# Patient Record
Sex: FEMALE | Race: WHITE | Employment: OTHER | URBAN - METROPOLITAN AREA
[De-identification: names, ages, dates, MRNs, and addresses within clinical notes are randomized per-mention and may not be internally consistent; named-entity substitution may affect disease eponyms.]

---

## 2017-01-26 ENCOUNTER — OFFICE VISIT (OUTPATIENT)
Dept: SLEEP MEDICINE | Age: 77
End: 2017-01-26

## 2017-01-26 ENCOUNTER — DOCUMENTATION ONLY (OUTPATIENT)
Dept: SLEEP MEDICINE | Age: 77
End: 2017-01-26

## 2017-01-26 VITALS
WEIGHT: 214 LBS | HEIGHT: 61 IN | BODY MASS INDEX: 40.4 KG/M2 | DIASTOLIC BLOOD PRESSURE: 65 MMHG | SYSTOLIC BLOOD PRESSURE: 169 MMHG | OXYGEN SATURATION: 95 % | TEMPERATURE: 98.2 F | HEART RATE: 55 BPM

## 2017-01-26 DIAGNOSIS — E66.01 OBESITY, MORBID, BMI 40.0-49.9 (HCC): ICD-10-CM

## 2017-01-26 DIAGNOSIS — G47.33 OSA (OBSTRUCTIVE SLEEP APNEA): Primary | ICD-10-CM

## 2017-01-26 RX ORDER — ZINC GLUCONATE 10 MG
1 LOZENGE ORAL
COMMUNITY
End: 2019-08-07

## 2017-01-26 RX ORDER — CALCIUM CARBONATE 600 MG
600 TABLET ORAL
COMMUNITY
End: 2019-08-07

## 2017-01-26 NOTE — PROGRESS NOTES
Faxed supply order to [facility], per [facility] no longer accepts medicare.  Patient has been notified of new DME and contact information

## 2017-01-26 NOTE — PROGRESS NOTES
[address] [place]. [place], [address]  Tel.  [phone]  Fax. 100 [facility] 60  [place], [address]  Tel.  [phone]  Fax. [phone] [address] [address] [address]   Tel.  [phone]  Fax. [phone]     S>Jeannine Zimmer is a 67 y.o. female seen for a positive airway pressure follow-up. She reports some problems using the device. The following problems are identified:    Drowsiness no Problems exhaling no   Snoring no Forget to put on no   Mask Comfortable yes Can't fall asleep no   Dry Mouth no Mask falls off no   Air Leaking yes Frequent awakenings no         She admits that her sleep has improved. Allergies   Allergen Reactions    Sulfa (Sulfonamide Antibiotics) Other (comments)     Unknown    Codeine Other (comments)    Levaquin [Levofloxacin] Other (comments)    Lisinopril Cough       She has a current medication list which includes the following prescription(s): calcium carbonate, magnesium, levetiracetam, metoprolol tartrate, chlorthalidone, pravastatin, ondansetron, nebivolol, simvastatin, amlodipine, terazosin hcl, losartan, docosahexanoic acid/epa, cholecalciferol (vitamin d3), calcium, aspirin, and levofloxacin. .      She  has a past medical history of High cholesterol and Hypertension. [place] Sleepiness Score: 6   and Modified F.O.S.Q. Score Total / 2: 18   which reflect improved sleep quality over therapy time. O>    Visit Vitals    /65    Pulse (!) 55    Temp 98.2 °F (36.8 °C)    Ht 5' 1\" (1.549 m)    Wt 214 lb (97.1 kg)    SpO2 95%    BMI 40.43 kg/m2           General:   Alert, oriented, not in distress   Neck:   No JVD    Chest/Lungs:  symetrical lung expansion , no accessory muscle use    Extremities:  no obvious rashes , negative edema    Neuro:  No focal deficits ; No obvious tremor    Psych:  Normal affect ,  Normal countenance ;           A>    ICD-10-CM ICD-9-CM    1.  ABIGAIL (obstructive sleep apnea) G47.33 327.23 AMB SUPPLY ORDER   2. Obesity, morbid, BMI 40.0-49.9 ([facility] E66.01 278.01      AHI = 28 (2014). On CPAP :  12 cmH2O. Compliant:      yes    Therapeutic Response:  Positive    P>    Orders Placed This Encounter    AMB SUPPLY ORDER     Wireless modem enrollment with privileges to change therapy remotely - indefinite. PAP Mask -patient preference, tubing, filters as needed (all sleep supplies)  Length of Need = 99 months    Narayan Guerrier M.D.  (electronically signed)  Diplomate in Sleep Medicine, [facility]    AMB SUPPLY ORDER     * CPAP interface re-evaluation - patient has mask comfort issues. Narayan Guerrier M.D.  (electronically signed)  Diplomate in Sleep Medicine, [facility]       * Follow-up Disposition:  Return in about 1 year (around 1/26/2018), or if symptoms worsen or fail to improve. * She was asked to contact our office for any problems regarding PAP therapy. * Counseling was provided regarding the importance of regular PAP use and on proper sleep hygiene and safe driving. * Re-enforced proper and regular cleaning for the device. Discussed the patient's above normal BMI with her. I have recommended the following interventions: dietary management education, guidance, and counseling . The BMI follow up plan is as follows: BMI is out of normal parameters and plan is as follows: I have counseled this patient on diet and exercise regimens    Thank you for allowing us to participate in your patient's medical care. Narayan Guerrier M.D.   [facility]

## 2017-01-26 NOTE — PATIENT INSTRUCTIONS
[address] [facility] [place]. [place], [address]  Tel.  [phone]  Fax. 100 [facility] 60  [place], [address]  Tel.  [phone]  Fax. [phone] [address]  Tel.  [phone]  Fax. [phone]     Learning About CPAP for Sleep Apnea  What is CPAP? CPAP is a small machine that you use at home every night while you sleep. It increases air pressure in your throat to keep your airway open. When you have sleep apnea, this can help you sleep better so you feel much better. CPAP stands for \"continuous positive airway pressure. \"  The CPAP machine will have one of the following:  · A mask that covers your nose and mouth  · Prongs that fit into your nose  · A mask that covers your nose only, the most common type. This type is called NCPAP. The N stands for \"nasal.\"  Why is it done? CPAP is usually the best treatment for obstructive sleep apnea. It is the first treatment choice and the most widely used. Your doctor may suggest CPAP if you have:  · Moderate to severe sleep apnea. · Sleep apnea and coronary artery disease (CAD) or heart failure. How does it help? · CPAP can help you have more normal sleep, so you feel less sleepy and more alert during the daytime. · CPAP may help keep heart failure or other heart problems from getting worse. · NCPAP may help lower your blood pressure. · If you use CPAP, your bed partner may also sleep better because you are not snoring or restless. What are the side effects? Some people who use CPAP have:  · A dry or stuffy nose and a sore throat. · Irritated skin on the face. · Sore eyes. · Bloating. If you have any of these problems, work with your doctor to fix them. Here are some things you can try:  · Be sure the mask or nasal prongs fit well. · See if your doctor can adjust the pressure of your CPAP. · If your nose is dry, try a humidifier.   · If your nose is runny or stuffy, try decongestant medicine or a steroid nasal spray. If these things do not help, you might try a different type of machine. Some machines have air pressure that adjusts on its own. Others have air pressures that are different when you breathe in than when you breathe out. This may reduce discomfort caused by too much pressure in your nose. Where can you learn more? Go to BloomReach.be  Enter "[employer]" in the search box to learn more about \"Learning About CPAP for Sleep Apnea. \"   © 9032-7705 Healthwise, Incorporated. Care instructions adapted under license by Keturah Browne (which disclaims liability or warranty for this information). This care instruction is for use with your licensed healthcare professional. If you have questions about a medical condition or this instruction, always ask your healthcare professional. [address] any warranty or liability for your use of this information. Content Version: 3.2.24152; Last Revised: January 11, 2010  PROPER SLEEP HYGIENE    What to avoid  · Do not have drinks with caffeine, such as coffee or black tea, for 8 hours before bed. · Do not smoke or use other types of tobacco near bedtime. Nicotine is a stimulant and can keep you awake. · Avoid drinking alcohol late in the evening, because it can cause you to wake in the middle of the night. · Do not eat a big meal close to bedtime. If you are hungry, eat a light snack. · Do not drink a lot of water close to bedtime, because the need to urinate may wake you up during the night. · Do not read or watch TV in bed. Use the bed only for sleeping and sexual activity. What to try  · Go to bed at the same time every night, and wake up at the same time every morning. Do not take naps during the day. · Keep your bedroom quiet, dark, and cool. · Get regular exercise, but not within 3 to 4 hours of your bedtime. .  · Sleep on a comfortable pillow and mattress.   · If watching the clock makes you anxious, turn it facing away from you so you cannot see the time. · If you worry when you lie down, start a worry book. Well before bedtime, write down your worries, and then set the book and your concerns aside. · Try meditation or other relaxation techniques before you go to bed. · If you cannot fall asleep, get up and go to another room until you feel sleepy. Do something relaxing. Repeat your bedtime routine before you go to bed again. · Make your house quiet and calm about an hour before bedtime. Turn down the lights, turn off the TV, log off the computer, and turn down the volume on music. This can help you relax after a busy day. Drowsy Driving: The [facility] cites drowsiness as a causing factor in more than 496,496 police reported crashes annually, resulting in 76,000 injuries and 1,500 deaths. Other surveys suggest 55% of people polled have driven while drowsy in the past year, 23% had fallen asleep but not crashed, 3% crashed, and 2% had and accident due to drowsy driving. Who is at risk? Young Drivers: One study of drowsy driving accidents states that 55% of the drivers were under 25 years. Of those, 75% were male. Shift Workers and Travelers: People who work overnight or travel across time zones frequently are at higher risk of experiencing Circadian Rhythm Disorders. They are trying to work and function when their body is programed to sleep. Sleep Deprived: Lack of sleep has a serious impact on your ability to pay attention or focus on a task. Consistently getting less than the average of 8 hours your body needs creates partial or cumulative sleep deprivation. Untreated Sleep Disorders: Sleep Apnea, Narcolepsy, R.L.S., and other sleep disorders (untreated) prevent a person from getting enough restful sleep. This leads to excessive daytime sleepiness and increases the risk for drowsy driving accidents by up to 7 times.   Medications / Alcohol: Even over the counter medications can cause drowsiness. Medications that impair a drivers attention should have a warning label. Alcohol naturally makes you sleepy and on its own can cause accidents. Combined with excessive drowsiness its effects are amplified. Signs of Drowsy Driving:   * You don't remember driving the last few miles   * You may drift out of your kiko   * You are unable to focus and your thoughts wander   * You may yawn more often than normal   * You have difficulty keeping your eyes open / nodding off   * Missing traffic signs, speeding, or tailgating  Prevention-   Good sleep hygiene, lifestyle and behavioral choices have the most impact on drowsy driving. There is no substitute for sleep and the average person requires 8 hours nightly. If you find yourself driving drowsy, stop and sleep. Consider the sleep hygiene tips provided during your visit as well. Medication Refill Policy: Refills for all medications require 1 week advance notice. Please have your pharmacy fax a refill request. We are unable to fax, or call in \"controled substance\" medications and you will need to pick these prescriptions up from our office. [employer] Activation    Thank you for requesting access to [employer]. Please follow the instructions below to securely access and download your online medical record. [employer] allows you to send messages to your doctor, view your test results, renew your prescriptions, schedule appointments, and more. How Do I Sign Up? 1. In your internet browser, go to [email or web address] [email or web address]. 2. Click on the First Time User? Click Here link in the Sign In box. You will see the New Member Sign Up page. 3. Enter your [employer] Access Code exactly as it appears below. You will not need to use this code after youve completed the sign-up process. If you do not sign up before the expiration date, you must request a new code. [employer] Access Code:  Activation code not generated  Current "[employer] Status: Patient Declined (This is the date your "[employer] access code will )    4. Enter the last four digits of your Social Security Number (xxxx) and Date of Birth (mm/dd/yyyy) as indicated and click Submit. You will be taken to the next sign-up page. 5. Create a "[employer] ID. This will be your "[employer] login ID and cannot be changed, so think of one that is secure and easy to remember. 6. Create a "[employer] password. You can change your password at any time. 7. Enter your Password Reset Question and Answer. This can be used at a later time if you forget your password. 8. Enter your e-mail address. You will receive e-mail notification when new information is available in [address] 9. Click Sign Up. You can now view and download portions of your medical record. 10. Click the Download Summary menu link to download a portable copy of your medical information. Additional Information    If you have questions, please call [phone]. Remember, "[employer] is NOT to be used for urgent needs. For medical emergencies, dial 911. Starting a Weight Loss Plan: After Your Visit  Your Care Instructions  If you are thinking about losing weight, it can be hard to know where to start. Your doctor can help you set up a weight loss plan that best meets your needs. You may want to take a class on nutrition or exercise, or join a weight loss support group. If you have questions about how to make changes to your eating or exercise habits, ask your doctor about seeing a registered dietitian or an exercise specialist.  It can be a big challenge to lose weight. But you do not have to make huge changes at once. Make small changes, and stick with them. When those changes become habit, add a few more changes. If you do not think you are ready to make changes right now, try to pick a date in the future.  Make an appointment to see your doctor to discuss whether the time is right for you to start a plan.  Follow-up care is a key part of your treatment and safety. Be sure to make and go to all appointments, and call your doctor if you are having problems. It's also a good idea to know your test results and keep a list of the medicines you take. How can you care for yourself at home? · Set realistic goals. Many people expect to lose much more weight than is likely. A weight loss of 5% to 10% of your body weight may be enough to improve your health. · Get family and friends involved to provide support. Talk to them about why you are trying to lose weight, and ask them to help. They can help by participating in exercise and having meals with you, even if they may be eating something different. · Find what works best for you. If you do not have time or do not like to cook, a program that offers meal replacement bars or shakes may be better for you. Or if you like to prepare meals, finding a plan that includes daily menus and recipes may be best.  · Ask your doctor about other health professionals who can help you achieve your weight loss goals. ¨ A dietitian can help you make healthy changes in your diet. ¨ An exercise specialist or  can help you develop a safe and effective exercise program.  ¨ A counselor or psychiatrist can help you cope with issues such as depression, anxiety, or family problems that can make it hard to focus on weight loss. · Consider joining a support group for people who are trying to lose weight. Your doctor can suggest groups in your area. Where can you learn more? Go to [employer]be  Enter U357 in the search box to learn more about \"Starting a Weight Loss Plan: After Your Visit. \"   © 8924-4168 Healthwise, Incorporated. Care instructions adapted under license by [address] [employer] (which disclaims liability or warranty for this information).  This care instruction is for use with your licensed healthcare professional. If you have questions about a medical condition or this instruction, always ask your healthcare professional. [place] [address] any warranty or liability for your use of this information.   Content Version: 7.2.80119; Last Revised: September 1, 2009

## 2017-01-26 NOTE — MR AVS SNAPSHOT
Visit Information Date & Time Provider Department Dept. Phone Encounter #  
 1/26/2017  9:20 AM Kathleen Hartmann MD [facility] 53 [id number] Follow-up Instructions Return in about 1 year (around 1/26/2018), or if symptoms worsen or fail to improve. Follow-up and Disposition History Your Appointments 8/10/2017  9:40 AM  
Follow Up with Jareth Swanson MD  
Neurology sergio [facility] ([facility]) Appt Note: FU seizures $0cp-db [place] [address] [facility] [address] [phone]  
  
   
 [place] [address] [id number] [address] Upcoming Health Maintenance Date Due DTaP/Tdap/Td series (1 - Tdap) 8/13/1960 ZOSTER VACCINE AGE 60> 8/13/1999 GLAUCOMA SCREENING Q2Y 8/13/2004 OSTEOPOROSIS SCREENING (DEXA) 8/13/2004 Pneumococcal 65+ Low/Medium Risk (1 of 2 - PCV13) 8/13/2004 MEDICARE YEARLY EXAM 8/13/2004 INFLUENZA AGE 9 TO ADULT 8/1/2016 Allergies as of 1/26/2017  Review Complete On: 1/26/2017 By: Kathleen Hartmann MD  
  
 Severity Noted Reaction Type Reactions Sulfa (Sulfonamide Antibiotics) High 10/02/2014    Other (comments) Unknown Codeine  05/31/2016    Other (comments) Levaquin [Levofloxacin]  05/31/2016    Other (comments) Lisinopril  05/31/2016    Cough Current Immunizations  Never Reviewed No immunizations on file. Not reviewed this visit You Were Diagnosed With   
  
 Codes Comments ABIGAIL (obstructive sleep apnea)    -  Primary ICD-10-CM: G47.33 
ICD-9-CM: 327.23 Obesity, morbid, BMI 40.0-49.9 (HCC)     ICD-10-CM: E66.01 
ICD-9-CM: 278.01 Vitals BP Pulse Temp Height(growth percentile) Weight(growth percentile) SpO2  
 169/65 (!) 55 98.2 °F (36.8 °C) 5' 1\" (1.549 m) 214 lb (97.1 kg) 95% BMI OB Status Smoking Status 40.43 kg/m2 Hysterectomy Never Smoker Vitals History BMI and BSA Data Body Mass Index Body Surface Area 40.43 kg/m 2 2.04 m 2 Preferred Pharmacy Pharmacy Name [facility] [address] [facility] Your Updated Medication List  
  
   
This list is accurate as of: 1/26/17  9:45 AM.  Always use your most recent med list.  
  
  
  
  
 aspirin 81 mg chewable tablet Take 81 mg by mouth daily. BYSTOLIC 5 mg tablet Generic drug:  nebivolol Take  by mouth daily. CALCIUM 600 600 mg (1,500 mg) tablet Generic drug:  calcium carbonate Take 600 mg by mouth two (2) times a day. CALCIUM PO Take  by mouth. chlorthalidone 25 mg tablet Commonly known as:  Annalee  Take  by mouth daily. FISH OIL PO Take  by mouth. HYTRIN PO Take  by mouth.  
  
 levETIRAcetam 500 mg tablet Commonly known as:  KEPPRA 1 in AM and HALF in PM  
  
 levoFLOXacin 750 mg tablet Commonly known as:  Bianca Nitza Take 1 tablet by mouth daily. losartan 100 mg tablet Commonly known as:  COZAAR Take 100 mg by mouth daily. magnesium 250 mg Tab Take  by mouth.  
  
 metoprolol tartrate 100 mg IR tablet Commonly known as:  LOPRESSOR Take  by mouth two (2) times a day. NORVASC 10 mg tablet Generic drug:  amLODIPine Take  by mouth daily. ondansetron 4 mg disintegrating tablet Commonly known as:  ZOFRAN ODT Take 1 tablet by mouth every eight (8) hours as needed for Nausea. pravastatin 20 mg tablet Commonly known as:  PRAVACHOL Take 20 mg by mouth nightly. simvastatin 20 mg tablet Commonly known as:  ZOCOR Take  by mouth nightly. VITAMIN D3 2,000 unit Tab Generic drug:  cholecalciferol (vitamin D3) Take  by mouth. We Performed the Following AMB SUPPLY ORDER [[id number] Custom] Comments:  
 Wireless modem enrollment with privileges to change therapy remotely - indefinite. PAP Mask -patient preference, tubing, filters as needed (all sleep supplies) Length of Need = 99 months Dave Marin M.D.  (electronically signed) Diplomate in Sleep Medicine, [facility] AMB SUPPLY ORDER [[id number] Custom] Comments: * CPAP interface re-evaluation - patient has mask comfort issues. Dave Marin M.D.  (electronically signed) Diplomate in Sleep Medicine, [facility] Follow-up Instructions Return in about 1 year (around 1/26/2018), or if symptoms worsen or fail to improve. Patient Instructions [address] [place]. [address] [facility], [address] Tel.  [phone] Fax. [address] [facility] [address] [place], [address] [facility] Tel.  [phone] Fax. [phone] [address] [place] [address] Tel.  [phone] Fax. [phone] Learning About CPAP for Sleep Apnea What is CPAP? CPAP is a small machine that you use at home every night while you sleep. It increases air pressure in your throat to keep your airway open. When you have sleep apnea, this can help you sleep better so you feel much better. CPAP stands for \"continuous positive airway pressure. \" The CPAP machine will have one of the following: · A mask that covers your nose and mouth · Prongs that fit into your nose · A mask that covers your nose only, the most common type. This type is called NCPAP. The N stands for \"nasal.\" Why is it done? CPAP is usually the best treatment for obstructive sleep apnea. It is the first treatment choice and the most widely used. Your doctor may suggest CPAP if you have: · Moderate to severe sleep apnea. · Sleep apnea and coronary artery disease (CAD) or heart failure. How does it help? · CPAP can help you have more normal sleep, so you feel less sleepy and more alert during the daytime. · CPAP may help keep heart failure or other heart problems from getting worse. · NCPAP may help lower your blood pressure. · If you use CPAP, your bed partner may also sleep better because you are not snoring or restless. What are the side effects? Some people who use CPAP have: · A dry or stuffy nose and a sore throat. · Irritated skin on the face. · Sore eyes. · Bloating. If you have any of these problems, work with your doctor to fix them. Here are some things you can try: · Be sure the mask or nasal prongs fit well. · See if your doctor can adjust the pressure of your CPAP. · If your nose is dry, try a humidifier. · If your nose is runny or stuffy, try decongestant medicine or a steroid nasal spray. If these things do not help, you might try a different type of machine. Some machines have air pressure that adjusts on its own. Others have air pressures that are different when you breathe in than when you breathe out. This may reduce discomfort caused by too much pressure in your nose. Where can you learn more? Go to Drimmi.be Enter A610 in the search box to learn more about \"Learning About CPAP for Sleep Apnea. \"  
© 7084-4437 Healthwise, Incorporated. Care instructions adapted under license by [employer] (which disclaims liability or warranty for this information). This care instruction is for use with your licensed healthcare professional. If you have questions about a medical condition or this instruction, always ask your healthcare professional. [place] [address] any warranty or liability for your use of this information. Content Version: 5.7.89526; Last Revised: January 11, 2010 PROPER SLEEP HYGIENE What to avoid · Do not have drinks with caffeine, such as coffee or black tea, for 8 hours before bed. · Do not smoke or use other types of tobacco near bedtime. Nicotine is a stimulant and can keep you awake. · Avoid drinking alcohol late in the evening, because it can cause you to wake in the middle of the night. · Do not eat a big meal close to bedtime. If you are hungry, eat a light snack. · Do not drink a lot of water close to bedtime, because the need to urinate may wake you up during the night. · Do not read or watch TV in bed. Use the bed only for sleeping and sexual activity. What to try · Go to bed at the same time every night, and wake up at the same time every morning. Do not take naps during the day. · Keep your bedroom quiet, dark, and cool. · Get regular exercise, but not within 3 to 4 hours of your bedtime. Mayo Junk · Sleep on a comfortable pillow and mattress. · If watching the clock makes you anxious, turn it facing away from you so you cannot see the time. · If you worry when you lie down, start a worry book. Well before bedtime, write down your worries, and then set the book and your concerns aside. · Try meditation or other relaxation techniques before you go to bed. · If you cannot fall asleep, get up and go to another room until you feel sleepy. Do something relaxing. Repeat your bedtime routine before you go to bed again. · Make your house quiet and calm about an hour before bedtime. Turn down the lights, turn off the TV, log off the computer, and turn down the volume on music. This can help you relax after a busy day. Drowsy Driving: The [employer] cites drowsiness as a causing factor in more than 167,509 police reported crashes annually, resulting in 76,000 injuries and 1,500 deaths. Other surveys suggest 55% of people polled have driven while drowsy in the past year, 23% had fallen asleep but not crashed, 3% crashed, and 2% had and accident due to drowsy driving. Who is at risk? Young Drivers: One study of drowsy driving accidents states that 55% of the drivers were under 25 years. Of those, 75% were male.   
Shift Workers and Travelers: People who work overnight or travel across time zones frequently are at higher risk of experiencing Circadian Rhythm Disorders. They are trying to work and function when their body is programed to sleep. Sleep Deprived: Lack of sleep has a serious impact on your ability to pay attention or focus on a task. Consistently getting less than the average of 8 hours your body needs creates partial or cumulative sleep deprivation. Untreated Sleep Disorders: Sleep Apnea, Narcolepsy, R.L.S., and other sleep disorders (untreated) prevent a person from getting enough restful sleep. This leads to excessive daytime sleepiness and increases the risk for drowsy driving accidents by up to 7 times. Medications / Alcohol: Even over the counter medications can cause drowsiness. Medications that impair a drivers attention should have a warning label. Alcohol naturally makes you sleepy and on its own can cause accidents. Combined with excessive drowsiness its effects are amplified. Signs of Drowsy Driving: * You don't remember driving the last few miles * You may drift out of your kiko * You are unable to focus and your thoughts wander * You may yawn more often than normal 
 * You have difficulty keeping your eyes open / nodding off * Missing traffic signs, speeding, or tailgating Prevention-  
Good sleep hygiene, lifestyle and behavioral choices have the most impact on drowsy driving. There is no substitute for sleep and the average person requires 8 hours nightly. If you find yourself driving drowsy, stop and sleep. Consider the sleep hygiene tips provided during your visit as well. Medication Refill Policy: Refills for all medications require 1 week advance notice. Please have your pharmacy fax a refill request. We are unable to fax, or call in \"controled substance\" medications and you will need to pick these prescriptions up from our office. [employer] Activation Thank you for requesting access to [employer].  Please follow the instructions below to securely access and download your online medical record. [employer] allows you to send messages to your doctor, view your test results, renew your prescriptions, schedule appointments, and more. How Do I Sign Up? 1. In your internet browser, go to [email or web address]. 2. Click on the First Time User? Click Here link in the Sign In box. You will see the New Member Sign Up page. 3. Enter your [employer] Access Code exactly as it appears below. You will not need to use this code after youve completed the sign-up process. If you do not sign up before the expiration date, you must request a new code. MyChart Access Code: Activation code not generated Current [employer] Status: Patient Declined (This is the date your [employer] access code will ) 4. Enter the last four digits of your Social Security Number (xxxx) and Date of Birth (mm/dd/yyyy) as indicated and click Submit. You will be taken to the next sign-up page. 5. Create a [employer] ID. This will be your [employer] login ID and cannot be changed, so think of one that is secure and easy to remember. 6. Create a [employer] password. You can change your password at any time. 7. Enter your Password Reset Question and Answer. This can be used at a later time if you forget your password. 8. Enter your e-mail address. You will receive e-mail notification when new information is available in [address] 9. Click Sign Up. You can now view and download portions of your medical record. 10. Click the Download Summary menu link to download a portable copy of your medical information. Additional Information If you have questions, please call [phone]. Remember, [employer] is NOT to be used for urgent needs. For medical emergencies, dial 911. Starting a Weight Loss Plan: After Your Visit Your Care Instructions If you are thinking about losing weight, it can be hard to know where to start. Your doctor can help you set up a weight loss plan that best meets your needs. You may want to take a class on nutrition or exercise, or join a weight loss support group. If you have questions about how to make changes to your eating or exercise habits, ask your doctor about seeing a registered dietitian or an exercise specialist. 
It can be a big challenge to lose weight. But you do not have to make huge changes at once. Make small changes, and stick with them. When those changes become habit, add a few more changes. If you do not think you are ready to make changes right now, try to pick a date in the future. Make an appointment to see your doctor to discuss whether the time is right for you to start a plan. Follow-up care is a key part of your treatment and safety. Be sure to make and go to all appointments, and call your doctor if you are having problems. It's also a good idea to know your test results and keep a list of the medicines you take. How can you care for yourself at home? · Set realistic goals. Many people expect to lose much more weight than is likely. A weight loss of 5% to 10% of your body weight may be enough to improve your health. · Get family and friends involved to provide support. Talk to them about why you are trying to lose weight, and ask them to help. They can help by participating in exercise and having meals with you, even if they may be eating something different. · Find what works best for you. If you do not have time or do not like to cook, a program that offers meal replacement bars or shakes may be better for you. Or if you like to prepare meals, finding a plan that includes daily menus and recipes may be best. 
· Ask your doctor about other health professionals who can help you achieve your weight loss goals. ¨ A dietitian can help you make healthy changes in your diet.  
¨ An exercise specialist or  can help you develop a safe and effective exercise program. 
¨ A counselor or psychiatrist can help you cope with issues such as depression, anxiety, or family problems that can make it hard to focus on weight loss. · Consider joining a support group for people who are trying to lose weight. Your doctor can suggest groups in your area. Where can you learn more? Go to Senic.be Enter R344 in the search box to learn more about \"Starting a Weight Loss Plan: After Your Visit. \"  
© 9959-2823 Healthwise, Incorporated. Care instructions adapted under license by [employer]ona [employer] (which disclaims liability or warranty for this information). This care instruction is for use with your licensed healthcare professional. If you have questions about a medical condition or this instruction, always ask your healthcare professional. [address] any warranty or liability for your use of this information. Content Version: 2.8.94709; Last Revised: September 1, 2009 Please provide this summary of care documentation to your next provider. Your primary care clinician is listed as Lola Sim. If you have any questions after today's visit, please call [phone].

## 2017-03-23 ENCOUNTER — DOCUMENTATION ONLY (OUTPATIENT)
Dept: SLEEP MEDICINE | Age: 77
End: 2017-03-23

## 2017-07-24 ENCOUNTER — TELEPHONE (OUTPATIENT)
Dept: NEUROLOGY | Age: 77
End: 2017-07-24

## 2017-07-24 DIAGNOSIS — Z86.69 HX OF SEIZURE DISORDER: Primary | ICD-10-CM

## 2017-07-24 NOTE — TELEPHONE ENCOUNTER
Contacted patient and made her aware of lab orders. She requested orders be mailed to address on file.

## 2017-07-24 NOTE — TELEPHONE ENCOUNTER
Yes, printed order for CMP and Keppra level. Ask her to get it done 1 week before her follow up visit. Thanks.

## 2017-07-24 NOTE — TELEPHONE ENCOUNTER
----- Message from Hugo Bradley sent at 7/24/2017 11:24 AM EDT -----  Regarding: Dr. Anderson Perea would like a callback to know if she needs to have blood work performed before her next appt on 8/10/17.      [phone]

## 2017-08-04 LAB
ALBUMIN SERPL-MCNC: 4 G/DL (ref 3.5–4.8)
ALBUMIN/GLOB SERPL: 1.4 {RATIO} (ref 1.2–2.2)
ALP SERPL-CCNC: 76 IU/L (ref 39–117)
ALT SERPL-CCNC: 21 IU/L (ref 0–32)
AST SERPL-CCNC: 23 IU/L (ref 0–40)
BILIRUB SERPL-MCNC: 0.7 MG/DL (ref 0–1.2)
BUN SERPL-MCNC: 40 MG/DL (ref 8–27)
BUN/CREAT SERPL: 25 (ref 12–28)
CALCIUM SERPL-MCNC: 10.1 MG/DL (ref 8.7–10.3)
CHLORIDE SERPL-SCNC: 105 MMOL/L (ref 96–106)
CO2 SERPL-SCNC: 22 MMOL/L (ref 18–29)
CREAT SERPL-MCNC: 1.6 MG/DL (ref 0.57–1)
GLOBULIN SER CALC-MCNC: 2.8 G/DL (ref 1.5–4.5)
GLUCOSE SERPL-MCNC: 86 MG/DL (ref 65–99)
LEVETIRACETAM SERPL-MCNC: 36.2 UG/ML (ref 10–40)
POTASSIUM SERPL-SCNC: 5 MMOL/L (ref 3.5–5.2)
PROT SERPL-MCNC: 6.8 G/DL (ref 6–8.5)
SODIUM SERPL-SCNC: 142 MMOL/L (ref 134–144)

## 2017-08-10 ENCOUNTER — OFFICE VISIT (OUTPATIENT)
Dept: NEUROLOGY | Age: 77
End: 2017-08-10

## 2017-08-10 VITALS
WEIGHT: 209 LBS | HEART RATE: 64 BPM | SYSTOLIC BLOOD PRESSURE: 128 MMHG | OXYGEN SATURATION: 98 % | DIASTOLIC BLOOD PRESSURE: 70 MMHG | HEIGHT: 61 IN | BODY MASS INDEX: 39.46 KG/M2

## 2017-08-10 DIAGNOSIS — R20.2 PARESTHESIA: ICD-10-CM

## 2017-08-10 DIAGNOSIS — G40.909 NONINTRACTABLE EPILEPSY WITHOUT STATUS EPILEPTICUS, UNSPECIFIED EPILEPSY TYPE (HCC): Primary | ICD-10-CM

## 2017-08-10 NOTE — PROGRESS NOTES
Interval HPI:   This is a 67 y.o. female who is following up for     Chief Complaint   Patient presents with    Seizure     Interval Hx:    EEG (8-29-26): normal  Keppra level (8-2-17): 36.2 (normal, rr 10-40)  CMP: mild elevated Cr 1.6 (essentially unchanged compared to Jan 2015)    Pt accompanied by daughter. She denies any syncopal or seizure-like episodes since last one in May 2016. Lowered dose of Keppra last visit to 500 AM/ 250 PM due to c/o lightheadedness. No similar complaints today, says she's tolerating it well. Only other issue she wanted to discuss was having random areas of sticking sensation in thighs, legs, and occasionally arms. No numbness/ tingling/ burning in feet, not diabetic, and the sensations seem to be less frequent recently. No diffuse muscle aching or limb pain (other than knee pains) to suggest myopathy or fibromyalgia.       =============================  Brief Hx:  67 y.o. female with remote hx of epilepsy, well controlled on combination of low dose Phenytoin (? Dose) and Mysoline. Last witnessed seizure in 1967. Those meds stopped around 2014 d/t no seizure for many years. Had an episode on 5-10-16 where patient fell out of bed in early AM, urinary incontinence, tongue or cheek bite, that may have been a seizure. Was started on Keppra 500 mg BID at that point. Brief ROS: as above or otherwise negative  There have been no significant changes in PMHx, PSHx, SHx except as noted above. Allergies   Allergen Reactions    Sulfa (Sulfonamide Antibiotics) Other (comments)     Unknown    Codeine Other (comments)    Levaquin [Levofloxacin] Other (comments)    Lisinopril Cough     Current Outpatient Prescriptions   Medication Sig Dispense Refill    omega 3-dha-epa-fish oil (FISH OIL) 100-160-1,000 mg cap Take  by mouth.  calcium carbonate (CALCIUM 600) 600 mg (1,500 mg) tablet Take 600 mg by mouth two (2) times a day.       magnesium 250 mg tab Take  by mouth.      levETIRAcetam (KEPPRA) 500 mg tablet 1 in AM and HALF in  Tab 2    metoprolol tartrate (LOPRESSOR) 100 mg IR tablet Take  by mouth two (2) times a day.  chlorthalidone (HYGROTEN) 25 mg tablet Take  by mouth daily.  pravastatin (PRAVACHOL) 20 mg tablet Take 20 mg by mouth nightly.  amlodipine (NORVASC) 10 mg tablet Take  by mouth daily.  TERAZOSIN HCL (HYTRIN PO) Take  by mouth.  losartan (COZAAR) 100 mg tablet Take 100 mg by mouth daily.  DOCOSAHEXANOIC ACID/EPA (FISH OIL PO) Take  by mouth.  cholecalciferol, vitamin D3, (VITAMIN D3) 2,000 unit tab Take  by mouth.  CALCIUM PO Take  by mouth.  aspirin 81 mg chewable tablet Take 81 mg by mouth daily.  ondansetron (ZOFRAN ODT) 4 mg disintegrating tablet Take 1 tablet by mouth every eight (8) hours as needed for Nausea. 15 tablet 0    levofloxacin (LEVAQUIN) 750 mg tablet Take 1 tablet by mouth daily. 10 tablet 0    nebivolol (BYSTOLIC) 5 mg tablet Take  by mouth daily.  simvastatin (ZOCOR) 20 mg tablet Take  by mouth nightly. Physical Exam  Blood pressure 128/70, pulse 64, height 5' 1\" (1.549 m), weight 94.8 kg (209 lb), SpO2 98 %. No acute distress  Neck: no stiffness  Skin: no rashes    Focused Neurological Exam     Mental status: Alert and oriented to person, place situation. Language: normal fluency and comprehension; no dysarthria. CNs:   Visual fields grossly normal  Extraocular movements intact, no nystagmus  Face appears symmetric and facial strength normal.    Hearing is intact to casual conversation. Sensory: intact light touch in all 4 extremities  Motor: Normal bulk and strength in all 4 extremities, no myalgias  Reflexes: not tested today  Gait: not observed    Impression      ICD-10-CM ICD-9-CM    1. Nonintractable epilepsy without status epilepticus, unspecified epilepsy type [facility] G40.909 345.90    2.  Paresthesia R20.2 782.0        1) Epilepsy (not intractable)  Well controlled after restarting AED. No further syncopal or seizure-like episode since the one in May 2016. Continue Keppra 500 mg AM and 250 mg PM; no side effects (had SEFx on higher dose). Pt recently filled 90 day Rx Keppa 500 mg AM and 250 mg so no new Rx today. Will authorize when new Rx request sent in. 2) Paresthesias  D/w patient that paresthesias don't seem to be in the pattern of a typical, symmetric, polyneuropathy, or suggestive of a radiculopathy. Could be small fiber polyneuropathy vs random paresthesias. As her symptoms seem to be lessening, we will defer any lab evaluation or nerve conduction/ emg testing for now. If symptoms become more frequent, she was advised to make an earlier follow up visit and we'll come up with an evaluation plan at that point.      3) F/u in 1 year

## 2017-10-16 ENCOUNTER — TELEPHONE (OUTPATIENT)
Dept: NEUROLOGY | Age: 77
End: 2017-10-16

## 2017-10-16 DIAGNOSIS — Z86.69 HX OF SEIZURE DISORDER: ICD-10-CM

## 2017-10-16 DIAGNOSIS — R56.9 CONVULSIONS, UNSPECIFIED CONVULSION TYPE (HCC): ICD-10-CM

## 2017-10-16 RX ORDER — LEVETIRACETAM 500 MG/1
TABLET ORAL
Qty: 405 TAB | Refills: 1 | Status: SHIPPED | OUTPATIENT
Start: 2017-10-16 | End: 2019-01-14 | Stop reason: SDUPTHER

## 2017-10-16 NOTE — TELEPHONE ENCOUNTER
----- Message from Fatimah Xavier MD sent at 10/16/2017  1:51 PM EDT -----  Regarding: Call pt or Daughter re keppra refill  Got a message from [address] saying refill error on Keppra \"Pt not found. \" I didn't send in a Rx at her last visit in early August but was going to when I got the request.  Call daughter and see if they changed pharmacies or something.      Thx

## 2017-10-16 NOTE — TELEPHONE ENCOUNTER
----- Message from Ck Centeno sent at 10/16/2017  2:47 PM EDT -----  Regarding: Dr Dinh/rx refill  Pt (p) [phone], she is following up on rx refill request for Keppra, for [facility], , she said she is out of the medication, and will need it called in as soon as possible.

## 2017-10-16 NOTE — TELEPHONE ENCOUNTER
Contacted patients daughter in law Davida. She states patient is still using the [facility] [address] in [place] [address]. She will find out is patient needs it refilled and call back.

## 2017-10-16 NOTE — TELEPHONE ENCOUNTER
Received the following message:   ----- Message from Fernanda Wynn sent at 10/16/2017  2:47 PM EDT -----  Regarding: Dr Dinh/rx refill  Pt (p) [phone], she is following up on rx refill request for Keppra, for [facility], , she said she is out of the medication, and will need it called in as soon as possible.

## 2017-10-17 ENCOUNTER — TELEPHONE (OUTPATIENT)
Dept: NEUROLOGY | Age: 77
End: 2017-10-17

## 2017-10-17 NOTE — TELEPHONE ENCOUNTER
----- Message from Joellen Phelps sent at 10/16/2017  3:19 PM EDT -----  Regarding: Dr. Jaylyn Chen called concerning her medication and would like it to be sent to the pharmacy on file.  Pt best contact number  [phone]

## 2017-10-17 NOTE — TELEPHONE ENCOUNTER
Contacted patient and informed her Keppra was sent to the pharmacy yesterday. Patient voiced understanding and will call back if any further questions or concerns.

## 2018-01-24 ENCOUNTER — DOCUMENTATION ONLY (OUTPATIENT)
Dept: SLEEP MEDICINE | Age: 78
End: 2018-01-24

## 2018-01-24 NOTE — PROGRESS NOTES
Silverio Jasso from [address] called advised need new cpap supply order for patient. Hector Hyatt that patient will need to schedule yearly follow up in order to get a current cpap supply order. LVm for patient to call office to schedule appointment.

## 2018-01-25 ENCOUNTER — OFFICE VISIT (OUTPATIENT)
Dept: SLEEP MEDICINE | Age: 78
End: 2018-01-25

## 2018-01-25 VITALS
WEIGHT: 210.5 LBS | HEART RATE: 63 BPM | DIASTOLIC BLOOD PRESSURE: 81 MMHG | OXYGEN SATURATION: 98 % | SYSTOLIC BLOOD PRESSURE: 147 MMHG | HEIGHT: 61 IN | BODY MASS INDEX: 39.74 KG/M2

## 2018-01-25 DIAGNOSIS — G47.33 OSA (OBSTRUCTIVE SLEEP APNEA): Primary | ICD-10-CM

## 2018-01-25 DIAGNOSIS — I10 ESSENTIAL HYPERTENSION: ICD-10-CM

## 2018-01-25 NOTE — PROGRESS NOTES
[address] [place]. [place], [address]  Tel.  [phone]  Fax. 100 [facility] 60  [place], [address]  Tel.  [phone]  Fax. [phone] [address] [facility] [address] [place] [address]  Tel.  [phone]  Fax. [phone]     S>Jeannine De Oliveira is a 65 y.o. female seen for a positive airway pressure follow-up. She reports no problems using the device. She is 98.9% compliant over the past 30 days. The following problems are identified:    Drowsiness no Problems exhaling no   Snoring no Forget to put on no   Mask Comfortable yes Can't fall asleep no   Dry Mouth no Mask falls off no   Air Leaking no Frequent awakenings no       She admits that her sleep has improved. Allergies   Allergen Reactions    Sulfa (Sulfonamide Antibiotics) Other (comments)     Unknown    Codeine Other (comments)    Levaquin [Levofloxacin] Other (comments)    Lisinopril Cough       She has a current medication list which includes the following prescription(s): levetiracetam, omega 3-dha-epa-fish oil, magnesium, metoprolol tartrate, chlorthalidone, pravastatin, amlodipine, terazosin hcl, losartan, cholecalciferol (vitamin d3), aspirin, calcium carbonate, ondansetron, levofloxacin, nebivolol, simvastatin, docosahexanoic acid/epa, and calcium. .      She  has a past medical history of High cholesterol and Hypertension. Causey Sleepiness Score: 5   and Modified F.O.S.Q. Score Total / 2: 19   which reflect improved sleep quality over therapy time.     O>    Visit Vitals    /81    Pulse 63    Ht 5' 1\" (1.549 m)    Wt 210 lb 8 oz (95.5 kg)    SpO2 98%    BMI 39.77 kg/m2         General:   Not in acute distress   Eyes:  Anicteric sclerae, no obvious strabismus   Nose:  No obvious nasal septum deviation    Oropharynx:   Class 4 oropharyngeal outlet, thick tongue base, uvula not seen due to low-lying soft palate, narrow tonsilo-pharyngeal pilars   Tonsils:   tonsils are not visualized due to low-lying soft palate   Neck:   midline trachea   Chest/Lungs:  Equal lung expansion, clear on auscultation    CVS:  Normal rate, regular rhythm; no JVD   Skin:  Warm to touch; no obvious rashes   Neuro:  No focal deficits ; no obvious tremor    Psych:  Normal affect,  normal countenance;           A>    ICD-10-CM ICD-9-CM    1. ABIGAIL (obstructive sleep apnea) G47.33 327.23 AMB SUPPLY ORDER   2. Essential hypertension I10 401.9    3. BMI 39.0-39.9,adult Z68.39 V85.39      AHI = 28 (2014). On CPAP :  10.5 cmH2O. Compliant:      yes    Therapeutic Response:  Positive    P>    * We have recommended a dedicated weight loss through appropriate diet and an exercise regiment as significant weight reduction has been shown to reduce severity of obstructive sleep apnea. * Follow-up Disposition:  Return in about 1 year (around 1/25/2019), or if symptoms worsen or fail to improve. * She was asked to contact our office for any problems regarding PAP therapy. * Counseling was provided regarding the importance of regular PAP use and on proper sleep hygiene and safe driving. * Re-enforced proper and regular cleaning for the device. Thank you for allowing us to participate in your patient's medical care. Reyes Sill, MD, FAASM  Electronically signed.  01/25/18

## 2018-01-25 NOTE — PATIENT INSTRUCTIONS
[address] [facility] [place]. [place], [address]  Tel.  [phone]  Fax. 100 [facility] 60  [place], [address] [facility]  Tel.  [phone]  Fax. [phone] [address]  Tel.  [phone]  Fax. [phone]     Learning About CPAP for Sleep Apnea  What is CPAP? CPAP is a small machine that you use at home every night while you sleep. It increases air pressure in your throat to keep your airway open. When you have sleep apnea, this can help you sleep better so you feel much better. CPAP stands for \"continuous positive airway pressure. \"  The CPAP machine will have one of the following:  · A mask that covers your nose and mouth  · Prongs that fit into your nose  · A mask that covers your nose only, the most common type. This type is called NCPAP. The N stands for \"nasal.\"  Why is it done? CPAP is usually the best treatment for obstructive sleep apnea. It is the first treatment choice and the most widely used. Your doctor may suggest CPAP if you have:  · Moderate to severe sleep apnea. · Sleep apnea and coronary artery disease (CAD) or heart failure. How does it help? · CPAP can help you have more normal sleep, so you feel less sleepy and more alert during the daytime. · CPAP may help keep heart failure or other heart problems from getting worse. · NCPAP may help lower your blood pressure. · If you use CPAP, your bed partner may also sleep better because you are not snoring or restless. What are the side effects? Some people who use CPAP have:  · A dry or stuffy nose and a sore throat. · Irritated skin on the face. · Sore eyes. · Bloating. If you have any of these problems, work with your doctor to fix them. Here are some things you can try:  · Be sure the mask or nasal prongs fit well. · See if your doctor can adjust the pressure of your CPAP. · If your nose is dry, try a humidifier.   · If your nose is runny or stuffy, try decongestant medicine or a steroid nasal spray. If these things do not help, you might try a different type of machine. Some machines have air pressure that adjusts on its own. Others have air pressures that are different when you breathe in than when you breathe out. This may reduce discomfort caused by too much pressure in your nose. Where can you learn more? Go to Instapagar.be  Enter Saray Betancourt in the search box to learn more about \"Learning About CPAP for Sleep Apnea. \"   © 8914-3888 Healthwise, [employer]. Care instructions adapted under license by Jorge Bingham (which disclaims liability or warranty for this information). This care instruction is for use with your licensed healthcare professional. If you have questions about a medical condition or this instruction, always ask your healthcare professional. [address] any warranty or liability for your use of this information. Content Version: 8.3.33962; Last Revised: January 11, 2010  PROPER SLEEP HYGIENE    What to avoid  · Do not have drinks with caffeine, such as coffee or black tea, for 8 hours before bed. · Do not smoke or use other types of tobacco near bedtime. Nicotine is a stimulant and can keep you awake. · Avoid drinking alcohol late in the evening, because it can cause you to wake in the middle of the night. · Do not eat a big meal close to bedtime. If you are hungry, eat a light snack. · Do not drink a lot of water close to bedtime, because the need to urinate may wake you up during the night. · Do not read or watch TV in bed. Use the bed only for sleeping and sexual activity. What to try  · Go to bed at the same time every night, and wake up at the same time every morning. Do not take naps during the day. · Keep your bedroom quiet, dark, and cool. · Get regular exercise, but not within 3 to 4 hours of your bedtime. .  · Sleep on a comfortable pillow and mattress.   · If watching the clock makes you anxious, turn it facing away from you so you cannot see the time. · If you worry when you lie down, start a worry book. Well before bedtime, write down your worries, and then set the book and your concerns aside. · Try meditation or other relaxation techniques before you go to bed. · If you cannot fall asleep, get up and go to another room until you feel sleepy. Do something relaxing. Repeat your bedtime routine before you go to bed again. · Make your house quiet and calm about an hour before bedtime. Turn down the lights, turn off the TV, log off the computer, and turn down the volume on music. This can help you relax after a busy day. Drowsy Driving: The [facility] 54 cites drowsiness as a causing factor in more than 387,567 police reported crashes annually, resulting in 76,000 injuries and 1,500 deaths. Other surveys suggest 55% of people polled have driven while drowsy in the past year, 23% had fallen asleep but not crashed, 3% crashed, and 2% had and accident due to drowsy driving. Who is at risk? Young Drivers: One study of drowsy driving accidents states that 55% of the drivers were under 25 years. Of those, 75% were male. Shift Workers and Travelers: People who work overnight or travel across time zones frequently are at higher risk of experiencing Circadian Rhythm Disorders. They are trying to work and function when their body is programed to sleep. Sleep Deprived: Lack of sleep has a serious impact on your ability to pay attention or focus on a task. Consistently getting less than the average of 8 hours your body needs creates partial or cumulative sleep deprivation. Untreated Sleep Disorders: Sleep Apnea, Narcolepsy, R.L.S., and other sleep disorders (untreated) prevent a person from getting enough restful sleep. This leads to excessive daytime sleepiness and increases the risk for drowsy driving accidents by up to 7 times.   Medications / Alcohol: Even over the counter medications can cause drowsiness. Medications that impair a drivers attention should have a warning label. Alcohol naturally makes you sleepy and on its own can cause accidents. Combined with excessive drowsiness its effects are amplified. Signs of Drowsy Driving:   * You don't remember driving the last few miles   * You may drift out of your kiko   * You are unable to focus and your thoughts wander   * You may yawn more often than normal   * You have difficulty keeping your eyes open / nodding off   * Missing traffic signs, speeding, or tailgating  Prevention-   Good sleep hygiene, lifestyle and behavioral choices have the most impact on drowsy driving. There is no substitute for sleep and the average person requires 8 hours nightly. If you find yourself driving drowsy, stop and sleep. Consider the sleep hygiene tips provided during your visit as well. Medication Refill Policy: Refills for all medications require 1 week advance notice. Please have your pharmacy fax a refill request. We are unable to fax, or call in \"controled substance\" medications and you will need to pick these prescriptions up from our office. [employer] Activation    Thank you for requesting access to [employer]. Please follow the instructions below to securely access and download your online medical record. [employer] allows you to send messages to your doctor, view your test results, renew your prescriptions, schedule appointments, and more. How Do I Sign Up? 1. In your internet browser, go to [email or web address]. 2. Click on the First Time User? Click Here link in the Sign In box. You will see the New Member Sign Up page. 3. Enter your [employer] Access Code exactly as it appears below. You will not need to use this code after youve completed the sign-up process. If you do not sign up before the expiration date, you must request a new code.     [employer] Access Code: [email or web address]  Expires: 2018 11:25 AM (This is the date your [employer] access code will )    4. Enter the last four digits of your Social Security Number (xxxx) and Date of Birth (mm/dd/yyyy) as indicated and click Submit. You will be taken to the next sign-up page. 5. Create a [employer] ID. This will be your [employer] login ID and cannot be changed, so think of one that is secure and easy to remember. 6. Create a [employer] password. You can change your password at any time. 7. Enter your Password Reset Question and Answer. This can be used at a later time if you forget your password. 8. Enter your e-mail address. You will receive e-mail notification when new information is available in [address] 9. Click Sign Up. You can now view and download portions of your medical record. 10. Click the Download Summary menu link to download a portable copy of your medical information. Additional Information    If you have questions, please call [phone]. Remember, [employer] is NOT to be used for urgent needs. For medical emergencies, dial 911.

## 2018-01-26 ENCOUNTER — DOCUMENTATION ONLY (OUTPATIENT)
Dept: SLEEP MEDICINE | Age: 78
End: 2018-01-26

## 2018-07-10 ENCOUNTER — TELEPHONE (OUTPATIENT)
Dept: NEUROLOGY | Age: 78
End: 2018-07-10

## 2018-07-10 NOTE — TELEPHONE ENCOUNTER
Patient called to make her yearly f/u appt and she wanted to know if she needed to have blood work done before she comes in to see him.

## 2018-08-14 DIAGNOSIS — G40.909 NONINTRACTABLE EPILEPSY WITHOUT STATUS EPILEPTICUS, UNSPECIFIED EPILEPSY TYPE (HCC): Primary | ICD-10-CM

## 2018-08-14 NOTE — TELEPHONE ENCOUNTER
Contacted patient and informed her she will need blood work done 1 week before her f/up. She voiced understanding and will call back if any further questions or concerns. Lab slip mailed to address on file.

## 2018-08-16 ENCOUNTER — HOSPITAL ENCOUNTER (OUTPATIENT)
Dept: CT IMAGING | Age: 78
Discharge: HOME | End: 2018-08-16
Attending: ORTHOPAEDIC SURGERY
Payer: MEDICARE

## 2018-08-16 DIAGNOSIS — M17.11 OSTEOARTHRITIS OF RIGHT KNEE: ICD-10-CM

## 2018-08-16 PROCEDURE — 73700 CT LOWER EXTREMITY W/O DYE: CPT

## 2018-08-23 ENCOUNTER — TELEPHONE (OUTPATIENT)
Dept: NEUROLOGY | Age: 78
End: 2018-08-23

## 2018-08-23 NOTE — TELEPHONE ENCOUNTER
Contacted patient and informed her she can go to any [facility] to have labs drawn. The order was printed at [facility] that's why is shows that address. Patient voiced understanding and will call back if any further questions or concerns.

## 2018-08-23 NOTE — TELEPHONE ENCOUNTER
----- Message from Suki Wheatley sent at 8/23/2018 11:46 AM EDT -----  Regarding: Dr Clarence Campos would like another lab order sent to her in the mail with the [facility] listed on it since that is where her appt is scheduled. The one she previously received had Rajeev on it. Pt can be reached at [phone].

## 2018-09-06 LAB
BUN SERPL-MCNC: 52 MG/DL (ref 8–27)
BUN/CREAT SERPL: 32 (ref 12–28)
CALCIUM SERPL-MCNC: 9.9 MG/DL (ref 8.7–10.3)
CHLORIDE SERPL-SCNC: 108 MMOL/L (ref 96–106)
CO2 SERPL-SCNC: 22 MMOL/L (ref 20–29)
CREAT SERPL-MCNC: 1.61 MG/DL (ref 0.57–1)
GLUCOSE SERPL-MCNC: 95 MG/DL (ref 65–99)
LEVETIRACETAM SERPL-MCNC: 32.8 UG/ML (ref 10–40)
POTASSIUM SERPL-SCNC: 5 MMOL/L (ref 3.5–5.2)
SODIUM SERPL-SCNC: 145 MMOL/L (ref 134–144)

## 2018-09-10 ENCOUNTER — OFFICE VISIT (OUTPATIENT)
Dept: NEUROLOGY | Age: 78
End: 2018-09-10

## 2018-09-10 VITALS
WEIGHT: 209 LBS | SYSTOLIC BLOOD PRESSURE: 112 MMHG | OXYGEN SATURATION: 96 % | HEART RATE: 63 BPM | HEIGHT: 61 IN | BODY MASS INDEX: 39.46 KG/M2 | DIASTOLIC BLOOD PRESSURE: 66 MMHG

## 2018-09-10 DIAGNOSIS — R06.2 WHEEZING: ICD-10-CM

## 2018-09-10 DIAGNOSIS — R00.0 TACHYCARDIA: ICD-10-CM

## 2018-09-10 DIAGNOSIS — G40.909 NONINTRACTABLE EPILEPSY WITHOUT STATUS EPILEPTICUS, UNSPECIFIED EPILEPSY TYPE (HCC): Primary | ICD-10-CM

## 2018-09-10 NOTE — PROGRESS NOTES
Interval HPI:   This is a 77 y.o. female who is following up for     Chief Complaint   Patient presents with    Seizure     Interval Hx:    EEG (8-29-26): normal  Keppra level (9-4-18): 32.8 (normal, rr 10-40)  CMP: mild elevated Cr 1.61 (essentially unchanged compared to Jan 2015)    Pt denies any syncopal or seizure-like episodes since last one in May 2016. C/o light-headedness in the past so lowered dose of Keppra to 500 AM/ 250 PM   Still c/o episodic light-headed feeling. Is on 3 BP meds. Last visit was c/o random areas of paresthesia (sticking sensation in thighs, legs, occasionally arms). Today says she has it but it's infrequent, not daily, maybe every other week and it doesn't last long. Brief ROS: as above or otherwise negative      =============================  Brief Hx:  67 y.o. female with remote hx of epilepsy, well controlled on combination of low dose Phenytoin (? Dose) and Mysoline. Last witnessed seizure in 1967. Those meds stopped around 2014 d/t no seizure for many years. Had an episode on 5-10-16 where patient fell out of bed in early AM, urinary incontinence, tongue or cheek bite, that may have been a seizure. Was started on Keppra 500 mg BID at that point. Allergies   Allergen Reactions    Sulfa (Sulfonamide Antibiotics) Other (comments)     Unknown    Codeine Other (comments)    Levaquin [Levofloxacin] Other (comments)    Lisinopril Cough     Current Outpatient Prescriptions   Medication Sig Dispense Refill    levETIRAcetam (KEPPRA) 500 mg tablet 1 in AM and HALF in PM.  Anti-seizure medication. 90 day prescription. 405 Tab 1    calcium carbonate (CALCIUM 600) 600 mg (1,500 mg) tablet Take 600 mg by mouth two (2) times a day.  magnesium 250 mg tab Take  by mouth.  metoprolol tartrate (LOPRESSOR) 100 mg IR tablet Take  by mouth two (2) times a day.  chlorthalidone (HYGROTEN) 25 mg tablet Take  by mouth daily.       pravastatin (PRAVACHOL) 20 mg tablet Take 20 mg by mouth nightly.  amlodipine (NORVASC) 10 mg tablet Take 10 mg by mouth daily.  TERAZOSIN HCL (HYTRIN PO) Take  by mouth.  losartan (COZAAR) 100 mg tablet Take 100 mg by mouth daily.  DOCOSAHEXANOIC ACID/EPA (FISH OIL PO) Take  by mouth.  cholecalciferol, vitamin D3, (VITAMIN D3) 2,000 unit tab Take  by mouth.  aspirin 81 mg chewable tablet Take 162 mg by mouth daily. Physical Exam  Blood pressure 112/66, pulse 63, height 5' 1\" (1.549 m), weight 94.8 kg (209 lb), SpO2 96 %. No acute distress  Neck: no stiffness    Lungs: mild wheezing bilaterally    Skin: no rashes    Focused Neurological Exam     Mental status: Alert and oriented to person, place situation. Language: normal fluency and comprehension; no dysarthria. CNs:   Visual fields grossly normal  Extraocular movements intact, no nystagmus  Face appears symmetric and facial strength normal.    Hearing is intact to casual conversation. Sensory: intact light touch in all 4 extremities  Motor: Normal bulk and strength in all 4 extremities, no myalgias  Reflexes: absent patellars  Gait: mild antalgic (right knee pain)    Impression      ICD-10-CM ICD-9-CM    1. Nonintractable epilepsy without status epilepticus, unspecified epilepsy type [facility] G40.909 345.90    2. Wheezing R06.2 786.07    3. Tachycardia R00.0 785.0        1) Epilepsy (not intractable)  Well controlled after restarting AED. No further syncopal or seizure-like episode since the one in May 2016. Continue Keppra 500 mg AM and 250 mg PM; no side effects (had SEFx on higher dose). Will authorize 90 day Rx (+ RFs) of Keppra when sent from pharmacy. 2) Wheezing  Very mild on exam; no c/o recent URI though says she coughs in AM  Advised her to see PCP    3) Intermittent tachycardia  Advised pt to see her Cardiologist    4) Paresthesias  Infrequent; no neurologic workup needed.      5) F/u in 1 year

## 2018-09-10 NOTE — PATIENT INSTRUCTIONS
1. Make a visit with your PCP regarding mild wheezing and coughing in morning    2. Make a visit with your Cardiologist to talk about intermittent rapid heart beats (tachycardia)    3. Follow up with me/ Neurologist in 1 year  We will renew the 90 day Rx for Keppra once your pharmacy sends it to me    4. See you in a year; call sooner if any problems.

## 2019-02-20 ENCOUNTER — HOSPITAL ENCOUNTER (INPATIENT)
Age: 79
LOS: 4 days | Discharge: HOME HEALTH | DRG: 066 | End: 2019-02-24
Attending: HOSPITALIST | Admitting: HOSPITALIST
Payer: MEDICARE

## 2019-02-20 ENCOUNTER — HOSPITAL ENCOUNTER (EMERGENCY)
Age: 79
Discharge: OTHER INSTITUTION | End: 2019-02-20
Attending: STUDENT IN AN ORGANIZED HEALTH CARE EDUCATION/TRAINING PROGRAM
Payer: MEDICARE

## 2019-02-20 ENCOUNTER — APPOINTMENT (OUTPATIENT)
Dept: CT IMAGING | Age: 79
End: 2019-02-20
Attending: STUDENT IN AN ORGANIZED HEALTH CARE EDUCATION/TRAINING PROGRAM
Payer: MEDICARE

## 2019-02-20 VITALS
RESPIRATION RATE: 18 BRPM | WEIGHT: 204 LBS | HEART RATE: 60 BPM | OXYGEN SATURATION: 97 % | HEIGHT: 63 IN | BODY MASS INDEX: 36.14 KG/M2 | TEMPERATURE: 97.7 F | SYSTOLIC BLOOD PRESSURE: 125 MMHG | DIASTOLIC BLOOD PRESSURE: 55 MMHG

## 2019-02-20 DIAGNOSIS — I61.1 NONTRAUMATIC CORTICAL HEMORRHAGE OF LEFT CEREBRAL HEMISPHERE (HCC): Primary | ICD-10-CM

## 2019-02-20 DIAGNOSIS — I61.0 NONTRAUMATIC SUBCORTICAL HEMORRHAGE OF LEFT CEREBRAL HEMISPHERE (HCC): ICD-10-CM

## 2019-02-20 PROBLEM — I61.9 ICH (INTRACEREBRAL HEMORRHAGE) (HCC): Status: ACTIVE | Noted: 2019-02-20

## 2019-02-20 LAB
ALBUMIN SERPL-MCNC: 3.4 G/DL (ref 3.5–5)
ALBUMIN/GLOB SERPL: 0.8 {RATIO} (ref 1.1–2.2)
ALP SERPL-CCNC: 62 U/L (ref 45–117)
ALT SERPL-CCNC: 33 U/L (ref 12–78)
ANION GAP SERPL CALC-SCNC: 9 MMOL/L (ref 5–15)
APPEARANCE UR: CLEAR
AST SERPL-CCNC: 28 U/L (ref 15–37)
ATRIAL RATE: 57 BPM
BACTERIA URNS QL MICRO: NEGATIVE /HPF
BASOPHILS # BLD: 0 K/UL (ref 0–0.1)
BASOPHILS NFR BLD: 1 % (ref 0–1)
BILIRUB SERPL-MCNC: 0.7 MG/DL (ref 0.2–1)
BILIRUB UR QL: NEGATIVE
BUN SERPL-MCNC: 42 MG/DL (ref 6–20)
BUN/CREAT SERPL: 24 (ref 12–20)
CALCIUM SERPL-MCNC: 10.1 MG/DL (ref 8.5–10.1)
CALCULATED P AXIS, ECG09: 23 DEGREES
CALCULATED R AXIS, ECG10: 13 DEGREES
CALCULATED T AXIS, ECG11: 31 DEGREES
CHLORIDE SERPL-SCNC: 108 MMOL/L (ref 97–108)
CO2 SERPL-SCNC: 27 MMOL/L (ref 21–32)
COLOR UR: ABNORMAL
CREAT SERPL-MCNC: 1.74 MG/DL (ref 0.55–1.02)
DIAGNOSIS, 93000: NORMAL
DIFFERENTIAL METHOD BLD: NORMAL
EOSINOPHIL # BLD: 0.2 K/UL (ref 0–0.4)
EOSINOPHIL NFR BLD: 3 % (ref 0–7)
EPITH CASTS URNS QL MICRO: NORMAL /LPF
ERYTHROCYTE [DISTWIDTH] IN BLOOD BY AUTOMATED COUNT: 11.9 % (ref 11.5–14.5)
GLOBULIN SER CALC-MCNC: 4.2 G/DL (ref 2–4)
GLUCOSE BLD STRIP.AUTO-MCNC: 109 MG/DL (ref 65–100)
GLUCOSE BLD STRIP.AUTO-MCNC: 114 MG/DL (ref 65–100)
GLUCOSE SERPL-MCNC: 110 MG/DL (ref 65–100)
GLUCOSE UR STRIP.AUTO-MCNC: NEGATIVE MG/DL
HCT VFR BLD AUTO: 36 % (ref 35–47)
HGB BLD-MCNC: 12.2 G/DL (ref 11.5–16)
HGB UR QL STRIP: ABNORMAL
INR PPP: 1 (ref 0.9–1.1)
KETONES UR QL STRIP.AUTO: NEGATIVE MG/DL
LEUKOCYTE ESTERASE UR QL STRIP.AUTO: NEGATIVE
LYMPHOCYTES # BLD: 2.7 K/UL (ref 0.8–3.5)
LYMPHOCYTES NFR BLD: 35 % (ref 12–49)
MCH RBC QN AUTO: 31.5 PG (ref 26–34)
MCHC RBC AUTO-ENTMCNC: 33.9 G/DL (ref 30–36.5)
MCV RBC AUTO: 93 FL (ref 80–99)
MONOCYTES # BLD: 0.7 K/UL (ref 0–1)
MONOCYTES NFR BLD: 9 % (ref 5–13)
NEUTS SEG # BLD: 4.2 K/UL (ref 1.8–8)
NEUTS SEG NFR BLD: 52 % (ref 32–75)
NITRITE UR QL STRIP.AUTO: NEGATIVE
P-R INTERVAL, ECG05: 190 MS
PH UR STRIP: 7 [PH] (ref 5–8)
PLATELET # BLD AUTO: 254 K/UL (ref 150–400)
PMV BLD AUTO: 9.1 FL (ref 8.9–12.9)
POTASSIUM SERPL-SCNC: 4.5 MMOL/L (ref 3.5–5.1)
PROT SERPL-MCNC: 7.6 G/DL (ref 6.4–8.2)
PROT UR STRIP-MCNC: 100 MG/DL
PROTHROMBIN TIME: 9.4 SEC (ref 9–11.1)
Q-T INTERVAL, ECG07: 420 MS
QRS DURATION, ECG06: 86 MS
QTC CALCULATION (BEZET), ECG08: 408 MS
RBC # BLD AUTO: 3.87 M/UL (ref 3.8–5.2)
RBC #/AREA URNS HPF: NORMAL /HPF (ref 0–5)
SERVICE CMNT-IMP: ABNORMAL
SERVICE CMNT-IMP: ABNORMAL
SODIUM SERPL-SCNC: 144 MMOL/L (ref 136–145)
SP GR UR REFRACTOMETRY: 1.02 (ref 1–1.03)
UROBILINOGEN UR QL STRIP.AUTO: 0.2 EU/DL (ref 0.2–1)
VENTRICULAR RATE, ECG03: 57 BPM
WBC # BLD AUTO: 7.8 K/UL (ref 3.6–11)
WBC URNS QL MICRO: NORMAL /HPF (ref 0–4)
XXWBCSUS: 0

## 2019-02-20 PROCEDURE — 99285 EMERGENCY DEPT VISIT HI MDM: CPT

## 2019-02-20 PROCEDURE — 81001 URINALYSIS AUTO W/SCOPE: CPT

## 2019-02-20 PROCEDURE — 82962 GLUCOSE BLOOD TEST: CPT

## 2019-02-20 PROCEDURE — 80053 COMPREHEN METABOLIC PANEL: CPT

## 2019-02-20 PROCEDURE — 74011250636 HC RX REV CODE- 250/636: Performed by: HOSPITALIST

## 2019-02-20 PROCEDURE — 36415 COLL VENOUS BLD VENIPUNCTURE: CPT

## 2019-02-20 PROCEDURE — 74011000250 HC RX REV CODE- 250: Performed by: STUDENT IN AN ORGANIZED HEALTH CARE EDUCATION/TRAINING PROGRAM

## 2019-02-20 PROCEDURE — 65610000006 HC RM INTENSIVE CARE

## 2019-02-20 PROCEDURE — 96365 THER/PROPH/DIAG IV INF INIT: CPT

## 2019-02-20 PROCEDURE — 85025 COMPLETE CBC W/AUTO DIFF WBC: CPT

## 2019-02-20 PROCEDURE — 85610 PROTHROMBIN TIME: CPT

## 2019-02-20 PROCEDURE — 74011250636 HC RX REV CODE- 250/636: Performed by: STUDENT IN AN ORGANIZED HEALTH CARE EDUCATION/TRAINING PROGRAM

## 2019-02-20 PROCEDURE — 93005 ELECTROCARDIOGRAM TRACING: CPT

## 2019-02-20 PROCEDURE — 70450 CT HEAD/BRAIN W/O DYE: CPT

## 2019-02-20 PROCEDURE — 74011250637 HC RX REV CODE- 250/637: Performed by: HOSPITALIST

## 2019-02-20 RX ORDER — LEVETIRACETAM 250 MG/1
250 TABLET ORAL EVERY EVENING
Status: DISCONTINUED | OUTPATIENT
Start: 2019-02-20 | End: 2019-02-24 | Stop reason: HOSPADM

## 2019-02-20 RX ORDER — LABETALOL HCL 20 MG/4 ML
10 SYRINGE (ML) INTRAVENOUS
Status: COMPLETED | OUTPATIENT
Start: 2019-02-20 | End: 2019-02-21

## 2019-02-20 RX ORDER — ACETAMINOPHEN 325 MG/1
650 TABLET ORAL
Status: DISCONTINUED | OUTPATIENT
Start: 2019-02-20 | End: 2019-02-24 | Stop reason: HOSPADM

## 2019-02-20 RX ORDER — PRAVASTATIN SODIUM 20 MG/1
20 TABLET ORAL
Status: DISCONTINUED | OUTPATIENT
Start: 2019-02-20 | End: 2019-02-24 | Stop reason: HOSPADM

## 2019-02-20 RX ORDER — LANOLIN ALCOHOL/MO/W.PET/CERES
200 CREAM (GRAM) TOPICAL DAILY
Status: DISCONTINUED | OUTPATIENT
Start: 2019-02-21 | End: 2019-02-24 | Stop reason: HOSPADM

## 2019-02-20 RX ORDER — NALOXONE HYDROCHLORIDE 0.4 MG/ML
0.4 INJECTION, SOLUTION INTRAMUSCULAR; INTRAVENOUS; SUBCUTANEOUS AS NEEDED
Status: DISCONTINUED | OUTPATIENT
Start: 2019-02-20 | End: 2019-02-24 | Stop reason: HOSPADM

## 2019-02-20 RX ORDER — HYDRALAZINE HYDROCHLORIDE 20 MG/ML
10 INJECTION INTRAMUSCULAR; INTRAVENOUS
Status: COMPLETED | OUTPATIENT
Start: 2019-02-20 | End: 2019-02-21

## 2019-02-20 RX ORDER — ACETAMINOPHEN 650 MG/1
650 SUPPOSITORY RECTAL
Status: DISCONTINUED | OUTPATIENT
Start: 2019-02-20 | End: 2019-02-24 | Stop reason: HOSPADM

## 2019-02-20 RX ORDER — SODIUM CHLORIDE 9 MG/ML
100 INJECTION, SOLUTION INTRAVENOUS CONTINUOUS
Status: DISPENSED | OUTPATIENT
Start: 2019-02-20 | End: 2019-02-21

## 2019-02-20 RX ORDER — ONDANSETRON 2 MG/ML
4 INJECTION INTRAMUSCULAR; INTRAVENOUS
Status: DISCONTINUED | OUTPATIENT
Start: 2019-02-20 | End: 2019-02-24 | Stop reason: HOSPADM

## 2019-02-20 RX ORDER — LEVETIRACETAM 500 MG/1
500 TABLET ORAL DAILY
Status: DISCONTINUED | OUTPATIENT
Start: 2019-02-21 | End: 2019-02-24 | Stop reason: HOSPADM

## 2019-02-20 RX ADMIN — NICARDIPINE HYDROCHLORIDE 5 MG/HR: 2.5 INJECTION INTRAVENOUS at 12:35

## 2019-02-20 RX ADMIN — PRAVASTATIN SODIUM 20 MG: 20 TABLET ORAL at 22:10

## 2019-02-20 RX ADMIN — SODIUM CHLORIDE 100 ML/HR: 900 INJECTION, SOLUTION INTRAVENOUS at 18:12

## 2019-02-20 RX ADMIN — LEVETIRACETAM 250 MG: 250 TABLET, FILM COATED ORAL at 18:02

## 2019-02-20 RX ADMIN — HYDRALAZINE HYDROCHLORIDE 10 MG: 20 INJECTION INTRAMUSCULAR; INTRAVENOUS at 15:52

## 2019-02-20 RX ADMIN — HYDRALAZINE HYDROCHLORIDE 10 MG: 20 INJECTION INTRAMUSCULAR; INTRAVENOUS at 18:15

## 2019-02-20 NOTE — ED NOTES
TRANSFER - OUT REPORT: 
 
Verbal report given to Nkechi Ross RN(name) on Armando Solorzano  being transferred to [facility] ICU 9(unit) for routine progression of care Report consisted of patients Situation, Background, Assessment and  
Recommendations(SBAR). Information from the following report(s) SBAR, Kardex, ED Summary, [facility] and Recent Results was reviewed with the receiving nurse. Lines:  
Peripheral IV 02/20/19 Right Antecubital (Active) Site Assessment Clean, dry, & intact 2/20/2019 11:40 AM  
Phlebitis Assessment 0 2/20/2019 11:40 AM  
Infiltration Assessment 0 2/20/2019 11:40 AM  
Dressing Status Clean, dry, & intact 2/20/2019 11:40 AM  
Dressing Type Transparent 2/20/2019 11:40 AM  
Hub Color/Line Status Pink 2/20/2019 11:40 AM  
  
 
Opportunity for questions and clarification was provided. Patient transported with: 
 Monitor, IV infusing via pump (SEE MAR).

## 2019-02-20 NOTE — ED TRIAGE NOTES
Pt brought to treatment area via wheelchair with c/o \"about 7 am my sister and I noticed I was slurring my speech and the right side of my face was drooping down. \"  Dr. Annette Longo at bedside to evaluate.

## 2019-02-20 NOTE — ED NOTES
Dr. Gabby Mcdaniel at bedside to speak to patient. Pt is in agreement with plan for admission to [facility].

## 2019-02-20 NOTE — ED NOTES
Timeout completed with CHRIS SALEH signed by all parties and original sent with patient to [facility] ICU 9.  Pt stable for transfer. No neuro changes since arrival.  Pt sent on monitor x 3 with cardene drip infusing via pump.

## 2019-02-20 NOTE — ED PROVIDER NOTES
Patient is a 71-year-old female presenting to the emergency department for slurred speech facial droop. Patient states that she had symptoms last night which went to bed and then upon awakening this morning she noticed around 7:30 towards 8:00 she was having slurred speech she looked in mirror and felt like the left side of her face was drooping she pointed this out to her friend who also agreed with her. Patient initially thought this was just due to her being nervous because she had a doctor's appointment this morning. Patient denies any other neuro symptoms including tingling or numbness in her upper or lower extremities upper or lower extremity weakness denies any headaches, dizziness changes in hearing or changes in vision. Patient denies any history of CVA does have a history of hypertension and high cholesterol. Past Medical History:  
Diagnosis Date  High cholesterol  Hypertension Past Surgical History:  
Procedure Laterality Date  HX TONSILLECTOMY Family History:  
Problem Relation Age of Onset  Hypertension Mother  Cancer Sister  Diabetes Sister  Hypertension Sister Social History Socioeconomic History  Marital status:  Spouse name: Not on file  Number of children: Not on file  Years of education: Not on file  Highest education level: Not on file Social Needs  Financial resource strain: Not on file  Food insecurity - worry: Not on file  Food insecurity - inability: Not on file  Transportation needs - medical: Not on file  Transportation needs - non-medical: Not on file Occupational History  Not on file Tobacco Use  Smoking status: Never Smoker  Smokeless tobacco: Never Used Substance and Sexual Activity  Alcohol use: No  
 Drug use: No  
 Sexual activity: Not on file Other Topics Concern  Not on file Social History Narrative  Not on file ALLERGIES: Sulfa (sulfonamide antibiotics); Codeine; Levaquin [levofloxacin]; and Lisinopril Review of Systems Respiratory: Negative for chest tightness. Cardiovascular: Negative for chest pain. Gastrointestinal: Negative for nausea and vomiting. Musculoskeletal: Negative for neck pain and neck stiffness. Neurological: Positive for facial asymmetry and speech difficulty. Negative for syncope, weakness, light-headedness and headaches. Psychiatric/Behavioral: The patient is nervous/anxious. All other systems reviewed and are negative. Vitals:  
 02/20/19 1326 02/20/19 1330 02/20/19 1332 02/20/19 1342 BP: 125/55 138/58  125/55 Pulse: 63 62 60 Resp: 20 19 18 Temp:      
SpO2: 95% 97% 97% Weight:      
Height:      
      
 
Physical Exam  
Constitutional: She is oriented to person, place, and time. She appears well-developed and well-nourished. HENT:  
Head: Normocephalic and atraumatic. Eyes: EOM are normal. Pupils are equal, round, and reactive to light. Neck: Normal range of motion. Neck supple. Pulmonary/Chest: Effort normal.  
Abdominal: Soft. Bowel sounds are normal.  
Neurological: She is alert and oriented to person, place, and time. She has normal strength. She displays no tremor. A cranial nerve deficit (slight left sided facial droop, mild dysarthria) is present. No sensory deficit. She displays a negative Romberg sign. GCS eye subscore is 4. GCS verbal subscore is 5. GCS motor subscore is 6. Nursing note and vitals reviewed. MDM Number of Diagnoses or Management Options Nontraumatic cortical hemorrhage of left cerebral hemisphere [facility]):  
Diagnosis management comments: A/P:  CVA, TIA. 76 y/o female with concerns of CVA vs. TIA with mild dysarthria and facial droop on left side. Pt with hx of HTN, no hx of CVA. Code Stroke called, ecg, cbc, cmp, pt/inr, ua, ct non con head, tele-neuro consult. Reassessment:  Pt with L internal capsular hemorrhage, will start pt on nicardipine gtt to maintain SBP <140. Spoke to Dr. Carlito Mosqueda ([facility]) at [facility] and Dr. Lima Sensor [facility]) for transfer of care. Amount and/or Complexity of Data Reviewed Clinical lab tests: ordered and reviewed Tests in the radiology section of CPT®: reviewed and ordered Review and summarize past medical records: yes Discuss the patient with other providers: yes Independent visualization of images, tracings, or specimens: yes Risk of Complications, Morbidity, and/or Mortality Presenting problems: high Diagnostic procedures: moderate Management options: moderate Critical Care Total time providing critical care: 30-74 minutes (Total critical care time spent exclusive of procedures:  52 min.) Patient Progress Patient progress: stable Procedures

## 2019-02-20 NOTE — ED NOTES
Bed assignment received at [facility]. Pt and family notified. Will attempt to call report.

## 2019-02-20 NOTE — ROUTINE PROCESS
TRANSFER - IN REPORT:    Verbal report received from Kinsey(name) on Marc Van Buren  being received from standing ED (unit) for urgent transfer      Report consisted of patients Situation, Background, Assessment and   Recommendations(SBAR). Information from the following report(s) SBAR, Kardex, ED Summary, Procedure Summary, Intake/Output, MAR, Accordion and Recent Results was reviewed with the receiving nurse. Opportunity for questions and clarification was provided. Assessment completed upon patients arrival to unit and care assumed.

## 2019-02-20 NOTE — ED NOTES
Stroke Education provided to patient and the following topics were discussed 1. Patients personal risk factors for stroke are hypertension and hyperlipidemia 2. Warning signs of Stroke: * Sudden numbness or weakness of the face, arm or leg, especially on one side of The body * Sudden confusion, trouble speaking or understanding * Sudden trouble seeing in one or both eyes * Sudden trouble walking, dizziness, loss of balance or coordination * Sudden severe headache with no known cause 3. Importance of activation Emergency Medical Services ( 9-1-1 ) immediately if experience any warning signs of stroke. 4. Be sure and schedule a follow-up appointment with your primary care doctor or any specialists as instructed. 5. You must take medicine every day to treat your risk factors for stroke. Be sure to take your medicines exactly as your doctor tells you: no more, no less. Know what your medicines are for , what they do. Anti-thrombotics /anticoagulants can help prevent strokes. You are taking the following medicine(s) 6.  Smoking and second-hand smoke greatly increase your risk of stroke, cardiovascular disease and death. Smoking history never 7. Information provided was [facility] Stroke Education Annmarie Avalos or Verbal Education 8. Documentation of teaching completed in Patient Education Activity and on Care Plan with teaching response noted? See note

## 2019-02-20 NOTE — ED NOTES
Pt assisted to [facility] to void. Urine specimen sent to lab. Pt assisted back to stretcher in position of comfort. Call bell within reach.

## 2019-02-21 ENCOUNTER — APPOINTMENT (OUTPATIENT)
Dept: MRI IMAGING | Age: 79
DRG: 066 | End: 2019-02-21
Attending: HOSPITALIST
Payer: MEDICARE

## 2019-02-21 LAB
ALBUMIN SERPL-MCNC: 2.9 G/DL (ref 3.5–5)
ALBUMIN/GLOB SERPL: 0.8 {RATIO} (ref 1.1–2.2)
ALP SERPL-CCNC: 61 U/L (ref 45–117)
ALT SERPL-CCNC: 26 U/L (ref 12–78)
ANION GAP SERPL CALC-SCNC: 8 MMOL/L (ref 5–15)
AST SERPL-CCNC: 24 U/L (ref 15–37)
BILIRUB SERPL-MCNC: 0.9 MG/DL (ref 0.2–1)
BUN SERPL-MCNC: 38 MG/DL (ref 6–20)
BUN/CREAT SERPL: 24 (ref 12–20)
CALCIUM SERPL-MCNC: 9 MG/DL (ref 8.5–10.1)
CHLORIDE SERPL-SCNC: 115 MMOL/L (ref 97–108)
CHOLEST SERPL-MCNC: 131 MG/DL
CO2 SERPL-SCNC: 22 MMOL/L (ref 21–32)
CREAT SERPL-MCNC: 1.6 MG/DL (ref 0.55–1.02)
ERYTHROCYTE [DISTWIDTH] IN BLOOD BY AUTOMATED COUNT: 12.1 % (ref 11.5–14.5)
GLOBULIN SER CALC-MCNC: 3.8 G/DL (ref 2–4)
GLUCOSE SERPL-MCNC: 100 MG/DL (ref 65–100)
HCT VFR BLD AUTO: 34.1 % (ref 35–47)
HDLC SERPL-MCNC: 38 MG/DL
HDLC SERPL: 3.4 {RATIO} (ref 0–5)
HGB BLD-MCNC: 11.5 G/DL (ref 11.5–16)
LDLC SERPL CALC-MCNC: 62 MG/DL (ref 0–100)
LIPID PROFILE,FLP: ABNORMAL
MCH RBC QN AUTO: 31.3 PG (ref 26–34)
MCHC RBC AUTO-ENTMCNC: 33.7 G/DL (ref 30–36.5)
MCV RBC AUTO: 92.9 FL (ref 80–99)
NRBC # BLD: 0 K/UL (ref 0–0.01)
NRBC BLD-RTO: 0 PER 100 WBC
PLATELET # BLD AUTO: 220 K/UL (ref 150–400)
PMV BLD AUTO: 9.1 FL (ref 8.9–12.9)
POTASSIUM SERPL-SCNC: 3.8 MMOL/L (ref 3.5–5.1)
PROT SERPL-MCNC: 6.7 G/DL (ref 6.4–8.2)
RBC # BLD AUTO: 3.67 M/UL (ref 3.8–5.2)
SODIUM SERPL-SCNC: 145 MMOL/L (ref 136–145)
TRIGL SERPL-MCNC: 155 MG/DL (ref ?–150)
VLDLC SERPL CALC-MCNC: 31 MG/DL
WBC # BLD AUTO: 8.2 K/UL (ref 3.6–11)

## 2019-02-21 PROCEDURE — 97535 SELF CARE MNGMENT TRAINING: CPT

## 2019-02-21 PROCEDURE — 74011250637 HC RX REV CODE- 250/637: Performed by: HOSPITALIST

## 2019-02-21 PROCEDURE — 74011250636 HC RX REV CODE- 250/636: Performed by: HOSPITALIST

## 2019-02-21 PROCEDURE — 65610000006 HC RM INTENSIVE CARE

## 2019-02-21 PROCEDURE — 80061 LIPID PANEL: CPT

## 2019-02-21 PROCEDURE — A9575 INJ GADOTERATE MEGLUMI 0.1ML: HCPCS | Performed by: HOSPITALIST

## 2019-02-21 PROCEDURE — 70553 MRI BRAIN STEM W/O & W/DYE: CPT

## 2019-02-21 PROCEDURE — 97165 OT EVAL LOW COMPLEX 30 MIN: CPT

## 2019-02-21 PROCEDURE — 74011250636 HC RX REV CODE- 250/636: Performed by: NURSE PRACTITIONER

## 2019-02-21 PROCEDURE — 97161 PT EVAL LOW COMPLEX 20 MIN: CPT

## 2019-02-21 PROCEDURE — 85027 COMPLETE CBC AUTOMATED: CPT

## 2019-02-21 PROCEDURE — 80053 COMPREHEN METABOLIC PANEL: CPT

## 2019-02-21 PROCEDURE — 74011250637 HC RX REV CODE- 250/637: Performed by: INTERNAL MEDICINE

## 2019-02-21 PROCEDURE — 94660 CPAP INITIATION&MGMT: CPT

## 2019-02-21 PROCEDURE — 36415 COLL VENOUS BLD VENIPUNCTURE: CPT

## 2019-02-21 PROCEDURE — 97116 GAIT TRAINING THERAPY: CPT

## 2019-02-21 RX ORDER — CARVEDILOL 6.25 MG/1
6.25 TABLET ORAL 2 TIMES DAILY WITH MEALS
Status: DISCONTINUED | OUTPATIENT
Start: 2019-02-21 | End: 2019-02-24 | Stop reason: HOSPADM

## 2019-02-21 RX ORDER — LOSARTAN POTASSIUM 50 MG/1
100 TABLET ORAL DAILY
Status: DISCONTINUED | OUTPATIENT
Start: 2019-02-22 | End: 2019-02-24 | Stop reason: HOSPADM

## 2019-02-21 RX ORDER — AMLODIPINE BESYLATE 5 MG/1
5 TABLET ORAL DAILY
Status: DISCONTINUED | OUTPATIENT
Start: 2019-02-22 | End: 2019-02-22

## 2019-02-21 RX ORDER — SODIUM CHLORIDE 0.9 % (FLUSH) 0.9 %
10 SYRINGE (ML) INJECTION
Status: COMPLETED | OUTPATIENT
Start: 2019-02-21 | End: 2019-02-21

## 2019-02-21 RX ORDER — GADOTERATE MEGLUMINE 376.9 MG/ML
10 INJECTION INTRAVENOUS
Status: COMPLETED | OUTPATIENT
Start: 2019-02-21 | End: 2019-02-21

## 2019-02-21 RX ORDER — LABETALOL HCL 20 MG/4 ML
10 SYRINGE (ML) INTRAVENOUS
Status: DISCONTINUED | OUTPATIENT
Start: 2019-02-21 | End: 2019-02-24 | Stop reason: HOSPADM

## 2019-02-21 RX ORDER — CARVEDILOL 6.25 MG/1
3.12 TABLET ORAL 2 TIMES DAILY WITH MEALS
COMMUNITY
End: 2019-08-28

## 2019-02-21 RX ORDER — FAMOTIDINE 20 MG/1
20 TABLET, FILM COATED ORAL DAILY
Status: DISCONTINUED | OUTPATIENT
Start: 2019-02-21 | End: 2019-02-24 | Stop reason: HOSPADM

## 2019-02-21 RX ORDER — HYDRALAZINE HYDROCHLORIDE 20 MG/ML
10 INJECTION INTRAMUSCULAR; INTRAVENOUS
Status: DISCONTINUED | OUTPATIENT
Start: 2019-02-21 | End: 2019-02-24 | Stop reason: HOSPADM

## 2019-02-21 RX ADMIN — Medication 10 ML: at 15:44

## 2019-02-21 RX ADMIN — HYDRALAZINE HYDROCHLORIDE 10 MG: 20 INJECTION INTRAMUSCULAR; INTRAVENOUS at 19:03

## 2019-02-21 RX ADMIN — LEVETIRACETAM 250 MG: 250 TABLET, FILM COATED ORAL at 18:14

## 2019-02-21 RX ADMIN — HYDRALAZINE HYDROCHLORIDE 10 MG: 20 INJECTION INTRAMUSCULAR; INTRAVENOUS at 12:33

## 2019-02-21 RX ADMIN — LABETALOL 20 MG/4 ML (5 MG/ML) INTRAVENOUS SYRINGE 10 MG: at 11:12

## 2019-02-21 RX ADMIN — CARVEDILOL 6.25 MG: 6.25 TABLET, FILM COATED ORAL at 16:12

## 2019-02-21 RX ADMIN — FAMOTIDINE 20 MG: 20 TABLET ORAL at 11:12

## 2019-02-21 RX ADMIN — ACETAMINOPHEN 650 MG: 325 TABLET ORAL at 16:14

## 2019-02-21 RX ADMIN — HYDRALAZINE HYDROCHLORIDE 10 MG: 20 INJECTION INTRAMUSCULAR; INTRAVENOUS at 03:22

## 2019-02-21 RX ADMIN — PRAVASTATIN SODIUM 20 MG: 20 TABLET ORAL at 23:26

## 2019-02-21 RX ADMIN — LEVETIRACETAM 500 MG: 500 TABLET ORAL at 08:17

## 2019-02-21 RX ADMIN — GADOTERATE MEGLUMINE 10 ML: 376.9 INJECTION INTRAVENOUS at 15:43

## 2019-02-21 RX ADMIN — Medication 200 MG: at 08:17

## 2019-02-21 NOTE — PROGRESS NOTES
Problem: Self Care Deficits Care Plan (Adult)  Goal: *Acute Goals and Plan of Care (Insert Text)  Occupational Therapy Goals  Initiated 2/21/2019   1. Patient will perform grooming standing at sink with supervision/set-up within 7 day(s). 2.  Patient will perform bathing with supervision/set-up within 7 day(s). 3.  Patient will perform lower body dressing with supervision/set-up using AE PRN within 7 day(s). 4.  Patient will perform toilet transfers with supervision/set-up within 7 day(s). 5.  Patient will perform all aspects of toileting with supervision/set-up within 7 day(s). 6.  Patient will participate in upper extremity therapeutic exercise/activities with supervision/set-up for 10 minutes within 7 day(s). 7.  Patient will utilize energy conservation techniques during functional activities with verbal cues within 7 day(s). 8.  Patient will improve their Fugl Forrest score by 5 points in prep for ADLs within 7 days. Occupational Therapy EVALUATION  Patient: Anya Steinberg (82 y.o. female)  Date: 2/21/2019  Primary Diagnosis: [facility] [I61.9]       Precautions:   Fall, DNR    ASSESSMENT :  Based on the objective data described below, the patient presents with mild RUE weakness (shoulder flexion MMT 4-, otherwise strength intact), mild RUE dysmetria and delayed coordination, impaired standing balance, b/l knee pain (reports history arthritis, anticipating knee replacement), and impaired LB access resulting in impaired functional mobility and ADL independence s/p admission for L basal ganglia ICH. Patient scored 62/66 on Fugl Forrest UE motor assessment. Receptive to [employer] education and provided with handout. Patient currently requires overall set-up to mod-A for ADLs, min-A for sit-stand, and min-A ambulating with RW.      PLOF: Fully independent with ADLs and IADLs, ambulatory with rollator, living alone in [facility] although son lives in same building    Discharge recommendation: Patient is below functional baseline and at increased risk for falls. Recommend inpatient rehabilitation       Patient will benefit from skilled intervention to address the above impairments. Patients rehabilitation potential is considered to be Good  Factors which may influence rehabilitation potential include:   [x]                None noted  []                Mental ability/status  []                Medical condition  []                Home/family situation and support systems  []                Safety awareness  []                Pain tolerance/management  []                Other:      PLAN :  Recommendations and Planned Interventions:  [x]                  Self Care Training                  [x]           Therapeutic Activities  [x]                  Functional Mobility Training    []           Cognitive Retraining  [x]                  Therapeutic Exercises           [x]           Endurance Activities  [x]                  Balance Training                   []           Neuromuscular Re-Education  []                  Visual/Perceptual Training     [x]      Home Safety Training  [x]                  Patient Education                 [x]           Family Training/Education  []                  Other (comment):    Frequency/Duration: Patient will be followed by occupational therapy 5 times a week to address goals. Discharge Recommendations: Inpatient Rehab  Further Equipment Recommendations for Discharge: TBD     SUBJECTIVE:   Patient stated My knees hurt. I was planning on getting a knee replacement in March.     OBJECTIVE DATA SUMMARY:   HISTORY:   Past Medical History:   Diagnosis Date    High cholesterol     Hypertension      Past Surgical History:   Procedure Laterality Date    HX TONSILLECTOMY         Prior Level of Function/Environment/Context: see above  Expanded or extensive additional review of patient history:     Home Situation  Home Environment: Private residence(condo)  # Steps to Enter: 0  One/Two Story Residence: Two story  # of Interior Steps: (elevator to access 2nd floor)  Living Alone: Yes  Support Systems: Family member(s), Child(shauna)  Patient Expects to be Discharged to[de-identified] Private residence  Current DME Used/Available at Home: Cane, straight, Walker, rolling, CPAP, Blood pressure cuff  Hand dominance: Right    EXAMINATION OF PERFORMANCE DEFICITS:  Cognitive/Behavioral Status:  Neurologic State: Alert  Orientation Level: Oriented X4  Cognition: Appropriate decision making; Appropriate safety awareness; Appropriate for age attention/concentration; Follows commands  Perception: Appears intact  Perseveration: No perseveration noted  Safety/Judgement: Awareness of environment; Insight into deficits    Skin: visible skin appears intact    Edema: none noted    Hearing: Auditory  Auditory Impairment: Hard of hearing, bilateral, Hearing aid(s)  Hearing Aids/Status: At bedside    Vision/Perceptual:    Tracking: Able to track stimulus in all quadrants w/o difficulty              Visual Fields: (able to detect stimuli in all fields)  Diplopia: No    Acuity: Within Defined Limits;Able to read clock/calendar on wall without difficulty; Able to read employee name badge without difficulty         Range of Motion:    AROM: Within functional limits  PROM: Within functional limits                      Strength:    Strength: Within functional limits                Coordination:  Coordination: Within functional limits(mild RUE dysmetria)  Fine Motor Skills-Upper: Left Intact; Right Intact    Gross Motor Skills-Upper: Left Intact; Right Impaired    Tone & Sensation:    Tone: Normal  Sensation: Intact                      Balance:  Sitting: Intact  Standing: Impaired; With support  Standing - Static: Good  Standing - Dynamic : Fair    Functional Mobility and Transfers for ADLs:  Bed Mobility:  Supine to Sit: (NT--pt received and returned seated in chair)    Transfers:  Sit to Stand: Minimum assistance ADL Assessment:  Feeding: Independent(inferred)    Oral Facial Hygiene/Grooming: Minimum assistance(standing at sink with RW)    Bathing: Moderate assistance(inferred due to impaired LB access)    Upper Body Dressing: Setup(inferred)    Lower Body Dressing: Moderate assistance(inferred d/t knee pain, impaired LB access, uses AE at home)    Toileting: Minimum assistance(inferred for steadying)                ADL Intervention and task modifications:               Cognitive Retraining  Safety/Judgement: Awareness of environment; Insight into deficits        Functional Measure:     Barthel Index:    Bathin  Bladder: 10  Bowels: 10  Groomin  Dressin  Feeding: 10  Mobility: 0  Stairs: 0  Toilet Use: 5  Transfer (Bed to Chair and Back): 10  Total: 55        The Barthel ADL Index: Guidelines  1. The index should be used as a record of what a patient does, not as a record of what a patient could do. 2. The main aim is to establish degree of independence from any help, physical or verbal, however minor and for whatever reason. 3. The need for supervision renders the patient not independent. 4. A patient's performance should be established using the best available evidence. Asking the patient, friends/relatives and nurses are the usual sources, but direct observation and common sense are also important. However direct testing is not needed. 5. Usually the patient's performance over the preceding 24-48 hours is important, but occasionally longer periods will be relevant. 6. Middle categories imply that the patient supplies over 50 per cent of the effort. 7. Use of aids to be independent is allowed. Cain Whitt., Barthel, D.W. (9308). Functional evaluation: the Barthel Index. [address] [facility] (14)2. Mark Chiang kota MATHEUS Bee, Errol Glasgow., Nevaeh Pressley, [address] [facility] ().  Measuring the change indisability after inpatient rehabilitation; comparison of the responsiveness of the Barthel Index and Functional [place] Measure. Journal of Neurology, Neurosurgery, and Psychiatry, 66(4), 918-326. OXANA Duncan, EDITA Ramirez, & Rossy Rendon M.A. (2004.) Assessment of post-stroke quality of life in cost-effectiveness studies: The usefulness of the Barthel Index and the EuroQoL-5D. Quality of Life Research, 13, 427-43       Fugl-Forrest Assessment of Motor Recovery after Stroke:     Reflex Activity  Flexors/Biceps/Fingers: Can be elicited  Extensors/Triceps: Can be elicited  Reflex Subtotal: 4    Volitional Movement Within Synergies  Shoulder Retraction: Full  Shoulder Elevation: Full  Shoulder Abduction (90 degrees): Full  Shoulder External Rotation: Full  Elbow Flexion: Full  Forearm Supination: Partial  Shoulder Adduction/Internal Rotation: Full  Elbow Extension: Full  Forearm Pronation: Full  Subtotal: 17    Volitional Movement Mixing Synergies  Hand to Lumbar Spine: Full  Shoulder Flexion (0-90 degrees): Full  Pronation-Supination: Full  Subtotal: 6    Volitional Movement With Little or No Synergy  Shoulder Abduction (0-90 degrees): Full  Shoulder Flexion ( degrees): Full  Pronation/Supination: Partial  Subtotal : 5    Normal Reflex Activity  Biceps, Triceps, Finger Flexors: Full  Subtotal : 2    Upper Extremity Total   Upper Extremity Total: 34    Wrist  Stability at 15 Degree Dorsiflexion: Full  Repeated Dorsiflexion/ Volar Flexion: Full  Stability at 15 Degree Dorsiflexion: Full  Repeated Dorsiflexion/ Volar Flexion: Full  Circumduction: Full  Wrist Total: 10    Hand  Mass Flexion: Full  Mass Extension: Full  Grasp A: Full  Grasp B: Full  Grasp C: Full  Grasp D: Full  Grasp E: Full  Hand Total: 14    Coordination/Speed  Tremor: None  Dysmetria: Slight  Time: 2-5s  Coordination/Speed Total : 4    Total A-D  Total A-D (Motor Function): 62/66       This is a reliable/valid measure of arm function after a neurological event.  It has established value to characterize functional status and for measuring spontaneous and therapy-induced recovery; tests proximal and distal motor functions. Fugl-Forrest Assessment - UE scores recorded between five and 30 days post neurologic event can be used to predict UE recovery at six months post neurologic event. Severe = 0-21 points   Moderately Severe = 22-33 points   Moderate = 34-47 points   Mild = 48-66 points  JORGE LUIS Gaytan, DEBRA Quinones, & BRUCE Gonsales (1992). Measurement of motor recovery after stroke: Outcome assessment and sample size requirements. Stroke, 23, pp. 2712-4210.   ------------------------------------------------------------------------------------------------------------------------------------------------------------------  MCID:  Stroke:   Leandra Blanca et al, 2001; n = 171; mean age 79 (6) years; assessed within 16 (12) days of stroke, Acute Stroke)  FMA Motor Scores from Admission to Discharge   10 point increase in FMA Upper Extremity = 1.5 change in discharge FIM   10 point increase in FMA Lower Extremity = 1.9 change in discharge FIM  MDC:   Stroke:   Vannessa Camargo et al, 2008, n = 14, mean age = 59.9 (14.6) years, assessed on average 14 (6.5) months post stroke, Chronic Stroke)   FMA = 5.2 points for the Upper Extremity portion of the assessment       Occupational Therapy Evaluation Charge Determination   History Examination Decision-Making   LOW Complexity : Brief history review  MEDIUM Complexity : 3-5 performance deficits relating to physical, cognitive , or psychosocial skils that result in activity limitations and / or participation restrictions MEDIUM Complexity : Patient may present with comorbidities that affect occupational performnce.  Miniml to moderate modification of tasks or assistance (eg, physical or verbal ) with assesment(s) is necessary to enable patient to complete evaluation       Based on the above components, the patient evaluation is determined to be of the following complexity level: LOW Pain:  Pain Scale 1: Numeric (0 - 10)  Pain Intensity 1: 0              Activity Tolerance:   VSS    After treatment:   [x]  Patient left in no apparent distress sitting up in chair  []  Patient left in no apparent distress in bed  [x]  Call bell left within reach  [x]  Nursing notified  []  Caregiver present  []  Bed alarm activated    COMMUNICATION/EDUCATION:   The patients plan of care was discussed with: Physical Therapist and Registered Nurse. Patient was educated regarding her deficit(s) of RUE dysmetria as this relates to her diagnosis of CVA. She demonstrated Good understanding as evidenced by verbalization. Patient and/or family was verbally educated on the BE FAST acronym for signs/symptoms of CVA and TIA. Provided with BEFAST handout. All questions answered with patient indicating good understanding. [x]      Home safety education was provided and the patient/caregiver indicated understanding. [x]      Patient/family have participated as able and agree with findings and recommendations. []      Patient is unable to participate in plan of care at this time. This patients plan of care is appropriate for delegation to [facility].     Thank you for this referral.  Nupur Mccoy OT  Time Calculation: 27 mins

## 2019-02-21 NOTE — PROGRESS NOTES
PCCM    Chart reviewed  Discussed on Multi-D rounds    New acute left basal ganglia bleed  In ICU  Neuro checks   Goal SBP <140  Head of bed elevation  For MRI  Has SCDs  SUP  On CPAP at home ABIGAIL  H/o SZ    available as needed ICU issues

## 2019-02-21 NOTE — PROGRESS NOTES
0730: Bedside and Verbal shift change report given to Sydnee RN (oncoming nurse) by Nolvia Chapin RN (offgoing nurse). Report included the following information SBAR, Kardex, Intake/Output, MAR, Recent Results and Cardiac Rhythm NSR.     1930: Bedside and Verbal shift change report given to Elinor RN (oncoming nurse) by Shelia Lopez RN (offgoing nurse). Report included the following information SBAR, Kardex, Intake/Output, MAR, Recent Results and Cardiac Rhythm NSR.

## 2019-02-21 NOTE — PROGRESS NOTES
Problem: Mobility Impaired (Adult and Pediatric)  Goal: *Acute Goals and Plan of Care (Insert Text)  Physical Therapy Goals  Initiated 2/21/2019  1. Patient will move from supine to sit and sit to supine  in bed with modified independence within 7 day(s). 2.  Patient will transfer from bed to chair and chair to bed with modified independence using the least restrictive device within 7 day(s). 3.  Patient will perform sit to stand with modified independence within 7 day(s). 4.  Patient will ambulate with modified independence for 250 feet with the least restrictive device within 7 day(s). 6.  Patient will improve Parra Balance score by 7 points within 7 days. physical Therapy EVALUATION  Patient: Yovani Collier (76 y.o. female)  Date: 2/21/2019  Primary Diagnosis: stroke       Precautions:   Fall, DNR    ASSESSMENT :   Based on the objective data described below, the patient presented with Contact guard assistance and Minimum assistance level overall for transfers. Gait training completed over 80 feet using a gait belt and rolling walker and at the Contact guard assistance and Minimum assistance level of assistance. Pt demonstrated slurred speech, R facial droop, impaired coordination on the RUE during finger to nose test s/p admission for ICH L Basal ganglia. Pt scored a 21/56 on the Parra Balance score indicating moderate risk for falls  The following are barriers to independence while in acute care:   -Medical condition: ROM, strength, standing balance and coordination    -Other:   History of arthritis bilateral knee    The patient will benefit from skilled acute intervention to address the above impairments and their rehabilitation potential is considered to be Good    Discharge recommendations: Rehab at inpatient facility: patient can tolerate 3 hours of therapy   Patient's barriers to discharging home, in addition to above impairments: lives alone. , at risk for falls       PLAN :  Recommendations and Planned Interventions: bed mobility training, transfer training, gait training, exercises and neuromuscular re-education      Frequency/Duration: Patient will be followed by physical therapy  5 times a week to address goals. SUBJECTIVE:   Patient stated [facility] speech is still off.     OBJECTIVE DATA SUMMARY:   HISTORY:    Past Medical History:   Diagnosis Date    High cholesterol     Hypertension      Past Surgical History:   Procedure Laterality Date    HX TONSILLECTOMY       Prior Level of Function/Home Situation: pt lives alone in a 2 story condo with elevator to access 2nd floor. Pt reported she has not driven for a few months due to arthritic pain. Pt has a supportive sister and friends that assist as needed. Pt typically ambulates with a RW or cane at baseline. Personal factors and/or comorbidities impacting plan of care:     Home Situation  Home Environment: Private residence  # Steps to Enter: 0  One/Two Story Residence: Two story  # of Interior Steps: (elevator to access 2nd floor)  Living Alone: Yes  Support Systems: Family member(s), Friends \ neighbors  Current DME Used/Available at Home: Cane, straight, Walker, rolling    EXAMINATION/PRESENTATION/DECISION MAKING:   Critical Behavior:  Neurologic State: Alert, Eyes open spontaneously  Orientation Level: Oriented X4  Cognition: Follows commands, Appropriate for age attention/concentration, Appropriate safety awareness       Range Of Motion:  AROM: Within functional limits           PROM: Within functional limits           Strength:    Strength:  Within functional limits                    Tone & Sensation:   Tone: Normal              Sensation: Intact               Coordination:  Coordination: Within functional limits(except slight impaired on the R finger to nose)  Vision:      Functional Mobility:  Bed Mobility:     Supine to Sit: (NT--pt received and returned seated in chair)        Transfers:  Sit to Stand: Contact guard assistance;Minimum assistance(verbal cues for proper hand placement)  Stand to Sit: Minimum assistance(fair eccentric control)                       Balance:   Sitting: Intact  Standing: Impaired; With support  Standing - Static: Good  Standing - Dynamic : Fair  Ambulation/Gait Training:  Distance (ft): 80 Feet (ft)  Assistive Device: Gait belt;Walker, rolling  Ambulation - Level of Assistance: Contact guard assistance;Minimal assistance        Gait Abnormalities: Decreased step clearance        Base of Support: Widened     Speed/[place]: Pace decreased (<100 feet/min)  Step Length: Right shortened;Left shortened      Functional Measure:  Parra Balance Test:    Sitting to Standing: 3  Standing Unsupported: 0  Sitting with Back Unsupported: 4  Standing to Sittin  Transfers: 1  Standing Unsupported with Eyes Closed: 3  Standing Unsupported with Feet Together: 3  Reach Forward with Outstretched Arm: 2   Object: 1  Turn to Look Over Shoulders: 1  Turn 360 Degrees: 1  Alternate Foot on Step/Stool: 0  Standing Unsupported One Foot in Front: 0  Stand on One Le  Total: 21         56=Maximum possible score;   0-20=High fall risk  21-40=Moderate fall risk   41-56=Low fall risk       Based on the above components, the patient evaluation is determined to be of the following complexity level: LOW         Activity Tolerance:   Good  Please refer to the flowsheet for vital signs taken during this treatment. After treatment patient left:   Up in chair  Bed/Chair-wheels locked  Call light within reach  RN notified     COMMUNICATION/EDUCATION:   The patients plan of care was discussed with: Occupational Therapist, Speech Therapist and Registered Nurse. Fall prevention education was provided and the patient/caregiver indicated understanding., Patient/family have participated as able in goal setting and plan of care. and Patient/family agree to work toward stated goals and plan of care.     Thank you for this referral.  Carlos Manuel Murillo PT, DPT   Time Calculation: 18 mins

## 2019-02-21 NOTE — H&P
[address] [place] [address] HISTORY AND PHYSICAL Name:  Petar Reardon 
MR#:  [id number] :  1940 ACCOUNT #:  [de-identified] ADMIT DATE:  2019 CHIEF COMPLAINT:  Facial droop and slurred speech, onset around 7:30 this morning. HISTORY OF PRESENT ILLNESS:  This is a 42-year-old female with a past medical history 
of hypertension, controlled with multiple antihypertensive medications, history of 
seizure, she was taking Keppra and seizure-free for 3 years, presented to outside 
emergency room for the above symptoms. The patient noted a facial droop when she 
looked into the mirror while brushing her teeth this morning, showed it to her sister 
who agreed and also she noted some slurred speech. She went to outside emergency 
room and she had a stat noncontrast CAT scan of the head that showed acute left basal 
ganglia hemorrhage, and she was emergently transferred to [facility].  The 
patient has been taking aspirin on a daily basis and she has taken her dose this 
morning. The other lab work showed a normal CBC, normal urine examination. INR was 1. Serum chemistry showed a BUN of 42, creatinine of 1.72. An EKG showed sinus 
bradycardia. The patient was seen in the intensive care unit bed 9 as a direct 
transfer. Presently, she denies headache, double vision, blurry vision, focal 
extremity numbness or weakness. Her sister is at the bedside. She has very subtle 
dysarthria, some facial droop on the right. She has history of TIAs and seizure, no 
breakthrough for 3 years now. MEDICATIONS:  She takes calcium carbonate, Lopressor 100 mg twice a day, 
chlorthalidone 25 mg daily, amlodipine 10 mg daily, terazosin as needed, losartan 100 
mg daily, calcium with vitamin D3, aspirin 81 mg daily, Keppra 500 mg in the morning 
and 250 in the evening, magnesium 250 mg daily, pravastatin 20 mg nightly. ALLERGIES:  SULFA DRUGS, CODEINE, LEVAQUIN, LISINOPRIL. PAST MEDICAL HISTORY:  As above. SOCIAL HISTORY:  She is . Never smokes. Denied excessive alcohol or drug 
use. FAMILY MEDICAL HISTORY:  Significant for hypertension, cancer, diabetes. Hypertension in siblings. Hypertension in mother. REVIEW OF SYSTEMS:  Pertinent findings discussed in the HPI. The rest of the 
extensive review of systems is negative. PHYSICAL EXAMINATION: 
GENERAL:  The patient is lying comfortably in ICU bed, alert, oriented x4, not in any 
cardiorespiratory distress. VITAL SIGNS:  Temperature 97.5, heart rate 64, respirations 17, oxygen saturation 98% 
on ambient air. HEENT:  Head is atraumatic. Slight droop on the right side. No pallor or jaundice. LUNGS:  Clear air entry bilaterally. CARDIOVASCULAR:  No carotid bruits. S1 is well heard. No gallop or murmur. ABDOMEN:  Obese, normoactive. No organomegaly. GENITOURINARY:  No CVA or suprapubic tenderness. EXTREMITIES:  No edema or deformity. NEUROLOGIC:  Mental status:  Alert, oriented x4. Cranial nerves:  Right facial droop 
and dysarthria. Motor examination:  Symmetrical 5/5. Gait not tested. Coordination 
normal. 
 
ASSESSMENT AND PLAN: 
1. Acute hemorrhagic stroke:  She has a small bleed into the left basal ganglia. She has taken her aspirin. We will avoid any antiplatelet, anticoagulant. She will 
be monitored in the intensive care unit. Blood pressure monitoring and treatment and 
targets set in the order. Neurology and Neurosurgical Service are consulted. The 
patient has been evaluated by a teleneurologist from the outside hospital, who 
recommended admission, imaging study, consultation with Neurology and Neurosurgery. I ordered plain MRI of the brain. She has chronic kidney disease. I will defer CT 
angiogram or MR angiogram studies for now unless they are recommended by Neurology or Neurosurgery. Per Teleneurology, the patient is not a candidate for neuroendovascular intervention. This is not thromboembolic. She may need evaluation 
for aneurysm, but we will await and discuss with Neurology or Neurosurgery before we 
venture on that. This looks more a hemorrhagic stroke, although one could not rule 
out aneurysm. Hold off all a hypertensive medications for now. Continue statin. 2.  History of seizure:  Keppra will be continued. If she passed dysphagia screen. She 
will be administered oral medicine, otherwise will be switched to intravenous route. 3.  Chronic kidney disease stage 3-4:  Slight worsening of the creatinine from September, but more or less, her kidney function has remained stable. 4.  Obstructive sleep apnea:  She wears CPAP at a pressure of 4. 
5.  Advanced care planning:  She wishes to be do not resuscitate and indicates that 
her son would be her surrogate decision maker. Collin Guerrero MD 
 
 
[id number] 
D:  02/20/2019 16:53 
T:  02/20/2019 21:21 
JOB #:  [id number]

## 2019-02-21 NOTE — CONSULTS
Neurology Consult                                                       Subjective:      Vera Brock  is a 77 y.o. Right handed  female  With a PMH of hypertension who around 0730 this morning was eating breakfast. She noticed at that time her face wasn't feeling right she then spoke with her sister who was with her and her sister told her she was slurring her words. She did not go in right away she had a appointment with her kidney doctor that morning and decided to keep that appointment and then was urged by her doctor at that time to be seen in the emergency room in which she was seen at an emergency room and after images were completed that revealed Acute left basal ganglia hemorrhage was transferred to [facility] for higher level of care. (She states that she does have an up coming Right knee surgery to take place in late March 2019) Neurosurgery has already evaluated as well .     Past Medical History:   Diagnosis Date    Arthritis     BMI 40.0-44.9, adult ([facility] [address] 03/20/2018    CAD (coronary artery disease)     Depression     Diabetes (HCC)     GERD (gastroesophageal reflux disease)     Headache(784.0)     Hx of carbuncle of skin and subcutaneous tissue 03/20/2018    Hyperlipidemia     Hypertension     Morbid obesity ([address] 03/20/2018    Psychiatric disorder     Depressioin, anxiety     Past Surgical History:   Procedure Laterality Date    HX GYN      c section    HX HEENT      tonsillectomy    HX ORTHOPAEDIC      lumbar spine surgery    HX ORTHOPAEDIC      bilat knee arthroscopy    HX ORTHOPAEDIC      cervical fusion    HX ORTHOPAEDIC      carpal tunnel surgery      Family History   Problem Relation Age of Onset    Cancer Maternal Aunt     Diabetes Brother     Heart Disease Maternal Grandmother      Social History     Tobacco Use    Smoking status: Never Smoker    Smokeless tobacco: Never Used   Substance Use Topics    Alcohol use: No      Current Facility-Administered Medications Medication Dose Route Frequency Provider Last Rate Last Dose    midazolam (VERSED) 1 mg/mL injection             propofol (DIPRIVAN) infusion  0-50 mcg/kg/min IntraVENous TITRATE Rosalind Sanchez MD 32 mL/hr at 01/12/19 2130 50 mcg/kg/min at 01/12/19 2130    midazolam (VERSED) 1 mg/mL injection             LORazepam (ATIVAN) 1 mg/mL in D5W infusion  0-5 mg/hr IntraVENous TITRATE Rosalind Sanchez MD 2.5 mL/hr at 01/12/19 1833 2.5 mg/hr at 01/12/19 1833    midazolam (VERSED) injection 5 mg  5 mg IntraVENous NOW Rosalind Sanchez MD        acetaminophen (TYLENOL) suppository 650 mg  650 mg Rectal Q4H PRN Carol Arango MD        0.9% sodium chloride infusion  100 mL/hr IntraVENous CONTINUOUS Carol Arango  mL/hr at 01/12/19 1834 100 mL/hr at 01/12/19 1834    sodium chloride (NS) flush 5-40 mL  5-40 mL IntraVENous Q8H Carol Arango MD   10 mL at 01/12/19 2144    sodium chloride (NS) flush 5-40 mL  5-40 mL IntraVENous PRN Carol Arango MD        ondansetron (ZOFRAN) injection 4 mg  4 mg IntraVENous Q6H PRN Garey Peabody, MD        [START ON 1/13/2019] aspirin (ASA) suppository 300 mg  300 mg Rectal DAILY Carol Arango MD        bisacodyl (DULCOLAX) suppository 10 mg  10 mg Rectal DAILY PRN Carol Arango MD        heparin (porcine) injection 5,000 Units  5,000 Units SubCUTAneous Q8H Carol Arango MD   5,000 Units at 01/12/19 1921    insulin lispro (HUMALOG) injection   SubCUTAneous AC&HS Delfina VERMA MD   4 Units at 01/12/19 2143    glucose chewable tablet 16 g  4 Tab Oral PRN Carol Arango MD        dextrose (D50W) injection syrg 12.5-25 g  12.5-25 g IntraVENous PRN Carol Arango MD        glucagon (GLUCAGEN) injection 1 mg  1 mg IntraMUSCular PRN Carol Arango MD        pantoprazole (PROTONIX) 40 mg in sodium chloride 0.9% 10 mL injection  40 mg IntraVENous DAILY Carol Arango MD   40 mg at 01/12/19 1928    potassium chloride 10 mEq in 50 ml IVPB  10 mEq IntraVENous Q1H Carol Arango MD 50 mL/hr at 01/12/19 2228 10 mEq at 01/12/19 2228    niCARdipine (CARDENE) 25 mg in 0.9% sodium chloride 250 mL infusion  0-15 mg/hr IntraVENous TITRATE Yumiko VERMA MD 75 mL/hr at 01/12/19 2131 7.5 mg/hr at 01/12/19 2131    acetaminophen (OFIRMEV) infusion 1,000 mg  1,000 mg IntraVENous ONCE Radha Read MD            Allergies   Allergen Reactions    Sulfa (Sulfonamide Antibiotics) Other (comments)     Eyes burned after using sulfa eyedrops    Gabapentin Other (comments)     Swelling in ankles    Oxycodone Unknown (comments)     \"hallucinations\"    Tape [Adhesive] Rash       Review of Systems:  A comprehensive review of systems was negative except for that written in the History of Present Illness. Objective:     Vitals:    01/12/19 2200 01/12/19 2205 01/12/19 2215 01/12/19 2230   BP: 111/76 127/57 134/60 137/55   Pulse: (!) 103 99 99 98   Resp: 11 14 23 19   Temp:    (!) 103.2 °F (39.6 °C)   SpO2: 98% 98% 98% 99%   Weight:       Height:            Physical Exam:  GENERAL: alert, cooperative, no distress, appears stated age  EYE: negative, conjunctivae/corneas clear. PERRL, EOM's intact. Fundi benign  LUNG: clear to auscultation bilaterally  HEART: regular rate and rhythm, S1, S2 normal, no murmur, click, rub or gallop  ABDOMEN: soft, non-tender. Bowel sounds normal. No masses,  no organomegaly  EXTREMITIES:  extremities normal, atraumatic, 2 + pitting edema in bilateral lower extremities no cyanosis   SKIN: Normal. and no rash or abnormalities  NEUROLOGIC: GCS 15  PSYCHIATRIC: non focal       NIHSS: 4      Imaging:  CT head 2/20/19    Ventricles: Midline, no hydrocephalus. Intracranial Hemorrhage: None. Brain Parenchyma/Brainstem: Acute left basal ganglia hemorrhage measures 2.4 x  0.9 x 1.7 cm (1.8 mL). Minimal adjacent edema without significant mass effect.   Basal Cisterns: Normal.  Paranasal Sinuses: Visualized sinuses are clear.  Additional Comments: N/A.     IMPRESSION:     Acute left basal ganglia hemorrhage          Assessment:     Hospital Problems  Date Reviewed: 1/12/2019          Codes Class Noted POA    * (Principal) Acute CVA (cerebrovascular accident) ([facility] [address] ICD-10-CM: I63.9  ICD-9-CM: 434.91  1/12/2019 Yes              Plan:     - SBP < 140  - HOB at 30-45 degrees  - MRI of the brain w/o contrast  - repeat head CT stat if decline in Neurostatus  - Repeat head CT in the am   - Neurology to continue to follow th patient        Signed By: TRISH Clark

## 2019-02-21 NOTE — PROGRESS NOTES
Problem: Falls - Risk of  Goal: *Absence of Falls  Document Chava Fall Risk and appropriate interventions in the flowsheet.   Outcome: Progressing Towards Goal  Fall Risk Interventions:  Mobility Interventions: Patient to call before getting OOB, PT Consult for mobility concerns, PT Consult for assist device competence, Strengthening exercises (ROM-active/passive)         Medication Interventions: Evaluate medications/consider consulting pharmacy, Patient to call before getting OOB, Teach patient to arise slowly    Elimination Interventions: Call light in reach, Patient to call for help with toileting needs, Toileting schedule/hourly rounds

## 2019-02-21 NOTE — ROUTINE PROCESS
Bedside, Verbal and Written shift change report given to Traci RN (oncoming nurse) by Deb Arroyo RN (offgoing nurse). Report included the following information SBAR, Kardex, ED Summary, Procedure Summary, Intake/Output, MAR, Accordion and Recent Results.

## 2019-02-21 NOTE — PROGRESS NOTES
Hospitalist Progress Note              Priyanka Bolton MD.                                                             Cell: [phone]                               NAME:  Lidia Aguirre  :  1940  MRN:  [id number]  Date of Service:  2019    Summary: 77 y.o. female with past medical history of hypertension on multiple anti-hypertensive's, seizure disorder, who presented on 2019 with left facial droop and slurred speech. Assessment/Plan:  Acute left basal ganglia hemorrhage: Poa, Possibly due to hypertensive bleed vs acute stroke with hemorrhagic transformation. F/u with brain MRI. Stroke up  Goal SBP < 140 mm hg.  Hold anti-platelet agent  Neuro checks  PT/OT consult    Left facial droop; d/t above    Resistant hypertension on multiple anti-hypertensive's. Resume home anti-hypertensive's    H/o Seizures: Continue keppra  Seizure precuations    CKD stage 3: Monitor renal indices      Code status: Full  DVT prophylaxsis: SCD   Dispo: Continue inpatient care         Interval History/Subjective:    F/u for left facial droop. No acute overnight event. No new complaints. Denies headache. Review of Systems:  Pertinent items are noted in HPI. Objective:     VITALS:   Last 24hrs VS reviewed since prior progress note. Most recent are:  Visit Vitals  /63   Pulse 70   Temp 97.9 °F (36.6 °C)   Resp 21   Ht 5' 3\" (1.6 m)   Wt 97.1 kg (214 lb 1.1 oz)   SpO2 97%   BMI 40.45 kg/m²       Intake/Output Summary (Last 24 hours) at 2019 1613  Last data filed at 2019 1300  Gross per 24 hour   Intake 1340 ml   Output --   Net 1340 ml        PHYSICAL EXAM:  General: No acute distress, cooperative, pleasant   EENT: EOMI. Anicteric sclerae. Oral mucous moist, oropharynx benign. Left facial droop  Resp: CTA bilaterally. No wheezing/rhonchi/rales.  No accessory muscle use  CV: Regular rhythm, normal rate, no murmurs, gallops, rubs  GI: Soft, non distended, non tender. normoactive bowel sounds, no hepatosplenomegaly Extremities: No edema, warm, 2+ pulses throughout  Neurologic: Moves all extremities. AAOx3, CN II-XII grossly intact  Psych: Good insight. Not anxious nor agitated. Skin: Good Turgor, no rashes or ulcers    Lab Data Personally Reviewed: (see below)     Medications list Personally Reviewed:  x YES  NO     _______________________________________________________________________  Care Plan discussed with:  Patient/Family and Nurse    Total NON critical care TIME:  30 minutes    Lionel Queen MD     Procedures: see electronic medical records for all procedures/Xrays and details which were not copied into this note but were reviewed prior to creation of Plan. LABS:  Recent Labs     02/21/19  0450 02/20/19  1142   WBC 8.2 7.8   HGB 11.5 12.2   HCT 34.1* 36.0    254     Recent Labs     02/21/19  0450 02/20/19  1142    144   K 3.8 4.5   * 108   CO2 22 27   BUN 38* 42*   CREA 1.60* 1.74*    110*   CA 9.0 10.1     Recent Labs     02/21/19  0450 02/20/19  1142   SGOT 24 28   ALT 26 33   AP 61 62   TBILI 0.9 0.7   TP 6.7 7.6   ALB 2.9* 3.4*   GLOB 3.8 4.2*     Recent Labs     02/20/19  1142   INR 1.0   PTP 9.4      No results for input(s): FE, TIBC, PSAT, FERR in the last 72 hours. No results found for: FOL, RBCF   No results for input(s): PH, PCO2, PO2 in the last 72 hours. No results for input(s): CPK, CKNDX, TROIQ in the last 72 hours.     No lab exists for component: CPKMB  Lab Results   Component Value Date/Time    Cholesterol, total 131 02/21/2019 04:51 AM    HDL Cholesterol 38 02/21/2019 04:51 AM    LDL, calculated 62 02/21/2019 04:51 AM    Triglyceride 155 (H) 02/21/2019 04:51 AM    CHOL/HDL Ratio 3.4 02/21/2019 04:51 AM     Lab Results   Component Value Date/Time    Glucose (POC) 109 (H) 02/20/2019 10:13 PM    Glucose (POC) 114 (H) 02/20/2019 11:34 AM     Lab Results   Component Value Date/Time    Color YELLOW/STRAW 02/20/2019 12:54 PM    Appearance CLEAR 02/20/2019 12:54 PM    Specific gravity 1.020 02/20/2019 12:54 PM    Specific gravity 1.020 01/05/2015 03:58 PM    pH (UA) 7.0 02/20/2019 12:54 PM    Protein 100 (A) 02/20/2019 12:54 PM    Glucose NEGATIVE  02/20/2019 12:54 PM    Ketone NEGATIVE  02/20/2019 12:54 PM    Bilirubin NEGATIVE  02/20/2019 12:54 PM    Urobilinogen 0.2 02/20/2019 12:54 PM    Nitrites NEGATIVE  02/20/2019 12:54 PM    Leukocyte Esterase NEGATIVE  02/20/2019 12:54 PM    Epithelial cells FEW 02/20/2019 12:54 PM    Bacteria NEGATIVE  02/20/2019 12:54 PM    WBC 0-4 02/20/2019 12:54 PM    RBC 0-5 02/20/2019 12:54 PM

## 2019-02-21 NOTE — CONSULTS
Neurology Consult                                                        Subjective:      Marino Cummings  is a 77 y.o. Right handed  female  With a PMH of hypertension who around 0730 this morning was eating breakfast. She noticed at that time her face wasn't feeling right she then spoke with her sister who was with her and her sister told her she was slurring her words. She did not go in right away she had a appointment with her kidney doctor that morning and decided to keep that appointment and then was urged by her doctor at that time to be seen in the emergency room in which she was seen at an emergency room and after images were completed that revealed Acute left basal ganglia hemorrhage was transferred to [facility] for higher level of care.    (She states that she does have an up coming Right knee surgery to take place in late March 2019) Neurosurgery has already evaluated as well .          Past Medical History:   Diagnosis Date    Arthritis      BMI 40.0-44.9, adult ([address] 03/20/2018    CAD (coronary artery disease)      Depression      Diabetes ([address]      GERD (gastroesophageal reflux disease)      Headache(784.0)      Hx of carbuncle of skin and subcutaneous tissue 03/20/2018    Hyperlipidemia      Hypertension      Morbid obesity ([address] 03/20/2018    Psychiatric disorder       Depressioin, anxiety            Past Surgical History:   Procedure Laterality Date    HX GYN         c section    HX HEENT         tonsillectomy    HX ORTHOPAEDIC         lumbar spine surgery    HX ORTHOPAEDIC         bilat knee arthroscopy    HX ORTHOPAEDIC         cervical fusion    HX ORTHOPAEDIC         carpal tunnel surgery            Family History   Problem Relation Age of Onset    Cancer Maternal Aunt      Diabetes Brother      Heart Disease Maternal Grandmother        Social History           Tobacco Use    Smoking status: Never Smoker    Smokeless tobacco: Never Used   Substance Use Topics    Alcohol use: No                Current Facility-Administered Medications   Medication Dose Route Frequency Provider Last Rate Last Dose    midazolam (VERSED) 1 mg/mL injection                propofol (DIPRIVAN) infusion  0-50 mcg/kg/min IntraVENous TITRATE Argentina Mcgowan MD 32 mL/hr at 01/12/19 2130 50 mcg/kg/min at 01/12/19 2130    midazolam (VERSED) 1 mg/mL injection                LORazepam (ATIVAN) 1 mg/mL in D5W infusion  0-5 mg/hr IntraVENous TITRATE Argentina Mcgowan MD 2.5 mL/hr at 01/12/19 1833 2.5 mg/hr at 01/12/19 1833    midazolam (VERSED) injection 5 mg  5 mg IntraVENous NOW Argentina Mcgowan MD        acetaminophen (TYLENOL) suppository 650 mg  650 mg Rectal Q4H PRN Carol Arango MD        0.9% sodium chloride infusion  100 mL/hr IntraVENous CONTINUOUS Carol Arango  mL/hr at 01/12/19 1834 100 mL/hr at 01/12/19 1834    sodium chloride (NS) flush 5-40 mL  5-40 mL IntraVENous Q8H Carol Arango MD   10 mL at 01/12/19 2144    sodium chloride (NS) flush 5-40 mL  5-40 mL IntraVENous PRN Carol Arango MD        ondansetron (ZOFRAN) injection 4 mg  4 mg IntraVENous Q6H PRN Carol Arango MD        [START ON 1/13/2019] aspirin (ASA) suppository 300 mg  300 mg Rectal DAILY Carol Arango MD        bisacodyl (DULCOLAX) suppository 10 mg  10 mg Rectal DAILY PRN Carol Arango MD        heparin (porcine) injection 5,000 Units  5,000 Units SubCUTAneous Q8H Carol Arango MD   5,000 Units at 01/12/19 1921    insulin lispro (HUMALOG) injection   SubCUTAneous AC&HS [place] VERMA MD   4 Units at 01/12/19 2143    glucose chewable tablet 16 g  4 Tab Oral PRN Carol Arango MD        dextrose (D50W) injection syrg 12.5-25 g  12.5-25 g IntraVENous PRN Carol Arango MD        glucagon (GLUCAGEN) injection 1 mg  1 mg IntraMUSCular PRN Carol Arango MD        pantoprazole (PROTONIX) 40 mg in sodium chloride 0.9% 10 mL injection  40 mg IntraVENous DAILY Venice Montiel MD LUCI   40 mg at 01/12/19 1928    potassium chloride 10 mEq in 50 ml IVPB  10 mEq IntraVENous Q1H Carol Arango MD 50 mL/hr at 01/12/19 2228 10 mEq at 01/12/19 2228    niCARdipine (CARDENE) 25 mg in 0.9% sodium chloride 250 mL infusion  0-15 mg/hr IntraVENous TITRATE Jazmin VERMA MD 75 mL/hr at 01/12/19 2131 7.5 mg/hr at 01/12/19 2131    acetaminophen (OFIRMEV) infusion 1,000 mg  1,000 mg IntraVENous ONCE Alan Read MD                   Allergies   Allergen Reactions    Sulfa (Sulfonamide Antibiotics) Other (comments)       Eyes burned after using sulfa eyedrops    Gabapentin Other (comments)       Swelling in ankles    Oxycodone Unknown (comments)       \"hallucinations\"    Tape [Adhesive] Rash         Review of Systems:  A comprehensive review of systems was negative except for that written in the History of Present Illness.     Objective:             Vitals:     01/12/19 2200 01/12/19 2205 01/12/19 2215 01/12/19 2230   BP: 111/76 127/57 134/60 137/55   Pulse: (!) 103 99 99 98   Resp: 11 14 23 19   Temp:       (!) 103.2 °F (39.6 °C)   SpO2: 98% 98% 98% 99%   Weight:           Height:                 Physical Exam:  GENERAL: alert, cooperative, no distress, appears stated age  EYE: negative, conjunctivae/corneas clear. PERRL, EOM's intact. Fundi benign  LUNG: clear to auscultation bilaterally  HEART: regular rate and rhythm, S1, S2 normal, no murmur, click, rub or gallop  ABDOMEN: soft, non-tender. Bowel sounds normal. No masses,  no organomegaly  EXTREMITIES:  extremities normal, atraumatic, 2 + pitting edema in bilateral lower extremities no cyanosis   SKIN: Normal. and no rash or abnormalities  NEUROLOGIC: GCS 15  PSYCHIATRIC: non focal        NIHSS: 4        Imaging:  CT head 2/20/19     Ventricles: Midline, no hydrocephalus. Intracranial Hemorrhage: None. Brain Parenchyma/Brainstem: Acute left basal ganglia hemorrhage measures 2.4 x  0.9 x 1.7 cm (1.8 mL).  Minimal adjacent edema without significant mass effect. Basal Cisterns: Normal.  Paranasal Sinuses: Visualized sinuses are clear.   Additional Comments: N/A.     IMPRESSION:     Acute left basal ganglia hemorrhage              Assessment:                 Hospital Problems  Date Reviewed: 1/12/2019           Codes Class Noted POA     * (Principal) Acute CVA (cerebrovascular accident) ([facility] [address] ICD-10-CM: I63.9  ICD-9-CM: 434.91   1/12/2019 Yes                   Plan:      - SBP < 140  - HOB at 30-45 degrees  - MRI of the brain w/o contrast  - repeat head CT stat if decline in Neurostatus  - Repeat head CT in the am   - Neurology to continue to follow th patient           Signed By: TRISH Diallo

## 2019-02-21 NOTE — PROGRESS NOTES
Reason for Admission:   Presented to the ED with slurred speech and right facial droop. CT: acute left basal ganglia hemorrhage. RRAT Score:   5                 Plan for utilizing home health:  Not at this time, therapy recommending inpatient rehab                       Likelihood of Readmission:  low  Transition of Care Plan:   Inpatient rehab    Care manager met with patient and her son Renuka Wong ([phone]) to introduce self and discuss transitions of care. Patient lives in a [address] on [address] second floor(facility has an elevator) and her son and his wife live on the 4th floor of the same building. Per patient she has a walker and a cane,mostly uses the walker, she still drives short distances. Patient confirmed her PCP to be Dr Naz Burnett and uses the [facility] as her pharmacy. Consult noted for discharge planning. Therapy has recommended inpatient rehab. CM provided patient a list of facilities and they would like referral made to [facility]. Referral made through [facility]. Akila Silva RN,CRM    Care Management Interventions  PCP Verified by CM: Yes(Dr Naz Burnett)  Palliative Care Criteria Met (RRAT>21 & CHF Dx)?: No  Transition of Care Consult (CM Consult): (Inpatient rehab)  Jose Mariahart Signup: No  Discharge Durable Medical Equipment: No  Physical Therapy Consult: Yes  Occupational Therapy Consult: Yes  Speech Therapy Consult: No  Current Support Network: Lives Alone(tyrone Andino: [phone], CARLIE Leong [phone])  Confirm Follow Up Transport: Family  Plan discussed with Pt/Family/Caregiver: Yes  Freedom of Choice Offered:  Yes

## 2019-02-21 NOTE — CONSULTS
[address]    Name:  Shelia Haque  MR#:  [id number]  :  1940  ACCOUNT #:  [de-identified]  DATE OF SERVICE:  2019      CONSULTING DOCTOR:  German Holm MD, hospitalist.    REASON FOR CONSULATION:  Left basal ganglia/left external capsule hemorrhage. HISTORY OF PRESENT ILLNESS:  A 69-year-old female with a history of hypertension, I  believe came to the emergency room today and she noticed that she had some slurred  speech and felt like some drooping of the right side of her mouth. She is moving  both sides. She has been having loss of consciousness, nausea, vomiting, is not  terribly  numb. Blood pressure on original presentation was only mildly elevated. CT of the head at an outside freestanding ER shows a small left external capsule  hemorrhage. No mass effect. No intraventricular hemorrhage. PAST MEDICAL HISTORY:  Hypertension, hypercholesterolemia. PAST SURGICAL HISTORY:   None known. MEDICATIONS ON ADMISSION:  None known. ALLERGIES:   SULFA AND CODEINE AND LEVAQUIN, LISINOPRIL. SOCIAL HISTORY:  She has been staying with her sister. She is an active DNR. NEUROLOGIC EXAMINATION:  She is an obese elderly female lying in bed. She is awake,  alert and oriented x3. She has a mild right facial droop. She is _____ gravity  bilaterally. Not complaining of any focal numbness, complaining of bilateral leg  pain from orthopedic problems. Abdomen soft. IMPRESSION:  Ms. Rk Mazariegos has a small hypertensive wet stroke of the left external  capsule. There is no need for neurosurgical intervention, would recommend followup  imaging in the next 24 hours and neurologic monitoring, call us if needed.       MD TRISH Quezada/S_ARLYN_Val/BC_RADHA  D:  2019 21:41  T:  2019 5:31  JOB #:  [id number]

## 2019-02-22 ENCOUNTER — APPOINTMENT (OUTPATIENT)
Dept: NON INVASIVE DIAGNOSTICS | Age: 79
DRG: 066 | End: 2019-02-22
Attending: HOSPITALIST
Payer: MEDICARE

## 2019-02-22 PROCEDURE — 74011250636 HC RX REV CODE- 250/636: Performed by: NURSE PRACTITIONER

## 2019-02-22 PROCEDURE — 74011250637 HC RX REV CODE- 250/637: Performed by: INTERNAL MEDICINE

## 2019-02-22 PROCEDURE — 94660 CPAP INITIATION&MGMT: CPT

## 2019-02-22 PROCEDURE — 74011250637 HC RX REV CODE- 250/637: Performed by: HOSPITALIST

## 2019-02-22 PROCEDURE — 74011250637 HC RX REV CODE- 250/637: Performed by: PSYCHIATRY & NEUROLOGY

## 2019-02-22 PROCEDURE — 97116 GAIT TRAINING THERAPY: CPT

## 2019-02-22 PROCEDURE — 74011250636 HC RX REV CODE- 250/636

## 2019-02-22 PROCEDURE — 93306 TTE W/DOPPLER COMPLETE: CPT

## 2019-02-22 PROCEDURE — 65660000000 HC RM CCU STEPDOWN

## 2019-02-22 RX ORDER — FLUTICASONE PROPIONATE 50 MCG
1 SPRAY, SUSPENSION (ML) NASAL
COMMUNITY
End: 2021-05-14

## 2019-02-22 RX ORDER — OLMESARTAN MEDOXOMIL 40 MG/1
40 TABLET ORAL DAILY
COMMUNITY
End: 2019-02-24

## 2019-02-22 RX ORDER — CHLORTHALIDONE 25 MG/1
25 TABLET ORAL DAILY
Status: DISCONTINUED | OUTPATIENT
Start: 2019-02-23 | End: 2019-02-24 | Stop reason: HOSPADM

## 2019-02-22 RX ORDER — DOXAZOSIN 2 MG/1
4 TABLET ORAL
Status: DISCONTINUED | OUTPATIENT
Start: 2019-02-22 | End: 2019-02-24 | Stop reason: HOSPADM

## 2019-02-22 RX ORDER — CARBOXYMETHYLCELLULOSE SODIUM AND GLYCERIN 5; 9 MG/ML; MG/ML
1 SOLUTION/ DROPS OPHTHALMIC
COMMUNITY
End: 2021-05-14

## 2019-02-22 RX ORDER — AMLODIPINE BESYLATE 5 MG/1
10 TABLET ORAL DAILY
Status: DISCONTINUED | OUTPATIENT
Start: 2019-02-23 | End: 2019-02-24 | Stop reason: HOSPADM

## 2019-02-22 RX ORDER — MELATONIN
2000
COMMUNITY
End: 2019-08-07

## 2019-02-22 RX ORDER — LABETALOL HCL 20 MG/4 ML
SYRINGE (ML) INTRAVENOUS
Status: COMPLETED
Start: 2019-02-22 | End: 2019-02-22

## 2019-02-22 RX ORDER — AMLODIPINE BESYLATE 5 MG/1
5 TABLET ORAL ONCE
Status: COMPLETED | OUTPATIENT
Start: 2019-02-22 | End: 2019-02-22

## 2019-02-22 RX ORDER — MONTELUKAST SODIUM 10 MG/1
10 TABLET ORAL DAILY
COMMUNITY
End: 2021-05-14

## 2019-02-22 RX ADMIN — CARVEDILOL 6.25 MG: 6.25 TABLET, FILM COATED ORAL at 08:38

## 2019-02-22 RX ADMIN — ACETAMINOPHEN 650 MG: 325 TABLET ORAL at 11:15

## 2019-02-22 RX ADMIN — DOXAZOSIN 4 MG: 2 TABLET ORAL at 21:09

## 2019-02-22 RX ADMIN — HYDRALAZINE HYDROCHLORIDE 10 MG: 20 INJECTION INTRAMUSCULAR; INTRAVENOUS at 00:32

## 2019-02-22 RX ADMIN — HYDRALAZINE HYDROCHLORIDE 10 MG: 20 INJECTION INTRAMUSCULAR; INTRAVENOUS at 11:15

## 2019-02-22 RX ADMIN — HYDRALAZINE HYDROCHLORIDE 10 MG: 20 INJECTION INTRAMUSCULAR; INTRAVENOUS at 20:04

## 2019-02-22 RX ADMIN — Medication 200 MG: at 08:38

## 2019-02-22 RX ADMIN — FAMOTIDINE 20 MG: 20 TABLET ORAL at 08:37

## 2019-02-22 RX ADMIN — AMLODIPINE BESYLATE 5 MG: 5 TABLET ORAL at 08:37

## 2019-02-22 RX ADMIN — LOSARTAN POTASSIUM 100 MG: 50 TABLET ORAL at 08:37

## 2019-02-22 RX ADMIN — HYDRALAZINE HYDROCHLORIDE 10 MG: 20 INJECTION INTRAMUSCULAR; INTRAVENOUS at 23:51

## 2019-02-22 RX ADMIN — LEVETIRACETAM 250 MG: 250 TABLET, FILM COATED ORAL at 17:55

## 2019-02-22 RX ADMIN — LEVETIRACETAM 500 MG: 500 TABLET ORAL at 08:37

## 2019-02-22 RX ADMIN — PRAVASTATIN SODIUM 20 MG: 20 TABLET ORAL at 21:09

## 2019-02-22 RX ADMIN — HYDRALAZINE HYDROCHLORIDE 10 MG: 20 INJECTION INTRAMUSCULAR; INTRAVENOUS at 15:41

## 2019-02-22 RX ADMIN — CARVEDILOL 6.25 MG: 6.25 TABLET, FILM COATED ORAL at 17:55

## 2019-02-22 RX ADMIN — LABETALOL 20 MG/4 ML (5 MG/ML) INTRAVENOUS SYRINGE 10 MG: at 22:52

## 2019-02-22 RX ADMIN — AMLODIPINE BESYLATE 5 MG: 5 TABLET ORAL at 11:12

## 2019-02-22 RX ADMIN — HYDRALAZINE HYDROCHLORIDE 10 MG: 20 INJECTION INTRAMUSCULAR; INTRAVENOUS at 07:05

## 2019-02-22 NOTE — PROGRESS NOTES
0730 Bedside and Verbal shift change report given to Roxanna (oncoming nurse) by Elyssa Snell (offgoing nurse). Report included the following information SBAR, Kardex, MAR, Recent Results and Cardiac Rhythm NSR.     0900 Working with PT, ambulating with walker. 1030 Updated patient's son at bedside. 200 Dr. Hattie Ramirez at bedside examining patient. Change to Q4 neuro checks and transfer to stepdown neuro floor. Provided Dr. Hattie Ramirez with son's phone number to update him on plan of care. 1420 TRANSFER - OUT REPORT:    Verbal report given to Reymundo Bard (name) on Prudy Coup  being transferred to NSTU (unit) for routine progression of care       Report consisted of patients Situation, Background, Assessment and   Recommendations(SBAR). Information from the following report(s) SBAR, Kardex, MAR, Recent Results and Cardiac Rhythm NSR was reviewed with the receiving nurse. Lines:   Peripheral IV 02/20/19 Right Antecubital (Active)   Site Assessment Clean, dry, & intact 2/22/2019 12:00 PM   Phlebitis Assessment 0 2/22/2019 12:00 PM   Infiltration Assessment 0 2/22/2019 12:00 PM   Dressing Status Clean, dry, & intact 2/22/2019 12:00 PM   Dressing Type Transparent 2/22/2019 12:00 PM   Hub Color/Line Status Pink;Capped 2/22/2019 12:00 PM   Action Taken Open ports on tubing capped 2/22/2019 12:00 PM   Alcohol Cap Used Yes 2/22/2019 12:00 PM        Opportunity for questions and clarification was provided.       Patient transported with:   SEJENT

## 2019-02-22 NOTE — PROGRESS NOTES
Problem: Falls - Risk of  Goal: *Absence of Falls  Document Chava Fall Risk and appropriate interventions in the flowsheet.   Outcome: Progressing Towards Goal  Fall Risk Interventions:  Mobility Interventions: Communicate number of staff needed for ambulation/transfer, Patient to call before getting OOB, Strengthening exercises (ROM-active/passive), Utilize walker, cane, or other assistive device, PT Consult for mobility concerns         Medication Interventions: Evaluate medications/consider consulting pharmacy, Patient to call before getting OOB    Elimination Interventions: Patient to call for help with toileting needs, Toileting schedule/hourly rounds

## 2019-02-22 NOTE — PROGRESS NOTES
TRANSFER - IN REPORT:    Verbal report received from [facility], RN(name) on Fern Albert  being received from ICU(unit) for routine progression of care      Report consisted of patients Situation, Background, Assessment and   Recommendations(SBAR). Information from the following report(s) SBAR, Kardex, Intake/Output, MAR, Recent Results, Med Rec Status and Cardiac Rhythm SR was reviewed with the receiving nurse. Opportunity for questions and clarification was provided. Assessment completed upon patients arrival to unit and care assumed.

## 2019-02-22 NOTE — PROGRESS NOTES
Hospitalist Progress Note              Nandini Quintero MD.                                                             Cell: [phone]                               NAME:  Bartolo Alex  :  1940  MRN:  [id number]  Date of Service:  2019    Summary: 77 y.o. female with past medical history of hypertension on multiple anti-hypertensive's, seizure disorder, who presented on 2019 with left facial droop and slurred speech. Assessment/Plan:  Acute left basal ganglia hemorrhage: Poa, Possibly due to hypertensive bleed vs acute stroke with hemorrhagic transformation. MRI of the brain shows left lateral basal ganglia acute infarct. No associated vascular abnormality appearance consistent with idiopathic/hypertensive hemorrhage. Old right posterior medial occipital lobe cortical infarct. - Apart from mild right facial drop no other acute neurological deficits. Hold antiplatelets but can resume Aspirin in 2 weeks as per neurology  Continue strict BP control with SBP< 140 mmhg  Pt/Ot following  Obtain 2 d echo     Right facial droop; d/t above    Resistant hypertension on multiple anti-hypertensive's. On norvasc, coreg, chlorthalidone and losartan  Encourage home BP monitoring  Goal SBP < 140 mm hg    H/o Seizures: Continue keppra  Seizure precuations  Stable    CKD stage 3: Monitor renal indices      Code status: Full  DVT prophylaxsis: SCD   Dispo: Home with self care. Probably d/c home tomorrow     Interval History/Subjective:    F/u for right facial droop. No acute overnight event. Patient states \" I feel jittery inside\". Review of Systems:  Pertinent items are noted in HPI. Objective:     VITALS:   Last 24hrs VS reviewed since prior progress note.  Most recent are:  Visit Vitals  /51 (BP 1 Location: Left arm, BP Patient Position: Post activity)   Pulse 77   Temp 98.2 °F (36.8 °C)   Resp 24   Ht 5' 3\" (1.6 m)   Wt 98.9 kg (218 lb 0.6 oz)   SpO2 93%   BMI 41.20 kg/m²       Intake/Output Summary (Last 24 hours) at 2/22/2019 1621  Last data filed at 2/22/2019 1300  Gross per 24 hour   Intake 370 ml   Output 300 ml   Net 70 ml        PHYSICAL EXAM:  General: No acute distress, cooperative, pleasant   EENT: EOMI. Anicteric sclerae. Oral mucous moist, oropharynx benign. Right facial droop  Resp: CTA bilaterally. No wheezing/rhonchi/rales. No accessory muscle use  CV: Regular rhythm, normal rate, no murmurs, gallops, rubs  GI: Soft, non distended, non tender. normoactive bowel sounds, no hepatosplenomegaly Extremities: No edema, warm, 2+ pulses throughout  Neurologic: Moves all extremities. AAOx3, CN II-XII grossly intact  Psych: Good insight. Not anxious nor agitated. Skin: Good Turgor, no rashes or ulcers    Lab Data Personally Reviewed: (see below)     Medications list Personally Reviewed:  x YES  NO     _______________________________________________________________________  Care Plan discussed with:  Patient/Family and Nurse    Total NON critical care TIME:  30 minutes    Omid Sainz MD     Procedures: see electronic medical records for all procedures/Xrays and details which were not copied into this note but were reviewed prior to creation of Plan. LABS:  Recent Labs     02/21/19  0450 02/20/19  1142   WBC 8.2 7.8   HGB 11.5 12.2   HCT 34.1* 36.0    254     Recent Labs     02/21/19  0450 02/20/19  1142    144   K 3.8 4.5   * 108   CO2 22 27   BUN 38* 42*   CREA 1.60* 1.74*    110*   CA 9.0 10.1     Recent Labs     02/21/19  0450 02/20/19  1142   SGOT 24 28   ALT 26 33   AP 61 62   TBILI 0.9 0.7   TP 6.7 7.6   ALB 2.9* 3.4*   GLOB 3.8 4.2*     Recent Labs     02/20/19  1142   INR 1.0   PTP 9.4      No results for input(s): FE, TIBC, PSAT, FERR in the last 72 hours. No results found for: FOL, RBCF   No results for input(s): PH, PCO2, PO2 in the last 72 hours.   No results for input(s): CPK, CKNDX, TROIQ in the last 72 hours.     No lab exists for component: CPKMB  Lab Results   Component Value Date/Time    Cholesterol, total 131 02/21/2019 04:51 AM    HDL Cholesterol 38 02/21/2019 04:51 AM    LDL, calculated 62 02/21/2019 04:51 AM    Triglyceride 155 (H) 02/21/2019 04:51 AM    CHOL/HDL Ratio 3.4 02/21/2019 04:51 AM     Lab Results   Component Value Date/Time    Glucose (POC) 109 (H) 02/20/2019 10:13 PM    Glucose (POC) 114 (H) 02/20/2019 11:34 AM     Lab Results   Component Value Date/Time    Color YELLOW/STRAW 02/20/2019 12:54 PM    Appearance CLEAR 02/20/2019 12:54 PM    Specific gravity 1.020 02/20/2019 12:54 PM    Specific gravity 1.020 01/05/2015 03:58 PM    pH (UA) 7.0 02/20/2019 12:54 PM    Protein 100 (A) 02/20/2019 12:54 PM    Glucose NEGATIVE  02/20/2019 12:54 PM    Ketone NEGATIVE  02/20/2019 12:54 PM    Bilirubin NEGATIVE  02/20/2019 12:54 PM    Urobilinogen 0.2 02/20/2019 12:54 PM    Nitrites NEGATIVE  02/20/2019 12:54 PM    Leukocyte Esterase NEGATIVE  02/20/2019 12:54 PM    Epithelial cells FEW 02/20/2019 12:54 PM    Bacteria NEGATIVE  02/20/2019 12:54 PM    WBC 0-4 02/20/2019 12:54 PM    RBC 0-5 02/20/2019 12:54 PM

## 2019-02-22 NOTE — PROGRESS NOTES
Neurology Progress Note     NAME: Barb Montiel   :  1940   MRN:  [id number]   DATE:  2019    Assessment:     Principal Problem:    ICH (intracerebral hemorrhage) ([facility] [address] (2019)      Pt is a 70yo female with HTN and epilepsy, followed by Dr. Dariel Ochoa and well controlled on Keppra, DM, HLD, ABIGAIL on CPAP, h/o TIA, presenting to an outside ED 19 with right facial droop and dysarthria found to have left BG hemorrhage on CT. She was transferred to [facility] for NSG eval, no surgical intervention indicated. BP at presentation was 125/55, SBPs ranging 130-150's in last 24 hours. She was taking ASA 162mg daily. MRI brain w/wo contrast left BG hemorrhagic infarct, no associated vascular abnormality. Old right occipital CVA seen. Exam with slight right facial droop, no PD today, o/w non-focal.   Hypertensive bleed. Plan:   -Transfer to floor  -q4 hour neuro checks  -Needs better control of HTN. Will ask pharmacist if she can check with pt's pharmacy to make certain we have the correct home doses of antihypertensive meds. -Goal SBP <140  -Continue to hold AP therapy. If HTN well controlled, she should not have recurrent hemorrhage. Restart ASA in two weeks.   -Possible d/c in AM if BP under control.   -Encourage improved diet and daily CV activity to help control BP. -Please call if needed. Subjective:   Pt reports she is feeling better. Finished lunch and stated it was delicious, she \"didn't know a heart health diet could taste so good. \"    Objective:   Chart reviewed since last seen    Current Facility-Administered Medications   Medication Dose Route Frequency    [START ON 2019] amLODIPine (NORVASC) tablet 10 mg  10 mg Oral DAILY    hydrALAZINE (APRESOLINE) 20 mg/mL injection 10 mg  10 mg IntraVENous Q4H PRN    labetalol (NORMODYNE;TRANDATE) injection 10 mg  10 mg IntraVENous Q4H PRN    famotidine (PEPCID) tablet 20 mg  20 mg Oral DAILY    carvedilol (COREG) tablet 6.25 mg  6.25 mg Oral BID WITH MEALS    losartan (COZAAR) tablet 100 mg  100 mg Oral DAILY    ondansetron (ZOFRAN) injection 4 mg  4 mg IntraVENous Q6H PRN    naloxone (NARCAN) injection 0.4 mg  0.4 mg IntraVENous PRN    acetaminophen (TYLENOL) tablet 650 mg  650 mg Oral Q4H PRN    Or    acetaminophen (TYLENOL) solution 650 mg  650 mg Per NG tube Q4H PRN    Or    acetaminophen (TYLENOL) suppository 650 mg  650 mg Rectal Q4H PRN    levETIRAcetam (KEPPRA) tablet 500 mg  500 mg Oral DAILY    levETIRAcetam (KEPPRA) tablet 250 mg  250 mg Oral QPM    magnesium oxide (MAG-OX) tablet 200 mg  200 mg Oral DAILY    pravastatin (PRAVACHOL) tablet 20 mg  20 mg Oral QHS       Visit Vitals  /62 (BP 1 Location: Left arm, BP Patient Position: Sitting)   Pulse 66   Temp 98.8 °F (37.1 °C)   Resp 16   Ht 5' 3\" (1.6 m)   Wt 98.9 kg (218 lb 0.6 oz)   SpO2 97%   BMI 41.20 kg/m²     Temp (24hrs), Av.5 °F (36.9 °C), Min:97.9 °F (36.6 °C), Max:98.8 °F (37.1 °C)      701 - 1900  In: 250 [P.O.:250]  Out: 200 [Urine:200]  1901 -  07  In: 9705 [P.O.:480; I.V.:980]  Out: -       Physical Exam:  General: Well developed well nourished patient in no apparent distress. Cardiac: Regular rate and rhythm with no murmurs. Extremities: 2+ Radial pulses, no cyanosis or edema    Neurological Exam:  Mental Status: Oriented to time, place and person. Speech and language intact. Attention and fund of knowledge appropriate. Normal recent and remote memory. Cranial Nerves:   PERRL, EOMI, no nystagmus, no ptosis. Very minimal right facial asym. Palate is midline. Tongue is midline. Hearing is intact. Motor:  5/5 strength in upper and lower proximal and distal muscles. Normal bulk and tone. No PD.  No tremors   Reflexes:      Sensory:      Gait:     Cerebellar:  Intact FTN intact Lab Review No results found for this or any previous visit (from the past 24 hour(s)). Additional comments:  I have reviewed the patient's new clinical lab test results. I have personally reviewed the patient's radiographs. MRI   MRI Results (most recent):  [facility] [place] [facility]   MRI BRAIN W WO CONT    Narrative *PRELIMINARY REPORT*  Hemorrhagic left basal ganglia infarct. Preliminary report was provided by Dr. Amy Mayberry, the on-call radiologist, at [phone]  hours. Final report to follow. *END PRELIMINARY REPORT*    *FINAL REPORT BELOW*  EXAM:  MRI BRAIN W WO CONT  INDICATION:  acute hemorrhagic stroke, patient presented with acute onset right  facial droop and slurred speech. TECHNIQUE:  Sagittal T1, axial FLAIR, T2, T1 and gradient echo T2-weighted images of the  head were obtained followed by intravenous infusion 10 mL Gadavist repeat axial  and coronal T1-weighted images and axial diffusion weighted images. COMPARISON: CT head  FINDINGS:  The ventricular size and configuration are within normal limits. The acute left lateral basal ganglia hemorrhage is demonstrated on MRI and  unchanged from the CT 2/20/19. No abnormal enhancement or other findings to  suggest associated tumor or vascular malformation. No other foci of hemorrhage in the brain. No restricted diffusion or other findings of acute brain infarction. A few scattered foci of T2 hyperintensity cerebral white matter suggest mild  chronic small vessel ischemic change. Focal encephalomalacia right posterior medial occipital lobe likely related to  an old right occipital infarct. No evidence of intracranial mass or abnormal extra-axial fluid collections. Flow voids are present in the vertebral, basilar and carotid artery systems. The craniocervical junction is unremarkable. The structures of the cranial base including paranasal sinuses are   unremarkable. Impression IMPRESSION:   1.  Left lateral basal ganglia acute infarct again demonstrated and unchanged in  size. No associated abnormality and appearance most consistent with  idiopathic/hypertensive hemorrhage. 2. Old right posterior medial occipital lobe cortical infarct. Care Plan discussed with:  Patient x   Family- called son, Dawn Cuevas x   RN x   Care Manager    Consultant/Specialist:       Signed: Louann Rock MD

## 2019-02-22 NOTE — PROGRESS NOTES
Admission Medication Reconciliation:    Information obtained from: Patient and 1 [employer]    Significant PMH/Disease States:   Past Medical History:   Diagnosis Date    High cholesterol     Hypertension        Chief Complaint for this Admission:  Facial droop and slurred speech    Allergies:  Sulfa (sulfonamide antibiotics); Codeine; Levaquin [levofloxacin]; and Lisinopril    Prior to Admission Medications:   Prior to Admission Medications   Prescriptions Last Dose Informant Patient Reported? Taking? Carboxymethylcellulose-Glycern (REFRESH OPTIVE) 0.5-0.9 % drop   Yes Yes   Sig: Administer 1 Drop to both eyes two (2) times daily as needed for Other (dry eyes). DOCOSAHEXANOIC ACID/EPA (FISH OIL PO)   Yes Yes   Sig: Take 1 Cap by mouth two (2) times a day. TERAZOSIN HCL (HYTRIN PO) 2019 at Unknown time  Yes Yes   Sig: Take 5 mg by mouth nightly. amlodipine (NORVASC) 10 mg tablet 2019 at 0730  Yes Yes   Sig: Take 10 mg by mouth daily. aspirin 81 mg chewable tablet 2019 at 0730  Yes Yes   Sig: Take 162 mg by mouth daily. calcium carbonate (CALCIUM 600) 600 mg (1,500 mg) tablet 2019 at Unknown time  Yes Yes   Sig: Take 600 mg by mouth nightly. carvedilol (COREG) 6.25 mg tablet 2019 at Unknown time  Yes Yes   Sig: Take 6.25 mg by mouth two (2) times daily (with meals). chlorthalidone (HYGROTEN) 25 mg tablet 2019 at Unknown time  Yes Yes   Sig: Take 25 mg by mouth daily. cholecalciferol (VITAMIN D3) 1,000 unit tablet   Yes Yes   Sig: Take 2,000 Units by mouth nightly. fluticasone (FLONASE ALLERGY RELIEF) 50 mcg/actuation nasal spray   Yes Yes   [place] by Both Nostrils route nightly. levETIRAcetam (KEPPRA) 500 mg tablet 2019 at Unknown time  No Yes   Sig: TAKE ONE TABLET BY MOUTH EVERY MORNING AND TAKE ONE-HALF TABLET BY MOUTH EVERY EVENING   magnesium 250 mg tab 2019 at Unknown time  Yes Yes   Sig: Take 1 Tab by mouth nightly.    montelukast (SINGULAIR) 10 mg tablet   Yes Yes   Sig: Take 10 mg by mouth daily as needed. olmesartan (BENICAR) 40 mg tablet   Yes Yes   Sig: Take 40 mg by mouth daily. pravastatin (PRAVACHOL) 20 mg tablet 2/14/2019 at Unknown time  Yes Yes   Sig: Take 20 mg by mouth nightly. Facility-Administered Medications: None     Comments/Recommendations: The patient's PTA medication list was reviewed with the outpatient pharmacy and confirmed with the patient. The following changes were made:    1. Amlodipine was changed from 5 to 10 mg PO daily  2. Cholecalciferol was changed from 2000 units PO BID to 2000 units PO qHS  3. Losartan 100 mg was changed to olmesartan 40 mg PO daily  4. Terazosin was changed to 5 mg PO qHS  5. Montelukast, Flonase nasal spray, Refresh Optive were added    Thank you for allowing me to participate in the care of this patient. The above list now represents the patient's most up-to-date PTA medication list.  Please call the inpatient pharmacy at [phone] with any questions.

## 2019-02-22 NOTE — PROGRESS NOTES
1930: Bedside and Verbal shift change report given to Svenontaignsergio RN  (oncoming nurse) by Seb Sellers RN  (offgoing nurse). Report included the following information SBAR, Kardex, ED Summary, Procedure Summary, Intake/Output, MAR, Accordion, Recent Results, Med Rec Status, Cardiac Rhythm NSR, Alarm Parameters , Procedure Verification and Quality Measures.

## 2019-02-22 NOTE — PROGRESS NOTES
Problem: Mobility Impaired (Adult and Pediatric)  Goal: *Acute Goals and Plan of Care (Insert Text)  Physical Therapy Goals  Initiated 2/21/2019  1. Patient will move from supine to sit and sit to supine  in bed with modified independence within 7 day(s). 2.  Patient will transfer from bed to chair and chair to bed with modified independence using the least restrictive device within 7 day(s). 3.  Patient will perform sit to stand with modified independence within 7 day(s). 4.  Patient will ambulate with modified independence for 250 feet with the least restrictive device within 7 day(s). 6.  Patient will improve Parra Balance score by 7 points within 7 days. physical Therapy TREATMENT  Patient: Soledad Singleton (65 y.o. female)  Date: 2/22/2019  Diagnosis: ICH (intracerebral hemorrhage) ([facility]) [I61.9] ICH (intracerebral hemorrhage) ([facility] [address]      Precautions: Fall, DNR  Chart, physical therapy assessment, plan of care and goals were reviewed. ASSESSMENT:  Pt received seated on the toilet and requested assistance with pericare stating she was unable to clean her bottom (discussed with OT). Pt completed sit<>stand with CGA-min A. Pt with fair eccentric control lowering self into chair requiring verbal cues to reach back for arm rests of chair. Pt ambulated with RW with CGA demonstrating wide JEANCARLOS, decreased zhang, reports of bilateral knee pain (chronic at baseline). Pt performed static standing tasks with reaching to targets with CGA. Pt will continue to benefit from PT to progress mobility as tolerated. Pt will benefit from Inpatient rehab upon discharge pending progress. Progression toward goals:  []    Improving appropriately and progressing toward goals  []    Improving slowly and progressing toward goals  []    Not making progress toward goals and plan of care will be adjusted     PLAN:  Patient continues to benefit from skilled intervention to address the above impairments.   Continue treatment per established plan of care. Discharge Recommendations:  Inpatient Rehab  Further Equipment Recommendations for Discharge:  tbd     SUBJECTIVE:   Patient stated I still don't feel like myself.     OBJECTIVE DATA SUMMARY:   Critical Behavior:  Neurologic State: Alert, Eyes open spontaneously  Orientation Level: Oriented X4  Cognition: Appropriate decision making, Appropriate for age attention/concentration, Appropriate safety awareness, Follows commands  Safety/Judgement: Awareness of environment, Insight into deficits  Functional Mobility Training:  Bed Mobility:                    Transfers:  Sit to Stand: Contact guard assistance  Stand to Sit: Minimum assistance(fair eccentric control)                             Balance:  Sitting: Intact  Standing: Impaired; With support  Standing - Static: Good  Standing - Dynamic : Fair  Ambulation/Gait Training:  Distance (ft): 90 Feet (ft)  Assistive Device: Gait belt;Walker, rolling  Ambulation - Level of Assistance: Contact guard assistance        Gait Abnormalities: Decreased step clearance        Base of Support: Widened     Speed/Italia: Pace decreased (<100 feet/min)  Step Length: Right shortened;Left shortened           Neuro Re-Education:  Reaching for targets in standing within JEANCARLOS, across midline  Therapeutic Exercises:     Pain:  Pain Scale 1: Numeric (0 - 10)  Pain Intensity 1: 0              Activity Tolerance:   Good. VSS    Please refer to the flowsheet for vital signs taken during this treatment.   After treatment:   [x]    Patient left in no apparent distress sitting up in chair  []    Patient left in no apparent distress in bed  [x]    Call bell left within reach  [x]    Nursing notified  []    Caregiver present  []    Bed alarm activated    COMMUNICATION/COLLABORATION:   The patients plan of care was discussed with: Occupational Therapist and Registered Nurse    Solomon Leija PT, DPT   Time Calculation: 12 mins

## 2019-02-22 NOTE — PROGRESS NOTES
PCCM  D/o multi-D rounds    In ICU neuro check protocol after BGF bleed  Available as needed ICU issues

## 2019-02-23 LAB
ECHO AO ROOT DIAM: 2.97 CM
ECHO AV AREA PEAK VELOCITY: 1.9 CM2
ECHO AV AREA VTI: 1.7 CM2
ECHO AV CUSP MM: 1.56 CM
ECHO AV MEAN GRADIENT: 10.5 MMHG
ECHO AV PEAK GRADIENT: 16.8 MMHG
ECHO AV PEAK VELOCITY: 204.72 CM/S
ECHO AV VTI: 50.64 CM
ECHO LA MAJOR AXIS: 3.23 CM
ECHO LA TO AORTIC ROOT RATIO: 1.09
ECHO LV INTERNAL DIMENSION DIASTOLIC: 4.95 CM (ref 3.9–5.3)
ECHO LV INTERNAL DIMENSION SYSTOLIC: 2.53 CM
ECHO LV IVSD: 1.02 CM (ref 0.6–0.9)
ECHO LV MASS 2D: 211.9 G (ref 67–162)
ECHO LV MASS INDEX 2D: 105.6 G/M2 (ref 43–95)
ECHO LV POSTERIOR WALL DIASTOLIC: 1 CM (ref 0.6–0.9)
ECHO LVOT DIAM: 1.86 CM
ECHO LVOT PEAK GRADIENT: 7.8 MMHG
ECHO LVOT PEAK VELOCITY: 139.84 CM/S
ECHO LVOT SV: 87.8 ML
ECHO LVOT VTI: 32.21 CM
ECHO MV A VELOCITY: 122 CM/S
ECHO MV AREA PHT: 2.4 CM2
ECHO MV E DECELERATION TIME (DT): 316.5 MS
ECHO MV E VELOCITY: 0.84 CM/S
ECHO MV E/A RATIO: 0.01
ECHO MV PRESSURE HALF TIME (PHT): 91.8 MS
ECHO PV MAX VELOCITY: 148.25 CM/S
ECHO PV PEAK GRADIENT: 8.8 MMHG
ECHO RV INTERNAL DIMENSION: 3.11 CM
ECHO RV TAPSE: 2.39 CM (ref 1.5–2)

## 2019-02-23 PROCEDURE — 74011250637 HC RX REV CODE- 250/637: Performed by: INTERNAL MEDICINE

## 2019-02-23 PROCEDURE — 74011250637 HC RX REV CODE- 250/637: Performed by: PSYCHIATRY & NEUROLOGY

## 2019-02-23 PROCEDURE — 74011250637 HC RX REV CODE- 250/637: Performed by: HOSPITALIST

## 2019-02-23 PROCEDURE — 65660000000 HC RM CCU STEPDOWN

## 2019-02-23 RX ADMIN — FAMOTIDINE 20 MG: 20 TABLET ORAL at 08:38

## 2019-02-23 RX ADMIN — DOXAZOSIN 4 MG: 2 TABLET ORAL at 20:42

## 2019-02-23 RX ADMIN — LEVETIRACETAM 250 MG: 250 TABLET, FILM COATED ORAL at 17:51

## 2019-02-23 RX ADMIN — Medication 200 MG: at 08:38

## 2019-02-23 RX ADMIN — LOSARTAN POTASSIUM 100 MG: 50 TABLET ORAL at 08:38

## 2019-02-23 RX ADMIN — LEVETIRACETAM 500 MG: 500 TABLET ORAL at 08:38

## 2019-02-23 RX ADMIN — PRAVASTATIN SODIUM 20 MG: 20 TABLET ORAL at 20:42

## 2019-02-23 RX ADMIN — CARVEDILOL 6.25 MG: 6.25 TABLET, FILM COATED ORAL at 17:46

## 2019-02-23 NOTE — PROGRESS NOTES
Problem: Falls - Risk of  Goal: *Absence of Falls  Document Chava Fall Risk and appropriate interventions in the flowsheet.   Outcome: Progressing Towards Goal  Fall Risk Interventions:  Mobility Interventions: Assess mobility with egress test, Communicate number of staff needed for ambulation/transfer, Patient to call before getting OOB, PT Consult for mobility concerns, Utilize walker, cane, or other assistive device         Medication Interventions: Evaluate medications/consider consulting pharmacy, Patient to call before getting OOB, Teach patient to arise slowly    Elimination Interventions: Call light in reach, Patient to call for help with toileting needs, Toileting schedule/hourly rounds

## 2019-02-23 NOTE — PROGRESS NOTES
Hospitalist Progress Note              Dolores Reyna MD.                                                             Cell: [phone]                               NAME:  Mirna Ortega  :  1940  MRN:  [id number]  Date of Service:  2019    Summary: 77 y.o. female with past medical history of hypertension on multiple anti-hypertensive's, seizure disorder, who presented on 2019 with left facial droop and slurred speech.   Doing Ok no residual weakness waiting for Echo last night BP was high and she was anxious now normal.    Assessment/Plan:  Acute left basal ganglia hemorrhage: Poa, Possibly due to hypertensive bleed vs acute stroke with hemorrhagic transformation. MRI of the brain shows left lateral basal ganglia acute infarct. No associated vascular abnormality appearance consistent with idiopathic/hypertensive hemorrhage. Old right posterior medial occipital lobe cortical infarct. - Apart from mild right facial drop no other acute neurological deficits. Hold antiplatelets but can resume Aspirin in 2 weeks as per neurology  Continue strict BP control with SBP< 140 mmhg  Pt/Ot following  Obtain 2 d echo     Right facial droop; d/t above    Resistant hypertension on multiple anti-hypertensive's. On norvasc, coreg, chlorthalidone and losartan  Encourage home BP monitoring  Goal SBP < 140 mm hg    H/o Seizures: Continue keppra  Seizure precuations  Stable    CKD stage 3: Monitor renal indices      Code status: Full  DVT prophylaxsis: SCD   Dispo: Home with self care. Probably d/c home tomorrow     Interval History/Subjective:    F/u for right facial droop. No acute overnight event. Patient states \" I feel jittery inside\". Review of Systems:  Pertinent items are noted in HPI. Objective:     VITALS:   Last 24hrs VS reviewed since prior progress note.  Most recent are:  Visit Vitals  /52 (BP 1 Location: Left arm, BP Patient Position: At rest)   Pulse 90   Temp 99.2 °F (37.3 °C)   Resp 17   Ht 5' 3\" (1.6 m)   Wt 93.2 kg (205 lb 8 oz)   SpO2 94%   BMI 36.40 kg/m²       Intake/Output Summary (Last 24 hours) at 2/23/2019 0737  Last data filed at 2/22/2019 1300  Gross per 24 hour   Intake 250 ml   Output 300 ml   Net -50 ml        PHYSICAL EXAM:  General: No acute distress, cooperative, pleasant   EENT: EOMI. Anicteric sclerae. Oral mucous moist, oropharynx benign. Right facial droop  Resp: CTA bilaterally. No wheezing/rhonchi/rales. No accessory muscle use  CV: Regular rhythm, normal rate, no murmurs, gallops, rubs  GI: Soft, non distended, non tender. normoactive bowel sounds, no hepatosplenomegaly Extremities: No edema, warm, 2+ pulses throughout  Neurologic: Moves all extremities. AAOx3, CN II-XII grossly intact  Psych: Good insight. Not anxious nor agitated. Skin: Good Turgor, no rashes or ulcers    Lab Data Personally Reviewed: (see below)     Medications list Personally Reviewed:  x YES  NO     _______________________________________________________________________  Care Plan discussed with:  Patient/Family and Nurse    Total NON critical care TIME:  30 minutes    Tori Daniel MD     Procedures: see electronic medical records for all procedures/Xrays and details which were not copied into this note but were reviewed prior to creation of Plan. LABS:  Recent Labs     02/21/19  0450 02/20/19  1142   WBC 8.2 7.8   HGB 11.5 12.2   HCT 34.1* 36.0    254     Recent Labs     02/21/19  0450 02/20/19  1142    144   K 3.8 4.5   * 108   CO2 22 27   BUN 38* 42*   CREA 1.60* 1.74*    110*   CA 9.0 10.1     Recent Labs     02/21/19  0450 02/20/19  1142   SGOT 24 28   ALT 26 33   AP 61 62   TBILI 0.9 0.7   TP 6.7 7.6   ALB 2.9* 3.4*   GLOB 3.8 4.2*     Recent Labs     02/20/19  1142   INR 1.0   PTP 9.4      No results for input(s): FE, TIBC, PSAT, FERR in the last 72 hours.    No results found for: FOL, RBCF   No results for input(s): PH, PCO2, PO2 in the last 72 hours. No results for input(s): CPK, CKNDX, TROIQ in the last 72 hours.     No lab exists for component: CPKMB  Lab Results   Component Value Date/Time    Cholesterol, total 131 02/21/2019 04:51 AM    HDL Cholesterol 38 02/21/2019 04:51 AM    LDL, calculated 62 02/21/2019 04:51 AM    Triglyceride 155 (H) 02/21/2019 04:51 AM    CHOL/HDL Ratio 3.4 02/21/2019 04:51 AM     Lab Results   Component Value Date/Time    Glucose (POC) 109 (H) 02/20/2019 10:13 PM    Glucose (POC) 114 (H) 02/20/2019 11:34 AM     Lab Results   Component Value Date/Time    Color YELLOW/STRAW 02/20/2019 12:54 PM    Appearance CLEAR 02/20/2019 12:54 PM    Specific gravity 1.020 02/20/2019 12:54 PM    Specific gravity 1.020 01/05/2015 03:58 PM    pH (UA) 7.0 02/20/2019 12:54 PM    Protein 100 (A) 02/20/2019 12:54 PM    Glucose NEGATIVE  02/20/2019 12:54 PM    Ketone NEGATIVE  02/20/2019 12:54 PM    Bilirubin NEGATIVE  02/20/2019 12:54 PM    Urobilinogen 0.2 02/20/2019 12:54 PM    Nitrites NEGATIVE  02/20/2019 12:54 PM    Leukocyte Esterase NEGATIVE  02/20/2019 12:54 PM    Epithelial cells FEW 02/20/2019 12:54 PM    Bacteria NEGATIVE  02/20/2019 12:54 PM    WBC 0-4 02/20/2019 12:54 PM    RBC 0-5 02/20/2019 12:54 PM

## 2019-02-23 NOTE — PROGRESS NOTES
Bedside shift change report given to Dawood Monzon RN (oncoming nurse) by Raymundo White RN (offgoing nurse). Report included the following information SBAR.

## 2019-02-24 VITALS
TEMPERATURE: 99.1 F | HEART RATE: 75 BPM | OXYGEN SATURATION: 94 % | DIASTOLIC BLOOD PRESSURE: 56 MMHG | WEIGHT: 186.3 LBS | SYSTOLIC BLOOD PRESSURE: 130 MMHG | BODY MASS INDEX: 33.01 KG/M2 | RESPIRATION RATE: 20 BRPM | HEIGHT: 63 IN

## 2019-02-24 PROCEDURE — 74011250637 HC RX REV CODE- 250/637: Performed by: HOSPITALIST

## 2019-02-24 PROCEDURE — 74011250637 HC RX REV CODE- 250/637: Performed by: PSYCHIATRY & NEUROLOGY

## 2019-02-24 PROCEDURE — 74011250637 HC RX REV CODE- 250/637: Performed by: INTERNAL MEDICINE

## 2019-02-24 RX ORDER — LEVETIRACETAM 250 MG/1
250 TABLET ORAL EVERY EVENING
Qty: 30 TAB | Refills: 2 | Status: SHIPPED
Start: 2019-02-24 | End: 2019-04-12

## 2019-02-24 RX ORDER — FAMOTIDINE 20 MG/1
20 TABLET, FILM COATED ORAL DAILY
Qty: 30 TAB | Refills: 0 | Status: SHIPPED | OUTPATIENT
Start: 2019-02-25 | End: 2019-08-07

## 2019-02-24 RX ORDER — LOSARTAN POTASSIUM 100 MG/1
100 TABLET ORAL DAILY
Qty: 30 TAB | Refills: 2 | Status: SHIPPED | OUTPATIENT
Start: 2019-02-25 | End: 2019-08-28

## 2019-02-24 RX ADMIN — LEVETIRACETAM 500 MG: 500 TABLET ORAL at 08:02

## 2019-02-24 RX ADMIN — AMLODIPINE BESYLATE 10 MG: 5 TABLET ORAL at 08:01

## 2019-02-24 RX ADMIN — FAMOTIDINE 20 MG: 20 TABLET ORAL at 08:01

## 2019-02-24 RX ADMIN — CARVEDILOL 6.25 MG: 6.25 TABLET, FILM COATED ORAL at 08:01

## 2019-02-24 RX ADMIN — LOSARTAN POTASSIUM 100 MG: 50 TABLET ORAL at 08:01

## 2019-02-24 RX ADMIN — Medication 200 MG: at 08:01

## 2019-02-24 RX ADMIN — CHLORTHALIDONE 25 MG: 25 TABLET ORAL at 08:01

## 2019-02-24 NOTE — PROGRESS NOTES
Stroke Education documented in Patient Education: YES  Core Measures Documented in [facility]:  Risk Factors: YES  Warning signs of stroke: YES  When to Activate 911: YES  Medication Education for Risk Factors: YES  Smoking cessation if applicable: YES  Written Education Given:  YES    Discharge NIH Completed: YES  Score: 1    BRAINS: YES    Follow Up Appointment Made: YES  Date/Time if applicable: Bedside RN performed patient education and medication education. Discharge concerns initiated and discussed with patient, including clarification on \"who\" assists the patient at their home and instructions for when the home going patient should call their provider after discharge. Opportunity for questions and clarification was provided. Patient receptive to education: YES  Patient stated:   Barriers to Education: None  Diagnosis Education given:  YES    Length of stay: 4  Expected Day of Discharge: Today  Ask if they have \"Help at Home\" & add to white board?   YES    Education Day #: 1    Medication Education Given:  YES  M in the box Medication name: pravastatin    Pt aware of HCAHPS survey: YES

## 2019-02-24 NOTE — PROGRESS NOTES
Problem: Falls - Risk of  Goal: *Absence of Falls  Document Chava Fall Risk and appropriate interventions in the flowsheet.   Outcome: Progressing Towards Goal  Fall Risk Interventions:  Mobility Interventions: Patient to call before getting OOB         Medication Interventions: Patient to call before getting OOB    Elimination Interventions: Call light in reach

## 2019-02-24 NOTE — DISCHARGE SUMMARY
[facility] [address] SUMMARY    Name:  Tawanda Arciniega  MR#:  [id number]  :  1940  ACCOUNT #:  [de-identified]  ADMIT DATE:  2019  DISCHARGE DATE:  2019      MAIN DIAGNOSIS ON ADMISSION:  Acute left basal ganglia hemorrhagic cerebrovascular accident. 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 CONSULTANTS:  1.  Neurology. 2.  Neurosurgery. HOSPITAL COURSE:  A 78-year-old female with a past medical history significant for uncontrolled primary hypertension and on multiple antihypertensives, seizure disorder, and chronic kidney disease stage III was brought to the emergency room for evaluation of acute onset left facial droop and slurred speech. The patient's initial hospital course was significant for being admitted to the intensive care unit after being evaluated with a CT of the head without contrast at an outside emergency room that showed the acute left basal ganglia hemorrhage. Of note, the patient was taking a daily aspirin including on the day of hospital transfer to [facility] Int[facility] Unit. Initial management included close monitoring of the blood pressure with a goal systolic blood pressure of less than 140 mmHg with close 1 hourly neurologic checks. The patient was seen emergently by the teleneurology service and was deemed not a candidate for neuroendovascular intervention as it was not a thromboembolic event. The patient was continued on seizure prophylaxis including with Keppra. The patient was seen and evaluated by Dr. Maria M Obrien from the neurosurgical service who deemed that this was a small hypertensive left basal ganglia hemorrhagic CVA requiring no surgical intervention. Blood pressure control was eventually optimized with adjustments in therapy including with daily Norvasc, carvedilol, chlorthalidone, and losartan.   The patient was eventually transferred down to the step-down unit with further evaluation including with an MRI of the brain that showed a left lateral basal ganglia acute infarct again demonstrated and unchanged in size. No associated abnormality in appearance, most consistent with idiopathic/hypertensive hemorrhage. An old right posterior medial occipital lobe cortical infarct was also noted. A 2-D echocardiogram showed a left ventricular hyperdynamic systolic function. Estimated left ventricular ejection fraction of 61-65%. Normal left ventricular wall motion. No regional wall motion abnormality was noted. No patent foramen ovale was noted. The patient's urinalysis showed yellow clear urine, negative nitrites, negative leukocytes, wbc's of 0-4, negative bacteria. Metabolic panel was reviewed with stable chronic kidney disease stage III without any acidosis or other electrolyte abnormalities. A lipid profile showed a total cholesterol of 131, triglycerides of 155, HDL of 38, LDL of 62. CBC showed normal indices with a WBC of 7.8, hemoglobin 12.2, hematocrit 36, platelet count of 959 with an MCV of 93. The patient was evaluated by the PT/OT service with steady daily progress with therapies, with recommendations for home health home PT/OT arrangements on discharge. The patient was able to tolerate and advance an heart healthy diet. Daily discussions about therapeutic lifestyle changes were done with the patient including the importance of blood pressure reading checks and recordings for followup office visit for adjustments in therapies. With clinical and chemical stability and plans in place for home health home PT arrangements, the patient was deemed stable for discharge to home this date, 02/24/2019. PHYSICAL EXAMINATION:  Examination findings on this day of discharge:  GENERAL:  The patient is awake, alert, denying any new complaints.   VITAL SIGNS:  Blood pressure of 113/56, heart rate 75, respiratory rate 20, afebrile. HEAD:  Normocephalic. Pupils equal and reactive to light without any nystagmus. ENT:  Ears, nose, and throat clear. NECK:  No jugular venous distention or carotid bruits. CARDIOVASCULAR:  S1, S2 present. RESPIRATORY:  Lungs with decreased air entry at both bases without any crepitations or rhonchi. GI:  Bowel sounds present. The abdomen is soft, nontender. No rebound, no guarding. GENITOURINARY:  Nil of note. MUSCULOSKELETAL:  No asymmetry of the extremities. About 2+ bipedal edema. CNS:  The patient is awake, alert, oriented to time, place, and person. LABORATORY FINDINGS ON DISCHARGE:  As follows:  CBC with a WBC of 8.2, hemoglobin 11.5, hematocrit 34.1, platelet count of 485 with an MCV of 92.9. Metabolic panel with a sodium of 145, potassium 3.8, chloride 115, bicarb 22, BUN of 30, creatinine 1.60. FINAL DIAGNOSES ON DISCHARGE:  As follows:  1. Acute left basal ganglia hemorrhagic cerebrovascular accident with resolved left facial droop and slurred speech. 2.  Status post malignant hypertension. Probable uncontrolled primary hypertension. 3.  Old cerebrovascular accident. 4.  Obstructive sleep apnea. 5.  Seizure disorder without any breakthrough seizures. 6.  Probable osteoporosis. DISCHARGE MEDICATIONS:  As follows:  1. Famotidine 20 mg 1 tablet p.o. daily for 30 days. 2.  Cholecalciferol 2000 units p.o. nightly. 3.  Keppra 250 mg p.o. every evening. 4.  Calcium 600 mg 1 tablet p.o. nightly. 5.  Chlorthalidone 25 mg p.o. daily. 6.  Carvedilol 6.25 mg p.o. twice daily. 7.  Fish oil 1 capsule p.o. twice daily. 8.  Flonase spray 1 spray to both nostrils nightly. 9.  Hytrin 5 mg p.o. nightly. 10.  Losartan 100 mg p.o. daily. 11.  Norvasc 10 mg p.o. daily. 12.  Pravastatin 20 mg p.o. nightly. 13.  Refresh Optive 1 drop to both eyes twice daily as needed for dry eyes. 14.  Singulair 10 mg p.o. daily.     DISCHARGE INSTRUCTIONS:  The patient is to follow a heart healthy diet, ambulate with the aid of a front-wheeled walker as tolerated, to check and record blood pressure readings at least three times a day and take to the next office visit for adjustments in antihypertensive therapies. The patient is to have home health arrangements in place on discharge for monitoring of the chronic medical problems. The patient is to have PT/OT arrangements in place on discharge for monitoring of activity. The patient instructed to hold aspirin and to resume as previously prescribed in 2 weeks' time from discharge if no further uncontrolled blood pressure readings. The patient is to return to the ER if any recurrence of or new symptoms develop. The patient is to follow up with PCP in 1 week's time from discharge and Neurology and Neurosurgeryin 4 weeks' time from discharge with a possible followup CT of the head to ascertain resolution of the small left basal ganglia hemorrhagic CVA. The patient is stable for discharge to home this date, 02/24/2019. The entire discharge plans including all medications, their side effects, the importance of adhering to all outpatient followup plans discussed in detail with the patient. All questions and concerns were addressed. Total time for the discharge summary 50 minutes.       MD ALICIA [email or web address]  D:  02/24/2019 17:00  T:  02/24/2019 18:39  JOB #:  [id number]

## 2019-02-24 NOTE — PROGRESS NOTES
Problem: Falls - Risk of  Goal: *Absence of Falls  Document Chava Fall Risk and appropriate interventions in the flowsheet.   Outcome: Progressing Towards Goal  Fall Risk Interventions:  Mobility Interventions: Communicate number of staff needed for ambulation/transfer, Patient to call before getting OOB, Utilize walker, cane, or other assistive device         Medication Interventions: Evaluate medications/consider consulting pharmacy, Patient to call before getting OOB, Teach patient to arise slowly    Elimination Interventions: Call light in reach, Patient to call for help with toileting needs, Toileting schedule/hourly rounds

## 2019-02-24 NOTE — PROGRESS NOTES
Problem: Hemorrhagic Stroke: Day 5 through Discharge  Goal: Diagnostic Test/Procedures  Outcome: Progressing Towards Goal  All tests completed , waiting on echo report for discharge.

## 2019-02-24 NOTE — PROGRESS NOTES
Discharge orders written. Met with patient, she is agreeable with discharge plan and home health. Referral sent to [facility] via electronic referral system.     Care Management Interventions  PCP Verified by CM: Yes(Dr Arash Macias)  Palliative Care Criteria Met (RRAT>21 & CHF Dx)?: No  Transition of Care Consult (CM Consult): [address]: No  Reason Outside [place]: Patient already serviced by other home care/hospice agency([place] of choice given, patient requested [facility])  [place] [address] [place]. Chiquita Malhotra: No  Discharge Durable Medical Equipment: No  Physical Therapy Consult: Yes  Occupational Therapy Consult: Yes  Speech Therapy Consult: No  Current Support Network: Lives Alone(tyrone Sanchez C: [phone], DIL Corbin Albino [phone])  Confirm Follow Up Transport: Family  Plan discussed with Pt/Family/Caregiver: Felicie Minors to discharge plan)  Freedom of Choice Offered: Yes  Discharge Location  Discharge Placement: Home with home health    Radha Lui RN, BSN, [facility] [place]  ED Care Management  [phone]

## 2019-04-12 ENCOUNTER — OFFICE VISIT (OUTPATIENT)
Dept: NEUROLOGY | Age: 79
End: 2019-04-12

## 2019-04-12 VITALS
HEIGHT: 63 IN | HEART RATE: 87 BPM | DIASTOLIC BLOOD PRESSURE: 84 MMHG | BODY MASS INDEX: 35.05 KG/M2 | SYSTOLIC BLOOD PRESSURE: 128 MMHG | OXYGEN SATURATION: 98 % | WEIGHT: 197.8 LBS | TEMPERATURE: 97.6 F | RESPIRATION RATE: 20 BRPM

## 2019-04-12 DIAGNOSIS — I61.0 BASAL GANGLIA HEMORRHAGE (HCC): ICD-10-CM

## 2019-04-12 DIAGNOSIS — G40.909 SEIZURE DISORDER (HCC): Primary | ICD-10-CM

## 2019-04-12 RX ORDER — LEVETIRACETAM 250 MG/1
250 TABLET ORAL 2 TIMES DAILY
Qty: 180 TAB | Refills: 0 | Status: SHIPPED | OUTPATIENT
Start: 2019-04-12 | End: 2019-07-11

## 2019-04-12 NOTE — PATIENT INSTRUCTIONS
1.  Restart Aspirin 81 mg a day (take in morning)    2. Keep BP under control  (you want to be sure the top # isn't going above 140 and bottom number isn't going above 85)    3. Increase your Keppra 500 mg tablet to HALF tab twice a day    4.  See you back in 6 months

## 2019-04-12 NOTE — PROGRESS NOTES
Interval HPI:   This is a 77 y.o. female who is following up for     Chief Complaint   Patient presents with    Neurologic Problem     Cerebral Hemorrhage Follow Up      Interval Hx:    Pt following after     1) Recent left basal ganglia hemorrhagic stroke. Reviewed EMR notes. Pt admitted at [facility] after developing acute onset right facial droop and slurred speech. CT head showed acute left basal ganglia hemorrhage. Seen by Neurosurgery but no intervention required. BP control was optimized with Norvasc, carvedilol, chlorthalidone, and losartan. MRI Brain confirmed the hemorrhage, no change in size. She was seen by Dr Weinberg/ Neurology who recommended keeping SBP < 140 and withholding ASA x 2 weeks. Today patient says she's doing well. Only mild residual right facial droop, otherwise ambulatory and only minimal reduced dexterity of right hand, not dropping anything out of either hand. Denies any new neurologic symptoms. 2) Epilepsy  EEG (8-29-26): normal.  Keppra level (9-4-18): 32.8 (normal, rr 10-40). Last visit kept her on keppa to 500 mg AM/  250 mg PM.  Today she continues to say she hasn't had a seizure since the last one in May 2016. She does say that during the recent admission, someone reduced her keppra to 250 mg once a day. I reviewed Dr Nieves Singh note and don't see an suggestion to reduce pt's Keppra. Pt has a hx of CKD (recent GFR 31, Cr 1.6) so someone may have reduced Keppra for that reason. 3) Intermittent facial muscle twitching  Notices it mostly at night after she places CPAP    Brief ROS: as above or otherwise negative      =============================  Brief Hx:  67 y.o. female with remote hx of epilepsy, well controlled on combination of low dose Phenytoin (? Dose) and Mysoline. Last witnessed seizure in 1967. Those meds stopped around 2014 d/t no seizure for many years.  Had an episode on 5-10-16 where patient fell out of bed in early AM, urinary incontinence, tongue or cheek bite, that may have been a seizure. Was started on Keppra 500 mg BID at that point. Allergies   Allergen Reactions    Sulfa (Sulfonamide Antibiotics) Other (comments)     Unknown    Codeine Other (comments)    Levaquin [Levofloxacin] Other (comments)    Lisinopril Cough     Current Outpatient Medications   Medication Sig Dispense Refill    levETIRAcetam (KEPPRA) 250 mg tablet Take 1 Tab by mouth two (2) times a day for 90 days. 180 Tab 0    losartan (COZAAR) 100 mg tablet Take 1 Tab by mouth daily. 30 Tab 2    montelukast (SINGULAIR) 10 mg tablet Take 10 mg by mouth daily as needed.  fluticasone (FLONASE ALLERGY RELIEF) 50 mcg/actuation nasal spray 1 [place] by Both Nostrils route nightly.  carvedilol (COREG) 6.25 mg tablet Take 6.25 mg by mouth two (2) times daily (with meals).  pravastatin (PRAVACHOL) 20 mg tablet Take 20 mg by mouth nightly.  amlodipine (NORVASC) 10 mg tablet Take 10 mg by mouth daily.  DOCOSAHEXANOIC ACID/EPA (FISH OIL PO) Take 1 Cap by mouth two (2) times a day.  famotidine (PEPCID) 20 mg tablet Take 1 Tab by mouth daily. 30 Tab 0    cholecalciferol (VITAMIN D3) 1,000 unit tablet Take 2,000 Units by mouth nightly.  Carboxymethylcellulose-Glycern (REFRESH OPTIVE) 0.5-0.9 % drop Administer 1 Drop to both eyes two (2) times daily as needed for Other (dry eyes).  calcium carbonate (CALCIUM 600) 600 mg (1,500 mg) tablet Take 600 mg by mouth nightly.  magnesium 250 mg tab Take 1 Tab by mouth nightly.  chlorthalidone (HYGROTEN) 25 mg tablet Take 25 mg by mouth daily.  TERAZOSIN HCL (HYTRIN PO) Take 5 mg by mouth nightly. Physical Exam  Blood pressure 128/84, pulse 87, temperature 97.6 °F (36.4 °C), temperature source Oral, resp. rate 20, height 5' 3\" (1.6 m), weight 89.7 kg (197 lb 12.8 oz), SpO2 98 %.     No acute distress  Neck: no stiffness    Lungs: mild wheezing bilaterally    Skin: no karen    Focused Neurological Exam     Mental status: Alert and oriented to person, place situation. Language: normal fluency and comprehension; no dysarthria. CNs:   Visual fields grossly normal  Extraocular movements intact, no nystagmus  Face: mild right lower facial weakness  Hearing is intact to casual conversation. Sensory: intact light touch in all 4 extremities  Motor: Normal bulk and strength in all 4 extremities, no myalgias  Reflexes: absent patellars  Gait: mild antalgic (right knee pain)    Impression      ICD-10-CM ICD-9-CM    1. Seizure disorder ([facility]) G40.909 345.90 levETIRAcetam (KEPPRA) 250 mg tablet   2. Basal ganglia hemorrhage ([facility]) I61.0 431      1) Basal ganglia hemorrhage  BP looks good today. Advised pt to restart ASA 81 mg/ day and continue working with PCP/ other Specialists to keep BPs under control. 2) Epilepsy (not intractable)  Well controlled after restarting AED. No further syncopal or seizure-like episode since the one in May 2016. Based on recent GFR/ Cr pt can take up to 250-500 mg Keppra every 12 hrs. Advised her to increase her keppra to 250 mg BID.       3) Follow up in 6 months

## 2019-04-12 NOTE — PROGRESS NOTES
Patient is here for a follow up for stroke on 2/20/19. She states that she doesn't really feel any different specifically before her stroke. Her right hand is a little weak. She states that she can hear her heart beat in her ears loudly. No seizures reported.

## 2019-07-03 ENCOUNTER — HOSPITAL ENCOUNTER (OUTPATIENT)
Dept: CT IMAGING | Age: 79
Discharge: HOME | End: 2019-07-03
Attending: ORTHOPAEDIC SURGERY
Payer: MEDICARE

## 2019-07-03 DIAGNOSIS — M17.11 OSTEOARTHRITIS OF RIGHT KNEE: ICD-10-CM

## 2019-07-03 PROCEDURE — 73700 CT LOWER EXTREMITY W/O DYE: CPT

## 2019-07-26 ENCOUNTER — OFFICE VISIT (OUTPATIENT)
Dept: NEUROLOGY | Age: 79
End: 2019-07-26

## 2019-07-26 VITALS
HEART RATE: 55 BPM | DIASTOLIC BLOOD PRESSURE: 70 MMHG | BODY MASS INDEX: 35.04 KG/M2 | HEIGHT: 63 IN | OXYGEN SATURATION: 97 % | RESPIRATION RATE: 20 BRPM | SYSTOLIC BLOOD PRESSURE: 148 MMHG

## 2019-07-26 DIAGNOSIS — E78.5 HYPERLIPIDEMIA, UNSPECIFIED HYPERLIPIDEMIA TYPE: ICD-10-CM

## 2019-07-26 DIAGNOSIS — G40.909 NONINTRACTABLE EPILEPSY WITHOUT STATUS EPILEPTICUS, UNSPECIFIED EPILEPSY TYPE (HCC): Primary | ICD-10-CM

## 2019-07-26 DIAGNOSIS — Z86.73 HX OF COMPLETED STROKE: ICD-10-CM

## 2019-07-26 NOTE — PROGRESS NOTES
Interval HPI:   This is a 77 y.o. female who is following up for     Chief Complaint   Patient presents with    Follow-up     having knee surgery on August 22nd      Interval Hx:    Pt presents for Neurologic clearance to proceed with Knee replacement (right)    1) Hx of Left Basal Ganglia Hemorrhage in February 2019 (treated at [facility])  2) Incidental old right medial occipital lobe stroke seen on MRI brain in February 2019  3) History of epilepsy (well-controlled)    Patient was taking Keppra 500 mg in the morning and 250 mg in the evening but when she went into the hospital for the basal ganglia stroke someone (not Dr. Ally Rodarte Neurology) reduced her Keppra to 250 mg a day presumably because of reduced kidney function. I reviewed her recent metabolic panel at the time of the last visit and her creatinine was 1.6 indicating that she could take up to 500 mg of Keppra twice a day. I increased her Keppra to 250 mg twice a day which she says she continues to take. She denies having any seizures since the last one in May 2016. In terms of her left basal ganglia hemorrhagic stroke she says the only residual symptom is she has very mild reduced dexterity of her right hand hard to type but otherwise she can hold things and do everything else with her hand. She was not aware that she had had a stroke prior to the one in February 2019. We reviewed the imaging in the office today and I showed her the basal ganglia stroke on the left side and also a very small ischemic stroke in the right medial occipital lobe. Brief ROS: as above or otherwise negative      =============================  Brief Hx:    77 y.o. female with remote hx of epilepsy, well controlled on combination of low dose Phenytoin (? Dose) and Mysoline. Last witnessed seizure in 1967. Those meds stopped around 2014 d/t no seizure for many years.  Had an episode on 5-10-16 where patient fell out of bed in early AM, urinary incontinence, tongue or cheek bite, that may have been a seizure. Was started on Keppra 500 mg BID at that point. EEG (8-29-26): normal.  Keppra level (9-4-18): 32.8 (normal, rr 10-40). When she was admitted for her basal ganglia stroke (February 2019) her Keppra had been reduced to 250 mg once a day for renal disease (GFR 31, creatinine 1.6). At her subsequent neurology follow-up visit I increased her Keppra to 250 mg twice a day. Feb 2019: Hypertensive left basal ganglia hemorrhagic stroke (initial symptoms are right facial droop and slurred speech). CT head showed acute left basal ganglia hemorrhage. Seen by Neurosurgery but no intervention required. BP control was optimized with Norvasc, carvedilol, chlorthalidone, and losartan. MRI Brain confirmed the hemorrhage, no change in size. She was seen by Dr Weinberg/ Neurology who recommended keeping SBP < 140 and withholding ASA x 2 weeks. MRI brain at that time also showed a remote right medial occipital ischemic stroke. Allergies   Allergen Reactions    Sulfa (Sulfonamide Antibiotics) Other (comments)     Unknown    Codeine Other (comments)    Levaquin [Levofloxacin] Other (comments)    Lisinopril Cough     Current Outpatient Medications   Medication Sig Dispense Refill    losartan (COZAAR) 100 mg tablet Take 1 Tab by mouth daily. 30 Tab 2    cholecalciferol (VITAMIN D3) 1,000 unit tablet Take 2,000 Units by mouth nightly.  montelukast (SINGULAIR) 10 mg tablet Take 10 mg by mouth daily as needed.  fluticasone (FLONASE ALLERGY RELIEF) 50 mcg/actuation nasal spray 1 [place] by Both Nostrils route nightly.  Carboxymethylcellulose-Glycern (REFRESH OPTIVE) 0.5-0.9 % drop Administer 1 Drop to both eyes two (2) times daily as needed for Other (dry eyes).  carvedilol (COREG) 6.25 mg tablet Take 6.25 mg by mouth two (2) times daily (with meals).  chlorthalidone (HYGROTEN) 25 mg tablet Take 25 mg by mouth daily.       pravastatin (PRAVACHOL) 20 mg tablet Take 20 mg by mouth nightly.  amlodipine (NORVASC) 10 mg tablet Take 10 mg by mouth daily.  TERAZOSIN HCL (HYTRIN PO) Take 5 mg by mouth nightly.  DOCOSAHEXANOIC ACID/EPA (FISH OIL PO) Take 1 Cap by mouth two (2) times a day.  famotidine (PEPCID) 20 mg tablet Take 1 Tab by mouth daily. 30 Tab 0    calcium carbonate (CALCIUM 600) 600 mg (1,500 mg) tablet Take 600 mg by mouth nightly.  magnesium 250 mg tab Take 1 Tab by mouth nightly. PMHx:   Past Medical History:   Diagnosis Date    High cholesterol     Hypertension      PSHx:  has a past surgical history that includes hx tonsillectomy. SocHx:  reports that she has never smoked. She has never used smokeless tobacco. She reports that she does not drink alcohol or use drugs. FHx: family history includes Cancer in her sister; Diabetes in her sister; Hypertension in her mother and sister. Physical Exam  Blood pressure 148/70, pulse (!) 55, resp. rate 20, height 5' 3\" (1.6 m), SpO2 97 %. No acute distress  Neck: no stiffness  Skin: no rashes    Focused Neurological Exam     Mental status: Alert and oriented to person, place situation. Language: normal fluency and comprehension; no dysarthria. CNs:   Visual fields grossly normal  Extraocular movements intact, no nystagmus  Face: mild right lower facial weakness  Hearing is intact to casual conversation. Sensory: intact light touch in all 4 extremities  Motor: Normal bulk and strength in all 4 extremities, no myalgias  Reflexes: absent patellars  Gait: mild antalgic (right knee pain)    Impression      ICD-10-CM ICD-9-CM    1. Nonintractable epilepsy without status epilepticus, unspecified epilepsy type ([facility] [address] G40.909 345.90 LEVETIRACETAM (KEPPRA)   2. Hx of completed stroke Z86.73 V12.54 LIPID PANEL      DUPLEX CAROTID BILATERAL   3.  Hyperlipidemia, unspecified hyperlipidemia type E78.5 272.4 LIPID PANEL       1) Basal ganglia hemorrhage (2-20-19, [facility]):    BP slightly elevated today (147/ 70) and mild bradycardia. Patient says her blood pressures vary anywhere from 120s to 140s. Discussed with her that she should work with her cardiologist (to see in a few weeks for preop clearance) and PCP to get keep her systolic blood pressure under the 140s all the time preferably (120s 130s) to reduce the chance of hemorrhagic stroke. 2) Old right posterior occipital lobe cortical infarct: continue ASA 81 mg/ day. Stop Asprin 5 days before surgery, restart after when cleared by Orthopedics. Check fasting cholesterol to ensure LDL is at goal (< 70, in setting of TIA/ stroke). If not, pt's pravastatin will need to be increased. 3) Epilepsy (not intractable)  Well controlled after restarting AED. No further syncopal or seizure-like episode since the one in May 2016. Continue Keppra 250 mg PO BID    4) Neurology preoperative clearance (the following was put into letter format and will be sent to patient's orthopedic/ Dr Lorena Osei)    Discussed with patient that from my standpoint, once the following are addressed, she can proceed with any necessary surgeries:    I) SBP consistently below 140s (to reduce chance of hemorrhagic stroke). She was advised to work with PCP and Cardiologist to achieve this. II) Check fasting cholesterol panel. If LDL is greater than 70, her pravastatin should be increased (to reduce chance of ischemic stroke). III) Check Keppra level to know what her baseline is. IV) Check Carotid Dopplers to ensure no significant carotid artery stenosis (given that she had a remote right occipital stroke)    Pre-operatively, she can stop taking her Aspirin 5 days before surgery and restart it after surgery when cleared by orthopedics to do so.     Perioperatively I recommend that she be given Keppra 500 mg IV x 1 before surgery and change her Keppra to 250 mg IV BID after surgery until she starts taking PO, then her Keppra can be changed back to 250 mg PO BID. 5) Follow up: 6 months.

## 2019-07-26 NOTE — PROGRESS NOTES
Doing water therapy for her knees   She has knee surgery scheduled for August 22nd     No seizures since her last visit   She needs clearance to be able to have surgery on her knee

## 2019-07-26 NOTE — PATIENT INSTRUCTIONS
To be cleared by the Neurologist for your Right Knee Surgery/ Replacement:    1) Blood Pressure Control  Due to your Left Brain Hemorrhage Stroke (February 2019), you will need to work with your PCP and Cardiologist to ensure your Blood Pressure remains under 140, consistently    2) Cholesterol  Due to your prior stroke, your LDL cholesterol (\" bad cholesterol\") will need to stay under 70. I am giving you a lab slip to have your Cholesterol checked. If the LDL is above 70, you will need to ask your PCP to increase your Pravastatin (cholesterol medication). 3) Seizure history  Have your Keppra level checked (see lab order)    Once I see the keppra level and cholesterol results I will sign the Surgery Clearance form and Fax back to Dr Orion Deleon.        4) Follow in 6 months

## 2019-07-26 NOTE — LETTER
7/26/2019 2:16 PM 
 
Ms. James Hampton 
[address] [facility] [address] [id number] [address] Campos Scan[facility]: 
 
James Hampton (1940) is followed by me for a history of Epilepsy (well controlled on Keppra 250 mg PO BID), and a recent (Feb 2019) left basal ganglia hemorrhage (the residual symptom is very mild reduced dexterity in the right hand). She has explained to me that she needs to have right knee replacement and is asked for neurologic clearance. Below are my recommendations: 
 
 
From my standpoint, once the following are addressed, she can proceed with any necessary surgeries: 
 
I) SBP consistently below 140s (to reduce chance of hemorrhagic stroke). She was advised to work with PCP and Cardiologist to achieve this. II) Check fasting cholesterol panel. If LDL is greater than 70, her pravastatin will need to be  increased (to reduce chance of ischemic stroke). III) Check Keppra level to know what her baseline is. IV) Check Carotid Dopplers to ensure no significant carotid artery stenosis (given that she had a remote right occipital stroke and no prior carotid imaging on file within [facility]) Pre-operatively, she can stop taking her Aspirin 5 days before surgery and restart it after surgery when cleared by orthopedics to do so. Perioperatively I recommend that she be given Keppra 500 mg IV x 1 before surgery and change her Keppra to 250 mg IV BID after surgery until she starts taking PO, then her Keppra can be changed back to 250 mg PO BID. Sincerely, Morgan Barrow MD

## 2019-08-01 LAB
CHOLEST SERPL-MCNC: 136 MG/DL (ref 100–199)
HDLC SERPL-MCNC: 36 MG/DL
LDLC SERPL CALC-MCNC: 77 MG/DL (ref 0–99)
LEVETIRACETAM SERPL-MCNC: 15.7 UG/ML (ref 10–40)
TRIGL SERPL-MCNC: 114 MG/DL (ref 0–149)
VLDLC SERPL CALC-MCNC: 23 MG/DL (ref 5–40)

## 2019-08-02 ENCOUNTER — TELEPHONE (OUTPATIENT)
Dept: NEUROLOGY | Age: 79
End: 2019-08-02

## 2019-08-02 NOTE — TELEPHONE ENCOUNTER
----- Message from Patrizia Toussaint sent at 8/2/2019  9:40 AM EDT -----  Regarding: Dr Ame Jesus is calling to get Test Results, please call pt at [phone]

## 2019-08-05 DIAGNOSIS — G40.909 NONINTRACTABLE EPILEPSY WITHOUT STATUS EPILEPTICUS, UNSPECIFIED EPILEPSY TYPE (HCC): Primary | ICD-10-CM

## 2019-08-05 RX ORDER — LEVETIRACETAM 500 MG/1
500 TABLET ORAL 2 TIMES DAILY
Qty: 60 TAB | Refills: 2 | Status: SHIPPED | OUTPATIENT
Start: 2019-08-05 | End: 2019-08-12 | Stop reason: SDUPTHER

## 2019-08-05 NOTE — TELEPHONE ENCOUNTER
1) Cholesterol: her bad cholesterol (LDL) is 77, which is above the goal of less than 70 for patients who have had a TIA or a stroke. Pt will need to contact her PCP to increase her Pravastatin to 40 mg at bedtime. 2) Keppra level: keppra level is 15, at the lower end of the normal range (10-40). She should increase her Keppra 250 mg tablets to two tablets twice a day. When she finishes with that, she should  the new Rx for Keppa 500 mg one tablet twice a day    3) I recommend she get Carotid Dopplers checked before surgery to be sure no areas of significant blockage in the neck arteries. Please contact her to get that scheduled at [facility]. Thanks.

## 2019-08-06 ENCOUNTER — TELEPHONE (OUTPATIENT)
Dept: NEUROLOGY | Age: 79
End: 2019-08-06

## 2019-08-06 NOTE — TELEPHONE ENCOUNTER
Called and spoke to patient relayed per md  She states understanding   She will call back to schedule doppler once she checks her calandar

## 2019-08-07 ENCOUNTER — HOSPITAL ENCOUNTER (OUTPATIENT)
Dept: PREADMISSION TESTING | Age: 79
Discharge: HOME | End: 2019-08-07
Payer: MEDICARE

## 2019-08-07 ENCOUNTER — HOSPITAL ENCOUNTER (OUTPATIENT)
Dept: NON INVASIVE DIAGNOSTICS | Age: 79
Discharge: HOME | End: 2019-08-07
Attending: ORTHOPAEDIC SURGERY
Payer: MEDICARE

## 2019-08-07 VITALS
BODY MASS INDEX: 34.2 KG/M2 | DIASTOLIC BLOOD PRESSURE: 71 MMHG | OXYGEN SATURATION: 98 % | SYSTOLIC BLOOD PRESSURE: 166 MMHG | RESPIRATION RATE: 18 BRPM | HEART RATE: 58 BPM | TEMPERATURE: 97.8 F | HEIGHT: 63 IN | WEIGHT: 193 LBS

## 2019-08-07 LAB
ABO + RH BLD: NORMAL
ALBUMIN SERPL-MCNC: 3.4 G/DL (ref 3.5–5)
ALBUMIN/GLOB SERPL: 0.9 {RATIO} (ref 1.1–2.2)
ALP SERPL-CCNC: 100 U/L (ref 45–117)
ALT SERPL-CCNC: 24 U/L (ref 12–78)
ANION GAP SERPL CALC-SCNC: 3 MMOL/L (ref 5–15)
APPEARANCE UR: ABNORMAL
AST SERPL-CCNC: 18 U/L (ref 15–37)
ATRIAL RATE: 56 BPM
BACTERIA URNS QL MICRO: ABNORMAL /HPF
BASOPHILS # BLD: 0.1 K/UL (ref 0–0.1)
BASOPHILS NFR BLD: 1 % (ref 0–1)
BILIRUB SERPL-MCNC: 0.5 MG/DL (ref 0.2–1)
BILIRUB UR QL: NEGATIVE
BLOOD GROUP ANTIBODIES SERPL: NORMAL
BUN SERPL-MCNC: 55 MG/DL (ref 6–20)
BUN/CREAT SERPL: 29 (ref 12–20)
CALCIUM SERPL-MCNC: 8.9 MG/DL (ref 8.5–10.1)
CALCULATED P AXIS, ECG09: 41 DEGREES
CALCULATED R AXIS, ECG10: 40 DEGREES
CALCULATED T AXIS, ECG11: 44 DEGREES
CHLORIDE SERPL-SCNC: 118 MMOL/L (ref 97–108)
CO2 SERPL-SCNC: 22 MMOL/L (ref 21–32)
COLOR UR: ABNORMAL
CREAT SERPL-MCNC: 1.92 MG/DL (ref 0.55–1.02)
CRP SERPL-MCNC: <0.29 MG/DL (ref 0–0.6)
DIAGNOSIS, 93000: NORMAL
DIFFERENTIAL METHOD BLD: ABNORMAL
EOSINOPHIL # BLD: 0.3 K/UL (ref 0–0.4)
EOSINOPHIL NFR BLD: 5 % (ref 0–7)
EPITH CASTS URNS QL MICRO: ABNORMAL /LPF
ERYTHROCYTE [DISTWIDTH] IN BLOOD BY AUTOMATED COUNT: 12.3 % (ref 11.5–14.5)
ERYTHROCYTE [SEDIMENTATION RATE] IN BLOOD: 49 MM/HR (ref 0–30)
EST. AVERAGE GLUCOSE BLD GHB EST-MCNC: 111 MG/DL
GLOBULIN SER CALC-MCNC: 4 G/DL (ref 2–4)
GLUCOSE SERPL-MCNC: 94 MG/DL (ref 65–100)
GLUCOSE UR STRIP.AUTO-MCNC: NEGATIVE MG/DL
HBA1C MFR BLD: 5.5 % (ref 4.2–6.3)
HCT VFR BLD AUTO: 35 % (ref 35–47)
HGB BLD-MCNC: 11.3 G/DL (ref 11.5–16)
HGB UR QL STRIP: ABNORMAL
HYALINE CASTS URNS QL MICRO: ABNORMAL /LPF (ref 0–5)
IMM GRANULOCYTES # BLD AUTO: 0 K/UL (ref 0–0.04)
IMM GRANULOCYTES NFR BLD AUTO: 0 % (ref 0–0.5)
KETONES UR QL STRIP.AUTO: NEGATIVE MG/DL
LEUKOCYTE ESTERASE UR QL STRIP.AUTO: ABNORMAL
LYMPHOCYTES # BLD: 2.7 K/UL (ref 0.8–3.5)
LYMPHOCYTES NFR BLD: 37 % (ref 12–49)
MCH RBC QN AUTO: 31.2 PG (ref 26–34)
MCHC RBC AUTO-ENTMCNC: 32.3 G/DL (ref 30–36.5)
MCV RBC AUTO: 96.7 FL (ref 80–99)
MONOCYTES # BLD: 0.7 K/UL (ref 0–1)
MONOCYTES NFR BLD: 9 % (ref 5–13)
NEUTS SEG # BLD: 3.5 K/UL (ref 1.8–8)
NEUTS SEG NFR BLD: 48 % (ref 32–75)
NITRITE UR QL STRIP.AUTO: NEGATIVE
NRBC # BLD: 0 K/UL (ref 0–0.01)
NRBC BLD-RTO: 0 PER 100 WBC
P-R INTERVAL, ECG05: 194 MS
PH UR STRIP: 5 [PH] (ref 5–8)
PLATELET # BLD AUTO: 251 K/UL (ref 150–400)
PMV BLD AUTO: 9.1 FL (ref 8.9–12.9)
POTASSIUM SERPL-SCNC: 5.7 MMOL/L (ref 3.5–5.1)
PROT SERPL-MCNC: 7.4 G/DL (ref 6.4–8.2)
PROT UR STRIP-MCNC: 300 MG/DL
Q-T INTERVAL, ECG07: 416 MS
QRS DURATION, ECG06: 90 MS
QTC CALCULATION (BEZET), ECG08: 401 MS
RBC # BLD AUTO: 3.62 M/UL (ref 3.8–5.2)
RBC #/AREA URNS HPF: ABNORMAL /HPF (ref 0–5)
SODIUM SERPL-SCNC: 143 MMOL/L (ref 136–145)
SP GR UR REFRACTOMETRY: 1.01 (ref 1–1.03)
SPECIMEN EXP DATE BLD: NORMAL
UA: UC IF INDICATED,UAUC: ABNORMAL
UROBILINOGEN UR QL STRIP.AUTO: 0.2 EU/DL (ref 0.2–1)
VENTRICULAR RATE, ECG03: 56 BPM
WBC # BLD AUTO: 7.2 K/UL (ref 3.6–11)
WBC URNS QL MICRO: >100 /HPF (ref 0–4)

## 2019-08-07 PROCEDURE — 87186 SC STD MICRODIL/AGAR DIL: CPT

## 2019-08-07 PROCEDURE — 83036 HEMOGLOBIN GLYCOSYLATED A1C: CPT

## 2019-08-07 PROCEDURE — 93005 ELECTROCARDIOGRAM TRACING: CPT

## 2019-08-07 PROCEDURE — 87077 CULTURE AEROBIC IDENTIFY: CPT

## 2019-08-07 PROCEDURE — 86140 C-REACTIVE PROTEIN: CPT

## 2019-08-07 PROCEDURE — 36415 COLL VENOUS BLD VENIPUNCTURE: CPT

## 2019-08-07 PROCEDURE — 86900 BLOOD TYPING SEROLOGIC ABO: CPT

## 2019-08-07 PROCEDURE — 81001 URINALYSIS AUTO W/SCOPE: CPT

## 2019-08-07 PROCEDURE — 80053 COMPREHEN METABOLIC PANEL: CPT

## 2019-08-07 PROCEDURE — 85025 COMPLETE CBC W/AUTO DIFF WBC: CPT

## 2019-08-07 PROCEDURE — 85652 RBC SED RATE AUTOMATED: CPT

## 2019-08-07 PROCEDURE — 87086 URINE CULTURE/COLONY COUNT: CPT

## 2019-08-07 RX ORDER — GUAIFENESIN 100 MG/5ML
81 LIQUID (ML) ORAL DAILY
COMMUNITY
End: 2019-08-28

## 2019-08-07 RX ORDER — TERAZOSIN 5 MG/1
5 CAPSULE ORAL
COMMUNITY
End: 2021-05-14

## 2019-08-07 NOTE — PERIOP NOTES
[address] [address] [facility]   MAIN OR                                  [phone]   MAIN PRE OP                          [phone]                                                                                AMBULATORY PRE OP          [phone]  PRE-ADMISSION TESTING    [phone]     Surgery Date:   Thursday, August 22, 2019        Is surgery arrival time given by surgeon? NO  If NO, [facility] staff will call you between 3 and 7pm the day before your surgery with your arrival time. (If your surgery is on a Monday, we will call you the Friday before.)    Call [phone] after 7pm Monday-Friday if you did not receive your arrival time. INSTRUCTIONS BEFORE YOUR SURGERY   When You  Arrive Arrive at the [address]nd [address] [facility] on the day of your surgery  Have your insurance card, photo ID, and any copayment (if needed)   Food   and   Drink NO food or drink after midnight the night before surgery    This means NO water, gum, mints, coffee, juice, etc.  No alcohol (beer, wine, liquor) 24 hours before and after surgery   Medications to   TAKE   Morning of Surgery MEDICATIONS TO TAKE THE MORNING OF SURGERY WITH A SIP OF WATER:    Carvedilol, amlodipine   Medications  To  STOP      7 days before surgery  Non-Steroidal anti-inflammatory Drugs (NSAID's): for example, Ibuprofen (Advil, Motrin), Naproxen (Aleve)   Aspirin, if taking for pain    Herbal supplements, vitamins, and fish oil   Other:  (Pain medications not listed above, including Tylenol may be taken)   Blood  Thinners  If you take  Aspirin, Plavix, Coumadin, or any blood-thinning or anti-blood clot medicine, talk to the doctor who prescribed the medications for pre-operative instructions.  Please stop aspirin 5 days prior to surgery per Dr. Jhoan Peacock instructions.    Bathing Clothing  Jewelry  Valuables       If you shower the morning of surgery, please do not apply anything to your skin (lotions, powders, deodorant, or makeup, especially mascara)   Follow all special bath instructions (for total joint replacement, spine and bowel surgeries)   Do not shave or trim anywhere 24 hours before surgery   Wear your hair loose or down; no pony-tails, buns, or metal hair clips   Wear loose, comfortable, clean clothes   Wear glasses instead of contacts   Leave money, valuables, and jewelry, including body piercings, at home   Going Home - or Spending the Night  SAME-DAY SURGERY: You must have a responsible adult drive you home and stay with you 24 hours after surgery   ADMITS: If your doctor is keeping you in the hospital after surgery, leave personal belongings/luggage in your car until you have a hospital room number. Hospital discharge time is 12 noon  Drivers must be here before 12 noon unless you are told differently   Special Instructions   Special Instructions:  · Use Chlorhexidine Care Fusion wash and sponges 3 days prior to surgery as instructed. · Incentive spirometer given with instructions to practice at home and bring back to the hospital on the day of surgery. · [facility] will contact you if your Hemoglobin A1C is greater than 7.5. · Ensure/Glucerna  sample, nutritional information, and Ensure/Glucerna coupon given. · Pain pamphlet and Call Don't Fall reminder reviewed with patient. ·  parking is complimentary Monday - Friday 7 am - 5 pm  · Bring PTA Medication list day of surgery with the last doses taken documented  · Please bring CPAP the day of surgery. · Bring cane/walker     Follow all instructions so your surgery wont be cancelled. Please, be on time. If a situation occurs and you are delayed the day of surgery, call  [phone]. If your physical condition changes (like a fever, cold, flu, etc.) call your surgeon. The patient was contacted  in person.  Home medication reviewed and verified during PAT appointment. The patient verbalizes understanding of all instructions and does not  need reinforcement.

## 2019-08-08 LAB
BACTERIA SPEC CULT: NORMAL
BACTERIA SPEC CULT: NORMAL
SERVICE CMNT-IMP: NORMAL

## 2019-08-09 LAB
BACTERIA SPEC CULT: ABNORMAL
CC UR VC: ABNORMAL
SERVICE CMNT-IMP: ABNORMAL

## 2019-08-09 NOTE — H&P
Preoperative Evaluation                     History and Physical with Surgical Risk Stratification     8/7/2019    CC: right knee pain  Surgery: RTK     HPI:   Elias Gabriel is a 77 y.o. female referred for pre-operative evaluation by Dr. Magda Chung for surgery on 8/22/19. Ms. Deedee Rouse states that for the past 4-5 years her knee has been hurting with the pain getting to the point it is impacting her quality of life. Walking makes the pain worse. She denies swelling. She has had buckling. Pain is typically a 8/10. The pain will radiate up and down her leg. She has failed all conservative measure. The patient was evaluated in the surgeon's office and it was determined that the most appropriate plan of care is to proceed with surgical intervention. Patient's PCP Orlando Horvath MD    Seizures: Received note from neurologist outlining his recommendations for surgery. She has done all that has been requested. She will see cardiology on the 14th to get her SBP lower. Keppra orders have been shared with surgeon and myself and pre-op dosing has been placed in CC. Review of Systems     Constitutional: Negative for chills and fever  HENT: Negative for congestion and sore throat  Eyes: negative for blurred vision and double vision  Respiratory: Negative for cough, shortness of breath and wheezing  Mouth: Negative for loose, broken or chipped teeth. Positive for top and bottom partials   Cardiovascular: Negative for chest pain and palpitations  Gastrointestinal: Negative for abdominal pain, constipation, diarrhea and nausea  Genitourinary: Negative for dysuria and hematuria  Musculoskeletal: Positive for right knee pain  Skin: Negative for rash, open wounds. Negative for bruises easily  Neurological: Negative for dizziness, tremors and headaches  Psychiatric: Negative for depression. The patient is not nervous/anxious.     Inherent Risk of Surgery     Surgical risk:  Intermediate  Low:  EIntermediate: Orthopedic, High:    Patient Cardiac Risk Assessment     Revised Cardiac Risk Index (RCRI)    Rate if cardiac death, nonfatal MI, nonfatal cardiac arrest by number of risk factor- 0.4%    TESSA/AHA 2007 Guidelines:   1) Surgery Emergency, Non-cardiac -> to surgery  2) If not, look at clinical predictors    Major Intermediate Minor    Hx of CVA CKD  Blood Thinner: ASA and Fish Oil    METS      EQUAL TO 4 Care for self Walk indoors around house Walk 2-3 blocks on level ground (2-3 mph) Light work around house (dust, dishes)   METS- Does water aerobics and walks laps in her apartment building    Other Risk Factors:   Screening for ETOH use:  Done and low risk  Smoking status:   None    Personal or FH of bleeding problems:  No  Personal or FH of blood clots:  No  Personal or FH of anesthesia problems:   No    Pulmonary Risk:  Asthma or COPD:  No  Body mass index is 34.74 kg/m². Known ABIGAIL:  No  Albumin abnormal, BUN normal    Past Medical, Surgical, Social History     Allergies: Allergies   Allergen Reactions    Codeine Other (comments)    Levaquin [Levofloxacin] Other (comments)     Hallucinations    Lisinopril Cough    Sulfa (Sulfonamide Antibiotics) Rash and Itching         Current Outpatient Medications   Medication Sig    aspirin 81 mg chewable tablet Take 81 mg by mouth daily.  terazosin (HYTRIN) 5 mg capsule Take 5 mg by mouth nightly.  acetaminophen (TYLENOL PO) Take  by mouth as needed.  levETIRAcetam (KEPPRA) 500 mg tablet Take 1 Tab by mouth two (2) times a day. Anti-seizure medication    losartan (COZAAR) 100 mg tablet Take 1 Tab by mouth daily.  montelukast (SINGULAIR) 10 mg tablet Take 10 mg by mouth daily.  fluticasone (FLONASE ALLERGY RELIEF) 50 mcg/actuation nasal spray 1 [place] by Both Nostrils route nightly.  Carboxymethylcellulose-Glycern (REFRESH OPTIVE) 0.5-0.9 % drop Administer 1 Drop to both eyes two (2) times daily as needed for Other (dry eyes).     carvedilol (COREG) 6.25 mg tablet Take 6.25 mg by mouth two (2) times daily (with meals).  chlorthalidone (HYGROTEN) 25 mg tablet Take 25 mg by mouth daily.  pravastatin (PRAVACHOL) 40 mg tablet Take 40 mg by mouth nightly.  amlodipine (NORVASC) 10 mg tablet Take 5 mg by mouth daily.  DOCOSAHEXANOIC ACID/EPA (FISH OIL PO) Take 1 Cap by mouth two (2) times a day. No current facility-administered medications for this encounter. Past Medical History:   Diagnosis Date    Anxiety     Arthritis     Basal ganglia stroke ([address] 2019    Left    CKD (chronic kidney disease) stage 3, GFR 30-59 ml/min ([facility])     High cholesterol     Hx of seasonal allergies     Hypertension     Ischemic stroke ([address]     Right occipital    Monoclonal gammopathy 2018    ABIGAIL on CPAP     Seizures ([facility])     last [facility] level- 2019 @ 15    Urinary incontinence      Past Surgical History:   Procedure Laterality Date    HX APPENDECTOMY N/A     HX CATARACT REMOVAL Bilateral 2018    HX  SECTION N/A     x 2    HX HEMORRHOIDECTOMY      HX HYSTERECTOMY      HX TONSILLECTOMY       Social History     Tobacco Use    Smoking status: Never Smoker    Smokeless tobacco: Never Used   Substance Use Topics    Alcohol use: No    Drug use: No       Objective     Vitals:    19 1459   BP: 166/71   Pulse: (!) 58   Resp: 18   Temp: 97.8 °F (36.6 °C)   SpO2: 98%   Weight: 87.5 kg (193 lb)   Height: 5' 2.5\" (1.588 m)       Constitutional:  Appears well,  No Acute Distress, Vitals noted  Psychiatric:   Affect normal, Alert and Oriented to person/place/time    Eyes:   Pupils equally round and reactive, EOMI, conjunctiva clear, eyelids normal  ENT:   External ears and nose normal/lips, teeth normal, gums normal, TMs and Orophyarynx normal  Neck:   general inspection and Thyroid normal.  No abnormal cervical or supraclavicular nodes    Lungs:   clear to auscultation, good respiratory effort  Heart: Ausculation normal.  Bradycardia. No cardiac murmurs. No carotid bruits or palpable thrills  Chest wall normal  Musculoskeletal: Gait antalgi  Extremities:   without edema, good peripheral pulses  Skin:   Warm to palpation, without rashes, bruising, or suspicious lesions     Recent Results (from the past 72 hour(s))   C REACTIVE PROTEIN, QT    Collection Time: 08/07/19  3:57 PM   Result Value Ref Range    C-Reactive protein <0.29 0.00 - 0.60 mg/dL   CBC WITH AUTOMATED DIFF    Collection Time: 08/07/19  3:57 PM   Result Value Ref Range    WBC 7.2 3.6 - 11.0 K/uL    RBC 3.62 (L) 3.80 - 5.20 M/uL    HGB 11.3 (L) 11.5 - 16.0 g/dL    HCT 35.0 35.0 - 47.0 %    MCV 96.7 80.0 - 99.0 FL    MCH 31.2 26.0 - 34.0 PG    MCHC 32.3 30.0 - 36.5 g/dL    RDW 12.3 11.5 - 14.5 %    PLATELET 563 524 - 052 K/uL    MPV 9.1 8.9 - 12.9 FL    NRBC 0.0 0  WBC    ABSOLUTE NRBC 0.00 0.00 - 0.01 K/uL    NEUTROPHILS 48 32 - 75 %    LYMPHOCYTES 37 12 - 49 %    MONOCYTES 9 5 - 13 %    EOSINOPHILS 5 0 - 7 %    BASOPHILS 1 0 - 1 %    IMMATURE GRANULOCYTES 0 0.0 - 0.5 %    ABS. NEUTROPHILS 3.5 1.8 - 8.0 K/UL    ABS. LYMPHOCYTES 2.7 0.8 - 3.5 K/UL    ABS. MONOCYTES 0.7 0.0 - 1.0 K/UL    ABS. EOSINOPHILS 0.3 0.0 - 0.4 K/UL    ABS. BASOPHILS 0.1 0.0 - 0.1 K/UL    ABS. IMM. GRANS. 0.0 0.00 - 0.04 K/UL    DF AUTOMATED     METABOLIC PANEL, COMPREHENSIVE    Collection Time: 08/07/19  3:57 PM   Result Value Ref Range    Sodium 143 136 - 145 mmol/L    Potassium 5.7 (H) 3.5 - 5.1 mmol/L    Chloride 118 (H) 97 - 108 mmol/L    CO2 22 21 - 32 mmol/L    Anion gap 3 (L) 5 - 15 mmol/L    Glucose 94 65 - 100 mg/dL    BUN 55 (H) 6 - 20 MG/DL    Creatinine 1.92 (H) 0.55 - 1.02 MG/DL    BUN/Creatinine ratio 29 (H) 12 - 20      GFR est AA 31 (L) >60 ml/min/1.73m2    GFR est non-AA 25 (L) >60 ml/min/1.73m2    Calcium 8.9 8.5 - 10.1 MG/DL    Bilirubin, total 0.5 0.2 - 1.0 MG/DL    ALT (SGPT) 24 12 - 78 U/L    AST (SGOT) 18 15 - 37 U/L    Alk.  phosphatase 100 45 - 117 U/L    Protein, total 7.4 6.4 - 8.2 g/dL    Albumin 3.4 (L) 3.5 - 5.0 g/dL    Globulin 4.0 2.0 - 4.0 g/dL    A-G Ratio 0.9 (L) 1.1 - 2.2     HEMOGLOBIN A1C WITH EAG    Collection Time: 08/07/19  3:57 PM   Result Value Ref Range    Hemoglobin A1c 5.5 4.2 - 6.3 %    Est. average glucose 111 mg/dL   CULTURE, MRSA    Collection Time: 08/07/19  3:57 PM   Result Value Ref Range    Special Requests: NO SPECIAL REQUESTS      Culture result: MRSA NOT PRESENT      Culture result:            Screening of patient nares for MRSA is for surveillance purposes and, if positive, to facilitate isolation considerations in high risk settings. It is not intended for automatic decolonization interventions per se as regimens are not sufficiently effective to warrant routine use.    SED RATE (ESR)    Collection Time: 08/07/19  3:57 PM   Result Value Ref Range    Sed rate, automated 49 (H) 0 - 30 mm/hr   URINALYSIS W/ REFLEX CULTURE    Collection Time: 08/07/19  3:57 PM   Result Value Ref Range    Color YELLOW/STRAW      Appearance CLOUDY (A) CLEAR      Specific gravity 1.015 1.003 - 1.030      pH (UA) 5.0 5.0 - 8.0      Protein 300 (A) NEG mg/dL    Glucose NEGATIVE  NEG mg/dL    Ketone NEGATIVE  NEG mg/dL    Bilirubin NEGATIVE  NEG      Blood TRACE (A) NEG      Urobilinogen 0.2 0.2 - 1.0 EU/dL    Nitrites NEGATIVE  NEG      Leukocyte Esterase MODERATE (A) NEG      WBC >100 (H) 0 - 4 /hpf    RBC 0-5 0 - 5 /hpf    Epithelial cells FEW FEW /lpf    Bacteria 1+ (A) NEG /hpf    UA:UC IF INDICATED URINE CULTURE ORDERED (A) CNI      Hyaline cast 2-5 0 - 5 /lpf   TYPE & SCREEN    Collection Time: 08/07/19  3:57 PM   Result Value Ref Range    Crossmatch Expiration 08/21/2019     ABO/Rh(D) A POSITIVE     Antibody screen NEG    CULTURE, URINE    Collection Time: 08/07/19  3:57 PM   Result Value Ref Range    Special Requests: NO SPECIAL REQUESTS  Reflexed from [id number]        [place] Count >100,000  COLONIES/mL        Culture result: GRAM NEGATIVE RODS (A)     EKG, 12 LEAD, INITIAL Collection Time: 08/07/19  4:23 PM   Result Value Ref Range    Ventricular Rate 56 BPM    Atrial Rate 56 BPM    P-R Interval 194 ms    QRS Duration 90 ms    Q-T Interval 416 ms    QTC Calculation (Bezet) 401 ms    Calculated P Axis 41 degrees    Calculated R Axis 40 degrees    Calculated T Axis 44 degrees    Diagnosis       Sinus bradycardia  Otherwise normal ECG  When compared with ECG of 20-FEB-2019 11:38,  No significant change was found  Confirmed by Shea Ospina MD., Rishi ([id number]) on 8/7/2019 6:42:22 PM         Assessment and Plan     Assessment/Plan:   1) OA Right Knee  2) Pre-Operative Evaluation    Labs and EKG reviewed. MRSA negative. Urine culture pending    Potassium elevated at 5.7- sent to PCP. Cr at baseline for CKD. See separate note for Keppra instructions. Preoperative Clearance  Per RCRI, the patient has a 0.4% risk of cardiac death, nonfatal MI, nonfatal cardiac arrest based on no risk factors. Per ACC/AHA guidelines, patient is intermediate risk for a(n) intermediate risk surgery and may proceed to planned surgery with the above noted risk.     Jazmin Yuan, MAYKEL

## 2019-08-09 NOTE — PROGRESS NOTES
Received detailed note from neurologist noting his recommendations for her upcoming surgery. She has gotten her dopplers- I cannot see any results at this time. Her cholesterol results were done and her statin has been increased to 40mg. Her Keppra level has been drawn and came back at 15. He recommends a loading dose of IV Keppra prior to surgery and then resume her PO dose the next day. Per surgeon's request I have placed the order under sign and held for DOS. She is going to her cardiologist, Dr. Ck Wu on 8/14/19 to address her still elevated SBP. I have faxed our EKG along with a copy of the note detailing she needs to be under 140 SBP. We will follow up with him once her appointment is finished.

## 2019-08-12 RX ORDER — CEFUROXIME AXETIL 250 MG/1
250 TABLET ORAL 2 TIMES DAILY
Qty: 10 TAB | Refills: 0 | Status: SHIPPED | OUTPATIENT
Start: 2019-08-12 | End: 2019-08-17

## 2019-08-12 RX ORDER — CEFUROXIME AXETIL 250 MG/1
250 TABLET ORAL 2 TIMES DAILY
Qty: 10 TAB | Refills: 0 | Status: SHIPPED | OUTPATIENT
Start: 2019-08-12 | End: 2019-08-12 | Stop reason: SDUPTHER

## 2019-08-12 NOTE — PROGRESS NOTES
Notification of Positive Urine Culture and Treatment Ordered by Preadmission Testing Nurse Practitioner      Patient Name:  Heather Hawthorne  MRN: [id number]  : 1940    Surgeon: Pako Cole  Date of surgery: 19  Procedure: RTK    Allergies: Allergies   Allergen Reactions    Codeine Other (comments)    Levaquin [Levofloxacin] Other (comments)     Hallucinations    Lisinopril Cough    Sulfa (Sulfonamide Antibiotics) Rash and Itching       Urine culture result:     Culture result:   Date Value Ref Range Status   2019 MRSA NOT PRESENT   Final   2019    Final        Screening of patient nares for MRSA is for surveillance purposes and, if positive, to facilitate isolation considerations in high risk settings. It is not intended for automatic decolonization interventions per se as regimens are not sufficiently effective to warrant routine use.    2019 KLEBSIELLA PNEUMONIAE (A)   Final       Treatment ordered: Ceftin 250mg PO BID x 5 days    Date patient notified of results / treatment prescribed: 19    The patient was instructed to add medication and date they began treatment to their \"Prior to Admission Medication\" list given to them in [address], NP

## 2019-08-15 NOTE — FACE TO FACE
The patient and  attended the pre-operative joint replacement class. The content of the class was presented using a power point presentation and visual demonstrations specific for patients undergoing total hip and knee replacement surgery. A pre-operative Patient education booklet specific to hip and knee replacement was given to the patient. Incentive spirometer and CHG bath kit were given to the patient with written instructions and a demonstration of the equipment was done in class. Day of surgery routine and expectations, hospital routine and expectations, nutrition, alcohol, nicotine, medications, infection control, pain management, DVT precautions and equipment, ice therapy, home preparation and safety were reviewed in class. During class, the patient and  had opportunities to ask questions of the material presented as well as any concerns about their upcoming surgery. Physical Therapy present in class and educated patient and caregivers primarily on [address], exercises, mobility expectations, caregiver education, and home safety.

## 2019-08-16 NOTE — PROGRESS NOTES
Received cardiology clearance and they took her BP on two separate occassions and both were under 140 SBP. Dr. Sarah Hill feels she is ok to proceed without further intervention.

## 2019-08-21 ENCOUNTER — ANESTHESIA EVENT (OUTPATIENT)
Dept: SURGERY | Age: 79
DRG: 470 | End: 2019-08-21
Payer: MEDICARE

## 2019-08-22 ENCOUNTER — ANESTHESIA (OUTPATIENT)
Dept: SURGERY | Age: 79
DRG: 470 | End: 2019-08-22
Payer: MEDICARE

## 2019-08-22 ENCOUNTER — APPOINTMENT (OUTPATIENT)
Dept: GENERAL RADIOLOGY | Age: 79
DRG: 470 | End: 2019-08-22
Attending: PHYSICIAN ASSISTANT
Payer: MEDICARE

## 2019-08-22 ENCOUNTER — HOSPITAL ENCOUNTER (INPATIENT)
Age: 79
LOS: 6 days | Discharge: HOME HEALTH | DRG: 470 | End: 2019-08-28
Attending: ORTHOPAEDIC SURGERY | Admitting: ORTHOPAEDIC SURGERY
Payer: MEDICARE

## 2019-08-22 DIAGNOSIS — M17.11 ARTHRITIS OF RIGHT KNEE: Primary | ICD-10-CM

## 2019-08-22 LAB
ABO + RH BLD: NORMAL
ANION GAP SERPL CALC-SCNC: 7 MMOL/L (ref 5–15)
BLOOD GROUP ANTIBODIES SERPL: NORMAL
BUN SERPL-MCNC: 62 MG/DL (ref 6–20)
BUN/CREAT SERPL: 33 (ref 12–20)
CALCIUM SERPL-MCNC: 9.3 MG/DL (ref 8.5–10.1)
CHLORIDE SERPL-SCNC: 121 MMOL/L (ref 97–108)
CO2 SERPL-SCNC: 15 MMOL/L (ref 21–32)
CREAT SERPL-MCNC: 1.86 MG/DL (ref 0.55–1.02)
GLUCOSE SERPL-MCNC: 103 MG/DL (ref 65–100)
POTASSIUM SERPL-SCNC: 5.4 MMOL/L (ref 3.5–5.1)
SODIUM SERPL-SCNC: 143 MMOL/L (ref 136–145)
SPECIMEN EXP DATE BLD: NORMAL

## 2019-08-22 PROCEDURE — 74011250637 HC RX REV CODE- 250/637: Performed by: ORTHOPAEDIC SURGERY

## 2019-08-22 PROCEDURE — 74011250636 HC RX REV CODE- 250/636: Performed by: NURSE PRACTITIONER

## 2019-08-22 PROCEDURE — 77030020263 HC SOL INJ SOD CL0.9% LFCR 1000ML: Performed by: ORTHOPAEDIC SURGERY

## 2019-08-22 PROCEDURE — 76210000035 HC AMBSU PH I REC 1 TO 1.5 HR: Performed by: ORTHOPAEDIC SURGERY

## 2019-08-22 PROCEDURE — 64445 NJX AA&/STRD SCIATIC NRV IMG: CPT

## 2019-08-22 PROCEDURE — 76030000021 HC AMB SURG 2 TO 2.5 HR INTENSV-TIER 1: Performed by: ORTHOPAEDIC SURGERY

## 2019-08-22 PROCEDURE — 74011000272 HC RX REV CODE- 272: Performed by: ORTHOPAEDIC SURGERY

## 2019-08-22 PROCEDURE — 86900 BLOOD TYPING SEROLOGIC ABO: CPT

## 2019-08-22 PROCEDURE — 80048 BASIC METABOLIC PNL TOTAL CA: CPT

## 2019-08-22 PROCEDURE — 77030028907 HC WRP KNEE WO BGS SOLM -B

## 2019-08-22 PROCEDURE — 77030018723 HC ELCTRD BLD COVD -A: Performed by: ORTHOPAEDIC SURGERY

## 2019-08-22 PROCEDURE — 65270000029 HC RM PRIVATE

## 2019-08-22 PROCEDURE — 74011250636 HC RX REV CODE- 250/636: Performed by: ANESTHESIOLOGY

## 2019-08-22 PROCEDURE — 74011250636 HC RX REV CODE- 250/636: Performed by: ORTHOPAEDIC SURGERY

## 2019-08-22 PROCEDURE — 74011250636 HC RX REV CODE- 250/636

## 2019-08-22 PROCEDURE — 74011000258 HC RX REV CODE- 258: Performed by: NURSE PRACTITIONER

## 2019-08-22 PROCEDURE — 77030018836 HC SOL IRR NACL ICUM -A: Performed by: ORTHOPAEDIC SURGERY

## 2019-08-22 PROCEDURE — 76060000064 HC AMB SURG ANES 2 TO 2.5 HR: Performed by: ORTHOPAEDIC SURGERY

## 2019-08-22 PROCEDURE — 64447 NJX AA&/STRD FEMORAL NRV IMG: CPT

## 2019-08-22 PROCEDURE — 77030006835 HC BLD SAW SAG STRY -B: Performed by: ORTHOPAEDIC SURGERY

## 2019-08-22 PROCEDURE — 77030039266 HC ADH SKN EXOFIN S2SG -A: Performed by: ORTHOPAEDIC SURGERY

## 2019-08-22 PROCEDURE — 77030002933 HC SUT MCRYL J&J -A: Performed by: ORTHOPAEDIC SURGERY

## 2019-08-22 PROCEDURE — 74011250637 HC RX REV CODE- 250/637: Performed by: ANESTHESIOLOGY

## 2019-08-22 PROCEDURE — 73560 X-RAY EXAM OF KNEE 1 OR 2: CPT

## 2019-08-22 PROCEDURE — 74011250636 HC RX REV CODE- 250/636: Performed by: PHYSICIAN ASSISTANT

## 2019-08-22 PROCEDURE — 77030012935 HC DRSG AQUACEL BMS -B: Performed by: ORTHOPAEDIC SURGERY

## 2019-08-22 PROCEDURE — 77030007866 HC KT SPN ANES BBMI -B

## 2019-08-22 PROCEDURE — 77030038149 HC BLD SAW SAG STRY -D: Performed by: ORTHOPAEDIC SURGERY

## 2019-08-22 PROCEDURE — 77030029820: Performed by: ORTHOPAEDIC SURGERY

## 2019-08-22 PROCEDURE — 36415 COLL VENOUS BLD VENIPUNCTURE: CPT

## 2019-08-22 PROCEDURE — 0SRC0JA REPLACEMENT OF RIGHT KNEE JOINT WITH SYNTHETIC SUBSTITUTE, UNCEMENTED, OPEN APPROACH: ICD-10-PCS | Performed by: ORTHOPAEDIC SURGERY

## 2019-08-22 PROCEDURE — 77030011640 HC PAD GRND REM COVD -A: Performed by: ORTHOPAEDIC SURGERY

## 2019-08-22 PROCEDURE — 77030000032 HC CUF TRNQT ZIMM -B: Performed by: ORTHOPAEDIC SURGERY

## 2019-08-22 PROCEDURE — 74011250636 HC RX REV CODE- 250/636: Performed by: NURSE ANESTHETIST, CERTIFIED REGISTERED

## 2019-08-22 PROCEDURE — 77030031139 HC SUT VCRL2 J&J -A: Performed by: ORTHOPAEDIC SURGERY

## 2019-08-22 PROCEDURE — 74011000250 HC RX REV CODE- 250: Performed by: ORTHOPAEDIC SURGERY

## 2019-08-22 PROCEDURE — 77030029828 HC FEM TIB CKPNT KT DISP STRY -B: Performed by: ORTHOPAEDIC SURGERY

## 2019-08-22 PROCEDURE — 74011000250 HC RX REV CODE- 250: Performed by: NURSE ANESTHETIST, CERTIFIED REGISTERED

## 2019-08-22 PROCEDURE — 77030035236 HC SUT PDS STRATFX BARB J&J -B: Performed by: ORTHOPAEDIC SURGERY

## 2019-08-22 PROCEDURE — 74011250637 HC RX REV CODE- 250/637: Performed by: PHYSICIAN ASSISTANT

## 2019-08-22 PROCEDURE — 74011000258 HC RX REV CODE- 258: Performed by: ORTHOPAEDIC SURGERY

## 2019-08-22 PROCEDURE — 74011000250 HC RX REV CODE- 250

## 2019-08-22 PROCEDURE — C1713 ANCHOR/SCREW BN/BN,TIS/BN: HCPCS | Performed by: ORTHOPAEDIC SURGERY

## 2019-08-22 PROCEDURE — 77030003601 HC NDL NRV BLK BBMI -A

## 2019-08-22 PROCEDURE — C1776 JOINT DEVICE (IMPLANTABLE): HCPCS | Performed by: ORTHOPAEDIC SURGERY

## 2019-08-22 PROCEDURE — 8E0Y0CZ ROBOTIC ASSISTED PROCEDURE OF LOWER EXTREMITY, OPEN APPROACH: ICD-10-PCS | Performed by: ORTHOPAEDIC SURGERY

## 2019-08-22 PROCEDURE — 77030018673: Performed by: ORTHOPAEDIC SURGERY

## 2019-08-22 PROCEDURE — 77030013708 HC HNDPC SUC IRR PULS STRY –B: Performed by: ORTHOPAEDIC SURGERY

## 2019-08-22 DEVICE — CRUCIATE RETAINING FEMORAL
Type: IMPLANTABLE DEVICE | Site: KNEE | Status: FUNCTIONAL
Brand: TRIATHLON

## 2019-08-22 DEVICE — TIBIAL BEARING INSERT - CR
Type: IMPLANTABLE DEVICE | Site: KNEE | Status: FUNCTIONAL
Brand: TRIATHLON

## 2019-08-22 DEVICE — COMPONENT TOT KNEE HYBRID POROUS X3 TRIATHLON: Type: IMPLANTABLE DEVICE | Status: FUNCTIONAL

## 2019-08-22 DEVICE — PATELLA
Type: IMPLANTABLE DEVICE | Site: KNEE | Status: FUNCTIONAL
Brand: TRIATHLON

## 2019-08-22 DEVICE — TIBIAL COMPONENT
Type: IMPLANTABLE DEVICE | Site: KNEE | Status: FUNCTIONAL
Brand: TRIATHLON

## 2019-08-22 RX ORDER — ONDANSETRON 2 MG/ML
4 INJECTION INTRAMUSCULAR; INTRAVENOUS
Status: ACTIVE | OUTPATIENT
Start: 2019-08-22 | End: 2019-08-23

## 2019-08-22 RX ORDER — DIPHENHYDRAMINE HYDROCHLORIDE 50 MG/ML
12.5 INJECTION, SOLUTION INTRAMUSCULAR; INTRAVENOUS
Status: ACTIVE | OUTPATIENT
Start: 2019-08-22 | End: 2019-08-23

## 2019-08-22 RX ORDER — CEFAZOLIN SODIUM/WATER 2 G/20 ML
2 SYRINGE (ML) INTRAVENOUS EVERY 8 HOURS
Status: COMPLETED | OUTPATIENT
Start: 2019-08-22 | End: 2019-08-23

## 2019-08-22 RX ORDER — AMOXICILLIN 250 MG
1 CAPSULE ORAL 2 TIMES DAILY
Status: DISCONTINUED | OUTPATIENT
Start: 2019-08-22 | End: 2019-08-28 | Stop reason: HOSPADM

## 2019-08-22 RX ORDER — SODIUM CHLORIDE 0.9 % (FLUSH) 0.9 %
5-40 SYRINGE (ML) INJECTION AS NEEDED
Status: DISCONTINUED | OUTPATIENT
Start: 2019-08-22 | End: 2019-08-28 | Stop reason: HOSPADM

## 2019-08-22 RX ORDER — AMLODIPINE BESYLATE 5 MG/1
5 TABLET ORAL DAILY
Status: DISCONTINUED | OUTPATIENT
Start: 2019-08-23 | End: 2019-08-23

## 2019-08-22 RX ORDER — LIDOCAINE HYDROCHLORIDE 10 MG/ML
0.1 INJECTION, SOLUTION EPIDURAL; INFILTRATION; INTRACAUDAL; PERINEURAL AS NEEDED
Status: DISCONTINUED | OUTPATIENT
Start: 2019-08-22 | End: 2019-08-22 | Stop reason: HOSPADM

## 2019-08-22 RX ORDER — FACIAL-BODY WIPES
10 EACH TOPICAL DAILY PRN
Status: DISCONTINUED | OUTPATIENT
Start: 2019-08-24 | End: 2019-08-28 | Stop reason: HOSPADM

## 2019-08-22 RX ORDER — CARBOXYMETHYLCELLULOSE SODIUM 10 MG/ML
1 GEL OPHTHALMIC
Status: DISCONTINUED | OUTPATIENT
Start: 2019-08-22 | End: 2019-08-28 | Stop reason: HOSPADM

## 2019-08-22 RX ORDER — IBUPROFEN 800 MG/1
800 TABLET ORAL
Qty: 50 TAB | Refills: 2 | Status: SHIPPED | OUTPATIENT
Start: 2019-08-22 | End: 2019-08-27

## 2019-08-22 RX ORDER — ACETAMINOPHEN 500 MG
500-1000 TABLET ORAL
Qty: 60 TAB | Refills: 0 | Status: SHIPPED | OUTPATIENT
Start: 2019-08-22 | End: 2019-08-28 | Stop reason: SDUPTHER

## 2019-08-22 RX ORDER — PRAVASTATIN SODIUM 20 MG/1
40 TABLET ORAL
Status: DISCONTINUED | OUTPATIENT
Start: 2019-08-22 | End: 2019-08-28 | Stop reason: HOSPADM

## 2019-08-22 RX ORDER — SODIUM CHLORIDE 9 MG/ML
125 INJECTION, SOLUTION INTRAVENOUS CONTINUOUS
Status: DISPENSED | OUTPATIENT
Start: 2019-08-22 | End: 2019-08-23

## 2019-08-22 RX ORDER — MIDAZOLAM HYDROCHLORIDE 1 MG/ML
INJECTION, SOLUTION INTRAMUSCULAR; INTRAVENOUS AS NEEDED
Status: DISCONTINUED | OUTPATIENT
Start: 2019-08-22 | End: 2019-08-22 | Stop reason: HOSPADM

## 2019-08-22 RX ORDER — EPHEDRINE SULFATE/0.9% NACL/PF 50 MG/5 ML
SYRINGE (ML) INTRAVENOUS AS NEEDED
Status: DISCONTINUED | OUTPATIENT
Start: 2019-08-22 | End: 2019-08-22 | Stop reason: HOSPADM

## 2019-08-22 RX ORDER — SODIUM CHLORIDE 0.9 % (FLUSH) 0.9 %
5-40 SYRINGE (ML) INJECTION EVERY 8 HOURS
Status: DISCONTINUED | OUTPATIENT
Start: 2019-08-22 | End: 2019-08-22 | Stop reason: HOSPADM

## 2019-08-22 RX ORDER — ASPIRIN 81 MG/1
81 TABLET ORAL 2 TIMES DAILY
Qty: 60 TAB | Refills: 0 | Status: SHIPPED | OUTPATIENT
Start: 2019-08-22 | End: 2019-08-28

## 2019-08-22 RX ORDER — CARVEDILOL 3.12 MG/1
3.12 TABLET ORAL 2 TIMES DAILY WITH MEALS
Status: DISCONTINUED | OUTPATIENT
Start: 2019-08-22 | End: 2019-08-24

## 2019-08-22 RX ORDER — POLYETHYLENE GLYCOL 3350 17 G/17G
17 POWDER, FOR SOLUTION ORAL DAILY
Status: DISCONTINUED | OUTPATIENT
Start: 2019-08-23 | End: 2019-08-28 | Stop reason: HOSPADM

## 2019-08-22 RX ORDER — PREGABALIN 75 MG/1
75 CAPSULE ORAL ONCE
Status: COMPLETED | OUTPATIENT
Start: 2019-08-22 | End: 2019-08-22

## 2019-08-22 RX ORDER — SODIUM CHLORIDE 0.9 % (FLUSH) 0.9 %
5-40 SYRINGE (ML) INJECTION AS NEEDED
Status: DISCONTINUED | OUTPATIENT
Start: 2019-08-22 | End: 2019-08-22 | Stop reason: HOSPADM

## 2019-08-22 RX ORDER — ACETAMINOPHEN 325 MG/1
975 TABLET ORAL ONCE
Status: COMPLETED | OUTPATIENT
Start: 2019-08-22 | End: 2019-08-22

## 2019-08-22 RX ORDER — HYDROMORPHONE HYDROCHLORIDE 1 MG/ML
0.5 INJECTION, SOLUTION INTRAMUSCULAR; INTRAVENOUS; SUBCUTANEOUS
Status: DISPENSED | OUTPATIENT
Start: 2019-08-22 | End: 2019-08-23

## 2019-08-22 RX ORDER — POVIDONE-IODINE 10 %
SOLUTION, NON-ORAL TOPICAL AS NEEDED
Status: DISCONTINUED | OUTPATIENT
Start: 2019-08-22 | End: 2019-08-22 | Stop reason: HOSPADM

## 2019-08-22 RX ORDER — MONTELUKAST SODIUM 10 MG/1
10 TABLET ORAL DAILY
Status: DISCONTINUED | OUTPATIENT
Start: 2019-08-22 | End: 2019-08-28 | Stop reason: HOSPADM

## 2019-08-22 RX ORDER — SODIUM CHLORIDE 0.9 % (FLUSH) 0.9 %
5-40 SYRINGE (ML) INJECTION EVERY 8 HOURS
Status: DISCONTINUED | OUTPATIENT
Start: 2019-08-22 | End: 2019-08-28 | Stop reason: HOSPADM

## 2019-08-22 RX ORDER — FENTANYL CITRATE 50 UG/ML
INJECTION, SOLUTION INTRAMUSCULAR; INTRAVENOUS AS NEEDED
Status: DISCONTINUED | OUTPATIENT
Start: 2019-08-22 | End: 2019-08-22 | Stop reason: HOSPADM

## 2019-08-22 RX ORDER — SODIUM CHLORIDE, SODIUM LACTATE, POTASSIUM CHLORIDE, CALCIUM CHLORIDE 600; 310; 30; 20 MG/100ML; MG/100ML; MG/100ML; MG/100ML
100 INJECTION, SOLUTION INTRAVENOUS CONTINUOUS
Status: DISCONTINUED | OUTPATIENT
Start: 2019-08-22 | End: 2019-08-22 | Stop reason: HOSPADM

## 2019-08-22 RX ORDER — FENTANYL CITRATE 50 UG/ML
25 INJECTION, SOLUTION INTRAMUSCULAR; INTRAVENOUS
Status: DISCONTINUED | OUTPATIENT
Start: 2019-08-22 | End: 2019-08-22 | Stop reason: HOSPADM

## 2019-08-22 RX ORDER — ONDANSETRON 2 MG/ML
4 INJECTION INTRAMUSCULAR; INTRAVENOUS AS NEEDED
Status: DISCONTINUED | OUTPATIENT
Start: 2019-08-22 | End: 2019-08-22 | Stop reason: HOSPADM

## 2019-08-22 RX ORDER — HYDROMORPHONE HYDROCHLORIDE 1 MG/ML
.25-1 INJECTION, SOLUTION INTRAMUSCULAR; INTRAVENOUS; SUBCUTANEOUS
Status: DISCONTINUED | OUTPATIENT
Start: 2019-08-22 | End: 2019-08-22 | Stop reason: HOSPADM

## 2019-08-22 RX ORDER — FAMOTIDINE 20 MG/1
20 TABLET, FILM COATED ORAL DAILY
Status: DISCONTINUED | OUTPATIENT
Start: 2019-08-23 | End: 2019-08-28 | Stop reason: HOSPADM

## 2019-08-22 RX ORDER — ONDANSETRON 8 MG/1
4 TABLET, ORALLY DISINTEGRATING ORAL
Qty: 30 TAB | Refills: 0 | Status: SHIPPED | OUTPATIENT
Start: 2019-08-22 | End: 2019-08-28 | Stop reason: SDUPTHER

## 2019-08-22 RX ORDER — GABAPENTIN 300 MG/1
300 CAPSULE ORAL
Status: DISCONTINUED | OUTPATIENT
Start: 2019-08-22 | End: 2019-08-28 | Stop reason: HOSPADM

## 2019-08-22 RX ORDER — DIPHENHYDRAMINE HYDROCHLORIDE 50 MG/ML
12.5 INJECTION, SOLUTION INTRAMUSCULAR; INTRAVENOUS AS NEEDED
Status: DISCONTINUED | OUTPATIENT
Start: 2019-08-22 | End: 2019-08-22 | Stop reason: HOSPADM

## 2019-08-22 RX ORDER — OXYCODONE HYDROCHLORIDE 5 MG/1
5 TABLET ORAL
Status: DISCONTINUED | OUTPATIENT
Start: 2019-08-22 | End: 2019-08-28 | Stop reason: HOSPADM

## 2019-08-22 RX ORDER — CEFAZOLIN SODIUM/WATER 2 G/20 ML
2 SYRINGE (ML) INTRAVENOUS ONCE
Status: COMPLETED | OUTPATIENT
Start: 2019-08-22 | End: 2019-08-22

## 2019-08-22 RX ORDER — BUPIVACAINE HYDROCHLORIDE 5 MG/ML
INJECTION, SOLUTION EPIDURAL; INTRACAUDAL AS NEEDED
Status: DISCONTINUED | OUTPATIENT
Start: 2019-08-22 | End: 2019-08-22 | Stop reason: HOSPADM

## 2019-08-22 RX ORDER — PROPOFOL 10 MG/ML
INJECTION, EMULSION INTRAVENOUS
Status: DISCONTINUED | OUTPATIENT
Start: 2019-08-22 | End: 2019-08-22 | Stop reason: HOSPADM

## 2019-08-22 RX ORDER — OXYCODONE HYDROCHLORIDE 5 MG/1
10 TABLET ORAL
Status: DISCONTINUED | OUTPATIENT
Start: 2019-08-22 | End: 2019-08-28 | Stop reason: HOSPADM

## 2019-08-22 RX ORDER — NALOXONE HYDROCHLORIDE 0.4 MG/ML
0.4 INJECTION, SOLUTION INTRAMUSCULAR; INTRAVENOUS; SUBCUTANEOUS AS NEEDED
Status: DISCONTINUED | OUTPATIENT
Start: 2019-08-22 | End: 2019-08-28 | Stop reason: HOSPADM

## 2019-08-22 RX ORDER — ACETAMINOPHEN 325 MG/1
650 TABLET ORAL EVERY 6 HOURS
Status: DISCONTINUED | OUTPATIENT
Start: 2019-08-22 | End: 2019-08-28 | Stop reason: HOSPADM

## 2019-08-22 RX ORDER — ROPIVACAINE HYDROCHLORIDE 2 MG/ML
INJECTION, SOLUTION EPIDURAL; INFILTRATION; PERINEURAL AS NEEDED
Status: DISCONTINUED | OUTPATIENT
Start: 2019-08-22 | End: 2019-08-22 | Stop reason: HOSPADM

## 2019-08-22 RX ORDER — FAMOTIDINE 20 MG/1
20 TABLET, FILM COATED ORAL 2 TIMES DAILY
Status: DISCONTINUED | OUTPATIENT
Start: 2019-08-22 | End: 2019-08-22

## 2019-08-22 RX ORDER — SODIUM CHLORIDE, SODIUM LACTATE, POTASSIUM CHLORIDE, CALCIUM CHLORIDE 600; 310; 30; 20 MG/100ML; MG/100ML; MG/100ML; MG/100ML
75 INJECTION, SOLUTION INTRAVENOUS CONTINUOUS
Status: DISCONTINUED | OUTPATIENT
Start: 2019-08-22 | End: 2019-08-22 | Stop reason: HOSPADM

## 2019-08-22 RX ORDER — GABAPENTIN 100 MG/1
100 CAPSULE ORAL
Status: DISCONTINUED | OUTPATIENT
Start: 2019-08-23 | End: 2019-08-28 | Stop reason: HOSPADM

## 2019-08-22 RX ORDER — OXYCODONE HYDROCHLORIDE 5 MG/1
5-10 TABLET ORAL
Qty: 42 TAB | Refills: 0 | Status: SHIPPED | OUTPATIENT
Start: 2019-08-22 | End: 2019-08-28 | Stop reason: SDUPTHER

## 2019-08-22 RX ORDER — TERAZOSIN 5 MG/1
5 CAPSULE ORAL
Status: DISCONTINUED | OUTPATIENT
Start: 2019-08-22 | End: 2019-08-28 | Stop reason: HOSPADM

## 2019-08-22 RX ORDER — FLUTICASONE PROPIONATE 50 MCG
1 SPRAY, SUSPENSION (ML) NASAL
Status: DISCONTINUED | OUTPATIENT
Start: 2019-08-22 | End: 2019-08-28 | Stop reason: HOSPADM

## 2019-08-22 RX ORDER — ASPIRIN 81 MG/1
81 TABLET ORAL 2 TIMES DAILY
Status: DISCONTINUED | OUTPATIENT
Start: 2019-08-22 | End: 2019-08-28 | Stop reason: HOSPADM

## 2019-08-22 RX ORDER — OXYCODONE HYDROCHLORIDE 5 MG/1
5 TABLET ORAL
Status: DISCONTINUED | OUTPATIENT
Start: 2019-08-22 | End: 2019-08-22 | Stop reason: HOSPADM

## 2019-08-22 RX ORDER — SODIUM CHLORIDE, SODIUM LACTATE, POTASSIUM CHLORIDE, CALCIUM CHLORIDE 600; 310; 30; 20 MG/100ML; MG/100ML; MG/100ML; MG/100ML
INJECTION, SOLUTION INTRAVENOUS
Status: DISCONTINUED | OUTPATIENT
Start: 2019-08-22 | End: 2019-08-22 | Stop reason: HOSPADM

## 2019-08-22 RX ORDER — SODIUM CHLORIDE, SODIUM LACTATE, POTASSIUM CHLORIDE, CALCIUM CHLORIDE 600; 310; 30; 20 MG/100ML; MG/100ML; MG/100ML; MG/100ML
125 INJECTION, SOLUTION INTRAVENOUS CONTINUOUS
Status: DISCONTINUED | OUTPATIENT
Start: 2019-08-22 | End: 2019-08-22 | Stop reason: HOSPADM

## 2019-08-22 RX ORDER — LEVETIRACETAM 250 MG/1
500 TABLET ORAL 2 TIMES DAILY
Status: DISCONTINUED | OUTPATIENT
Start: 2019-08-22 | End: 2019-08-28 | Stop reason: HOSPADM

## 2019-08-22 RX ADMIN — Medication 10 MG: at 14:36

## 2019-08-22 RX ADMIN — PREGABALIN 75 MG: 75 CAPSULE ORAL at 12:18

## 2019-08-22 RX ADMIN — GABAPENTIN 300 MG: 300 CAPSULE ORAL at 22:16

## 2019-08-22 RX ADMIN — ROPIVACAINE HYDROCHLORIDE 20 ML: 2 INJECTION, SOLUTION EPIDURAL; INFILTRATION; PERINEURAL at 12:52

## 2019-08-22 RX ADMIN — Medication 2 G: at 13:41

## 2019-08-22 RX ADMIN — FAMOTIDINE 20 MG: 20 TABLET ORAL at 17:52

## 2019-08-22 RX ADMIN — SODIUM CHLORIDE, POTASSIUM CHLORIDE, SODIUM LACTATE AND CALCIUM CHLORIDE: 600; 310; 30; 20 INJECTION, SOLUTION INTRAVENOUS at 14:58

## 2019-08-22 RX ADMIN — Medication 2 G: at 20:08

## 2019-08-22 RX ADMIN — FLUTICASONE PROPIONATE 1 SPRAY: 50 SPRAY, METERED NASAL at 22:16

## 2019-08-22 RX ADMIN — MIDAZOLAM HYDROCHLORIDE 2 MG: 1 INJECTION, SOLUTION INTRAMUSCULAR; INTRAVENOUS at 12:48

## 2019-08-22 RX ADMIN — ACETAMINOPHEN 650 MG: 325 TABLET ORAL at 17:52

## 2019-08-22 RX ADMIN — ACETAMINOPHEN 975 MG: 325 TABLET ORAL at 12:18

## 2019-08-22 RX ADMIN — SODIUM CHLORIDE 125 ML/HR: 900 INJECTION, SOLUTION INTRAVENOUS at 16:49

## 2019-08-22 RX ADMIN — SODIUM CHLORIDE 500 MG: 900 INJECTION, SOLUTION INTRAVENOUS at 12:31

## 2019-08-22 RX ADMIN — Medication 10 ML: at 22:15

## 2019-08-22 RX ADMIN — FENTANYL CITRATE 50 MCG: 50 INJECTION, SOLUTION INTRAMUSCULAR; INTRAVENOUS at 12:57

## 2019-08-22 RX ADMIN — ROPIVACAINE HYDROCHLORIDE 20 ML: 2 INJECTION, SOLUTION EPIDURAL; INFILTRATION; PERINEURAL at 12:55

## 2019-08-22 RX ADMIN — MONTELUKAST SODIUM 10 MG: 10 TABLET, COATED ORAL at 20:08

## 2019-08-22 RX ADMIN — TERAZOSIN HYDROCHLORIDE 5 MG: 5 CAPSULE ORAL at 22:16

## 2019-08-22 RX ADMIN — ASPIRIN 81 MG: 81 TABLET, COATED ORAL at 17:52

## 2019-08-22 RX ADMIN — Medication 10 MG: at 14:12

## 2019-08-22 RX ADMIN — SODIUM CHLORIDE, SODIUM LACTATE, POTASSIUM CHLORIDE, AND CALCIUM CHLORIDE 100 ML/HR: 600; 310; 30; 20 INJECTION, SOLUTION INTRAVENOUS at 12:19

## 2019-08-22 RX ADMIN — LEVETIRACETAM 500 MG: 500 TABLET ORAL at 20:08

## 2019-08-22 RX ADMIN — PROPOFOL 100 MCG/KG/MIN: 10 INJECTION, EMULSION INTRAVENOUS at 13:38

## 2019-08-22 RX ADMIN — BUPIVACAINE HYDROCHLORIDE 2 ML: 5 INJECTION, SOLUTION EPIDURAL; INTRACAUDAL; PERINEURAL at 13:04

## 2019-08-22 RX ADMIN — FENTANYL CITRATE 50 MCG: 50 INJECTION, SOLUTION INTRAMUSCULAR; INTRAVENOUS at 12:48

## 2019-08-22 RX ADMIN — Medication 10 ML: at 17:53

## 2019-08-22 RX ADMIN — PRAVASTATIN SODIUM 40 MG: 20 TABLET ORAL at 22:16

## 2019-08-22 RX ADMIN — SODIUM CHLORIDE, POTASSIUM CHLORIDE, SODIUM LACTATE AND CALCIUM CHLORIDE: 600; 310; 30; 20 INJECTION, SOLUTION INTRAVENOUS at 13:24

## 2019-08-22 RX ADMIN — SENNOSIDES,DOCUSATE SODIUM 1 TABLET: 8.6; 5 TABLET, FILM COATED ORAL at 17:52

## 2019-08-22 NOTE — ANESTHESIA PREPROCEDURE EVALUATION
Relevant Problems   No relevant active problems       Anesthetic History   No history of anesthetic complications            Review of Systems / Medical History  Patient summary reviewed, nursing notes reviewed and pertinent labs reviewed    Pulmonary        Sleep apnea: CPAP           Neuro/Psych     seizures  CVA       Cardiovascular    Hypertension          Hyperlipidemia         GI/Hepatic/Renal  Within defined limits              Endo/Other        Arthritis     Other Findings              Physical Exam    Airway  Mallampati: III  TM Distance: 4 - 6 cm  Neck ROM: normal range of motion   Mouth opening: Normal     Cardiovascular    Rhythm: regular  Rate: normal         Dental    Dentition: Lower partial plate and Upper partial plate     Pulmonary  Breath sounds clear to auscultation               Abdominal  GI exam deferred       Other Findings            Anesthetic Plan    ASA: 3  Anesthesia type: spinal      Post-op pain plan if not by surgeon: peripheral nerve block single    Induction: Intravenous  Anesthetic plan and risks discussed with: Patient

## 2019-08-22 NOTE — PROGRESS NOTES
Bedside and Verbal shift change report given to Driss Barajas (oncoming nurse) by Val Petit (offgoing nurse). Report included the following information SBAR, Kardex, OR Summary, Intake/Output, MAR and Recent Results.

## 2019-08-22 NOTE — PROGRESS NOTES
Pharmacy Dosing Note    Ordered regimen: Famotidine 20 mg PO BID  New regimen: Change to famotidine 20 mg PO daily due to CrCl less than 50 mL/min per [facility] Renal Dosing Protocol. Estimated Creatinine Clearance: 24.5 mL/min (A) (based on SCr of 1.86 mg/dL (H)).     Thank you,  Kika Givens, PHARMD

## 2019-08-22 NOTE — PERIOP NOTES
1650  Pt off monitors for tranposrt to room 422. Family informed. All belongings snet. TRANSFER - OUT REPORT:    Verbal report given to Quoc Jackson on Salima Koenig  being transferred to [address] [place] [address] for routine post - op       Report consisted of patients Situation, Background, Assessment and   Recommendations(SBAR). Information from the following report(s) SBAR, OR Summary, Intake/Output and MAR was reviewed with the receiving nurse. Lines:   Peripheral IV 08/22/19 Left Forearm (Active)   Site Assessment Clean, dry, & intact 8/22/2019  3:45 PM   Phlebitis Assessment 0 8/22/2019  3:45 PM   Infiltration Assessment 0 8/22/2019  3:45 PM   Dressing Status Clean, dry, & intact 8/22/2019  3:45 PM   Dressing Type Transparent 8/22/2019  3:45 PM   Hub Color/Line Status Pink; Infusing 8/22/2019  3:45 PM   Alcohol Cap Used Yes 8/22/2019  3:45 PM        Opportunity for questions and clarification was provided.       Patient transported with:   Registered Nurse

## 2019-08-22 NOTE — DISCHARGE INSTRUCTIONS
TOTAL KNEE DISCHARGE INSTRUCTIONS      Patient: Palmer Tim MRN: [id number]  SSN: [id number]              Please take the time to review the following instructions before you leave the hospital and use them as guidelines during your recovery from surgery. If you have any questions you may contact my office at [phone]  After business hours or during the weekend you can contact me through [address] or text / call at [phone] (cell phone) for emergency's. Please use the office number during regular business hours. SPECIAL INSTRUCTIONS :   1. Full extension at the knee is the most important aspect of your range of motion. Avoid placing a pillow or bump behind the knee. Rather, place the heel up on a bump or pillow and allow gravity to help straighten the knee. 2. You may weight bear as tolerated on the knee and during the day you should bend the knee as much as possible. 3. Drainage from the incision more than 4 days from surgery is concerning. Contact my office if there is any question [phone].   4. You may contact me directly through [employer] if there are specific questions or text / call using my cell number [phone]. DRESSING :     AQUACEL Dressings : This should be removed by physical therapy or you may remove this yourself 7 days after the date of your surgery. If there is no drainage, then a simple dressing may be used or no dressing at all. Other dressing options can be purchased over the counter at a local pharmacy or medical supply vendor. A porous adhesive dressing such as pictured above can be purchased online ([employer]) or at your local [facility] or [employer]. You only need to keep the incision covered for 7 days after showers. A dressing may be used for longer if there are issues with clothing clinging to the incision. Showering/ Bathing: You may shower with the Silverlon dressing in place.  This is left in place for 7 days following discharge from the hospital. If your incision is dry without drainage you may shower following your discharge home. After 7 days your dressing should be removed for showering. It is fine to have water run over the incision. Do not vigorously scrub your incision. Apply a clean, dry dressing after you have dried your incision. Do not take a bath or get into a swimming pool / [facility] Angel until you follow up with Dr. Crystal Radford. Do not soak your incision under water. If there is continued drainage or you are concerned contact Dr Muriel Kline office prior to showering [phone] ext [phone] . Diet:  You may advance to your regular diet as tolerated. Increase your clear liquid intake for the next 2-3 days. Medication:      1. You will be given prescriptions for pain medication when you are discharged from the hospital. The side effects of these medications can be substantial and the narcotic medications are not mandatory. You may substitute these medications with Tylenol or Alleve / Motrin. 2. Please use the medications as prescribed. Pain medications may cause constipation- Colace twice daily and Miralax one scoop daily while taking the narcotic medication should help prevent constipation. Please discuss with your local pharmacist regarding increasing this dosage if constipation persists. Other possible side effects of pain medication are dizziness, headache, nausea, vomiting, and urinary retention. Discontinue the pain medication if you develop itching, rash, shortness of breath, or difficulties swallowing. If these symptoms become severe or are not relieved by discontinuing the medication, you should seek immediate medical attention. 3. Refills of pain medication are authorized during office hours only (8 AM- 5 PM  Monday thru Friday). Many of these medication will require you or a family member to pick-up a physical prescription at the office.    4. Medications other than antiinflammatories will not be called into the pharmacy after business hours. 5. You may resume the medication(s) you were taking prior to your surgery. Narcotics may change the effects of some antidepressant medication(s). If you have any questions about possible interactions between your regular medications and the pain medication, you should ask the pharmacist or contact the prescribing physician. 6. If you have constipation which is not improved by oral stool softeners then a Ducolax suppository should be purchased over the counter. 7. Continue the blood thinner (Aspirin or Lovenox) for a total of 30 days following surgery. Follow up appointment:    Please call our office at [phone] for your follow up appointment. This should be scheduled 14 days following the date of surgery. You should also have a scheduled appointment for physical therapy following surgery. Physical Therapy / Nursing:    Physical Therapy following surgery will be arranged as an outpatient or at home. They have specific instructions for rehab and wound care. It is fine to have physical therapy remove your dressing at 7 days following surgery. Returning to work:    Normal return to work is 6-12 weeks following surgery. Depending on your progression following surgery and specific job duties you may take longer for a full return to work. DRIVING    You should not return to driving until you are off all opioid pain medications and able to safely and quickly apply the brakes. This is normally 2-6 weeks for left sided surgery and 2-8 weeks for right sided surgery. Important Signs and Symptoms:    If any of the following signs or symptoms occur, you should contact Dr. Marga Fischer office.   Please be advised if a problem arises which you feel requires immediate medical attention or you are unable to contact Dr. Marga Fischer office you should seek immediate medical attention at the ER or other health care facility you have access to.    1. A sudden increase in swelling and/or redness or warmth at the area your surgery was performed which isnt relieved by rest, ice, and elevation. 2. Oral temperature greater than 101 degrees for 12 hours or more which isnt relieved by an increase in fluid intake and taking 2 Tylenol every 4-6 hours. 3. Excessive drainage from your incisions, or drainage which hasnt stopped by 72 hours after your surgery. 4. Fever, chills, shortness of breath, chest pain, nausea, vomiting or other signs and symptoms which are of concern to you. FREQUENTLY ASKED QUESTIONS   What should I take for pain?  o In general you will be discharged with three medications for pain (Extra Strength Tylenol, Oxycodone 5mg and Tramadol). Gabapentin is also used for pain and taken as directed (100mg in the am and 300mg prior to bed at night). This may vary slightly depending on what you were taking in the hospital. USE ICE regularly, this is the most important modality for controlling pain. Scheduled Medications :      1. Tylenol 1 tablet (500mg) to 2 tablets (1000mg) every 4 hours. This should not exceed 4000mg in a 24 hour period. 2. Ibuprofen 800mg every 8 hours x 30 days. 3. Gabapentin 1 tablet (100 mg) in the morning with breakfast and 3 tablets (300 mg) at night before bed. Once you decide to discontinue taking this medication, it should be tapered over a 7 day time period. Break through Pain Medications :       1st Line - Oxycodone 1 (5mg) - 2 (10mg) every 4 hours (Or as directed), take these between Tylenol / Ibuprofen doses if your pain is not below 4 / 10. This may be needed only for several days following your discharge. Extra Strength Tylenol 1-2 tablets (500-1000mg) every 4-6 hrs. 2nd Line - Tramadol 50mg Every 8 hours for pain if not improving with the Tylenol and Oxycodone.               When should I call for advice regarding my pain?  o After 12 hours on the above regimen, if nothing is working call the office ) [phone], contact me through [employer] or text / call my cell after hours [phone].  Can I get refills?  o Opioid refills are provided for the first 6 weeks following surgery. o Try Tylenol 500- 1000 mg along with Alleve 220-440mg (every 12 hours) or Motrin 200-800mg (every 6-8 hours) during the majority of the day. - Save the opioid pain medications for physical therapy (1 hour prior) and before sleeping at night. - Keep in mind you need to discontinue these medications prior to returning to driving.  Is swelling normal?  o Almost everyone has some degree of swelling following surgery. o Following hip and knee replacement surgery, swelling can be normal below the incision for the first 1-2 weeks. - This swelling peaks around 5-7 days after surgery.   - You may have some bruising around the back of the thigh, calf and even into the foot. - Use ice daily   What should I do for the swelling?  o Ice, Ice, Ice  o Keep the limb elevated. o Apply compression socks (knee high for total knees and up to the mid-thigh for total hips.   o Heat may also be applied, choose the modality that makes you the most comfortable.  How long should I remain on blood thinners following surgery?  o Thirty days   When can I drive?  o Once you have stopped using regular narcotic pain medications (Percocet, Lortab, etc.) and can safely apply the brakes without hesitation.  When can I shower? o 48-72 hours following surgery if the incision is dry.  o No submersion of the incision, bathing or swimming for 14 days following surgery or until cleared by Dr Faustina Lan.  What do I do with the dressing when I shower?  o The dressing can be removed after 7 days. o The incision is sealed with Dermabond (Biologic glue) and except for wounds which are draining should be watertight.  How active should I be following surgery?   o Progress activities in moderation at your own pace.   o Walk each day and set progressive goals with small increments (1st week - ½ block of walking, 2nd week - 1 block, 3rd week - 2 blocks, etc.)     Please do not hesitate to contact me through [employer] or by text / call me at [phone] (cell phone) for questions following surgery - MD Carlton Rodríguez MD  Cell [phone]  Danny Boyce [address] [phone]  Medical Assistants: [address]  [phone]

## 2019-08-22 NOTE — PROGRESS NOTES
Problem: Falls - Risk of  Goal: *Absence of Falls  Description  Document Giovana Late Fall Risk and appropriate interventions in the flowsheet.   Outcome: Progressing Towards Goal  Note:   Fall Risk Interventions:  Mobility Interventions: Assess mobility with egress test, Bed/chair exit alarm, Communicate number of staff needed for ambulation/transfer, OT consult for ADLs, Patient to call before getting OOB, PT Consult for mobility concerns, Strengthening exercises (ROM-active/passive), Utilize walker, cane, or other assistive device         Medication Interventions: Assess postural VS orthostatic hypotension, Bed/chair exit alarm, Evaluate medications/consider consulting pharmacy, Patient to call before getting OOB, Teach patient to arise slowly    Elimination Interventions: Bed/chair exit alarm, Elevated toilet seat, Call light in reach, Patient to call for help with toileting needs, Stay With Me (per policy), Toilet paper/wipes in reach, Toileting schedule/hourly rounds              Problem: Knee Replacement: Day of Surgery/Unit  Goal: Activity/Safety  Outcome: Progressing Towards Goal  Goal: Consults, if ordered  Outcome: Progressing Towards Goal  Goal: Nutrition/Diet  Outcome: Progressing Towards Goal  Goal: Medications  Outcome: Progressing Towards Goal  Goal: Respiratory  Outcome: Progressing Towards Goal  Goal: Treatments/Interventions/Procedures  Outcome: Progressing Towards Goal  Goal: Psychosocial  Outcome: Progressing Towards Goal  Goal: *Initiate mobility  Outcome: Progressing Towards Goal  Goal: *Optimal pain control at patient's stated goal  Outcome: Progressing Towards Goal  Goal: *Hemodynamically stable  Outcome: Progressing Towards Goal

## 2019-08-22 NOTE — ANESTHESIA PROCEDURE NOTES
Spinal Block    Start time: 8/22/2019 12:59 PM  End time: 8/22/2019 1:04 PM  Performed by: Brant Vasquez MD  Authorized by: Brant Vasquez MD     Pre-procedure:   Indications: at surgeon's request and primary anesthetic  Preanesthetic Checklist: patient identified, risks and benefits discussed, anesthesia consent, site marked, patient being monitored and timeout performed    Timeout Time: 12:59          Spinal Block:   Patient Position:  Seated  Prep Region:  Lumbar  Prep: chlorhexidine      Location:  L3-4  Technique:  Single shot        Needle:   Needle Type:  Pencil-tip  Needle Gauge:  25 G  Attempts:  1      Events: CSF confirmed, no blood with aspiration and no paresthesia        Assessment:  Insertion:  Uncomplicated  Patient tolerance:  Patient tolerated the procedure well with no immediate complications

## 2019-08-22 NOTE — ANESTHESIA PROCEDURE NOTES
Peripheral Block    Start time: 8/22/2019 12:48 PM  End time: 8/22/2019 12:55 PM  Performed by: Shekhar Lemus MD  Authorized by: Shekhar Lemus MD       Pre-procedure: Indications: at surgeon's request and post-op pain management    Preanesthetic Checklist: patient identified, risks and benefits discussed, site marked, timeout performed, anesthesia consent given and patient being monitored    Timeout Time: 12:48          Block Type:   Block Type:  Popliteal and femoral single shot  Laterality:  Right  Monitoring:  Continuous pulse ox, frequent vital sign checks, heart rate and responsive to questions  Injection Technique:  Single shot  Procedures: ultrasound guided and nerve stimulator    Patient Position: supine  Prep: chlorhexidine    Location:  Upper thigh  Needle Type:  Stimuplex  Needle Gauge:  21 G  Needle Localization:  Nerve stimulator and ultrasound guidance    Assessment:  Number of attempts:  1  Injection Assessment:  Incremental injection every 5 mL, local visualized surrounding nerve on ultrasound, negative aspiration for blood, no paresthesia and no intravascular symptoms  Patient tolerance:  Patient tolerated the procedure well with no immediate complications  Popliteal block done under ultrasound guidance and nerve stimulation with incremental injection of Ropivacaine 0.2% 20 cc using a 21 g Stimuplex needle.

## 2019-08-22 NOTE — ANESTHESIA POSTPROCEDURE EVALUATION
Procedure(s):  RIGHT TOTAL KNEE ARTHROPLASTY MAKOPLASTY. spinal    Anesthesia Post Evaluation      Multimodal analgesia: multimodal analgesia used between 6 hours prior to anesthesia start to PACU discharge  Patient location during evaluation: bedside  Patient participation: complete - patient participated  Level of consciousness: awake  Pain management: adequate  Airway patency: patent  Anesthetic complications: no  Cardiovascular status: acceptable  Respiratory status: acceptable  Hydration status: acceptable  Post anesthesia nausea and vomiting:  controlled      Vitals Value Taken Time   /63 8/22/2019  4:40 PM   Temp 36.4 °C (97.6 °F) 8/22/2019  4:25 PM   Pulse 61 8/22/2019  4:41 PM   Resp 19 8/22/2019  4:41 PM   SpO2 95 % 8/22/2019  4:43 PM   Vitals shown include unvalidated device data.

## 2019-08-23 LAB
ALBUMIN SERPL-MCNC: 2.7 G/DL (ref 3.5–5)
ALBUMIN/GLOB SERPL: 0.7 {RATIO} (ref 1.1–2.2)
ALP SERPL-CCNC: 90 U/L (ref 45–117)
ALT SERPL-CCNC: 83 U/L (ref 12–78)
ANION GAP SERPL CALC-SCNC: 7 MMOL/L (ref 5–15)
ANION GAP SERPL CALC-SCNC: 7 MMOL/L (ref 5–15)
AST SERPL-CCNC: 74 U/L (ref 15–37)
ATRIAL RATE: 61 BPM
BILIRUB SERPL-MCNC: 0.2 MG/DL (ref 0.2–1)
BUN SERPL-MCNC: 59 MG/DL (ref 6–20)
BUN SERPL-MCNC: 61 MG/DL (ref 6–20)
BUN/CREAT SERPL: 31 (ref 12–20)
BUN/CREAT SERPL: 37 (ref 12–20)
CALCIUM SERPL-MCNC: 8.2 MG/DL (ref 8.5–10.1)
CALCIUM SERPL-MCNC: 8.5 MG/DL (ref 8.5–10.1)
CALCULATED P AXIS, ECG09: 46 DEGREES
CALCULATED R AXIS, ECG10: 11 DEGREES
CALCULATED T AXIS, ECG11: 25 DEGREES
CHLORIDE SERPL-SCNC: 120 MMOL/L (ref 97–108)
CHLORIDE SERPL-SCNC: 121 MMOL/L (ref 97–108)
CO2 SERPL-SCNC: 13 MMOL/L (ref 21–32)
CO2 SERPL-SCNC: 14 MMOL/L (ref 21–32)
CREAT SERPL-MCNC: 1.67 MG/DL (ref 0.55–1.02)
CREAT SERPL-MCNC: 1.88 MG/DL (ref 0.55–1.02)
DIAGNOSIS, 93000: NORMAL
GLOBULIN SER CALC-MCNC: 3.8 G/DL (ref 2–4)
GLUCOSE SERPL-MCNC: 154 MG/DL (ref 65–100)
GLUCOSE SERPL-MCNC: 216 MG/DL (ref 65–100)
HGB BLD-MCNC: 10 G/DL (ref 11.5–16)
P-R INTERVAL, ECG05: 192 MS
POTASSIUM SERPL-SCNC: 5.7 MMOL/L (ref 3.5–5.1)
POTASSIUM SERPL-SCNC: 5.7 MMOL/L (ref 3.5–5.1)
POTASSIUM SERPL-SCNC: 6.1 MMOL/L (ref 3.5–5.1)
PROT SERPL-MCNC: 6.5 G/DL (ref 6.4–8.2)
Q-T INTERVAL, ECG07: 408 MS
QRS DURATION, ECG06: 92 MS
QTC CALCULATION (BEZET), ECG08: 410 MS
SODIUM SERPL-SCNC: 140 MMOL/L (ref 136–145)
SODIUM SERPL-SCNC: 142 MMOL/L (ref 136–145)
VENTRICULAR RATE, ECG03: 61 BPM

## 2019-08-23 PROCEDURE — 80053 COMPREHEN METABOLIC PANEL: CPT

## 2019-08-23 PROCEDURE — 85018 HEMOGLOBIN: CPT

## 2019-08-23 PROCEDURE — 84132 ASSAY OF SERUM POTASSIUM: CPT

## 2019-08-23 PROCEDURE — 36415 COLL VENOUS BLD VENIPUNCTURE: CPT

## 2019-08-23 PROCEDURE — 74011250637 HC RX REV CODE- 250/637: Performed by: PHYSICIAN ASSISTANT

## 2019-08-23 PROCEDURE — 74011250636 HC RX REV CODE- 250/636: Performed by: PHYSICIAN ASSISTANT

## 2019-08-23 PROCEDURE — 74011250637 HC RX REV CODE- 250/637: Performed by: FAMILY MEDICINE

## 2019-08-23 PROCEDURE — 97535 SELF CARE MNGMENT TRAINING: CPT

## 2019-08-23 PROCEDURE — 74011250637 HC RX REV CODE- 250/637: Performed by: ORTHOPAEDIC SURGERY

## 2019-08-23 PROCEDURE — 97116 GAIT TRAINING THERAPY: CPT

## 2019-08-23 PROCEDURE — 97165 OT EVAL LOW COMPLEX 30 MIN: CPT

## 2019-08-23 PROCEDURE — 97110 THERAPEUTIC EXERCISES: CPT

## 2019-08-23 PROCEDURE — 74011250636 HC RX REV CODE- 250/636: Performed by: FAMILY MEDICINE

## 2019-08-23 PROCEDURE — 93005 ELECTROCARDIOGRAM TRACING: CPT

## 2019-08-23 PROCEDURE — 97162 PT EVAL MOD COMPLEX 30 MIN: CPT

## 2019-08-23 PROCEDURE — 97530 THERAPEUTIC ACTIVITIES: CPT

## 2019-08-23 PROCEDURE — 65660000000 HC RM CCU STEPDOWN

## 2019-08-23 PROCEDURE — 77030038269 HC DRN EXT URIN PURWCK BARD -A

## 2019-08-23 RX ORDER — LABETALOL HCL 20 MG/4 ML
20 SYRINGE (ML) INTRAVENOUS
Status: DISCONTINUED | OUTPATIENT
Start: 2019-08-23 | End: 2019-08-28 | Stop reason: HOSPADM

## 2019-08-23 RX ORDER — LOSARTAN POTASSIUM 50 MG/1
100 TABLET ORAL DAILY
Status: DISCONTINUED | OUTPATIENT
Start: 2019-08-23 | End: 2019-08-24

## 2019-08-23 RX ORDER — FUROSEMIDE 10 MG/ML
10 INJECTION INTRAMUSCULAR; INTRAVENOUS ONCE
Status: COMPLETED | OUTPATIENT
Start: 2019-08-23 | End: 2019-08-23

## 2019-08-23 RX ORDER — AMLODIPINE BESYLATE 5 MG/1
10 TABLET ORAL DAILY
Status: DISCONTINUED | OUTPATIENT
Start: 2019-08-24 | End: 2019-08-28 | Stop reason: HOSPADM

## 2019-08-23 RX ORDER — AMLODIPINE BESYLATE 5 MG/1
5 TABLET ORAL ONCE
Status: COMPLETED | OUTPATIENT
Start: 2019-08-23 | End: 2019-08-23

## 2019-08-23 RX ORDER — HYDRALAZINE HYDROCHLORIDE 20 MG/ML
10 INJECTION INTRAMUSCULAR; INTRAVENOUS
Status: DISCONTINUED | OUTPATIENT
Start: 2019-08-23 | End: 2019-08-23

## 2019-08-23 RX ADMIN — FAMOTIDINE 20 MG: 20 TABLET ORAL at 08:39

## 2019-08-23 RX ADMIN — SENNOSIDES,DOCUSATE SODIUM 1 TABLET: 8.6; 5 TABLET, FILM COATED ORAL at 08:39

## 2019-08-23 RX ADMIN — GABAPENTIN 300 MG: 300 CAPSULE ORAL at 21:18

## 2019-08-23 RX ADMIN — ACETAMINOPHEN 650 MG: 325 TABLET ORAL at 11:04

## 2019-08-23 RX ADMIN — Medication 2 G: at 05:46

## 2019-08-23 RX ADMIN — ACETAMINOPHEN 650 MG: 325 TABLET ORAL at 05:45

## 2019-08-23 RX ADMIN — HYDRALAZINE HYDROCHLORIDE 10 MG: 20 INJECTION INTRAMUSCULAR; INTRAVENOUS at 11:49

## 2019-08-23 RX ADMIN — Medication 10 ML: at 05:46

## 2019-08-23 RX ADMIN — SENNOSIDES,DOCUSATE SODIUM 1 TABLET: 8.6; 5 TABLET, FILM COATED ORAL at 17:04

## 2019-08-23 RX ADMIN — ACETAMINOPHEN 650 MG: 325 TABLET ORAL at 17:04

## 2019-08-23 RX ADMIN — TERAZOSIN HYDROCHLORIDE 5 MG: 5 CAPSULE ORAL at 21:18

## 2019-08-23 RX ADMIN — LEVETIRACETAM 500 MG: 500 TABLET ORAL at 21:18

## 2019-08-23 RX ADMIN — OXYCODONE HYDROCHLORIDE 5 MG: 5 TABLET ORAL at 19:02

## 2019-08-23 RX ADMIN — ASPIRIN 81 MG: 81 TABLET, COATED ORAL at 08:39

## 2019-08-23 RX ADMIN — MONTELUKAST SODIUM 10 MG: 10 TABLET, COATED ORAL at 21:18

## 2019-08-23 RX ADMIN — Medication 10 ML: at 21:19

## 2019-08-23 RX ADMIN — LOSARTAN POTASSIUM 100 MG: 50 TABLET, FILM COATED ORAL at 21:18

## 2019-08-23 RX ADMIN — POLYETHYLENE GLYCOL 3350 17 G: 17 POWDER, FOR SOLUTION ORAL at 08:39

## 2019-08-23 RX ADMIN — AMLODIPINE BESYLATE 5 MG: 5 TABLET ORAL at 08:39

## 2019-08-23 RX ADMIN — CARVEDILOL 3.12 MG: 3.12 TABLET, FILM COATED ORAL at 08:39

## 2019-08-23 RX ADMIN — LABETALOL 20 MG/4 ML (5 MG/ML) INTRAVENOUS SYRINGE 20 MG: at 17:04

## 2019-08-23 RX ADMIN — ACETAMINOPHEN 650 MG: 325 TABLET ORAL at 00:41

## 2019-08-23 RX ADMIN — CARVEDILOL 3.12 MG: 3.12 TABLET, FILM COATED ORAL at 17:04

## 2019-08-23 RX ADMIN — GABAPENTIN 100 MG: 100 CAPSULE ORAL at 08:39

## 2019-08-23 RX ADMIN — ASPIRIN 81 MG: 81 TABLET, COATED ORAL at 17:04

## 2019-08-23 RX ADMIN — SODIUM CHLORIDE 125 ML/HR: 900 INJECTION, SOLUTION INTRAVENOUS at 03:15

## 2019-08-23 RX ADMIN — FUROSEMIDE 10 MG: 10 INJECTION, SOLUTION INTRAMUSCULAR; INTRAVENOUS at 11:04

## 2019-08-23 RX ADMIN — Medication 2 G: at 11:51

## 2019-08-23 RX ADMIN — PRAVASTATIN SODIUM 40 MG: 20 TABLET ORAL at 21:18

## 2019-08-23 RX ADMIN — Medication 10 ML: at 13:55

## 2019-08-23 RX ADMIN — OXYCODONE HYDROCHLORIDE 5 MG: 5 TABLET ORAL at 15:40

## 2019-08-23 RX ADMIN — AMLODIPINE BESYLATE 5 MG: 5 TABLET ORAL at 13:54

## 2019-08-23 RX ADMIN — LEVETIRACETAM 500 MG: 500 TABLET ORAL at 08:39

## 2019-08-23 NOTE — PROGRESS NOTES
8/23/2019 1:27 PM Case management consult for discharge planning received. Met with pt. Charted address and phone number confirmed. Reason for Admission:   Right total knee arthroplasty with Dr. Kya Douglas Score: 9                    Plan for utilizing home health: N/a, pt to start outpatient PT on 8/27 at [facility]. Pt made this appt prior to surgery. Current Advanced Directive/Advance Care Plan: On file  Darius Durant Son [phone]   [facility] Daughter-in-Law [phone]     Darius Durant Child [phone]                            Transition of Care Plan: home with outpatient PT and family    Pt lives alone in a 2nd floor condo in [place], there is an elevator to enter. Pt will have her sister staying with her for the 1st week after discharge and reported her son and daughter in law live in the same building as her, they are able to assist if needed. Pt has a rw, bsc, cane and wheelchair at home. Pt has rx coverage and fills her scripts at the [address] at [facility]. Pt's son will transport pt home. CM will follow. YOSELYN Hector  Care Management Interventions  PCP Verified by CM: Apolinar Khna, no nurse navigator )  Transition of Care Consult (CM Consult): Discharge Planning  MyChart Signup: No  Discharge Durable Medical Equipment: No  Physical Therapy Consult: Yes  Occupational Therapy Consult: Yes  Speech Therapy Consult: No  Current Support Network: Family Lives Nearby, Other  Plan discussed with Pt/Family/Caregiver:  Yes

## 2019-08-23 NOTE — PROGRESS NOTES
OCCUPATIONAL THERAPY EVALUATION/DISCHARGE  Patient: Ana Grewal (55 y.o. female)  Date: 8/23/2019  Primary Diagnosis: Primary osteoarthritis of right knee [M17.11]  Procedure(s) (LRB):  RIGHT TOTAL KNEE ARTHROPLASTY MAKOPLASTY (Right) 1 Day Post-Op   Precautions:   Bed Alarm, Fall, WBAT    ASSESSMENT  Based on the objective data described below, the patient presents with R knee pain, and decreased functional mobility. Patient with elevated blood pressure today but cleared by RN for OT evaluation. Patient without symptoms for elevated BP and agreeable to activity. Patient demonstrates min assist -CGA for ADL tasks. Patient declined full dressing due to plan for overnight hospital stay, but agreeable to practice for education of technique. Patient educated for home safety, tasks limitations and modifications for ADL tasks. Patient to have sister stay with her x 1 week upon return home. Current Level of Function (ADLs/self-care): Min A to CGA    Functional Outcome Measure: The patient scored 75/100 on the Barthel Index outcome measure. PLAN :    Recommendation for discharge: (in order for the patient to meet his/her long term goals)  No skilled occupational therapy/ follow up rehabilitation needs identified at this time. This discharge recommendation:  Has not yet been discussed the attending provider and/or case management    Equipment recommendations for successful discharge: none       SUBJECTIVE:   Patient stated I have been doing it right then.     OBJECTIVE DATA SUMMARY:   HISTORY:   Past Medical History:   Diagnosis Date    Anxiety     Arthritis     Basal ganglia stroke ([facility] [address] 02/20/2019    Left    CKD (chronic kidney disease) stage 3, GFR 30-59 ml/min (HCC)     High cholesterol     Hx of seasonal allergies     Hypertension     Ischemic stroke ([facility] [address]     Right occipital    Monoclonal gammopathy 2018    ABIGAIL on CPAP     Seizures ([facility] [address]     last keppra level- 8/2019 @ 15   Last seizure 2017    Urinary incontinence      Past Surgical History:   Procedure Laterality Date    HX APPENDECTOMY N/A     HX CATARACT REMOVAL Bilateral 2018    HX  SECTION N/A     x 2    HX HEMORRHOIDECTOMY      HX HYSTERECTOMY      HX TONSILLECTOMY         Prior Level of Function/Environment/Context: mod I   Expanded or extensive additional review of patient history:   Home Situation  Home Environment: Private residence  # Steps to Enter: 0  One/Two Story Residence: One story  # of Interior Steps: (elevator)  Lift Chair Available: No(son and wife live two floors above her's)  Living Alone: Yes  Support Systems: Child(shauna)(son and wife live two floors above her's)  Patient Expects to be Discharged to[de-identified] Private residence  Current DME Used/Available at Home: Adaptive dressing aides, Cane, straight, Commode, bedside, CPAP, Grab bars, Raised toilet seat, Shower chair, Walker, rollator, Walker, rolling, Wheelchair  Tub or Shower Type: Shower    Hand dominance: Right    EXAMINATION OF PERFORMANCE DEFICITS:  Cognitive/Behavioral Status:  Neurologic State: Alert  Orientation Level: Oriented X4  Cognition: Appropriate decision making; Appropriate for age attention/concentration; Appropriate safety awareness; Follows commands  Perception: Appears intact  Perseveration: No perseveration noted  Safety/Judgement: Awareness of environment;Good awareness of safety precautions    Skin: intact as seen    Edema: RLE     Hearing: Auditory  Auditory Impairment: Hard of hearing, bilateral    Vision/Perceptual:                                     Range of Motion:    AROM: Within functional limits  PROM: Within functional limits                      Strength:    Strength: Within functional limits                Coordination:  Coordination: Within functional limits  Fine Motor Skills-Upper: Left Intact; Right Intact    Gross Motor Skills-Upper: Left Intact; Right Intact    Tone & Sensation:    Tone: Normal  Sensation: Intact Balance:  Sitting: Intact  Standing: Intact; With support    Functional Mobility and Transfers for ADLs:  Bed Mobility:  Rolling: Modified independent  Supine to Sit: Modified independent  Sit to Supine: Modified independent  Scooting: Modified independent    Transfers:  Sit to Stand: Minimum assistance  Stand to Sit: Contact guard assistance  Bed to Chair: Minimum assistance  Toilet Transfer : Minimum assistance    ADL Assessment:  Feeding: Independent    Oral Facial Hygiene/Grooming: Contact guard assistance    Bathing: Minimum assistance    Upper Body Dressing: Supervision    Lower Body Dressing: Minimum assistance    Toileting: Contact guard assistance                ADL Intervention and task modifications:            Bathing: Patient educated that discharge instructions will include specifics from surgeon regarding when patient is allowed to bathe. Patient instructed when bathing to not submerge wound in water, stand/use shower chair to shower or sponge bathe  Patient indicated understanding. Dressing joint: Patient instructed to don/doff RLE first/last.  Patient instructed to don all clothing while sitting prior to standing, doff all clothing to knees while standing, then sit to doff clothing off from knees to feet in order to facilitate fall prevention, pain management, and energy conservation. Patient indicated understanding/recalled strategies. Home safety: Patient instructed on home modifications and safety (raise height of ADL objects, appropriate height of chair surfaces, recliner safety, change of floor surfaces, clear pathways) to increase independence and fall prevention. Patient indicated understanding. Standing: Patient instructed to walk up to sink/counter top/surfaces, step into walker to increase safety of joint and fall prevention.  Patient instructed to increase amount of time standing, observe standing position during ADLs in order to increase even weight bearing through bilateral LEs in order to increase independence with ADLs. Patient indicated understanding. Cognitive Retraining  Safety/Judgement: Awareness of environment;Good awareness of safety precautions    Therapeutic Exercise:     Functional Measure:  Barthel Index:    Bathin  Bladder: 10  Bowels: 10  Groomin  Dressing: 10  Feeding: 10  Mobility: 10  Stairs: 0  Toilet Use: 5  Transfer (Bed to Chair and Back): 10  Total: 75/100        Percentage of impairment   0%   1-19%   20-39%   40-59%   60-79%   80-99%   100%   Barthel Score 0-100 100 99-80 79-60 59-40 20-39 1-19   0     The Barthel ADL Index: Guidelines  1. The index should be used as a record of what a patient does, not as a record of what a patient could do. 2. The main aim is to establish degree of independence from any help, physical or verbal, however minor and for whatever reason. 3. The need for supervision renders the patient not independent. 4. A patient's performance should be established using the best available evidence. Asking the patient, friends/relatives and nurses are the usual sources, but direct observation and common sense are also important. However direct testing is not needed. 5. Usually the patient's performance over the preceding 24-48 hours is important, but occasionally longer periods will be relevant. 6. Middle categories imply that the patient supplies over 50 per cent of the effort. 7. Use of aids to be independent is allowed. Dillon Cardenas., Barthel, DLindaW. ([id number]). Functional evaluation: the Barthel Index. [facility] (14)2. MATHEUS Mederos, Lissy Atkins., Eric Shaikh., [place], [address] [place] [address] (). Measuring the change indisability after inpatient rehabilitation; comparison of the responsiveness of the Barthel Index and Functional [place] Measure. Journal of Neurology, Neurosurgery, and Psychiatry, 66(4), 738-206.   Evelio Barrios NSOLITARIO, EDITA Ramirez, Chuckie Villa M.A. (2004.) Assessment of post-stroke quality of life in cost-effectiveness studies: The usefulness of the Barthel Index and the EuroQoL-5D. Quality of Life Research, 15, 314-14         Occupational Therapy Evaluation Charge Determination   History Examination Decision-Making   LOW Complexity : Brief history review  LOW Complexity : 1-3 performance deficits relating to physical, cognitive , or psychosocial skils that result in activity limitations and / or participation restrictions  LOW Complexity : No comorbidities that affect functional and no verbal or physical assistance needed to complete eval tasks       Based on the above components, the patient evaluation is determined to be of the following complexity level: LOW   Pain Rating:      Activity Tolerance:   Good  Please refer to the flowsheet for vital signs taken during this treatment. After treatment patient left in no apparent distress:    Sitting in chair, Call bell within reach and Bed / chair alarm activated    COMMUNICATION/EDUCATION:   The patients plan of care was discussed with: Physical Therapist and Registered Nurse.     Thank you for this referral.  Renee Pretty OTR/L  Time Calculation: 25 mins

## 2019-08-23 NOTE — PROGRESS NOTES
Patient sleeping throughout the night with no c/o pain. Circulation, motion and good pulses to LLE.  MD - Michael Earing notified of critical lab results - Carbon Dioxide and no orders received.

## 2019-08-23 NOTE — PROGRESS NOTES
Problem: Mobility Impaired (Adult and Pediatric)  Goal: *Acute Goals and Plan of Care (Insert Text)  Description  FUNCTIONAL STATUS PRIOR TO ADMISSION: Patient was independent and active without use of DME.    HOME SUPPORT PRIOR TO ADMISSION: The patient lived alone with no local support. Physical Therapy Goals  Initiated 8/23/2019  1. Patient will move from supine to sit and sit to supine , scoot up and down and roll side to side in bed with modified independence within 7 day(s). 2.  Patient will transfer from bed to chair and chair to bed with modified independence using the least restrictive device within 7 day(s). 3.  Patient will perform sit to stand with modified independence within 7 day(s). 4.  Patient will ambulate with modified independence for 150 feet with the least restrictive device within 7 day(s). Note:   PHYSICAL THERAPY EVALUATION  Patient: João Goyal (57 y.o. female)  Date: 8/23/2019  Primary Diagnosis: Primary osteoarthritis of right knee [M17.11]  Procedure(s) (LRB):  RIGHT TOTAL KNEE ARTHROPLASTY MAKOPLASTY (Right) 1 Day Post-Op   Precautions:  Bed Alarm, Fall, WBAT      ASSESSMENT  Based on the objective data described below, the patient presents with knee pain, decreased right knee ROM and strength, and decreased balance. Patient with elevated BP in 200s/90s so after review of knee exercises, transferred to edge of bed and to chair but then in order to complete EKG transferred patient back to bed. Patient asymptomatic and EKG normal per RN. Patient verbalized good understanding of exercises and gait and transfer sequences. Anticipate patient will do well with PT post op and be able to transfer to home. She has good family support and 1:1 of sitting staying with her at discharge. Has all DME and elevator at [employer].     Current Level of Function Impacting Discharge (mobility/balance): minimal assist x 1 for basic mobility; HBP limiting mobility assessment at this time    Functional Outcome Measure: The patient scored  on the Barthel Index outcome measure. Patient will benefit from skilled therapy intervention to address the above noted impairments. PLAN :  Recommendations and Planned Interventions: bed mobility training, transfer training, gait training, therapeutic exercises, patient and family training/education and therapeutic activities      Frequency/Duration: Patient will be followed by physical therapy:  twice daily to address goals. Recommendation for discharge: (in order for the patient to meet his/her long term goals)  Per Dr Orion Deleon    This discharge recommendation:  Has not yet been discussed the attending provider and/or case management    Equipment recommendations for successful discharge (if) home: none         SUBJECTIVE:   Patient stated I feel fine- there is no pain at all.     OBJECTIVE DATA SUMMARY:   HISTORY:    Past Medical History:   Diagnosis Date    Anxiety     Arthritis     Basal ganglia stroke ([facility] [address] 2019    Left    CKD (chronic kidney disease) stage 3, GFR 30-59 ml/min ([facility]     High cholesterol     Hx of seasonal allergies     Hypertension     Ischemic stroke ([facility] [address]     Right occipital    Monoclonal gammopathy 2018    ABIGAIL on CPAP     Seizures ([facility] [address]     last keppra level- 2019 @ 15   Last seizure 2017    Urinary incontinence      Past Surgical History:   Procedure Laterality Date    HX APPENDECTOMY N/A     HX CATARACT REMOVAL Bilateral 2018    HX  SECTION N/A     x 2    HX HEMORRHOIDECTOMY      HX HYSTERECTOMY      HX TONSILLECTOMY         Personal factors and/or comorbidities impacting plan of care:     Home Situation  Home Environment: Private residence  # Steps to Enter: 0  One/Two Story Residence: One story  # of Interior Steps: (elevator)  Lift Chair Available: No(son and wife live two floors above her's)  Living Alone: Yes  Support Systems: Child(shauna)(son and wife live two floors above her's)  Patient Expects to be Discharged to[de-identified] Private residence  Current DME Used/Available at Home: Adaptive dressing aides, Cane, straight, Commode, bedside, CPAP, Grab bars, Raised toilet seat, Shower chair, Walker, rollator, Walker, rolling, Wheelchair  Tub or Shower Type: Shower    EXAMINATION/PRESENTATION/DECISION MAKING:   Critical Behavior:  Neurologic State: Alert  Orientation Level: Oriented X4  Cognition: Appropriate decision making, Appropriate for age attention/concentration, Appropriate safety awareness, Follows commands  Safety/Judgement: Awareness of environment, Good awareness of safety precautions  Hearing: Auditory  Auditory Impairment: Hard of hearing, bilateral    Range Of Motion:  AROM: Within functional limits           PROM: Within functional limits           Strength:    Strength: Within functional limits                    Tone & Sensation:   Tone: Normal              Sensation: Intact               Coordination:  Coordination: Within functional limits    Functional Mobility:  Bed Mobility:  Rolling: Modified independent  Supine to Sit: Modified independent  Sit to Supine: Modified independent  Scooting: Modified independent  Transfers:  Sit to Stand: Minimum assistance  Stand to Sit: Minimum assistance  Stand Pivot Transfers: Minimum assistance     Bed to Chair: Minimum assistance    Balance:   Sitting: Intact; Without support  Standing: Intact; With support  Ambulation/Gait Training:  Distance (ft): 2 Feet (ft)  Assistive Device: Gait belt;Walker, rolling  Ambulation - Level of Assistance: Minimal assistance  Gait Abnormalities: Antalgic  Base of Support: Narrowed  Speed/Italia: Slow        Stairs - Level of Assistance: (NT)        Therapeutic Exercises:   Initiated with handout- 10 reps of basic exercises    Functional Measure:  Barthel Index:    Bathin  Bladder: 10  Bowels: 10  Groomin  Dressing: 10  Feeding: 10  Mobility: 10  Stairs: 0  Toilet Use: 0  Transfer (Bed to Chair and Back): 10  Total: 70/100 The Barthel ADL Index: Guidelines  1. The index should be used as a record of what a patient does, not as a record of what a patient could do. 2. The main aim is to establish degree of independence from any help, physical or verbal, however minor and for whatever reason. 3. The need for supervision renders the patient not independent. 4. A patient's performance should be established using the best available evidence. Asking the patient, friends/relatives and nurses are the usual sources, but direct observation and common sense are also important. However direct testing is not needed. 5. Usually the patient's performance over the preceding 24-48 hours is important, but occasionally longer periods will be relevant. 6. Middle categories imply that the patient supplies over 50 per cent of the effort. 7. Use of aids to be independent is allowed. Elayne Tripp., Barthel, D.W. ([id number]). Functional evaluation: the Barthel Index. [address] [facility] (14)2. [facility] Kathi kota Rohit, CHELOF, Trish Bernstein., [facility] [place], [address] [place] [address] (1999). Measuring the change indisability after inpatient rehabilitation; comparison of the responsiveness of the Barthel Index and Functional [place] Measure. Journal of Neurology, Neurosurgery, and Psychiatry, 66(4), 974-861. Tavia Krause, N.[place].LUCI, EDITA Ramirez, & Bridget Macias MLindaA. (2004.) Assessment of post-stroke quality of life in cost-effectiveness studies: The usefulness of the Barthel Index and the EuroQoL-5D.  Quality of Life Research, 15, 190-21           Physical Therapy Evaluation Charge Determination   History Examination Presentation Decision-Making   LOW Complexity : Zero comorbidities / personal factors that will impact the outcome / POC LOW Complexity : 1-2 Standardized tests and measures addressing body structure, function, activity limitation and / or participation in recreation  LOW Complexity : Stable, uncomplicated  Other outcome measures Barthel Index  LOW Based on the above components, the patient evaluation is determined to be of the following complexity level: LOW     Pain Ratin/10    Activity Tolerance:   Limited assessment due to elevated BP   Please refer to the flowsheet for vital signs taken during this treatment. After treatment patient left in no apparent distress:   Supine in bed, Call bell within reach and Bed / chair alarm activated    COMMUNICATION/EDUCATION:   The patients plan of care was discussed with: Registered Nurse. Fall prevention education was provided and the patient/caregiver indicated understanding. and Patient/family have participated as able in goal setting and plan of care.     Thank you for this referral.  Prasanna Jimenez, PT, DPT   Time Calculation: 45 mins

## 2019-08-23 NOTE — PROGRESS NOTES
Problem: Mobility Impaired (Adult and Pediatric)  Goal: *Acute Goals and Plan of Care (Insert Text)  Description  FUNCTIONAL STATUS PRIOR TO ADMISSION: Patient was independent and active without use of DME.    HOME SUPPORT PRIOR TO ADMISSION: The patient lived alone with no local support. Physical Therapy Goals  Initiated 8/23/2019  1. Patient will move from supine to sit and sit to supine , scoot up and down and roll side to side in bed with modified independence within 7 day(s). 2.  Patient will transfer from bed to chair and chair to bed with modified independence using the least restrictive device within 7 day(s). 3.  Patient will perform sit to stand with modified independence within 7 day(s). 4.  Patient will ambulate with modified independence for 150 feet with the least restrictive device within 7 day(s).      8/23/2019 1713 by Milagro Hadley, PT, DPT  Note:   PHYSICAL THERAPY TREATMENT  Patient: Desirae Thomas (5 y.o. female)  Date: 8/23/2019  Diagnosis: Primary osteoarthritis of right knee [M17.11] <principal problem not specified>  Procedure(s) (LRB):  RIGHT TOTAL KNEE ARTHROPLASTY MAKOPLASTY (Right) 1 Day Post-Op  Precautions: Bed Alarm, Fall, WBAT  Chart, physical therapy assessment, plan of care and goals were reviewed. ASSESSMENT  Based on the objective data described below, presents asymptomatic in supine, anxious to ambulate and get OOB. BP distinctly different between RUE and LUE measurements and pre and post activity measures listed below. Patient able to complete 10 reps of all knee exercises including SLRs with verbal prompts only. Amb 100' in rivera with step to gait and beginning step through pattern with CG assistance. Absent of knee buckling or instability. Up to chair and chair alarm set, RN aware. Patient requesting med for pain 5-6/10 upon sitting.  Ice also applied               Right         Left                       PRE     196/81      140/63    98% 73 bpm asymptomatic    POST   228/100    122/57   97%  78 bpm , slight shortness of breath reported but otherwise asymptomatic           PLAN :  Patient continues to benefit from skilled intervention to address the above impairments. Continue treatment per established plan of care. to address goals. Recommendation for discharge: (in order for the patient to meet his/her long term goals)  Per Dr Ebenezer Torres    This discharge recommendation:  Has not yet been discussed the attending provider and/or case management    Equipment recommendations for successful discharge (if) home: none       SUBJECTIVE:   Patient stated I feel perfectly fine- I just dont understand this BP.    OBJECTIVE DATA SUMMARY:   Critical Behavior:  Neurologic State: Alert  Orientation Level: Oriented X4  Cognition: Appropriate decision making, Appropriate for age attention/concentration, Appropriate safety awareness, Follows commands  Safety/Judgement: Awareness of environment, Good awareness of safety precautions    Range of Motion:  AROM: Within functional limits  PROM: Within functional limits     RLE AROM  R Knee Flexion: 80  R Knee Extension: 6                Functional Mobility Training:  Bed Mobility:  Rolling: Modified independent  Supine to Sit: Modified independent  Sit to Supine: Modified independent  Scooting: Modified independent        Transfers:  Sit to Stand: Minimum assistance  Stand to Sit: Contact guard assistance  Stand Pivot Transfers: Minimum assistance     Bed to Chair: Minimum assistance                    Balance:  Sitting: Intact  Standing: Intact; With support  Ambulation/Gait Training:  Distance (ft): 100 Feet (ft)  Assistive Device: Gait belt;Walker, rolling  Ambulation - Level of Assistance: Minimal assistance        Gait Abnormalities: Antalgic  Right Side Weight Bearing: As tolerated     Base of Support: Narrowed     Speed/Italia: Slow    Stairs:     Stairs - Level of Assistance: (NT)        Therapeutic Exercises: EXERCISE   Sets   Reps   Active Active Assist   Passive Self ROM   Comments   Ankle Pumps  10 ?                                        ?                                        ?                                        ?                                           Quad Sets  10 ?                                        ?                                        ?                                        ?                                           Hamstring Sets  10 ?                                        ?                                        ?                                        ?                                           Short Arc Quads   ? ?                                        ?                                        ?                                           Knee Extension Stretch  1   ?                                          ?                                          ?                                          ?                                           Heel Slides  10 ?                                        ?                                        ?                                        ?                                           Long Arc Quads   ? ?                                        ?                                        ?                                           Knee Flexion Stretch   ? ?                                        ?                                        ?                                           Straight Leg Raises  10 ?                                        ?                                        ?                                        ?                                               Pain Ratin-6/10    Activity Tolerance:   observed SOB with activity  Please refer to the flowsheet for vital signs taken during this treatment.     After treatment patient left in no apparent distress:   Sitting in chair, Call bell within reach and Bed / chair alarm activated    COMMUNICATION/COLLABORATION:   The patients plan of care was discussed with: Occupational Therapist and Registered Nurse    Rajan Melo, PT, DPT   Time Calculation: 25 mins           8/23/2019 1405 by Mackenzie Naik PT, DPT  Note:   PHYSICAL THERAPY EVALUATION  Patient: Regina Jones (77 y.o. female)  Date: 8/23/2019  Primary Diagnosis: Primary osteoarthritis of right knee [M17.11]  Procedure(s) (LRB):  RIGHT TOTAL KNEE ARTHROPLASTY MAKOPLASTY (Right) 1 Day Post-Op   Precautions:  Bed Alarm, Fall, WBAT      ASSESSMENT  Based on the objective data described below, the patient presents with knee pain, decreased right knee ROM and strength, and decreased balance. Patient with elevated BP in 200s/90s so after review of knee exercises, transferred to edge of bed and to chair but then in order to complete EKG transferred patient back to bed. Patient asymptomatic and EKG normal per RN. Patient verbalized good understanding of exercises and gait and transfer sequences. Anticipate patient will do well with PT post op and be able to transfer to home. She has good family support and 1:1 of sitting staying with her at discharge. Has all DME and elevator at [employer]. Current Level of Function Impacting Discharge (mobility/balance): minimal assist x 1 for basic mobility; HBP limiting mobility assessment at this time    Functional Outcome Measure: The patient scored  on the Barthel Index outcome measure. Patient will benefit from skilled therapy intervention to address the above noted impairments.        PLAN :  Recommendations and Planned Interventions: bed mobility training, transfer training, gait training, therapeutic exercises, patient and family training/education and therapeutic activities      Frequency/Duration: Patient will be followed by physical therapy:  twice daily to address goals. Recommendation for discharge: (in order for the patient to meet his/her long term goals)  Per Dr Rayshawn Huitron    This discharge recommendation:  Has not yet been discussed the attending provider and/or case management    Equipment recommendations for successful discharge (if) home: none         SUBJECTIVE:   Patient stated I feel fine- there is no pain at all.     OBJECTIVE DATA SUMMARY:   HISTORY:    Past Medical History:   Diagnosis Date    Anxiety     Arthritis     Basal ganglia stroke ([facility] 2019    Left    CKD (chronic kidney disease) stage 3, GFR 30-59 ml/min ([facility])     High cholesterol     Hx of seasonal allergies     Hypertension     Ischemic stroke ([facility]     Right occipital    Monoclonal gammopathy 2018    ABIGAIL on CPAP     Seizures ([facility]     last keppra level- 2019 @ 15   Last seizure 2017    Urinary incontinence      Past Surgical History:   Procedure Laterality Date    HX APPENDECTOMY N/A     HX CATARACT REMOVAL Bilateral 2018    HX  SECTION N/A     x 2    HX HEMORRHOIDECTOMY      HX HYSTERECTOMY      HX TONSILLECTOMY         Personal factors and/or comorbidities impacting plan of care:     Home Situation  Home Environment: Private residence  # Steps to Enter: 0  One/Two Story Residence: One story  # of Interior Steps: (elevator)  Lift Chair Available: No(son and wife live two floors above her's)  Living Alone: Yes  Support Systems: Child(shauna)(son and wife live two floors above her's)  Patient Expects to be Discharged to[de-identified] Private residence  Current DME Used/Available at Home: Adaptive dressing aides, Cane, straight, Commode, bedside, CPAP, Grab bars, Raised toilet seat, Shower chair, Walker, rollator, Walker, rolling, Wheelchair  Tub or Shower Type: Shower    EXAMINATION/PRESENTATION/DECISION MAKING:   Critical Behavior:  Neurologic State: Alert  Orientation Level: Oriented X4  Cognition: Appropriate decision making, Appropriate for age attention/concentration, Appropriate safety awareness, Follows commands  Safety/Judgement: Awareness of environment, Good awareness of safety precautions  Hearing: Auditory  Auditory Impairment: Hard of hearing, bilateral    Range Of Motion:  AROM: Within functional limits           PROM: Within functional limits           Strength:    Strength: Within functional limits                    Tone & Sensation:   Tone: Normal              Sensation: Intact               Coordination:  Coordination: Within functional limits    Functional Mobility:  Bed Mobility:  Rolling: Modified independent  Supine to Sit: Modified independent  Sit to Supine: Modified independent  Scooting: Modified independent  Transfers:  Sit to Stand: Minimum assistance  Stand to Sit: Minimum assistance  Stand Pivot Transfers: Minimum assistance     Bed to Chair: Minimum assistance    Balance:   Sitting: Intact; Without support  Standing: Intact; With support  Ambulation/Gait Training:  Distance (ft): 2 Feet (ft)  Assistive Device: Gait belt;Walker, rolling  Ambulation - Level of Assistance: Minimal assistance  Gait Abnormalities: Antalgic  Base of Support: Narrowed  Speed/Italia: Slow        Stairs - Level of Assistance: (NT)        Therapeutic Exercises:   Initiated with handout- 10 reps of basic exercises    Functional Measure:  Barthel Index:    Bathin  Bladder: 10  Bowels: 10  Groomin  Dressing: 10  Feeding: 10  Mobility: 10  Stairs: 0  Toilet Use: 0  Transfer (Bed to Chair and Back): 10  Total: 70/100       The Barthel ADL Index: Guidelines  1. The index should be used as a record of what a patient does, not as a record of what a patient could do. 2. The main aim is to establish degree of independence from any help, physical or verbal, however minor and for whatever reason. 3. The need for supervision renders the patient not independent. 4. A patient's performance should be established using the best available evidence.  Asking the patient, friends/relatives and nurses are the usual sources, but direct observation and common sense are also important. However direct testing is not needed. 5. Usually the patient's performance over the preceding 24-48 hours is important, but occasionally longer periods will be relevant. 6. Middle categories imply that the patient supplies over 50 per cent of the effort. 7. Use of aids to be independent is allowed. Barabara Files., Barthel, D.W. (9491). Functional evaluation: the Barthel Index. [facility] (14)2. Shine Marx kota MATHEUS Bee, Mikaela Villegas., Shelley Romero., [place], [address] Measuring the change indisability after inpatient rehabilitation; comparison of the responsiveness of the Barthel Index and Functional [place] Measure. Journal of Neurology, Neurosurgery, and Psychiatry, 66(4), 258-543. Josee Polanco, NADEOLA.LUCI, EDITA Ramirez, & Neela Kinney MANNELIESE. (2004.) Assessment of post-stroke quality of life in cost-effectiveness studies: The usefulness of the Barthel Index and the EuroQoL-5D. Quality of Life Research, 15, 566-72           Physical Therapy Evaluation Charge Determination   History Examination Presentation Decision-Making   LOW Complexity : Zero comorbidities / personal factors that will impact the outcome / POC LOW Complexity : 1-2 Standardized tests and measures addressing body structure, function, activity limitation and / or participation in recreation  LOW Complexity : Stable, uncomplicated  Other outcome measures Barthel Index  LOW       Based on the above components, the patient evaluation is determined to be of the following complexity level: LOW     Pain Ratin/10    Activity Tolerance:   Limited assessment due to elevated BP   Please refer to the flowsheet for vital signs taken during this treatment.     After treatment patient left in no apparent distress:   Supine in bed, Call bell within reach and Bed / chair alarm activated    COMMUNICATION/EDUCATION:   The patients plan of care was discussed with: Registered Nurse. Fall prevention education was provided and the patient/caregiver indicated understanding. and Patient/family have participated as able in goal setting and plan of care.     Thank you for this referral.  Adis Elise, PT, DPT   Time Calculation: 45 mins

## 2019-08-23 NOTE — PROGRESS NOTES
Shift Summary    Bedside and Verbal shift change report given to Ellyn Urias RN (oncoming nurse) by Wai Gilman RN (offgoing nurse). Report included the following information SBAR, Kardex, MAR and Recent Results. NP with ortho notified of elevated potassium. New orders to recheck. x2 unsuccessful attempts. Phlebotomy notified to attempt draw    New orders to consult Dr. Duyen Jean. BP redrawn post AM medications. BP continues to rise. Dr. Rodriguez Earing at bedside and made aware of BP. Izabella Mcdonough notified of elevated BP and potassium    EKG completed. nml showing NSR. Attempted to work with therapy. Patient transfer to chair /72    Repeat K  5.7    Dr Duyen Jean at bedside. Critical CO2 reported. Norvasc increased to 10mg    Dr. Izabella Mcdonough notified of continued elevated BP despite hydralazine administration. OK to give additional 5 mg of norvasc now. 18  Notified MD again of elevated BP. Manual BP showing 194/86. TRANSFER - OUT REPORT:    Verbal report given to April RN(name) on Amritnicolasa Vincent  being transferred to [facility](unit) for routine progression of care/Hypertension    Report consisted of patients Situation, Background, Assessment and   Recommendations(SBAR). Information from the following report(s) SBAR, Kardex, [facility], Recent Results and Cardiac Rhythm   was reviewed with the receiving nurse. Lines:   Peripheral IV 08/22/19 Left Forearm (Active)   Site Assessment Clean, dry, & intact 8/23/2019  3:54 PM   Phlebitis Assessment 0 8/23/2019  3:54 PM   Infiltration Assessment 0 8/23/2019  3:54 PM   Dressing Status Clean, dry, & intact 8/23/2019  3:54 PM   Dressing Type Transparent 8/23/2019  3:54 PM   Hub Color/Line Status Pink 8/23/2019  3:54 PM   Action Taken Open ports on tubing capped 8/23/2019  3:54 PM   Alcohol Cap Used Yes 8/23/2019  3:54 PM        Opportunity for questions and clarification was provided.       Patient transported with:   Monitor  Registered Nurse

## 2019-08-23 NOTE — PROGRESS NOTES
TOTAL KNEE ARTHROPLASTY DAILY NOTE     ASSESSMENT / PLAN :   1. Pain Control : Excellent - Able to sleep and participate with therapy  2. Overnight Issues : none  3. Wound or incisional issue : Healing incision with no visible drainage  4. Therapy / Weight Bearing Recommendations : Weight bear as tolerated with use of a walker and two person assist while mobilizing  5. DVT Prophylaxis : Mechanical and Aspirin and mechanical lower extremity compression device  6. Disposition : Discharge to home with home health (maximum of 4 visits)  7. Medical Concerns : MMP, CKD and dementia. Hyperkalemia noted, will consult Dr Hilary Teresa  8. Comments : Mobilize with therapy     POD  1 Day Post-Op s/p Procedure(s):  RIGHT TOTAL KNEE ARTHROPLASTY MAKOPLASTY     SUBJECTIVE :     Overnight complaints : none. OBJECTIVE :     Vitals:    08/23/19 1018 08/23/19 1036 08/23/19 1056 08/23/19 1057   BP: 153/73 (!) 212/72 145/67 182/68   Pulse: 66 64 63    Resp:   16    Temp:   97.9 °F (36.6 °C)    SpO2: 98%  97%    Weight:       Height:           Alert and oriented x3. Examination of the right knee reveals that the dressing is clean, dry and intact. Motor Exam : Pt is able to perform a straight leg raise. Sensation is intact to light touch. No calf pain.      Labs:  Recent Labs     08/23/19  0956 08/23/19  0529   HGB  --  10.0*    142   K 5.7*  5.7* 6.1*   * 121*   CO2 13* 14*   BUN 59* 61*   CREA 1.88* 1.67*   * 154*      Patient mobility           Panda Banks MD  Cell [phone]  Jeny Branham PA-C  Cell [phone]  Medical Assistants: [address]  [phone]                 Surgery Scheduler: Real Redd PLUIS. [address]

## 2019-08-23 NOTE — PROGRESS NOTES
Occupational therapy note:  Orders received, chart reviewed. Spoke with patient RN who reports patient currently hypertensive awaiting input from physician for management. Will hold at this time and follow up with patient at later time. Seble Mcdonough MS OTR/L

## 2019-08-23 NOTE — PROGRESS NOTES
Nutrition Assessment:    RECOMMENDATIONS/INTERVENTION(S):   1. Continue regular diet. 2. Add Ensure HP 1x/day to promote adequate protein intake. 3. Continue to monitor po intake, wt changes, labs. ASSESSMENT:   8/23: RD consult received for nutrition supplements. Pt admitted w/ osteoarthritis of L knee, s/p TKA. Pt reports good appetite and po intake currently and PTA. Says she has had some recent intentional wt loss of 10# by cutting back on starches and snacks. No n/v/d/c or c/s issues. Discussed sources of protein and its importance for healing. Interested in receiving Ensure while hospitalized, will add 1x/day. Has Ensure at home that she plans on drinking once per day. K 6.1 high, discussed sources of potassium to avoid until lab is WNL. Pt seemed receptive. Labs- K6.1, Cl 121, -103. Meds- Miralax, pericolace, pravastatin, Pepcid, Coreg. Diet Order: Regular  % Eaten:  No data found. Pertinent Medications: [x] Reviewed    Labs: [x] Reviewed    Anthropometrics: Height: 5' 1.5\" (156.2 cm) Weight: 87.2 kg (192 lb 3.9 oz)    IBW (%IBW):   ( ) UBW (%UBW):   (  %)      BMI: Body mass index is 35.74 kg/m². This BMI is indicative of:   [] Underweight    [] Normal    [] Overweight    [x]  Obesity    []  Extreme Obesity (BMI>40)  Estimated Nutrition Needs (Based on): 1405 Kcals/day(1286 x 1.3 AF) , 87 g(1-1.2 g/kg) Protein  Carbohydrate: At Least 130 g/day  Fluids: 1671 mL/day (1 ml/kcal)    Last BM: 8/21   [x]Active     []Hyperactive  []Hypoactive       [] Absent   BS  Skin:    [] Intact   [x] Incision  [] Breakdown   [] DTI   [] Tears/Excoriation/Abrasion  []Edema [] Other:      Wt Readings from Last 30 Encounters:   08/22/19 87.2 kg (192 lb 3.9 oz)   08/07/19 87.5 kg (193 lb)   04/12/19 89.7 kg (197 lb 12.8 oz)   02/24/19 84.5 kg (186 lb 4.8 oz)   02/20/19 94.3 kg (207 lb 14.3 oz)   02/20/19 92.5 kg (204 lb)   09/10/18 94.8 kg (209 lb)   01/25/18 95.5 kg (210 lb 8 oz)   08/10/17 94.8 kg (209 lb) 01/26/17 97.1 kg (214 lb)   11/10/16 90.7 kg (200 lb)   05/31/16 99.3 kg (219 lb)   01/05/15 99.3 kg (219 lb)   12/10/14 101.2 kg (223 lb)   10/21/14 100.2 kg (221 lb)   10/03/14 100.2 kg (221 lb)   10/02/14 99.3 kg (219 lb)      NUTRITION DIAGNOSES:   Problem:  Increased nutrient needs     Etiology: related to increased demand for protein     Signs/Symptoms: as evidenced by        NUTRITION INTERVENTIONS:  Meals/Snacks: General/healthful diet   Supplements: Commercial supplement              GOAL:   PO intake >75% meals and ONS next 5-7 days    Cultural, Presybeterian, or Ethnic Dietary Needs: None     EDUCATION & DISCHARGE NEEDS:    [] None Identified   [x] Identified and Education Provided/Documented: sources of potassium   [] Identified and Pt declined/was not appropriate      [] Interdisciplinary Care Plan Reviewed/Documented    [x] Discharge Needs:  Regular diet + ONS   [] No Nutrition Related Discharge Needs    NUTRITION RISK:   Pt Is At Nutrition Risk  [x]     No Nutrition Risk Identified  []       PT SEEN FOR:    [x]  MD Consult: []Calorie Count      []Diabetic Diet Education        []Diet Education     []Electrolyte Management     [x]General Nutrition Management and Supplements     []Management of Tube Feeding     []TPN Recommendations    []  RN Referral:  []MST score >=2     []Enteral/Parenteral Nutrition PTA     []Pregnant: Gestational DM or Multigestation                 [] Pressure Ulcer    []  Low BMI      []  Length of Stay       [] Dysphagia Diet         [] Ventilator  []  Follow-up     Previous Recommendations:   [] Implemented          [] Not Implemented          [x] Not Applicable    Previous Goal:   [] Met              [] Progressing Towards Goal              [] Not Progressing Towards Goal   [x] Not Applicable            Rajesh Bryson, [address] [facility]  Pager [phone]  Phone [phone]

## 2019-08-23 NOTE — PROGRESS NOTES
Bedside and Verbal shift change report given to Negrito Carlson (oncoming nurse) by April (offgoing nurse). Report included the following information SBAR, Kardex, ED Summary, Intake/Output, MAR, Accordion, Recent Results, Med Rec Status and Cardiac Rhythm NSR.     1930: P/c to Dr. India Good in reference to patient's concern about home medication, BP, and patient's request for nephrology consult. Per Dr. India Good, ok to restart patient's home dose of losartan. Goals for sbp <200 and to keep dbp no lower than what it is currently (in the 50 range). 2118: Patient requested medication for 4/10 headache. Pulled roxicodone but was unable to administer d/t patient receiving the medication at 1907. I turned off the patient's lights, decreased tv volume, and made sure that the patient was comfortable in bed. I will reassess shortly. 7529: Patient is now bradycardic with HR at 49 while sleeping. BP was: 143/36. I went in to check on the patient and woke her up. Her HR increased to 63. Rechecked BP: 156/40 Patient denied shortness of breath and pain. I will continue to monitor.

## 2019-08-24 ENCOUNTER — APPOINTMENT (OUTPATIENT)
Dept: VASCULAR SURGERY | Age: 79
DRG: 470 | End: 2019-08-24
Attending: FAMILY MEDICINE
Payer: MEDICARE

## 2019-08-24 LAB
ANION GAP SERPL CALC-SCNC: 8 MMOL/L (ref 5–15)
APPEARANCE UR: ABNORMAL
BACTERIA URNS QL MICRO: NEGATIVE /HPF
BASOPHILS # BLD: 0 K/UL (ref 0–0.1)
BASOPHILS NFR BLD: 0 % (ref 0–1)
BILIRUB UR QL: NEGATIVE
BUN SERPL-MCNC: 72 MG/DL (ref 6–20)
BUN/CREAT SERPL: 37 (ref 12–20)
CALCIUM SERPL-MCNC: 8.5 MG/DL (ref 8.5–10.1)
CHLORIDE SERPL-SCNC: 117 MMOL/L (ref 97–108)
CO2 SERPL-SCNC: 14 MMOL/L (ref 21–32)
COLOR UR: ABNORMAL
CREAT SERPL-MCNC: 1.95 MG/DL (ref 0.55–1.02)
DIFFERENTIAL METHOD BLD: ABNORMAL
EOSINOPHIL # BLD: 0 K/UL (ref 0–0.4)
EOSINOPHIL NFR BLD: 0 % (ref 0–7)
EPITH CASTS URNS QL MICRO: ABNORMAL /LPF
ERYTHROCYTE [DISTWIDTH] IN BLOOD BY AUTOMATED COUNT: 12.6 % (ref 11.5–14.5)
GLUCOSE SERPL-MCNC: 130 MG/DL (ref 65–100)
GLUCOSE UR STRIP.AUTO-MCNC: NEGATIVE MG/DL
HCT VFR BLD AUTO: 28.3 % (ref 35–47)
HGB BLD-MCNC: 9.5 G/DL (ref 11.5–16)
HGB UR QL STRIP: NEGATIVE
HYALINE CASTS URNS QL MICRO: ABNORMAL /LPF (ref 0–5)
IMM GRANULOCYTES # BLD AUTO: 0.1 K/UL (ref 0–0.04)
IMM GRANULOCYTES NFR BLD AUTO: 1 % (ref 0–0.5)
KETONES UR QL STRIP.AUTO: NEGATIVE MG/DL
LEUKOCYTE ESTERASE UR QL STRIP.AUTO: NEGATIVE
LYMPHOCYTES # BLD: 1.3 K/UL (ref 0.8–3.5)
LYMPHOCYTES NFR BLD: 10 % (ref 12–49)
MAGNESIUM SERPL-MCNC: 2.4 MG/DL (ref 1.6–2.4)
MCH RBC QN AUTO: 32 PG (ref 26–34)
MCHC RBC AUTO-ENTMCNC: 33.6 G/DL (ref 30–36.5)
MCV RBC AUTO: 95.3 FL (ref 80–99)
MONOCYTES # BLD: 1 K/UL (ref 0–1)
MONOCYTES NFR BLD: 8 % (ref 5–13)
NEUTS SEG # BLD: 10.3 K/UL (ref 1.8–8)
NEUTS SEG NFR BLD: 81 % (ref 32–75)
NITRITE UR QL STRIP.AUTO: NEGATIVE
NRBC # BLD: 0 K/UL (ref 0–0.01)
NRBC BLD-RTO: 0 PER 100 WBC
PH UR STRIP: 5.5 [PH] (ref 5–8)
PLATELET # BLD AUTO: 190 K/UL (ref 150–400)
PMV BLD AUTO: 9.4 FL (ref 8.9–12.9)
POTASSIUM SERPL-SCNC: 5.3 MMOL/L (ref 3.5–5.1)
PROT UR STRIP-MCNC: 100 MG/DL
RBC # BLD AUTO: 2.97 M/UL (ref 3.8–5.2)
RBC #/AREA URNS HPF: ABNORMAL /HPF (ref 0–5)
SODIUM SERPL-SCNC: 139 MMOL/L (ref 136–145)
SP GR UR REFRACTOMETRY: 1.01 (ref 1–1.03)
UA: UC IF INDICATED,UAUC: ABNORMAL
UROBILINOGEN UR QL STRIP.AUTO: 0.2 EU/DL (ref 0.2–1)
WBC # BLD AUTO: 12.7 K/UL (ref 3.6–11)
WBC URNS QL MICRO: ABNORMAL /HPF (ref 0–4)

## 2019-08-24 PROCEDURE — 74011250637 HC RX REV CODE- 250/637: Performed by: FAMILY MEDICINE

## 2019-08-24 PROCEDURE — 85025 COMPLETE CBC W/AUTO DIFF WBC: CPT

## 2019-08-24 PROCEDURE — 83735 ASSAY OF MAGNESIUM: CPT

## 2019-08-24 PROCEDURE — 74011250637 HC RX REV CODE- 250/637: Performed by: PHYSICIAN ASSISTANT

## 2019-08-24 PROCEDURE — 36415 COLL VENOUS BLD VENIPUNCTURE: CPT

## 2019-08-24 PROCEDURE — 74011250637 HC RX REV CODE- 250/637: Performed by: ORTHOPAEDIC SURGERY

## 2019-08-24 PROCEDURE — 80048 BASIC METABOLIC PNL TOTAL CA: CPT

## 2019-08-24 PROCEDURE — 93975 VASCULAR STUDY: CPT

## 2019-08-24 PROCEDURE — 97116 GAIT TRAINING THERAPY: CPT | Performed by: PHYSICAL THERAPIST

## 2019-08-24 PROCEDURE — 97110 THERAPEUTIC EXERCISES: CPT | Performed by: PHYSICAL THERAPIST

## 2019-08-24 PROCEDURE — 65610000006 HC RM INTENSIVE CARE

## 2019-08-24 PROCEDURE — 81001 URINALYSIS AUTO W/SCOPE: CPT

## 2019-08-24 PROCEDURE — 77030038269 HC DRN EXT URIN PURWCK BARD -A

## 2019-08-24 RX ORDER — CARVEDILOL 6.25 MG/1
6.25 TABLET ORAL 2 TIMES DAILY WITH MEALS
Status: DISCONTINUED | OUTPATIENT
Start: 2019-08-24 | End: 2019-08-24

## 2019-08-24 RX ORDER — CARVEDILOL 6.25 MG/1
6.25 TABLET ORAL 2 TIMES DAILY WITH MEALS
Status: DISCONTINUED | OUTPATIENT
Start: 2019-08-24 | End: 2019-08-28 | Stop reason: HOSPADM

## 2019-08-24 RX ADMIN — OXYCODONE HYDROCHLORIDE 5 MG: 5 TABLET ORAL at 14:23

## 2019-08-24 RX ADMIN — ACETAMINOPHEN 650 MG: 325 TABLET ORAL at 00:37

## 2019-08-24 RX ADMIN — Medication 10 ML: at 08:57

## 2019-08-24 RX ADMIN — ACETAMINOPHEN 650 MG: 325 TABLET ORAL at 19:06

## 2019-08-24 RX ADMIN — GABAPENTIN 100 MG: 100 CAPSULE ORAL at 08:56

## 2019-08-24 RX ADMIN — MONTELUKAST SODIUM 10 MG: 10 TABLET, COATED ORAL at 20:29

## 2019-08-24 RX ADMIN — LEVETIRACETAM 500 MG: 500 TABLET ORAL at 08:56

## 2019-08-24 RX ADMIN — TERAZOSIN HYDROCHLORIDE 5 MG: 5 CAPSULE ORAL at 22:02

## 2019-08-24 RX ADMIN — Medication 10 ML: at 14:00

## 2019-08-24 RX ADMIN — SENNOSIDES,DOCUSATE SODIUM 1 TABLET: 8.6; 5 TABLET, FILM COATED ORAL at 19:06

## 2019-08-24 RX ADMIN — LEVETIRACETAM 500 MG: 500 TABLET ORAL at 20:30

## 2019-08-24 RX ADMIN — AMLODIPINE BESYLATE 10 MG: 5 TABLET ORAL at 08:56

## 2019-08-24 RX ADMIN — ASPIRIN 81 MG: 81 TABLET, COATED ORAL at 19:06

## 2019-08-24 RX ADMIN — ACETAMINOPHEN 650 MG: 325 TABLET ORAL at 23:44

## 2019-08-24 RX ADMIN — PRAVASTATIN SODIUM 40 MG: 20 TABLET ORAL at 22:02

## 2019-08-24 RX ADMIN — GABAPENTIN 300 MG: 300 CAPSULE ORAL at 22:03

## 2019-08-24 RX ADMIN — ASPIRIN 81 MG: 81 TABLET, COATED ORAL at 08:56

## 2019-08-24 RX ADMIN — FLUTICASONE PROPIONATE 1 SPRAY: 50 SPRAY, METERED NASAL at 22:57

## 2019-08-24 RX ADMIN — FAMOTIDINE 20 MG: 20 TABLET ORAL at 08:56

## 2019-08-24 RX ADMIN — POLYETHYLENE GLYCOL 3350 17 G: 17 POWDER, FOR SOLUTION ORAL at 08:55

## 2019-08-24 RX ADMIN — SENNOSIDES,DOCUSATE SODIUM 1 TABLET: 8.6; 5 TABLET, FILM COATED ORAL at 08:56

## 2019-08-24 RX ADMIN — ACETAMINOPHEN 650 MG: 325 TABLET ORAL at 08:56

## 2019-08-24 NOTE — CONSULTS
[address] [facility]    Name:  George Price  MR#:  [id number]  :  1940  ACCOUNT #:  [de-identified]  DATE OF SERVICE:  2019      REASON FOR CONSULTATION:  Hypertension, chronic kidney disease. HISTORY OF PRESENT ILLNESS:  This is an 42-year-old white female with the following medical problems:  1. Hypertension. 2.  MGUS. 3.  Dyslipidemia. 4.  Chronic kidney disease stage IV with a baseline creatinine around 1.79 mg/dL (2019). 5.  Proteinuria. 6.  Edema. We were asked to see the patient in regards to hypertension and chronic kidney disease. This is an 42-year-old white female followed by my partner, Dr. Arsalan Aden, last seen in the office in 2019 or 2019. She had a creatinine of 1.79 mg/dL at that time. She has been admitted to the hospital, underwent a right knee replacement. Her blood pressure was quite elevated postoperatively yesterday, into the 214 range. In addition, she was modestly hyperkalemic, now improved. Her creatinine appears to be at/near baseline. Her potassium has improved, as well, back to that which was noted 2019 (around the time of presentation). She is currently in bed, denies any nausea, vomiting, or shortness of breath. She says otherwise she is feeling reasonably well. CURRENT MEDICATIONS:  1. Amlodipine 10 mg daily. 2.  Carvedilol 6.25 mg b.i.d.  3.  Famotidine. 4.  Neurontin. 5.  Keppra. 6.  Pravastatin. 7.  Terazosin 5 mg at bedtime. PHYSICAL EXAMINATION:  GENERAL:  A pleasant elderly female in bed. VITAL SIGNS:  Most recent blood pressure documented is 131/53, pulse 55, respirations 28, temperature 97.4. HEENT:  Head is normocephalic. Eyes show no scleral icterus. NECK:  Appears supple. LUNGS:  Clear to auscultation. CARDIOVASCULAR:  Exam shows a rhythm which is regular. ABDOMEN:  Obese and soft. EXTREMITIES:  No left ankle edema. SKIN:  Warm to touch.   NEUROLOGIC:  She is awake and alert, answers questions appropriately. LABORATORY DATA:  Sodium 139, potassium 5.3, chloride and bicarb 117 and 14 (was 15 preoperatively), BUN and creatinine are 72 and 1.95. Calcium 8.5, magnesium 2.4. ASSESSMENT AND PLAN:  The patient's creatinine is at/near baseline. Her blood pressure is reasonable. She is a little bit bradycardic (heart rate mid-50s). When she was last seen in the office, her regimen included losartan and chlorthalidone, as well as amlodipine. She is not on the losartan or chlorthalidone currently. This may make her hyperkalemia somewhat problematic, but she has improved with a dose of furosemide. We would recommend the following at this juncture. Watch another day or so. Watch blood pressure; if her heart rate remains in the mid-50 range, her current regimen would appear to be acceptable. Hold on reinstituting therapy with losartan/chlorthalidone at present. I told her that if she is discharged Monday or Tuesday (my partners will swing by on Monday to see her), she will need to keep a close eye on her blood pressures at home, and probably need a set of labs next week as well. She also should probably be on a potassium-restricted diet currently. Thank you for the consult. I will check her labs tomorrow and her blood pressure. Dr. Siena Espinoza will be back Monday. If blood pressures are reasonable (130-140 with heart rate in the mid-50s) and creatinine is stable and she is not hyperkalemic, she can probably be discharged home tomorrow with the above guidance. Thank you for the consult.       MD MADI Ulrich/[email or web address]/  D:  08/24/2019 11:31  T:  08/24/2019 14:21  JOB #:  [id number]

## 2019-08-24 NOTE — PROGRESS NOTES
PHYSICAL THERAPY TREATMENT  Patient: Jose Luis Hyatt (10 y.o. female)  Date: 8/24/2019  Diagnosis: Primary osteoarthritis of right knee [M17.11] <principal problem not specified>  Procedure(s) (LRB):  RIGHT TOTAL KNEE ARTHROPLASTY MAKOPLASTY (Right) 2 Days Post-Op  Precautions: Bed Alarm, Fall, WBAT  Chart, physical therapy assessment, plan of care and goals were reviewed. ASSESSMENT  Based on the objective data described below, patient able to increase ambulatory distance to 150' with rolling walker, contact guard only. Gait pattern improving with decreased antalgia and increased step length. She is essentially mod I with bed mobility, contact when upright. Nearing full ext & flexion; swelling minimal.  Good exercise performance and follow thru. Pain as high as 7, tho no negative impact upon performance. No stairs at home. BP stable & within appropriate range today as taken from R forearm. Should be cleared to go home - from PT standpoint - following today's second treatment. Current Level of Function Impacting Discharge (mobility/balance): contact guard while upright    Other factors to consider for discharge: daughter to be with her upon return home - for 1 week. To OP PT         PLAN :  Patient continues to benefit from skilled intervention to address the above impairments. Continue treatment per established plan of care. to address goals.     Recommendation for discharge: (in order for the patient to meet his/her long term goals)  Outpatient physical therapy follow up recommended for       This discharge recommendation:  Has not yet been discussed the attending provider and/or case management    Equipment recommendations for successful discharge (if) home: none       SUBJECTIVE:   Patient stated My pain is up to a 7. when walking    OBJECTIVE DATA SUMMARY:   Critical Behavior:  Neurologic State: Alert  Orientation Level: Oriented to time  Cognition: Follows commands  Safety/Judgement: Awareness of environment, Good awareness of safety precautions  Functional Mobility Training:  Bed Mobility:  Rolling: Modified independent  Supine to Sit: Modified independent  Sit to Supine: Modified independent  Scooting: Modified independent        Transfers:  Sit to Stand: Contact guard assistance  Stand to Sit: Contact guard assistance(trouble controling descent)                             Balance:  Sitting: Intact  Standing: Intact; With support  Ambulation/Gait Training:  Distance (ft): 150 Feet (ft)  Assistive Device: Walker, rolling  Ambulation - Level of Assistance: Contact guard assistance        Gait Abnormalities: Antalgic;Decreased step clearance  Right Side Weight Bearing: As tolerated        Stance: Right decreased  Speed/Italia: Slow  Step Length: Right shortened;Left shortened                    Therapeutic Exercises: All supine and seated exercises performed with use of visual & verbal cues  Unable to SLR yet  Tolerating flexion & ext stretches well  Pain Rating:  As above    Activity Tolerance:   Good  Please refer to the flowsheet for vital signs taken during this treatment.     After treatment patient left in no apparent distress:   Sitting in chair    COMMUNICATION/COLLABORATION:   The patients plan of care was discussed with: Registered Nurse and Physician    Kenyetta Unger PT   Time Calculation: 32 mins

## 2019-08-24 NOTE — PROGRESS NOTES
Spoke with BODØ (nurse) about patient needing to be NPO after midnight and will do the renal study in the morning.

## 2019-08-24 NOTE — PROGRESS NOTES
Family Practice  Daily Progress Note: 8/24/2019  Nino Mujica MD    Assessment/Plan:   S/P Right total knee arthroplasty 8/22/19 - per ortho    CKD3 with moderate hyperkalemia  - recheck and monitor  - one dose lasix given and recheck labs    HTN - chronic, requiring multiple drugs  - nephrology consult    Seizure disorder  - cont Keppra    Asthma  - cont home asthma meds    MGUS  -monitor    Hyperlipidemia  -hold statin for now    Hx Basal Ganglia bleed 2/20/19 - tx at [facility]     Problem List:  Problem List as of 8/24/2019 Date Reviewed: 1/25/2018          Codes Class Noted - Resolved    Primary osteoarthritis of right knee ICD-10-CM: M17.11  ICD-9-CM: 715.16  8/22/2019 - Present        ICH (intracerebral hemorrhage) ([facility] ICD-10-CM: I61.9  ICD-9-CM: 833  2/20/2019 - Present        Paresthesia ICD-10-CM: R20.2  ICD-9-CM: 782.0  8/10/2017 - Present        Nonintractable epilepsy without status epilepticus ([facility] ICD-10-CM: G40.909  ICD-9-CM: 345.90  8/10/2017 - Present        ABIGAIL (obstructive sleep apnea) ICD-10-CM: G47.33  ICD-9-CM: 327.23  12/10/2014 - Present        RESOLVED: Convulsions ([facility] ICD-10-CM: R56.9  ICD-9-CM: 780.39  5/31/2016 - 8/10/2017        RESOLVED: Hx of seizure disorder ICD-10-CM: R24.50  ICD-9-CM: V12.49  5/31/2016 - 8/10/2017            Subjective:    [de-identified] yo WF, pt of Dr [employer] in our offices, who is 1 day s/p right TKR. We are asked to see her due to elevated K+ and for her other medical problems. Currently she reports little pain, has been up briefly and appetite is good. Review of her office note reveal longstanding HTN which has been difficult to control, requiring multiple medications and med changes. I suspect her BP will be difficult to control here as well. She has CKD3 with creat usually in 1.6-1.8 range and about the same here to slightly worse. She has a hx of Seizure disorder which has been stable on current med.   Her asthma has been stable lately, with her last flair being in 2019 associated with a URI/Sinusitis/Bronchitis. In addition, she has a hx of a Basal Ganglia bleed 19, was treated at [facility], and has recovered. Long term anticoags may not be in order. Hx of MGUS (monoclonal gammopathy of uncertain significance) which is being monitored outpt. Hyperlipidemia has been fairly well controlled with last LDL 77. Her potassium has been creeping up the last several days and this AM is 6.1; she has not had hyperkalemia outpt. For now will tx this simply with low dose of Lasix IV (10mg) one time since she is edematous and will decrease her fluids from 125cc/hr to 75cc/hr. Recheck of CMP is pending. EKG is ok showing no acute changes. Continue to monitor K+ - if it stays in the mid 5s we can just watch it. Also will not restart her Losartan as this could increase K+. For her BP will increase Amlodipine to 10mg a day and add prn hydralzine. : AM labs pending. K+ 5.7 yesterday. Will hold Losartan and increase Coreg. Continue Amlodipine. Will ask Nephrology to see for her BP that is much higher in  the right arm but not the left. Renal US this am. Knee is feeling pretty well. Some pain.    Past Medical History:   Diagnosis Date    Anxiety     Arthritis     Basal ganglia stroke ([facility] 2019    Left    CKD (chronic kidney disease) stage 3, GFR 30-59 ml/min ([facility])     High cholesterol     Hx of seasonal allergies     Hypertension     Ischemic stroke ([facility]     Right occipital    Monoclonal gammopathy 2018    ABIGAIL on CPAP     Seizures ([facility]     last keppra level- 2019 @ 15   Last seizure 2017    Urinary incontinence      Past Surgical History:   Procedure Laterality Date    HX APPENDECTOMY N/A     HX CATARACT REMOVAL Bilateral 2018    HX  SECTION N/A     x 2    HX HEMORRHOIDECTOMY      HX HYSTERECTOMY      HX TONSILLECTOMY       Social History     Socioeconomic History    Marital status:  Spouse name: Not on file    Number of children: Not on file    Years of education: Not on file    Highest education level: Not on file   Occupational History    Not on file   Social Needs    Financial resource strain: Not on file    Food insecurity:     Worry: Not on file     Inability: Not on file    Transportation needs:     Medical: Not on file     Non-medical: Not on file   Tobacco Use    Smoking status: Never Smoker    Smokeless tobacco: Never Used   Substance and Sexual Activity    Alcohol use: No    Drug use: No    Sexual activity: Not on file   Lifestyle    Physical activity:     Days per week: Not on file     Minutes per session: Not on file    Stress: Not on file   Relationships    Social connections:     Talks on phone: Not on file     Gets together: Not on file     Attends Cheondoism service: Not on file     Active member of club or organization: Not on file     Attends meetings of clubs or organizations: Not on file     Relationship status: Not on file    Intimate partner violence:     Fear of current or ex partner: Not on file     Emotionally abused: Not on file     Physically abused: Not on file     Forced sexual activity: Not on file   Other Topics Concern    Not on file   Social History Narrative    Not on file     Family History   Problem Relation Age of Onset    Hypertension Mother     Cancer Sister         Skin    Diabetes Sister     Hypertension Sister     Stroke Father     Parkinson's Disease Father     Cancer Sister         Skin    Cancer Sister     No Known Problems Brother      Allergies   Allergen Reactions    Codeine Other (comments)    Levaquin [Levofloxacin] Other (comments)     Hallucinations    Lisinopril Cough    Sulfa (Sulfonamide Antibiotics) Rash and Itching       Review of Systems:   A comprehensive review of systems was negative except for that written in the HPI.     Objective:   Physical Exam:     Visit Vitals  /42 (BP 1 Location: Right arm, BP Patient Position: At rest)   Pulse (!) 49   Temp 97.4 °F (36.3 °C)   Resp 17   Ht 5' 1.5\" (1.562 m)   Wt 192 lb 3.9 oz (87.2 kg)   SpO2 99%   BMI 35.74 kg/m²    O2 Flow Rate (L/min): 2 l/min O2 Device: Room air    Temp (24hrs), Av.5 °F (36.4 °C), Min:97.4 °F (36.3 °C), Max:97.9 °F (36.6 °C)    No intake/output data recorded.  1901 -  0700  In: -   Out: 850 [Urine:850]    General:  Alert, cooperative, no distress, appears stated age. Head:  Normocephalic, without obvious abnormality, atraumatic. Eyes:  Conjunctivae/corneas clear. PERRL, EOMs intact. Throat: Lips, mucosa, and tongue moist..   Neck: Supple, symmetrical, trachea midline, no adenopathy, thyroid: no enlargement/tenderness/nodules, no carotid bruit and no JVD. Lungs:   Clear to auscultation bilaterally. Chest wall:  No tenderness or deformity. Heart:  Regular rate and rhythm, S1, S2 normal, no murmur, click, rub or gallop. Abdomen:   Soft, non-tender. Bowel sounds normal. No masses,  No organomegaly. Extremities: no cyanosis. Trace edema pretibial bilat. No calf tenderness or cords. Right knee dressing dry. Painful to attempt to move right leg. Pulses: 2+ and symmetric all extremities. Skin: Skin color, texture, turgor normal. No rashes or lesions   Neurologic: CNII-XII intact. Alert and oriented X 3. Fine motor of hands and fingers normal.   equal.  No cogwheeling or rigidity. Gait not tested at this time. Sensation grossly normal to touch.   Gross motor of extremities otherwise normal.       Data Review:       Recent Days:  Recent Labs     19  0817 19  0529   WBC 12.7*  --    HGB 9.5* 10.0*   HCT 28.3*  --      --      Recent Labs     19  0956 19  0529 19  1145    142 143   K 5.7*  5.7* 6.1* 5.4*   * 121* 121*   CO2 13* 14* 15*   * 154* 103*   BUN 59* 61* 62*   CREA 1.88* 1.67* 1.86*   CA 8.2* 8.5 9.3   ALB 2.7*  --   --    TBILI 0.2  --   -- SGOT 74*  --   --    ALT 83*  --   --      No results for input(s): PH, PCO2, PO2, HCO3, FIO2 in the last 72 hours. 24 Hour Results:  Recent Results (from the past 24 hour(s))   POTASSIUM    Collection Time: 08/23/19  9:56 AM   Result Value Ref Range    Potassium 5.7 (H) 3.5 - 5.1 mmol/L   METABOLIC PANEL, COMPREHENSIVE    Collection Time: 08/23/19  9:56 AM   Result Value Ref Range    Sodium 140 136 - 145 mmol/L    Potassium 5.7 (H) 3.5 - 5.1 mmol/L    Chloride 120 (H) 97 - 108 mmol/L    CO2 13 (LL) 21 - 32 mmol/L    Anion gap 7 5 - 15 mmol/L    Glucose 216 (H) 65 - 100 mg/dL    BUN 59 (H) 6 - 20 MG/DL    Creatinine 1.88 (H) 0.55 - 1.02 MG/DL    BUN/Creatinine ratio 31 (H) 12 - 20      GFR est AA 31 (L) >60 ml/min/1.73m2    GFR est non-AA 26 (L) >60 ml/min/1.73m2    Calcium 8.2 (L) 8.5 - 10.1 MG/DL    Bilirubin, total 0.2 0.2 - 1.0 MG/DL    ALT (SGPT) 83 (H) 12 - 78 U/L    AST (SGOT) 74 (H) 15 - 37 U/L    Alk.  phosphatase 90 45 - 117 U/L    Protein, total 6.5 6.4 - 8.2 g/dL    Albumin 2.7 (L) 3.5 - 5.0 g/dL    Globulin 3.8 2.0 - 4.0 g/dL    A-G Ratio 0.7 (L) 1.1 - 2.2     EKG, 12 LEAD, INITIAL    Collection Time: 08/23/19 10:38 AM   Result Value Ref Range    Ventricular Rate 61 BPM    Atrial Rate 61 BPM    P-R Interval 192 ms    QRS Duration 92 ms    Q-T Interval 408 ms    QTC Calculation (Bezet) 410 ms    Calculated P Axis 46 degrees    Calculated R Axis 11 degrees    Calculated T Axis 25 degrees    Diagnosis       Normal sinus rhythm  Normal ECG  When compared with ECG of 07-AUG-2019 16:23,  No significant change was found  Confirmed by Rishi Segovia MD. ([id number]) on 8/23/2019 12:55:02 PM     CBC WITH AUTOMATED DIFF    Collection Time: 08/24/19  8:17 AM   Result Value Ref Range    WBC 12.7 (H) 3.6 - 11.0 K/uL    RBC 2.97 (L) 3.80 - 5.20 M/uL    HGB 9.5 (L) 11.5 - 16.0 g/dL    HCT 28.3 (L) 35.0 - 47.0 %    MCV 95.3 80.0 - 99.0 FL    MCH 32.0 26.0 - 34.0 PG    MCHC 33.6 30.0 - 36.5 g/dL    RDW 12.6 11.5 - 14.5 % PLATELET 975 995 - 199 K/uL    MPV 9.4 8.9 - 12.9 FL    NRBC 0.0 0  WBC    ABSOLUTE NRBC 0.00 0.00 - 0.01 K/uL    NEUTROPHILS 81 (H) 32 - 75 %    LYMPHOCYTES 10 (L) 12 - 49 %    MONOCYTES 8 5 - 13 %    EOSINOPHILS 0 0 - 7 %    BASOPHILS 0 0 - 1 %    IMMATURE GRANULOCYTES 1 (H) 0.0 - 0.5 %    ABS. NEUTROPHILS 10.3 (H) 1.8 - 8.0 K/UL    ABS. LYMPHOCYTES 1.3 0.8 - 3.5 K/UL    ABS. MONOCYTES 1.0 0.0 - 1.0 K/UL    ABS. EOSINOPHILS 0.0 0.0 - 0.4 K/UL    ABS. BASOPHILS 0.0 0.0 - 0.1 K/UL    ABS. IMM.  GRANS. 0.1 (H) 0.00 - 0.04 K/UL    DF AUTOMATED         Medications reviewed  Current Facility-Administered Medications   Medication Dose Route Frequency    amLODIPine (NORVASC) tablet 10 mg  10 mg Oral DAILY    labetalol (NORMODYNE;TRANDATE) 20 mg/4 mL (5 mg/mL) injection 20 mg  20 mg IntraVENous Q4H PRN    losartan (COZAAR) tablet 100 mg  100 mg Oral DAILY    sodium chloride (NS) flush 5-40 mL  5-40 mL IntraVENous Q8H    sodium chloride (NS) flush 5-40 mL  5-40 mL IntraVENous PRN    acetaminophen (TYLENOL) tablet 650 mg  650 mg Oral Q6H    oxyCODONE IR (ROXICODONE) tablet 5 mg  5 mg Oral Q3H PRN    oxyCODONE IR (ROXICODONE) tablet 10 mg  10 mg Oral Q3H PRN    naloxone (NARCAN) injection 0.4 mg  0.4 mg IntraVENous PRN    senna-docusate (PERICOLACE) 8.6-50 mg per tablet 1 Tab  1 Tab Oral BID    polyethylene glycol (MIRALAX) packet 17 g  17 g Oral DAILY    bisacodyl (DULCOLAX) suppository 10 mg  10 mg Rectal DAILY PRN    gabapentin (NEURONTIN) capsule 100 mg  100 mg Oral DAILY WITH BREAKFAST    gabapentin (NEURONTIN) capsule 300 mg  300 mg Oral QHS    aspirin delayed-release tablet 81 mg  81 mg Oral BID    terazosin (HYTRIN) capsule 5 mg  5 mg Oral QHS    carvedilol (COREG) tablet 3.125 mg  3.125 mg Oral BID WITH MEALS    levETIRAcetam (KEPPRA) tablet 500 mg  500 mg Oral BID    pravastatin (PRAVACHOL) tablet 40 mg  40 mg Oral QHS    montelukast (SINGULAIR) tablet 10 mg  10 mg Oral DAILY    fluticasone propionate (FLONASE) 50 mcg/actuation nasal spray 1 Spray  1 Spray Both Nostrils QHS    carboxymethylcellulose sodium (CELLUVISC) 1 % ophthalmic solution 1 Drop  1 Drop Both Eyes BID PRN    famotidine (PEPCID) tablet 20 mg  20 mg Oral DAILY     Care Plan discussed with: Patient and Nurse  Total time spent with patient: 30 minutes.   Nolberto Powell MD

## 2019-08-24 NOTE — PROGRESS NOTES
Bedside and Verbal shift change report given to [place] (oncoming nurse) by BODØ (offgoing nurse). Report included the following information SBAR, Kardex, ED Summary, Procedure Summary, Intake/Output, MAR, Recent Results and Cardiac Rhythm sinus vanessa. 0800 Dr Stephie Ibrahim on floor, aware of asymptomatic bradycardia, no new orders. Verbal order for STAT BMP/CBC/Mag labs. Verbal order to start regular low K diet after US, pt has been NPO since midnight for US. MD defers to ortho for DVT prophylaxis. Purewick in place. Verbal order for nephrology consult per pt request, pt of [facility]    [phone] losartan dcd by MD 2/2 K increase, increased coreg    [phone] pt off floor to [address], tele notified. Call from Jolyn Krabbe in lab, CO2 14, Dr Stephie Ibrahim notified, no new orders    [phone] pt back on floor, tele notified    1102 Dr Lauren Moreno notified of bradycardia, telephone order to hold coreg. MD aware of VS, parameters to be placed on coreg, hold if HR<55    1127 Dr Sofia Carrero from nephrology at bedside, aware of bradycardia, BPs    1141 Pt at bedside    [address] at bedside, given update    1424 prn roxicodone given for c/o knee pain. 12 son given phone update with pt permission, sister at bedside    [id number] unable to get oral or axillary temp. Rectal temp 94.3. Dr Lauren Moreno notified, order to transfer to ICU for [facility] hugger    1620 pt wrapped in blankets, thermostat increased     [address] for Dr Faustina Lan given update, will update surgeon    0323 recheck temp 94.2 rectal, ICU RN aware    TRANSFER - OUT REPORT:    Verbal report given to [address] [place] [address] (name) on [facility]  being transferred to ICU(unit) for change in patient condition(hypothermia)       Report consisted of patients Situation, Background, Assessment and   Recommendations(SBAR).      Information from the following report(s) SBAR, Kardex, Procedure Summary, Intake/Output, MAR, Recent Results and Cardiac Rhythm sinus vanessa was reviewed with the receiving nurse.    Lines:   Peripheral IV 08/22/19 Left Forearm (Active)   Site Assessment Clean, dry, & intact 8/24/2019  8:18 AM   Phlebitis Assessment 0 8/24/2019  8:18 AM   Infiltration Assessment 0 8/24/2019  8:18 AM   Dressing Status Clean, dry, & intact 8/24/2019  8:18 AM   Dressing Type Transparent 8/24/2019  8:18 AM   Hub Color/Line Status Pink;Capped;Flushed 8/24/2019  8:18 AM   Action Taken Open ports on tubing capped 8/24/2019  8:18 AM   Alcohol Cap Used Yes 8/24/2019  8:18 AM        Opportunity for questions and clarification was provided. Patient transported with:   Monitor  Registered Nurse     Pt and all belongings moved to ICU 6.  ICU RN aware that no labs are ordered for tomorrow am.

## 2019-08-24 NOTE — PROGRESS NOTES
PHYSICAL THERAPY TREATMENT  Patient: Darryn Stevens (74 y.o. female)  Date: 8/24/2019  Diagnosis: Primary osteoarthritis of right knee [M17.11] <principal problem not specified>  Procedure(s) (LRB):  RIGHT TOTAL KNEE ARTHROPLASTY MAKOPLASTY (Right) 2 Days Post-Op  Precautions: Bed Alarm, Fall, WBAT  Chart, physical therapy assessment, plan of care and goals were reviewed. ASSESSMENT  Based on the objective data described below, patient is ready for discharge from PT standpoint. She ambulates with contact guard for safety, but requires no physical assist.  Has doubled amb distance this pm.  Vitals are stable and no complaints with exception of pain at 6 with movement. Good ROM    Current Level of Function Impacting Discharge (mobility/balance): good functional mobility at standby level when initially at home. Will have assist in short term    Other factors to consider for discharge: lives alone         PLAN :  Patient continues to benefit from skilled intervention to address the above impairments. Continue treatment per established plan of care. to address goals. Recommendation for discharge: (in order for the patient to meet his/her long term goals)  Outpatient physical therapy follow up recommended for per protocol     This discharge recommendation:  Has not yet been discussed the attending provider and/or case management    Equipment recommendations for successful discharge (if) home: none       SUBJECTIVE:   Patient stated I've been doing my exercises.     OBJECTIVE DATA SUMMARY:   Critical Behavior:  Neurologic State: Alert  Orientation Level: Oriented to time  Cognition: Follows commands  Safety/Judgement: Awareness of environment, Good awareness of safety precautions  Functional Mobility Training:  Bed Mobility:  Rolling: Modified independent  Supine to Sit: Modified independent  Sit to Supine: Modified independent  Scooting: Modified independent        Transfers:  Sit to Stand: Contact guard assistance  Stand to Sit: Contact guard assistance                             Balance:  Sitting: Intact  Standing: Intact; With support  Ambulation/Gait Training:  Distance (ft): 300 Feet (ft)  Assistive Device: Walker, rolling  Ambulation - Level of Assistance: Contact guard assistance        Gait Abnormalities: Antalgic;Decreased step clearance  Right Side Weight Bearing: As tolerated        Stance: Right decreased  Speed/Italia: Slow  Step Length: Right shortened;Left shortened                    Therapeutic Exercises:   NA this session, tho reminded to complete  Pain Ratin    Activity Tolerance:   Good  Please refer to the flowsheet for vital signs taken during this treatment.     After treatment patient left in no apparent distress:   Supine in bed    COMMUNICATION/COLLABORATION:   The patients plan of care was discussed with: Registered Nurse    Liliana Chandler, PT   Time Calculation: 13 mins

## 2019-08-25 LAB
ANION GAP SERPL CALC-SCNC: 7 MMOL/L (ref 5–15)
BASOPHILS # BLD: 0 K/UL (ref 0–0.1)
BASOPHILS NFR BLD: 0 % (ref 0–1)
BUN SERPL-MCNC: 74 MG/DL (ref 6–20)
BUN/CREAT SERPL: 40 (ref 12–20)
CALCIUM SERPL-MCNC: 8.3 MG/DL (ref 8.5–10.1)
CHLORIDE SERPL-SCNC: 119 MMOL/L (ref 97–108)
CO2 SERPL-SCNC: 15 MMOL/L (ref 21–32)
CREAT SERPL-MCNC: 1.86 MG/DL (ref 0.55–1.02)
DIFFERENTIAL METHOD BLD: ABNORMAL
EOSINOPHIL # BLD: 0 K/UL (ref 0–0.4)
EOSINOPHIL NFR BLD: 0 % (ref 0–7)
ERYTHROCYTE [DISTWIDTH] IN BLOOD BY AUTOMATED COUNT: 12.7 % (ref 11.5–14.5)
GLUCOSE SERPL-MCNC: 127 MG/DL (ref 65–100)
HCT VFR BLD AUTO: 27.1 % (ref 35–47)
HGB BLD-MCNC: 8.8 G/DL (ref 11.5–16)
IMM GRANULOCYTES # BLD AUTO: 0.1 K/UL (ref 0–0.04)
IMM GRANULOCYTES NFR BLD AUTO: 1 % (ref 0–0.5)
LYMPHOCYTES # BLD: 1.4 K/UL (ref 0.8–3.5)
LYMPHOCYTES NFR BLD: 12 % (ref 12–49)
MAGNESIUM SERPL-MCNC: 2.6 MG/DL (ref 1.6–2.4)
MCH RBC QN AUTO: 31.4 PG (ref 26–34)
MCHC RBC AUTO-ENTMCNC: 32.5 G/DL (ref 30–36.5)
MCV RBC AUTO: 96.8 FL (ref 80–99)
MONOCYTES # BLD: 1.1 K/UL (ref 0–1)
MONOCYTES NFR BLD: 9 % (ref 5–13)
NEUTS SEG # BLD: 9.4 K/UL (ref 1.8–8)
NEUTS SEG NFR BLD: 78 % (ref 32–75)
NRBC # BLD: 0 K/UL (ref 0–0.01)
NRBC BLD-RTO: 0 PER 100 WBC
PHOSPHATE SERPL-MCNC: 3.6 MG/DL (ref 2.6–4.7)
PLATELET # BLD AUTO: 197 K/UL (ref 150–400)
PMV BLD AUTO: 9.8 FL (ref 8.9–12.9)
POTASSIUM SERPL-SCNC: 5.3 MMOL/L (ref 3.5–5.1)
RBC # BLD AUTO: 2.8 M/UL (ref 3.8–5.2)
SODIUM SERPL-SCNC: 141 MMOL/L (ref 136–145)
WBC # BLD AUTO: 12 K/UL (ref 3.6–11)

## 2019-08-25 PROCEDURE — 74011250637 HC RX REV CODE- 250/637: Performed by: PHYSICIAN ASSISTANT

## 2019-08-25 PROCEDURE — 74011250637 HC RX REV CODE- 250/637: Performed by: ORTHOPAEDIC SURGERY

## 2019-08-25 PROCEDURE — 65270000029 HC RM PRIVATE

## 2019-08-25 PROCEDURE — 74011250637 HC RX REV CODE- 250/637: Performed by: FAMILY MEDICINE

## 2019-08-25 PROCEDURE — 80048 BASIC METABOLIC PNL TOTAL CA: CPT

## 2019-08-25 PROCEDURE — 83735 ASSAY OF MAGNESIUM: CPT

## 2019-08-25 PROCEDURE — 85025 COMPLETE CBC W/AUTO DIFF WBC: CPT

## 2019-08-25 PROCEDURE — 97530 THERAPEUTIC ACTIVITIES: CPT | Performed by: PHYSICAL THERAPIST

## 2019-08-25 PROCEDURE — 84100 ASSAY OF PHOSPHORUS: CPT

## 2019-08-25 PROCEDURE — 36415 COLL VENOUS BLD VENIPUNCTURE: CPT

## 2019-08-25 PROCEDURE — 97116 GAIT TRAINING THERAPY: CPT | Performed by: PHYSICAL THERAPIST

## 2019-08-25 PROCEDURE — 77030038269 HC DRN EXT URIN PURWCK BARD -A

## 2019-08-25 RX ADMIN — GABAPENTIN 100 MG: 100 CAPSULE ORAL at 09:14

## 2019-08-25 RX ADMIN — LEVETIRACETAM 500 MG: 500 TABLET ORAL at 21:55

## 2019-08-25 RX ADMIN — SENNOSIDES,DOCUSATE SODIUM 1 TABLET: 8.6; 5 TABLET, FILM COATED ORAL at 09:14

## 2019-08-25 RX ADMIN — MONTELUKAST SODIUM 10 MG: 10 TABLET, COATED ORAL at 21:59

## 2019-08-25 RX ADMIN — ASPIRIN 81 MG: 81 TABLET, COATED ORAL at 18:13

## 2019-08-25 RX ADMIN — ACETAMINOPHEN 650 MG: 325 TABLET ORAL at 05:38

## 2019-08-25 RX ADMIN — FAMOTIDINE 20 MG: 20 TABLET ORAL at 09:14

## 2019-08-25 RX ADMIN — ACETAMINOPHEN 650 MG: 325 TABLET ORAL at 18:13

## 2019-08-25 RX ADMIN — LEVETIRACETAM 500 MG: 500 TABLET ORAL at 09:14

## 2019-08-25 RX ADMIN — AMLODIPINE BESYLATE 10 MG: 5 TABLET ORAL at 09:14

## 2019-08-25 RX ADMIN — TERAZOSIN HYDROCHLORIDE 5 MG: 5 CAPSULE ORAL at 21:55

## 2019-08-25 RX ADMIN — ASPIRIN 81 MG: 81 TABLET, COATED ORAL at 09:14

## 2019-08-25 RX ADMIN — PRAVASTATIN SODIUM 40 MG: 20 TABLET ORAL at 21:55

## 2019-08-25 RX ADMIN — CARVEDILOL 6.25 MG: 6.25 TABLET, FILM COATED ORAL at 18:13

## 2019-08-25 RX ADMIN — ACETAMINOPHEN 650 MG: 325 TABLET ORAL at 12:07

## 2019-08-25 RX ADMIN — POLYETHYLENE GLYCOL 3350 17 G: 17 POWDER, FOR SOLUTION ORAL at 09:14

## 2019-08-25 RX ADMIN — GABAPENTIN 300 MG: 300 CAPSULE ORAL at 21:55

## 2019-08-25 RX ADMIN — CARVEDILOL 6.25 MG: 6.25 TABLET, FILM COATED ORAL at 09:14

## 2019-08-25 RX ADMIN — Medication 10 ML: at 21:59

## 2019-08-25 RX ADMIN — OXYCODONE HYDROCHLORIDE 10 MG: 5 TABLET ORAL at 20:13

## 2019-08-25 NOTE — PROGRESS NOTES
@0700 Bedside and Verbal shift change report given to Kaity Eduardo RN (oncoming nurse) by Sherren Squibb RN (offgoing nurse). Report included the following information SBAR, Kardex, ED Summary, OR Summary, Procedure Summary, Intake/Output, MAR, Accordion, Recent Results, Med Rec Status, Cardiac Rhythm Sinus and Alarm Parameters . Overnight events reviewed with night RN. No ectopy noted on scope overnight.  K 5.3, Mag 2.6, Creatinine 1.86.  ml overnight (0.5 ml/kg/H). Temp normal at 98.1 regan hugger was discontinue shortly after transfer and patient temp remain stable overnight. @9898 Dr. Esperanza Jc arrives and exams the patient. Discontinues the regan hugger and transfer to surgical floor. @7461 TRANSFER - OUT REPORT:    Verbal report given to Deya TALLEY(name) on Kaylan Avery  being transferred to [facility] for routine progression of care       Report consisted of patients Situation, Background, Assessment and   Recommendations(SBAR). Information from the following report(s) SBAR, Kardex, OR Summary, Procedure Summary, Intake/Output, MAR, Accordion, Recent Results, Med Rec Status, Cardiac Rhythm Sinus and Alarm Parameters  was reviewed with the receiving nurse. Lines:   Peripheral IV 08/22/19 Left Forearm (Active)   Site Assessment Clean, dry, & intact 8/25/2019 12:10 PM   Phlebitis Assessment 0 8/25/2019 12:10 PM   Infiltration Assessment 0 8/25/2019 12:10 PM   Dressing Status Clean, dry, & intact 8/25/2019 12:10 PM   Dressing Type Transparent 8/25/2019 12:10 PM   Hub Color/Line Status Pink;Capped;Flushed 8/25/2019 12:10 PM   Action Taken Open ports on tubing capped 8/25/2019 12:10 PM   Alcohol Cap Used Yes 8/25/2019 12:10 PM        Opportunity for questions and clarification was provided.       Patient transported with:   Monitor  Tech

## 2019-08-25 NOTE — PROGRESS NOTES
TOTAL KNEE ARTHROPLASTY DAILY NOTE     ASSESSMENT / PLAN :   1. Pain Control : Acceptable - Some pain at times, but the patient does not wish to increase their medications  2. Overnight Issues : transferred to ICU for [facility], BP/HR stable,    3. Wound or incisional issue : Healing incision with no visible drainage  4. Therapy / Weight Bearing Recommendations : Weight bear as tolerated with use of a walker and two person assist while mobilizing  5. DVT Prophylaxis : Mechanical and Aspirin and mechanical lower extremity compression device  6. Disposition : Discharge home with scheduled outpatient PT once medically cleared. 7. Medical Concerns : CKD (elevated BUN/Cr - improved from yesterday. Nephrology consulted. Mildly hyperkalemic (Treated with lasix)  8. Comments : Dr. Aileen Welch (Nephrology) recommends monitoring patient with consideration for possible discharge Sunday vs Monday     POD  2 Days Post-Op s/p Procedure(s):  RIGHT TOTAL KNEE ARTHROPLASTY MAKOPLASTY  Acute post-operative blood-loss anemia - HgB 8.8 - expected/asymptomatic     SUBJECTIVE :     Overnight complaints : none reported. OBJECTIVE :     Vitals:    08/25/19 0300 08/25/19 0400 08/25/19 0500 08/25/19 0702   BP: 125/49 120/48 139/55    Pulse: 63 63 61 66   Resp: 23 15 17    Temp:  98.2 °F (36.8 °C)     SpO2: 97% 98% 98%    Weight:       Height:           Alert and oriented x3. Examination of the right knee reveals that the dressing is clean, dry and intact. Motor Exam : Pt is able to perform a straight leg raise. Sensation is intact to light touch. No calf pain.      Labs:  Recent Labs     08/25/19  0448   HGB 8.8*   HCT 27.1*      K 5.3*   *   CO2 15*   BUN 74*   CREA 1.86*   *      Patient mobility  Gait  Base of Support: Narrowed  Speed/Italia: Slow  Step Length: Right shortened, Left shortened  Stance: Right decreased  Gait Abnormalities: Antalgic, Decreased step clearance  Ambulation - Level of Assistance: Contact guard assistance  Distance (ft): 300 Feet (ft)  Assistive Device: Walker, rolling  Stairs - Level of Assistance: (NT)        Lidia Benitez MD  Cell [phone]  Araceli Medrano PA-C  Cell [phone]  Medical Assistants: [address] [place] [address] [phone]                 Surgery Scheduler: Haley Mckeon, [address] [place] [id number]

## 2019-08-25 NOTE — PROGRESS NOTES
Family Practice  Daily Progress Note: 8/25/2019  Eusebia Eller MD    Assessment/Plan:   S/P Right total knee arthroplasty 8/22/19 - per ortho    CKD3 with moderate hyperkalemia  - recheck and monitor     HTN - chronic, requiring multiple drugs  - nephrology consult    Seizure disorder  - cont Keppra    Asthma  - cont home asthma meds    MGUS  -monitor    Hyperlipidemia  -hold statin for now    Hx Basal Ganglia bleed 2/20/19 - tx at [facility]    Episode of Hypothermia - unk cause       Problem List:  Problem List as of 8/25/2019 Date Reviewed: 1/25/2018          Codes Class Noted - Resolved    Primary osteoarthritis of right knee ICD-10-CM: M17.11  ICD-9-CM: 715.16  8/22/2019 - Present        ICH (intracerebral hemorrhage) ([facility] ICD-10-CM: I61.9  ICD-9-CM: 741  2/20/2019 - Present        Paresthesia ICD-10-CM: R20.2  ICD-9-CM: 782.0  8/10/2017 - Present        Nonintractable epilepsy without status epilepticus ([facility] ICD-10-CM: G40.909  ICD-9-CM: 345.90  8/10/2017 - Present        ABIGAIL (obstructive sleep apnea) ICD-10-CM: G47.33  ICD-9-CM: 327.23  12/10/2014 - Present        RESOLVED: Convulsions ([facility] ICD-10-CM: R56.9  ICD-9-CM: 780.39  5/31/2016 - 8/10/2017        RESOLVED: Hx of seizure disorder ICD-10-CM: Q62.78  ICD-9-CM: V12.49  5/31/2016 - 8/10/2017            Subjective:    [de-identified] yo WF, pt of Dr Rich Marsh in our offices, who is 1 day s/p right TKR. We are asked to see her due to elevated K+ and for her other medical problems. Currently she reports little pain, has been up briefly and appetite is good. Review of her office notes reveals longstanding HTN which has been difficult to control, requiring multiple medications and med changes. I suspect her BP will be difficult to control here as well. She has CKD3 with creat usually in 1.6-1.8 range and about the same here to slightly worse. She has a hx of Seizure disorder which has been stable on current med.   Her asthma has been stable lately, with her last flair being in March 2019 associated with a URI/Sinusitis/Bronchitis. In addition, she has a hx of a Basal Ganglia bleed 2/20/19, was treated at [facility], and has recovered. Long term anticoags may not be in order. Hx of MGUS (monoclonal gammopathy of uncertain significance) which is being monitored outpt. Hyperlipidemia has been fairly well controlled with last LDL 77. Her potassium has been creeping up the last several days and this AM is 6.1; she has not had hyperkalemia outpt. For now will tx this simply with low dose of Lasix IV (10mg) one time since she is edematous and will decrease her fluids from 125cc/hr to 75cc/hr. Recheck of CMP is pending. EKG is ok showing no acute changes. Continue to monitor K+ - if it stays in the mid 5s we can just watch it. Also will not restart her Losartan as this could increase K+. For her BP will increase Amlodipine to 10mg a day and add prn hydralzine. 8/24: AM labs pending. K+ 5.7 yesterday. Will hold Losartan and increase Coreg. Continue Amlodipine. Will ask Nephrology to see for her BP that is much higher in  the right arm but not the left. Renal US this am. Knee is feeling pretty well. Some pain. 8/25:   \"Feeling pretty good\" she reports. No complaints today. Transferred to unit due to persistent low temp and need for bear hugger, but;  in ICU temp has been normal.  WBC about the same. She can go back to ortho floor today. She is stable for rehab. K+ 5.3. Hb 8.8 - watching.        Past Medical History:   Diagnosis Date    Anxiety     Arthritis     Basal ganglia stroke ([facility] 02/20/2019    Left    CKD (chronic kidney disease) stage 3, GFR 30-59 ml/min ([facility])     High cholesterol     Hx of seasonal allergies     Hypertension     Ischemic stroke ([address]     Right occipital    Monoclonal gammopathy 2018    ABIGAIL on CPAP     Seizures ([facility]     last keppra level- 8/2019 @ 15   Last seizure 2017    Urinary incontinence Past Surgical History:   Procedure Laterality Date    HX APPENDECTOMY N/A     HX CATARACT REMOVAL Bilateral 2018    HX  SECTION N/A     x 2    HX HEMORRHOIDECTOMY      HX HYSTERECTOMY      HX TONSILLECTOMY       Social History     Socioeconomic History    Marital status:      Spouse name: Not on file    Number of children: Not on file    Years of education: Not on file    Highest education level: Not on file   Occupational History    Not on file   Social Needs    Financial resource strain: Not on file    Food insecurity:     Worry: Not on file     Inability: Not on file    Transportation needs:     Medical: Not on file     Non-medical: Not on file   Tobacco Use    Smoking status: Never Smoker    Smokeless tobacco: Never Used   Substance and Sexual Activity    Alcohol use: No    Drug use: No    Sexual activity: Not on file   Lifestyle    Physical activity:     Days per week: Not on file     Minutes per session: Not on file    Stress: Not on file   Relationships    Social connections:     Talks on phone: Not on file     Gets together: Not on file     Attends Jainism service: Not on file     Active member of club or organization: Not on file     Attends meetings of clubs or organizations: Not on file     Relationship status: Not on file    Intimate partner violence:     Fear of current or ex partner: Not on file     Emotionally abused: Not on file     Physically abused: Not on file     Forced sexual activity: Not on file   Other Topics Concern    Not on file   Social History Narrative    Not on file     Family History   Problem Relation Age of Onset    Hypertension Mother     Cancer Sister         Skin    Diabetes Sister     Hypertension Sister     Stroke Father     Parkinson's Disease Father     Cancer Sister         Skin    Cancer Sister     No Known Problems Brother      Allergies   Allergen Reactions    Codeine Other (comments)    Levaquin [Levofloxacin] Other (comments)     Hallucinations    Lisinopril Cough    Sulfa (Sulfonamide Antibiotics) Rash and Itching       Review of Systems:   A comprehensive review of systems was negative except for that written in the HPI. Objective:   Physical Exam:     Visit Vitals  /48   Pulse (!) 59   Temp 97.9 °F (36.6 °C)   Resp 13   Ht 5' 1.5\" (1.562 m)   Wt 87.2 kg (192 lb 3.9 oz)   SpO2 97%   BMI 35.74 kg/m²    O2 Flow Rate (L/min): 2 l/min O2 Device: Room air    Temp (24hrs), Av.6 °F (35.9 °C), Min:94.2 °F (34.6 °C), Max:98.2 °F (36.8 °C)    701 - 1900  In: -   Out: 500 [Urine:500]   1901 -  0700  In: 660 [P.O.:660]  Out: 1800 [Urine:1800]    General:  Alert, cooperative, no distress, appears stated age. Head:  Normocephalic, without obvious abnormality, atraumatic. Eyes:  Conjunctivae/corneas clear. PERRL, EOMs intact. Throat: Lips, mucosa, and tongue moist..   Neck: Supple, symmetrical, trachea midline, no adenopathy, thyroid: no enlargement/tenderness/nodules, no carotid bruit and no JVD. Lungs:   Clear to auscultation bilaterally. Chest wall:  No tenderness or deformity. Heart:  Regular rate and rhythm, S1, S2 normal, no murmur, click, rub or gallop. Abdomen:   Soft, non-tender. Bowel sounds normal. No masses,  No organomegaly. Extremities: no cyanosis. Trace edema pretibial bilat. No calf tenderness or cords. Right knee dressing dry. Painful to attempt to move right leg. Pulses: 2+ and symmetric all extremities. Skin:  turgor normal. No rashes or lesions   Neurologic: CNII-XII intact. Alert and oriented X 3. Fine motor of hands and fingers normal.   equal.  No cogwheeling or rigidity. Gait not tested at this time. Sensation grossly normal to touch.   Gross motor of extremities otherwise normal.       Data Review:       Recent Days:  Recent Labs     19  0448 19  0817 19  0529   WBC 12.0* 12.7*  --    HGB 8.8* 9.5* 10.0*   HCT 27.1* 28.3*  --     190  --      Recent Labs     08/25/19  0448 08/24/19  0817 08/23/19  0956    139 140   K 5.3* 5.3* 5.7*  5.7*   * 117* 120*   CO2 15* 14* 13*   * 130* 216*   BUN 74* 72* 59*   CREA 1.86* 1.95* 1.88*   CA 8.3* 8.5 8.2*   MG 2.6* 2.4  --    PHOS 3.6  --   --    ALB  --   --  2.7*   TBILI  --   --  0.2   SGOT  --   --  74*   ALT  --   --  83*     No results for input(s): PH, PCO2, PO2, HCO3, FIO2 in the last 72 hours.     24 Hour Results:  Recent Results (from the past 24 hour(s))   DUPLEX RENAL ART/CANDIE BILATERAL    Collection Time: 08/24/19 10:17 AM   Result Value Ref Range    Right renal lower parenchyma max 21.5 cm/s    Right renal lower parenchyma min 0.0 cm/s    Right renal middle parenchyma max 34.5 cm/s    Right renal middle parenchyma min 6.3 cm/s    Right renal upper parenchyma max 38.8 cm/s    Right renal upper parenchyma min 6.3 cm/s    Left renal lower parenchyma max 28.4 cm/s    Left renal lower parenchyma min 0.0 cm/s    Left renal middle parenchyma max 36.1 cm/s    Left renal middle parenchyma min 0.0 cm/s    Left renal upper parenchyma max 42.7 cm/s    Left renal upper parenchyma min 8.7 cm/s    Left renal dist sys 113.9 cm/s    Left renal dist encarnacion 19.5 cm/s    Left renal mid sys 102.3 cm/s    Left renal mid encarnacion 13.5 cm/s    Left renal prox sys 113.9 cm/s    Left renal prox encarnacion 15.1 cm/s    Abdominal prox aorta chaitanya 171.1 cm/s    Prox SMA .4 cm/s    Celiac .4 cm/s    Prox aortic AP 0.94 cm    Prox aortic trans 1.50 cm    Right kidney length 8.50 cm    Right kidney width 4.35 cm    Left kidney length 11.29 cm    Left kidney width 5.14 cm    Right renal upper parenchyma RI 0.84     Right renal middle parenchyma RI 0.82     Right renal lower parenchyma RI 1.00     Left renal prox rar 0.67     Left renal mid rar 0.60     Left renal dist rar 0.67     Left renal upper parenchyma RI 0.80     Left renal middle parenchyma RI 1.00     Left renal lower parenchyma RI 1.00     Left Renal Prox RI 0.87     Left Renal Mid RI 0.87     Left Renal Dist RI 0.83    URINALYSIS W/ REFLEX CULTURE    Collection Time: 08/24/19  5:54 PM   Result Value Ref Range    Color YELLOW/STRAW      Appearance CLOUDY (A) CLEAR      Specific gravity 1.009 1.003 - 1.030      pH (UA) 5.5 5.0 - 8.0      Protein 100 (A) NEG mg/dL    Glucose NEGATIVE  NEG mg/dL    Ketone NEGATIVE  NEG mg/dL    Bilirubin NEGATIVE  NEG      Blood NEGATIVE  NEG      Urobilinogen 0.2 0.2 - 1.0 EU/dL    Nitrites NEGATIVE  NEG      Leukocyte Esterase NEGATIVE  NEG      WBC 0-4 0 - 4 /hpf    RBC 0-5 0 - 5 /hpf    Epithelial cells FEW FEW /lpf    Bacteria NEGATIVE  NEG /hpf    UA:UC IF INDICATED CULTURE NOT INDICATED BY UA RESULT CNI      Hyaline cast 0-2 0 - 5 /lpf   CBC WITH AUTOMATED DIFF    Collection Time: 08/25/19  4:48 AM   Result Value Ref Range    WBC 12.0 (H) 3.6 - 11.0 K/uL    RBC 2.80 (L) 3.80 - 5.20 M/uL    HGB 8.8 (L) 11.5 - 16.0 g/dL    HCT 27.1 (L) 35.0 - 47.0 %    MCV 96.8 80.0 - 99.0 FL    MCH 31.4 26.0 - 34.0 PG    MCHC 32.5 30.0 - 36.5 g/dL    RDW 12.7 11.5 - 14.5 %    PLATELET 814 860 - 356 K/uL    MPV 9.8 8.9 - 12.9 FL    NRBC 0.0 0  WBC    ABSOLUTE NRBC 0.00 0.00 - 0.01 K/uL    NEUTROPHILS 78 (H) 32 - 75 %    LYMPHOCYTES 12 12 - 49 %    MONOCYTES 9 5 - 13 %    EOSINOPHILS 0 0 - 7 %    BASOPHILS 0 0 - 1 %    IMMATURE GRANULOCYTES 1 (H) 0.0 - 0.5 %    ABS. NEUTROPHILS 9.4 (H) 1.8 - 8.0 K/UL    ABS. LYMPHOCYTES 1.4 0.8 - 3.5 K/UL    ABS. MONOCYTES 1.1 (H) 0.0 - 1.0 K/UL    ABS. EOSINOPHILS 0.0 0.0 - 0.4 K/UL    ABS. BASOPHILS 0.0 0.0 - 0.1 K/UL    ABS. IMM.  GRANS. 0.1 (H) 0.00 - 0.04 K/UL    DF AUTOMATED     METABOLIC PANEL, BASIC    Collection Time: 08/25/19  4:48 AM   Result Value Ref Range    Sodium 141 136 - 145 mmol/L    Potassium 5.3 (H) 3.5 - 5.1 mmol/L    Chloride 119 (H) 97 - 108 mmol/L    CO2 15 (LL) 21 - 32 mmol/L    Anion gap 7 5 - 15 mmol/L    Glucose 127 (H) 65 - 100 mg/dL    BUN 74 (H) 6 - 20 MG/DL    Creatinine 1.86 (H) 0.55 - 1.02 MG/DL    BUN/Creatinine ratio 40 (H) 12 - 20      GFR est AA 32 (L) >60 ml/min/1.73m2    GFR est non-AA 26 (L) >60 ml/min/1.73m2    Calcium 8.3 (L) 8.5 - 10.1 MG/DL   MAGNESIUM    Collection Time: 08/25/19  4:48 AM   Result Value Ref Range    Magnesium 2.6 (H) 1.6 - 2.4 mg/dL   PHOSPHORUS    Collection Time: 08/25/19  4:48 AM   Result Value Ref Range    Phosphorus 3.6 2.6 - 4.7 MG/DL       Medications reviewed  Current Facility-Administered Medications   Medication Dose Route Frequency    carvedilol (COREG) tablet 6.25 mg  6.25 mg Oral BID WITH MEALS    amLODIPine (NORVASC) tablet 10 mg  10 mg Oral DAILY    labetalol (NORMODYNE;TRANDATE) 20 mg/4 mL (5 mg/mL) injection 20 mg  20 mg IntraVENous Q4H PRN    sodium chloride (NS) flush 5-40 mL  5-40 mL IntraVENous Q8H    sodium chloride (NS) flush 5-40 mL  5-40 mL IntraVENous PRN    acetaminophen (TYLENOL) tablet 650 mg  650 mg Oral Q6H    oxyCODONE IR (ROXICODONE) tablet 5 mg  5 mg Oral Q3H PRN    oxyCODONE IR (ROXICODONE) tablet 10 mg  10 mg Oral Q3H PRN    naloxone (NARCAN) injection 0.4 mg  0.4 mg IntraVENous PRN    senna-docusate (PERICOLACE) 8.6-50 mg per tablet 1 Tab  1 Tab Oral BID    polyethylene glycol (MIRALAX) packet 17 g  17 g Oral DAILY    bisacodyl (DULCOLAX) suppository 10 mg  10 mg Rectal DAILY PRN    gabapentin (NEURONTIN) capsule 100 mg  100 mg Oral DAILY WITH BREAKFAST    gabapentin (NEURONTIN) capsule 300 mg  300 mg Oral QHS    aspirin delayed-release tablet 81 mg  81 mg Oral BID    terazosin (HYTRIN) capsule 5 mg  5 mg Oral QHS    levETIRAcetam (KEPPRA) tablet 500 mg  500 mg Oral BID    pravastatin (PRAVACHOL) tablet 40 mg  40 mg Oral QHS    montelukast (SINGULAIR) tablet 10 mg  10 mg Oral DAILY    fluticasone propionate (FLONASE) 50 mcg/actuation nasal spray 1 Spray  1 Spray Both Nostrils QHS    carboxymethylcellulose sodium (CELLUVISC) 1 % ophthalmic solution 1 Drop  1 Drop Both Eyes BID PRN    famotidine (PEPCID) tablet 20 mg  20 mg Oral DAILY     Care Plan discussed with: Patient and Nurse  Total time spent with patient: 30 minutes.   Melia Arce MD

## 2019-08-25 NOTE — PROGRESS NOTES
PHYSICAL THERAPY TREATMENT  Patient: Babar Phillips (36 y.o. female)  Date: 8/25/2019  Diagnosis: Primary osteoarthritis of right knee [M17.11] <principal problem not specified>  Procedure(s) (LRB):  RIGHT TOTAL KNEE ARTHROPLASTY MAKOPLASTY (Right) 3 Days Post-Op  Precautions: Bed Alarm, Fall, WBAT  Chart, physical therapy assessment, plan of care and goals were reviewed. ASSESSMENT  Based on the objective data described below, patient continues to mobilize with similar upright abilities, requiring some assist with walker to maneuver in the ICU room. Given the mattress/ beds in this unit, she now also requires assist to assume sitting. Once she returns to a regular room, expect no change in her abilities. Current Level of Function Impacting Discharge (mobility/balance): supr only in an environment without clutter; possible min A from supine-sit the first day or two    Other factors to consider for discharge: lives alone tho will have help in short term         PLAN :  Patient continues to benefit from skilled intervention to address the above impairments. Continue treatment per established plan of care. to address goals. Recommendation for discharge: (in order for the patient to meet his/her long term goals)  Outpatient physical therapy follow up as recommended by Dr Kristi Mandujano    This discharge recommendation:  Has not yet been discussed the attending provider and/or case management    Equipment recommendations for successful discharge (if) home: none       SUBJECTIVE:   Patient stated I'm not sure when I'll go home. ...    OBJECTIVE DATA SUMMARY:   Critical Behavior:  Neurologic State: Alert, Appropriate for age, Eyes open spontaneously  Orientation Level: Appropriate for age, Oriented X4  Cognition: Appropriate decision making, Appropriate for age attention/concentration, Appropriate safety awareness, Follows commands  Safety/Judgement: Awareness of environment, Good awareness of safety precautions  Functional Mobility Training:  Bed Mobility:  Rolling: Minimum assistance  Supine to Sit: Additional time; Moderate assistance(due to mattress/ bed)     Scooting: Moderate assistance; Additional time(due to mattress/ bed)        Transfers:  Sit to Stand: Contact guard assistance  Stand to Sit: Setup                             Balance:     Ambulation/Gait Training:  Distance (ft): 20 Feet (ft)(x2)  Assistive Device: Walker, rolling  Ambulation - Level of Assistance: Minimal assistance(for walker mgmt in ICU room)        Gait Abnormalities: Antalgic;Decreased step clearance;Trunk sway increased  Right Side Weight Bearing: As tolerated     Base of Support: Widened  Stance: Right decreased  Speed/Italia: Slow  Step Length: Right shortened;Left shortened                  Therapeutic Exercises:   Patient is continuing to do her exercises as able in bed & chair    Pain Ratin    Activity Tolerance:   Good  Please refer to the flowsheet for vital signs taken during this treatment.     After treatment patient left in no apparent distress:   on commode [facility] nursing attendance    COMMUNICATION/COLLABORATION:   The patients plan of care was discussed with: Registered Nurse    Casey Braswell PT   Time Calculation: 30 mins

## 2019-08-25 NOTE — PROGRESS NOTES
Problem: Knee Replacement: Day of Surgery/Unit  Goal: Activity/Safety  Outcome: Progressing Towards Goal  Goal: Nutrition/Diet  Outcome: Progressing Towards Goal  Goal: *Optimal pain control at patient's stated goal  Outcome: Progressing Towards Goal

## 2019-08-25 NOTE — PROGRESS NOTES
LATE ENTRY FROM ROUNDS YESTERDAY    TOTAL KNEE ARTHROPLASTY DAILY NOTE     ASSESSMENT / PLAN :   1. Pain Control : Acceptable - Some pain at times, but the patient does not wish to increase their medications  2. Overnight Issues : HTN, bradycardia  (HR 49), mild hyperkalemia - patient subjectively feeling fine  3. Wound or incisional issue : Healing incision with no visible drainage  4. Therapy / Weight Bearing Recommendations : Weight bear as tolerated with use of a walker and two person assist while mobilizing  5. DVT Prophylaxis : Mechanical and Aspirin and mechanical lower extremity compression device  6. Disposition : Discharge home with scheduled outpatient PT once medically cleared. 7. Medical Concerns : CKD (elevated BUN/Cr) Nephrology consulted. Mildly hyperkalemic (Treated with lasix)  8. Comments : Patient will likely remain inpatient for monitoring through the weekend. POD  2 Days Post-Op s/p Procedure(s):  RIGHT TOTAL KNEE ARTHROPLASTY MAKOPLASTY  Acute post-operative blood-loss anemia - HgB 9.5     SUBJECTIVE :     Overnight complaints : none reported. OBJECTIVE :     Vitals:    08/25/19 0300 08/25/19 0400 08/25/19 0500 08/25/19 0702   BP: 125/49 120/48 139/55    Pulse: 63 63 61 66   Resp: 23 15 17    Temp:  98.2 °F (36.8 °C)     SpO2: 97% 98% 98%    Weight:       Height:           Alert and oriented x3. Examination of the right knee reveals that the dressing is clean, dry and intact. Motor Exam : Pt is able to perform a straight leg raise. Sensation is intact to light touch. No calf pain.      Labs:  Recent Labs     08/25/19  0448   HGB 8.8*   HCT 27.1*      K 5.3*   *   CO2 15*   BUN 74*   CREA 1.86*   *      Patient mobility  Gait  Base of Support: Narrowed  Speed/Italia: Slow  Step Length: Right shortened, Left shortened  Stance: Right decreased  Gait Abnormalities: Antalgic, Decreased step clearance  Ambulation - Level of Assistance: Contact guard assistance  Distance (ft): 300 Feet (ft)  Assistive Device: Walker, rolling  Stairs - Level of Assistance: (NT)        Johnie Horton MD  Cell [phone]  Melissa Justin PA-C  Cell [phone]  Medical Assistants: [facility]  [phone]                 Surgery Scheduler: Jenny Almanza, [address] [place] [id number]

## 2019-08-25 NOTE — PROGRESS NOTES
2000 Care plan updated. Will put in an order for the turn team. Patient had knee surgery on the 22 of this month. Was transferred to ICU due to hypothermia. Patient on the [facility]. Temp is now 97. Anaid huggar cut off. W ill continue to monitor. Bedside and Verbal shift change report given to Estefania Lyn RN (oncoming nurse) by Marlon Ko RN (offgoing nurse). Report included the following information SBAR, Kardex, ED Summary, OR Summary, Procedure Summary, Intake/Output, MAR and Recent Results. Primary Nurse George Cam, MAYANK and Ramirez Daugherty RN, RN performed a dual skin assessment on this patient No impairment noted  Humble score is 15. Patient expressed to this nurse that she is a DNR. She showed a paper from 12 where she signed a DNR in 12. A copy was placed on the chart. The day shift nurse, Oswaldo Goins RN also heard her express the desire to be a DNR and the patients son and daughter were in agreement. Shared this with charge nurse.  Will inform MD when they come in in the AM.

## 2019-08-26 LAB
ABDOMINAL PROX AORTA VEL: 171.1 CM/S
ALBUMIN SERPL-MCNC: 2.3 G/DL (ref 3.5–5)
ALBUMIN SERPL-MCNC: 2.5 G/DL (ref 3.5–5)
ALBUMIN/GLOB SERPL: 0.6 {RATIO} (ref 1.1–2.2)
ALBUMIN/GLOB SERPL: 0.7 {RATIO} (ref 1.1–2.2)
ALP SERPL-CCNC: 216 U/L (ref 45–117)
ALP SERPL-CCNC: 220 U/L (ref 45–117)
ALT SERPL-CCNC: 351 U/L (ref 12–78)
ALT SERPL-CCNC: 375 U/L (ref 12–78)
ANION GAP SERPL CALC-SCNC: 7 MMOL/L (ref 5–15)
ANION GAP SERPL CALC-SCNC: 8 MMOL/L (ref 5–15)
AST SERPL-CCNC: 371 U/L (ref 15–37)
AST SERPL-CCNC: 595 U/L (ref 15–37)
BASOPHILS # BLD: 0 K/UL (ref 0–0.1)
BASOPHILS NFR BLD: 0 % (ref 0–1)
BILIRUB DIRECT SERPL-MCNC: 0.1 MG/DL (ref 0–0.2)
BILIRUB SERPL-MCNC: 0.3 MG/DL (ref 0.2–1)
BILIRUB SERPL-MCNC: 0.4 MG/DL (ref 0.2–1)
BUN SERPL-MCNC: 74 MG/DL (ref 6–20)
BUN SERPL-MCNC: 74 MG/DL (ref 6–20)
BUN/CREAT SERPL: 38 (ref 12–20)
BUN/CREAT SERPL: 38 (ref 12–20)
CALCIUM SERPL-MCNC: 8 MG/DL (ref 8.5–10.1)
CALCIUM SERPL-MCNC: 8 MG/DL (ref 8.5–10.1)
CELIAC PSV: 508.4 CM/S
CHLORIDE SERPL-SCNC: 118 MMOL/L (ref 97–108)
CHLORIDE SERPL-SCNC: 119 MMOL/L (ref 97–108)
CO2 SERPL-SCNC: 16 MMOL/L (ref 21–32)
CO2 SERPL-SCNC: 17 MMOL/L (ref 21–32)
CREAT SERPL-MCNC: 1.94 MG/DL (ref 0.55–1.02)
CREAT SERPL-MCNC: 1.97 MG/DL (ref 0.55–1.02)
DIFFERENTIAL METHOD BLD: ABNORMAL
EOSINOPHIL # BLD: 0 K/UL (ref 0–0.4)
EOSINOPHIL NFR BLD: 1 % (ref 0–7)
ERYTHROCYTE [DISTWIDTH] IN BLOOD BY AUTOMATED COUNT: 13.1 % (ref 11.5–14.5)
GLOBULIN SER CALC-MCNC: 3.4 G/DL (ref 2–4)
GLOBULIN SER CALC-MCNC: 3.7 G/DL (ref 2–4)
GLUCOSE SERPL-MCNC: 109 MG/DL (ref 65–100)
GLUCOSE SERPL-MCNC: 211 MG/DL (ref 65–100)
HCT VFR BLD AUTO: 26.8 % (ref 35–47)
HGB BLD-MCNC: 8.5 G/DL (ref 11.5–16)
IMM GRANULOCYTES # BLD AUTO: 0.1 K/UL (ref 0–0.04)
IMM GRANULOCYTES NFR BLD AUTO: 1 % (ref 0–0.5)
LEFT KIDNEY LENGTH: 11.29 CM
LEFT KIDNEY WIDTH: 5.14 CM
LEFT RENAL DIST DIAS: 19.5 CM/S
LEFT RENAL DIST RAR: 0.67
LEFT RENAL DIST RI: 0.83
LEFT RENAL DIST SYS: 113.9 CM/S
LEFT RENAL LOWER PARENCHYMA MAX: 28.4 CM/S
LEFT RENAL LOWER PARENCHYMA MIN: 0 CM/S
LEFT RENAL LOWER PARENCHYMA RI: 1
LEFT RENAL MID DIAS: 13.5 CM/S
LEFT RENAL MID RAR: 0.6
LEFT RENAL MID RI: 0.87
LEFT RENAL MID SYS: 102.3 CM/S
LEFT RENAL MIDDLE PARENCHYMA MAX: 36.1 CM/S
LEFT RENAL MIDDLE PARENCHYMA MIN: 0 CM/S
LEFT RENAL MIDDLE PARENCHYMA RI: 1
LEFT RENAL PROX DIAS: 15.1 CM/S
LEFT RENAL PROX RAR: 0.67
LEFT RENAL PROX RI: 0.87
LEFT RENAL PROX SYS: 113.9 CM/S
LEFT RENAL UPPER PARENCHYMA MAX: 42.7 CM/S
LEFT RENAL UPPER PARENCHYMA MIN: 8.7 CM/S
LEFT RENAL UPPER PARENCHYMA RI: 0.8
LYMPHOCYTES # BLD: 1.7 K/UL (ref 0.8–3.5)
LYMPHOCYTES NFR BLD: 20 % (ref 12–49)
MAGNESIUM SERPL-MCNC: 2.5 MG/DL (ref 1.6–2.4)
MCH RBC QN AUTO: 31 PG (ref 26–34)
MCHC RBC AUTO-ENTMCNC: 31.7 G/DL (ref 30–36.5)
MCV RBC AUTO: 97.8 FL (ref 80–99)
MONOCYTES # BLD: 0.9 K/UL (ref 0–1)
MONOCYTES NFR BLD: 10 % (ref 5–13)
NEUTS SEG # BLD: 6.2 K/UL (ref 1.8–8)
NEUTS SEG NFR BLD: 68 % (ref 32–75)
NRBC # BLD: 0 K/UL (ref 0–0.01)
NRBC BLD-RTO: 0 PER 100 WBC
PLATELET # BLD AUTO: 188 K/UL (ref 150–400)
PMV BLD AUTO: 9.7 FL (ref 8.9–12.9)
POTASSIUM SERPL-SCNC: 5.2 MMOL/L (ref 3.5–5.1)
POTASSIUM SERPL-SCNC: 5.7 MMOL/L (ref 3.5–5.1)
PROT SERPL-MCNC: 5.9 G/DL (ref 6.4–8.2)
PROT SERPL-MCNC: 6 G/DL (ref 6.4–8.2)
PROX AORTIC AP: 0.94 CM
PROX AORTIC TRANS: 1.5 CM
PROX SMA PSV: 334.4 CM/S
RBC # BLD AUTO: 2.74 M/UL (ref 3.8–5.2)
RIGHT KIDNEY LENGTH: 8.5 CM
RIGHT KIDNEY WIDTH: 4.35 CM
RIGHT RENAL LOWER PARENCHYMA MAX: 21.5 CM/S
RIGHT RENAL LOWER PARENCHYMA MIN: 0 CM/S
RIGHT RENAL LOWER PARENCHYMA RI: 1
RIGHT RENAL MIDDLE PARENCHYMA MAX: 34.5 CM/S
RIGHT RENAL MIDDLE PARENCHYMA MIN: 6.3 CM/S
RIGHT RENAL MIDDLE PARENCHYMA RI: 0.82
RIGHT RENAL UPPER PARENCHYMA MAX: 38.8 CM/S
RIGHT RENAL UPPER PARENCHYMA MIN: 6.3 CM/S
RIGHT RENAL UPPER PARENCHYMA RI: 0.84
SODIUM SERPL-SCNC: 142 MMOL/L (ref 136–145)
SODIUM SERPL-SCNC: 143 MMOL/L (ref 136–145)
WBC # BLD AUTO: 8.9 K/UL (ref 3.6–11)

## 2019-08-26 PROCEDURE — 74011250637 HC RX REV CODE- 250/637: Performed by: PHYSICIAN ASSISTANT

## 2019-08-26 PROCEDURE — 97110 THERAPEUTIC EXERCISES: CPT

## 2019-08-26 PROCEDURE — 65270000029 HC RM PRIVATE

## 2019-08-26 PROCEDURE — 97116 GAIT TRAINING THERAPY: CPT

## 2019-08-26 PROCEDURE — 80053 COMPREHEN METABOLIC PANEL: CPT

## 2019-08-26 PROCEDURE — 77030038269 HC DRN EXT URIN PURWCK BARD -A

## 2019-08-26 PROCEDURE — 74011250637 HC RX REV CODE- 250/637: Performed by: FAMILY MEDICINE

## 2019-08-26 PROCEDURE — 85025 COMPLETE CBC W/AUTO DIFF WBC: CPT

## 2019-08-26 PROCEDURE — 36415 COLL VENOUS BLD VENIPUNCTURE: CPT

## 2019-08-26 PROCEDURE — 80076 HEPATIC FUNCTION PANEL: CPT

## 2019-08-26 PROCEDURE — 97530 THERAPEUTIC ACTIVITIES: CPT

## 2019-08-26 PROCEDURE — 83735 ASSAY OF MAGNESIUM: CPT

## 2019-08-26 PROCEDURE — 77030040361 HC SLV COMPR DVT MDII -B

## 2019-08-26 PROCEDURE — 74011250637 HC RX REV CODE- 250/637: Performed by: INTERNAL MEDICINE

## 2019-08-26 PROCEDURE — 74011250637 HC RX REV CODE- 250/637: Performed by: ORTHOPAEDIC SURGERY

## 2019-08-26 RX ORDER — SODIUM POLYSTYRENE SULFONATE 15 G/60ML
30 SUSPENSION ORAL; RECTAL
Status: COMPLETED | OUTPATIENT
Start: 2019-08-26 | End: 2019-08-26

## 2019-08-26 RX ADMIN — ACETAMINOPHEN 650 MG: 325 TABLET ORAL at 17:10

## 2019-08-26 RX ADMIN — Medication 10 ML: at 06:06

## 2019-08-26 RX ADMIN — ACETAMINOPHEN 650 MG: 325 TABLET ORAL at 11:35

## 2019-08-26 RX ADMIN — PRAVASTATIN SODIUM 40 MG: 20 TABLET ORAL at 21:16

## 2019-08-26 RX ADMIN — AMLODIPINE BESYLATE 10 MG: 5 TABLET ORAL at 08:01

## 2019-08-26 RX ADMIN — SODIUM POLYSTYRENE SULFONATE 30 G: 15 SUSPENSION ORAL; RECTAL at 09:35

## 2019-08-26 RX ADMIN — ACETAMINOPHEN 650 MG: 325 TABLET ORAL at 06:03

## 2019-08-26 RX ADMIN — LEVETIRACETAM 500 MG: 500 TABLET ORAL at 21:16

## 2019-08-26 RX ADMIN — ASPIRIN 81 MG: 81 TABLET, COATED ORAL at 08:00

## 2019-08-26 RX ADMIN — Medication 10 ML: at 21:17

## 2019-08-26 RX ADMIN — TERAZOSIN HYDROCHLORIDE 5 MG: 5 CAPSULE ORAL at 21:16

## 2019-08-26 RX ADMIN — CARVEDILOL 6.25 MG: 6.25 TABLET, FILM COATED ORAL at 07:57

## 2019-08-26 RX ADMIN — LEVETIRACETAM 500 MG: 500 TABLET ORAL at 08:00

## 2019-08-26 RX ADMIN — SENNOSIDES,DOCUSATE SODIUM 1 TABLET: 8.6; 5 TABLET, FILM COATED ORAL at 08:01

## 2019-08-26 RX ADMIN — MONTELUKAST SODIUM 10 MG: 10 TABLET, COATED ORAL at 21:15

## 2019-08-26 RX ADMIN — FLUTICASONE PROPIONATE 1 SPRAY: 50 SPRAY, METERED NASAL at 21:22

## 2019-08-26 RX ADMIN — OXYCODONE HYDROCHLORIDE 10 MG: 5 TABLET ORAL at 09:33

## 2019-08-26 RX ADMIN — CARVEDILOL 6.25 MG: 6.25 TABLET, FILM COATED ORAL at 17:10

## 2019-08-26 RX ADMIN — GABAPENTIN 300 MG: 300 CAPSULE ORAL at 21:16

## 2019-08-26 RX ADMIN — GABAPENTIN 100 MG: 100 CAPSULE ORAL at 08:01

## 2019-08-26 RX ADMIN — ACETAMINOPHEN 650 MG: 325 TABLET ORAL at 00:26

## 2019-08-26 RX ADMIN — ACETAMINOPHEN 650 MG: 325 TABLET ORAL at 23:12

## 2019-08-26 RX ADMIN — ASPIRIN 81 MG: 81 TABLET, COATED ORAL at 17:11

## 2019-08-26 RX ADMIN — FAMOTIDINE 20 MG: 20 TABLET ORAL at 08:00

## 2019-08-26 NOTE — PROGRESS NOTES
Nutrition Assessment:    RECOMMENDATIONS/INTERVENTION(S):   1. Continue regular, low K+ diet. 2. Switch ONS to gelatein x1/d (providing 90 kcal, 20 gm protein) - lower in K+    3. Provided low K+ diet education. Will f/u to address any additional questions/ provide further education as needed. 4. Continue to monitor po intake, wt changes, labs. ASSESSMENT:   8/26: MD consult for low K+ diet education. Noted K+ 5.7. Discussed foods containing K+ and provided written materials. Talked with pt about typical foods consumed and helped identify which foods are higher in potassium and how to limit in diet. Pt questions addressed at this time. Noted pt with 100% meal intake. Will switch ONS to a lower K+ choice. Labs and medications reviewed. Will continue to f/u per protocol. 8/23: RD consult received for nutrition supplements. Pt admitted w/ osteoarthritis of L knee, s/p TKA. Pt reports good appetite and po intake currently and PTA. Says she has had some recent intentional wt loss of 10# by cutting back on starches and snacks. No n/v/d/c or c/s issues. Discussed sources of protein and its importance for healing. Interested in receiving Ensure while hospitalized, will add 1x/day. Has Ensure at home that she plans on drinking once per day. K 6.1 high, discussed sources of potassium to avoid until lab is WNL. Pt seemed receptive. Labs- K6.1, Cl 121, -103. Meds- Miralax, pericolace, pravastatin, Pepcid, Coreg. Diet Order: Regular  % Eaten:    Patient Vitals for the past 72 hrs:   % Diet Eaten   08/25/19 0900 100 %   08/24/19 1425 100 %   08/24/19 1003 100 %       Pertinent Medications: [x] Reviewed    Labs: [x] Reviewed    Anthropometrics: Height: 5' 1.5\" (156.2 cm) Weight: 87.2 kg (192 lb 3.9 oz)    IBW (%IBW):   ( ) UBW (%UBW):   (  %)      BMI: Body mass index is 35.74 kg/m².     This BMI is indicative of:   [] Underweight    [] Normal    [] Overweight    [x]  Obesity    []  Extreme Obesity (BMI>40)  Estimated Nutrition Needs (Based on): 1671 Kcals/day(1286 x 1.3 AF) , 87 g(1-1.2 g/kg) Protein  Carbohydrate: At Least 130 g/day  Fluids: 1671 mL/day (1 ml/kcal)    Last BM: 8/25   [x]Active     []Hyperactive  []Hypoactive       [] Absent   BS  Skin:    [] Intact   [x] Incision  [] Breakdown   [] DTI   [] Tears/Excoriation/Abrasion  []Edema [] Other: Wt Readings from Last 30 Encounters:   08/22/19 87.2 kg (192 lb 3.9 oz)   08/07/19 87.5 kg (193 lb)   04/12/19 89.7 kg (197 lb 12.8 oz)   02/24/19 84.5 kg (186 lb 4.8 oz)   02/20/19 94.3 kg (207 lb 14.3 oz)   02/20/19 92.5 kg (204 lb)   09/10/18 94.8 kg (209 lb)   01/25/18 95.5 kg (210 lb 8 oz)   08/10/17 94.8 kg (209 lb)   01/26/17 97.1 kg (214 lb)   11/10/16 90.7 kg (200 lb)   05/31/16 99.3 kg (219 lb)   01/05/15 99.3 kg (219 lb)   12/10/14 101.2 kg (223 lb)   10/21/14 100.2 kg (221 lb)   10/03/14 100.2 kg (221 lb)   10/02/14 99.3 kg (219 lb)      NUTRITION DIAGNOSES:   Problem:  Increased nutrient needs     Etiology: related to increased demand for protein     Signs/Symptoms: as evidenced by    GERMAN ramirez protein needs s/p surgery    8/26: Nutrition Dx continues.      NUTRITION INTERVENTIONS:  Meals/Snacks: General/healthful diet   Supplements: Commercial supplement              GOAL:   PO intake >75% meals and ONS next 5-7 days    Cultural, Christian, or Ethnic Dietary Needs: None     EDUCATION & DISCHARGE NEEDS:    [] None Identified   [x] Identified and Education Provided/Documented: sources of potassium   [] Identified and Pt declined/was not appropriate      [] Interdisciplinary Care Plan Reviewed/Documented    [x] Discharge Needs:  Regular low K+ diet + ONS   [] No Nutrition Related Discharge Needs    NUTRITION RISK:   Pt Is At Nutrition Risk  [x]     No Nutrition Risk Identified  []       PT SEEN FOR:    [x]  MD Consult: []Calorie Count      []Diabetic Diet Education        [x]Diet Education     []Electrolyte Management     []General Nutrition Management and Supplements     []Management of Tube Feeding     []TPN Recommendations    []  RN Referral:  []MST score >=2     []Enteral/Parenteral Nutrition PTA     []Pregnant: Gestational DM or Multigestation                 [] Pressure Ulcer    []  Low BMI      []  Length of Stay       [] Dysphagia Diet         [] Ventilator  []  Follow-up     Previous Recommendations:   [x] Implemented          [] Not Implemented          [] Not Applicable    Previous Goal:   [] Met              [x] Progressing Towards Goal              [] Not Progressing Towards Goal   [] Not Applicable            Connie Carpenter RDN  Pager [phone]  Phone [phone]

## 2019-08-26 NOTE — PROGRESS NOTES
[address] [facility]  YOB: 1940          Assessment & Plan:   CKD 3, stable. Followed by Dr. Peyton Kawasaki  HTN, controlled  Hyperkalemia, worse  S/p knee surgery  Sz d/o    Rec:  Change to low K supplements  SPS x 1  Dietary education on low K diet       Subjective:   CC: f/u CKD  HPI: Renal function stable.  K up to 5.7   ROS: no sob/n/v  Current Facility-Administered Medications   Medication Dose Route Frequency    carvedilol (COREG) tablet 6.25 mg  6.25 mg Oral BID WITH MEALS    amLODIPine (NORVASC) tablet 10 mg  10 mg Oral DAILY    labetalol (NORMODYNE;TRANDATE) 20 mg/4 mL (5 mg/mL) injection 20 mg  20 mg IntraVENous Q4H PRN    sodium chloride (NS) flush 5-40 mL  5-40 mL IntraVENous Q8H    sodium chloride (NS) flush 5-40 mL  5-40 mL IntraVENous PRN    acetaminophen (TYLENOL) tablet 650 mg  650 mg Oral Q6H    oxyCODONE IR (ROXICODONE) tablet 5 mg  5 mg Oral Q3H PRN    oxyCODONE IR (ROXICODONE) tablet 10 mg  10 mg Oral Q3H PRN    naloxone (NARCAN) injection 0.4 mg  0.4 mg IntraVENous PRN    senna-docusate (PERICOLACE) 8.6-50 mg per tablet 1 Tab  1 Tab Oral BID    polyethylene glycol (MIRALAX) packet 17 g  17 g Oral DAILY    bisacodyl (DULCOLAX) suppository 10 mg  10 mg Rectal DAILY PRN    gabapentin (NEURONTIN) capsule 100 mg  100 mg Oral DAILY WITH BREAKFAST    gabapentin (NEURONTIN) capsule 300 mg  300 mg Oral QHS    aspirin delayed-release tablet 81 mg  81 mg Oral BID    terazosin (HYTRIN) capsule 5 mg  5 mg Oral QHS    levETIRAcetam (KEPPRA) tablet 500 mg  500 mg Oral BID    pravastatin (PRAVACHOL) tablet 40 mg  40 mg Oral QHS    montelukast (SINGULAIR) tablet 10 mg  10 mg Oral DAILY    fluticasone propionate (FLONASE) 50 mcg/actuation nasal spray 1 Spray  1 Spray Both Nostrils QHS    carboxymethylcellulose sodium (CELLUVISC) 1 % ophthalmic solution 1 Drop  1 Drop Both Eyes BID PRN    famotidine (PEPCID) tablet 20 mg 20 mg Oral DAILY          Objective:     Vitals:  Blood pressure 117/68, pulse 67, temperature 98.2 °F (36.8 °C), resp. rate 16, height 5' 1.5\" (1.562 m), weight 87.2 kg (192 lb 3.9 oz), SpO2 96 %. Temp (24hrs), Av.1 °F (36.7 °C), Min:97.5 °F (36.4 °C), Max:98.5 °F (36.9 °C)      Intake and Output:  No intake/output data recorded. 1901 -  0700  In: 500 [P.O.:500]  Out: 1400 [Urine:1400]    Physical Exam:               GENERAL ASSESSMENT: NAD  CHEST: CTA  HEART: S1S2  ABDOMEN: Soft,NT  EXTREMITY: no EDEMA  NEURO: Grossly non focal          ECG/rhythm:    Data Review      No results for input(s): TNIPOC in the last 72 hours. No lab exists for component: ITNL   No results for input(s): CPK, CKMB, TROIQ in the last 72 hours. Recent Labs     19  0345 19  0448 19  0817 19  0956    141 139 140   K 5.7* 5.3* 5.3* 5.7*  5.7*   * 119* 117* 120*   CO2 16* 15* 14* 13*   BUN 74* 74* 72* 59*   CREA 1.94* 1.86* 1.95* 1.88*   * 127* 130* 216*   PHOS  --  3.6  --   --    MG 2.5* 2.6* 2.4  --    CA 8.0* 8.3* 8.5 8.2*   ALB 2.3*  --   --  2.7*   WBC 8.9 12.0* 12.7*  --    HGB 8.5* 8.8* 9.5*  --    HCT 26.8* 27.1* 28.3*  --     197 190  --       No results for input(s): INR, PTP, APTT in the last 72 hours. No lab exists for component: INREXT  Needs: urine analysis, urine sodium, protein and creatinine  No results found for: KAYCEE GIPSON        : Yara Spangler MD  2019        [address] [place] [address] [facility]:  [email or web address]  Tayler White office:  [address] [address],[address]  [address]  Phone: [phone]  Fax :     [phone]    [address] [place] [address] office:  [address], [address]  Phone - [phone]  Fax - [phone]

## 2019-08-26 NOTE — OP NOTES
OPERATIVE REPORT     Admit Date: 8/22/2019  Admit Diagnosis: Primary osteoarthritis of right knee [M17.11]    Date of Procedure: 8/22/2019   Preoperative Diagnosis: DJD RIGHT KNEE  Postoperative Diagnosis: DJD RIGHT KNEE    Procedure: Procedure(s):  RIGHT TOTAL KNEE ARTHROPLASTY MAKOPLASTY  Surgeon: Chance Atwood MD  Assistant(s): Aida Akbar PA-C  Anesthesia: Spinal   Estimated Blood Loss: 300cc  Specimens: * No specimens in log *   Complications: None           INDICATIONS:   The patient is a [de-identified] y.o., female who has complained of a long history of knee pain. The patient  has failed conservative treatment and presents for definitive operative care. Informed consent obtained including a discussion of the risks and benefits, which include, but are not limited to, bleeding, infection, neurovascular damage, wound complications, pain and stiffness in the knee, periprosthetic loosening, fracture dislocation and DVT, the patient consented for the procedure. DESCRIPTION OF PROCEDURE:        The patient was seen in the preoperative holding area. The patient was positively identified. The limb was initialed,  questions were answered. The patient was given Ancef preop for an antibiotic. The patient was subsequently taken to the operating room. The patient underwent spinal anesthesia. The patient was positioned in the supine position. All bony prominences were well padded. The limb was prepped and draped in a sterile fashion. The appropriate pause for safety was performed. A mid-line incision was created. Utilizing an incision from above the superior pole of the patella distally to the tibial tubercle. The incision was taken down through the skin and subcutaneous tissue until the retinaculum could be identified. This was sharply incised utilizing a medial parapatellar incision. The femoral array was placed at the superior portion of the patella.  The patellar was everted, a planar resurfacing was performed and the drill guide was placed with the appropriate rotation. The medial-based soft tissue was then retracted as well as well as the lateral tissue. Katie pins were placed within the incision for thetibial array (one hand breadth proximal to the superior pole of the patella). The femoral and tibial trackers were placed. The hip center or rotation was established. Registration was performed on the femur and tibia. Osteophytes were removed. The knee was balanced in both flexion and extension with force applied. The computer model of implants and position was verified. Once this was complete the MAKOplasty robot was positioned. Standard cuts were made for the tibia first. The tibial cut was removed. The remaining femoral cuts were made with the robot. The blade was changed and the medial meniscus was removed. The tibial preparation was completed. Including removal of residual medial and lateral mensci. Tibial baseplate placement and keel preparation were performed along with drill holes for the tibial baseplate. Final bony osteophytes were removed and the meniscal rim was removed. The knee was injected with 60cc of Morphine / Ketoralac and Lidocaine. Trial implants were placed and range of motion along with overall fit was established with very good integrity of the MCL noted. The katie pins and trackers were removed and accounted for prior to closure. All the bony surfaces were irrigated. The tibia was placed first, then the poly, residual osteophytes were removed and then the femur. Finally, the patella was placed and press-fit with the clamp / impaction. There was normal tracking of the patella at the conclusion of the case. The knee was very stable after it was taken through a full range of motion. The wound was closed with 0 Vicryl and then number 2 Quill proximally. Once this had been completed, the skin was closed with 2-0 Vicryl 4-0 monocryl suture and Dermabond.  A sterile dressing was applied. The patient was taken from the operating room in stable condition. OPERATIVE FINDINGS : Severe tricompartmental OA of the knee. IMPLANTS :   Implant Name Type Inv. Item Serial No.  Lot No. LRB No. Used Action   triathlon x3 tibial bearing insert cr 4 thickness 9mm Joint Component  NA [employer] [id number] Right 1 Implanted   COMPNT FEM CR TRIATHLN 3 R PA --  - SNA  COMPNT FEM CR TRIATHLN 3 R PA --  NA [employer] HOW [id number] Right 1 Implanted   BASEPLT TIB PC TRITNM SZ 4 -- TRIATHLON - SNA  BASEPLT TIB PC TRITNM SZ 4 -- TRIATHLON NA [employer] HOW [id number] Right 1 Implanted   PATELLA  ASYMETRIC SZ A32MM 10MM - SNA Joint Component PATELLA  ASYMETRIC SZ A32MM 10MM NA [employer] HOW [id number] Right 1 Implanted   KNEE TTL POR/HYB TRIATHLON X3 -- K6 BSV - [id number]  KNEE TTL POR/HYB TRIATHLON X3 -- K6 BSV  [employer] HOW  Right 1 Implanted       JUSTIFICATION FOR SURGICAL ASSISTANT:   Surgical Assistant, was requried and necessary in this case, to help with soft tissue retraction, extremity positioning, equiment management, implant management, and wound closure.     Wanda Olivo MD

## 2019-08-26 NOTE — PROGRESS NOTES
Problem: Falls - Risk of  Goal: *Absence of Falls  Description  Document Raffaele Fells Fall Risk and appropriate interventions in the flowsheet.   8/26/2019 1226 by Pamela Nathan RN  Outcome: Progressing Towards Goal  Note:   Fall Risk Interventions:  Mobility Interventions: Bed/chair exit alarm, Communicate number of staff needed for ambulation/transfer, Patient to call before getting OOB         Medication Interventions: Patient to call before getting OOB    Elimination Interventions: Patient to call for help with toileting needs           8/26/2019 1101 by Pamela Nathan RN  Outcome: Progressing Towards Goal  Note:   Fall Risk Interventions:  Mobility Interventions: Bed/chair exit alarm, Communicate number of staff needed for ambulation/transfer, Patient to call before getting OOB         Medication Interventions: Patient to call before getting OOB    Elimination Interventions: Patient to call for help with toileting needs           8/26/2019 1101 by Pamela Nathan RN  Outcome: Progressing Towards Goal  Note:   Fall Risk Interventions:  Mobility Interventions: Bed/chair exit alarm, Communicate number of staff needed for ambulation/transfer, Patient to call before getting OOB         Medication Interventions: Patient to call before getting OOB    Elimination Interventions: Patient to call for help with toileting needs              Problem: Patient Education: Go to Patient Education Activity  Goal: Patient/Family Education  8/26/2019 1226 by Pamela Nathan RN  Outcome: Progressing Towards Goal  8/26/2019 1101 by Pamela Nathan RN  Outcome: Progressing Towards Goal     Problem: Knee Replacement: Day of Surgery/Unit  Goal: Activity/Safety  Outcome: Progressing Towards Goal  Goal: Consults, if ordered  Outcome: Progressing Towards Goal  Goal: Nutrition/Diet  Outcome: Progressing Towards Goal  Goal: Medications  Outcome: Progressing Towards Goal  Goal: Respiratory  Outcome: Progressing Towards Goal  Goal: Treatments/Interventions/Procedures  Outcome: Progressing Towards Goal  Goal: Psychosocial  Outcome: Progressing Towards Goal  Goal: *Initiate mobility  Outcome: Progressing Towards Goal  Goal: *Optimal pain control at patient's stated goal  Outcome: Progressing Towards Goal  Goal: *Hemodynamically stable  Outcome: Progressing Towards Goal     Problem: Patient Education: Go to Patient Education Activity  Goal: Patient/Family Education  Outcome: Progressing Towards Goal     Problem: Pressure Injury - Risk of  Goal: *Prevention of pressure injury  Description  Document Humble Scale and appropriate interventions in the flowsheet.   Outcome: Progressing Towards Goal  Note:   Pressure Injury Interventions:  Sensory Interventions: Minimize linen layers, Keep linens dry and wrinkle-free, Float heels, Discuss PT/OT consult with provider    Moisture Interventions: Absorbent underpads, Apply protective barrier, creams and emollients, Check for incontinence Q2 hours and as needed, Internal/External urinary devices    Activity Interventions: Increase time out of bed, PT/OT evaluation    Mobility Interventions: PT/OT evaluation    Nutrition Interventions: Offer support with meals,snacks and hydration    Friction and Shear Interventions: HOB 30 degrees or less                Problem: Patient Education: Go to Patient Education Activity  Goal: Patient/Family Education  Outcome: Progressing Towards Goal     Problem: Knee Replacement: Post-Op Day 2  Goal: Off Pathway (Use only if patient is Off Pathway)  Outcome: Progressing Towards Goal  Goal: Activity/Safety  Outcome: Progressing Towards Goal  Goal: Medications  Outcome: Progressing Towards Goal  Goal: Respiratory  Outcome: Progressing Towards Goal  Goal: Treatments/Interventions/Procedures  Outcome: Progressing Towards Goal  Goal: Psychosocial  Outcome: Progressing Towards Goal

## 2019-08-26 NOTE — PROGRESS NOTES
Problem: Mobility Impaired (Adult and Pediatric)  Goal: *Acute Goals and Plan of Care (Insert Text)  Description  FUNCTIONAL STATUS PRIOR TO ADMISSION: Patient was independent and active without use of DME.    HOME SUPPORT PRIOR TO ADMISSION: The patient lived alone with no local support. Physical Therapy Goals  Initiated 8/23/2019  1. Patient will move from supine to sit and sit to supine , scoot up and down and roll side to side in bed with modified independence within 7 day(s). 2.  Patient will transfer from bed to chair and chair to bed with modified independence using the least restrictive device within 7 day(s). 3.  Patient will perform sit to stand with modified independence within 7 day(s). 4.  Patient will ambulate with modified independence for 150 feet with the least restrictive device within 7 day(s). Outcome: Progressing Towards Goal  Note:   PHYSICAL THERAPY TREATMENT  Patient: Amrit Vincent (9 y.o. female)  Date: 8/26/2019  Diagnosis: Primary osteoarthritis of right knee [M17.11] <principal problem not specified>  Procedure(s) (LRB):  RIGHT TOTAL KNEE ARTHROPLASTY MAKOPLASTY (Right) 4 Days Post-Op  Precautions: Bed Alarm, Fall, WBAT  Chart, physical therapy assessment, plan of care and goals were reviewed. ASSESSMENT  Based on the objective data described below, reviewed HEP demonstrates fair QS, able to SAQ independently, Mod A for SLR. CGa for bed mobility, functional transfers and gait training using RW for steadying. Pt does not have stairs to enter building with elevator to her home. BP stable throughout session, see flow sheet    Current Level of Function Impacting Discharge (mobility/balance): use of RW for steadying    Other factors to consider for discharge: lives alone, family support short term         PLAN :  Patient continues to benefit from skilled intervention to address the above impairments. Continue treatment per established plan of care.   to address goals. Recommendation for discharge: (in order for the patient to meet his/her long term goals)  Outpatient physical therapy follow up recommended per MD     This discharge recommendation:  Has been made in collaboration with the attending provider and/or case management    Equipment recommendations for successful discharge (if) home: patient owns DME required for discharge       SUBJECTIVE:   Patient stated Alessandro Child don't know when I might leave.     OBJECTIVE DATA SUMMARY:   Critical Behavior:  Neurologic State: Appropriate for age  Orientation Level: Oriented X4  Cognition: Follows commands  Safety/Judgement: Awareness of environment, Good awareness of safety precautions  Functional Mobility Training:  Bed Mobility:     Supine to Sit: Contact guard assistance; Additional time  Sit to Supine: Minimum assistance; Additional time;Assist x1           Transfers:  Sit to Stand: Contact guard assistance                                Balance:  Sitting: Intact  Standing: Intact; With support  Ambulation/Gait Training:  Distance (ft): 120 Feet (ft)  Assistive Device: Walker, rolling;Gait belt  Ambulation - Level of Assistance: Contact guard assistance;Stand-by assistance        Gait Abnormalities: Decreased step clearance        Base of Support: Widened     Speed/[place]: Pace decreased (<100 feet/min)                       Stairs: Therapeutic Exercises:     Pain Rating:  3/10    Activity Tolerance:   Good  Please refer to the flowsheet for vital signs taken during this treatment.     After treatment patient left in no apparent distress:   Supine in bed, Call bell within reach and Bed / chair alarm activated    COMMUNICATION/COLLABORATION:   The patients plan of care was discussed with: Registered Nurse    Karin Aschoff   Time Calculation: 26 mins

## 2019-08-26 NOTE — PROGRESS NOTES
Problem: Mobility Impaired (Adult and Pediatric)  Goal: *Acute Goals and Plan of Care (Insert Text)  Description  FUNCTIONAL STATUS PRIOR TO ADMISSION: Patient was independent and active without use of DME.    HOME SUPPORT PRIOR TO ADMISSION: The patient lived alone with no local support. Physical Therapy Goals  Initiated 8/23/2019  1. Patient will move from supine to sit and sit to supine , scoot up and down and roll side to side in bed with modified independence within 7 day(s). 2.  Patient will transfer from bed to chair and chair to bed with modified independence using the least restrictive device within 7 day(s). 3.  Patient will perform sit to stand with modified independence within 7 day(s). 4.  Patient will ambulate with modified independence for 150 feet with the least restrictive device within 7 day(s).      8/26/2019 1410 by Burgess Beltran  Outcome: Progressing Towards Goal  Note:   PHYSICAL THERAPY TREATMENT  Patient: Salima Koenig (30 y.o. female)  Date: 8/26/2019  Diagnosis: Primary osteoarthritis of right knee [M17.11] <principal problem not specified>  Procedure(s) (LRB):  RIGHT TOTAL KNEE ARTHROPLASTY MAKOPLASTY (Right) 4 Days Post-Op  Precautions: Bed Alarm, Fall, WBAT  Chart, physical therapy assessment, plan of care and goals were reviewed. ASSESSMENT  Based on the objective data described below, Reviewed HEP. CGA/SBA for functional tranfers and gait training using RW for steadying. Declined gait training into hallway secondary to loose stools. Pt is cleared for discharge from PT perspective when medically stable     Current Level of Function Impacting Discharge (mobility/balance): use of RW for gait and trasnfers. Other factors to consider for discharge: lives alone, family assist for short term         PLAN :  Patient continues to benefit from skilled intervention to address the above impairments. Continue treatment per established plan of care.   to address goals. Recommendation for discharge: (in order for the patient to meet his/her long term goals)  Outpatient physical therapy follow up recommended per MD     This discharge recommendation:  Has been made in collaboration with the attending provider and/or case management    Equipment recommendations for successful discharge (if) home: patient owns DME required for discharge       SUBJECTIVE:   Patient stated i am feeling better about this.     OBJECTIVE DATA SUMMARY:   Critical Behavior:  Neurologic State: Appropriate for age  Orientation Level: Oriented X4  Cognition: Follows commands  Safety/Judgement: Awareness of environment, Good awareness of safety precautions    Range of Motion:           RLE AROM  R Knee Flexion: 80(sitting EOB)  R Knee Extension: 5                Functional Mobility Training:  Bed Mobility:     Supine to Sit: Contact guard assistance  Sit to Supine: Contact guard assistance; Additional time           Transfers:  Sit to Stand: Contact guard assistance  Stand to Sit: Stand-by assistance                             Balance:  Sitting: Intact  Standing: Intact; With support  Ambulation/Gait Training:  Distance (ft): 20 Feet (ft)  Assistive Device: Walker, rolling;Gait belt  Ambulation - Level of Assistance: Contact guard assistance;Stand-by assistance        Gait Abnormalities: Decreased step clearance        Base of Support: Widened     Speed/[place]: Pace decreased (<100 feet/min)                       Stairs: Therapeutic Exercises:     EXERCISE   Sets   Reps   Active Active Assist   Passive Self ROM   Comments   Ankle Pumps   ? ?                                        ?                                        ?                                           Quad Sets   ?                                         ?                                        ?                                        ?                                           Hamstring Sets   ?                                        ?                                        ?                                        ?                                           Short Arc Quads   ? ?                                        ?                                        ?                                           Knee Extension Stretch     ? ?                                          ?                                          ?                                           Heel Slides   ? ?                                        ?                                        ?                                           Long Arc Quads   ? ?                                        ?                                        ?                                           Knee Flexion Stretch   ? ?                                        ?                                        ?                                           Straight Leg Raises   ? ?                                        ?                                        ?                                               Pain Rating:  3/10    Activity Tolerance:   Good  Please refer to the flowsheet for vital signs taken during this treatment.     After treatment patient left in no apparent distress:   Supine in bed, Call bell within reach and Bed / chair alarm activated    COMMUNICATION/COLLABORATION:   The patients plan of care was discussed with: Registered Nurse    Ozzy Tomas   Time Calculation: 25 mins            8/26/2019 1052 by Burgess Beltran  Outcome: Progressing Towards Goal  Note:   PHYSICAL THERAPY TREATMENT  Patient: Salima Koenig (1 y.o. female)  Date: 8/26/2019  Diagnosis: Primary osteoarthritis of right knee [M17.11] <principal problem not specified>  Procedure(s) (LRB):  RIGHT TOTAL KNEE ARTHROPLASTY MAKOPLASTY (Right) 4 Days Post-Op  Precautions: Bed Alarm, Fall, WBAT  Chart, physical therapy assessment, plan of care and goals were reviewed. ASSESSMENT  Based on the objective data described below, reviewed HEP demonstrates fair QS, able to SAQ independently, Mod A for SLR. CGa for bed mobility, functional transfers and gait training using RW for steadying. Pt does not have stairs to enter building with elevator to her home. BP stable throughout session, see flow sheet    Current Level of Function Impacting Discharge (mobility/balance): use of RW for steadying    Other factors to consider for discharge: lives alone, family support short term         PLAN :  Patient continues to benefit from skilled intervention to address the above impairments. Continue treatment per established plan of care. to address goals. Recommendation for discharge: (in order for the patient to meet his/her long term goals)  Outpatient physical therapy follow up recommended per MD     This discharge recommendation:  Has been made in collaboration with the attending provider and/or case management    Equipment recommendations for successful discharge (if) home: patient owns DME required for discharge       SUBJECTIVE:   Patient stated Sharyn Peguero don't know when I might leave.     OBJECTIVE DATA SUMMARY:   Critical Behavior:  Neurologic State: Appropriate for age  Orientation Level: Oriented X4  Cognition: Follows commands  Safety/Judgement: Awareness of environment, Good awareness of safety precautions  Functional Mobility Training:  Bed Mobility:     Supine to Sit: Contact guard assistance; Additional time  Sit to Supine: Minimum assistance; Additional time;Assist x1           Transfers:  Sit to Stand: Contact guard assistance                                Balance:  Sitting: Intact  Standing: Intact; With support  Ambulation/Gait Training:  Distance (ft): 120 Feet (ft)  Assistive Device: Walker, rolling;Gait belt  Ambulation - Level of Assistance: Contact guard assistance;Stand-by assistance        Gait Abnormalities: Decreased step clearance        Base of Support: Widened     Speed/[place]: Pace decreased (<100 feet/min)                       Stairs: Therapeutic Exercises:     Pain Rating:  3/10    Activity Tolerance:   Good  Please refer to the flowsheet for vital signs taken during this treatment.     After treatment patient left in no apparent distress:   Supine in bed, Call bell within reach and Bed / chair alarm activated    COMMUNICATION/COLLABORATION:   The patients plan of care was discussed with: Registered Nurse    Araceli Dallas   Time Calculation: 26 mins

## 2019-08-26 NOTE — PROGRESS NOTES
Family Practice  Daily Progress Note: 8/26/2019  Zhang Morales MD    Assessment/Plan:   S/P Right total knee arthroplasty 8/22/19 - per ortho    CKD3 with moderate hyperkalemia  - recheck and monitor     HTN - chronic, requiring multiple drugs  - nephrology consult    Seizure disorder  - cont Keppra    Asthma  - cont home asthma meds    MGUS  -monitor    Hyperlipidemia  -hold statin for now    Hx Basal Ganglia bleed 2/20/19 - tx at [facility]    Episode of Hypothermia - unk cause       Problem List:  Problem List as of 8/26/2019 Date Reviewed: 1/25/2018          Codes Class Noted - Resolved    Primary osteoarthritis of right knee ICD-10-CM: M17.11  ICD-9-CM: 715.16  8/22/2019 - Present        ICH (intracerebral hemorrhage) ([facility] ICD-10-CM: I61.9  ICD-9-CM: 417  2/20/2019 - Present        Paresthesia ICD-10-CM: R20.2  ICD-9-CM: 782.0  8/10/2017 - Present        Nonintractable epilepsy without status epilepticus ([facility] ICD-10-CM: G40.909  ICD-9-CM: 345.90  8/10/2017 - Present        ABIGAIL (obstructive sleep apnea) ICD-10-CM: G47.33  ICD-9-CM: 327.23  12/10/2014 - Present        RESOLVED: Convulsions ([facility] ICD-10-CM: R56.9  ICD-9-CM: 780.39  5/31/2016 - 8/10/2017        RESOLVED: Hx of seizure disorder ICD-10-CM: M17.65  ICD-9-CM: V12.49  5/31/2016 - 8/10/2017            Subjective:    [de-identified] yo WF, pt of Dr Supa Dobbins in our offices, who is 1 day s/p right TKR. We are asked to see her due to elevated K+ and for her other medical problems. Currently she reports little pain, has been up briefly and appetite is good. Review of her office notes reveals longstanding HTN which has been difficult to control, requiring multiple medications and med changes. I suspect her BP will be difficult to control here as well. She has CKD3 with creat usually in 1.6-1.8 range and about the same here to slightly worse. She has a hx of Seizure disorder which has been stable on current med.   Her asthma has been stable lately, with her last flair being in March 2019 associated with a URI/Sinusitis/Bronchitis. In addition, she has a hx of a Basal Ganglia bleed 2/20/19, was treated at [facility], and has recovered. Long term anticoags may not be in order. Hx of MGUS (monoclonal gammopathy of uncertain significance) which is being monitored outpt. Hyperlipidemia has been fairly well controlled with last LDL 77. Her potassium has been creeping up the last several days and this AM is 6.1; she has not had hyperkalemia outpt. For now will tx this simply with low dose of Lasix IV (10mg) one time since she is edematous and will decrease her fluids from 125cc/hr to 75cc/hr. Recheck of CMP is pending. EKG is ok showing no acute changes. Continue to monitor K+ - if it stays in the mid 5s we can just watch it. Also will not restart her Losartan as this could increase K+. For her BP will increase Amlodipine to 10mg a day and add prn hydralzine. 8/24: AM labs pending. K+ 5.7 yesterday. Will hold Losartan and increase Coreg. Continue Amlodipine. Will ask Nephrology to see for her BP that is much higher in  the right arm but not the left. Renal US this am. Knee is feeling pretty well. Some pain. 8/25:   \"Feeling pretty good\" she reports. No complaints today. Transferred to unit due to persistent low temp and need for bear hugger, but;  in ICU temp has been normal.  WBC about the same. She can go back to ortho floor today. She is stable for rehab. K+ 5.3. Hb 8.8 - watching.       8/26:  Little change. She reports she feels ok and no complaints other than knee pain. She is interested in rehab now. Creat up a little and K+ 5.7 - continuing to monitor closely for now and will get Nephrology to see. LFTs have bumped up - unsure as to cause - watch for now and if cont to rise then Ab US. Stable for rehab. Hb 8.5 - watching.        Past Medical History:   Diagnosis Date    Anxiety     Arthritis     Basal ganglia stroke ([facility] 2019    Left    CKD (chronic kidney disease) stage 3, GFR 30-59 ml/min (HCC)     High cholesterol     Hx of seasonal allergies     Hypertension     Ischemic stroke ([facility]     Right occipital    Monoclonal gammopathy 2018    ABIGAIL on CPAP     Seizures (HCC)     last keppra level- 2019 @ 15   Last seizure 2017    Urinary incontinence      Past Surgical History:   Procedure Laterality Date    HX APPENDECTOMY N/A     HX CATARACT REMOVAL Bilateral 2018    HX  SECTION N/A     x 2    HX HEMORRHOIDECTOMY      HX HYSTERECTOMY      HX TONSILLECTOMY       Social History     Socioeconomic History    Marital status:      Spouse name: Not on file    Number of children: Not on file    Years of education: Not on file    Highest education level: Not on file   Occupational History    Not on file   Social Needs    Financial resource strain: Not on file    Food insecurity:     Worry: Not on file     Inability: Not on file    Transportation needs:     Medical: Not on file     Non-medical: Not on file   Tobacco Use    Smoking status: Never Smoker    Smokeless tobacco: Never Used   Substance and Sexual Activity    Alcohol use: No    Drug use: No    Sexual activity: Not on file   Lifestyle    Physical activity:     Days per week: Not on file     Minutes per session: Not on file    Stress: Not on file   Relationships    Social connections:     Talks on phone: Not on file     Gets together: Not on file     Attends Uats[employer] service: Not on file     Active member of club or organization: Not on file     Attends meetings of clubs or organizations: Not on file     Relationship status: Not on file    Intimate partner violence:     Fear of current or ex partner: Not on file     Emotionally abused: Not on file     Physically abused: Not on file     Forced sexual activity: Not on file   Other Topics Concern    Not on file   Social History Narrative    Not on file     Family History Problem Relation Age of Onset    Hypertension Mother    [address] [facility] Cancer Sister         Skin    Diabetes Sister     Hypertension Sister     Stroke Father     Parkinson's Disease Father     Cancer Sister         Skin    Cancer Sister     No Known Problems Brother      Allergies   Allergen Reactions    Codeine Other (comments)    Levaquin [Levofloxacin] Other (comments)     Hallucinations    Lisinopril Cough    Sulfa (Sulfonamide Antibiotics) Rash and Itching       Review of Systems:   A comprehensive review of systems was negative except for that written in the HPI. Objective:   Physical Exam:     Visit Vitals  /86 (BP 1 Location: Right arm, BP Patient Position: At rest)   Pulse 61   Temp 98.2 °F (36.8 °C)   Resp 18   Ht 5' 1.5\" (1.562 m)   Wt 87.2 kg (192 lb 3.9 oz)   SpO2 97%   BMI 35.74 kg/m²    O2 Flow Rate (L/min): 2 l/min O2 Device: Room air    Temp (24hrs), Av.9 °F (36.6 °C), Min:97.5 °F (36.4 °C), Max:98.4 °F (36.9 °C)    No intake/output data recorded.  1901 -  0700  In: 500 [P.O.:500]  Out: 1400 [Urine:1400]    General:  Alert, cooperative, no distress, appears stated age. Head:  Normocephalic, without obvious abnormality, atraumatic. Eyes:  Conjunctivae/corneas clear. PERRL, EOMs intact. Throat: Lips, mucosa, and tongue moist..   Neck: Supple, symmetrical, trachea midline, no adenopathy, thyroid: no enlargement/tenderness/nodules, no carotid bruit and no JVD. Lungs:   Clear to auscultation bilaterally. Chest wall:  No tenderness or deformity. Heart:  Regular rate and rhythm, S1, S2 normal, no murmur, click, rub or gallop. Abdomen:   Soft, non-tender. Bowel sounds normal. No masses,  No organomegaly. Extremities: no cyanosis. Trace edema pretibial bilat. No calf tenderness or cords. Right knee dressing dry. Painful to attempt to move right leg. Pulses: 2+ and symmetric all extremities.    Skin:  turgor normal. No rashes or lesions   Neurologic: CNII-XII intact. Alert and oriented X 3. Fine motor of hands and fingers normal.   equal.  No cogwheeling or rigidity. Gait not tested at this time. Sensation grossly normal to touch. Gross motor of extremities otherwise normal.       Data Review:       Recent Days:  Recent Labs     08/26/19 0345 08/25/19 0448 08/24/19 0817   WBC 8.9 12.0* 12.7*   HGB 8.5* 8.8* 9.5*   HCT 26.8* 27.1* 28.3*    197 190     Recent Labs     08/26/19 0345 08/25/19 0448 08/24/19  0817 08/23/19  0956    141 139 140   K 5.7* 5.3* 5.3* 5.7*  5.7*   * 119* 117* 120*   CO2 16* 15* 14* 13*   * 127* 130* 216*   BUN 74* 74* 72* 59*   CREA 1.94* 1.86* 1.95* 1.88*   CA 8.0* 8.3* 8.5 8.2*   MG 2.5* 2.6* 2.4  --    PHOS  --  3.6  --   --    ALB 2.3*  --   --  2.7*   TBILI 0.4  --   --  0.2   SGOT 595*  --   --  74*   *  --   --  83*     No results for input(s): PH, PCO2, PO2, HCO3, FIO2 in the last 72 hours. 24 Hour Results:  Recent Results (from the past 24 hour(s))   METABOLIC PANEL, COMPREHENSIVE    Collection Time: 08/26/19  3:45 AM   Result Value Ref Range    Sodium 142 136 - 145 mmol/L    Potassium 5.7 (H) 3.5 - 5.1 mmol/L    Chloride 119 (H) 97 - 108 mmol/L    CO2 16 (L) 21 - 32 mmol/L    Anion gap 7 5 - 15 mmol/L    Glucose 109 (H) 65 - 100 mg/dL    BUN 74 (H) 6 - 20 MG/DL    Creatinine 1.94 (H) 0.55 - 1.02 MG/DL    BUN/Creatinine ratio 38 (H) 12 - 20      GFR est AA 30 (L) >60 ml/min/1.73m2    GFR est non-AA 25 (L) >60 ml/min/1.73m2    Calcium 8.0 (L) 8.5 - 10.1 MG/DL    Bilirubin, total 0.4 0.2 - 1.0 MG/DL    ALT (SGPT) 351 (H) 12 - 78 U/L    AST (SGOT) 595 (H) 15 - 37 U/L    Alk.  phosphatase 216 (H) 45 - 117 U/L    Protein, total 6.0 (L) 6.4 - 8.2 g/dL    Albumin 2.3 (L) 3.5 - 5.0 g/dL    Globulin 3.7 2.0 - 4.0 g/dL    A-G Ratio 0.6 (L) 1.1 - 2.2     MAGNESIUM    Collection Time: 08/26/19  3:45 AM   Result Value Ref Range    Magnesium 2.5 (H) 1.6 - 2.4 mg/dL   CBC WITH AUTOMATED DIFF    Collection Time: 08/26/19  3:45 AM   Result Value Ref Range    WBC 8.9 3.6 - 11.0 K/uL    RBC 2.74 (L) 3.80 - 5.20 M/uL    HGB 8.5 (L) 11.5 - 16.0 g/dL    HCT 26.8 (L) 35.0 - 47.0 %    MCV 97.8 80.0 - 99.0 FL    MCH 31.0 26.0 - 34.0 PG    MCHC 31.7 30.0 - 36.5 g/dL    RDW 13.1 11.5 - 14.5 %    PLATELET 657 703 - 103 K/uL    MPV 9.7 8.9 - 12.9 FL    NRBC 0.0 0  WBC    ABSOLUTE NRBC 0.00 0.00 - 0.01 K/uL    NEUTROPHILS 68 32 - 75 %    LYMPHOCYTES 20 12 - 49 %    MONOCYTES 10 5 - 13 %    EOSINOPHILS 1 0 - 7 %    BASOPHILS 0 0 - 1 %    IMMATURE GRANULOCYTES 1 (H) 0.0 - 0.5 %    ABS. NEUTROPHILS 6.2 1.8 - 8.0 K/UL    ABS. LYMPHOCYTES 1.7 0.8 - 3.5 K/UL    ABS. MONOCYTES 0.9 0.0 - 1.0 K/UL    ABS. EOSINOPHILS 0.0 0.0 - 0.4 K/UL    ABS. BASOPHILS 0.0 0.0 - 0.1 K/UL    ABS. IMM.  GRANS. 0.1 (H) 0.00 - 0.04 K/UL    DF AUTOMATED         Medications reviewed  Current Facility-Administered Medications   Medication Dose Route Frequency    carvedilol (COREG) tablet 6.25 mg  6.25 mg Oral BID WITH MEALS    amLODIPine (NORVASC) tablet 10 mg  10 mg Oral DAILY    labetalol (NORMODYNE;TRANDATE) 20 mg/4 mL (5 mg/mL) injection 20 mg  20 mg IntraVENous Q4H PRN    sodium chloride (NS) flush 5-40 mL  5-40 mL IntraVENous Q8H    sodium chloride (NS) flush 5-40 mL  5-40 mL IntraVENous PRN    acetaminophen (TYLENOL) tablet 650 mg  650 mg Oral Q6H    oxyCODONE IR (ROXICODONE) tablet 5 mg  5 mg Oral Q3H PRN    oxyCODONE IR (ROXICODONE) tablet 10 mg  10 mg Oral Q3H PRN    naloxone (NARCAN) injection 0.4 mg  0.4 mg IntraVENous PRN    senna-docusate (PERICOLACE) 8.6-50 mg per tablet 1 Tab  1 Tab Oral BID    polyethylene glycol (MIRALAX) packet 17 g  17 g Oral DAILY    bisacodyl (DULCOLAX) suppository 10 mg  10 mg Rectal DAILY PRN    gabapentin (NEURONTIN) capsule 100 mg  100 mg Oral DAILY WITH BREAKFAST    gabapentin (NEURONTIN) capsule 300 mg  300 mg Oral QHS    aspirin delayed-release tablet 81 mg  81 mg Oral BID    terazosin (HYTRIN) capsule 5 mg  5 mg Oral QHS    levETIRAcetam (KEPPRA) tablet 500 mg  500 mg Oral BID    pravastatin (PRAVACHOL) tablet 40 mg  40 mg Oral QHS    montelukast (SINGULAIR) tablet 10 mg  10 mg Oral DAILY    fluticasone propionate (FLONASE) 50 mcg/actuation nasal spray 1 Spray  1 Spray Both Nostrils QHS    carboxymethylcellulose sodium (CELLUVISC) 1 % ophthalmic solution 1 Drop  1 Drop Both Eyes BID PRN    famotidine (PEPCID) tablet 20 mg  20 mg Oral DAILY     Care Plan discussed with: Patient and Nurse  Total time spent with patient: 30 minutes.   Lola Barahona MD

## 2019-08-26 NOTE — PROGRESS NOTES
Spiritual Care Assessment/Progress Note  [address]      NAME: Maxine Abel      MRN: [id number]  AGE: [de-identified] y.o.  SEX: female  Tenriism Affiliation: Oriental orthodox   Language: English     8/26/2019     Total Time (in minutes): 10     Spiritual Assessment begun in [facility] POST SURG ORT 1 through conversation with:         [x]Patient        [] Family    [] Friend(s)        Reason for Consult: Initial/Spiritual assessment, patient floor     Spiritual beliefs: (Please include comment if needed)     [x] Identifies with a juanjose tradition:         [] Supported by a juanjose community:            [] Claims no spiritual orientation:           [] Seeking spiritual identity:                [] Adheres to an individual form of spirituality:           [] Not able to assess:                           Identified resources for coping:      [x] Prayer                               [] Music                  [] Guided Imagery     [x] Family/friends                 [] Pet visits     [] Devotional reading                         [] Unknown     [] Other:                                            Interventions offered during this visit: (See comments for more details)    Patient Interventions: Coping skills reviewed/reinforced, Affirmation of emotions/emotional suffering, Catharsis/review of pertinent events in supportive environment           Plan of Care:     [x] Support spiritual and/or cultural needs    [] Support AMD and/or advance care planning process      [] Support grieving process   [] Coordinate Rites and/or Rituals    [] Coordination with community clergy   [] No spiritual needs identified at this time   [] Detailed Plan of Care below (See Comments)  [] Make referral to Music Therapy  [] Make referral to Pet Therapy     [] Make referral to Addiction services  [] Make referral to [facility]  [] Make referral to Spiritual Care Partner  [] No future visits requested        [] Follow up visits as needed     Comments:  visited patient, Valarie Hanley, for an initial spiritual assessment on the Post. Surgical Ortho floor. Valarie Hanley was awake, alert, and sitting up in bed. Her sister and brother-in-law was sitting at the bedside.  introduced herself and extended support through active listening and pastoral support. Valarie Hanley and her family made good eye contact and greeted the  warmly. Valarie Hanley openly discussed her recent surgery and her recovery process. She is hopeful that she will be discharged home from the hospital tomorrow.  continued to be a supportive presence as Valarie Hanley discussed her Sikhism juanjose and her feelings of gratitude for all of her friends and family sending prayers her way. She feels that the prayers of others have greatly helped her while she has been in the hospital.  inquired how she could best support Valarie Hanley during this time. Valarie Hanley thanked  for her visit and expressed no other needs. She is aware of the 's availability.  will follow up as able and/or needed  Angelica Murray.      Paging Service: [phone])

## 2019-08-26 NOTE — PROGRESS NOTES
TOTAL KNEE ARTHROPLASTY DAILY NOTE     ASSESSMENT / PLAN :   1. Pain Control : Excellent - Able to sleep and participate with therapy  2. Overnight Issues : none from ortho perspective  3. Wound or incisional issue : Healing incision with no visible drainage  4. Therapy / Weight Bearing Recommendations : Weight bear as tolerated with use of a walker and two person assist while mobilizing  5. DVT Prophylaxis : Mechanical and Aspirin and mechanical lower extremity compression device  6. Disposition :Outpatient PT scheduled for Tuesday 8/27  7. Medical Concerns : CKD with hyperkalemia, elevated LFTs, seizure disorder (stable on Keppra), post-op hypothermia of unknown cause - transferred to ICU for [facility] hugger/monitoring - stabilized, transferred back to ortho unit  8. Comments : Dr. Gissel Trimble spoke with Dr. Mary Anne Montiel this morning and patient will stay another day to continue to monitor      POD  4 Days Post-Op s/p Procedure(s):  RIGHT TOTAL KNEE ARTHROPLASTY MAKOPLASTY     SUBJECTIVE :     Overnight complaints : none. OBJECTIVE :     Vitals:    08/26/19 0807 08/26/19 0832 08/26/19 1014 08/26/19 1040   BP: 132/71 117/68 129/69 118/69   Pulse: 80 67 69 67   Resp: 18 16     Temp: 98.5 °F (36.9 °C) 98.2 °F (36.8 °C)     SpO2: 97% 96% 97%    Weight:       Height:           Alert and oriented x3. Examination of the right knee reveals that the dressing is clean, dry and intact. Motor Exam : Pt is able to perform a straight leg raise. Sensation is intact to light touch. No calf pain.      Labs:  Recent Labs     08/26/19  0345   HGB 8.5*   HCT 26.8*      K 5.7*   *   CO2 16*   BUN 74*   CREA 1.94*   *      Patient mobility  Gait  Base of Support: Widened  Speed/Italia: Pace decreased (<100 feet/min)  Step Length: Right shortened, Left shortened  Stance: Right decreased  Gait Abnormalities: Decreased step clearance  Ambulation - Level of Assistance: Contact guard assistance, Stand-by assistance  Distance (ft): 120 Feet (ft)  Assistive Device: Walker, rolling, Gait belt  Stairs - Level of Assistance: (NT)        Larry Moritz, MD  Cell [phone]  Babita Peck PA-C  Cell [phone]  Medical Assistants: [address] [place] [address] [phone]                 Surgery Scheduler: Gwendolyn Woody, [address] [facility] [id number]

## 2019-08-26 NOTE — PROGRESS NOTES
Problem: Falls - Risk of  Goal: *Absence of Falls  Description  Document Drewgloria Perkins Fall Risk and appropriate interventions in the flowsheet. Outcome: Progressing Towards Goal  Note:   Fall Risk Interventions:  Mobility Interventions: Patient to call before getting OOB, Utilize walker, cane, or other assistive device         Medication Interventions: Patient to call before getting OOB, Teach patient to arise slowly    Elimination Interventions: Patient to call for help with toileting needs, Stay With Me (per policy), Call light in reach              Problem: Patient Education: Go to Patient Education Activity  Goal: Patient/Family Education  Outcome: Progressing Towards Goal     Problem: Knee Replacement: Day of Surgery/Unit  Goal: Activity/Safety  Outcome: Progressing Towards Goal  Goal: Consults, if ordered  Outcome: Progressing Towards Goal  Goal: Nutrition/Diet  Outcome: Progressing Towards Goal  Goal: Medications  Outcome: Progressing Towards Goal  Goal: Respiratory  Outcome: Progressing Towards Goal  Goal: Treatments/Interventions/Procedures  Outcome: Progressing Towards Goal  Goal: Psychosocial  Outcome: Progressing Towards Goal  Goal: *Initiate mobility  Outcome: Progressing Towards Goal  Goal: *Optimal pain control at patient's stated goal  Outcome: Progressing Towards Goal  Goal: *Hemodynamically stable  Outcome: Progressing Towards Goal     Problem: Patient Education: Go to Patient Education Activity  Goal: Patient/Family Education  Outcome: Progressing Towards Goal     Problem: Pressure Injury - Risk of  Goal: *Prevention of pressure injury  Description  Document Humble Scale and appropriate interventions in the flowsheet.   Outcome: Progressing Towards Goal  Note:   Pressure Injury Interventions:  Sensory Interventions: Assess need for specialty bed    Moisture Interventions: Absorbent underpads    Activity Interventions: Increase time out of bed, PT/OT evaluation    Mobility Interventions: PT/OT evaluation    Nutrition Interventions: Offer support with meals,snacks and hydration    Friction and Shear Interventions: Apply protective barrier, creams and emollients, Feet elevated on foot rest, Foam dressings/transparent film/skin sealants, HOB 30 degrees or less, Lift sheet, Lift team/patient mobility team, Minimize layers                Problem: Patient Education: Go to Patient Education Activity  Goal: Patient/Family Education  Outcome: Progressing Towards Goal

## 2019-08-26 NOTE — PROGRESS NOTES
8/26/2019  10:05 AM  CM reviewed EMR for coordination of service needs. Awaiting medical stability. Cm to continue to monitor. Transition of care plan:   1. Home with family assistance and outpatient PT. 2. Home with HH. 3. SNF.

## 2019-08-26 NOTE — PROGRESS NOTES
Bedside and Verbal shift change report given to Najma Escobar (oncoming nurse) by Eloina Fischer (offgoing nurse). Report included the following information SBAR, Kardex, OR Summary, Intake/Output, MAR, Recent Results and Alarm Parameters .

## 2019-08-27 PROCEDURE — 74011250637 HC RX REV CODE- 250/637: Performed by: FAMILY MEDICINE

## 2019-08-27 PROCEDURE — 65270000029 HC RM PRIVATE

## 2019-08-27 PROCEDURE — 97110 THERAPEUTIC EXERCISES: CPT

## 2019-08-27 PROCEDURE — 74011250637 HC RX REV CODE- 250/637: Performed by: ORTHOPAEDIC SURGERY

## 2019-08-27 PROCEDURE — 74011250637 HC RX REV CODE- 250/637: Performed by: PHYSICIAN ASSISTANT

## 2019-08-27 PROCEDURE — 97116 GAIT TRAINING THERAPY: CPT

## 2019-08-27 RX ADMIN — ACETAMINOPHEN 650 MG: 325 TABLET ORAL at 10:32

## 2019-08-27 RX ADMIN — AMLODIPINE BESYLATE 10 MG: 5 TABLET ORAL at 08:00

## 2019-08-27 RX ADMIN — PRAVASTATIN SODIUM 40 MG: 20 TABLET ORAL at 21:05

## 2019-08-27 RX ADMIN — ACETAMINOPHEN 650 MG: 325 TABLET ORAL at 17:02

## 2019-08-27 RX ADMIN — ASPIRIN 81 MG: 81 TABLET, COATED ORAL at 08:00

## 2019-08-27 RX ADMIN — OXYCODONE HYDROCHLORIDE 5 MG: 5 TABLET ORAL at 10:31

## 2019-08-27 RX ADMIN — GABAPENTIN 300 MG: 300 CAPSULE ORAL at 21:05

## 2019-08-27 RX ADMIN — ACETAMINOPHEN 650 MG: 325 TABLET ORAL at 06:45

## 2019-08-27 RX ADMIN — CARVEDILOL 6.25 MG: 6.25 TABLET, FILM COATED ORAL at 17:02

## 2019-08-27 RX ADMIN — ASPIRIN 81 MG: 81 TABLET, COATED ORAL at 17:02

## 2019-08-27 RX ADMIN — LEVETIRACETAM 500 MG: 500 TABLET ORAL at 08:00

## 2019-08-27 RX ADMIN — FLUTICASONE PROPIONATE 1 SPRAY: 50 SPRAY, METERED NASAL at 21:06

## 2019-08-27 RX ADMIN — FAMOTIDINE 20 MG: 20 TABLET ORAL at 08:01

## 2019-08-27 RX ADMIN — Medication 10 ML: at 21:06

## 2019-08-27 RX ADMIN — GABAPENTIN 100 MG: 100 CAPSULE ORAL at 08:01

## 2019-08-27 RX ADMIN — MONTELUKAST SODIUM 10 MG: 10 TABLET, COATED ORAL at 21:05

## 2019-08-27 RX ADMIN — TERAZOSIN HYDROCHLORIDE 5 MG: 5 CAPSULE ORAL at 21:05

## 2019-08-27 RX ADMIN — Medication 10 ML: at 06:45

## 2019-08-27 RX ADMIN — LEVETIRACETAM 500 MG: 500 TABLET ORAL at 21:05

## 2019-08-27 RX ADMIN — CARVEDILOL 6.25 MG: 6.25 TABLET, FILM COATED ORAL at 08:01

## 2019-08-27 NOTE — INTERDISCIPLINARY ROUNDS
Interdisciplinary team rounds were held with the following team members: Nurse Practitioner, Physical Therapy, and Pharmacy. Plan of care reviewed and all questions answered. Dr. Americo Brito wants labs drawn  tomorrow morning to evaluate renal function. Pt informed she would not be discharged home today.

## 2019-08-27 NOTE — PROGRESS NOTES
Family Practice  Daily Progress Note: 8/27/2019  Beau Fields MD    Assessment/Plan:   S/P Right total knee arthroplasty 8/22/19 - per ortho  - stable for rehab    CKD3 with moderate hyperkalemia  - recheck and monitor     HTN - chronic, requiring multiple drugs  - nephrology consulted    Seizure disorder  - cont Keppra    Asthma  - cont home asthma meds    MGUS  -monitor    Hyperlipidemia  -hold statin for now    Hx Basal Ganglia bleed 2/20/19 - tx at [facility]    Episode of Hypothermia - unk cause       Problem List:  Problem List as of 8/27/2019 Date Reviewed: 1/25/2018          Codes Class Noted - Resolved    Primary osteoarthritis of right knee ICD-10-CM: M17.11  ICD-9-CM: 715.16  8/22/2019 - Present        ICH (intracerebral hemorrhage) ([facility] ICD-10-CM: I61.9  ICD-9-CM: 136  2/20/2019 - Present        Paresthesia ICD-10-CM: R20.2  ICD-9-CM: 782.0  8/10/2017 - Present        Nonintractable epilepsy without status epilepticus ([facility] ICD-10-CM: G40.909  ICD-9-CM: 345.90  8/10/2017 - Present        ABIGAIL (obstructive sleep apnea) ICD-10-CM: G47.33  ICD-9-CM: 327.23  12/10/2014 - Present        RESOLVED: Convulsions ([facility] ICD-10-CM: R56.9  ICD-9-CM: 780.39  5/31/2016 - 8/10/2017        RESOLVED: Hx of seizure disorder ICD-10-CM: Y58.06  ICD-9-CM: V12.49  5/31/2016 - 8/10/2017            Subjective:    [de-identified] yo WF, pt of Dr HUBBARD [facility] in our offices, who is 1 day s/p right TKR. We are asked to see her due to elevated K+ and for her other medical problems. Currently she reports little pain, has been up briefly and appetite is good. Review of her office notes reveals longstanding HTN which has been difficult to control, requiring multiple medications and med changes. I suspect her BP will be difficult to control here as well. She has CKD3 with creat usually in 1.6-1.8 range and about the same here to slightly worse. She has a hx of Seizure disorder which has been stable on current med.   Her asthma has been stable lately, with her last flair being in March 2019 associated with a URI/Sinusitis/Bronchitis. In addition, she has a hx of a Basal Ganglia bleed 2/20/19, was treated at [facility], and has recovered. Long term anticoags may not be in order. Hx of MGUS (monoclonal gammopathy of uncertain significance) which is being monitored outpt. Hyperlipidemia has been fairly well controlled with last LDL 77. Her potassium has been creeping up the last several days and this AM is 6.1; she has not had hyperkalemia outpt. For now will tx this simply with low dose of Lasix IV (10mg) one time since she is edematous and will decrease her fluids from 125cc/hr to 75cc/hr. Recheck of CMP is pending. EKG is ok showing no acute changes. Continue to monitor K+ - if it stays in the mid 5s we can just watch it. Also will not restart her Losartan as this could increase K+. For her BP will increase Amlodipine to 10mg a day and add prn hydralzine. 8/24: AM labs pending. K+ 5.7 yesterday. Will hold Losartan and increase Coreg. Continue Amlodipine. Will ask Nephrology to see for her BP that is much higher in  the right arm but not the left. Renal US this am. Knee is feeling pretty well. Some pain. 8/25:   \"Feeling pretty good\" she reports. No complaints today. Transferred to unit due to persistent low temp and need for bear hugger, but;  in ICU temp has been normal.  WBC about the same. She can go back to ortho floor today. She is stable for rehab. K+ 5.3. Hb 8.8 - watching.       8/26:  Little change. She reports she feels ok and no complaints other than knee pain. She is interested in rehab now. Creat up a little and K+ 5.7 - continuing to monitor closely for now and will get Nephrology to see. LFTs have bumped up - unsure as to cause - watch for now and if cont to rise then Ab US. Stable for rehab. Hb 8.5 - watching.       8/27:  Up more and feeling better. She walked in [place] yesterday. LFTs trending down. K+ down to 5.2. Stable for rehab.       Past Medical History:   Diagnosis Date    Anxiety     Arthritis     Basal ganglia stroke ([facility] 2019    Left    CKD (chronic kidney disease) stage 3, GFR 30-59 ml/min ([facility])     High cholesterol     Hx of seasonal allergies     Hypertension     Ischemic stroke ([facility]     Right occipital    Monoclonal gammopathy 2018    ABIGAIL on CPAP     Seizures ([facility])     last keppra level- 2019 @ 15   Last seizure 2017    Urinary incontinence      Past Surgical History:   Procedure Laterality Date    HX APPENDECTOMY N/A     HX CATARACT REMOVAL Bilateral 2018    HX  SECTION N/A     x 2    HX HEMORRHOIDECTOMY      HX HYSTERECTOMY      HX TONSILLECTOMY       Social History     Socioeconomic History    Marital status:      Spouse name: Not on file    Number of children: Not on file    Years of education: Not on file    Highest education level: Not on file   Occupational History    Not on file   Social Needs    Financial resource strain: Not on file    Food insecurity:     Worry: Not on file     Inability: Not on file    Transportation needs:     Medical: Not on file     Non-medical: Not on file   Tobacco Use    Smoking status: Never Smoker    Smokeless tobacco: Never Used   Substance and Sexual Activity    Alcohol use: No    Drug use: No    Sexual activity: Not on file   Lifestyle    Physical activity:     Days per week: Not on file     Minutes per session: Not on file    Stress: Not on file   Relationships    Social connections:     Talks on phone: Not on file     Gets together: Not on file     Attends Advent service: Not on file     Active member of club or organization: Not on file     Attends meetings of clubs or organizations: Not on file     Relationship status: Not on file    Intimate partner violence:     Fear of current or ex partner: Not on file     Emotionally abused: Not on file     Physically abused: Not on file     Forced sexual activity: Not on file   Other Topics Concern    Not on file   Social History Narrative    Not on file     Family History   Problem Relation Age of Onset    Hypertension Mother     Cancer Sister         Skin    Diabetes Sister     Hypertension Sister     Stroke Father     Parkinson's Disease Father     Cancer Sister         Skin    Cancer Sister     No Known Problems Brother      Allergies   Allergen Reactions    Codeine Other (comments)    Levaquin [Levofloxacin] Other (comments)     Hallucinations    Lisinopril Cough    Sulfa (Sulfonamide Antibiotics) Rash and Itching       Review of Systems:   A comprehensive review of systems was negative except for that written in the HPI. Objective:   Physical Exam:     Visit Vitals  /63 (BP 1 Location: Left arm, BP Patient Position: At rest;Head of bed elevated (Comment degrees))   Pulse 65   Temp 97.5 °F (36.4 °C)   Resp 18   Ht 5' 1.5\" (1.562 m)   Wt 87.2 kg (192 lb 3.9 oz)   SpO2 97%   BMI 35.74 kg/m²    O2 Flow Rate (L/min): 2 l/min O2 Device: CPAP nasal    Temp (24hrs), Av.8 °F (36.6 °C), Min:97.3 °F (36.3 °C), Max:98.2 °F (36.8 °C)    701 - 1900  In: -   Out: 900 [Urine:900]   1901 -  07  In: -   Out: 400 [Urine:400]  General:  Alert, cooperative, no distress, appears stated age. Head:  Normocephalic, without obvious abnormality, atraumatic. Eyes:  Conjunctivae/corneas clear. PERRL, EOMs intact. Throat: Lips, mucosa, and tongue moist..   Neck: Supple, symmetrical, trachea midline, no adenopathy, thyroid: no enlargement/tenderness/nodules, no carotid bruit and no JVD. Lungs:   Clear to auscultation bilaterally. Chest wall:  No tenderness or deformity. Heart:  Regular rate and rhythm, S1, S2 normal, no murmur, click, rub or gallop. Abdomen:   Soft, non-tender. Bowel sounds normal. No masses,  No organomegaly. Extremities: no cyanosis. Trace edema pretibial bilat.  No calf tenderness or cords. Right knee dressing dry. Painful to attempt to move right leg. Pulses: 2+ and symmetric all extremities. Skin:  turgor normal. No rashes or lesions   Neurologic: CNII-XII intact. Alert and oriented X 3. Fine motor of hands and fingers normal.   equal.  No cogwheeling or rigidity. Gait not tested at this time. Sensation grossly normal to touch. Gross motor of extremities otherwise normal.       Data Review:       Recent Days:  Recent Labs     08/26/19  0345 08/25/19 0448   WBC 8.9 12.0*   HGB 8.5* 8.8*   HCT 26.8* 27.1*    197     Recent Labs     08/26/19  1442 08/26/19 0345 08/25/19 0448    142 141   K 5.2* 5.7* 5.3*   * 119* 119*   CO2 17* 16* 15*   * 109* 127*   BUN 74* 74* 74*   CREA 1.97* 1.94* 1.86*   CA 8.0* 8.0* 8.3*   MG  --  2.5* 2.6*   PHOS  --   --  3.6   ALB 2.5* 2.3*  --    TBILI 0.3 0.4  --    SGOT 371* 595*  --    * 351*  --      No results for input(s): PH, PCO2, PO2, HCO3, FIO2 in the last 72 hours. 24 Hour Results:  Recent Results (from the past 24 hour(s))   HEPATIC FUNCTION PANEL    Collection Time: 08/26/19  2:42 PM   Result Value Ref Range    Protein, total 5.9 (L) 6.4 - 8.2 g/dL    Albumin 2.5 (L) 3.5 - 5.0 g/dL    Globulin 3.4 2.0 - 4.0 g/dL    A-G Ratio 0.7 (L) 1.1 - 2.2      Bilirubin, total 0.3 0.2 - 1.0 MG/DL    Bilirubin, direct 0.1 0.0 - 0.2 MG/DL    Alk.  phosphatase 220 (H) 45 - 117 U/L    AST (SGOT) 371 (H) 15 - 37 U/L    ALT (SGPT) 375 (H) 12 - 78 U/L   METABOLIC PANEL, BASIC    Collection Time: 08/26/19  2:42 PM   Result Value Ref Range    Sodium 143 136 - 145 mmol/L    Potassium 5.2 (H) 3.5 - 5.1 mmol/L    Chloride 118 (H) 97 - 108 mmol/L    CO2 17 (L) 21 - 32 mmol/L    Anion gap 8 5 - 15 mmol/L    Glucose 211 (H) 65 - 100 mg/dL    BUN 74 (H) 6 - 20 MG/DL    Creatinine 1.97 (H) 0.55 - 1.02 MG/DL    BUN/Creatinine ratio 38 (H) 12 - 20      GFR est AA 30 (L) >60 ml/min/1.73m2    GFR est non-AA 24 (L) >60 ml/min/1.73m2    Calcium 8.0 (L) 8.5 - 10.1 MG/DL       Medications reviewed  Current Facility-Administered Medications   Medication Dose Route Frequency    carvedilol (COREG) tablet 6.25 mg  6.25 mg Oral BID WITH MEALS    amLODIPine (NORVASC) tablet 10 mg  10 mg Oral DAILY    labetalol (NORMODYNE;TRANDATE) 20 mg/4 mL (5 mg/mL) injection 20 mg  20 mg IntraVENous Q4H PRN    sodium chloride (NS) flush 5-40 mL  5-40 mL IntraVENous Q8H    sodium chloride (NS) flush 5-40 mL  5-40 mL IntraVENous PRN    acetaminophen (TYLENOL) tablet 650 mg  650 mg Oral Q6H    oxyCODONE IR (ROXICODONE) tablet 5 mg  5 mg Oral Q3H PRN    oxyCODONE IR (ROXICODONE) tablet 10 mg  10 mg Oral Q3H PRN    naloxone (NARCAN) injection 0.4 mg  0.4 mg IntraVENous PRN    senna-docusate (PERICOLACE) 8.6-50 mg per tablet 1 Tab  1 Tab Oral BID    polyethylene glycol (MIRALAX) packet 17 g  17 g Oral DAILY    bisacodyl (DULCOLAX) suppository 10 mg  10 mg Rectal DAILY PRN    gabapentin (NEURONTIN) capsule 100 mg  100 mg Oral DAILY WITH BREAKFAST    gabapentin (NEURONTIN) capsule 300 mg  300 mg Oral QHS    aspirin delayed-release tablet 81 mg  81 mg Oral BID    terazosin (HYTRIN) capsule 5 mg  5 mg Oral QHS    levETIRAcetam (KEPPRA) tablet 500 mg  500 mg Oral BID    pravastatin (PRAVACHOL) tablet 40 mg  40 mg Oral QHS    montelukast (SINGULAIR) tablet 10 mg  10 mg Oral DAILY    fluticasone propionate (FLONASE) 50 mcg/actuation nasal spray 1 Spray  1 Spray Both Nostrils QHS    carboxymethylcellulose sodium (CELLUVISC) 1 % ophthalmic solution 1 Drop  1 Drop Both Eyes BID PRN    famotidine (PEPCID) tablet 20 mg  20 mg Oral DAILY     Care Plan discussed with: Patient and Nurse  Total time spent with patient: 30 minutes.   Brendon Freeman MD

## 2019-08-27 NOTE — PROGRESS NOTES
[address] [facility]  YOB: 1940          Assessment & Plan:   CKD 3, stable. Followed by Dr. Alicia Cao  HTN, controlled  Hyperkalemia, better p SPS  S/p knee surgery  Sz d/o    Rec:  Continue low K diet and supplements  Received dietary education on low K diet  Watch labs       Subjective:   CC: f/u CKD  HPI: K improved after SPS yesterday. Renal function stable.    ROS: no sob/n/v  Current Facility-Administered Medications   Medication Dose Route Frequency    carvedilol (COREG) tablet 6.25 mg  6.25 mg Oral BID WITH MEALS    amLODIPine (NORVASC) tablet 10 mg  10 mg Oral DAILY    labetalol (NORMODYNE;TRANDATE) 20 mg/4 mL (5 mg/mL) injection 20 mg  20 mg IntraVENous Q4H PRN    sodium chloride (NS) flush 5-40 mL  5-40 mL IntraVENous Q8H    sodium chloride (NS) flush 5-40 mL  5-40 mL IntraVENous PRN    acetaminophen (TYLENOL) tablet 650 mg  650 mg Oral Q6H    oxyCODONE IR (ROXICODONE) tablet 5 mg  5 mg Oral Q3H PRN    oxyCODONE IR (ROXICODONE) tablet 10 mg  10 mg Oral Q3H PRN    naloxone (NARCAN) injection 0.4 mg  0.4 mg IntraVENous PRN    senna-docusate (PERICOLACE) 8.6-50 mg per tablet 1 Tab  1 Tab Oral BID    polyethylene glycol (MIRALAX) packet 17 g  17 g Oral DAILY    bisacodyl (DULCOLAX) suppository 10 mg  10 mg Rectal DAILY PRN    gabapentin (NEURONTIN) capsule 100 mg  100 mg Oral DAILY WITH BREAKFAST    gabapentin (NEURONTIN) capsule 300 mg  300 mg Oral QHS    aspirin delayed-release tablet 81 mg  81 mg Oral BID    terazosin (HYTRIN) capsule 5 mg  5 mg Oral QHS    levETIRAcetam (KEPPRA) tablet 500 mg  500 mg Oral BID    pravastatin (PRAVACHOL) tablet 40 mg  40 mg Oral QHS    montelukast (SINGULAIR) tablet 10 mg  10 mg Oral DAILY    fluticasone propionate (FLONASE) 50 mcg/actuation nasal spray 1 Spray  1 Spray Both Nostrils QHS    carboxymethylcellulose sodium (CELLUVISC) 1 % ophthalmic solution 1 Drop  1 Drop Both Eyes BID PRN    famotidine (PEPCID) tablet 20 mg  20 mg Oral DAILY          Objective:     Vitals:  Blood pressure 166/63, pulse 65, temperature 97.5 °F (36.4 °C), resp. rate 18, height 5' 1.5\" (1.562 m), weight 87.2 kg (192 lb 3.9 oz), SpO2 97 %. Temp (24hrs), Av.8 °F (36.6 °C), Min:97.3 °F (36.3 °C), Max:98.2 °F (36.8 °C)      Intake and Output:   07 -  190  In: -   Out: 900 [Urine:900]   190 -  0700  In: -   Out: 400 [Urine:400]    Physical Exam:               GENERAL ASSESSMENT: NAD  CHEST: CTA  HEART: S1S2  ABDOMEN: Soft,NT  EXTREMITY: no EDEMA  NEURO: Grossly non focal          ECG/rhythm:    Data Review      No results for input(s): TNIPOC in the last 72 hours. No lab exists for component: ITNL   No results for input(s): CPK, CKMB, TROIQ in the last 72 hours. Recent Labs     19  1442 19  0345 19  0448    142 141   K 5.2* 5.7* 5.3*   * 119* 119*   CO2 17* 16* 15*   BUN 74* 74* 74*   CREA 1.97* 1.94* 1.86*   * 109* 127*   PHOS  --   --  3.6   MG  --  2.5* 2.6*   CA 8.0* 8.0* 8.3*   ALB 2.5* 2.3*  --    WBC  --  8.9 12.0*   HGB  --  8.5* 8.8*   HCT  --  26.8* 27.1*   PLT  --  188 197      No results for input(s): INR, PTP, APTT in the last 72 hours. No lab exists for component: INREXT, INREXT  Needs: urine analysis, urine sodium, protein and creatinine  No results found for: KAYCEE GIPSON        : Francisco Javier Dalton MD  2019        [facility]:  [email or web address]  Ester [place] office:  [address] [place] [address], [address] [place] [address],[address]  [place], [address] [facility]  Phone: [phone]  Fax :     [phone]    [place] office:  [address] [facility], [address]  Phone - [phone]  Fax - [phone]

## 2019-08-27 NOTE — PROGRESS NOTES
Problem: Mobility Impaired (Adult and Pediatric)  Goal: *Acute Goals and Plan of Care (Insert Text)  Description  FUNCTIONAL STATUS PRIOR TO ADMISSION: Patient was independent and active without use of DME.    HOME SUPPORT PRIOR TO ADMISSION: The patient lived alone with no local support. Physical Therapy Goals  Initiated 8/23/2019  1. Patient will move from supine to sit and sit to supine , scoot up and down and roll side to side in bed with modified independence within 7 day(s). 2.  Patient will transfer from bed to chair and chair to bed with modified independence using the least restrictive device within 7 day(s). 3.  Patient will perform sit to stand with modified independence within 7 day(s). 4.  Patient will ambulate with modified independence for 150 feet with the least restrictive device within 7 day(s).      8/27/2019 1605 by Kristin Saini  Outcome: Progressing Towards Goal  Note:   PHYSICAL THERAPY TREATMENT  Patient: Alison Armando (23 y.o. female)  Date: 8/27/2019  Diagnosis: Primary osteoarthritis of right knee [M17.11] <principal problem not specified>  Procedure(s) (LRB):  RIGHT TOTAL KNEE ARTHROPLASTY MAKOPLASTY (Right) 5 Days Post-Op  Precautions: Bed Alarm, Fall, WBAT  Chart, physical therapy assessment, plan of care and goals were reviewed. ASSESSMENT  Based on the objective data described below, up to [facility] for functional tranfers and gait training. Ascend/descend 8 steps using single handrail on R with [facility]. Demonstrated HEP, Min A for SLR. Pt is cleared for discharge when medically stable    Current Level of Function Impacting Discharge (mobility/balance): use of RW for gait and transfers. Other factors to consider for discharge:          PLAN :  Patient continues to benefit from skilled intervention to address the above impairments. Continue treatment per established plan of care. to address goals.     Recommendation for discharge: (in order for the patient to meet his/her long term goals)  Outpatient physical therapy follow up recommended for increased ROM, strength and balance training     This discharge recommendation:  Has been made in collaboration with the attending provider and/or case management    Equipment recommendations for successful discharge (if) home: patient owns DME required for discharge       SUBJECTIVE:   Patient stated i am getting there .     OBJECTIVE DATA SUMMARY:   Critical Behavior:  Neurologic State: Alert, Appropriate for age  Orientation Level: Oriented X4  Cognition: Appropriate for age attention/concentration, Appropriate decision making, Follows commands  Safety/Judgement: Awareness of environment, Good awareness of safety precautions    Range of Motion:           RLE AROM  R Knee Flexion: 85  R Knee Extension: 5                Functional Mobility Training:  Bed Mobility:     Supine to Sit: Contact guard assistance  Sit to Supine: Contact guard assistance           Transfers:  Sit to Stand: Stand-by assistance  Stand to Sit: Stand-by assistance                             Balance:  Sitting: Intact  Standing: Intact; With support  Ambulation/Gait Training:  Distance (ft): 150 Feet (ft)  Assistive Device: Walker, rolling;Gait belt  Ambulation - Level of Assistance: Stand-by assistance        Gait Abnormalities: Decreased step clearance        Base of Support: Widened     Speed/Italia: Pace decreased (<100 feet/min)                       Stairs:     Stairs - Level of Assistance: Contact guard assistance        Therapeutic Exercises:     EXERCISE   Sets   Reps   Active Active Assist   Passive Self ROM   Comments   Ankle Pumps   ? ?                                        ?                                        ?                                           Quad Sets   ?                                         ?                                        ?                                        ? Hamstring Sets   ? ?                                        ?                                        ?                                           Short Arc Quads   ? ?                                        ?                                        ?                                           Knee Extension Stretch     ? ?                                          ?                                          ?                                           Heel Slides   ? ?                                        ?                                        ?                                           Long Arc Quads   ? ?                                        ?                                        ?                                           Knee Flexion Stretch   ? ?                                        ?                                        ?                                           Straight Leg Raises   ? ?                                        ?                                        ?                                               Pain Rating:  3/10    Activity Tolerance:   Good  Please refer to the flowsheet for vital signs taken during this treatment.     After treatment patient left in no apparent distress:   Supine in bed, Call bell within reach and Bed / chair alarm activated    COMMUNICATION/COLLABORATION:   The patients plan of care was discussed with: Registered Nurse    José Antonio Woods   Time Calculation: 23 mins            8/27/2019 1124 by Clarita Falcon  Outcome: Progressing Towards Goal  Note:   PHYSICAL THERAPY TREATMENT  Patient: Nora Chaney (49 y.o. female)  Date: 8/27/2019  Diagnosis: Primary osteoarthritis of right knee [M17.11] <principal problem not specified>  Procedure(s) (LRB):  RIGHT TOTAL KNEE ARTHROPLASTY MAKOPLASTY (Right) 5 Days Post-Op  Precautions: Bed Alarm, Fall, WBAT  Chart, physical therapy assessment, plan of care and goals were reviewed. ASSESSMENT  Based on the objective data described below, pt requires up to [facility] for functional transfers and gait training using RW for steadying. REviwed HEP    Current Level of Function Impacting Discharge (mobility/balance): use of RW    Other factors to consider for discharge: no stairs at home, lives alone short term family assist         PLAN :  Patient continues to benefit from skilled intervention to address the above impairments. Continue treatment per established plan of care. to address goals. Recommendation for discharge: (in order for the patient to meet his/her long term goals)  Outpatient physical therapy follow up recommended for improved ROM, strength and balance training     This discharge recommendation:  Has been made in collaboration with the attending provider and/or case management    Equipment recommendations for successful discharge (if) home: patient owns DME required for discharge       SUBJECTIVE:   Patient stated i am leaving later today.     OBJECTIVE DATA SUMMARY:   Critical Behavior:  Neurologic State: Alert, Appropriate for age  Orientation Level: Oriented X4  Cognition: Appropriate for age attention/concentration, Appropriate decision making, Follows commands  Safety/Judgement: Awareness of environment, Good awareness of safety precautions    Range of Motion:                            Functional Mobility Training:  Bed Mobility:     Supine to Sit: Contact guard assistance  Sit to Supine: Contact guard assistance           Transfers:  Sit to Stand: Stand-by assistance  Stand to Sit: Stand-by assistance                             Balance:  Sitting: Intact  Standing: Intact; With support  Ambulation/Gait Training:  Distance (ft): 120 Feet (ft)  Assistive Device: Walker, rolling;Gait belt  Ambulation - Level of Assistance: Contact guard assistance;Stand-by assistance        Gait Abnormalities: Decreased step clearance; Antalgic        Base of Support: Widened     Speed/Italia: Pace decreased (<100 feet/min)                       Stairs: Therapeutic Exercises:     EXERCISE   Sets   Reps   Active Active Assist   Passive Self ROM   Comments   Ankle Pumps   ? ?                                        ?                                        ?                                           Quad Sets   ? ?                                        ?                                        ?                                           Hamstring Sets   ? ?                                        ?                                        ?                                           Short Arc Quads   ? ?                                        ?                                        ?                                           Knee Extension Stretch     ? ?                                          ?                                          ?                                           Heel Slides   ? ?                                        ?                                        ?                                           Long Arc Quads   ? ?                                        ?                                        ?                                           Knee Flexion Stretch   ? ?                                        ?                                        ?                                           Straight Leg Raises   ? ?                                        ?                                        ?                                               Pain Rating:  3/10    Activity Tolerance:   Good  Please refer to the flowsheet for vital signs taken during this treatment.     After treatment patient left in no apparent distress:   Supine in bed, Call bell within reach and Bed / chair alarm activated    COMMUNICATION/COLLABORATION:   The patients plan of care was discussed with: Registered Nurse    Shakila Redd   Time Calculation: 30 mins

## 2019-08-27 NOTE — PROGRESS NOTES
Problem: Mobility Impaired (Adult and Pediatric)  Goal: *Acute Goals and Plan of Care (Insert Text)  Description  FUNCTIONAL STATUS PRIOR TO ADMISSION: Patient was independent and active without use of DME.    HOME SUPPORT PRIOR TO ADMISSION: The patient lived alone with no local support. Physical Therapy Goals  Initiated 8/23/2019  1. Patient will move from supine to sit and sit to supine , scoot up and down and roll side to side in bed with modified independence within 7 day(s). 2.  Patient will transfer from bed to chair and chair to bed with modified independence using the least restrictive device within 7 day(s). 3.  Patient will perform sit to stand with modified independence within 7 day(s). 4.  Patient will ambulate with modified independence for 150 feet with the least restrictive device within 7 day(s). Outcome: Progressing Towards Goal  Note:   PHYSICAL THERAPY TREATMENT  Patient: Nora Chaney (17 y.o. female)  Date: 8/27/2019  Diagnosis: Primary osteoarthritis of right knee [M17.11] <principal problem not specified>  Procedure(s) (LRB):  RIGHT TOTAL KNEE ARTHROPLASTY MAKOPLASTY (Right) 5 Days Post-Op  Precautions: Bed Alarm, Fall, WBAT  Chart, physical therapy assessment, plan of care and goals were reviewed. ASSESSMENT  Based on the objective data described below, pt requires up to [facility] for functional transfers and gait training using RW for steadying. REviwed HEP    Current Level of Function Impacting Discharge (mobility/balance): use of RW    Other factors to consider for discharge: no stairs at home, lives alone short term family assist         PLAN :  Patient continues to benefit from skilled intervention to address the above impairments. Continue treatment per established plan of care. to address goals.     Recommendation for discharge: (in order for the patient to meet his/her long term goals)  Outpatient physical therapy follow up recommended for improved ROM, strength and balance training     This discharge recommendation:  Has been made in collaboration with the attending provider and/or case management    Equipment recommendations for successful discharge (if) home: patient owns DME required for discharge       SUBJECTIVE:   Patient stated i am leaving later today.     OBJECTIVE DATA SUMMARY:   Critical Behavior:  Neurologic State: Alert, Appropriate for age  Orientation Level: Oriented X4  Cognition: Appropriate for age attention/concentration, Appropriate decision making, Follows commands  Safety/Judgement: Awareness of environment, Good awareness of safety precautions    Range of Motion:                            Functional Mobility Training:  Bed Mobility:     Supine to Sit: Contact guard assistance  Sit to Supine: Contact guard assistance           Transfers:  Sit to Stand: Stand-by assistance  Stand to Sit: Stand-by assistance                             Balance:  Sitting: Intact  Standing: Intact; With support  Ambulation/Gait Training:  Distance (ft): 120 Feet (ft)  Assistive Device: Walker, rolling;Gait belt  Ambulation - Level of Assistance: Contact guard assistance;Stand-by assistance        Gait Abnormalities: Decreased step clearance; Antalgic        Base of Support: Widened     Speed/Italia: Pace decreased (<100 feet/min)                       Stairs: Therapeutic Exercises:     EXERCISE   Sets   Reps   Active Active Assist   Passive Self ROM   Comments   Ankle Pumps   ? ?                                        ?                                        ?                                           Quad Sets   ? ?                                        ?                                        ?                                           Hamstring Sets   ?                                         ?                                        ?                                        ? Short Arc Quads   ? ?                                        ?                                        ?                                           Knee Extension Stretch     ? ?                                          ?                                          ?                                           Heel Slides   ? ?                                        ?                                        ?                                           Long Arc Quads   ? ?                                        ?                                        ?                                           Knee Flexion Stretch   ? ?                                        ?                                        ?                                           Straight Leg Raises   ? ?                                        ?                                        ?                                               Pain Rating:  3/10    Activity Tolerance:   Good  Please refer to the flowsheet for vital signs taken during this treatment.     After treatment patient left in no apparent distress:   Supine in bed, Call bell within reach and Bed / chair alarm activated    COMMUNICATION/COLLABORATION:   The patients plan of care was discussed with: Registered Nurse    Mimi Del Rio   Time Calculation: 30 mins

## 2019-08-27 NOTE — PROGRESS NOTES
Bedside and Verbal shift change report given to Katrin Caruso (oncoming nurse) by Jose Manuel Banks (offgoing nurse). Report included the following information SBAR, Kardex, OR Summary, Intake/Output, MAR, Recent Results and Alarm Parameters .

## 2019-08-27 NOTE — PROGRESS NOTES
Problem: Falls - Risk of  Goal: *Absence of Falls  Description  Document Maikol Thorpe Fall Risk and appropriate interventions in the flowsheet. Outcome: Progressing Towards Goal  Note:   Fall Risk Interventions:  Mobility Interventions: Utilize walker, cane, or other assistive device    Medication Interventions: Teach patient to arise slowly, Evaluate medications/consider consulting pharmacy    Elimination Interventions: Call light in reach, Toileting schedule/hourly rounds    Problem: Pressure Injury - Risk of  Goal: *Prevention of pressure injury  Description  Document Humble Scale and appropriate interventions in the flowsheet. Outcome: Progressing Towards Goal  Note:   Pressure Injury Interventions:  Sensory Interventions: Assess changes in LOC    Moisture Interventions: Absorbent underpads    Activity Interventions: PT/OT evaluation, Increase time out of bed    Mobility Interventions: PT/OT evaluation    Nutrition Interventions: Offer support with meals,snacks and hydration    Friction and Shear Interventions: HOB 30 degrees or less    Bedside and Verbal shift change report given to Anaid Quinn (oncoming nurse) by Consuelo Pla (offgoing nurse). Report included the following information SBAR, Intake/Output and MAR.

## 2019-08-27 NOTE — PROGRESS NOTES
8/27/2019  6:13 PM  CM reviewed EMR for coordination of service needs. Awaiting medical stability. Cm to continue to monitor. Transition of care plan:   1. Home with family assistance and outpatient PT. 2. Home with HH. 3. SNF.

## 2019-08-28 VITALS
RESPIRATION RATE: 18 BRPM | DIASTOLIC BLOOD PRESSURE: 60 MMHG | SYSTOLIC BLOOD PRESSURE: 133 MMHG | HEART RATE: 74 BPM | TEMPERATURE: 98 F | WEIGHT: 192.24 LBS | BODY MASS INDEX: 35.38 KG/M2 | HEIGHT: 62 IN | OXYGEN SATURATION: 98 %

## 2019-08-28 LAB
ALBUMIN SERPL-MCNC: 2.3 G/DL (ref 3.5–5)
ANION GAP SERPL CALC-SCNC: 7 MMOL/L (ref 5–15)
BUN SERPL-MCNC: 78 MG/DL (ref 6–20)
BUN/CREAT SERPL: 40 (ref 12–20)
CALCIUM SERPL-MCNC: 8.2 MG/DL (ref 8.5–10.1)
CHLORIDE SERPL-SCNC: 123 MMOL/L (ref 97–108)
CO2 SERPL-SCNC: 17 MMOL/L (ref 21–32)
CREAT SERPL-MCNC: 1.96 MG/DL (ref 0.55–1.02)
GLUCOSE SERPL-MCNC: 104 MG/DL (ref 65–100)
PHOSPHATE SERPL-MCNC: 4.6 MG/DL (ref 2.6–4.7)
POTASSIUM SERPL-SCNC: 4.9 MMOL/L (ref 3.5–5.1)
SODIUM SERPL-SCNC: 147 MMOL/L (ref 136–145)

## 2019-08-28 PROCEDURE — 36415 COLL VENOUS BLD VENIPUNCTURE: CPT

## 2019-08-28 PROCEDURE — 74011250637 HC RX REV CODE- 250/637: Performed by: PHYSICIAN ASSISTANT

## 2019-08-28 PROCEDURE — 80069 RENAL FUNCTION PANEL: CPT

## 2019-08-28 PROCEDURE — 97116 GAIT TRAINING THERAPY: CPT

## 2019-08-28 PROCEDURE — 74011250637 HC RX REV CODE- 250/637: Performed by: FAMILY MEDICINE

## 2019-08-28 PROCEDURE — 74011250637 HC RX REV CODE- 250/637: Performed by: ORTHOPAEDIC SURGERY

## 2019-08-28 PROCEDURE — 97110 THERAPEUTIC EXERCISES: CPT

## 2019-08-28 RX ORDER — AMLODIPINE BESYLATE 10 MG/1
10 TABLET ORAL DAILY
Qty: 30 TAB | Refills: 0 | Status: SHIPPED | OUTPATIENT
Start: 2019-08-29 | End: 2021-05-14

## 2019-08-28 RX ORDER — ACETAMINOPHEN 500 MG
500-1000 TABLET ORAL
Qty: 60 TAB | Refills: 0 | Status: SHIPPED | OUTPATIENT
Start: 2019-08-28 | End: 2021-05-14

## 2019-08-28 RX ORDER — CARVEDILOL 6.25 MG/1
6.25 TABLET ORAL 2 TIMES DAILY WITH MEALS
Qty: 60 TAB | Refills: 0 | Status: ON HOLD | OUTPATIENT
Start: 2019-08-28 | End: 2022-08-23

## 2019-08-28 RX ORDER — ASPIRIN 81 MG/1
81 TABLET ORAL 2 TIMES DAILY
Qty: 60 TAB | Refills: 0 | Status: SHIPPED
Start: 2019-08-28 | End: 2019-08-28 | Stop reason: SDUPTHER

## 2019-08-28 RX ORDER — ONDANSETRON 8 MG/1
4 TABLET, ORALLY DISINTEGRATING ORAL
Qty: 30 TAB | Refills: 0 | Status: SHIPPED | OUTPATIENT
Start: 2019-08-28 | End: 2021-05-14

## 2019-08-28 RX ORDER — ASPIRIN 81 MG/1
81 TABLET ORAL 2 TIMES DAILY
Qty: 60 TAB | Refills: 0 | Status: SHIPPED | OUTPATIENT
Start: 2019-08-28 | End: 2021-05-14

## 2019-08-28 RX ORDER — OXYCODONE HYDROCHLORIDE 5 MG/1
5-10 TABLET ORAL
Qty: 42 TAB | Refills: 0 | Status: SHIPPED | OUTPATIENT
Start: 2019-08-28 | End: 2019-09-04

## 2019-08-28 RX ADMIN — LEVETIRACETAM 500 MG: 500 TABLET ORAL at 08:24

## 2019-08-28 RX ADMIN — AMLODIPINE BESYLATE 10 MG: 5 TABLET ORAL at 08:24

## 2019-08-28 RX ADMIN — FAMOTIDINE 20 MG: 20 TABLET ORAL at 08:24

## 2019-08-28 RX ADMIN — ASPIRIN 81 MG: 81 TABLET, COATED ORAL at 08:24

## 2019-08-28 RX ADMIN — OXYCODONE HYDROCHLORIDE 5 MG: 5 TABLET ORAL at 13:27

## 2019-08-28 RX ADMIN — OXYCODONE HYDROCHLORIDE 5 MG: 5 TABLET ORAL at 16:54

## 2019-08-28 RX ADMIN — ACETAMINOPHEN 650 MG: 325 TABLET ORAL at 07:19

## 2019-08-28 RX ADMIN — CARVEDILOL 6.25 MG: 6.25 TABLET, FILM COATED ORAL at 08:24

## 2019-08-28 RX ADMIN — SENNOSIDES,DOCUSATE SODIUM 1 TABLET: 8.6; 5 TABLET, FILM COATED ORAL at 08:26

## 2019-08-28 RX ADMIN — GABAPENTIN 100 MG: 100 CAPSULE ORAL at 08:24

## 2019-08-28 RX ADMIN — ACETAMINOPHEN 650 MG: 325 TABLET ORAL at 11:10

## 2019-08-28 NOTE — INTERDISCIPLINARY ROUNDS
Bedside interdisciplinary team rounds were completed with the following team members: Nurse Practitioner, Nursing, and Pharmacy. The following issues were discussed: discharge plans for today as patient has been cleared by family practice and nephrology. All questions answered.     Angelika Enrique, NP

## 2019-08-28 NOTE — PROGRESS NOTES
Family Practice  Daily Progress Note: 8/28/2019  Matt Mckay MD    Assessment/Plan:   S/P Right total knee arthroplasty 8/22/19 - per ortho  - stable for discharge    CKD3 with moderate hyperkalemia  - recheck and monitor     HTN - chronic, requiring multiple drugs  - nephrology consulted    Seizure disorder  - cont Keppra    Asthma  - cont home asthma meds    MGUS  -monitor    Hyperlipidemia  -holding statin     Hx Basal Ganglia bleed 2/20/19 - tx at [facility]    Episode of Hypothermia - unk cause       Problem List:  Problem List as of 8/28/2019 Date Reviewed: 1/25/2018          Codes Class Noted - Resolved    Primary osteoarthritis of right knee ICD-10-CM: M17.11  ICD-9-CM: 715.16  8/22/2019 - Present        ICH (intracerebral hemorrhage) ([facility] ICD-10-CM: I61.9  ICD-9-CM: 729  2/20/2019 - Present        Paresthesia ICD-10-CM: R20.2  ICD-9-CM: 782.0  8/10/2017 - Present        Nonintractable epilepsy without status epilepticus ([facility] ICD-10-CM: G40.909  ICD-9-CM: 345.90  8/10/2017 - Present        ABIGAIL (obstructive sleep apnea) ICD-10-CM: G47.33  ICD-9-CM: 327.23  12/10/2014 - Present        RESOLVED: Convulsions ([facility] ICD-10-CM: R56.9  ICD-9-CM: 780.39  5/31/2016 - 8/10/2017        RESOLVED: Hx of seizure disorder ICD-10-CM: L91.17  ICD-9-CM: V12.49  5/31/2016 - 8/10/2017            Subjective:    [de-identified] yo WF, pt of Dr Maame Ventura in our offices, who is 1 day s/p right TKR. We are asked to see her due to elevated K+ and for her other medical problems. Currently she reports little pain, has been up briefly and appetite is good. Review of her office notes reveals longstanding HTN which has been difficult to control, requiring multiple medications and med changes. I suspect her BP will be difficult to control here as well. She has CKD3 with creat usually in 1.6-1.8 range and about the same here to slightly worse. She has a hx of Seizure disorder which has been stable on current med.   Her asthma has been stable lately, with her last flair being in March 2019 associated with a URI/Sinusitis/Bronchitis. In addition, she has a hx of a Basal Ganglia bleed 2/20/19, was treated at [facility], and has recovered. Long term anticoags may not be in order. Hx of MGUS (monoclonal gammopathy of uncertain significance) which is being monitored outpt. Hyperlipidemia has been fairly well controlled with last LDL 77. Her potassium has been creeping up the last several days and this AM is 6.1; she has not had hyperkalemia outpt. For now will tx this simply with low dose of Lasix IV (10mg) one time since she is edematous and will decrease her fluids from 125cc/hr to 75cc/hr. Recheck of CMP is pending. EKG is ok showing no acute changes. Continue to monitor K+ - if it stays in the mid 5s we can just watch it. Also will not restart her Losartan as this could increase K+. For her BP will increase Amlodipine to 10mg a day and add prn hydralzine. 8/24: AM labs pending. K+ 5.7 yesterday. Will hold Losartan and increase Coreg. Continue Amlodipine. Will ask Nephrology to see for her BP that is much higher in  the right arm but not the left. Renal US this am. Knee is feeling pretty well. Some pain. 8/25:   \"Feeling pretty good\" she reports. No complaints today. Transferred to unit due to persistent low temp and need for bear hugger, but;  in ICU temp has been normal.  WBC about the same. She can go back to ortho floor today. She is stable for rehab. K+ 5.3. Hb 8.8 - watching.       8/26:  Little change. She reports she feels ok and no complaints other than knee pain. She is interested in rehab now. Creat up a little and K+ 5.7 - continuing to monitor closely for now and will get Nephrology to see. LFTs have bumped up - unsure as to cause - watch for now and if cont to rise then Ab US. Stable for rehab. Hb 8.5 - watching.       8/27:  Up more and feeling better. She walked in [place] yesterday. LFTs trending down. K+ down to 5.2. Stable for rehab.      : Up to BR. Doing well. K+ 4.9. She is stable for discharge. Will need to follow up as an outpatient for her elevated LFTs.    Past Medical History:   Diagnosis Date    Anxiety     Arthritis     [facility] 2019    Left    CKD (chronic kidney disease) stage 3, GFR 30-59 ml/min ([facility])     High cholesterol     Hx of seasonal allergies     Hypertension     Ischemic stroke ([facility]     Right occipital    Monoclonal gammopathy 2018    ABIGAIL on CPAP     Seizures (HCC)     last keppra level- 2019 @ 15   Last seizure 2017    Urinary incontinence      Past Surgical History:   Procedure Laterality Date    HX APPENDECTOMY N/A     HX CATARACT REMOVAL Bilateral 2018    HX  SECTION N/A     x 2    HX HEMORRHOIDECTOMY      HX HYSTERECTOMY      HX TONSILLECTOMY       Social History     Socioeconomic History    Marital status:      Spouse name: Not on file    Number of children: Not on file    Years of education: Not on file    Highest education level: Not on file   Occupational History    Not on file   Social Needs    Financial resource strain: Not on file    Food insecurity:     Worry: Not on file     Inability: Not on file    Transportation needs:     Medical: Not on file     Non-medical: Not on file   Tobacco Use    Smoking status: Never Smoker    Smokeless tobacco: Never Used   Substance and Sexual Activity    Alcohol use: No    Drug use: No    Sexual activity: Not on file   Lifestyle    Physical activity:     Days per week: Not on file     Minutes per session: Not on file    Stress: Not on file   Relationships    Social connections:     Talks on phone: Not on file     Gets together: Not on file     Attends Scientologist service: Not on file     Active member of club or organization: Not on file     Attends meetings of clubs or organizations: Not on file     Relationship status: Not on file    Intimate partner violence:     Fear of current or ex partner: Not on file     Emotionally abused: Not on file     Physically abused: Not on file     Forced sexual activity: Not on file   Other Topics Concern    Not on file   Social History Narrative    Not on file     Family History   Problem Relation Age of Onset    Hypertension Mother     Cancer Sister         Skin    Diabetes Sister     Hypertension Sister     Stroke Father     Parkinson's Disease Father     Cancer Sister         Skin    Cancer Sister     No Known Problems Brother      Allergies   Allergen Reactions    Codeine Other (comments)    Levaquin [Levofloxacin] Other (comments)     Hallucinations    Lisinopril Cough    Sulfa (Sulfonamide Antibiotics) Rash and Itching       Review of Systems:   A comprehensive review of systems was negative except for that written in the HPI. Objective:   Physical Exam:     Visit Vitals  /64 (BP 1 Location: Left arm, BP Patient Position: At rest)   Pulse 75   Temp 97.9 °F (36.6 °C)   Resp 18   Ht 5' 1.5\" (1.562 m)   Wt 192 lb 3.9 oz (87.2 kg)   SpO2 96%   BMI 35.74 kg/m²    O2 Flow Rate (L/min): 2 l/min O2 Device: Room air    Temp (24hrs), Av.1 °F (36.7 °C), Min:97.6 °F (36.4 °C), Max:98.5 °F (36.9 °C)    No intake/output data recorded.  1901 -  0700  In: -   Out: 1200 [Urine:1200]  General:  Alert, cooperative, no distress, appears stated age. Head:  Normocephalic, without obvious abnormality, atraumatic. Eyes:  Conjunctivae/corneas clear. PERRL, EOMs intact. Throat: Lips, mucosa, and tongue moist..   Neck: Supple, symmetrical, trachea midline, no adenopathy, thyroid: no enlargement/tenderness/nodules, no carotid bruit and no JVD. Lungs:   Clear to auscultation bilaterally. Chest wall:  No tenderness or deformity. Heart:  Regular rate and rhythm, S1, S2 normal, no murmur, click, rub or gallop. Abdomen:   Soft, non-tender. Bowel sounds normal. No masses,  No organomegaly. Extremities: no cyanosis. Trace edema pretibial bilat. No calf tenderness or cords. Right knee dressing dry. Painful to attempt to move right leg. Pulses: 2+ and symmetric all extremities. Skin:  turgor normal. No rashes or lesions   Neurologic: CNII-XII intact. Alert and oriented X 3. Fine motor of hands and fingers normal.   equal.  No cogwheeling or rigidity. Gait not tested at this time. Sensation grossly normal to touch. Gross motor of extremities otherwise normal.       Data Review:       Recent Days:  Recent Labs     08/26/19  0345   WBC 8.9   HGB 8.5*   HCT 26.8*        Recent Labs     08/28/19  0436 08/26/19  1442 08/26/19  0345   * 143 142   K 4.9 5.2* 5.7*   * 118* 119*   CO2 17* 17* 16*   * 211* 109*   BUN 78* 74* 74*   CREA 1.96* 1.97* 1.94*   CA 8.2* 8.0* 8.0*   MG  --   --  2.5*   PHOS 4.6  --   --    ALB 2.3* 2.5* 2.3*   TBILI  --  0.3 0.4   SGOT  --  371* 595*   ALT  --  375* 351*     No results for input(s): PH, PCO2, PO2, HCO3, FIO2 in the last 72 hours.     24 Hour Results:  Recent Results (from the past 24 hour(s))   RENAL FUNCTION PANEL    Collection Time: 08/28/19  4:36 AM   Result Value Ref Range    Sodium 147 (H) 136 - 145 mmol/L    Potassium 4.9 3.5 - 5.1 mmol/L    Chloride 123 (H) 97 - 108 mmol/L    CO2 17 (L) 21 - 32 mmol/L    Anion gap 7 5 - 15 mmol/L    Glucose 104 (H) 65 - 100 mg/dL    BUN 78 (H) 6 - 20 MG/DL    Creatinine 1.96 (H) 0.55 - 1.02 MG/DL    BUN/Creatinine ratio 40 (H) 12 - 20      GFR est AA 30 (L) >60 ml/min/1.73m2    GFR est non-AA 25 (L) >60 ml/min/1.73m2    Calcium 8.2 (L) 8.5 - 10.1 MG/DL    Phosphorus 4.6 2.6 - 4.7 MG/DL    Albumin 2.3 (L) 3.5 - 5.0 g/dL       Medications reviewed  Current Facility-Administered Medications   Medication Dose Route Frequency    carvedilol (COREG) tablet 6.25 mg  6.25 mg Oral BID WITH MEALS    amLODIPine (NORVASC) tablet 10 mg  10 mg Oral DAILY    labetalol (NORMODYNE;TRANDATE) 20 mg/4 mL (5 mg/mL) injection 20 mg  20 mg IntraVENous Q4H PRN    sodium chloride (NS) flush 5-40 mL  5-40 mL IntraVENous Q8H    sodium chloride (NS) flush 5-40 mL  5-40 mL IntraVENous PRN    acetaminophen (TYLENOL) tablet 650 mg  650 mg Oral Q6H    oxyCODONE IR (ROXICODONE) tablet 5 mg  5 mg Oral Q3H PRN    oxyCODONE IR (ROXICODONE) tablet 10 mg  10 mg Oral Q3H PRN    naloxone (NARCAN) injection 0.4 mg  0.4 mg IntraVENous PRN    senna-docusate (PERICOLACE) 8.6-50 mg per tablet 1 Tab  1 Tab Oral BID    polyethylene glycol (MIRALAX) packet 17 g  17 g Oral DAILY    bisacodyl (DULCOLAX) suppository 10 mg  10 mg Rectal DAILY PRN    gabapentin (NEURONTIN) capsule 100 mg  100 mg Oral DAILY WITH BREAKFAST    gabapentin (NEURONTIN) capsule 300 mg  300 mg Oral QHS    aspirin delayed-release tablet 81 mg  81 mg Oral BID    terazosin (HYTRIN) capsule 5 mg  5 mg Oral QHS    levETIRAcetam (KEPPRA) tablet 500 mg  500 mg Oral BID    pravastatin (PRAVACHOL) tablet 40 mg  40 mg Oral QHS    montelukast (SINGULAIR) tablet 10 mg  10 mg Oral DAILY    fluticasone propionate (FLONASE) 50 mcg/actuation nasal spray 1 Spray  1 Spray Both Nostrils QHS    carboxymethylcellulose sodium (CELLUVISC) 1 % ophthalmic solution 1 Drop  1 Drop Both Eyes BID PRN    famotidine (PEPCID) tablet 20 mg  20 mg Oral DAILY     Care Plan discussed with: Patient and Nurse  Total time spent with patient: 30 minutes.   Kemper Kehr, MD

## 2019-08-28 NOTE — PROGRESS NOTES
TOTAL KNEE ARTHROPLASTY DAILY NOTE     ASSESSMENT / PLAN :   1. Pain Control : Excellent - Able to sleep and participate with therapy  2. Overnight Issues : none  3. Wound or incisional issue : Healing incision with no visible drainage  4. Therapy / Weight Bearing Recommendations : Weight bear as tolerated with use of a walker and two person assist while mobilizing  5. DVT Prophylaxis : Mechanical and Aspirin and mechanical lower extremity compression device  6. Disposition : Discharge to home with home health (maximum of 4 visits)  7. Medical Concerns : MMP - CKD and elevated liver enzymes, appreciate FP and Nephrology input. Improving  8. Comments : Stable, home today if medically cleared. She is doing quite well with therapy. POD  6 Days Post-Op s/p Procedure(s):  RIGHT TOTAL KNEE ARTHROPLASTY MAKOPLASTY     SUBJECTIVE :     Overnight complaints : none. OBJECTIVE :     Vitals:    08/27/19 1100 08/27/19 1905 08/27/19 2344 08/28/19 0254   BP: 131/89 144/67 102/64 123/64   Pulse: 68 88 82 75   Resp: 17 18 18 18   Temp: 97.6 °F (36.4 °C) 98.5 °F (36.9 °C) 98.2 °F (36.8 °C) 97.9 °F (36.6 °C)   SpO2: 98% 97% 98% 96%   Weight:       Height:           Alert and oriented x3. Examination of the right knee reveals that the dressing is clean, dry and intact. Motor Exam : Pt is able to perform a straight leg raise. Sensation is intact to light touch. No calf pain. Labs:  Recent Labs     08/28/19  0436  08/26/19  0345   HGB  --   --  8.5*   HCT  --   --  26.8*   *   < > 142   K 4.9   < > 5.7*   *   < > 119*   CO2 17*   < > 16*   BUN 78*   < > 74*   CREA 1.96*   < > 1.94*   *   < > 109*    < > = values in this interval not displayed.       Patient mobility  Gait  Base of Support: Widened  Speed/Italia: Pace decreased (<100 feet/min)  Step Length: Right shortened, Left shortened  Stance: Right decreased  Gait Abnormalities: Decreased step clearance  Ambulation - Level of Assistance: Stand-by assistance  Distance (ft): 150 Feet (ft)  Assistive Device: Walker, rolling, Gait belt  Stairs - Level of Assistance: Contact guard assistance        Campos Scanlon MD  Cell [phone]  Rex Skiff, PA-C  Cell [phone]  Medical Assistants: [address] [place] [address] [phone]                 Surgery Scheduler: Quita Johnson, [address] [place] [id number]

## 2019-08-28 NOTE — PROGRESS NOTES
Notified by discharge RN that patient's prescriptions written by Dr. Clement Albert have been misplaced. Prescriptions re-printed and placed in chart.     Soraida Sahni NP

## 2019-08-28 NOTE — PROGRESS NOTES
[address] [facility]  YOB: 1940          Assessment & Plan:   CKD 3, stable. Followed by Dr. Shira Hernandez  HTN, controlled  Hyperkalemia, better p SPS  S/p knee surgery  Sz d/o    Rec:  Low K diet  D/w son by phone yesterday  OK for d/c from renal standpoint  F/u with Dr. Shira Hernandez       Subjective:   CC: f/u CKD  HPI: Renal function stable.  K normal.   ROS: no sob/n/v  Current Facility-Administered Medications   Medication Dose Route Frequency    carvedilol (COREG) tablet 6.25 mg  6.25 mg Oral BID WITH MEALS    amLODIPine (NORVASC) tablet 10 mg  10 mg Oral DAILY    labetalol (NORMODYNE;TRANDATE) 20 mg/4 mL (5 mg/mL) injection 20 mg  20 mg IntraVENous Q4H PRN    sodium chloride (NS) flush 5-40 mL  5-40 mL IntraVENous Q8H    sodium chloride (NS) flush 5-40 mL  5-40 mL IntraVENous PRN    acetaminophen (TYLENOL) tablet 650 mg  650 mg Oral Q6H    oxyCODONE IR (ROXICODONE) tablet 5 mg  5 mg Oral Q3H PRN    oxyCODONE IR (ROXICODONE) tablet 10 mg  10 mg Oral Q3H PRN    naloxone (NARCAN) injection 0.4 mg  0.4 mg IntraVENous PRN    senna-docusate (PERICOLACE) 8.6-50 mg per tablet 1 Tab  1 Tab Oral BID    polyethylene glycol (MIRALAX) packet 17 g  17 g Oral DAILY    bisacodyl (DULCOLAX) suppository 10 mg  10 mg Rectal DAILY PRN    gabapentin (NEURONTIN) capsule 100 mg  100 mg Oral DAILY WITH BREAKFAST    gabapentin (NEURONTIN) capsule 300 mg  300 mg Oral QHS    aspirin delayed-release tablet 81 mg  81 mg Oral BID    terazosin (HYTRIN) capsule 5 mg  5 mg Oral QHS    levETIRAcetam (KEPPRA) tablet 500 mg  500 mg Oral BID    pravastatin (PRAVACHOL) tablet 40 mg  40 mg Oral QHS    montelukast (SINGULAIR) tablet 10 mg  10 mg Oral DAILY    fluticasone propionate (FLONASE) 50 mcg/actuation nasal spray 1 Spray  1 Spray Both Nostrils QHS    carboxymethylcellulose sodium (CELLUVISC) 1 % ophthalmic solution 1 Drop  1 Drop Both Eyes BID PRN    famotidine (PEPCID) tablet 20 mg  20 mg Oral DAILY          Objective:     Vitals:  Blood pressure 147/68, pulse 84, temperature 97.9 °F (36.6 °C), resp. rate 18, height 5' 1.5\" (1.562 m), weight 87.2 kg (192 lb 3.9 oz), SpO2 91 %. Temp (24hrs), Av °F (36.7 °C), Min:97.6 °F (36.4 °C), Max:98.5 °F (36.9 °C)      Intake and Output:  No intake/output data recorded.  1901 -  0700  In: -   Out: 1200 [Urine:1200]    Physical Exam:               GENERAL ASSESSMENT: NAD  CHEST: CTA  HEART: S1S2  ABDOMEN: Soft,NT  EXTREMITY: no EDEMA  NEURO: Grossly non focal          ECG/rhythm:    Data Review      No results for input(s): TNIPOC in the last 72 hours. No lab exists for component: ITNL   No results for input(s): CPK, CKMB, TROIQ in the last 72 hours. Recent Labs     19  0436 19  1442 19  0345   * 143 142   K 4.9 5.2* 5.7*   * 118* 119*   CO2 17* 17* 16*   BUN 78* 74* 74*   CREA 1.96* 1.97* 1.94*   * 211* 109*   PHOS 4.6  --   --    MG  --   --  2.5*   CA 8.2* 8.0* 8.0*   ALB 2.3* 2.5* 2.3*   WBC  --   --  8.9   HGB  --   --  8.5*   HCT  --   --  26.8*   PLT  --   --  188      No results for input(s): INR, PTP, APTT in the last 72 hours. No lab exists for component: INREXT, INREXT  Needs: urine analysis, urine sodium, protein and creatinine  No results found for: KAYCEE GIPSON        : Anushka Honeycutt MD  2019        [address] [facility]:  [email or web address]  Linette Clock office:  [address] [place] [address], [address] [place] [address],[address]  [place], [address] [facility]  Phone: [phone]  Fax :     [phone]    [address] [place] [address] office:  [address] [place] [address], [address]  Phone - [phone]  Fax - [phone]

## 2019-08-28 NOTE — PROGRESS NOTES
Nurse handed patient a copy of discharge instructions which have been read and explained to her.  Nurse read and explained all new medications and changes in her medicines

## 2019-08-28 NOTE — PROGRESS NOTES
Problem: Falls - Risk of  Goal: *Absence of Falls  Description  Document Kajal Reese Fall Risk and appropriate interventions in the flowsheet. Outcome: Progressing Towards Goal  Note:   Fall Risk Interventions:  Mobility Interventions: Utilize walker, cane, or other assistive device    Medication Interventions: Teach patient to arise slowly, Evaluate medications/consider consulting pharmacy    Elimination Interventions: Call light in reach, Toileting schedule/hourly rounds    Problem: Knee Replacement: Day of Surgery/Unit  Goal: *Optimal pain control at patient's stated goal  Outcome: Progressing Towards Goal  Note:   Controlled at or below pt stated goal  with PRN and scheduled medications     Problem: Pressure Injury - Risk of  Goal: *Prevention of pressure injury  Description  Document Humble Scale and appropriate interventions in the flowsheet. Outcome: Progressing Towards Goal  Note:   Pressure Injury Interventions:  Sensory Interventions: Assess changes in LOC    Moisture Interventions: Absorbent underpads    Activity Interventions: PT/OT evaluation, Increase time out of bed    Mobility Interventions: PT/OT evaluation    Nutrition Interventions: Offer support with meals,snacks and hydration    Friction and Shear Interventions: Apply protective barrier, creams and emollients    Bedside and Verbal shift change report given to Juliana Rushing (oncoming nurse) by Mir Lee (offgoing nurse). Report included the following information SBAR, OR Summary, MAR and Recent Results.

## 2019-08-28 NOTE — PROGRESS NOTES
Problem: Mobility Impaired (Adult and Pediatric)  Goal: *Acute Goals and Plan of Care (Insert Text)  Description  FUNCTIONAL STATUS PRIOR TO ADMISSION: Patient was independent and active without use of DME.    HOME SUPPORT PRIOR TO ADMISSION: The patient lived alone with no local support. Physical Therapy Goals  Initiated 8/23/2019  1. Patient will move from supine to sit and sit to supine , scoot up and down and roll side to side in bed with modified independence within 7 day(s). 2.  Patient will transfer from bed to chair and chair to bed with modified independence using the least restrictive device within 7 day(s). 3.  Patient will perform sit to stand with modified independence within 7 day(s). 4.  Patient will ambulate with modified independence for 150 feet with the least restrictive device within 7 day(s). Outcome: Progressing Towards Goal  Note:   PHYSICAL THERAPY TREATMENT  Patient: Kaylan Avery (15 y.o. female)  Date: 8/28/2019  Diagnosis: Primary osteoarthritis of right knee [M17.11] <principal problem not specified>  Procedure(s) (LRB):  RIGHT TOTAL KNEE ARTHROPLASTY MAKOPLASTY (Right) 6 Days Post-Op  Precautions: Bed Alarm, Fall, WBAT  Chart, physical therapy assessment, plan of care and goals were reviewed. ASSESSMENT  Based on the objective data described below, SBA for functional tranfers and gait training using RW. Demonstrated good understanding of HEP. Current Level of Function Impacting Discharge (mobility/balance): RW use    Other factors to consider for discharge: family assist for short term         PLAN :  Patient continues to benefit from skilled intervention to address the above impairments. Continue treatment per established plan of care. to address goals.     Recommendation for discharge: (in order for the patient to meet his/her long term goals)  Outpatient physical therapy follow up recommended for per MD     This discharge recommendation:  Has been made in collaboration with the attending provider and/or case management    Equipment recommendations for successful discharge (if) home: patient owns DME required for discharge       SUBJECTIVE:   Patient stated doing fine.     OBJECTIVE DATA SUMMARY:   Critical Behavior:  Neurologic State: Alert, Appropriate for age  Orientation Level: Oriented X4  Cognition: Appropriate decision making, Follows commands  Safety/Judgement: Awareness of environment, Good awareness of safety precautions    Range of Motion:           RLE AROM  R Knee Flexion: 90  R Knee Extension: 5                Functional Mobility Training:  Bed Mobility:                    Transfers:  Sit to Stand: Stand-by assistance  Stand to Sit: Stand-by assistance                             Balance:  Sitting: Intact  Standing: Intact; With support  Ambulation/Gait Training:  Distance (ft): 150 Feet (ft)  Assistive Device: Walker, rolling;Gait belt  Ambulation - Level of Assistance: Stand-by assistance        Gait Abnormalities: Decreased step clearance        Base of Support: Widened     Speed/Italia: Pace decreased (<100 feet/min)                       Stairs: Therapeutic Exercises:     EXERCISE   Sets   Reps   Active Active Assist   Passive Self ROM   Comments   Ankle Pumps   ? ?                                        ?                                        ?                                           Quad Sets   ? ?                                        ?                                        ?                                           Hamstring Sets   ? ?                                        ?                                        ?                                           Short Arc Quads   ?                                         ?                                        ?                                        ? Knee Extension Stretch     ? ?                                          ?                                          ?                                           Heel Slides   ? ?                                        ?                                        ?                                           Long Arc Quads   ? ?                                        ?                                        ?                                           Knee Flexion Stretch   ? ?                                        ?                                        ?                                           Straight Leg Raises   ? ?                                        ?                                        ?                                               Pain Rating:  3/10    Activity Tolerance:   Good  Please refer to the flowsheet for vital signs taken during this treatment.     After treatment patient left in no apparent distress:   Sitting in chair and Call bell within reach    COMMUNICATION/COLLABORATION:   The patients plan of care was discussed with: Registered Nurse    Varinder Fox   Time Calculation: 23 mins      '

## 2019-08-28 NOTE — PROGRESS NOTES
Problem: Mobility Impaired (Adult and Pediatric)  Goal: *Acute Goals and Plan of Care (Insert Text)  Description  FUNCTIONAL STATUS PRIOR TO ADMISSION: Patient was independent and active without use of DME.    HOME SUPPORT PRIOR TO ADMISSION: The patient lived alone with no local support. Physical Therapy Goals  Initiated 8/23/2019  1. Patient will move from supine to sit and sit to supine , scoot up and down and roll side to side in bed with modified independence within 7 day(s). 2.  Patient will transfer from bed to chair and chair to bed with modified independence using the least restrictive device within 7 day(s). 3.  Patient will perform sit to stand with modified independence within 7 day(s). 4.  Patient will ambulate with modified independence for 150 feet with the least restrictive device within 7 day(s). 8/28/2019 1509 by Nasrin Banda  Outcome: Progressing Towards Goal  Note:   PHYSICAL THERAPY TREATMENT  Patient: Gisela Ocasio (77 y.o. female)  Date: 8/28/2019  Diagnosis: Primary osteoarthritis of right knee [M17.11] <principal problem not specified>  Procedure(s) (LRB):  RIGHT TOTAL KNEE ARTHROPLASTY MAKOPLASTY (Right) 6 Days Post-Op  Precautions: Bed Alarm, Fall, WBAT  Chart, physical therapy assessment, plan of care and goals were reviewed. ASSESSMENT  Based on the objective data described below, SBA for functional transfers and gait training, verbalized and demonstrated HEP. Pt has elevator to reach apt. Has successfully performed stair training on previous session. Pt is safe to discharge to home. Current Level of Function Impacting Discharge (mobility/balance): use of RW for mobility    Other factors to consider for discharge: short term assistance from family         PLAN :  Patient continues to benefit from skilled intervention to address the above impairments. Continue treatment per established plan of care. to address goals.     Recommendation for discharge: (in order for the patient to meet his/her long term goals)  Physical therapy at least 2 days/week in the home per MD    This discharge recommendation:  Has been made in collaboration with the attending provider and/or case management    Equipment recommendations for successful discharge (if) home: patient owns DME required for discharge       SUBJECTIVE:   Patient stated i am feeling good today.     OBJECTIVE DATA SUMMARY:   Critical Behavior:  Neurologic State: Alert, Appropriate for age  Orientation Level: Oriented X4  Cognition: Appropriate decision making, Follows commands  Safety/Judgement: Awareness of environment, Good awareness of safety precautions    Range of Motion:           RLE AROM  R Knee Flexion: 90  R Knee Extension: 5                Functional Mobility Training:  Bed Mobility:                    Transfers:  Sit to Stand: Stand-by assistance;Supervision  Stand to Sit: Stand-by assistance;Supervision                             Balance:  Sitting: Intact  Standing: Intact; With support  Ambulation/Gait Training:  Distance (ft): 300 Feet (ft)  Assistive Device: Walker, rolling;Gait belt  Ambulation - Level of Assistance: Stand-by assistance        Gait Abnormalities: Decreased step clearance        Base of Support: Widened     Speed/[place]: Pace decreased (<100 feet/min)                       Stairs: Therapeutic Exercises:     EXERCISE   Sets   Reps   Active Active Assist   Passive Self ROM   Comments   Ankle Pumps   ? ?                                        ?                                        ?                                           Quad Sets   ? ?                                        ?                                        ?                                           Hamstring Sets   ?                                         ?                                        ?                                        ? Short Arc Quads   ? ?                                        ?                                        ?                                           Knee Extension Stretch     ? ?                                          ?                                          ?                                           Heel Slides   ? ?                                        ?                                        ?                                           Long Arc Quads   ? ?                                        ?                                        ?                                           Knee Flexion Stretch   ? ?                                        ?                                        ?                                           Straight Leg Raises   ? ?                                        ?                                        ?                                               Pain Rating:  3/10    Activity Tolerance:   Good  Please refer to the flowsheet for vital signs taken during this treatment.     After treatment patient left in no apparent distress:   Sitting in chair and Call bell within reach    COMMUNICATION/COLLABORATION:   The patients plan of care was discussed with: Registered Nurse    Dennis Howard   Time Calculation: 20 mins            8/28/2019 1112 by Abbey Saldana  Outcome: Progressing Towards Goal  Note:   PHYSICAL THERAPY TREATMENT  Patient: João Goyal (35 y.o. female)  Date: 8/28/2019  Diagnosis: Primary osteoarthritis of right knee [M17.11] <principal problem not specified>  Procedure(s) (LRB):  RIGHT TOTAL KNEE ARTHROPLASTY MAKOPLASTY (Right) 6 Days Post-Op  Precautions: Bed Alarm, Fall, WBAT  Chart, physical therapy assessment, plan of care and goals were reviewed. ASSESSMENT  Based on the objective data described below, SBA for functional tranfers and gait training using RW. Demonstrated good understanding of HEP. Current Level of Function Impacting Discharge (mobility/balance): RW use    Other factors to consider for discharge: family assist for short term         PLAN :  Patient continues to benefit from skilled intervention to address the above impairments. Continue treatment per established plan of care. to address goals. Recommendation for discharge: (in order for the patient to meet his/her long term goals)  Outpatient physical therapy follow up recommended for per MD     This discharge recommendation:  Has been made in collaboration with the attending provider and/or case management    Equipment recommendations for successful discharge (if) home: patient owns DME required for discharge       SUBJECTIVE:   Patient stated doing fine.     OBJECTIVE DATA SUMMARY:   Critical Behavior:  Neurologic State: Alert, Appropriate for age  Orientation Level: Oriented X4  Cognition: Appropriate decision making, Follows commands  Safety/Judgement: Awareness of environment, Good awareness of safety precautions    Range of Motion:           RLE AROM  R Knee Flexion: 90  R Knee Extension: 5                Functional Mobility Training:  Bed Mobility:                    Transfers:  Sit to Stand: Stand-by assistance  Stand to Sit: Stand-by assistance                             Balance:  Sitting: Intact  Standing: Intact; With support  Ambulation/Gait Training:  Distance (ft): 150 Feet (ft)  Assistive Device: Walker, rolling;Gait belt  Ambulation - Level of Assistance: Stand-by assistance        Gait Abnormalities: Decreased step clearance        Base of Support: Widened     Speed/Italia: Pace decreased (<100 feet/min)                       Stairs:               Therapeutic Exercises:     EXERCISE   Sets   Reps Active Active Assist   Passive Self ROM   Comments   Ankle Pumps   ? ?                                        ?                                        ?                                           Quad Sets   ? ?                                        ?                                        ?                                           Hamstring Sets   ? ?                                        ?                                        ?                                           Short Arc Quads   ? ?                                        ?                                        ?                                           Knee Extension Stretch     ? ?                                          ?                                          ?                                           Heel Slides   ? ?                                        ?                                        ?                                           Long Arc Quads   ? ?                                        ?                                        ?                                           Knee Flexion Stretch   ? ?                                        ?                                        ?                                           Straight Leg Raises   ? ?                                        ?                                        ?                                               Pain Rating:  3/10    Activity Tolerance:   Good  Please refer to the flowsheet for vital signs taken during this treatment.     After treatment patient left in no apparent distress:   Sitting in chair and Call bell within reach    COMMUNICATION/COLLABORATION:   The patients plan of care was discussed with: Registered Nurse    Nolan Owen   Time Calculation: 23 mins      '

## 2019-08-28 NOTE — PROGRESS NOTES
8/28/2019  12:14 PM  CM noted new recommendation for HH. CM met with pt who indicated [facility] as preference. CM completed referral, and they accepted. [facility] letter provided and explained. Pt's family will provide transport. No additional DC service needs indicated.

## 2019-09-05 NOTE — DISCHARGE SUMMARY
@[email or web address]@ [place] [address]   [address] [address]    DISCHARGE SUMMARY     Patient: Heather Hawthorne                             Medical Record Number: [id number]                : 1940  Age: [de-identified] y.o. Admit Date: 2019  Discharge Date: 2019    Admission Diagnosis: Primary osteoarthritis of right knee [M17.11]  Discharge Diagnosis: DJD RIGHT KNEE    Procedures: Procedure(s):  RIGHT TOTAL KNEE ARTHROPLASTY MAKOPLASTY    Surgeon: Erik Durant MD  Assistants: Balta Reed PA-C    Anesthesia: spinal  Complications: None     History of Present Illness:  Heather Hawthorne is a [de-identified] y.o. female with a history of Right knee pain, swelling, and marked loss of function. Despite conservative management and after clinical and radiographic evaluation, it was determined that she suffered from end-stage osteoarthritis and would benefit from Procedure(s):  [place], which she consented to undergo after a discussion of the risks, benefits, alternatives, rehab concerns, and potential complications of surgery. Hospital Course:  Heather Hawthorne tolerated the procedure well. She was transferred  to the recovery room in stable condition. After a brief stay the patient was then transferred to the Joint Replacement Unit at [place] [address]. On postoperative day #1, the dressing was clean and dry, she was neurovascularly intact. The patient was afebrile and vital signs were stable. Calves were soft and non-tender bilaterally. On postoperative day  # 2, the patient was tolerating a regular diet and making satisfactory progress with physical therapy. Patient was transferred to 3rd floor for monitoring due to elevated liver enzymes and hyperkalemia POD 3-6.       Hemoglobin and INR prior to discharge were   Lab Results   Component Value Date/Time    HGB 8.5 (L) 2019 03:45 AM    INR 1.0 2019 11:42 AM       Heather Hawthorne was cleared by physical therapy and was discharged to Home in stable condition on postoperative day 6. She was provided with routine postoperative instructions and advised to follow up in my office in 2 weeks following discharge from the hospital.  She was prescribed aspirin for DVT prophylaxis, tramadol and oxycodone for post-operative pain, dulcolax suppository for constipation, and zofran for nausea. Discharge Medications:  Cannot display discharge medications since this patient is not currently admitted.       Signed by: Carina Alvarado MD  9/5/2019

## 2019-11-25 ENCOUNTER — TELEPHONE (OUTPATIENT)
Dept: NEUROLOGY | Age: 79
End: 2019-11-25

## 2019-11-25 NOTE — TELEPHONE ENCOUNTER
----- Message from Edwin Radford sent at 11/25/2019 10:58 AM EST -----  Regarding: Dr. Chavez Headings (if not patient):      Relationship of caller (if not patient):      Best contact number(s): [phone]      Name of medication and dosage if known: \"Keppra\"      Is patient out of this medication (yes/no):no      Pharmacy name:    Pharmacy listed in chart? (yes/no):  Pharmacy phone number:      Details to clarify the request:Pt stated her Rx for \"Keppra \"was reduced from 500 mg  2x daily to 250 mg 2x daily and would like a call from the nurse to confirm this is correct.       Edwin Radford

## 2020-01-30 ENCOUNTER — OFFICE VISIT (OUTPATIENT)
Dept: NEUROLOGY | Age: 80
End: 2020-01-30

## 2020-01-30 VITALS
HEART RATE: 78 BPM | OXYGEN SATURATION: 97 % | SYSTOLIC BLOOD PRESSURE: 138 MMHG | RESPIRATION RATE: 16 BRPM | HEIGHT: 63 IN | BODY MASS INDEX: 36.68 KG/M2 | DIASTOLIC BLOOD PRESSURE: 72 MMHG | WEIGHT: 207 LBS | TEMPERATURE: 98.6 F

## 2020-01-30 DIAGNOSIS — G40.909 NONINTRACTABLE EPILEPSY WITHOUT STATUS EPILEPTICUS, UNSPECIFIED EPILEPSY TYPE (HCC): Primary | ICD-10-CM

## 2020-01-30 NOTE — PROGRESS NOTES
Chief Complaint   Patient presents with    Follow-up     Patient is here for a f/u for seizures. Patient states she has not had a seizure since she started the [address].      Visit Vitals  /72 (BP 1 Location: Left arm, BP Patient Position: Sitting)   Pulse 78   Temp 98.6 °F (37 °C) (Oral)   Resp 16   Ht 5' 3\" (1.6 m)   Wt 93.9 kg (207 lb)   SpO2 97%   BMI 36.67 kg/m²

## 2020-01-30 NOTE — PROGRESS NOTES
Interval HPI:   This is a [de-identified] y.o. female who is following up for     Chief Complaint   Patient presents with    Follow-up     Patient is here for a f/u for seizures. Patient states she has not had a seizure since she started the [email or web address]. 1) Hx of Left Basal Ganglia Hemorrhage in February 2019 (treated at [facility])  2) Incidental old right medial occipital lobe stroke seen on MRI brain in February 2019  3) History of epilepsy (well-controlled)    Interval Hx:    Underwent right knee replacement, pt says much less right knee pain. No recent seizures. Prior to surgery I had advised her to increase her Keppra from 250 mg BID to 500 mg BID. She says currently she's taking 250 mg twice a day, and that she recently had an increase in her kidney numbers and is going to see her Nephrologist next week. She had recent labs done with Nephrologist but I cannot see those. Brief ROS: as above or otherwise negative    =============================  Brief Hx:    [de-identified] y.o. female with remote hx of epilepsy, well controlled on combination of low dose Phenytoin (? Dose) and Mysoline. Last witnessed seizure in 1967. Those meds stopped around 2014 d/t no seizure for many years. Had an episode on 5-10-16 where patient fell out of bed in early AM, urinary incontinence, tongue or cheek bite, that may have been a seizure. Was started on Keppra 500 mg BID at that point. EEG (8-29-26): normal.  Keppra level (9-4-18): 32.8 (normal, rr 10-40). When she was admitted for her basal ganglia stroke (February 2019) her Keppra had been reduced to 250 mg once a day for renal disease (GFR 31, creatinine 1.6). At her subsequent neurology follow-up visit I increased her Keppra to 250 mg twice a day. Feb 2019: Hypertensive left basal ganglia hemorrhagic stroke (initial symptoms are right facial droop and slurred speech). CT head showed acute left basal ganglia hemorrhage.  Seen by Neurosurgery but no intervention required. BP control was optimized with Norvasc, carvedilol, chlorthalidone, and losartan. MRI Brain confirmed the hemorrhage, no change in size. She was seen by Dr Weinberg/ Neurology who recommended keeping SBP < 140 and withholding ASA x 2 weeks. MRI brain at that time also showed a remote right medial occipital ischemic stroke. Allergies   Allergen Reactions    Codeine Other (comments)    Levaquin [Levofloxacin] Other (comments)     Hallucinations    Lisinopril Cough    Sulfa (Sulfonamide Antibiotics) Rash and Itching     Current Outpatient Medications   Medication Sig Dispense Refill    levETIRAcetam (KEPPRA) 250 mg tablet Take 1 Tab by mouth two (2) times a day for 90 days. 180 Tab 0    amLODIPine (NORVASC) 10 mg tablet Take 1 Tab by mouth daily. 30 Tab 0    carvedilol (COREG) 6.25 mg tablet Take 1 Tab by mouth two (2) times daily (with meals). 60 Tab 0    acetaminophen (TYLENOL EXTRA STRENGTH) 500 mg tablet Take 1-2 Tabs by mouth every six (6) hours as needed for Pain. Not to exceed 4,000mg in any 24 hour period  Indications: pain 60 Tab 0    aspirin delayed-release 81 mg tablet Take 1 Tab by mouth two (2) times a day. Get otc 60 Tab 0    terazosin (HYTRIN) 5 mg capsule Take 5 mg by mouth nightly.  montelukast (SINGULAIR) 10 mg tablet Take 10 mg by mouth daily.  fluticasone (FLONASE ALLERGY RELIEF) 50 mcg/actuation nasal spray 1 [place] by Both Nostrils route nightly.  Carboxymethylcellulose-Glycern (REFRESH OPTIVE) 0.5-0.9 % drop Administer 1 Drop to both eyes two (2) times daily as needed for Other (dry eyes).  chlorthalidone (HYGROTEN) 25 mg tablet Take 25 mg by mouth daily.  pravastatin (PRAVACHOL) 40 mg tablet Take 40 mg by mouth nightly.  DOCOSAHEXANOIC ACID/EPA (FISH OIL PO) Take 1 Cap by mouth two (2) times a day.  ondansetron (ZOFRAN ODT) 8 mg disintegrating tablet Take 0.5 Tabs by mouth every eight (8) hours as needed for Nausea.  30 Tab 0 PMHx:   Past Medical History:   Diagnosis Date    Anxiety     Arthritis     Basal ganglia stroke ([facility] 2019    Left    CKD (chronic kidney disease) stage 3, GFR 30-59 ml/min ([facility]     High cholesterol     Hx of seasonal allergies     Hypertension     Ischemic stroke ([facility]     Right occipital    Monoclonal gammopathy 2018    ABIGAIL on CPAP     [facility] ([facility]     last keppra level- 2019 @ 15   Last seizure 2017    Urinary incontinence      PSHx:  has a past surgical history that includes hx tonsillectomy; hx hysterectomy; hx hemorrhoidectomy; hx  section (N/A); hx cataract removal (Bilateral, 2018); hx appendectomy (N/A); and hx knee replacement (Right, 2019). SocHx:  reports that she has never smoked. She has never used smokeless tobacco. She reports that she does not drink alcohol or use drugs. Physical Exam  Blood pressure 138/72, pulse 78, temperature 98.6 °F (37 °C), temperature source Oral, resp. rate 16, height 5' 3\" (1.6 m), weight 93.9 kg (207 lb), SpO2 97 %. No acute distress  Neck: no stiffness  Skin: no rashes    Focused Neurological Exam     Mental status: Alert and oriented to person, place situation. Language: normal fluency and comprehension; no dysarthria. CNs:   Visual fields grossly normal  Extraocular movements intact, no nystagmus  Face: mild right lower facial weakness  Hearing is intact to casual conversation. Sensory: intact light touch in all 4 extremities  Motor: Normal bulk and strength in all 4 extremities, no myalgias  Reflexes: deferred  Gait: mild antalgic, favoring right leg    Impression      ICD-10-CM ICD-9-CM    1.  Nonintractable epilepsy without status epilepticus, unspecified epilepsy type ([facility] G40.909 345.90      1) Non-intractable epilepsy  Continue Keppra 250 mg BID (limited dose due to renal impairment)    2) Hx of Basal ganglia hemorrhage (--, [employer]):  BP looks good today  Recommend keeping SBP under 140s to reduce chance of ICH recurrence. 3) Old right posterior occipital lobe cortical infarct:     Continue ASA 81 mg/ day. Pt should have Lipid Panel check with Dr. Crystal Marsh PCP. She should be started on a -statin if LDL is > 70 as goal LDL in setting of TIA/ CVA history is LDL < 70.        Follow up in 1 year      Signed By: Aliyah Olivares MD     January 30, 2020

## 2020-02-26 ENCOUNTER — TELEPHONE (OUTPATIENT)
Dept: NEUROLOGY | Age: 80
End: 2020-02-26

## 2020-07-02 NOTE — MR AVS SNAPSHOT
COVID Screen    Does Patient OR patient's household members have any of the following:    · Temperature: Fever greater than or equal to 100.4 F   No   · Respiratory symptoms: New or worsening cough, shortness of breath, or sore throat   No   · GI Symptoms: New onset of nausea, vomiting or diarrhea   No   · Miscellaneous: New onset of loss of taste or smell, chills, repeated shaking with chills, muscle pain or headache   No   · In the last 14 days,  have you or anyone in your household been tested,suspected of having or have a test in process for covid?   No   · In the last 14 days, have you or anyone in your household had any contact with anyone who has tested positive,suspected of having, or have a test in process for covid?   No     If patient is scheduled for a non virtual appointment (in clinic):  • Patient informed of policy for masking while present for appointments and asked to bring own mask if able to and/or told a mask will be given at arrival to clinic   Yes  • Patient informed of visitor restrictions (if a visitor/chaperone is necessary please list name of person)   Yes  • Patient advised not to arrive more than 20 minutes before appointment time   Yes   Visit Information Date & Time Provider Department Dept. Phone Encounter #  
 8/10/2017  9:40 AM MD Jamie Ramsey Neurology [facility] [phone] [id number] Follow-up Instructions Return in about 1 year (around 8/10/2018). Upcoming Health Maintenance Date Due DTaP/Tdap/Td series (1 - Tdap) 8/13/1960 ZOSTER VACCINE AGE 60> 6/13/1999 GLAUCOMA SCREENING Q2Y 8/13/2004 OSTEOPOROSIS SCREENING (DEXA) 8/13/2004 Pneumococcal 65+ Low/Medium Risk (1 of 2 - PCV13) 8/13/2004 MEDICARE YEARLY EXAM 8/13/2004 INFLUENZA AGE 9 TO ADULT 8/1/2017 Allergies as of 8/10/2017  Review Complete On: 8/10/2017 By: Alonso Humphrey LPN Severity Noted Reaction Type Reactions Sulfa (Sulfonamide Antibiotics) High 10/02/2014    Other (comments) Unknown Codeine  05/31/2016    Other (comments) Levaquin [Levofloxacin]  05/31/2016    Other (comments) Lisinopril  05/31/2016    Cough Current Immunizations  Never Reviewed No immunizations on file. Not reviewed this visit You Were Diagnosed With   
  
 Codes Comments Paresthesia    -  Primary ICD-10-CM: R20.2 ICD-9-CM: 782.0 Convulsions, unspecified convulsion type ([facility]     ICD-10-CM: R56.9 ICD-9-CM: 780.39 Hx of seizure disorder     ICD-10-CM: Z86.69 
ICD-9-CM: V12.49 Vitals BP Pulse Height(growth percentile) Weight(growth percentile) SpO2 BMI  
 128/70 (BP 1 Location: Left arm, BP Patient Position: Sitting) 64 5' 1\" (1.549 m) 209 lb (94.8 kg) 98% 39.49 kg/m2 OB Status Smoking Status Hysterectomy Never Smoker Vitals History BMI and BSA Data Body Mass Index Body Surface Area  
 39.49 kg/m 2 2.02 m 2 Preferred Pharmacy Pharmacy Name Phone [facility] [address] Your Updated Medication List  
  
   
 This list is accurate as of: 8/10/17 10:30 AM.  Always use your most recent med list.  
  
  
  
  
 aspirin 81 mg chewable tablet Take 81 mg by mouth daily. BYSTOLIC 5 mg tablet Generic drug:  nebivolol Take  by mouth daily. CALCIUM 600 600 mg calcium (1,500 mg) tablet Generic drug:  calcium carbonate Take 600 mg by mouth two (2) times a day. CALCIUM PO Take  by mouth. chlorthalidone 25 mg tablet Commonly known as:  Tyrell Kelch Take  by mouth daily. FISH -160-1,000 mg Cap Generic drug:  omega 3-dha-epa-fish oil Take  by mouth. FISH OIL PO Take  by mouth. HYTRIN PO Take  by mouth.  
  
 levETIRAcetam 500 mg tablet Commonly known as:  KEPPRA 1 in AM and HALF in PM  
  
 levoFLOXacin 750 mg tablet Commonly known as:  Stewart Cedar Take 1 tablet by mouth daily. losartan 100 mg tablet Commonly known as:  COZAAR Take 100 mg by mouth daily. magnesium 250 mg Tab Take  by mouth.  
  
 metoprolol tartrate 100 mg IR tablet Commonly known as:  LOPRESSOR Take  by mouth two (2) times a day. NORVASC 10 mg tablet Generic drug:  amLODIPine Take  by mouth daily. ondansetron 4 mg disintegrating tablet Commonly known as:  ZOFRAN ODT Take 1 tablet by mouth every eight (8) hours as needed for Nausea. pravastatin 20 mg tablet Commonly known as:  PRAVACHOL Take 20 mg by mouth nightly. simvastatin 20 mg tablet Commonly known as:  ZOCOR Take  by mouth nightly. VITAMIN D3 2,000 unit Tab Generic drug:  cholecalciferol (vitamin D3) Take  by mouth. Follow-up Instructions Return in about 1 year (around 8/10/2018). Introducing [facility]! Dear Kim Alvarez: Thank you for requesting a [employer] account. Our records indicate that you have previously registered for a [employer] account but its currently inactive. Please call our [employer] support line at [phone]. Additional Information If you have questions, please visit the Frequently Asked Questions section of the [employer] website at [email or web address] Remember, [email or web address] is NOT to be used for urgent needs. For medical emergencies, dial 911. Now available from your iPhone and Android! Please provide this summary of care documentation to your next provider. Your primary care clinician is listed as Ami Liao. If you have any questions after today's visit, please call [phone].

## 2020-08-17 DIAGNOSIS — G40.909 NONINTRACTABLE EPILEPSY WITHOUT STATUS EPILEPTICUS, UNSPECIFIED EPILEPSY TYPE (HCC): ICD-10-CM

## 2020-08-18 RX ORDER — LEVETIRACETAM 250 MG/1
250 TABLET ORAL 2 TIMES DAILY
Qty: 180 TAB | Refills: 1 | Status: SHIPPED | OUTPATIENT
Start: 2020-08-18 | End: 2021-02-16

## 2021-02-15 ENCOUNTER — TRANSCRIBE ORDER (OUTPATIENT)
Dept: INTERNAL MEDICINE CLINIC | Age: 81
End: 2021-02-15

## 2021-02-16 ENCOUNTER — TRANSCRIBE ORDER (OUTPATIENT)
Dept: SCHEDULING | Age: 81
End: 2021-02-16

## 2021-02-16 ENCOUNTER — HOSPITAL ENCOUNTER (OUTPATIENT)
Dept: LAB | Age: 81
Discharge: HOME | End: 2021-02-16
Payer: MEDICARE

## 2021-02-16 ENCOUNTER — TRANSCRIBE ORDER (OUTPATIENT)
Dept: REGISTRATION | Age: 81
End: 2021-02-16

## 2021-02-16 DIAGNOSIS — Z01.812 PRE-PROCEDURAL LABORATORY EXAMINATIONS: Primary | ICD-10-CM

## 2021-02-16 DIAGNOSIS — J90 PLEURAL EFFUSION: Primary | ICD-10-CM

## 2021-02-16 DIAGNOSIS — Z01.812 PRE-PROCEDURAL LABORATORY EXAMINATIONS: ICD-10-CM

## 2021-02-16 PROCEDURE — U0003 INFECTIOUS AGENT DETECTION BY NUCLEIC ACID (DNA OR RNA); SEVERE ACUTE RESPIRATORY SYNDROME CORONAVIRUS 2 (SARS-COV-2) (CORONAVIRUS DISEASE [COVID-19]), AMPLIFIED PROBE TECHNIQUE, MAKING USE OF HIGH THROUGHPUT TECHNOLOGIES AS DESCRIBED BY CMS-2020-01-R: HCPCS

## 2021-02-17 LAB — SARS-COV-2, COV2NT: NOT DETECTED

## 2021-02-19 ENCOUNTER — HOSPITAL ENCOUNTER (OUTPATIENT)
Age: 81
Setting detail: OUTPATIENT SURGERY
Discharge: HOME | End: 2021-02-19
Attending: INTERNAL MEDICINE | Admitting: INTERNAL MEDICINE
Payer: MEDICARE

## 2021-02-19 ENCOUNTER — TELEPHONE (OUTPATIENT)
Dept: NEUROLOGY | Age: 81
End: 2021-02-19

## 2021-02-19 VITALS
SYSTOLIC BLOOD PRESSURE: 152 MMHG | BODY MASS INDEX: 37.45 KG/M2 | WEIGHT: 203.48 LBS | HEART RATE: 68 BPM | RESPIRATION RATE: 20 BRPM | HEIGHT: 62 IN | TEMPERATURE: 97.7 F | OXYGEN SATURATION: 95 % | DIASTOLIC BLOOD PRESSURE: 57 MMHG

## 2021-02-19 PROCEDURE — 76040000019: Performed by: INTERNAL MEDICINE

## 2021-02-19 PROCEDURE — 77030018870 HC TY PARCNT BD -B: Performed by: INTERNAL MEDICINE

## 2021-02-19 PROCEDURE — 2709999900 HC NON-CHARGEABLE SUPPLY: Performed by: INTERNAL MEDICINE

## 2021-02-19 RX ORDER — SODIUM CHLORIDE 0.9 % (FLUSH) 0.9 %
5-40 SYRINGE (ML) INJECTION AS NEEDED
Status: DISCONTINUED | OUTPATIENT
Start: 2021-02-19 | End: 2021-02-19 | Stop reason: HOSPADM

## 2021-02-19 RX ORDER — SODIUM CHLORIDE 0.9 % (FLUSH) 0.9 %
5-40 SYRINGE (ML) INJECTION EVERY 8 HOURS
Status: DISCONTINUED | OUTPATIENT
Start: 2021-02-19 | End: 2021-02-19 | Stop reason: HOSPADM

## 2021-02-19 RX ORDER — HYDRALAZINE HYDROCHLORIDE 10 MG/1
TABLET, FILM COATED ORAL
COMMUNITY
End: 2021-05-14

## 2021-02-19 RX ORDER — BUMETANIDE 1 MG/1
TABLET ORAL DAILY
COMMUNITY
End: 2021-05-14

## 2021-02-19 NOTE — PROGRESS NOTES
Bedside US performed in anticipation of a thora. No pleural effusion noted on the left and minimal on the right, not enough to drain. Updated the patient and the son. Will plan on getting a CXR in about a month or sooner if symptoms worsen.

## 2021-02-19 NOTE — TELEPHONE ENCOUNTER
----- Message from Phil Phelps MD sent at 2/16/2021  4:57 PM EST -----  Regarding: Epilepsy patient; needs annual follow up visit for med refill request.  Epilepsy patient; needs annual follow up visit for med refill request.

## 2021-02-22 ENCOUNTER — TELEPHONE (OUTPATIENT)
Dept: NEUROLOGY | Age: 81
End: 2021-02-22

## 2021-02-22 NOTE — TELEPHONE ENCOUNTER
Attempted to contact. Message left on voicemail asking for patient to schedule annual follow up visit.

## 2021-02-22 NOTE — TELEPHONE ENCOUNTER
----- Message from Carlos Gilbert sent at 2/19/2021  2:27 PM EST -----  Regarding: Dr. Arizmendi Hearing first and last name and relationship (if not the patient): n/a  Best contact number(s): [phone]  Whose call is being returned: unknown  Details to clarify the request: Pt is returning a call back but was uncertain of what the call is pertaining to.

## 2021-03-10 ENCOUNTER — OFFICE VISIT (OUTPATIENT)
Dept: NEUROLOGY | Age: 81
End: 2021-03-10
Payer: MEDICARE

## 2021-03-10 VITALS
TEMPERATURE: 97.7 F | HEART RATE: 70 BPM | OXYGEN SATURATION: 99 % | BODY MASS INDEX: 37.36 KG/M2 | HEIGHT: 62 IN | RESPIRATION RATE: 16 BRPM | DIASTOLIC BLOOD PRESSURE: 80 MMHG | SYSTOLIC BLOOD PRESSURE: 124 MMHG | WEIGHT: 203 LBS

## 2021-03-10 DIAGNOSIS — G40.909 NONINTRACTABLE EPILEPSY WITHOUT STATUS EPILEPTICUS, UNSPECIFIED EPILEPSY TYPE (HCC): ICD-10-CM

## 2021-03-10 DIAGNOSIS — Z86.73 HX OF COMPLETED STROKE: Primary | ICD-10-CM

## 2021-03-10 PROCEDURE — G8428 CUR MEDS NOT DOCUMENT: HCPCS | Performed by: PSYCHIATRY & NEUROLOGY

## 2021-03-10 PROCEDURE — G8536 NO DOC ELDER MAL SCRN: HCPCS | Performed by: PSYCHIATRY & NEUROLOGY

## 2021-03-10 PROCEDURE — G8400 PT W/DXA NO RESULTS DOC: HCPCS | Performed by: PSYCHIATRY & NEUROLOGY

## 2021-03-10 PROCEDURE — G8432 DEP SCR NOT DOC, RNG: HCPCS | Performed by: PSYCHIATRY & NEUROLOGY

## 2021-03-10 PROCEDURE — 1101F PT FALLS ASSESS-DOCD LE1/YR: CPT | Performed by: PSYCHIATRY & NEUROLOGY

## 2021-03-10 PROCEDURE — G8419 CALC BMI OUT NRM PARAM NOF/U: HCPCS | Performed by: PSYCHIATRY & NEUROLOGY

## 2021-03-10 PROCEDURE — 1090F PRES/ABSN URINE INCON ASSESS: CPT | Performed by: PSYCHIATRY & NEUROLOGY

## 2021-03-10 PROCEDURE — 99214 OFFICE O/P EST MOD 30 MIN: CPT | Performed by: PSYCHIATRY & NEUROLOGY

## 2021-03-10 RX ORDER — SODIUM BICARBONATE 650 MG/1
650 TABLET ORAL 2 TIMES DAILY
COMMUNITY
End: 2021-05-14

## 2021-03-10 RX ORDER — LEVETIRACETAM 250 MG/1
250 TABLET ORAL 2 TIMES DAILY
Qty: 180 TAB | Refills: 1 | Status: SHIPPED | OUTPATIENT
Start: 2021-03-10 | End: 2021-06-08

## 2021-03-10 NOTE — PROGRESS NOTES
Blessing Lee (1940) is a 79 y.o. female, established patient, here for evaluation of the following     Chief complaint(s):   Chief Complaint   Patient presents with    Follow-up     States no seizures since seen last    Epilepsy       ASSESSMENT/ PLAN:     ICD-10-CM ICD-9-CM    1. Hx of completed stroke  Z86.73 V12.54    2. Nonintractable epilepsy without status epilepticus, unspecified epilepsy type ([facility]  G40.909 345.90 levETIRAcetam (KEPPRA) 250 mg tablet     Continue Keppra 250 mg BID. Will send DMITRY to Nephrologist/ Dr Chepe Chaparro for most recent labs. Attempted to order Fasting Lipid Panel to check LDL level (as it relates to her prior stroke) but wouldn't go through due to medicare requiring an ABN/ waiver for it. Pt will need to have PCP check Lipid Panel. If LDL is > 70, she should be switched from pravastatin to Crestor 40 mg or Lipitor 40 mg QHS (high-intensity statins). Discussed with patient if she has received Coronavirus Vaccine. She says she has tried calling her health department but everytime she calls, it says someone will answer in 10 minutes, then she gets tired of waiting and hangs up, tries again. I suggested she call back, and leave the phone on speakerphone until someone picks up, so she can get scheduled. We also discussed that [facility] periodically gets a vaccine supply and sends notices to eligible patients through [employer]/ patient portal.  Unfortunately,she does not have a computer or a smartphone to receive those messages. My nurse is going to give her the # for [facility] scheduling so she can call and see if she can get scheduled that way. Also, I recommended she ask her Pharmacist if they can contact her if they receive any vaccine supply.       Follow up in 6 months      ========================================    SUBJECTIVE/ OBJECTIVE:    HPI: 79 y.o. female      Pertinent Hx:   1) Hx of Left Basal Ganglia Hemorrhage in February 2019 (treated at [facility])  2) Incidental old right medial occipital lobe stroke seen on MRI brain in February 2019  3) History of epilepsy (well-controlled)     Interval Hx:    Last visit was in Jan 2020. Annual follow up visit. Pt says no seizures since last visit  Remains on Keppra 250 mg BID    Since last visit, has developed CKD, and she says she's close to going on dialysis but not at that point yet. Had recent labs with Nephrologist, but I can't see those as they are done through Neprhologist's office. Will request most recent labs/ last clinic note so I can review. Reviewed med list: Taking ASA 81 mg once a day, Pravachol 40 mg QHS       Review of Systems: as above    ========================================    Brief Hx: 79 y.o. female with remote hx of epilepsy, well controlled on combination of low dose Phenytoin (? Dose) and Mysoline. Last witnessed seizure in 1967. Those meds stopped around 2014 d/t no seizure for many years. Had an episode on 5-10-16 where patient fell out of bed in early AM, urinary incontinence, tongue or cheek bite, that may have been a seizure. Was started on Keppra 500 mg BID at that point. EEG (8-29-26): normal.  Keppra level (9-4-18): 32.8 (normal, rr 10-40). When she was admitted for her basal ganglia stroke (February 2019) her Keppra had been reduced to 250 mg once a day for renal disease (GFR 31, creatinine 1.6). At her subsequent neurology follow-up visit I increased her Keppra to 250 mg twice a day.       Feb 2019: Hypertensive left basal ganglia hemorrhagic stroke (initial symptoms are right facial droop and slurred speech). CT head showed acute left basal ganglia hemorrhage. Seen by Neurosurgery but no intervention required. BP control was optimized with Norvasc, carvedilol, chlorthalidone, and losartan. MRI Brain confirmed the hemorrhage, no change in size. She was seen by Dr Weinberg/ Neurology who recommended keeping SBP < 140 and withholding ASA x 2 weeks.    MRI brain at that time also showed a remote right medial occipital ischemic stroke. Allergies   Allergen Reactions    Latex Rash    Codeine Other (comments)    Levaquin [Levofloxacin] Other (comments)     Hallucinations    Lisinopril Cough    Sulfa (Sulfonamide Antibiotics) Rash and Itching       Current Outpatient Medications   Medication Sig Dispense Refill    sodium bicarbonate 650 mg tablet Take 650 mg by mouth two (2) times a day.  levETIRAcetam (KEPPRA) 250 mg tablet Take 1 Tab by mouth two (2) times a day for 90 days. Anti-seizure medication. 180 Tab 1    hydrALAZINE (APRESOLINE) 10 mg tablet Take  by mouth.  bumetanide (BUMEX) 1 mg tablet Take  by mouth daily.  amLODIPine (NORVASC) 10 mg tablet Take 1 Tab by mouth daily. 30 Tab 0    carvedilol (COREG) 6.25 mg tablet Take 1 Tab by mouth two (2) times daily (with meals). 60 Tab 0    aspirin delayed-release 81 mg tablet Take 1 Tab by mouth two (2) times a day. Get otc (Patient taking differently: Take 81 mg by mouth daily. Get otc) 60 Tab 0    montelukast (SINGULAIR) 10 mg tablet Take 10 mg by mouth daily.  fluticasone (FLONASE ALLERGY RELIEF) 50 mcg/actuation nasal spray 1 [place] by Both Nostrils route nightly.  Carboxymethylcellulose-Glycern (REFRESH OPTIVE) 0.5-0.9 % drop Administer 1 Drop to both eyes two (2) times daily as needed for Other (dry eyes).  pravastatin (PRAVACHOL) 40 mg tablet Take 40 mg by mouth nightly.  DOCOSAHEXANOIC ACID/EPA (FISH OIL PO) Take 1 Cap by mouth two (2) times a day.  ondansetron (ZOFRAN ODT) 8 mg disintegrating tablet Take 0.5 Tabs by mouth every eight (8) hours as needed for Nausea. 30 Tab 0    acetaminophen (TYLENOL EXTRA STRENGTH) 500 mg tablet Take 1-2 Tabs by mouth every six (6) hours as needed for Pain. Not to exceed 4,000mg in any 24 hour period  Indications: pain 60 Tab 0    terazosin (HYTRIN) 5 mg capsule Take 5 mg by mouth nightly.       chlorthalidone (HYGROTEN) 25 mg tablet Take 25 mg by mouth daily. Past Medical History:   Diagnosis Date    Anxiety     Arthritis     Basal ganglia stroke ([facility] 02/20/2019    Left    CKD (chronic kidney disease) stage 3, GFR 30-59 ml/min     High cholesterol     Hx of seasonal allergies     Hypertension     Ischemic stroke ([facility]     Right occipital    Monoclonal gammopathy 2018    ABIGAIL on CPAP     [facility] ([facility]     last keppra level- 8/2019 @ 15   Last seizure 2017    Urinary incontinence        Physical Exam:    Vitals:    03/10/21 1120   BP: 124/80   BP 1 Location: Left arm   BP Patient Position: Sitting   BP Cuff Size: Adult long   Pulse: 70   Resp: 16   Temp: 97.7 °F (36.5 °C)   TempSrc: Temporal   SpO2: 99%   Weight: 92.1 kg (203 lb)   Height: 5' 2\" (1.575 m)       Awake, resting in chair, rolator at side. Alert, conversant, normal hearing, normal speech  VF grossly normal bilaterally  Moves both arms w/o difficulty  Stands slowly, ambulates with slow, antalgic gait (attributes to knee pain/ knee replacement)        An electronic signature was used to authenticate this note.   -- Ruby Mckay MD

## 2021-03-22 ENCOUNTER — DOCUMENTATION ONLY (OUTPATIENT)
Dept: NEUROLOGY | Age: 81
End: 2021-03-22

## 2021-03-22 NOTE — PROGRESS NOTES
Reviewed most recent labs from Nephrology/ Dr Chepe Chaparro.     Labs dated 3-10-21: GFR is between 8-10 and Cr is 4.61    Regarding Keppra dosing:     Continue Keppra 250 mg twice a day    If pt goes on Dialysis, then she will need to take an additional 500 mg of Keppra after each dialysis session

## 2021-05-14 ENCOUNTER — HOSPITAL ENCOUNTER (INPATIENT)
Age: 81
LOS: 10 days | Discharge: NURSING, REHAB OR LONG-TERM CARE | DRG: 280 | End: 2021-05-24
Attending: EMERGENCY MEDICINE | Admitting: INTERNAL MEDICINE
Payer: MEDICARE

## 2021-05-14 ENCOUNTER — APPOINTMENT (OUTPATIENT)
Dept: GENERAL RADIOLOGY | Age: 81
DRG: 280 | End: 2021-05-14
Attending: EMERGENCY MEDICINE
Payer: MEDICARE

## 2021-05-14 DIAGNOSIS — J81.0 ACUTE PULMONARY EDEMA (HCC): ICD-10-CM

## 2021-05-14 DIAGNOSIS — R77.8 ELEVATED TROPONIN: ICD-10-CM

## 2021-05-14 DIAGNOSIS — N17.9 AKI (ACUTE KIDNEY INJURY) (HCC): ICD-10-CM

## 2021-05-14 DIAGNOSIS — J96.01 ACUTE RESPIRATORY FAILURE WITH HYPOXIA (HCC): ICD-10-CM

## 2021-05-14 DIAGNOSIS — R06.02 SOB (SHORTNESS OF BREATH): Primary | ICD-10-CM

## 2021-05-14 DIAGNOSIS — R79.89 ELEVATED BRAIN NATRIURETIC PEPTIDE (BNP) LEVEL: ICD-10-CM

## 2021-05-14 PROBLEM — E87.70 VOLUME OVERLOAD: Status: ACTIVE | Noted: 2021-05-14

## 2021-05-14 PROBLEM — I50.33 DIASTOLIC CHF, ACUTE ON CHRONIC (HCC): Status: ACTIVE | Noted: 2021-05-14

## 2021-05-14 LAB
ALBUMIN SERPL-MCNC: 3.1 G/DL (ref 3.5–5)
ALBUMIN/GLOB SERPL: 0.7 {RATIO} (ref 1.1–2.2)
ALP SERPL-CCNC: 91 U/L (ref 45–117)
ALT SERPL-CCNC: 23 U/L (ref 12–78)
ANION GAP SERPL CALC-SCNC: 10 MMOL/L (ref 5–15)
ARTERIAL PATENCY WRIST A: YES
AST SERPL-CCNC: 19 U/L (ref 15–37)
BASE DEFICIT BLDA-SCNC: 4.2 MMOL/L
BASOPHILS # BLD: 0 K/UL (ref 0–0.1)
BASOPHILS NFR BLD: 0 % (ref 0–1)
BDY SITE: ABNORMAL
BILIRUB SERPL-MCNC: 0.6 MG/DL (ref 0.2–1)
BNP SERPL-MCNC: 3736 PG/ML
BUN SERPL-MCNC: 83 MG/DL (ref 6–20)
BUN/CREAT SERPL: 16 (ref 12–20)
CALCIUM SERPL-MCNC: 8.4 MG/DL (ref 8.5–10.1)
CHLORIDE SERPL-SCNC: 109 MMOL/L (ref 97–108)
CO2 SERPL-SCNC: 24 MMOL/L (ref 21–32)
COMMENT, HOLDF: NORMAL
CREAT SERPL-MCNC: 5.22 MG/DL (ref 0.55–1.02)
DIFFERENTIAL METHOD BLD: ABNORMAL
EOSINOPHIL # BLD: 0.1 K/UL (ref 0–0.4)
EOSINOPHIL NFR BLD: 1 % (ref 0–7)
ERYTHROCYTE [DISTWIDTH] IN BLOOD BY AUTOMATED COUNT: 12.9 % (ref 11.5–14.5)
GAS FLOW.O2 O2 DELIVERY SYS: 4 L/MIN
GLOBULIN SER CALC-MCNC: 4.3 G/DL (ref 2–4)
GLUCOSE SERPL-MCNC: 113 MG/DL (ref 65–100)
HCO3 BLDA-SCNC: 20 MMOL/L (ref 22–26)
HCT VFR BLD AUTO: 28.3 % (ref 35–47)
HGB BLD-MCNC: 8.6 G/DL (ref 11.5–16)
IMM GRANULOCYTES # BLD AUTO: 0.1 K/UL (ref 0–0.04)
IMM GRANULOCYTES NFR BLD AUTO: 1 % (ref 0–0.5)
LYMPHOCYTES # BLD: 1.9 K/UL (ref 0.8–3.5)
LYMPHOCYTES NFR BLD: 21 % (ref 12–49)
MCH RBC QN AUTO: 30 PG (ref 26–34)
MCHC RBC AUTO-ENTMCNC: 30.4 G/DL (ref 30–36.5)
MCV RBC AUTO: 98.6 FL (ref 80–99)
MONOCYTES # BLD: 0.8 K/UL (ref 0–1)
MONOCYTES NFR BLD: 8 % (ref 5–13)
NEUTS SEG # BLD: 6.2 K/UL (ref 1.8–8)
NEUTS SEG NFR BLD: 69 % (ref 32–75)
NRBC # BLD: 0 K/UL (ref 0–0.01)
NRBC BLD-RTO: 0 PER 100 WBC
PCO2 BLDA: 35 MMHG (ref 35–45)
PH BLDA: 7.38 [PH] (ref 7.35–7.45)
PLATELET # BLD AUTO: 266 K/UL (ref 150–400)
PMV BLD AUTO: 9.3 FL (ref 8.9–12.9)
PO2 BLDA: 84 MMHG (ref 80–100)
POTASSIUM SERPL-SCNC: 3.9 MMOL/L (ref 3.5–5.1)
PROT SERPL-MCNC: 7.4 G/DL (ref 6.4–8.2)
RBC # BLD AUTO: 2.87 M/UL (ref 3.8–5.2)
SAMPLES BEING HELD,HOLD: NORMAL
SAO2 % BLD: 96 % (ref 92–97)
SAO2% DEVICE SAO2% SENSOR NAME: ABNORMAL
SODIUM SERPL-SCNC: 143 MMOL/L (ref 136–145)
SPECIMEN SITE: ABNORMAL
TROPONIN I SERPL-MCNC: 0.17 NG/ML
WBC # BLD AUTO: 9.1 K/UL (ref 3.6–11)

## 2021-05-14 PROCEDURE — 99285 EMERGENCY DEPT VISIT HI MDM: CPT

## 2021-05-14 PROCEDURE — 74011000250 HC RX REV CODE- 250: Performed by: INTERNAL MEDICINE

## 2021-05-14 PROCEDURE — 36415 COLL VENOUS BLD VENIPUNCTURE: CPT

## 2021-05-14 PROCEDURE — 65660000000 HC RM CCU STEPDOWN

## 2021-05-14 PROCEDURE — 82803 BLOOD GASES ANY COMBINATION: CPT

## 2021-05-14 PROCEDURE — 74011250637 HC RX REV CODE- 250/637: Performed by: INTERNAL MEDICINE

## 2021-05-14 PROCEDURE — 85025 COMPLETE CBC W/AUTO DIFF WBC: CPT

## 2021-05-14 PROCEDURE — 74011250637 HC RX REV CODE- 250/637: Performed by: EMERGENCY MEDICINE

## 2021-05-14 PROCEDURE — 94660 CPAP INITIATION&MGMT: CPT

## 2021-05-14 PROCEDURE — 96375 TX/PRO/DX INJ NEW DRUG ADDON: CPT

## 2021-05-14 PROCEDURE — 36600 WITHDRAWAL OF ARTERIAL BLOOD: CPT

## 2021-05-14 PROCEDURE — 96374 THER/PROPH/DIAG INJ IV PUSH: CPT

## 2021-05-14 PROCEDURE — 74011250636 HC RX REV CODE- 250/636: Performed by: EMERGENCY MEDICINE

## 2021-05-14 PROCEDURE — 83880 ASSAY OF NATRIURETIC PEPTIDE: CPT

## 2021-05-14 PROCEDURE — 71045 X-RAY EXAM CHEST 1 VIEW: CPT

## 2021-05-14 PROCEDURE — 80053 COMPREHEN METABOLIC PANEL: CPT

## 2021-05-14 PROCEDURE — 84484 ASSAY OF TROPONIN QUANT: CPT

## 2021-05-14 PROCEDURE — 74011250636 HC RX REV CODE- 250/636: Performed by: INTERNAL MEDICINE

## 2021-05-14 RX ORDER — CALCIUM ACETATE 667 MG/1
1 CAPSULE ORAL
COMMUNITY
End: 2021-05-24

## 2021-05-14 RX ORDER — SODIUM BICARBONATE 650 MG/1
650 TABLET ORAL 2 TIMES DAILY
Status: DISCONTINUED | OUTPATIENT
Start: 2021-05-15 | End: 2021-05-15

## 2021-05-14 RX ORDER — CARVEDILOL 6.25 MG/1
6.25 TABLET ORAL 2 TIMES DAILY WITH MEALS
Status: DISCONTINUED | OUTPATIENT
Start: 2021-05-15 | End: 2021-05-24 | Stop reason: HOSPADM

## 2021-05-14 RX ORDER — OMEGA-3-ACID ETHYL ESTERS 1 G/1
1 CAPSULE, LIQUID FILLED ORAL 2 TIMES DAILY
COMMUNITY

## 2021-05-14 RX ORDER — SODIUM CHLORIDE 9 MG/ML
25 INJECTION, SOLUTION INTRAVENOUS AS NEEDED
Status: DISCONTINUED | OUTPATIENT
Start: 2021-05-14 | End: 2021-05-24 | Stop reason: HOSPADM

## 2021-05-14 RX ORDER — MINOXIDIL 2.5 MG/1
2.5 TABLET ORAL 2 TIMES DAILY
Status: DISCONTINUED | OUTPATIENT
Start: 2021-05-14 | End: 2021-05-15

## 2021-05-14 RX ORDER — LABETALOL HCL 20 MG/4 ML
10 SYRINGE (ML) INTRAVENOUS
Status: COMPLETED | OUTPATIENT
Start: 2021-05-14 | End: 2021-05-14

## 2021-05-14 RX ORDER — BUMETANIDE 0.25 MG/ML
1 INJECTION INTRAMUSCULAR; INTRAVENOUS EVERY 12 HOURS
Status: DISCONTINUED | OUTPATIENT
Start: 2021-05-14 | End: 2021-05-14

## 2021-05-14 RX ORDER — SODIUM CHLORIDE 0.9 % (FLUSH) 0.9 %
5-40 SYRINGE (ML) INJECTION AS NEEDED
Status: DISCONTINUED | OUTPATIENT
Start: 2021-05-14 | End: 2021-05-24 | Stop reason: HOSPADM

## 2021-05-14 RX ORDER — HYDRALAZINE HYDROCHLORIDE 50 MG/1
50 TABLET, FILM COATED ORAL 2 TIMES DAILY
Status: ON HOLD | COMMUNITY
End: 2021-05-24 | Stop reason: SDUPTHER

## 2021-05-14 RX ORDER — MINOXIDIL 2.5 MG/1
2.5 TABLET ORAL 2 TIMES DAILY
COMMUNITY

## 2021-05-14 RX ORDER — HYDRALAZINE HYDROCHLORIDE 20 MG/ML
20 INJECTION INTRAMUSCULAR; INTRAVENOUS
Status: DISCONTINUED | OUTPATIENT
Start: 2021-05-14 | End: 2021-05-19

## 2021-05-14 RX ORDER — ASPIRIN 81 MG/1
81 TABLET ORAL DAILY
Status: DISCONTINUED | OUTPATIENT
Start: 2021-05-15 | End: 2021-05-24 | Stop reason: HOSPADM

## 2021-05-14 RX ORDER — SODIUM BICARBONATE 650 MG/1
650 TABLET ORAL 2 TIMES DAILY
COMMUNITY
End: 2021-07-10

## 2021-05-14 RX ORDER — LABETALOL HCL 20 MG/4 ML
20 SYRINGE (ML) INTRAVENOUS
Status: DISCONTINUED | OUTPATIENT
Start: 2021-05-14 | End: 2021-05-19

## 2021-05-14 RX ORDER — TERAZOSIN 5 MG/1
5 CAPSULE ORAL
Status: CANCELLED | OUTPATIENT
Start: 2021-05-14

## 2021-05-14 RX ORDER — ACETAMINOPHEN 325 MG/1
650 TABLET ORAL
Status: DISCONTINUED | OUTPATIENT
Start: 2021-05-14 | End: 2021-05-24 | Stop reason: HOSPADM

## 2021-05-14 RX ORDER — LEVETIRACETAM 250 MG/1
250 TABLET ORAL 2 TIMES DAILY
Status: DISCONTINUED | OUTPATIENT
Start: 2021-05-15 | End: 2021-05-24 | Stop reason: HOSPADM

## 2021-05-14 RX ORDER — ASPIRIN 81 MG/1
81 TABLET ORAL DAILY
COMMUNITY

## 2021-05-14 RX ORDER — BUMETANIDE 2 MG/1
2 TABLET ORAL DAILY
COMMUNITY
End: 2021-05-24

## 2021-05-14 RX ORDER — FACIAL-BODY WIPES
10 EACH TOPICAL DAILY PRN
Status: DISCONTINUED | OUTPATIENT
Start: 2021-05-14 | End: 2021-05-24 | Stop reason: HOSPADM

## 2021-05-14 RX ORDER — ACETAMINOPHEN 500 MG
1000 TABLET ORAL ONCE
Status: COMPLETED | OUTPATIENT
Start: 2021-05-14 | End: 2021-05-14

## 2021-05-14 RX ORDER — AMOXICILLIN 250 MG
1 CAPSULE ORAL 2 TIMES DAILY
Status: DISCONTINUED | OUTPATIENT
Start: 2021-05-14 | End: 2021-05-24 | Stop reason: HOSPADM

## 2021-05-14 RX ORDER — MONTELUKAST SODIUM 10 MG/1
10 TABLET ORAL EVERY EVENING
COMMUNITY

## 2021-05-14 RX ORDER — ONDANSETRON 2 MG/ML
4 INJECTION INTRAMUSCULAR; INTRAVENOUS
Status: DISCONTINUED | OUTPATIENT
Start: 2021-05-14 | End: 2021-05-24 | Stop reason: HOSPADM

## 2021-05-14 RX ORDER — FUROSEMIDE 10 MG/ML
40 INJECTION INTRAMUSCULAR; INTRAVENOUS
Status: COMPLETED | OUTPATIENT
Start: 2021-05-14 | End: 2021-05-14

## 2021-05-14 RX ORDER — FLUTICASONE PROPIONATE 50 MCG
2 SPRAY, SUSPENSION (ML) NASAL EVERY EVENING
COMMUNITY

## 2021-05-14 RX ORDER — MONTELUKAST SODIUM 10 MG/1
10 TABLET ORAL DAILY
Status: DISCONTINUED | OUTPATIENT
Start: 2021-05-15 | End: 2021-05-24 | Stop reason: HOSPADM

## 2021-05-14 RX ORDER — SODIUM CHLORIDE 0.9 % (FLUSH) 0.9 %
5-40 SYRINGE (ML) INJECTION EVERY 8 HOURS
Status: DISCONTINUED | OUTPATIENT
Start: 2021-05-14 | End: 2021-05-24 | Stop reason: HOSPADM

## 2021-05-14 RX ORDER — ACETAMINOPHEN 650 MG/1
650 SUPPOSITORY RECTAL
Status: DISCONTINUED | OUTPATIENT
Start: 2021-05-14 | End: 2021-05-24 | Stop reason: HOSPADM

## 2021-05-14 RX ORDER — BUMETANIDE 0.25 MG/ML
2 INJECTION INTRAMUSCULAR; INTRAVENOUS EVERY 12 HOURS
Status: DISCONTINUED | OUTPATIENT
Start: 2021-05-14 | End: 2021-05-15

## 2021-05-14 RX ORDER — PROMETHAZINE HYDROCHLORIDE 25 MG/1
12.5 TABLET ORAL
Status: DISCONTINUED | OUTPATIENT
Start: 2021-05-14 | End: 2021-05-24 | Stop reason: HOSPADM

## 2021-05-14 RX ORDER — ACETAMINOPHEN 325 MG/1
650 TABLET ORAL
COMMUNITY
End: 2021-08-01 | Stop reason: DRUGHIGH

## 2021-05-14 RX ORDER — PRAVASTATIN SODIUM 20 MG/1
40 TABLET ORAL
Status: DISCONTINUED | OUTPATIENT
Start: 2021-05-14 | End: 2021-05-24 | Stop reason: HOSPADM

## 2021-05-14 RX ORDER — HEPARIN SODIUM 5000 [USP'U]/ML
5000 INJECTION, SOLUTION INTRAVENOUS; SUBCUTANEOUS EVERY 8 HOURS
Status: DISCONTINUED | OUTPATIENT
Start: 2021-05-15 | End: 2021-05-24 | Stop reason: HOSPADM

## 2021-05-14 RX ORDER — AMLODIPINE BESYLATE 5 MG/1
10 TABLET ORAL DAILY
Status: DISCONTINUED | OUTPATIENT
Start: 2021-05-15 | End: 2021-05-24 | Stop reason: HOSPADM

## 2021-05-14 RX ORDER — AMLODIPINE BESYLATE 10 MG/1
10 TABLET ORAL EVERY EVENING
COMMUNITY

## 2021-05-14 RX ORDER — HYDRALAZINE HYDROCHLORIDE 25 MG/1
50 TABLET, FILM COATED ORAL 2 TIMES DAILY
Status: DISCONTINUED | OUTPATIENT
Start: 2021-05-14 | End: 2021-05-15

## 2021-05-14 RX ADMIN — ACETAMINOPHEN 650 MG: 325 TABLET ORAL at 23:56

## 2021-05-14 RX ADMIN — BUMETANIDE 2 MG: 0.25 INJECTION INTRAMUSCULAR; INTRAVENOUS at 20:15

## 2021-05-14 RX ADMIN — LABETALOL HYDROCHLORIDE 20 MG: 5 INJECTION, SOLUTION INTRAVENOUS at 23:21

## 2021-05-14 RX ADMIN — PRAVASTATIN SODIUM 40 MG: 20 TABLET ORAL at 23:56

## 2021-05-14 RX ADMIN — FUROSEMIDE 40 MG: 10 INJECTION, SOLUTION INTRAMUSCULAR; INTRAVENOUS at 17:24

## 2021-05-14 RX ADMIN — HYDRALAZINE HYDROCHLORIDE 50 MG: 25 TABLET, FILM COATED ORAL at 21:06

## 2021-05-14 RX ADMIN — LABETALOL HYDROCHLORIDE 10 MG: 5 INJECTION, SOLUTION INTRAVENOUS at 17:23

## 2021-05-14 RX ADMIN — MINOXIDIL 2.5 MG: 2.5 TABLET ORAL at 21:05

## 2021-05-14 RX ADMIN — Medication 10 ML: at 23:57

## 2021-05-14 RX ADMIN — HYDRALAZINE HYDROCHLORIDE 20 MG: 20 INJECTION INTRAMUSCULAR; INTRAVENOUS at 20:19

## 2021-05-14 RX ADMIN — ACETAMINOPHEN 1000 MG: 500 TABLET, FILM COATED ORAL at 17:23

## 2021-05-14 RX ADMIN — HEPARIN SODIUM 5000 UNITS: 5000 INJECTION INTRAVENOUS; SUBCUTANEOUS at 23:56

## 2021-05-14 RX ADMIN — NITROGLYCERIN 1 INCH: 20 OINTMENT TOPICAL at 18:46

## 2021-05-14 NOTE — ED TRIAGE NOTES
Patient arrives via EMS from home with co increased SOB. Patient on 4L on arrival to ER. patient not normally on O2, original O2 90%    Patient to start diallysis in June, has recently had her bumex increased. Patient had recent travel to [facility]. Right base crackles per ems.

## 2021-05-14 NOTE — H&P
Miguel Angel Morales juan [place] 78  [address] [facility], [place], [address] [place] [address]  [phone]    Admission History and Physical      NAME:  Fatimah Prieto   :   1940   MRN:  [id number]     PCP:  Haley Pritchett MD     Date/Time of service:  2021  6:46 PM        Subjective:     CHIEF COMPLAINT: swollen, shortness of breath     HISTORY OF PRESENT ILLNESS:     The patient is a 80 yo hx of HTN, CVA, CKD 5, presented w/ dyspnea, hypoxia, volume overload, CHF. The patient c/o worsening dyspnea w/ exertion this week, associated with anasarca and orthopnea. She denied cough, fevers, chills, chest pain, nausea, vomiting, diarrhea. The patient's nephrologist is Dr. Linwood Horton, and he was planning to start dialysis soon. She has been taking oral bumex but noticed that urine output has decreased. In the ED, CXR with pulm edema. Pro BNP 3,700, Trop 0.17. Patient was started on bipap. Allergies   Allergen Reactions    Latex Rash    Codeine Other (comments)    Levaquin [Levofloxacin] Other (comments)     Hallucinations    Lisinopril Cough    Sulfa (Sulfonamide Antibiotics) Rash and Itching       Prior to Admission medications    Medication Sig Start Date End Date Taking? Authorizing Provider   sodium bicarbonate 650 mg tablet Take 650 mg by mouth two (2) times a day. Provider, Historical   levETIRAcetam (KEPPRA) 250 mg tablet Take 1 Tab by mouth two (2) times a day for 90 days. Anti-seizure medication. 3/10/21 6/8/21  Kandi Cabot, MD   hydrALAZINE (APRESOLINE) 10 mg tablet Take  by mouth. Provider, Historical   bumetanide (BUMEX) 1 mg tablet Take  by mouth daily. Provider, Historical   amLODIPine (NORVASC) 10 mg tablet Take 1 Tab by mouth daily. 19   Birdie Ortiz MD   carvedilol (COREG) 6.25 mg tablet Take 1 Tab by mouth two (2) times daily (with meals).  19   Birdie Ortiz MD   ondansetron (ZOFRAN ODT) 8 mg disintegrating tablet Take 0.5 Tabs by mouth every eight (8) hours as needed for Nausea. 19   Philippe Lieberman, NP   acetaminophen (TYLENOL EXTRA STRENGTH) 500 mg tablet Take 1-2 Tabs by mouth every six (6) hours as needed for Pain. Not to exceed 4,000mg in any 24 hour period  Indications: pain 19   Philippe Lieberman, NP   aspirin delayed-release 81 mg tablet Take 1 Tab by mouth two (2) times a day. Get otc  Patient taking differently: Take 81 mg by mouth daily. Get otc 19   Philippe Lieberman NP   terazosin (HYTRIN) 5 mg capsule Take 5 mg by mouth nightly. Provider, Historical   montelukast (SINGULAIR) 10 mg tablet Take 10 mg by mouth daily. Provider, Historical   fluticasone (FLONASE ALLERGY RELIEF) 50 mcg/actuation nasal spray 1 [place] by Both Nostrils route nightly. Provider, Historical   Carboxymethylcellulose-Glycern (REFRESH OPTIVE) 0.5-0.9 % drop Administer 1 Drop to both eyes two (2) times daily as needed for Other (dry eyes). Provider, Historical   chlorthalidone (HYGROTEN) 25 mg tablet Take 25 mg by mouth daily. Provider, Historical   pravastatin (PRAVACHOL) 40 mg tablet Take 40 mg by mouth nightly. Provider, Historical   DOCOSAHEXANOIC ACID/EPA (FISH OIL PO) Take 1 Cap by mouth two (2) times a day.     Provider, Historical       Past Medical History:   Diagnosis Date    Anxiety     Arthritis     Basal ganglia stroke ([facility] [address] 2019    Left    CKD (chronic kidney disease) stage 3, GFR 30-59 ml/min ([facility])     High cholesterol     Hx of seasonal allergies     Hypertension     Ischemic stroke ([facility] [address]     Right occipital    Monoclonal gammopathy 2018    ABIGAIL on CPAP     Seizures ([facility] [address]     last keppra level- 2019 @ 15   Last seizure 2017    Urinary incontinence         Past Surgical History:   Procedure Laterality Date    HX APPENDECTOMY N/A     HX CATARACT REMOVAL Bilateral 2018    HX  SECTION N/A     x 2    HX HEMORRHOIDECTOMY      HX HYSTERECTOMY      HX KNEE REPLACEMENT Right 2019    HX TONSILLECTOMY         Social History     Tobacco Use    Smoking status: Never Smoker    Smokeless tobacco: Never Used   Substance Use Topics    Alcohol use: No        Family History   Problem Relation Age of Onset    Hypertension Mother     Cancer Sister         Skin    Diabetes Sister     Hypertension Sister     Stroke Father     Parkinson's Disease Father     Cancer Sister         Skin    Cancer Sister     No Known Problems Brother         Review of Systems:  (bold if positive, if negative)    Gen:  Eyes:  ENT:  CVS:  , edemaPulm:   dyspnea,GI:  :  MS:  Skin:  Psych:  Endo:  Hem:  Renal:  Neuro:          Objective:      VITALS:    Vital signs reviewed; most recent are:    Visit Vitals  BP (!) 198/62   Pulse 78   Temp 97.4 °F (36.3 °C)   Resp 24   Ht 5' 1\" (1.549 m)   Wt 92.1 kg (203 lb)   SpO2 98%   BMI 38.36 kg/m²     SpO2 Readings from Last 6 Encounters:   05/14/21 98%   03/10/21 99%   02/19/21 95%   01/30/20 97%   08/28/19 98%   08/07/19 98%    O2 Flow Rate (L/min): 2 l/min   No intake or output data in the 24 hours ending 05/14/21 1846     Exam:     Physical Exam:    Gen:  Elderly, ill-appearing, morbidly obese, mild distress, on bipap  HEENT:  Pink conjunctivae, PERRL, hearing intact to voice  Neck:  Supple, without masses, thyroid non-tender  Resp:  No accessory muscle use, bilateral rales  Card:  No murmurs, normal S1, S2 without thrills, 3+ edema  Abd:  Soft, non-tender, non-distended, normoactive bowel sounds are present  Lymph:  No cervical adenopathy  Musc:  No cyanosis or clubbing  Skin:  No rashes   Neuro:  Cranial nerves 3-12 are grossly intact, follows commands appropriately  Psych:  Alert with good insight.   Oriented to person, place, and time    Labs:    Recent Labs     05/14/21  1503   WBC 9.1   HGB 8.6*   HCT 28.3*        Recent Labs     05/14/21  1503      K 3.9   *   CO2 24   *   BUN 83*   CREA 5.22*   CA 8.4*   ALB 3.1*   TBILI 0.6   ALT 23     Lab Results   Component Value Date/Time    Glucose (POC) 109 (H) 02/20/2019 10:13 PM    Glucose (POC) 114 (H) 02/20/2019 11:34 AM     Recent Labs     05/14/21  1624   PH 7.38   PCO2 35   PO2 84   HCO3 20*     No results for input(s): INR, INREXT in the last 72 hours. Radiology and EKG reviewed:   CXR reviewed    **Old Records reviewed in [facility]**       Assessment/Plan:       Principal Problem:    80 yo hx of HTN, CVA, CKD 5, presented w/ dyspnea, hypoxia, volume overload, CHF    1) Acute respiratory failure with hypoxia: due to pulm edema, volume overload, CHF, renal failure. Will cont bipap, wean as tolerated    2) Diastolic CHF, acute on chronic: due to worsening renal failure. Monitor on Tele. Check echo. Will start IV bumex. Will likely need dialysis. Consult Cards, Renal    3) Elevated Trop: likely due to demand ischemia from volume overload, renal failure. Unlikely ACS. Will monitor Trop. Cont ASA, BB, statin. Consult Cards    4) Malignant HTN urgency: will start nitro paste, IV bumex. Use IV hydralazine, IV labetalol prn. Low threshold to start nitro gtt    5) SHARON/CKD 5: approached ESRD. Will need dialysis. Defer to Renal    6) Hx of CVA: cont ASA, statin    7) Seizure d/o: cont keppra    8) Anemia: due to CKD. Check iron panel.   Monitor     Code: Full     Risk of deterioration: high      Total time spent with patient: 79 Minutes **I personally saw and examined the patient during this time period**                 Care Plan discussed with: Patient, nursing, sister     Discussed:  Care Plan    Prophylaxis:  Hep SQ    Probable Disposition:  SNF/LTC           ___________________________________________________    Attending Physician: Vee Marshall MD

## 2021-05-14 NOTE — ED NOTES
Bedside and Verbal shift change report given to [address] [place] [address] (oncoming nurse) by Nora Casas (offgoing nurse). Report included the following information SBAR, Kardex, MAR and Recent Results.

## 2021-05-14 NOTE — Clinical Note
Patient lying comfortably on stretcher. Waiting for Dr Ramesh Cables call to put patient on procedure table.

## 2021-05-14 NOTE — ED PROVIDER NOTES
Pt is a 78 yo with hx of anxiety, arthritis, CVA, CKD, HLD, HTN, ABIGAIL on CPAP, seizures who presents with progressively worsening SOB. Pt reports she has stage 5 CKD and managed by Dr Taras Riley, nephrology. Pt reports she has a fistula and is scheduled to start dialysis in . Pt notes she takes bumex and LE swelling has worsened. No home oxygen and denies CP/palpatations. Son at bedside reports initially CARRANZA but now SOB at rest too.             Past Medical History:   Diagnosis Date    Anxiety     Arthritis     Basal ganglia stroke ([facility] [address] 2019    Left    CKD (chronic kidney disease) stage 3, GFR 30-59 ml/min ([facility])     High cholesterol     Hx of seasonal allergies     Hypertension     Ischemic stroke ([facility] [address]     Right occipital    Monoclonal gammopathy 2018    ABIGAIL on CPAP     Seizures ([facility] [address]     last keppra level- 2019 @ 15   Last seizure 2017    Urinary incontinence        Past Surgical History:   Procedure Laterality Date    HX APPENDECTOMY N/A     HX CATARACT REMOVAL Bilateral 2018    HX  SECTION N/A     x 2    HX HEMORRHOIDECTOMY      HX HYSTERECTOMY      HX KNEE REPLACEMENT Right 2019    HX TONSILLECTOMY           Family History:   Problem Relation Age of Onset    Hypertension Mother     Cancer Sister         Skin    Diabetes Sister     Hypertension Sister     Stroke Father     Parkinson's Disease Father     Cancer Sister         Skin    Cancer Sister     No Known Problems Brother        Social History     Socioeconomic History    Marital status:      Spouse name: Not on file    Number of children: Not on file    Years of education: Not on file    Highest education level: Not on file   Occupational History    Not on file   Social Needs    Financial resource strain: Not on file    Food insecurity     Worry: Not on file     Inability: Not on file    Transportation needs     Medical: Not on file     Non-medical: Not on file   Tobacco Use    Smoking status: Never Smoker    Smokeless tobacco: Never Used   Substance and Sexual Activity    Alcohol use: No    Drug use: No    Sexual activity: Not on file   Lifestyle    Physical activity     Days per week: Not on file     Minutes per session: Not on file    Stress: Not on file   Relationships    Social connections     Talks on phone: Not on file     Gets together: Not on file     Attends Restoration service: Not on file     Active member of club or organization: Not on file     Attends meetings of clubs or organizations: Not on file     Relationship status: Not on file    Intimate partner violence     Fear of current or ex partner: Not on file     Emotionally abused: Not on file     Physically abused: Not on file     Forced sexual activity: Not on file   Other Topics Concern    Not on file   Social History Narrative    Not on file         ALLERGIES: Latex, Codeine, Levaquin [levofloxacin], Lisinopril, and Sulfa (sulfonamide antibiotics)    Review of Systems   Constitutional: Positive for fatigue. Negative for chills and fever. HENT: Negative for drooling and nosebleeds. Eyes: Negative for pain and itching. Respiratory: Positive for shortness of breath. Negative for choking and stridor. Cardiovascular: Negative for leg swelling. Gastrointestinal: Negative for abdominal distention and rectal pain. Endocrine: Negative for heat intolerance and polyphagia. Genitourinary: Negative for enuresis and genital sores. Musculoskeletal: Negative for arthralgias and joint swelling. Skin: Negative for color change. Allergic/Immunologic: Negative for immunocompromised state. Neurological: Negative for tremors and speech difficulty. Hematological: Negative for adenopathy. Psychiatric/Behavioral: Negative for dysphoric mood and sleep disturbance.        Vitals:    05/14/21 1452 05/14/21 1454 05/14/21 1458   BP: (!) 217/63     Pulse: 91     Resp: 26     Temp: 97.4 °F (36.3 °C)     SpO2: 91% 95% 98%   Weight: 92.1 kg (203 lb)     Height: 5' 1\" (1.549 m)              Physical Exam  Vitals signs and nursing note reviewed. Constitutional:       Appearance: She is well-developed. She is ill-appearing. She is not toxic-appearing or diaphoretic. HENT:      Head: Normocephalic and atraumatic. Nose: Nose normal.   Eyes:      Conjunctiva/sclera: Conjunctivae normal.   Neck:      Musculoskeletal: Normal range of motion and neck supple. Cardiovascular:      Rate and Rhythm: Normal rate and regular rhythm. Heart sounds: Normal heart sounds. Comments: Fistula left AC  Pulmonary:      Effort: No respiratory distress. Breath sounds: Rales present. Comments: Mild increased WOB  Abdominal:      General: There is no distension. Palpations: Abdomen is soft. Musculoskeletal: Normal range of motion. General: No deformity. Right lower leg: Edema present. Left lower leg: Edema present. Skin:     General: Skin is warm and dry. Neurological:      Mental Status: She is alert and oriented to person, place, and time. Coordination: Coordination normal.   Psychiatric:         Behavior: Behavior normal.          MDM  Number of Diagnoses or Management Options  Acute pulmonary edema (HCC)  SHARON (acute kidney injury) ([facility] [address]  Elevated brain natriuretic peptide (BNP) level  SOB (shortness of breath)  Diagnosis management comments: Critical care time: 75 mins       Amount and/or Complexity of Data Reviewed  Decide to obtain previous medical records or to obtain history from someone other than the patient: yes      ED Course as of May 14 1821   Fri May 14, 2021   1618 Pt with mild increased WOB, speaking in short sentences. Says she feels uncomfortable, and oxygen only helped a little bit. Will attempt BIPAP trial. Son at bedside who is leaving but both agreeable with plan. [AL]   5972 Pt feels improved on BIPAP. BP improved too. Will admit for further management.      [AL]      ED Course User Index  [AL] Anam Isidro MD       Procedures    Perfect Serve Consult for Admission  6:21 PM    ED Room Number: ER03/03  Patient Name and age:  Elisha Fairchild 79 y.o.  female  Working Diagnosis:   1. SOB (shortness of breath)    2. Acute pulmonary edema (HCC)    3. SHARON (acute kidney injury) ([facility] [address]    4. Elevated brain natriuretic peptide (BNP) level        COVID-19 Suspicion:  no  Sepsis present:  no  Reassessment needed: no  Code Status:  Full Code  Readmission: no  Isolation Requirements:  no  Recommended Level of Care:  step down  Department:[facility] ED - [phone]  Other:  First time BIPAP for volume overload. Pulmonary edema and about to start dialysis next month. Lasix given and symptoms improved on BIPAP. Patient is being admitted to the hospital.  The results of their tests and reasons for their admission have been discussed with them and/or available family. They convey agreement and understanding for the need to be admitted and for their admission diagnosis.

## 2021-05-14 NOTE — ACP (ADVANCE CARE PLANNING)
Advance Care Planning     Advance Care Planning (ACP) Physician/NP/PA Conversation      Date of Conversation: 5/14/2021   Diagnosis: acute chronic diastolic CHF    Conducted with: Patient with Decision Making Capacity    Healthcare Decision Maker: Tien Lyons     Click here to complete [address] including selection of the Healthcare Decision Maker Relationship (ie \"Primary\")      Care Preferences:    Hospitalization: \"If your health worsens and it becomes clear that your chance of recovery is unlikely, what would be your preference regarding hospitalization? \"  Has not decided    Ventilation: \"If you were unable to breathe on your own and your chance of recovery was unlikely, what would be your preference about the use of a ventilator (breathing machine) if it was available to you? \"   Full Code    Resuscitation: \"In the event your heart stopped as a result of an underlying serious health condition, would you want attempts to be made to restart your heart, or would you prefer a natural death? \"   Full code, but will think about it     Conversation Outcomes / Follow-Up Plan:   Discussed with patient and sister regarding current medical issues, prognosis, and treatment. Patient confirmed advance directive. Tien Lyons, is POA. Wants to remain Full Code for now, but will think about it. Patient is interested in starting dialysis. Goal is to return home (lives alone).       Length of Voluntary ACP Conversation in minutes:  16 minutes    Mirna Womack MD

## 2021-05-15 LAB
ALBUMIN SERPL-MCNC: 2.8 G/DL (ref 3.5–5)
ALBUMIN/GLOB SERPL: 0.8 {RATIO} (ref 1.1–2.2)
ALP SERPL-CCNC: 79 U/L (ref 45–117)
ALT SERPL-CCNC: 20 U/L (ref 12–78)
ANION GAP SERPL CALC-SCNC: 9 MMOL/L (ref 5–15)
AST SERPL-CCNC: 17 U/L (ref 15–37)
BILIRUB DIRECT SERPL-MCNC: 0.2 MG/DL (ref 0–0.2)
BILIRUB SERPL-MCNC: 0.6 MG/DL (ref 0.2–1)
BUN SERPL-MCNC: 83 MG/DL (ref 6–20)
BUN/CREAT SERPL: 15 (ref 12–20)
CALCIUM SERPL-MCNC: 8.1 MG/DL (ref 8.5–10.1)
CHLORIDE SERPL-SCNC: 110 MMOL/L (ref 97–108)
CHOLEST SERPL-MCNC: 128 MG/DL
CO2 SERPL-SCNC: 24 MMOL/L (ref 21–32)
CREAT SERPL-MCNC: 5.41 MG/DL (ref 0.55–1.02)
ERYTHROCYTE [DISTWIDTH] IN BLOOD BY AUTOMATED COUNT: 13.1 % (ref 11.5–14.5)
EST. AVERAGE GLUCOSE BLD GHB EST-MCNC: 111 MG/DL
FERRITIN SERPL-MCNC: 110 NG/ML (ref 8–252)
GLOBULIN SER CALC-MCNC: 3.7 G/DL (ref 2–4)
GLUCOSE SERPL-MCNC: 96 MG/DL (ref 65–100)
HBA1C MFR BLD: 5.5 % (ref 4–5.6)
HCT VFR BLD AUTO: 24.8 % (ref 35–47)
HDLC SERPL-MCNC: 56 MG/DL
HDLC SERPL: 2.3 {RATIO} (ref 0–5)
HGB BLD-MCNC: 7.7 G/DL (ref 11.5–16)
IRON SATN MFR SERPL: 8 % (ref 20–50)
IRON SERPL-MCNC: 24 UG/DL (ref 35–150)
LDLC SERPL CALC-MCNC: 53.4 MG/DL (ref 0–100)
MAGNESIUM SERPL-MCNC: 2.6 MG/DL (ref 1.6–2.4)
MCH RBC QN AUTO: 30.1 PG (ref 26–34)
MCHC RBC AUTO-ENTMCNC: 31 G/DL (ref 30–36.5)
MCV RBC AUTO: 96.9 FL (ref 80–99)
NRBC # BLD: 0 K/UL (ref 0–0.01)
NRBC BLD-RTO: 0 PER 100 WBC
PHOSPHATE SERPL-MCNC: 7 MG/DL (ref 2.6–4.7)
PLATELET # BLD AUTO: 224 K/UL (ref 150–400)
PMV BLD AUTO: 9.5 FL (ref 8.9–12.9)
POTASSIUM SERPL-SCNC: 4 MMOL/L (ref 3.5–5.1)
PROT SERPL-MCNC: 6.5 G/DL (ref 6.4–8.2)
RBC # BLD AUTO: 2.56 M/UL (ref 3.8–5.2)
SODIUM SERPL-SCNC: 143 MMOL/L (ref 136–145)
TIBC SERPL-MCNC: 283 UG/DL (ref 250–450)
TRIGL SERPL-MCNC: 93 MG/DL (ref ?–150)
TROPONIN I SERPL-MCNC: 0.21 NG/ML
TSH SERPL DL<=0.05 MIU/L-ACNC: 2.63 UIU/ML (ref 0.36–3.74)
VLDLC SERPL CALC-MCNC: 18.6 MG/DL
WBC # BLD AUTO: 8.5 K/UL (ref 3.6–11)

## 2021-05-15 PROCEDURE — 84100 ASSAY OF PHOSPHORUS: CPT

## 2021-05-15 PROCEDURE — 77010033678 HC OXYGEN DAILY

## 2021-05-15 PROCEDURE — 74011250637 HC RX REV CODE- 250/637: Performed by: INTERNAL MEDICINE

## 2021-05-15 PROCEDURE — 83540 ASSAY OF IRON: CPT

## 2021-05-15 PROCEDURE — 83735 ASSAY OF MAGNESIUM: CPT

## 2021-05-15 PROCEDURE — 80076 HEPATIC FUNCTION PANEL: CPT

## 2021-05-15 PROCEDURE — 80048 BASIC METABOLIC PNL TOTAL CA: CPT

## 2021-05-15 PROCEDURE — 84484 ASSAY OF TROPONIN QUANT: CPT

## 2021-05-15 PROCEDURE — 65660000000 HC RM CCU STEPDOWN

## 2021-05-15 PROCEDURE — 74011250636 HC RX REV CODE- 250/636: Performed by: INTERNAL MEDICINE

## 2021-05-15 PROCEDURE — 84443 ASSAY THYROID STIM HORMONE: CPT

## 2021-05-15 PROCEDURE — 80061 LIPID PANEL: CPT

## 2021-05-15 PROCEDURE — 97163 PT EVAL HIGH COMPLEX 45 MIN: CPT

## 2021-05-15 PROCEDURE — 74011000250 HC RX REV CODE- 250: Performed by: INTERNAL MEDICINE

## 2021-05-15 PROCEDURE — 83036 HEMOGLOBIN GLYCOSYLATED A1C: CPT

## 2021-05-15 PROCEDURE — 82728 ASSAY OF FERRITIN: CPT

## 2021-05-15 PROCEDURE — 36415 COLL VENOUS BLD VENIPUNCTURE: CPT

## 2021-05-15 PROCEDURE — 85027 COMPLETE CBC AUTOMATED: CPT

## 2021-05-15 PROCEDURE — 99223 1ST HOSP IP/OBS HIGH 75: CPT | Performed by: INTERNAL MEDICINE

## 2021-05-15 PROCEDURE — 97530 THERAPEUTIC ACTIVITIES: CPT

## 2021-05-15 RX ORDER — CALCIUM CARBONATE 200(500)MG
200 TABLET,CHEWABLE ORAL 2 TIMES DAILY WITH MEALS
Status: COMPLETED | OUTPATIENT
Start: 2021-05-15 | End: 2021-05-16

## 2021-05-15 RX ORDER — BUMETANIDE 0.25 MG/ML
3 INJECTION INTRAMUSCULAR; INTRAVENOUS 2 TIMES DAILY
Status: DISCONTINUED | OUTPATIENT
Start: 2021-05-15 | End: 2021-05-15

## 2021-05-15 RX ORDER — SEVELAMER CARBONATE 800 MG/1
800 TABLET, FILM COATED ORAL
Status: DISCONTINUED | OUTPATIENT
Start: 2021-05-15 | End: 2021-05-24 | Stop reason: HOSPADM

## 2021-05-15 RX ORDER — SODIUM BICARBONATE 650 MG/1
650 TABLET ORAL 2 TIMES DAILY
Status: DISCONTINUED | OUTPATIENT
Start: 2021-05-16 | End: 2021-05-16

## 2021-05-15 RX ORDER — METOLAZONE 5 MG/1
5 TABLET ORAL ONCE
Status: COMPLETED | OUTPATIENT
Start: 2021-05-15 | End: 2021-05-15

## 2021-05-15 RX ORDER — BUMETANIDE 0.25 MG/ML
3 INJECTION INTRAMUSCULAR; INTRAVENOUS 3 TIMES DAILY
Status: DISCONTINUED | OUTPATIENT
Start: 2021-05-15 | End: 2021-05-19

## 2021-05-15 RX ORDER — SEVELAMER HYDROCHLORIDE 800 MG/1
800 TABLET, FILM COATED ORAL
Status: DISCONTINUED | OUTPATIENT
Start: 2021-05-15 | End: 2021-05-15 | Stop reason: CLARIF

## 2021-05-15 RX ORDER — HYDRALAZINE HYDROCHLORIDE 25 MG/1
100 TABLET, FILM COATED ORAL 2 TIMES DAILY
Status: DISCONTINUED | OUTPATIENT
Start: 2021-05-15 | End: 2021-05-23

## 2021-05-15 RX ADMIN — Medication 10 ML: at 21:26

## 2021-05-15 RX ADMIN — Medication 10 ML: at 15:13

## 2021-05-15 RX ADMIN — HEPARIN SODIUM 5000 UNITS: 5000 INJECTION INTRAVENOUS; SUBCUTANEOUS at 15:13

## 2021-05-15 RX ADMIN — PRAVASTATIN SODIUM 40 MG: 20 TABLET ORAL at 21:25

## 2021-05-15 RX ADMIN — HYDRALAZINE HYDROCHLORIDE 100 MG: 25 TABLET, FILM COATED ORAL at 17:18

## 2021-05-15 RX ADMIN — AMLODIPINE BESYLATE 10 MG: 5 TABLET ORAL at 08:07

## 2021-05-15 RX ADMIN — CARVEDILOL 6.25 MG: 6.25 TABLET, FILM COATED ORAL at 08:08

## 2021-05-15 RX ADMIN — HEPARIN SODIUM 5000 UNITS: 5000 INJECTION INTRAVENOUS; SUBCUTANEOUS at 08:07

## 2021-05-15 RX ADMIN — ACETAMINOPHEN 650 MG: 325 TABLET ORAL at 15:32

## 2021-05-15 RX ADMIN — BUMETANIDE 2 MG: 0.25 INJECTION INTRAMUSCULAR; INTRAVENOUS at 08:07

## 2021-05-15 RX ADMIN — LEVETIRACETAM 250 MG: 250 TABLET, FILM COATED ORAL at 17:18

## 2021-05-15 RX ADMIN — LEVETIRACETAM 250 MG: 250 TABLET, FILM COATED ORAL at 08:08

## 2021-05-15 RX ADMIN — ANTACID TABLETS 200 MG: 500 TABLET, CHEWABLE ORAL at 19:41

## 2021-05-15 RX ADMIN — CARVEDILOL 6.25 MG: 6.25 TABLET, FILM COATED ORAL at 17:18

## 2021-05-15 RX ADMIN — ACETAMINOPHEN 650 MG: 325 TABLET ORAL at 20:24

## 2021-05-15 RX ADMIN — NITROGLYCERIN 1 INCH: 20 OINTMENT TOPICAL at 17:18

## 2021-05-15 RX ADMIN — ASPIRIN 81 MG: 81 TABLET, COATED ORAL at 08:05

## 2021-05-15 RX ADMIN — ONDANSETRON 4 MG: 2 INJECTION INTRAMUSCULAR; INTRAVENOUS at 20:24

## 2021-05-15 RX ADMIN — LABETALOL HYDROCHLORIDE 20 MG: 5 INJECTION, SOLUTION INTRAVENOUS at 05:13

## 2021-05-15 RX ADMIN — METOLAZONE 5 MG: 5 TABLET ORAL at 15:32

## 2021-05-15 RX ADMIN — MONTELUKAST 10 MG: 10 TABLET, FILM COATED ORAL at 08:06

## 2021-05-15 RX ADMIN — HYDRALAZINE HYDROCHLORIDE 100 MG: 25 TABLET, FILM COATED ORAL at 08:14

## 2021-05-15 RX ADMIN — NITROGLYCERIN 1 INCH: 20 OINTMENT TOPICAL at 08:07

## 2021-05-15 RX ADMIN — EPOETIN ALFA-EPBX 20000 UNITS: 10000 INJECTION, SOLUTION INTRAVENOUS; SUBCUTANEOUS at 21:25

## 2021-05-15 RX ADMIN — Medication 10 ML: at 05:13

## 2021-05-15 RX ADMIN — MINOXIDIL 2.5 MG: 2.5 TABLET ORAL at 08:07

## 2021-05-15 RX ADMIN — BUMETANIDE 3 MG: 0.25 INJECTION INTRAMUSCULAR; INTRAVENOUS at 21:26

## 2021-05-15 RX ADMIN — SEVELAMER CARBONATE 800 MG: 800 TABLET, FILM COATED ORAL at 17:18

## 2021-05-15 RX ADMIN — SODIUM BICARBONATE 650 MG: 650 TABLET ORAL at 08:08

## 2021-05-15 RX ADMIN — BUMETANIDE 3 MG: 0.25 INJECTION INTRAMUSCULAR; INTRAVENOUS at 15:15

## 2021-05-15 RX ADMIN — DOCUSATE SODIUM 50MG AND SENNOSIDES 8.6MG 1 TABLET: 8.6; 5 TABLET, FILM COATED ORAL at 08:06

## 2021-05-15 NOTE — PROGRESS NOTES
Orders received, chart reviewed and patient evaluated by physical therapy. Recommend discharge to SNF for rehab if discharged at current functional level. Full evaluation to follow.

## 2021-05-15 NOTE — PROGRESS NOTES
Miguel Angel Morales juan [place] 78  [address] [facility], [address] [place] [address]  [phone]      Medical Progress Note      NAME: Opal Arriaza   :  1940  MRM:  [id number]    Date/Time: 5/15/2021        Assessment / Plan:      78 yo F w/ hx of HTN, CVA, CKD 5 presenting w/ dyspnea, found to have AHRF 2/2 ADHF     Acute respiratory failure with hypoxia: 2/2 ADHF/pulm edema, volume overload from renal failure. Off BiPAP. Titrate supplemental O2 PRN     Diastolic CHF, acute on chronic: due to worsening renal failure / volume overload. Cont IV diuretics; will likely need dialysis soon. Appreciate cardiology and nephrology input, noted increase in Bumex dosing, coupled with metolazone. TTE pending     Elevated troponin: type 2 MI / demand ischemia from volume overload, renal failure. NOT ACS. Cont ASA, BB, statin. Seen by cardiology     Malignant HTN urgency: better, but BP still high. Continue nitropaste, Coreg, Norvasc. Increase hydralazine.      SHARON/CKD 5: approaching ESRD and likely need for RRT soon. Nephrology to see     Hx of CVA: cont ASA, statin. Needs better BP control    Seizure d/o: Cont keppra     Anemia: likely ACD, CKD however iron panel and ferritin equivocal. Send FOBT     Morbid obesity: Body mass index is 38.36 kg/m². Total time spent: 35 minutes  Time spent in the care of this patient including reviewing records, discussing with nursing and/or other providers on the treatment team, obtaining history and examining the patient, and discussing treatment plans. Care Plan discussed with: Patient, Nursing Staff and >50% of time spent in counseling and coordination of care    Discussed:  Care Plan and D/C Planning    Prophylaxis:  Hep SQ    Disposition:  HH PT, OT, RN         Subjective:     Chief Complaint:  Follow up SOB    Chart/notes/labs/studies reviewed, patient examined. Feels katelin. Less SOB. No CP.  No fevers          Objective:       Vitals:        Last 24hrs VS reviewed since prior progress note. Most recent are:    Visit Vitals  BP (!) 151/47 (BP 1 Location: Right arm, BP Patient Position: At rest)   Pulse 89   Temp 98.1 °F (36.7 °C)   Resp 26   Ht 5' 1\" (1.549 m)   Wt 92.1 kg (203 lb)   SpO2 92%   BMI 38.36 kg/m²     SpO2 Readings from Last 6 Encounters:   05/15/21 92%   03/10/21 99%   02/19/21 95%   01/30/20 97%   08/28/19 98%   08/07/19 98%    O2 Flow Rate (L/min): 4 l/min       Intake/Output Summary (Last 24 hours) at 5/15/2021 1526  Last data filed at 5/15/2021 0910  Gross per 24 hour   Intake 270 ml   Output 250 ml   Net 20 ml          Exam:     Physical Exam:    Gen:  Obese. Elderly, chronically ill-appearing  HEENT:  Atraumatic, normocephalic. Sclerae nonicteric, hearing intact to voice  Neck:  Supple, without apparent masses. Resp:  No accessory muscle use, bibasilar rales without wheezes, or rhonchi  Card: RRR, without m/r/g. 2+ BLE edema  Abd:  +bowel sounds, soft, NTTP, nondistended. No HSM  Neuro: Face symmetric, speech fluent, follows commands appropriately, no focal weakness or numbness  Psych:  Alert, oriented x 3.  Good insight     Medications Reviewed: (see below)    Lab Data Reviewed: (see below)    ______________________________________________________________________    Medications:     Current Facility-Administered Medications   Medication Dose Route Frequency    hydrALAZINE (APRESOLINE) tablet 100 mg  100 mg Oral BID    metOLazone (ZAROXOLYN) tablet 5 mg  5 mg Oral ONCE    bumetanide (BUMEX) injection 3 mg  3 mg IntraVENous TID    hydrALAZINE (APRESOLINE) 20 mg/mL injection 20 mg  20 mg IntraVENous Q6H PRN    nitroglycerin (NITROBID) 2 % ointment 1 Inch  1 Inch Topical BID    labetaloL (NORMODYNE;TRANDATE) 20 mg/4 mL (5 mg/mL) injection 20 mg  20 mg IntraVENous Q4H PRN    amLODIPine (NORVASC) tablet 10 mg  10 mg Oral DAILY    aspirin delayed-release tablet 81 mg  81 mg Oral DAILY    carvediloL (COREG) tablet 6.25 mg  6.25 mg Oral BID WITH MEALS    levETIRAcetam (KEPPRA) tablet 250 mg  250 mg Oral BID    montelukast (SINGULAIR) tablet 10 mg  10 mg Oral DAILY    pravastatin (PRAVACHOL) tablet 40 mg  40 mg Oral QHS    sodium chloride (NS) flush 5-40 mL  5-40 mL IntraVENous Q8H    sodium chloride (NS) flush 5-40 mL  5-40 mL IntraVENous PRN    0.9% sodium chloride infusion 25 mL  25 mL IntraVENous PRN    acetaminophen (TYLENOL) tablet 650 mg  650 mg Oral Q6H PRN    Or    acetaminophen (TYLENOL) suppository 650 mg  650 mg Rectal Q6H PRN    senna-docusate (PERICOLACE) 8.6-50 mg per tablet 1 Tab  1 Tab Oral BID    bisacodyL (DULCOLAX) suppository 10 mg  10 mg Rectal DAILY PRN    promethazine (PHENERGAN) tablet 12.5 mg  12.5 mg Oral Q6H PRN    Or    ondansetron (ZOFRAN) injection 4 mg  4 mg IntraVENous Q6H PRN    heparin (porcine) injection 5,000 Units  5,000 Units SubCUTAneous Q8H            Lab Review:     Recent Labs     05/15/21  0352 05/14/21  1503   WBC 8.5 9.1   HGB 7.7* 8.6*   HCT 24.8* 28.3*    266     Recent Labs     05/15/21  0352 05/14/21  1503    143   K 4.0 3.9   * 109*   CO2 24 24   GLU 96 113*   BUN 83* 83*   CREA 5.41* 5.22*   CA 8.1* 8.4*   MG 2.6*  --    PHOS 7.0*  --    ALB 2.8* 3.1*   ALT 20 23     No components found for: GLPOC  Recent Labs     05/14/21  1624   PH 7.38   PCO2 35   PO2 84   HCO3 20*     No results for input(s): INR, INREXT in the last 72 hours. No results found for: SDES  Lab Results   Component Value Date/Time    Culture result: MRSA NOT PRESENT 08/07/2019 03:57 PM    Culture result:  08/07/2019 03:57 PM         Screening of patient nares for MRSA is for surveillance purposes and, if positive, to facilitate isolation considerations in high risk settings. It is not intended for automatic decolonization interventions per se as regimens are not sufficiently effective to warrant routine use.     Culture result: KLEBSIELLA PNEUMONIAE (A) 08/07/2019 03:57 PM ___________________________________________________    Attending Physician: Teri Schaumann, MD

## 2021-05-15 NOTE — ROUTINE PROCESS
Bedside shift change report given to Hiral (oncoming nurse) by Sylvia Gomez (offgoing nurse). Report included the following information SBAR, Kardex, ED Summary, Intake/Output, MAR, Accordion, Recent Results, Med Rec Status and Cardiac Rhythm NSR.

## 2021-05-15 NOTE — ED NOTES
Pt scheduled to take PO meds, Dr. Janeth Woodard made aware of pt's on BiPAP and NPO at this time. Ok to give PO meds per Dr. Janeth Woodard.

## 2021-05-15 NOTE — PROGRESS NOTES
[facility]: MED RECONCILIATION      Information obtained from: Patient in ER 3    Allergies: Latex, Codeine, Levaquin [levofloxacin], Lisinopril, and Sulfa (sulfonamide antibiotics)      Comments:   Patient provided written medication list. Last updated about a week ago per patient. Bumex: Dose was increased from 1 mg to 2 mg about 2 days prior to this admission. Hydralazine: Dose is increased from 25 mg to 50 mg.    Prior to Admission Medications:     Medication Documentation Review Audit       Reviewed by Cassdiy Conn PHARMD (Pharmacist) on 05/14/21 at [address] Provider Last Dose Status Taking?   acetaminophen (TylenoL) 325 mg tablet Take  by mouth as needed for Pain. Provider, Historical  Active Yes   amLODIPine (NORVASC) 10 mg tablet Take 10 mg by mouth every evening. Provider, Historical  Active Yes   aspirin delayed-release 81 mg tablet Take 81 mg by mouth daily. Provider, Historical  Active Yes   bumetanide (BUMEX) 2 mg tablet Take 2 mg by mouth daily. Provider, Historical  Active Yes   calcium acetate,phosphat bind, (PHOSLO) 667 mg cap Take 1 Cap by mouth three (3) times daily (with meals). Provider, Historical  Active Yes   carvedilol (COREG) 6.25 mg tablet Take 1 Tab by mouth two (2) times daily (with meals). Birdie Ortiz MD  Active    fluticasone propionate (Flonase Allergy Relief) 50 mcg/actuation nasal spray 2 Sprays by Both Nostrils route every evening. Provider, Historical  Active Yes   hydrALAZINE (APRESOLINE) 50 mg tablet Take 50 mg by mouth two (2) times a day. Provider, Historical  Active Yes   levETIRAcetam (KEPPRA) 250 mg tablet Take 1 Tab by mouth two (2) times a day for 90 days. Anti-seizure medication. Kandi Cabot, MD 5/14/2021 AM Active Yes   minoxidiL (LONITEN) 2.5 mg tablet Take 2.5 mg by mouth two (2) times a day. Provider, Historical  Active Yes   montelukast (Singulair) 10 mg tablet Take 10 mg by mouth every evening.  Provider, Historical Active Yes   omega-3 acid ethyl esters (LOVAZA) 1 gram capsule Take 1 g by mouth two (2) times a day. Provider, Historical  Active Yes   polyvinyl alcohol-povidon,PF, (REFRESH CLASSIC) 1.4-0.6 % ophthalmic solution Administer 1-2 Drops to both eyes as needed. Provider, Historical  Active Yes   pravastatin (PRAVACHOL) 40 mg tablet Take 40 mg by mouth nightly. Provider, Historical  Active    sodium bicarbonate 650 mg tablet Take 650 mg by mouth two (2) times a day.  Provider, Historical  Active Yes                      [facility]   Contact: [phone]

## 2021-05-15 NOTE — PROGRESS NOTES
This patient was assisted with Intentional Toileting every 2 hours during this shift. Documentation of ambulation and output reflected on Flowsheet.      LM

## 2021-05-15 NOTE — PROGRESS NOTES
Reason for Admission: acute hypoxic respiratory failure, SHARON. Hx ESRD with fistula placement one month ago, HTN, CVA, CKD5, CHF, seizures, sleep apnea on cpap. RUR Score:    18%/ low risk                 Plan for utilizing home health:  Unsure at this time, prior use of home health services but unable to remember agency. DME - cpap, walker, rollator, BSC, shower chair, wheelchair. PCP: First and Last name:  Jovani Oreilly MD   Name of Practice:    Are you a current patient: Yes/No:  yes   Approximate date of last visit:   3 months ago   Can you participate in a virtual visit with your PCP: no                    Current Advanced Directive/Advance Care Plan: Full Code    Healthcare Decision Maker:   Click here to complete [address] including selection of the [address] Relationship (ie \"Primary\")           Son Jimmy Vaughn  [phone]                  Transition of Care Plan:      Chart reviewed, demographics verified. CM role and follow up discussed. Met with patient at bedside, face mask and goggles on. Patient lives alone. Patient lives in a one story home with 0 steps to enter home. Patient has prescription drug coverage, uses [address]. Patient is independent and provides self care. Does not drive but her son and sister assist with transportation needs. Current status:  Patient currently requiring medical management including bumex IV, oxygen at 4L/nc. PLAN:  1. Monitor patient response to treatment and recommendations. 2. Medical management continues. 3. Patient is appropriate for home health skilled nursing placement, await PT/OT evaluations. 4. Patient transport home per son at discharge. 5. CM to monitor clinical progress and disposition recommendations.      Care Management Interventions  PCP Verified by CM: Yes(Dr. Anabel Mandujano)  Transition of Care Consult (CM Consult): Discharge Planning  Physical Therapy Consult: Yes  Occupational Therapy Consult: Yes  Current Support Network: Family Lives Nearby, Lives Alone  Confirm Follow Up Transport: Family  The Plan for Transition of Care is Related to the Following Treatment Goals : return home at [facility]  Discharge Location  Discharge Placement: Home with family assistance    Caitlin Claudio RN, MSN, Care manager

## 2021-05-15 NOTE — PROGRESS NOTES
TRANSFER - IN REPORT:    Verbal report received from Ei Ei RN(name) on Tomas Guzman  being received from ED(unit) for routine progression of care      Report consisted of patients Situation, Background, Assessment and   Recommendations(SBAR). Information from the following report(s) SBAR, Kardex, ED Summary, Intake/Output, MAR, Recent Results and Cardiac Rhythm NSR was reviewed with the receiving nurse. Opportunity for questions and clarification was provided. Assessment completed upon patients arrival to unit and care assumed. 0000: Pt arrived on 6L NC, does not appear to be in distress, O2 97%. Will remain off of BIPAP for now. 0715: Bedside and Verbal shift change report given to [address] [place] [address] (oncoming nurse) by Mckay Ervin RN (offgoing nurse). Report included the following information SBAR, Kardex, Intake/Output, MAR and Recent Results.

## 2021-05-15 NOTE — PROGRESS NOTES
This patient was assisted with Intentional Toileting every 2 hours during this shift. Documentation of ambulation and output reflected on Flowsheet. none

## 2021-05-15 NOTE — CONSULTS
Nephrology Consult Note     [facility] [place] [address]     [facility]. [email or web address]              Phone - [phone]   Patient: Shella Phalen   YOB: 1940    Date- 5/15/2021  MRN: [id number]             REASON FOR CONSULTATION: CKD 5, fluid overload  CONSULTING PHYSICIAN: Dr Daniel Polk MD     IMPRESSION:   # SHARON on CKD5( outpt nephrologist: Dr Latanya Pickering )  - baseline Cr until March/2021: 3.2-3.5  - etiology: secondary FSGS d/t HTN  - AVF created in April 14th by Dr Puja Dubon, not ready for use yet    # Fluid overload( pulmonary edema+ LE edema)  - initially on BIPAP, now on NC, w/  improvement of resp distress    # HTN urgency on admission  - better controlled on multiple antihypertensives now    # Anemia of CKD  - worsening in Hb  - check iron profile  - check FOBT  - start EPO    # MBD  - hyperphosphatemia 7.0: start phosph binders  - check PTH     PLAN:   - AVF not mature for use yet  - increased Bumex dose and frequency and added Metolazone today  - if no significant UOP, will need karina placement for HD initiation  - all discussed with patient and RN at bedside, all questions answered  - continue current meds for BP control.   - Monitor BP closely and use holding parameters to avoid hypotension  - strict I/O's  - daily weights     Thank you for allowing us to participate in the care this patient. We will follow patient with you.     · Principal Problem:  ·   Acute respiratory failure with hypoxia (HCC) (5/14/2021)  ·   · Active Problems:  ·   Diastolic CHF, acute on chronic (HCC) (5/14/2021)  ·   ·   Volume overload (5/14/2021)  ·   ·   SHARON (acute kidney injury) ([facility] (5/14/2021)  ·   ·   Elevated troponin (5/14/2021)  ·   ·     [x] High complexity decision making was performed  [] Patient is at high-risk of decompensation with multiple organ involvement    Subjective:   HPI:  80 yo hx of HTN, CVA, CKD 5, HF, presented on 5/14 evening w/ dyspnea, volume overload,found to be in acute respiratory failure with hypoxia due to pulm edema, placed on BIPAP, now better on NC.  Also found to have  HTN urgency( SBP >200's), on multiple antihypertensives, now better controlled.    Denies N/V/AMS/tremors. Denies NSAIDs use. Review of Systems:     A 11 point review of system was performed today. Pertinent positives and negatives are mentioned in the HPI. The reminder of the ROS is negative and noncontributory. Past Medical History:   Diagnosis Date    Anxiety     Arthritis     Basal ganglia stroke ([facility] 2019    Left    CKD (chronic kidney disease) stage 3, GFR 30-59 ml/min ([facility])     High cholesterol     Hx of seasonal allergies     Hypertension     Ischemic stroke ([facility]     Right occipital    Monoclonal gammopathy 2018    ABIGAIL on CPAP     Seizures ([facility]     last keppra level- 2019 @ 15   Last seizure 2017    Urinary incontinence       Past Surgical History:   Procedure Laterality Date    HX APPENDECTOMY N/A     HX CATARACT REMOVAL Bilateral 2018    HX  SECTION N/A     x 2    HX HEMORRHOIDECTOMY      HX HYSTERECTOMY      HX KNEE REPLACEMENT Right 2019    HX TONSILLECTOMY        Prior to Admission medications    Medication Sig Start Date End Date Taking? Authorizing Provider   calcium acetate,phosphat bind, (PHOSLO) 667 mg cap Take 1 Cap by mouth three (3) times daily (with meals). Yes Provider, Historical   hydrALAZINE (APRESOLINE) 50 mg tablet Take 50 mg by mouth two (2) times a day. Yes Provider, Historical   minoxidiL (LONITEN) 2.5 mg tablet Take 2.5 mg by mouth two (2) times a day. Yes Provider, Historical   sodium bicarbonate 650 mg tablet Take 650 mg by mouth two (2) times a day. Yes Provider, Historical   omega-3 acid ethyl esters (LOVAZA) 1 gram capsule Take 1 g by mouth two (2) times a day. Yes Provider, Historical   bumetanide (BUMEX) 2 mg tablet Take 2 mg by mouth daily.    Yes Provider, Historical   aspirin delayed-release 81 mg tablet Take 81 mg by mouth daily. Yes Provider, Historical   amLODIPine (NORVASC) 10 mg tablet Take 10 mg by mouth every evening. Yes Provider, Historical   montelukast (Singulair) 10 mg tablet Take 10 mg by mouth every evening. Yes Provider, Historical   fluticasone propionate (Flonase Allergy Relief) 50 mcg/actuation nasal spray 2 Sprays by Both Nostrils route every evening. Yes Provider, Historical   acetaminophen (TylenoL) 325 mg tablet Take  by mouth as needed for Pain. Yes Provider, Historical   polyvinyl alcohol-povidon,PF, (REFRESH CLASSIC) 1.4-0.6 % ophthalmic solution Administer 1-2 Drops to both eyes as needed. Yes Provider, Historical   levETIRAcetam (KEPPRA) 250 mg tablet Take 1 Tab by mouth two (2) times a day for 90 days. Anti-seizure medication. 3/10/21 6/8/21 Yes Carmen Mcdaniel MD   carvedilol (COREG) 6.25 mg tablet Take 1 Tab by mouth two (2) times daily (with meals). 8/28/19   Sonja Spears MD   pravastatin (PRAVACHOL) 40 mg tablet Take 40 mg by mouth nightly.     Provider, Historical     Allergies   Allergen Reactions    Latex Rash    Codeine Other (comments)    Levaquin [Levofloxacin] Other (comments)     Hallucinations    Lisinopril Cough    Sulfa (Sulfonamide Antibiotics) Rash and Itching      Social History     Tobacco Use    Smoking status: Never Smoker    Smokeless tobacco: Never Used   Substance Use Topics    Alcohol use: No      Family History   Problem Relation Age of Onset    Hypertension Mother     Cancer Sister         Skin    Diabetes Sister     Hypertension Sister     Stroke Father     Parkinson's Disease Father     Cancer Sister         Skin    Cancer Sister     No Known Problems Brother         Objective:      Patient Vitals for the past 24 hrs:   Temp Pulse Resp BP SpO2   05/15/21 1400 -- 89 -- (!) 170/49 95 %   05/15/21 1357 -- 92 -- (!) 163/54 93 %   05/15/21 1350 -- -- (!) 45 -- 94 %   05/15/21 1348 -- 86 23 (!) 157/49 97 %   05/15/21 1345 -- -- (!) 45 -- --   05/15/21 1109 98.3 °F (36.8 °C) 85 23 (!) 160/55 94 %   05/15/21 0751 98.2 °F (36.8 °C) 89 24 (!) 166/53 96 %   05/15/21 0705 -- 82 -- -- --   05/15/21 0300 98.2 °F (36.8 °C) 77 18 (!) 159/53 99 %   05/15/21 0000 97.8 °F (36.6 °C) 84 18 (!) 164/55 98 %   05/14/21 2315 97.7 °F (36.5 °C) 87 20 (!) 194/62 99 %   05/14/21 2300 -- 88 23 (!) 190/52 99 %   05/14/21 2245 -- 90 26 (!) 187/54 97 %   05/14/21 2230 -- 89 26 (!) 189/57 100 %   05/14/21 2215 -- 90 24 (!) 182/52 99 %   05/14/21 2200 -- 91 28 (!) 189/58 98 %   05/14/21 2145 -- 89 25 (!) 187/53 98 %   05/14/21 2130 -- 89 24 (!) 188/54 98 %   05/14/21 2115 -- 91 26 (!) 203/57 99 %   05/14/21 2105 -- 90 -- (!) 205/51 --   05/14/21 2100 -- 89 28 (!) 205/51 99 %   05/14/21 2045 -- 88 23 (!) 192/54 97 %   05/14/21 2030 -- 86 26 (!) 197/58 98 %   05/14/21 2015 -- 83 26 (!) 206/60 100 %   05/14/21 2000 -- 83 22 (!) 207/61 100 %   05/14/21 1945 -- 84 25 (!) 198/60 98 %   05/14/21 1930 -- 81 25 (!) 198/59 98 %   05/14/21 1915 -- 82 25 (!) 196/63 100 %   05/14/21 1900 -- 82 26 (!) 205/63 99 %   05/14/21 1857 -- -- -- -- 100 %   05/14/21 1846 -- 81 -- (!) 198/60 --   05/14/21 1845 -- 82 24 (!) 198/60 100 %   05/14/21 1830 -- 78 24 (!) 198/62 98 %   05/14/21 1815 -- 78 30 (!) 185/61 100 %   05/14/21 1800 -- 75 25 (!) 187/62 99 %   05/14/21 1745 -- 72 28 (!) 178/64 100 %   05/14/21 1730 -- 70 20 (!) 182/56 99 %   05/14/21 1723 -- 89 -- (!) 216/63 --   05/14/21 1715 -- 90 29 (!) 216/63 94 %   05/14/21 1700 -- 92 30 (!) 210/62 97 %   05/14/21 1645 -- 89 24 (!) 208/63 98 %   05/14/21 1633 -- -- -- -- 100 %   05/14/21 1630 -- 90 (!) 38 (!) 202/72 97 %   05/14/21 1458 -- -- -- -- 98 %   05/14/21 1454 -- -- -- -- 95 %   05/14/21 1452 97.4 °F (36.3 °C) 91 26 (!) 217/63 91 %     05/15 0701 - 05/15 1900  In: 120 [P.O.:120]  Out: -   Physical Exam:  General:AAOx3, in NAD  HEENT: AT/NC, MMM  Neck:Supple,full ROM  Lungs: satting well on NC, no use of accessory muscles  CV:RRR, S1 S2 normal  Abdomen:Soft,NT  Extremities: moves all, 3+ pitting LE edema up to midshins  Skin:No visible rash or lesions  NEURO: grossly nonfocal  Psych: appropriate affect/mood      CODE STATUS:  full  Care Plan discussed with:  Pt, RN, Jo    Chart reviewed.    y Reviewed previous records   y Discussion with patient and/or family and questions answered       Lab Data Personally Reviewed: (see below)  Recent Labs     05/15/21  0352 05/14/21  1503   WBC 8.5 9.1   HGB 7.7* 8.6*    266   ANEU  --  6.2    143   K 4.0 3.9   GLU 96 113*   BUN 83* 83*   CREA 5.41* 5.22*   ALT 20 23   TBILI 0.6 0.6   AP 79 91   CA 8.1* 8.4*   MG 2.6*  --    PHOS 7.0*  --      Lab Results   Component Value Date/Time    Color YELLOW/STRAW 08/24/2019 05:54 PM    Appearance CLOUDY (A) 08/24/2019 05:54 PM    Specific gravity 1.009 08/24/2019 05:54 PM    Specific gravity 1.020 02/20/2019 12:54 PM    pH (UA) 5.5 08/24/2019 05:54 PM    Protein 100 (A) 08/24/2019 05:54 PM    Glucose NEGATIVE  08/24/2019 05:54 PM    Ketone NEGATIVE  08/24/2019 05:54 PM    Bilirubin NEGATIVE  08/24/2019 05:54 PM    Urobilinogen 0.2 08/24/2019 05:54 PM    Nitrites NEGATIVE  08/24/2019 05:54 PM    Leukocyte Esterase NEGATIVE  08/24/2019 05:54 PM    Epithelial cells FEW 08/24/2019 05:54 PM    Bacteria NEGATIVE  08/24/2019 05:54 PM    WBC 0-4 08/24/2019 05:54 PM    RBC 0-5 08/24/2019 05:54 PM       Lab Results   Component Value Date/Time    Iron 24 (L) 05/15/2021 03:52 AM    TIBC 283 05/15/2021 03:52 AM    Iron % saturation 8 (L) 05/15/2021 03:52 AM    Ferritin 110 05/15/2021 03:52 AM     Lab Results   Component Value Date/Time    Culture result: MRSA NOT PRESENT 08/07/2019 03:57 PM    Culture result:  08/07/2019 03:57 PM         Screening of patient nares for MRSA is for surveillance purposes and, if positive, to facilitate isolation considerations in high risk settings.  It is not intended for automatic decolonization interventions per se as regimens are not sufficiently effective to warrant routine use. Culture result: KLEBSIELLA PNEUMONIAE (A) 2019 03:57 PM     Prior to Admission Medications   Prescriptions Last Dose Informant Patient Reported? Taking?   acetaminophen (TylenoL) 325 mg tablet  Self Yes Yes   Sig: Take  by mouth as needed for Pain. amLODIPine (NORVASC) 10 mg tablet  Self Yes Yes   Sig: Take 10 mg by mouth every evening. aspirin delayed-release 81 mg tablet  Self Yes Yes   Sig: Take 81 mg by mouth daily. bumetanide (BUMEX) 2 mg tablet  Self Yes Yes   Sig: Take 2 mg by mouth daily. calcium acetate,phosphat bind, (PHOSLO) 667 mg cap  Self Yes Yes   Sig: Take 1 Cap by mouth three (3) times daily (with meals). carvedilol (COREG) 6.25 mg tablet  Self No No   Sig: Take 1 Tab by mouth two (2) times daily (with meals). fluticasone propionate (Flonase Allergy Relief) 50 mcg/actuation nasal spray  Self Yes Yes   Si Sprays by Both Nostrils route every evening. hydrALAZINE (APRESOLINE) 50 mg tablet  Self Yes Yes   Sig: Take 50 mg by mouth two (2) times a day. levETIRAcetam (KEPPRA) 250 mg tablet 2021 at AM Self No Yes   Sig: Take 1 Tab by mouth two (2) times a day for 90 days. Anti-seizure medication. minoxidiL (LONITEN) 2.5 mg tablet  Self Yes Yes   Sig: Take 2.5 mg by mouth two (2) times a day. montelukast (Singulair) 10 mg tablet  Self Yes Yes   Sig: Take 10 mg by mouth every evening. omega-3 acid ethyl esters (LOVAZA) 1 gram capsule  Self Yes Yes   Sig: Take 1 g by mouth two (2) times a day. polyvinyl alcohol-povidon,PF, (REFRESH CLASSIC) 1.4-0.6 % ophthalmic solution  Self Yes Yes   Sig: Administer 1-2 Drops to both eyes as needed. pravastatin (PRAVACHOL) 40 mg tablet  Self Yes No   Sig: Take 40 mg by mouth nightly.   sodium bicarbonate 650 mg tablet  Self Yes Yes   Sig: Take 650 mg by mouth two (2) times a day.       Facility-Administered Medications: None Imaging:    Medications list Personally Reviewed   [x]      Yes     []               No      Signed By: Patrice Cardenas MD  [facility]      [email or web address]

## 2021-05-15 NOTE — PROGRESS NOTES
Problem: Mobility Impaired (Adult and Pediatric)  Goal: *Acute Goals and Plan of Care (Insert Text)  Description: FUNCTIONAL STATUS PRIOR TO ADMISSION: Pt ambulates using rollator walker, does not drive. HOME SUPPORT PRIOR TO ADMISSION: Pt lived alone in condo/apartment with family living on different floor of same building. Physical Therapy Goals  Initiated 5/15/2021  1. Patient will move from supine to sit and sit to supine  in bed with supervision/set-up within 7 day(s). 2.  Patient will transfer from bed to chair and chair to bed with minimal assistance/contact guard assist using the least restrictive device within 7 day(s). 3.  Patient will perform sit to stand with minimal assistance/contact guard assist within 7 day(s). 4.  Patient will ambulate with minimal assistance/contact guard assist for 50 feet with the least restrictive device within 7 day(s). Outcome: Not Met    PHYSICAL THERAPY EVALUATION  Patient: Aria Montanez (76 y.o. female)  Date: 5/15/2021  Primary Diagnosis: Acute respiratory failure with hypoxia ([facility] [J96.01]        Precautions:   Fall    ASSESSMENT  Based on the objective data described below, the patient presents with generally decreased strength, dyspnea and tachypnea on exertion, decreased endurance, and limited functional mobility on day 2 of admission with respiratory failure and volume overload associated with renal failure. Pt cooperative with PT but with very limited activity tolerance. She mobilizes to edge of bed and dangles x 4 mins with persistent tachypnea and dyspnea. Symptoms improve with return to supine rest, though pt then with elevated BP. She is unable to tolerate transfers or ambulation at this time. RN aware of pt presentation and symptoms. Current Level of Function Impacting Discharge (mobility/balance): total assist some bed mobility. Functional Outcome Measure:   The patient scored 15 on the Barthel outcome measure which is indicative of 85% functional impairment. Other factors to consider for discharge: none additional     Patient will benefit from skilled therapy intervention to address the above noted impairments. PLAN :  Recommendations and Planned Interventions: bed mobility training, transfer training, gait training, therapeutic exercises, neuromuscular re-education, modalities, edema management/control, patient and family training/education, and therapeutic activities      Frequency/Duration: Patient will be followed by physical therapy:  5 times a week to address goals. Recommendation for discharge: (in order for the patient to meet his/her long term goals)  Therapy up to 5 days/week in SNF setting    This discharge recommendation:  Has not yet been discussed the attending provider and/or case management    IF patient discharges home will need the following DME: to be determined (TBD)         SUBJECTIVE:   Patient stated I'm sorry I couldn't do more.  Offered encouragement and education regarding pt efforts and role of PT in determining appropriate activity levels based on symptoms.      OBJECTIVE DATA SUMMARY:   HISTORY:    Past Medical History:   Diagnosis Date    Anxiety     Arthritis     [facility] 2019    Left    CKD (chronic kidney disease) stage 3, GFR 30-59 ml/min ([facility])     High cholesterol     Hx of seasonal allergies     Hypertension     Ischemic stroke ([facility]     Right occipital    Monoclonal gammopathy 2018    ABIGAIL on CPAP     Seizures ([facility]     last keppra level- 2019 @ 15   Last seizure 2017    Urinary incontinence      Past Surgical History:   Procedure Laterality Date    HX APPENDECTOMY N/A     HX CATARACT REMOVAL Bilateral 2018    HX  SECTION N/A     x 2    HX HEMORRHOIDECTOMY      HX HYSTERECTOMY      HX KNEE REPLACEMENT Right 2019    HX TONSILLECTOMY         Personal factors and/or comorbidities impacting plan of care: as above    Home Situation  Home Environment: Other (comment)(condo)  # Steps to Enter: (elevator)  One/Two Story Residence: One story  Living Alone: Yes  Support Systems: Child(shauna)  Patient Expects to be Discharged to[de-identified] Other (comment)(condo)  Current DME Used/Available at Home: Shower chair, CPAP, Wheelchair, Walker, rollator, Safety frame [employer], Grab bars, [employer], quad    EXAMINATION/PRESENTATION/DECISION MAKING:   Critical Behavior:  Neurologic State: Alert  Orientation Level: Oriented X4  Cognition: Follows commands     Hearing: Auditory  Auditory Impairment: Hard of hearing, bilateral, Hearing aid(s)  Hearing Aids/Status: Bilateral  Skin:  LE exposed skin intact  Edema: 3+ BLEs   Range Of Motion:  AROM: Generally decreased, functional                       Strength:    Strength: Generally decreased, functional                    Tone & Sensation:   Tone: Normal              Sensation: Impaired(B feet and lower legs consistent with baseline)               Coordination:  Coordination: Within functional limits       Functional Mobility:  Bed Mobility:     Supine to Sit: Additional time; Moderate assistance; Adaptive equipment;Bed Modified  Sit to Supine: Additional time;Maximum assistance; Adaptive equipment;Bed Modified  Scooting: Additional time; Total assistance(up in bed; mod assist forward at edge of bed)  Transfers:  Sit to Stand: (deferred due to dyspnea, persistent tachypnea)                          Balance:   Sitting: Without support  Sitting - Static: Good (unsupported)  Sitting - Dynamic: Fair (occasional)  Ambulation/Gait Training:               deferred                                                 Therapeutic Exercises: Ankle pumps x 10    Functional Measure:  Barthel Index:    Bathin  Bladder: 5  Bowels: 5  Groomin  Dressin  Feedin  Mobility: 0  Stairs: 0  Toilet Use: 0  Transfer (Bed to Chair and Back): 0  Total: 15/100       The Barthel ADL Index: Guidelines  1.  The index should be used as a record of what a patient does, not as a record of what a patient could do. 2. The main aim is to establish degree of independence from any help, physical or verbal, however minor and for whatever reason. 3. The need for supervision renders the patient not independent. 4. A patient's performance should be established using the best available evidence. Asking the patient, friends/relatives and nurses are the usual sources, but direct observation and common sense are also important. However direct testing is not needed. 5. Usually the patient's performance over the preceding 24-48 hours is important, but occasionally longer periods will be relevant. 6. Middle categories imply that the patient supplies over 50 per cent of the effort. 7. Use of aids to be independent is allowed. Jose A Crowder., Barthel, D.W. ([id number]). Functional evaluation: the Barthel Index. [address] [facility] (14)2. Timi Lott kota MATHEUS Bee, Natasha Cleaning., Santino Chu., [place], [address] [facility] (1999). Measuring the change indisability after inpatient rehabilitation; comparison of the responsiveness of the Barthel Index and Functional [place] Measure. Journal of Neurology, Neurosurgery, and Psychiatry, 66(4), 793-642. Shakila Farr, N.J.A, NEIL Ramirez.SHAILESH, & Charmaine Gomez, M.A. (2004.) Assessment of post-stroke quality of life in cost-effectiveness studies: The usefulness of the Barthel Index and the EuroQoL-5D.  Quality of Life Research, 15, 227-01            Physical Therapy Evaluation Charge Determination   History Examination Presentation Decision-Making   HIGH Complexity :3+ comorbidities / personal factors will impact the outcome/ POC  HIGH Complexity : 4+ Standardized tests and measures addressing body structure, function, activity limitation and / or participation in recreation  HIGH Complexity : Unstable and unpredictable characteristics  Other outcome measures barthel  HIGH       Based on the above components, the patient evaluation is determined to be of the following complexity level: HIGH     Pain Rating:  States neck pain associated with bed positioning; RN notified, offered repositioning, education  5/10 numeric scale    Activity Tolerance:   Poor    After treatment patient left in no apparent distress:   Supine in bed, Call bell within reach, Bed / chair alarm activated, and Side rails x 3    COMMUNICATION/EDUCATION:   The patients plan of care was discussed with: Registered nurse and Rehabilitation technician. Fall prevention education was provided and the patient/caregiver indicated understanding., Patient/family have participated as able in goal setting and plan of care. , and Patient/family agree to work toward stated goals and plan of care.     Thank you for this referral.  Luan Urbina, PT, DPT   Time Calculation: 22 mins

## 2021-05-15 NOTE — CONSULTS
Cardiology Consult    Patient: Sree Loernzo MRN: [id number]  SSN: [id number]    YOB: 1940  Age: 79 y.o. Sex: female       Subjective:      Date of  Admission: 2021     Admission type: Emergency    Sere Lorenzo is a 79 y.o. female admitted for Acute respiratory failure with hypoxia ([facility] [address] [J96.01]. Reason: question CHF  Referring: Dr. Yomaira Lindsey with h/o HTN, CKD stage 5, CVA admit with fluid overload, progressive dyspnea. Denies any chest pain. No prior cardiac issues.  She was undergoing plans outpt to start HD    Primary Care Provider: Lisa Celeste MD  Past Medical History:   Diagnosis Date    Anxiety     Arthritis     [facility]    Left    CKD (chronic kidney disease) stage 3, GFR 30-59 ml/min (HCC)     High cholesterol     Hx of seasonal allergies     Hypertension     Ischemic stroke ([facility]     Right occipital    Monoclonal gammopathy 2018    ABIGAIL on CPAP     Seizures (HCC)     last keppra level- 2019 @ 15   Last seizure 2017    Urinary incontinence       Past Surgical History:   Procedure Laterality Date    HX APPENDECTOMY N/A     HX CATARACT REMOVAL Bilateral 2018    HX  SECTION N/A     x 2    HX HEMORRHOIDECTOMY      HX HYSTERECTOMY      HX KNEE REPLACEMENT Right 2019    HX TONSILLECTOMY       Family History   Problem Relation Age of Onset    Hypertension Mother     Cancer Sister         Skin    Diabetes Sister     Hypertension Sister     Stroke Father     Parkinson's Disease Father     Cancer Sister         Skin    Cancer Sister     No Known Problems Brother       Social History     Tobacco Use    Smoking status: Never Smoker    Smokeless tobacco: Never Used   Substance Use Topics    Alcohol use: No      Current Facility-Administered Medications   Medication Dose Route Frequency    hydrALAZINE (APRESOLINE) tablet 100 mg  100 mg Oral BID    hydrALAZINE (APRESOLINE) 20 mg/mL injection 20 mg  20 mg IntraVENous Q6H PRN    nitroglycerin (NITROBID) 2 % ointment 1 Inch  1 Inch Topical BID    labetaloL (NORMODYNE;TRANDATE) 20 mg/4 mL (5 mg/mL) injection 20 mg  20 mg IntraVENous Q4H PRN    bumetanide (BUMEX) injection 2 mg  2 mg IntraVENous Q12H    amLODIPine (NORVASC) tablet 10 mg  10 mg Oral DAILY    aspirin delayed-release tablet 81 mg  81 mg Oral DAILY    carvediloL (COREG) tablet 6.25 mg  6.25 mg Oral BID WITH MEALS    levETIRAcetam (KEPPRA) tablet 250 mg  250 mg Oral BID    montelukast (SINGULAIR) tablet 10 mg  10 mg Oral DAILY    pravastatin (PRAVACHOL) tablet 40 mg  40 mg Oral QHS    sodium bicarbonate tablet 650 mg  650 mg Oral BID    sodium chloride (NS) flush 5-40 mL  5-40 mL IntraVENous Q8H    sodium chloride (NS) flush 5-40 mL  5-40 mL IntraVENous PRN    0.9% sodium chloride infusion 25 mL  25 mL IntraVENous PRN    acetaminophen (TYLENOL) tablet 650 mg  650 mg Oral Q6H PRN    Or    acetaminophen (TYLENOL) suppository 650 mg  650 mg Rectal Q6H PRN    senna-docusate (PERICOLACE) 8.6-50 mg per tablet 1 Tab  1 Tab Oral BID    bisacodyL (DULCOLAX) suppository 10 mg  10 mg Rectal DAILY PRN    promethazine (PHENERGAN) tablet 12.5 mg  12.5 mg Oral Q6H PRN    Or    ondansetron (ZOFRAN) injection 4 mg  4 mg IntraVENous Q6H PRN    heparin (porcine) injection 5,000 Units  5,000 Units SubCUTAneous Q8H    minoxidiL (LONITEN) tablet 2.5 mg  2.5 mg Oral BID        Allergies   Allergen Reactions    Latex Rash    Codeine Other (comments)    Levaquin [Levofloxacin] Other (comments)     Hallucinations    Lisinopril Cough    Sulfa (Sulfonamide Antibiotics) Rash and Itching        Review of Systems:  A comprehensive review of systems was negative except for that written in the History of Present Illness.        Subjective:     Visit Vitals  BP (!) 160/55 (BP 1 Location: Right arm, BP Patient Position: At rest)   Pulse 85   Temp 98.3 °F (36.8 °C)   Resp 28   Ht 5' 1\" (1.549 m)   Wt 203 lb (92.1 kg)   SpO2 94%   BMI 38.36 kg/m²        Physical Exam:  Visit Vitals  BP (!) 160/55 (BP 1 Location: Right arm, BP Patient Position: At rest)   Pulse 85   Temp 98.3 °F (36.8 °C)   Resp 28   Ht 5' 1\" (1.549 m)   Wt 203 lb (92.1 kg)   SpO2 94%   BMI 38.36 kg/m²     General Appearance:  Well developed, well nourished,alert and oriented x 3, and individual in no acute distress. Ears/Nose/Mouth/Throat:   Hearing grossly normal.         Neck: Supple. Chest:   Decreased BS bases   Cardiovascular:  Regular rate and rhythm, S1, S2 normal, II/VI systolic murmur. Abdomen:   Soft, non-tender, bowel sounds are active. Extremities: 1+ LE edema bilaterally. Skin: Warm and dry. Cardiographics:  Telemetry: normal sinus rhythm  Echocardiogram: pending    Data Reviewed: All lab results for the last 24 hours reviewed. Assessment:         Hospital Problems  Date Reviewed: 5/14/2021          Codes Class Noted POA    * (Principal) Acute respiratory failure with hypoxia ([facility] ICD-10-CM: J96.01  ICD-9-CM: 518.81  5/14/2021 Unknown        Diastolic CHF, acute on chronic (HCC) ICD-10-CM: I50.33  ICD-9-CM: 428.33, 428.0  5/14/2021 Yes        Volume overload ICD-10-CM: E87.70  ICD-9-CM: 276.69  5/14/2021 Yes        SHARON (acute kidney injury) ([facility] ICD-10-CM: N17.9  ICD-9-CM: 584.9  5/14/2021 Yes        Elevated troponin ICD-10-CM: R77.8  ICD-9-CM: 790.6  5/14/2021 Yes               Plan:     Volume overload, acute on chronic renal failure. Do not think this represents CHF but rather fluid overload due to renal failure. Echo pending and will review this, if normal will be available prn. Elevated troponin. Trivially elevated and flat in setting of HTN and renal failure. Does not represent acute ischemia. No chest pain. Acute on chronic renal failure. Per nephrology, plans for HD. Essential HTN. Per nephrology.

## 2021-05-16 ENCOUNTER — APPOINTMENT (OUTPATIENT)
Dept: NON INVASIVE DIAGNOSTICS | Age: 81
DRG: 280 | End: 2021-05-16
Attending: INTERNAL MEDICINE
Payer: MEDICARE

## 2021-05-16 ENCOUNTER — APPOINTMENT (OUTPATIENT)
Dept: INTERVENTIONAL RADIOLOGY/VASCULAR | Age: 81
DRG: 280 | End: 2021-05-16
Attending: INTERNAL MEDICINE
Payer: MEDICARE

## 2021-05-16 ENCOUNTER — APPOINTMENT (OUTPATIENT)
Dept: GENERAL RADIOLOGY | Age: 81
DRG: 280 | End: 2021-05-16
Attending: INTERNAL MEDICINE
Payer: MEDICARE

## 2021-05-16 ENCOUNTER — APPOINTMENT (OUTPATIENT)
Dept: VASCULAR SURGERY | Age: 81
DRG: 280 | End: 2021-05-16
Attending: INTERNAL MEDICINE
Payer: MEDICARE

## 2021-05-16 ENCOUNTER — APPOINTMENT (OUTPATIENT)
Dept: GENERAL RADIOLOGY | Age: 81
DRG: 280 | End: 2021-05-16
Attending: RADIOLOGY
Payer: MEDICARE

## 2021-05-16 LAB
ALBUMIN SERPL-MCNC: 2.7 G/DL (ref 3.5–5)
ANION GAP SERPL CALC-SCNC: 12 MMOL/L (ref 5–15)
BASOPHILS # BLD: 0 K/UL (ref 0–0.1)
BASOPHILS NFR BLD: 0 % (ref 0–1)
BUN SERPL-MCNC: 91 MG/DL (ref 6–20)
BUN/CREAT SERPL: 16 (ref 12–20)
CALCIUM SERPL-MCNC: 7.6 MG/DL (ref 8.5–10.1)
CALCIUM SERPL-MCNC: 7.9 MG/DL (ref 8.5–10.1)
CHLORIDE SERPL-SCNC: 106 MMOL/L (ref 97–108)
CO2 SERPL-SCNC: 19 MMOL/L (ref 21–32)
CREAT SERPL-MCNC: 5.79 MG/DL (ref 0.55–1.02)
DIFFERENTIAL METHOD BLD: ABNORMAL
ECHO AO ASC DIAM: 3.04 CM
ECHO AO ROOT DIAM: 2.8 CM
ECHO AV AREA PEAK VELOCITY: 2.27 CM2
ECHO AV AREA VTI: 2.45 CM2
ECHO AV AREA/BSA PEAK VELOCITY: 1.2 CM2/M2
ECHO AV AREA/BSA VTI: 1.3 CM2/M2
ECHO AV MEAN GRADIENT: 5.89 MMHG
ECHO AV PEAK GRADIENT: 10.53 MMHG
ECHO AV PEAK VELOCITY: 162.28 CM/S
ECHO AV VTI: 35.78 CM
ECHO IVC PROX: 2.7 CM
ECHO LA AREA 4C: 22.58 CM2
ECHO LA MAJOR AXIS: 4.08 CM
ECHO LA MINOR AXIS: 2.1 CM
ECHO LA VOL 2C: 49.58 ML (ref 22–52)
ECHO LA VOL 4C: 71.84 ML (ref 22–52)
ECHO LA VOL BP: 64.92 ML (ref 22–52)
ECHO LA VOL/BSA BIPLANE: 33.46 ML/M2 (ref 16–28)
ECHO LA VOLUME INDEX A2C: 25.55 ML/M2 (ref 16–28)
ECHO LA VOLUME INDEX A4C: 37.02 ML/M2 (ref 16–28)
ECHO LV E' LATERAL VELOCITY: 8.24 CENTIMETER/SECOND
ECHO LV E' SEPTAL VELOCITY: 10.03 CENTIMETER/SECOND
ECHO LV INTERNAL DIMENSION DIASTOLIC: 4.58 CM (ref 3.9–5.3)
ECHO LV INTERNAL DIMENSION SYSTOLIC: 3.33 CM
ECHO LV IVSD: 1.43 CM (ref 0.6–0.9)
ECHO LV MASS 2D: 263.3 G (ref 67–162)
ECHO LV MASS INDEX 2D: 135.7 G/M2 (ref 43–95)
ECHO LV POSTERIOR WALL DIASTOLIC: 1.43 CM (ref 0.6–0.9)
ECHO LVOT DIAM: 1.96 CM
ECHO LVOT PEAK GRADIENT: 5.91 MMHG
ECHO LVOT PEAK VELOCITY: 121.57 CM/S
ECHO LVOT SV: 87.7 ML
ECHO LVOT VTI: 29 CM
ECHO MV A VELOCITY: 140.61 CENTIMETER/SECOND
ECHO MV AREA PHT: 6.4 CM2
ECHO MV E DECELERATION TIME (DT): 118.62 MS
ECHO MV E VELOCITY: 138.77 CENTIMETER/SECOND
ECHO MV PRESSURE HALF TIME (PHT): 34.4 MS
ECHO PV MAX VELOCITY: 135.19 CM/S
ECHO PV PEAK INSTANTANEOUS GRADIENT SYSTOLIC: 7.38 MMHG
ECHO RV INTERNAL DIMENSION: 3.94 CM
ECHO RV TAPSE: 2.91 CM (ref 1.5–2)
ECHO TV REGURGITANT MAX VELOCITY: 396.67 CM/S
ECHO TV REGURGITANT PEAK GRADIENT: 62.94 MMHG
EOSINOPHIL # BLD: 0.2 K/UL (ref 0–0.4)
EOSINOPHIL NFR BLD: 3 % (ref 0–7)
ERYTHROCYTE [DISTWIDTH] IN BLOOD BY AUTOMATED COUNT: 12.8 % (ref 11.5–14.5)
FERRITIN SERPL-MCNC: 115 NG/ML (ref 8–252)
GLUCOSE SERPL-MCNC: 102 MG/DL (ref 65–100)
HCT VFR BLD AUTO: 26.3 % (ref 35–47)
HGB BLD-MCNC: 8 G/DL (ref 11.5–16)
IMM GRANULOCYTES # BLD AUTO: 0.1 K/UL (ref 0–0.04)
IMM GRANULOCYTES NFR BLD AUTO: 1 % (ref 0–0.5)
IRON SATN MFR SERPL: 14 % (ref 20–50)
IRON SERPL-MCNC: 42 UG/DL (ref 35–150)
LA VOL DISK BP: 61.26 ML (ref 22–52)
LVOT MG: 3.84 MMHG
LYMPHOCYTES # BLD: 2.1 K/UL (ref 0.8–3.5)
LYMPHOCYTES NFR BLD: 24 % (ref 12–49)
MAGNESIUM SERPL-MCNC: 2.6 MG/DL (ref 1.6–2.4)
MCH RBC QN AUTO: 30.9 PG (ref 26–34)
MCHC RBC AUTO-ENTMCNC: 30.4 G/DL (ref 30–36.5)
MCV RBC AUTO: 101.5 FL (ref 80–99)
MONOCYTES # BLD: 0.8 K/UL (ref 0–1)
MONOCYTES NFR BLD: 9 % (ref 5–13)
NEUTS SEG # BLD: 5.6 K/UL (ref 1.8–8)
NEUTS SEG NFR BLD: 63 % (ref 32–75)
NRBC # BLD: 0 K/UL (ref 0–0.01)
NRBC BLD-RTO: 0 PER 100 WBC
PHOSPHATE SERPL-MCNC: 7.4 MG/DL (ref 2.6–4.7)
PLATELET # BLD AUTO: 240 K/UL (ref 150–400)
PMV BLD AUTO: 9.6 FL (ref 8.9–12.9)
POTASSIUM SERPL-SCNC: 4.5 MMOL/L (ref 3.5–5.1)
PTH-INTACT SERPL-MCNC: 355.5 PG/ML (ref 18.4–88)
RBC # BLD AUTO: 2.59 M/UL (ref 3.8–5.2)
SODIUM SERPL-SCNC: 137 MMOL/L (ref 136–145)
TIBC SERPL-MCNC: 307 UG/DL (ref 250–450)
WBC # BLD AUTO: 8.9 K/UL (ref 3.6–11)

## 2021-05-16 PROCEDURE — 74011250636 HC RX REV CODE- 250/636: Performed by: INTERNAL MEDICINE

## 2021-05-16 PROCEDURE — 5A1D70Z PERFORMANCE OF URINARY FILTRATION, INTERMITTENT, LESS THAN 6 HOURS PER DAY: ICD-10-PCS | Performed by: INTERNAL MEDICINE

## 2021-05-16 PROCEDURE — 83970 ASSAY OF PARATHORMONE: CPT

## 2021-05-16 PROCEDURE — 94660 CPAP INITIATION&MGMT: CPT

## 2021-05-16 PROCEDURE — 36415 COLL VENOUS BLD VENIPUNCTURE: CPT

## 2021-05-16 PROCEDURE — 97165 OT EVAL LOW COMPLEX 30 MIN: CPT | Performed by: OCCUPATIONAL THERAPIST

## 2021-05-16 PROCEDURE — 97535 SELF CARE MNGMENT TRAINING: CPT | Performed by: OCCUPATIONAL THERAPIST

## 2021-05-16 PROCEDURE — 36556 INSERT NON-TUNNEL CV CATH: CPT

## 2021-05-16 PROCEDURE — 93971 EXTREMITY STUDY: CPT

## 2021-05-16 PROCEDURE — 74011000250 HC RX REV CODE- 250: Performed by: INTERNAL MEDICINE

## 2021-05-16 PROCEDURE — 86706 HEP B SURFACE ANTIBODY: CPT

## 2021-05-16 PROCEDURE — 83540 ASSAY OF IRON: CPT

## 2021-05-16 PROCEDURE — 74011250637 HC RX REV CODE- 250/637: Performed by: INTERNAL MEDICINE

## 2021-05-16 PROCEDURE — 71045 X-RAY EXAM CHEST 1 VIEW: CPT

## 2021-05-16 PROCEDURE — 85025 COMPLETE CBC W/AUTO DIFF WBC: CPT

## 2021-05-16 PROCEDURE — 93306 TTE W/DOPPLER COMPLETE: CPT

## 2021-05-16 PROCEDURE — C1894 INTRO/SHEATH, NON-LASER: HCPCS

## 2021-05-16 PROCEDURE — C1752 CATH,HEMODIALYSIS,SHORT-TERM: HCPCS

## 2021-05-16 PROCEDURE — 74011000250 HC RX REV CODE- 250: Performed by: RADIOLOGY

## 2021-05-16 PROCEDURE — 90935 HEMODIALYSIS ONE EVALUATION: CPT

## 2021-05-16 PROCEDURE — 76937 US GUIDE VASCULAR ACCESS: CPT

## 2021-05-16 PROCEDURE — 83735 ASSAY OF MAGNESIUM: CPT

## 2021-05-16 PROCEDURE — 87340 HEPATITIS B SURFACE AG IA: CPT

## 2021-05-16 PROCEDURE — 77010033678 HC OXYGEN DAILY

## 2021-05-16 PROCEDURE — 65660000000 HC RM CCU STEPDOWN

## 2021-05-16 PROCEDURE — 82728 ASSAY OF FERRITIN: CPT

## 2021-05-16 PROCEDURE — 74011250636 HC RX REV CODE- 250/636: Performed by: RADIOLOGY

## 2021-05-16 PROCEDURE — 80069 RENAL FUNCTION PANEL: CPT

## 2021-05-16 PROCEDURE — 51798 US URINE CAPACITY MEASURE: CPT

## 2021-05-16 RX ORDER — HEPARIN SODIUM 1000 [USP'U]/ML
1000-5000 INJECTION, SOLUTION INTRAVENOUS; SUBCUTANEOUS ONCE
Status: COMPLETED | OUTPATIENT
Start: 2021-05-16 | End: 2021-05-16

## 2021-05-16 RX ORDER — LIDOCAINE HYDROCHLORIDE 10 MG/ML
10-30 INJECTION INFILTRATION; PERINEURAL
Status: DISCONTINUED | OUTPATIENT
Start: 2021-05-16 | End: 2021-05-19

## 2021-05-16 RX ORDER — SODIUM BICARBONATE 650 MG/1
650 TABLET ORAL 3 TIMES DAILY
Status: DISCONTINUED | OUTPATIENT
Start: 2021-05-16 | End: 2021-05-24 | Stop reason: HOSPADM

## 2021-05-16 RX ORDER — MINOXIDIL 2.5 MG/1
2.5 TABLET ORAL 2 TIMES DAILY WITH MEALS
Status: DISCONTINUED | OUTPATIENT
Start: 2021-05-16 | End: 2021-05-24 | Stop reason: HOSPADM

## 2021-05-16 RX ADMIN — CARVEDILOL 6.25 MG: 6.25 TABLET, FILM COATED ORAL at 08:32

## 2021-05-16 RX ADMIN — ANTACID TABLETS 200 MG: 500 TABLET, CHEWABLE ORAL at 08:32

## 2021-05-16 RX ADMIN — HYDRALAZINE HYDROCHLORIDE 20 MG: 20 INJECTION INTRAMUSCULAR; INTRAVENOUS at 02:33

## 2021-05-16 RX ADMIN — Medication 10 ML: at 13:51

## 2021-05-16 RX ADMIN — ACETAMINOPHEN 650 MG: 325 TABLET ORAL at 13:54

## 2021-05-16 RX ADMIN — BUMETANIDE 3 MG: 0.25 INJECTION INTRAMUSCULAR; INTRAVENOUS at 22:57

## 2021-05-16 RX ADMIN — LEVETIRACETAM 250 MG: 250 TABLET, FILM COATED ORAL at 17:33

## 2021-05-16 RX ADMIN — BUMETANIDE 3 MG: 0.25 INJECTION INTRAMUSCULAR; INTRAVENOUS at 16:00

## 2021-05-16 RX ADMIN — LABETALOL HYDROCHLORIDE 20 MG: 5 INJECTION, SOLUTION INTRAVENOUS at 04:30

## 2021-05-16 RX ADMIN — LEVETIRACETAM 250 MG: 250 TABLET, FILM COATED ORAL at 08:34

## 2021-05-16 RX ADMIN — HEPARIN SODIUM 5000 UNITS: 5000 INJECTION INTRAVENOUS; SUBCUTANEOUS at 23:01

## 2021-05-16 RX ADMIN — LIDOCAINE HYDROCHLORIDE 5 ML: 10 INJECTION, SOLUTION INFILTRATION; PERINEURAL at 17:59

## 2021-05-16 RX ADMIN — PRAVASTATIN SODIUM 40 MG: 20 TABLET ORAL at 22:55

## 2021-05-16 RX ADMIN — ACETAMINOPHEN 650 MG: 325 TABLET ORAL at 22:55

## 2021-05-16 RX ADMIN — ASPIRIN 81 MG: 81 TABLET, COATED ORAL at 08:33

## 2021-05-16 RX ADMIN — SODIUM BICARBONATE 650 MG: 650 TABLET ORAL at 08:34

## 2021-05-16 RX ADMIN — DOCUSATE SODIUM 50MG AND SENNOSIDES 8.6MG 1 TABLET: 8.6; 5 TABLET, FILM COATED ORAL at 08:32

## 2021-05-16 RX ADMIN — MINOXIDIL 2.5 MG: 2.5 TABLET ORAL at 16:03

## 2021-05-16 RX ADMIN — SODIUM BICARBONATE 650 MG: 650 TABLET ORAL at 22:55

## 2021-05-16 RX ADMIN — BUMETANIDE 3 MG: 0.25 INJECTION INTRAMUSCULAR; INTRAVENOUS at 08:34

## 2021-05-16 RX ADMIN — MINOXIDIL 2.5 MG: 2.5 TABLET ORAL at 10:29

## 2021-05-16 RX ADMIN — ACETAMINOPHEN 650 MG: 325 TABLET ORAL at 03:13

## 2021-05-16 RX ADMIN — NITROGLYCERIN 1 INCH: 20 OINTMENT TOPICAL at 08:38

## 2021-05-16 RX ADMIN — CARVEDILOL 6.25 MG: 6.25 TABLET, FILM COATED ORAL at 16:03

## 2021-05-16 RX ADMIN — SEVELAMER CARBONATE 800 MG: 800 TABLET, FILM COATED ORAL at 12:12

## 2021-05-16 RX ADMIN — Medication 10 ML: at 22:57

## 2021-05-16 RX ADMIN — HEPARIN SODIUM 2200 UNITS: 1000 INJECTION INTRAVENOUS; SUBCUTANEOUS at 18:03

## 2021-05-16 RX ADMIN — HYDRALAZINE HYDROCHLORIDE 100 MG: 25 TABLET, FILM COATED ORAL at 08:32

## 2021-05-16 RX ADMIN — SODIUM BICARBONATE 650 MG: 650 TABLET ORAL at 16:03

## 2021-05-16 RX ADMIN — MONTELUKAST 10 MG: 10 TABLET, FILM COATED ORAL at 08:34

## 2021-05-16 RX ADMIN — HEPARIN SODIUM 5000 UNITS: 5000 INJECTION INTRAVENOUS; SUBCUTANEOUS at 08:34

## 2021-05-16 RX ADMIN — SEVELAMER CARBONATE 800 MG: 800 TABLET, FILM COATED ORAL at 08:33

## 2021-05-16 RX ADMIN — SEVELAMER CARBONATE 800 MG: 800 TABLET, FILM COATED ORAL at 16:03

## 2021-05-16 RX ADMIN — HEPARIN SODIUM 5000 UNITS: 5000 INJECTION INTRAVENOUS; SUBCUTANEOUS at 00:13

## 2021-05-16 RX ADMIN — AMLODIPINE BESYLATE 10 MG: 5 TABLET ORAL at 08:33

## 2021-05-16 RX ADMIN — Medication 10 ML: at 06:13

## 2021-05-16 RX ADMIN — HEPARIN SODIUM 5000 UNITS: 5000 INJECTION INTRAVENOUS; SUBCUTANEOUS at 16:02

## 2021-05-16 RX ADMIN — NITROGLYCERIN 1 INCH: 20 OINTMENT TOPICAL at 17:35

## 2021-05-16 RX ADMIN — HYDRALAZINE HYDROCHLORIDE 100 MG: 25 TABLET, FILM COATED ORAL at 17:33

## 2021-05-16 NOTE — PROGRESS NOTES
Nephrology Progress Note     [facility] [address]     www. "[email or web address]              Phone - [phone]   Patient: Mason Zayas   YOB: 1940    Date- 5/16/2021  MRN: [id number]             F/u CKD 5, fluid overload    ADMIT DATE:5/14/2021 PATIENT Deedee Lubin MD     IMPRESSION/PLAN   # SHARON on CKD5( outpt nephrologist: Dr Sanjana Michaels )  - baseline Cr until March/2021: 3.2-3.5  - etiology: secondary FSGS d/t HTN  - AVF created in April 14th by Dr Darlene Del Rio, not ready for use yet  - IR consult placed for karina placement  - Once line is in place, please call [id number]:  plan for 2hs of HD today, 2nd HD tomorrow and likely 3rd HD on Tuesday    # Fluid overload( pulmonary edema+ LE edema)  - initially on BIPAP, now on NC, w/  improvement of resp distress  - Bumex dose increased + one dose of Metolazone given w/ poor response in UOP( remains oliguric)    # Metabolic acidosis  - increased PO bicarb 650mg BID to TID  - it can be discontinued after HD initiation    # Uncontrolled HTN  - on multiple antihypertensives   - likely due to fluid overload and should improve with HD initiation    # Anemia of CKD  - Hb: 8  -  iron profile c/w SAUD  - check FOBT  - started EPO    # MBD  - hyperphosphatemia 7.0: started phosph binders  -               We will follow patient with you. Principal Problem:    Acute respiratory failure with hypoxia ([address] (5/14/2021)    Active Problems:    Diastolic CHF, acute on chronic (HCC) (5/14/2021)      Volume overload (5/14/2021)      SHARON (acute kidney injury) ([facility] [facility] (5/14/2021)      Elevated troponin (5/14/2021)        [x] High complexity decision making was performed  [] Patient is at high-risk of decompensation with multiple organ involvement    Subjective:   Remains oliguric, acidotic, Cr>5    Review of Systems:     A 11 point review of system was performed today. Pertinent positives and negatives are mentioned in the HPI.  The reminder of the ROS is negative and noncontributory. Past Medical History:   Diagnosis Date    Anxiety     Arthritis     Basal ganglia stroke ([facility] [address] 2019    Left    CKD (chronic kidney disease) stage 3, GFR 30-59 ml/min ([facility])     High cholesterol     Hx of seasonal allergies     Hypertension     Ischemic stroke ([facility] [address]     Right occipital    Monoclonal gammopathy 2018    ABIGAIL on CPAP     Seizures ([facility] [address]     last keppra level- 2019 @ 15   Last seizure 2017    Urinary incontinence       Past Surgical History:   Procedure Laterality Date    HX APPENDECTOMY N/A     HX CATARACT REMOVAL Bilateral 2018    HX  SECTION N/A     x 2    HX HEMORRHOIDECTOMY      HX HYSTERECTOMY      HX KNEE REPLACEMENT Right 2019    HX TONSILLECTOMY        Prior to Admission medications    Medication Sig Start Date End Date Taking? Authorizing Provider   calcium acetate,phosphat bind, (PHOSLO) 667 mg cap Take 1 Cap by mouth three (3) times daily (with meals). Yes Provider, Historical   hydrALAZINE (APRESOLINE) 50 mg tablet Take 50 mg by mouth two (2) times a day. Yes Provider, Historical   minoxidiL (LONITEN) 2.5 mg tablet Take 2.5 mg by mouth two (2) times a day. Yes Provider, Historical   sodium bicarbonate 650 mg tablet Take 650 mg by mouth two (2) times a day. Yes Provider, Historical   omega-3 acid ethyl esters (LOVAZA) 1 gram capsule Take 1 g by mouth two (2) times a day. Yes Provider, Historical   bumetanide (BUMEX) 2 mg tablet Take 2 mg by mouth daily. Yes Provider, Historical   aspirin delayed-release 81 mg tablet Take 81 mg by mouth daily. Yes Provider, Historical   amLODIPine (NORVASC) 10 mg tablet Take 10 mg by mouth every evening. Yes Provider, Historical   montelukast (Singulair) 10 mg tablet Take 10 mg by mouth every evening. Yes Provider, Historical   fluticasone propionate (Flonase Allergy Relief) 50 mcg/actuation nasal spray 2 Sprays by Both Nostrils route every evening.    Yes Provider, Historical acetaminophen (TylenoL) 325 mg tablet Take  by mouth as needed for Pain. Yes Provider, Historical   polyvinyl alcohol-povidon,PF, (REFRESH CLASSIC) 1.4-0.6 % ophthalmic solution Administer 1-2 Drops to both eyes as needed. Yes Provider, Historical   levETIRAcetam (KEPPRA) 250 mg tablet Take 1 Tab by mouth two (2) times a day for 90 days. Anti-seizure medication. 3/10/21 6/8/21 Yes Fatimah Barlow MD   carvedilol (COREG) 6.25 mg tablet Take 1 Tab by mouth two (2) times daily (with meals). 8/28/19   Nishi Aguilar MD   pravastatin (PRAVACHOL) 40 mg tablet Take 40 mg by mouth nightly.     Provider, Historical     Allergies   Allergen Reactions    Latex Rash    Codeine Other (comments)    Levaquin [Levofloxacin] Other (comments)     Hallucinations    Lisinopril Cough    Sulfa (Sulfonamide Antibiotics) Rash and Itching      Social History     Tobacco Use    Smoking status: Never Smoker    Smokeless tobacco: Never Used   Substance Use Topics    Alcohol use: No      Family History   Problem Relation Age of Onset    Hypertension Mother     Cancer Sister         Skin    Diabetes Sister     Hypertension Sister     Stroke Father     Parkinson's Disease Father     Cancer Sister         Skin    Cancer Sister     No Known Problems Brother         Objective:      Patient Vitals for the past 24 hrs:   Temp Pulse Resp BP SpO2   05/16/21 1112 97.7 °F (36.5 °C) 87 30 (!) 156/56 91 %   05/16/21 1029 -- -- -- (!) 168/59 --   05/16/21 1023 -- 91 29 -- 95 %   05/16/21 1022 -- 90 23 -- 94 %   05/16/21 1021 -- 90 30 -- 95 %   05/16/21 1020 -- 89 22 -- 95 %   05/16/21 1019 -- 90 26 -- 95 %   05/16/21 1018 -- 89 25 -- 95 %   05/16/21 1017 -- 88 30 -- 94 %   05/16/21 1016 -- 88 20 -- 94 %   05/16/21 1015 -- 88 26 -- 96 %   05/16/21 1014 -- 86 26 -- 95 %   05/16/21 1013 -- 86 22 -- 96 %   05/16/21 1012 -- 87 23 -- 95 %   05/16/21 1011 -- 86 23 -- 95 %   05/16/21 1010 -- 87 23 -- 96 %   05/16/21 1009 -- 86 22 -- 95 % 05/16/21 1008 -- 87 29 -- 96 %   05/16/21 1007 -- 86 27 -- 95 %   05/16/21 1006 -- 85 (!) 32 -- 95 %   05/16/21 1005 -- 87 24 -- 95 %   05/16/21 1004 -- 87 23 -- 94 %   05/16/21 1003 -- 90 22 -- 96 %   05/16/21 1002 -- 87 30 -- 95 %   05/16/21 1001 -- 86 29 -- 96 %   05/16/21 1000 -- 87 24 (!) 188/56 96 %   05/16/21 0959 -- 85 24 -- 95 %   05/16/21 0958 -- 86 28 -- 95 %   05/16/21 0957 -- 86 28 -- 96 %   05/16/21 0956 -- 87 (!) 31 -- 97 %   05/16/21 0955 -- 85 (!) 31 -- 95 %   05/16/21 0954 -- 86 23 -- 96 %   05/16/21 0953 -- 85 25 -- 94 %   05/16/21 0952 -- 85 28 -- 95 %   05/16/21 0951 -- 85 24 -- 94 %   05/16/21 0950 -- 85 24 -- 95 %   05/16/21 0949 -- 84 26 -- 94 %   05/16/21 0948 -- 85 25 -- 95 %   05/16/21 0947 -- 86 28 -- 94 %   05/16/21 0946 -- 86 30 -- 94 %   05/16/21 0945 -- 87 26 -- 95 %   05/16/21 0944 -- 82 25 -- 94 %   05/16/21 0943 -- 88 22 -- 94 %   05/16/21 0942 -- 86 27 -- 94 %   05/16/21 0941 -- 88 29 -- 94 %   05/16/21 0940 -- 90 26 -- 94 %   05/16/21 0939 -- 90 21 -- 94 %   05/16/21 0938 -- 90 (!) 33 -- 94 %   05/16/21 0937 -- 89 25 -- 94 %   05/16/21 0936 -- 89 26 -- 93 %   05/16/21 0935 -- 91 30 -- 93 %   05/16/21 0933 -- 90 (!) 32 -- 94 %   05/16/21 0932 -- 89 23 -- 94 %   05/16/21 0931 -- 87 29 -- 94 %   05/16/21 0930 -- 87 28 -- 95 %   05/16/21 0929 -- 88 20 -- 95 %   05/16/21 0928 -- 87 29 -- 94 %   05/16/21 0927 -- 87 21 -- 94 %   05/16/21 0926 -- 87 21 -- 95 %   05/16/21 0925 -- 87 22 -- 94 %   05/16/21 0924 -- 87 21 -- 94 %   05/16/21 0923 -- 87 28 -- 94 %   05/16/21 0922 -- 88 23 -- 94 %   05/16/21 0921 -- 87 23 -- 94 %   05/16/21 0920 -- 87 30 -- 95 %   05/16/21 0919 -- 88 27 -- 93 %   05/16/21 0918 -- 88 28 -- 93 %   05/16/21 0917 -- 87 24 -- 93 %   05/16/21 0916 -- 88 27 -- 93 %   05/16/21 0915 -- 88 23 -- 93 %   05/16/21 0914 -- 89 22 -- 94 %   05/16/21 0913 -- 88 20 -- 94 %   05/16/21 0912 -- 89 23 -- 92 %   05/16/21 0911 -- 85 25 -- 96 %   05/16/21 0910 -- 85 25 -- 96 % 05/16/21 0909 -- 86 27 -- 95 %   05/16/21 0908 -- 86 25 -- 95 %   05/16/21 0907 -- 85 28 (!) 167/50 95 %   05/16/21 0906 -- 86 28 -- 95 %   05/16/21 0905 -- 88 21 -- 95 %   05/16/21 0904 -- 87 29 -- 95 %   05/16/21 0903 -- 85 29 -- 95 %   05/16/21 0902 -- 88 23 -- 95 %   05/16/21 0901 -- 88 26 -- 94 %   05/16/21 0900 -- 89 22 (!) 181/52 93 %   05/16/21 0859 -- 88 25 -- 94 %   05/16/21 0858 -- 87 23 -- 94 %   05/16/21 0857 -- 88 27 -- 94 %   05/16/21 0856 -- 88 30 -- 94 %   05/16/21 0855 -- 88 23 -- 94 %   05/16/21 0854 -- 85 24 -- 94 %   05/16/21 0853 -- 86 24 -- 93 %   05/16/21 0852 -- 88 25 -- 94 %   05/16/21 0851 -- 90 30 -- 93 %   05/16/21 0850 -- 89 (!) 31 -- 94 %   05/16/21 0849 -- 88 28 -- 94 %   05/16/21 0848 -- 90 27 -- 94 %   05/16/21 0847 -- 89 26 -- 94 %   05/16/21 0846 -- 87 (!) 40 -- 95 %   05/16/21 0845 -- 88 18 -- 95 %   05/16/21 0844 -- 86 29 -- 96 %   05/16/21 0843 -- 88 28 -- 94 %   05/16/21 0842 -- 88 (!) 31 -- 94 %   05/16/21 0841 -- 88 22 -- 95 %   05/16/21 0840 -- 87 27 -- 96 %   05/16/21 0839 -- 86 19 -- 97 %   05/16/21 0838 -- 86 27 -- 96 %   05/16/21 0837 -- 88 28 -- 95 %   05/16/21 0836 -- 88 19 -- 95 %   05/16/21 0835 -- 89 28 -- 95 %   05/16/21 0834 -- 88 (!) 33 -- 95 %   05/16/21 0833 -- 89 17 -- 94 %   05/16/21 0832 -- 86 25 -- 95 %   05/16/21 0831 -- 85 17 -- 96 %   05/16/21 0830 -- 85 25 -- 97 %   05/16/21 0829 -- 84 23 -- 97 %   05/16/21 0828 -- 84 22 -- 97 %   05/16/21 0827 -- 86 23 -- 95 %   05/16/21 0826 -- 86 23 -- 94 %   05/16/21 0825 -- 87 29 -- 94 %   05/16/21 0824 -- 90 25 -- 96 %   05/16/21 0823 -- 88 28 -- 94 %   05/16/21 0822 -- 86 22 -- 97 %   05/16/21 0821 -- 85 26 -- 97 %   05/16/21 0820 -- 84 20 -- 97 %   05/16/21 0819 -- 84 22 -- 98 %   05/16/21 0818 -- 85 21 -- 98 %   05/16/21 0817 -- 86 27 -- 97 %   05/16/21 0816 -- 85 23 -- 97 %   05/16/21 0815 -- 87 23 -- 95 %   05/16/21 0814 -- 88 23 -- 94 %   05/16/21 0813 -- 88 30 -- 93 %   05/16/21 0812 -- 84 24 -- 97 % 05/16/21 0810 -- 76 16 -- 99 %   05/16/21 0809 -- 76 15 -- 99 %   05/16/21 0808 -- 77 15 -- 99 %   05/16/21 0807 -- 76 17 -- 99 %   05/16/21 0806 -- 79 16 -- 99 %   05/16/21 0805 -- 77 16 -- 99 %   05/16/21 0804 -- 82 18 -- 99 %   05/16/21 0803 -- 75 14 -- 99 %   05/16/21 0802 -- 78 16 -- 99 %   05/16/21 0801 -- 78 16 -- 99 %   05/16/21 0800 -- 80 20 (!) 164/48 99 %   05/16/21 0759 -- 78 18 -- 98 %   05/16/21 0758 -- 77 15 -- 99 %   05/16/21 0757 -- 77 17 -- 99 %   05/16/21 0756 -- 81 22 (!) 167/50 99 %   05/16/21 0755 98.2 °F (36.8 °C) 81 22 (!) 167/50 100 %   05/16/21 0700 -- 76 -- -- --   05/16/21 0453 -- -- -- (!) 148/47 98 %   05/16/21 0452 -- 71 16 -- 98 %   05/16/21 0428 -- 94 25 (!) 181/47 97 %   05/16/21 0408 -- 96 29 (!) 180/49 98 %   05/16/21 0337 98.5 °F (36.9 °C) -- -- -- --   05/16/21 0300 -- 98 28 (!) 174/49 (!) 89 %   05/16/21 0200 -- 87 17 (!) 174/45 96 %   05/16/21 0100 -- 87 19 (!) 156/63 95 %   05/16/21 0038 -- 82 -- -- --   05/16/21 0000 99 °F (37.2 °C) 85 18 (!) 166/46 97 %   05/15/21 2300 -- 83 16 (!) 162/49 97 %   05/15/21 2200 -- 82 16 (!) 161/44 97 %   05/15/21 2100 -- 93 26 (!) 175/49 96 %   05/15/21 2000 -- 91 (!) 33 (!) 173/54 92 %   05/15/21 1949 98.9 °F (37.2 °C) 93 29 (!) 173/54 91 %   05/15/21 1900 -- 91 29 (!) 166/50 92 %   05/15/21 1506 98.1 °F (36.7 °C) 89 26 (!) 151/47 92 %   05/15/21 1445 -- 78 -- -- --     05/16 0701 - 05/16 1900  In: 170 [P.O.:170]  Out: 250 [Urine:250]  Physical Exam:  General:AAOx3, in NAD  HEENT: AT/NC, MMM  Neck:Supple,full ROM  Lungs: satting well on NC, no use of accessory muscles  CV:RRR, S1 S2 normal  Abdomen:Soft,NT  Extremities: moves all, 3+ pitting LE edema up to midshins  Skin:No visible rash or lesions  NEURO: grossly nonfocal  Psych: appropriate affect/mood      CODE STATUS:  full  Care Plan discussed with:  Pt, RN, Jo    Chart reviewed.    y Reviewed previous records   y Discussion with patient and/or family and questions answered Lab Data Personally Reviewed: (see below)  Recent Labs     05/16/21  0426 05/15/21  0352 05/14/21  1503   WBC 8.9 8.5 9.1   HGB 8.0* 7.7* 8.6*    224 266   ANEU 5.6  --  6.2    143 143   K 4.5 4.0 3.9   * 96 113*   BUN 91* 83* 83*   CREA 5.79* 5.41* 5.22*   ALT  --  20 23   TBILI  --  0.6 0.6   AP  --  79 91   CA 7.6*  7.9* 8.1* 8.4*   MG 2.6* 2.6*  --    PHOS 7.4* 7.0*  --      Lab Results   Component Value Date/Time    Color YELLOW/STRAW 08/24/2019 05:54 PM    Appearance CLOUDY (A) 08/24/2019 05:54 PM    Specific gravity 1.009 08/24/2019 05:54 PM    Specific gravity 1.020 02/20/2019 12:54 PM    pH (UA) 5.5 08/24/2019 05:54 PM    Protein 100 (A) 08/24/2019 05:54 PM    Glucose NEGATIVE  08/24/2019 05:54 PM    Ketone NEGATIVE  08/24/2019 05:54 PM    Bilirubin NEGATIVE  08/24/2019 05:54 PM    Urobilinogen 0.2 08/24/2019 05:54 PM    Nitrites NEGATIVE  08/24/2019 05:54 PM    Leukocyte Esterase NEGATIVE  08/24/2019 05:54 PM    Epithelial cells FEW 08/24/2019 05:54 PM    Bacteria NEGATIVE  08/24/2019 05:54 PM    WBC 0-4 08/24/2019 05:54 PM    RBC 0-5 08/24/2019 05:54 PM       Lab Results   Component Value Date/Time    Iron 42 05/16/2021 04:26 AM    TIBC 307 05/16/2021 04:26 AM    Iron % saturation 14 (L) 05/16/2021 04:26 AM    Ferritin 115 05/16/2021 04:26 AM     Lab Results   Component Value Date/Time    Culture result: MRSA NOT PRESENT 08/07/2019 03:57 PM    Culture result:  08/07/2019 03:57 PM         Screening of patient nares for MRSA is for surveillance purposes and, if positive, to facilitate isolation considerations in high risk settings. It is not intended for automatic decolonization interventions per se as regimens are not sufficiently effective to warrant routine use.     Culture result: KLEBSIELLA PNEUMONIAE (A) 08/07/2019 03:57 PM                Imaging:    Medications list Personally Reviewed   [x]      Yes     []               No    No current facility-administered medications on file prior to encounter. Current Outpatient Medications on File Prior to Encounter   Medication Sig Dispense Refill    calcium acetate,phosphat bind, (PHOSLO) 667 mg cap Take 1 Cap by mouth three (3) times daily (with meals).  hydrALAZINE (APRESOLINE) 50 mg tablet Take 50 mg by mouth two (2) times a day.  minoxidiL (LONITEN) 2.5 mg tablet Take 2.5 mg by mouth two (2) times a day.  sodium bicarbonate 650 mg tablet Take 650 mg by mouth two (2) times a day.  omega-3 acid ethyl esters (LOVAZA) 1 gram capsule Take 1 g by mouth two (2) times a day.  bumetanide (BUMEX) 2 mg tablet Take 2 mg by mouth daily.  aspirin delayed-release 81 mg tablet Take 81 mg by mouth daily.  amLODIPine (NORVASC) 10 mg tablet Take 10 mg by mouth every evening.  montelukast (Singulair) 10 mg tablet Take 10 mg by mouth every evening.  fluticasone propionate (Flonase Allergy Relief) 50 mcg/actuation nasal spray 2 Sprays by Both Nostrils route every evening.  acetaminophen (TylenoL) 325 mg tablet Take  by mouth as needed for Pain.  polyvinyl alcohol-povidon,PF, (REFRESH CLASSIC) 1.4-0.6 % ophthalmic solution Administer 1-2 Drops to both eyes as needed.  levETIRAcetam (KEPPRA) 250 mg tablet Take 1 Tab by mouth two (2) times a day for 90 days. Anti-seizure medication. 180 Tab 1    carvedilol (COREG) 6.25 mg tablet Take 1 Tab by mouth two (2) times daily (with meals). 60 Tab 0    pravastatin (PRAVACHOL) 40 mg tablet Take 40 mg by mouth nightly.        Current Facility-Administered Medications   Medication Dose Route Frequency    minoxidiL (LONITEN) tablet 2.5 mg  2.5 mg Oral BID WITH MEALS    sodium bicarbonate tablet 650 mg  650 mg Oral TID    hydrALAZINE (APRESOLINE) tablet 100 mg  100 mg Oral BID    bumetanide (BUMEX) injection 3 mg  3 mg IntraVENous TID    epoetin millie-epbx (RETACRIT) injection 20,000 Units  20,000 Units SubCUTAneous Q7D    sevelamer carbonate (RENVELA) tab 800 mg  800 mg Oral TID WITH MEALS    hydrALAZINE (APRESOLINE) 20 mg/mL injection 20 mg  20 mg IntraVENous Q6H PRN    nitroglycerin (NITROBID) 2 % ointment 1 Inch  1 Inch Topical BID    labetaloL (NORMODYNE;TRANDATE) 20 mg/4 mL (5 mg/mL) injection 20 mg  20 mg IntraVENous Q4H PRN    amLODIPine (NORVASC) tablet 10 mg  10 mg Oral DAILY    aspirin delayed-release tablet 81 mg  81 mg Oral DAILY    carvediloL (COREG) tablet 6.25 mg  6.25 mg Oral BID WITH MEALS    levETIRAcetam (KEPPRA) tablet 250 mg  250 mg Oral BID    montelukast (SINGULAIR) tablet 10 mg  10 mg Oral DAILY    pravastatin (PRAVACHOL) tablet 40 mg  40 mg Oral QHS    sodium chloride (NS) flush 5-40 mL  5-40 mL IntraVENous Q8H    sodium chloride (NS) flush 5-40 mL  5-40 mL IntraVENous PRN    0.9% sodium chloride infusion 25 mL  25 mL IntraVENous PRN    acetaminophen (TYLENOL) tablet 650 mg  650 mg Oral Q6H PRN    Or    acetaminophen (TYLENOL) suppository 650 mg  650 mg Rectal Q6H PRN    senna-docusate (PERICOLACE) 8.6-50 mg per tablet 1 Tab  1 Tab Oral BID    bisacodyL (DULCOLAX) suppository 10 mg  10 mg Rectal DAILY PRN    promethazine (PHENERGAN) tablet 12.5 mg  12.5 mg Oral Q6H PRN    Or    ondansetron (ZOFRAN) injection 4 mg  4 mg IntraVENous Q6H PRN    heparin (porcine) injection 5,000 Units  5,000 Units SubCUTAneous Q8H     Signed By: Curtis Huddleston MD  [facility]      [email or web address]

## 2021-05-16 NOTE — ROUTINE PROCESS
IR to come in this evening for karina placement. Dr. Bc Bradley aware. RN called Barry Duran to notify of HD to be completed today.

## 2021-05-16 NOTE — PROGRESS NOTES
Problem: Self Care Deficits Care Plan (Adult)  Goal: *Acute Goals and Plan of Care (Insert Text)  Description:   FUNCTIONAL STATUS PRIOR TO ADMISSION: Pt ambulates using rollator walker, does not drive. HOME SUPPORT PRIOR TO ADMISSION: Pt lived alone in condo/apartment with family living on different floor of same building. Occupational Therapy Goals  Initiated 5/16/2021  1. Patient will perform grooming in standing with supervision/set-up and O2 sats 90% and greater within 7 day(s). 2.  Patient will perform lower body dressing with supervision/set-up and O2 sats 90% and greater within 7 day(s). 3.  Patient will perform toilet transfers with supervision/set-up within 7 day(s). 4.  Patient will perform all aspects of toileting with supervision/set-up and O2 sats 90% and greater within 7 day(s). 5.  Patient will participate in upper extremity therapeutic exercise/activities with modified independence for 10 minutes within 7 day(s). 6.  Patient will utilize energy conservation techniques during functional activities with verbal and visual cues within 7 day(s). Outcome: Progressing Towards Goal     OCCUPATIONAL THERAPY EVALUATION  Patient: Jolie Bledsoe (89 y.o. female)  Date: 5/16/2021  Primary Diagnosis: Acute respiratory failure with hypoxia ([facility] [J96.01]        Precautions:  Fall    ASSESSMENT  Based on the objective data described below, the patient presents with SOB with increased RR. O2 sats initially on 1L O2 93%, decreased to 85% with EOB mobility, unable to recover to >87% on 2L and increased to 4L per RN and able to recover within 3 min. Of PLB to 90%. In addition pt limited by decreased strength, endurance, mobility, balance and safety. She requires significant physical assist, 4L O2 and frequent rest breaks to perform light ADLs and brief mobility at bedside. Recommend SNF at discharge.     Current Level of Function Impacting Discharge (ADLs/self-care): max-total A LE ADLs and toileting, max A bed mobility, min-mod A x2 standing and amb up side of bed using RW    Functional Outcome Measure: The patient scored Total: 40/100 on the Barthel Index outcome measure. Other factors to consider for discharge: see above     Patient will benefit from skilled therapy intervention to address the above noted impairments. PLAN :  Recommendations and Planned Interventions: self care training, functional mobility training, therapeutic exercise, balance training, therapeutic activities, endurance activities, patient education, home safety training, and family training/education    Frequency/Duration: Patient will be followed by occupational therapy 5 times a week to address goals. Recommendation for discharge: (in order for the patient to meet his/her long term goals)  Therapy up to 5 days/week in SNF setting    This discharge recommendation:  Has not yet been discussed the attending provider and/or case management    IF patient discharges home will need the following DME: TBD       SUBJECTIVE:   Patient stated I want to try.     OBJECTIVE DATA SUMMARY:   HISTORY:   Past Medical History:   Diagnosis Date    Anxiety     Arthritis     Basal [facility] 2019    Left    CKD (chronic kidney disease) stage 3, GFR 30-59 ml/min ([facility])     High cholesterol     Hx of seasonal allergies     Hypertension     Ischemic stroke ([facility]     Right occipital    Monoclonal gammopathy 2018    ABIGAIL on CPAP     Seizures ([facility]     last keppra level- 2019 @ 15   Last seizure 2017    Urinary incontinence      Past Surgical History:   Procedure Laterality Date    HX APPENDECTOMY N/A     HX CATARACT REMOVAL Bilateral 2018    HX  SECTION N/A     x 2    HX HEMORRHOIDECTOMY      HX HYSTERECTOMY      HX KNEE REPLACEMENT Right 2019    HX TONSILLECTOMY         Expanded or extensive additional review of patient history:     Home Situation  Home Environment: Private residence(condo)  # Steps to Enter: 0(elevator)  One/Two Story Residence: One story  Living Alone: Yes  Support Systems: Child(shauna), Family member(s)(son lives on 4th floor)  Patient Expects to be Discharged to[de-identified] Private residence(condo)  Current DME Used/Available at Home: CPAP, Cane, quad, Walker, rollator, Safety frame [employer], [employer], Shower chair, Wheelchair  Tub or Shower Type: Shower    Hand dominance: Right    EXAMINATION OF PERFORMANCE DEFICITS:  Cognitive/Behavioral Status:  Neurologic State: Alert; Appropriate for age  Orientation Level: Oriented X4  Cognition: Appropriate decision making; Appropriate for age attention/concentration; Appropriate safety awareness; Follows commands  Perception: Appears intact  Perseveration: No perseveration noted  Safety/Judgement: Awareness of environment; Fall prevention; Insight into deficits    Hearing: Auditory  Auditory Impairment: Hard of hearing, bilateral, Hearing aid(s)  Hearing Aids/Status: Bilateral    Vision/Perceptual:    Acuity: Within Defined Limits    Corrective Lenses: Reading glasses    Range of Motion:  AROM: Generally decreased, functional                         Strength:  Strength: Generally decreased, functional                Coordination:  Coordination: Generally decreased, functional  Fine Motor Skills-Upper: Left Impaired;Right Intact    Gross Motor Skills-Upper: Left Intact; Right Intact    Tone & Sensation:  Tone: Normal  Sensation: Impaired(numbness L hand and B feet)                      Balance:  Sitting: Intact  Standing: Impaired; With support  Standing - Static: Fair  Standing - Dynamic : Fair    Functional Mobility and Transfers for ADLs:  Bed Mobility:  Rolling: Maximum assistance; Additional time;Assist x1  Supine to Sit: Maximum assistance; Additional time;Assist x2  Sit to Supine: Maximum assistance; Additional time;Assist x2  Scooting: Moderate assistance;Assist x1    Transfers:  Sit to Stand: Minimum assistance; Additional time;Assist x2  Stand to Sit: Minimum assistance; Additional time;Assist x1  Toilet Transfer : Minimum assistance;Assist x2; Additional time(RW and BSC)  Assistive Device : Gait Belt;Walker, rolling    ADL Assessment:  Feeding: Modified independent    Oral Facial Hygiene/Grooming: Setup; Additional time(frequent rest breask, seated supported in bed)    Bathing: Moderate assistance; Additional time;Assist x1(poor endurance, CARRANZA)    Upper Body Dressing: Stand-by assistance; Additional time(seated EOB)    Lower Body Dressing: Total assistance    Toileting: Total assistance                ADL Intervention and task modifications:  Patient instructed and indicated understanding energy conservation techniques to increase independence and safety during ADLs. Cognitive Retraining  Safety/Judgement: Awareness of environment; Fall prevention; Insight into deficits      Functional Measure:  Barthel Index:    Bathin  Bladder: 5  Bowels: 10  Groomin  Dressin  Feeding: 10  Mobility: 0  Stairs: 0  Toilet Use: 5  Transfer (Bed to Chair and Back): 5  Total: 40/100        The Barthel ADL Index: Guidelines  1. The index should be used as a record of what a patient does, not as a record of what a patient could do. 2. The main aim is to establish degree of independence from any help, physical or verbal, however minor and for whatever reason. 3. The need for supervision renders the patient not independent. 4. A patient's performance should be established using the best available evidence. Asking the patient, friends/relatives and nurses are the usual sources, but direct observation and common sense are also important. However direct testing is not needed. 5. Usually the patient's performance over the preceding 24-48 hours is important, but occasionally longer periods will be relevant. 6. Middle categories imply that the patient supplies over 50 per cent of the effort. 7. Use of aids to be independent is allowed. Jaquelyn Der., Barthel, D.W. (5205). Functional evaluation: the Barthel Index. [address] [facility] (14)2. MATHEUS Pacheco, Stella Ahuja., Alina Craft., [place], [address] (). Measuring the change indisability after inpatient rehabilitation; comparison of the responsiveness of the Barthel Index and Functional [place] Measure. Journal of Neurology, Neurosurgery, and Psychiatry, 66(4), 974-552. OXANA Tsai, EDITA Ramirez, & Vera Gann M.A. (2004.) Assessment of post-stroke quality of life in cost-effectiveness studies: The usefulness of the Barthel Index and the EuroQoL-5D. Quality of Life Research, 15, 233-29         Occupational Therapy Evaluation Charge Determination   History Examination Decision-Making   LOW Complexity : Brief history review  HIGH Complexity : 5 or more performance deficits relating to physical, cognitive , or psychosocial skils that result in activity limitations and / or participation restrictions MEDIUM Complexity : Patient may present with comorbidities that affect occupational performnce. Miniml to moderate modification of tasks or assistance (eg, physical or verbal ) with assesment(s) is necessary to enable patient to complete evaluation       Based on the above components, the patient evaluation is determined to be of the following complexity level: LOW   Pain Ratin/10    Activity Tolerance:   Poor, desaturates with exertion and requires oxygen, requires frequent rest breaks, and observed SOB with activity    After treatment patient left in no apparent distress:    Supine in bed, Call bell within reach, and Bed / chair alarm activated    COMMUNICATION/EDUCATION:   The patients plan of care was discussed with: Registered nurse and Rehabilitation technician. Home safety education was provided and the patient/caregiver indicated understanding., Patient/family have participated as able in goal setting and plan of care. , and Patient/family agree to work toward stated goals and plan of care.    This patients plan of care is appropriate for delegation to NATHALIA.     Thank you for this referral.  Gretchen Ying, OT  Time Calculation: 26 mins

## 2021-05-16 NOTE — ROUTINE PROCESS
Bedside shift change report given to BRUCE KANG [facility], RN(oncoming nurse) by Holley Stoddard (offgoing nurse). Report included the following information SBAR, Kardex, ED Summary, Procedure Summary, Intake/Output, MAR, Accordion, Recent Results, Med Rec Status and Cardiac Rhythm NSR.

## 2021-05-16 NOTE — PROGRESS NOTES
Miguel Angel Watson[facility] juan [place] [address]  [address] [facility], [address] [place] [address]  [phone]      Medical Progress Note      NAME: Adelita Kim   :  1940  MRM:  [id number]    Date/Time: 2021        Assessment / Plan:      80 yo F w/ hx of HTN, CVA, CKD 5 presenting w/ dyspnea, found to have AHRF 2/2 ADHF     Acute respiratory failure with hypoxia: 2/2 ADHF/pulm edema, volume overload from renal failure. Off BiPAP. Titrate supplemental O2 PRN     Diastolic CHF, acute on chronic: due to worsening renal failure / volume overload. Cont IV diuretics; will need dialysis soon. Appreciate cardiology and nephrology input, noted increase in Bumex dosing, coupled with metolazone yesterday. Need better BP control. Cont Coreg, Norvasc, hydralazine. Resumed home minoxidil,. TTE pending     Elevated troponin: type 2 MI / demand ischemia from volume overload, renal failure. NOT ACS. Cont ASA, BB, statin. Evaluated by cardiology     Malignant HTN urgency: better, but remains uncontrolled. Cont antihypertensives as above     SHARON/CKD 5: approaching ESRD, and will need RRT soon. Defer timing to nephrology. Fistula not yet ready per nephrology. Will need HD catheter. RUE edema: fistula in LUE but not swollen. Asymmetric edema in RUE. Check duplex     Hx of CVA: cont ASA, statin. Treat HTN as above    Seizure d/o: Cont keppra     Anemia: likely ACD, CKD however iron panel and ferritin equivocal. Send FOBT     Morbid obesity: Body mass index is 40.25 kg/m². Total time spent: 35 minutes  Time spent in the care of this patient including reviewing records, discussing with nursing and/or other providers on the treatment team, obtaining history and examining the patient, and discussing treatment plans.                   Care Plan discussed with: Patient, Nursing Staff and >50% of time spent in counseling and coordination of care    Discussed:  Care Plan and D/C Planning    Prophylaxis:  Hep SQ    Disposition:  PT, OT, RN         Subjective:     Chief Complaint:  Follow up SOB    Chart/notes/labs/studies reviewed, patient examined. Feels okay. R arm swollen. No CP. Dyspnea stable. No fevers          Objective:       Vitals:        Last 24hrs VS reviewed since prior progress note. Most recent are:    Visit Vitals  BP (!) 167/50   Pulse 81   Temp 98.2 °F (36.8 °C)   Resp 22   Ht 5' 1\" (1.549 m)   Wt 96.6 kg (213 lb)   SpO2 100%   BMI 40.25 kg/m²     SpO2 Readings from Last 6 Encounters:   05/16/21 100%   03/10/21 99%   02/19/21 95%   01/30/20 97%   08/28/19 98%   08/07/19 98%    O2 Flow Rate (L/min): 3 l/min       Intake/Output Summary (Last 24 hours) at 5/16/2021 0984  Last data filed at 5/16/2021 0740  Gross per 24 hour   Intake 1420 ml   Output 250 ml   Net 1170 ml          Exam:     Physical Exam:    Gen:  Obese. Elderly, chronically ill-appearing  HEENT:  Atraumatic, normocephalic. Sclerae nonicteric, hearing intact to voice  Neck:  Supple, without apparent masses. Resp:  No accessory muscle use, bibasilar rales without wheezes, or rhonchi  Card: RRR, without m/r/g. 2+ BLE edema. 2+ RUE edema  Abd:  +bowel sounds, soft, NTTP, nondistended. No HSM  Neuro: Face symmetric, speech fluent, follows commands appropriately, no focal weakness or numbness  Psych:  Alert, oriented x 3.  Good insight     Medications Reviewed: (see below)    Lab Data Reviewed: (see below)    ______________________________________________________________________    Medications:     Current Facility-Administered Medications   Medication Dose Route Frequency    minoxidiL (LONITEN) tablet 2.5 mg  2.5 mg Oral BID WITH MEALS    hydrALAZINE (APRESOLINE) tablet 100 mg  100 mg Oral BID    bumetanide (BUMEX) injection 3 mg  3 mg IntraVENous TID    sodium bicarbonate tablet 650 mg  650 mg Oral BID    epoetin millie-epbx (RETACRIT) injection 20,000 Units  20,000 Units SubCUTAneous Q7D    sevelamer carbonate (RENVELA) tab 800 mg  800 mg Oral TID WITH MEALS    hydrALAZINE (APRESOLINE) 20 mg/mL injection 20 mg  20 mg IntraVENous Q6H PRN    nitroglycerin (NITROBID) 2 % ointment 1 Inch  1 Inch Topical BID    labetaloL (NORMODYNE;TRANDATE) 20 mg/4 mL (5 mg/mL) injection 20 mg  20 mg IntraVENous Q4H PRN    amLODIPine (NORVASC) tablet 10 mg  10 mg Oral DAILY    aspirin delayed-release tablet 81 mg  81 mg Oral DAILY    carvediloL (COREG) tablet 6.25 mg  6.25 mg Oral BID WITH MEALS    levETIRAcetam (KEPPRA) tablet 250 mg  250 mg Oral BID    montelukast (SINGULAIR) tablet 10 mg  10 mg Oral DAILY    pravastatin (PRAVACHOL) tablet 40 mg  40 mg Oral QHS    sodium chloride (NS) flush 5-40 mL  5-40 mL IntraVENous Q8H    sodium chloride (NS) flush 5-40 mL  5-40 mL IntraVENous PRN    0.9% sodium chloride infusion 25 mL  25 mL IntraVENous PRN    acetaminophen (TYLENOL) tablet 650 mg  650 mg Oral Q6H PRN    Or    acetaminophen (TYLENOL) suppository 650 mg  650 mg Rectal Q6H PRN    senna-docusate (PERICOLACE) 8.6-50 mg per tablet 1 Tab  1 Tab Oral BID    bisacodyL (DULCOLAX) suppository 10 mg  10 mg Rectal DAILY PRN    promethazine (PHENERGAN) tablet 12.5 mg  12.5 mg Oral Q6H PRN    Or    ondansetron (ZOFRAN) injection 4 mg  4 mg IntraVENous Q6H PRN    heparin (porcine) injection 5,000 Units  5,000 Units SubCUTAneous Q8H            Lab Review:     Recent Labs     05/16/21  0426 05/15/21  0352 05/14/21  1503   WBC 8.9 8.5 9.1   HGB 8.0* 7.7* 8.6*   HCT 26.3* 24.8* 28.3*    224 266     Recent Labs     05/16/21  0426 05/15/21  0352 05/14/21  1503    143 143   K 4.5 4.0 3.9    110* 109*   CO2 19* 24 24   * 96 113*   BUN 91* 83* 83*   CREA 5.79* 5.41* 5.22*   CA 7.9* 8.1* 8.4*   MG 2.6* 2.6*  --    PHOS 7.4* 7.0*  --    ALB 2.7* 2.8* 3.1*   ALT  --  20 23     No components found for: [facility]  Recent Labs     05/14/21  2080   PH 7.38   PCO2 35   PO2 84   HCO3 20*     No results for input(s): INR, INREXT, INREXT in the last 72 hours.   No results found for: SDES  Lab Results   Component Value Date/Time    Culture result: MRSA NOT PRESENT 08/07/2019 03:57 PM    Culture result:  08/07/2019 03:57 PM         Screening of patient nares for MRSA is for surveillance purposes and, if positive, to facilitate isolation considerations in high risk settings. It is not intended for automatic decolonization interventions per se as regimens are not sufficiently effective to warrant routine use.     Culture result: KLEBSIELLA PNEUMONIAE (A) 08/07/2019 03:57 PM              ___________________________________________________    Attending Physician: Harry Giron MD

## 2021-05-16 NOTE — ROUTINE PROCESS
0815 - Pt has had poor output overnight, night shift RN reported 250cc for the entire night. Will bladder scan pt and continue to monitor. Pt to receive IV bumex this morning. Pt also has RUE swelling, but LUE with fistula does not appear to be swollen. Notified Dr. Petar Nathan, duplex ordered. Will continue to monitor . 1030 - This morning pt was given IV bumex and has had total of 250cc output since medication given. RN bladder scanned patient but showed 0 cc in bladder. Notified Dr. Petar Nathan. Will continue to monitor. 1200 - Pt worked with PT this afternoon. Was on 2L NC but when ambulating sats dropped to 85%, pt required 4L NC. Will continue to monitor and wean as able.

## 2021-05-16 NOTE — PROGRESS NOTES
1900: Bedside and Verbal shift change report given to Bhumi SORIANO RN (oncoming nurse) by Luis Mcallister (offgoing nurse). Report included the following information SBAR, Kardex, Intake/Output, Recent Results, Cardiac Rhythm SR and Alarm Parameters . 1945: [facility] Agent dialysis at bedside. 2000: Shift  Assessment completed. Mental Status: A/Ox4            Respiratory: Lungs diminished on 2L NC            Cardiac: SR on the monitor. GI/: Active bowel sounds, purewick in place. IV Lines/Drips: RAC 20G, RIJ Kurtis, L arm fistula. 2245: Dialysis completed. Pt repositioned. 2255: Pt c/o HA, PRN tylenol given for pain. 0000: Reassessment completed. No changes from previous assessment. Pt repositioned. 0200: Pt repositioned. 0345: Labs drawn. 0400: Reassessment completed. No changes from previous assessment. Full CHG bath completed, purewick changed. 0600: Pt repositioned. 0700: Bedside and Verbal shift change report given to Nino Serra (oncoming nurse) by Venu Aquino RN (offgoing nurse). Report included the following information SBAR, Kardex, Intake/Output, Recent Results, Cardiac Rhythm SR and Alarm Parameters .

## 2021-05-16 NOTE — ROUTINE PROCESS
1745 - IR at bedside now to place karina.  Jayla Agent called to notify RN they will be by within the hour    1815 - xray at bedside to verify placement    1927 - Jayla Agent at bedside

## 2021-05-16 NOTE — PROGRESS NOTES
TRANSFER - OUT REPORT:    Verbal report given to [facility] RN(name) on Opal Arriaza being transferred to [facility](unit) for routine progression of care       Report consisted of patient's Situation, Background, Assessment and   Recommendations(SBAR). Information from the following report(s) Procedure Summary was reviewed with the receiving nurse. Opportunity for questions and clarification was provided.

## 2021-05-16 NOTE — ROUTINE PROCESS
1330-Chart accessed for review due to elevated MEWS 3. Pt with renal failure, CHF, fluid overload and related respiratory failure appears to have increased respirations at 30bpm and hypertension. Nephrology attempting to diurese with little result, pt will likely need HD soon. Sats 91% on 2L NC. Primary RN has noted concerning issues and is monitoring; no new interventions to suggest. Will follow.  Antonio TALLEY

## 2021-05-17 LAB
ALBUMIN SERPL-MCNC: 2.6 G/DL (ref 3.5–5)
ANION GAP SERPL CALC-SCNC: 7 MMOL/L (ref 5–15)
BASOPHILS # BLD: 0 K/UL (ref 0–0.1)
BASOPHILS NFR BLD: 0 % (ref 0–1)
BUN SERPL-MCNC: 57 MG/DL (ref 6–20)
BUN/CREAT SERPL: 13 (ref 12–20)
CALCIUM SERPL-MCNC: 7.9 MG/DL (ref 8.5–10.1)
CHLORIDE SERPL-SCNC: 107 MMOL/L (ref 97–108)
CO2 SERPL-SCNC: 26 MMOL/L (ref 21–32)
CREAT SERPL-MCNC: 4.49 MG/DL (ref 0.55–1.02)
DIFFERENTIAL METHOD BLD: ABNORMAL
EOSINOPHIL # BLD: 0.2 K/UL (ref 0–0.4)
EOSINOPHIL NFR BLD: 2 % (ref 0–7)
ERYTHROCYTE [DISTWIDTH] IN BLOOD BY AUTOMATED COUNT: 12.6 % (ref 11.5–14.5)
GLUCOSE SERPL-MCNC: 90 MG/DL (ref 65–100)
HBV SURFACE AB SER QL: NONREACTIVE
HBV SURFACE AB SER-ACNC: <3.1 MIU/ML
HBV SURFACE AG SER QL: <0.1 INDEX
HBV SURFACE AG SER QL: NEGATIVE
HCT VFR BLD AUTO: 23.9 % (ref 35–47)
HGB BLD-MCNC: 7.5 G/DL (ref 11.5–16)
IMM GRANULOCYTES # BLD AUTO: 0.1 K/UL (ref 0–0.04)
IMM GRANULOCYTES NFR BLD AUTO: 1 % (ref 0–0.5)
LYMPHOCYTES # BLD: 2.2 K/UL (ref 0.8–3.5)
LYMPHOCYTES NFR BLD: 26 % (ref 12–49)
MAGNESIUM SERPL-MCNC: 2.3 MG/DL (ref 1.6–2.4)
MCH RBC QN AUTO: 30.4 PG (ref 26–34)
MCHC RBC AUTO-ENTMCNC: 31.4 G/DL (ref 30–36.5)
MCV RBC AUTO: 96.8 FL (ref 80–99)
MONOCYTES # BLD: 0.8 K/UL (ref 0–1)
MONOCYTES NFR BLD: 9 % (ref 5–13)
NEUTS SEG # BLD: 5 K/UL (ref 1.8–8)
NEUTS SEG NFR BLD: 61 % (ref 32–75)
NRBC # BLD: 0 K/UL (ref 0–0.01)
NRBC BLD-RTO: 0 PER 100 WBC
PHOSPHATE SERPL-MCNC: 6.1 MG/DL (ref 2.6–4.7)
PLATELET # BLD AUTO: 219 K/UL (ref 150–400)
PMV BLD AUTO: 9.2 FL (ref 8.9–12.9)
POTASSIUM SERPL-SCNC: 4.1 MMOL/L (ref 3.5–5.1)
RBC # BLD AUTO: 2.47 M/UL (ref 3.8–5.2)
SODIUM SERPL-SCNC: 140 MMOL/L (ref 136–145)
WBC # BLD AUTO: 8.2 K/UL (ref 3.6–11)

## 2021-05-17 PROCEDURE — 90935 HEMODIALYSIS ONE EVALUATION: CPT

## 2021-05-17 PROCEDURE — 74011250636 HC RX REV CODE- 250/636: Performed by: INTERNAL MEDICINE

## 2021-05-17 PROCEDURE — 02HV33Z INSERTION OF INFUSION DEVICE INTO SUPERIOR VENA CAVA, PERCUTANEOUS APPROACH: ICD-10-PCS | Performed by: RADIOLOGY

## 2021-05-17 PROCEDURE — 74011250637 HC RX REV CODE- 250/637: Performed by: INTERNAL MEDICINE

## 2021-05-17 PROCEDURE — 83735 ASSAY OF MAGNESIUM: CPT

## 2021-05-17 PROCEDURE — 97535 SELF CARE MNGMENT TRAINING: CPT | Performed by: OCCUPATIONAL THERAPIST

## 2021-05-17 PROCEDURE — 36415 COLL VENOUS BLD VENIPUNCTURE: CPT

## 2021-05-17 PROCEDURE — 86803 HEPATITIS C AB TEST: CPT

## 2021-05-17 PROCEDURE — 65660000000 HC RM CCU STEPDOWN

## 2021-05-17 PROCEDURE — 77010033678 HC OXYGEN DAILY

## 2021-05-17 PROCEDURE — 85025 COMPLETE CBC W/AUTO DIFF WBC: CPT

## 2021-05-17 PROCEDURE — 80069 RENAL FUNCTION PANEL: CPT

## 2021-05-17 PROCEDURE — 74011000250 HC RX REV CODE- 250: Performed by: INTERNAL MEDICINE

## 2021-05-17 RX ORDER — DOXERCALCIFEROL 4 UG/2ML
2 INJECTION INTRAVENOUS
Status: DISCONTINUED | OUTPATIENT
Start: 2021-05-17 | End: 2021-05-24 | Stop reason: HOSPADM

## 2021-05-17 RX ADMIN — Medication 10 ML: at 13:07

## 2021-05-17 RX ADMIN — AMLODIPINE BESYLATE 10 MG: 5 TABLET ORAL at 08:56

## 2021-05-17 RX ADMIN — BUMETANIDE 3 MG: 0.25 INJECTION INTRAMUSCULAR; INTRAVENOUS at 08:55

## 2021-05-17 RX ADMIN — ASPIRIN 81 MG: 81 TABLET, COATED ORAL at 08:57

## 2021-05-17 RX ADMIN — HEPARIN SODIUM 5000 UNITS: 5000 INJECTION INTRAVENOUS; SUBCUTANEOUS at 08:56

## 2021-05-17 RX ADMIN — SODIUM BICARBONATE 650 MG: 650 TABLET ORAL at 21:45

## 2021-05-17 RX ADMIN — ONDANSETRON 4 MG: 2 INJECTION INTRAMUSCULAR; INTRAVENOUS at 20:53

## 2021-05-17 RX ADMIN — MONTELUKAST 10 MG: 10 TABLET, FILM COATED ORAL at 08:57

## 2021-05-17 RX ADMIN — HEPARIN SODIUM 5000 UNITS: 5000 INJECTION INTRAVENOUS; SUBCUTANEOUS at 18:09

## 2021-05-17 RX ADMIN — SODIUM BICARBONATE 650 MG: 650 TABLET ORAL at 08:57

## 2021-05-17 RX ADMIN — IRON SUCROSE 100 MG: 20 INJECTION, SOLUTION INTRAVENOUS at 22:01

## 2021-05-17 RX ADMIN — NITROGLYCERIN 1 INCH: 20 OINTMENT TOPICAL at 08:56

## 2021-05-17 RX ADMIN — ACETAMINOPHEN 650 MG: 325 TABLET ORAL at 09:43

## 2021-05-17 RX ADMIN — DOCUSATE SODIUM 50MG AND SENNOSIDES 8.6MG 1 TABLET: 8.6; 5 TABLET, FILM COATED ORAL at 20:53

## 2021-05-17 RX ADMIN — SEVELAMER CARBONATE 800 MG: 800 TABLET, FILM COATED ORAL at 13:03

## 2021-05-17 RX ADMIN — BUMETANIDE 3 MG: 0.25 INJECTION INTRAMUSCULAR; INTRAVENOUS at 18:09

## 2021-05-17 RX ADMIN — ACETAMINOPHEN 650 MG: 325 TABLET ORAL at 20:53

## 2021-05-17 RX ADMIN — CARVEDILOL 6.25 MG: 6.25 TABLET, FILM COATED ORAL at 08:55

## 2021-05-17 RX ADMIN — Medication 10 ML: at 06:20

## 2021-05-17 RX ADMIN — HYDRALAZINE HYDROCHLORIDE 100 MG: 25 TABLET, FILM COATED ORAL at 08:57

## 2021-05-17 RX ADMIN — Medication 10 ML: at 22:02

## 2021-05-17 RX ADMIN — SEVELAMER CARBONATE 800 MG: 800 TABLET, FILM COATED ORAL at 18:09

## 2021-05-17 RX ADMIN — BUMETANIDE 3 MG: 0.25 INJECTION INTRAMUSCULAR; INTRAVENOUS at 21:44

## 2021-05-17 RX ADMIN — LEVETIRACETAM 250 MG: 250 TABLET, FILM COATED ORAL at 08:57

## 2021-05-17 RX ADMIN — SEVELAMER CARBONATE 800 MG: 800 TABLET, FILM COATED ORAL at 08:56

## 2021-05-17 RX ADMIN — SODIUM BICARBONATE 650 MG: 650 TABLET ORAL at 18:09

## 2021-05-17 RX ADMIN — IRON SUCROSE 100 MG: 20 INJECTION, SOLUTION INTRAVENOUS at 08:55

## 2021-05-17 RX ADMIN — HEPARIN SODIUM 5000 UNITS: 5000 INJECTION INTRAVENOUS; SUBCUTANEOUS at 23:48

## 2021-05-17 RX ADMIN — HYDRALAZINE HYDROCHLORIDE 100 MG: 25 TABLET, FILM COATED ORAL at 18:09

## 2021-05-17 RX ADMIN — CARVEDILOL 6.25 MG: 6.25 TABLET, FILM COATED ORAL at 18:09

## 2021-05-17 RX ADMIN — DOCUSATE SODIUM 50MG AND SENNOSIDES 8.6MG 1 TABLET: 8.6; 5 TABLET, FILM COATED ORAL at 08:57

## 2021-05-17 RX ADMIN — MINOXIDIL 2.5 MG: 2.5 TABLET ORAL at 08:55

## 2021-05-17 RX ADMIN — NITROGLYCERIN 1 INCH: 20 OINTMENT TOPICAL at 18:08

## 2021-05-17 RX ADMIN — MINOXIDIL 2.5 MG: 2.5 TABLET ORAL at 18:09

## 2021-05-17 RX ADMIN — PRAVASTATIN SODIUM 40 MG: 20 TABLET ORAL at 21:45

## 2021-05-17 RX ADMIN — LEVETIRACETAM 250 MG: 250 TABLET, FILM COATED ORAL at 18:09

## 2021-05-17 RX ADMIN — DOXERCALCIFEROL 2 MCG: 4 INJECTION INTRAVENOUS at 22:00

## 2021-05-17 NOTE — NURSE NAVIGATOR
Chart reviewed by Heart Failure Nurse Navigator. Heart Failure database completed. Patient admitted with volume overload in setting of CKD 5 with dialysis planning underway to start in June prior to admission. Cardiology consulted for evaluation for heart failure. Per Cardiology note, volume overload due to worsening kidney function and not representative of CHF. EF:  55 to 60% with mild concentric hypertrophy, moderate PA HTN. ACEi/ARB/ARNi: Not indicated. BB: Coreg 6.25 mg BID. Aldosterone Antagonist: Not indicated. Hydralazine 100 mg BID. Obstructive Sleep Apnea Screening:  ABIGAIL and CPAP    CRT Not indicated. NYHA Functional Class **. Heart Failure Teach Back in Patient Education. Heart Failure Avoiding Triggers on Discharge Instructions. Cardiologist: Dr. Sarahi Burt consulted. Post discharge follow up phone call to be made within 48-72 hours of discharge.

## 2021-05-17 NOTE — ROUTINE PROCESS
Received order for perm HD catheter. Patient has not been NPO and heparin SQ has not been held. Not being discharged today. Will plan to place perm HD catheter tomorrow at 0915. Please keep NPO after 12M and hold 12M and 0800 heparin SQ. Discussed all the above with Siddharth Shah RN.

## 2021-05-17 NOTE — PROGRESS NOTES
CM follow up:    Order received for dialysis. Met with patient at bedside, face mask and goggles on. Discussed outpatient HD, choice is [facility]. Referral sent with clinicals via [facility] to [facility]. Await response. Patient is being evaluated by Junito Baez for MWF, 2nd shift. Patient still needs \"Hep B total core\" lab please.     Care Management Interventions  PCP Verified by CM: Yes(Dr. Anabel Mandujano)  Transition of Care Consult (CM Consult): Discharge Planning  Physical Therapy Consult: Yes  Occupational Therapy Consult: Yes  Current Support Network: Family Lives [place], Lives [facility]  Confirm Follow Up Transport: Family  The Plan for Transition of Care is Related to the Following Treatment Goals : hemodialysis OP  The Patient and/or Patient Representative was Provided with a Choice of Provider and Agrees with the Discharge Plan?: (S) Yes  Freedom of Choice List was Provided with Basic Dialogue that Supports the Patient's Individualized Plan of Care/Goals, Treatment Preferences and Shares the Quality Data Associated with the Providers?: (S) Yes  Discharge Location  Discharge Placement: Home with outpatient services    Odalys Ziegler RN, MSN/Care manager  [phone]

## 2021-05-17 NOTE — PROGRESS NOTES
[facility] 78    Adelita Kim  YOB: 1940          Assessment & Plan:   New ESRD  Volume +   HTN  Anemia of CKD  SHPT    Rec:  HD today and MWF  Check Hep serologies  CM c/s for outpt HD  On EPO, add IV iron  Start Hectorol  UF with HD, continue current BP meds  Change temp IJ to PC tomorrow       Subjective:   CC: f/u ESRD  HPI: HD last pm luis enrique well  ROS: no n/v/sob  Current Facility-Administered Medications   Medication Dose Route Frequency    iron sucrose (VENOFER) injection 100 mg  100 mg IntraVENous DIALYSIS MON, WED & FRI    doxercalciferoL (HECTOROL) 4 mcg/2 mL injection 2 mcg  2 mcg IntraVENous DIALYSIS MON, WED & FRI    minoxidiL (LONITEN) tablet 2.5 mg  2.5 mg Oral BID WITH MEALS    sodium bicarbonate tablet 650 mg  650 mg Oral TID    lidocaine (XYLOCAINE) 10 mg/mL (1 %) injection 10-30 mL  10-30 mL SubCUTAneous Multiple    hydrALAZINE (APRESOLINE) tablet 100 mg  100 mg Oral BID    bumetanide (BUMEX) injection 3 mg  3 mg IntraVENous TID    epoetin millie-epbx (RETACRIT) injection 20,000 Units  20,000 Units SubCUTAneous Q7D    sevelamer carbonate (RENVELA) tab 800 mg  800 mg Oral TID WITH MEALS    hydrALAZINE (APRESOLINE) 20 mg/mL injection 20 mg  20 mg IntraVENous Q6H PRN    nitroglycerin (NITROBID) 2 % ointment 1 Inch  1 Inch Topical BID    labetaloL (NORMODYNE;TRANDATE) 20 mg/4 mL (5 mg/mL) injection 20 mg  20 mg IntraVENous Q4H PRN    amLODIPine (NORVASC) tablet 10 mg  10 mg Oral DAILY    aspirin delayed-release tablet 81 mg  81 mg Oral DAILY    carvediloL (COREG) tablet 6.25 mg  6.25 mg Oral BID WITH MEALS    levETIRAcetam (KEPPRA) tablet 250 mg  250 mg Oral BID    montelukast (SINGULAIR) tablet 10 mg  10 mg Oral DAILY    pravastatin (PRAVACHOL) tablet 40 mg  40 mg Oral QHS    sodium chloride (NS) flush 5-40 mL  5-40 mL IntraVENous Q8H    sodium chloride (NS) flush 5-40 mL  5-40 mL IntraVENous PRN    0.9% sodium chloride infusion 25 mL  25 mL IntraVENous PRN    acetaminophen (TYLENOL) tablet 650 mg  650 mg Oral Q6H PRN    Or    acetaminophen (TYLENOL) suppository 650 mg  650 mg Rectal Q6H PRN    senna-docusate (PERICOLACE) 8.6-50 mg per tablet 1 Tab  1 Tab Oral BID    bisacodyL (DULCOLAX) suppository 10 mg  10 mg Rectal DAILY PRN    promethazine (PHENERGAN) tablet 12.5 mg  12.5 mg Oral Q6H PRN    Or    ondansetron (ZOFRAN) injection 4 mg  4 mg IntraVENous Q6H PRN    heparin (porcine) injection 5,000 Units  5,000 Units SubCUTAneous Q8H          Objective:     Vitals:  Blood pressure (!) 159/147, pulse 94, temperature 98.3 °F (36.8 °C), resp. rate 23, height 5' 1\" (1.549 m), weight 93.7 kg (206 lb 8 oz), SpO2 92 %. Temp (24hrs), Av.1 °F (36.7 °C), Min:97.7 °F (36.5 °C), Max:98.3 °F (36.8 °C)      Intake and Output:  No intake/output data recorded. 05/15 1901 -  0700  In: 572 [P.O.:572]  Out: 2150 [Urine:650]    Physical Exam:               GENERAL ASSESSMENT: NAD  NECK: RIJ temp HD cath  CHEST: CTA  HEART: S1S2  ABDOMEN: Soft,NT  EXTREMITY: +EDEMA          ECG/rhythm:    Data Review      No results for input(s): TNIPOC in the last 72 hours. No lab exists for component: ITNL   Recent Labs     05/15/21  0352 21  1503   TROIQ 0.21* 0.17*     Recent Labs     21  0345 21  0426 05/15/21  0352    137 143   K 4.1 4.5 4.0    106 110*   CO2 26 19* 24   BUN 57* 91* 83*   CREA 4.49* 5.79* 5.41*   GLU 90 102* 96   PHOS 6.1* 7.4* 7.0*   MG 2.3 2.6* 2.6*   CA 7.9* 7.6*  7.9* 8.1*   ALB 2.6* 2.7* 2.8*   WBC 8.2 8.9 8.5   HGB 7.5* 8.0* 7.7*   HCT 23.9* 26.3* 24.8*    240 224      No results for input(s): INR, PTP, APTT, INREXT in the last 72 hours. Needs: urine analysis, urine sodium, protein and creatinine  No results found for: KAYCEE GIPSON        : Alla Appiah MD  2021        [facility]:  [email or web address]  [email or web address]    Matthieu Mccollum office:  [address], [address] [place] [address],[address]  [place], [address] [facility]  Phone: [phone]  Fax :     [phone]    [place] office:  [address] [facility]  Leslee Soriano  Phone - [phone]  Fax - [phone]

## 2021-05-17 NOTE — PROGRESS NOTES
0700 Bedside and Verbal shift change report given to [address] [place] [address] (oncoming nurse) by Jin Robles RN (offgoing nurse). Report included the following information SBAR, Kardex and Cardiac Rhythm NSR    0930 Patient with large incontinent episode, pure wick did not capture urine. Patient bathed and linen changed. Patient tolerated fairly well. 0940 OT in with patient. 1040 Call received from IR lab, patient to have tunneled dialysis catheter placed tomorrow, Patient needs to be NP at midnight and hold subcutaneous heparin at 0000/0800.    1730 Call placed to [facility] to get an estimated time for dialysis for patient. 1750 Received return call from [facility], patient is on schedule and will be dialyzed no ETA at this time. Patient updated. 1900 Bedside and Verbal shift change report given to [address] (oncoming nurse) by [address] [place] [address] (offgoing nurse). Report included the following information SBAR, Kardex and Cardiac Rhythm NSR.

## 2021-05-17 NOTE — PROCEDURES
[facility] D[facility]  [phone]    Vitals   Pre   Post   Assessment   Pre   Post     Temp  98.0  98.3 LOC  A&O x4 A&O x4   HR   81 85 Lungs   No SOB  No SOB   B/P  158/58 164/44 Cardiac   regular  regular   Resp   28 25 Skin   Dry, warm  dry, warm   Pain level  0 0 Edema  generalized     generalized   Orders:    Duration:   Start:   2030 End:   2230 Total:   2hrs   Dialyzer:   Dialyzer/Set Up Inspection: Revaclear (05/16/21 2030)   K Bath:   Dialysate K (mEq/L): 3 (05/16/21 2030)   Ca Bath:   Dialysate CA (mEq/L): 2.5 (05/16/21 2030)   Na/Bicarb:   Dialysate NA (mEq/L): 140 (05/16/21 2030)   Target Fluid Removal:   Goal/Amount of Fluid to Remove (mL): 1500 mL (05/16/21 2030)   Access     Type & Location:   Right IJ; Each catheter limb disinfected per p&p, caps removed, hubs disinfected per p&p.  +aspir/flushes   Labs     Obtained/Reviewed   Critical Results Called   Date when labs were drawn-  Hgb-    HGB   Date Value Ref Range Status   05/16/2021 8.0 (L) 11.5 - 16.0 g/dL Final     K-    Potassium   Date Value Ref Range Status   05/16/2021 4.5 3.5 - 5.1 mmol/L Final     Comment:     HEMOLYZED SPECIMEN     Ca-   Calcium   Date Value Ref Range Status   05/16/2021 7.6 (L) 8.5 - 10.1 MG/DL Final   05/16/2021 7.9 (L) 8.5 - 10.1 MG/DL Final     Bun-   BUN   Date Value Ref Range Status   05/16/2021 91 (H) 6 - 20 MG/DL Final     Creat-   Creatinine   Date Value Ref Range Status   05/16/2021 5.79 (H) 0.55 - 1.02 MG/DL Final        Medications/ Blood Products Given     Name   Dose   Route and Time                     Blood Volume Processed (BVP):    28L Net Fluid   Removed:  1500cc   Comments   Time Out Done: 2025  Primary Nurse Rpt Pre: Sen Sofia RN  Primary Nurse Rpt Post: [id number] [address] [place], RN  Pt Education: access care, procedure  Care Plan: continue HD tx as per MD order  Tx Summary:Tolerated tx well.  Each dialysis catheter limb disinfected per p&p, blood returned per p&p, each dialysis hub disinfected per p&p,  and caps applied.   Admiting Diagnosis:  Pt's previous clinic-  Consent signed - Informed Consent Verified: Yes (05/16/21 2030)  Hepatitis Status- blood sample sent to lab  Machine #- Machine Number: [id number] (05/16/21 2030)  Telemetry status- monitored at the bedside

## 2021-05-17 NOTE — PROGRESS NOTES
Problem: Self Care Deficits Care Plan (Adult)  Goal: *Acute Goals and Plan of Care (Insert Text)  Description:   FUNCTIONAL STATUS PRIOR TO ADMISSION: Pt ambulates using rollator walker, does not drive. HOME SUPPORT PRIOR TO ADMISSION: Pt lived alone in condo/apartment with family living on different floor of same building. Occupational Therapy Goals  Initiated 5/16/2021  1. Patient will perform grooming in standing with supervision/set-up and O2 sats 90% and greater within 7 day(s). 2.  Patient will perform lower body dressing with supervision/set-up and O2 sats 90% and greater within 7 day(s). 3.  Patient will perform toilet transfers with supervision/set-up within 7 day(s). 4.  Patient will perform all aspects of toileting with supervision/set-up and O2 sats 90% and greater within 7 day(s). 5.  Patient will participate in upper extremity therapeutic exercise/activities with modified independence for 10 minutes within 7 day(s). 6.  Patient will utilize energy conservation techniques during functional activities with verbal and visual cues within 7 day(s). Outcome: Progressing Towards Goal     OCCUPATIONAL THERAPY TREATMENT  Patient: Prelsey Crandall (39 y.o. female)  Date: 5/17/2021  Diagnosis: Acute respiratory failure with hypoxia ([facility] [address] [J96.01] Acute respiratory failure with hypoxia [facility])       Precautions: Fall  Chart, occupational therapy assessment, plan of care, and goals were reviewed. ASSESSMENT  Patient continues with skilled OT services and is progressing towards goals. Improved mobility and activity tolerance this session. Pt on 2L O2 upon arrival performing bed level ADLs with nursing O2 sats 84%. Increased to 4L during EOB activity with sats 87%. Increased to 6L during OOB mobility and stats 88-92%. Re-educated pt on energy conservation techniques, PLB and pacing strategies with written handout provided. Pt verbalized good understanding, but required constant cues for PLB. Recommend rehab at discharge. Current Level of Function Impacting Discharge (ADLs): max A LE ADLs and toileting, mod A bed mobility and min A OOB mobility using RW near bedside    Other factors to consider for discharge: see above         PLAN :  Patient continues to benefit from skilled intervention to address the above impairments. Continue treatment per established plan of care to address goals. Recommend with staff: up to chair for meals and BSC for toileting pending tolerance for respiratory status    Recommend next OT session: endurance, LE ADLs using AE    Recommendation for discharge: (in order for the patient to meet his/her long term goals)  Therapy up to 5 days/week in SNF setting    This discharge recommendation:  Has not yet been discussed the attending provider and/or case management    IF patient discharges home will need the following DME: TBD       SUBJECTIVE:   Patient stated I feel a little better.     OBJECTIVE DATA SUMMARY:   Cognitive/Behavioral Status:  Neurologic State: Alert; Appropriate for age  Orientation Level: Oriented X4  Cognition: Appropriate decision making; Appropriate for age attention/concentration; Follows commands  Perception: Appears intact  Perseveration: No perseveration noted  Safety/Judgement: Awareness of environment; Fall prevention; Insight into deficits    Functional Mobility and Transfers for ADLs:  Bed Mobility:  Supine to Sit: Moderate assistance; Additional time;Assist x1  Scooting: Moderate assistance; Additional time;Assist x1    Transfers:  Sit to Stand: Minimum assistance; Additional time;Assist x1  Functional Transfers  Toilet Transfer : Minimum assistance; Additional time;Assist x1(RW and BSC)  Cues: Physical assistance; Tactile cues provided;Verbal cues provided;Visual cues provided  Adaptive Equipment: Walker (comment); Bedside commode  Bed to Chair: Minimum assistance; Additional time;Assist x1    Balance:  Sitting: Intact  Standing: Impaired; With support  Standing - Static: Fair  Standing - Dynamic : Fair    ADL Intervention:   Patient instructed and indicated understanding energy conservation techniques to increase independence and safety during ADLs with visual handout provided. Cognitive Retraining  Safety/Judgement: Awareness of environment; Fall prevention; Insight into deficits      Pain:  0/10    Activity Tolerance:   Fair, desaturates with exertion and requires oxygen, requires frequent rest breaks, and observed SOB with activity    After treatment patient left in no apparent distress:   Sitting in chair and Call bell within reach    COMMUNICATION/COLLABORATION:   The patients plan of care was discussed with: Registered nurse.      Charmaine Jones OT  Time Calculation: 17 mins

## 2021-05-17 NOTE — PROGRESS NOTES
[facility] [address]  [address]  [phone]      Medical Progress Note      NAME: Vamshi Fairchild   :  1940  MRM:  [id number]    Date/Time of service: 2021  1:04 PM       Subjective:     Chief Complaint:  Patient was personally seen and examined by me during this time period. Chart reviewed. Still with edema       Objective:       Vitals:       Last 24hrs VS reviewed since prior progress note. Most recent are:    Visit Vitals  BP (!) 174/42 (BP Patient Position: At rest)   Pulse 82   Temp 98 °F (36.7 °C)   Resp 22   Ht 5' 1\" (1.549 m)   Wt 93.7 kg (206 lb 8 oz)   SpO2 95%   BMI 39.02 kg/m²     SpO2 Readings from Last 6 Encounters:   21 95%   03/10/21 99%   21 95%   20 97%   19 98%   19 98%    O2 Flow Rate (L/min): 4 l/min(desat to mid 80s while bathing)       Intake/Output Summary (Last 24 hours) at 2021 1304  Last data filed at 2021 1040  Gross per 24 hour   Intake 332 ml   Output 2250 ml   Net -1918 ml        Exam:     Physical Exam:    Gen:  Elderly, ill-appearing, morbidly obese, NAD  HEENT:  Pink conjunctivae, PERRL, hearing intact to voice  Neck:  Supple, without masses, thyroid non-tender  Resp:  No accessory muscle use, bilateral rales at bases  Card:  No murmurs, normal S1, S2 without thrills, 3+ edema  Abd:  Soft, non-tender, non-distended, normoactive bowel sounds are present  Lymph:  No cervical adenopathy  Musc:  No cyanosis or clubbing  Skin:  No rashes   Neuro:  Cranial nerves 3-12 are grossly intact, follows commands appropriately  Psych:  Alert with good insight.   Oriented to person, place, and time    Medications Reviewed: (see below)    Lab Data Reviewed: (see below)    ______________________________________________________________________    Medications:     Current Facility-Administered Medications   Medication Dose Route Frequency    iron sucrose (VENOFER) injection 100 mg  100 mg IntraVENous DIALYSIS MON, WED & FRI    doxercalciferoL (HECTOROL) 4 mcg/2 mL injection 2 mcg  2 mcg IntraVENous DIALYSIS MON, WED & FRI    minoxidiL (LONITEN) tablet 2.5 mg  2.5 mg Oral BID WITH MEALS    sodium bicarbonate tablet 650 mg  650 mg Oral TID    lidocaine (XYLOCAINE) 10 mg/mL (1 %) injection 10-30 mL  10-30 mL SubCUTAneous Multiple    hydrALAZINE (APRESOLINE) tablet 100 mg  100 mg Oral BID    bumetanide (BUMEX) injection 3 mg  3 mg IntraVENous TID    epoetin millie-epbx (RETACRIT) injection 20,000 Units  20,000 Units SubCUTAneous Q7D    sevelamer carbonate (RENVELA) tab 800 mg  800 mg Oral TID WITH MEALS    hydrALAZINE (APRESOLINE) 20 mg/mL injection 20 mg  20 mg IntraVENous Q6H PRN    nitroglycerin (NITROBID) 2 % ointment 1 Inch  1 Inch Topical BID    labetaloL (NORMODYNE;TRANDATE) 20 mg/4 mL (5 mg/mL) injection 20 mg  20 mg IntraVENous Q4H PRN    amLODIPine (NORVASC) tablet 10 mg  10 mg Oral DAILY    aspirin delayed-release tablet 81 mg  81 mg Oral DAILY    carvediloL (COREG) tablet 6.25 mg  6.25 mg Oral BID WITH MEALS    levETIRAcetam (KEPPRA) tablet 250 mg  250 mg Oral BID    montelukast (SINGULAIR) tablet 10 mg  10 mg Oral DAILY    pravastatin (PRAVACHOL) tablet 40 mg  40 mg Oral QHS    sodium chloride (NS) flush 5-40 mL  5-40 mL IntraVENous Q8H    sodium chloride (NS) flush 5-40 mL  5-40 mL IntraVENous PRN    0.9% sodium chloride infusion 25 mL  25 mL IntraVENous PRN    acetaminophen (TYLENOL) tablet 650 mg  650 mg Oral Q6H PRN    Or    acetaminophen (TYLENOL) suppository 650 mg  650 mg Rectal Q6H PRN    senna-docusate (PERICOLACE) 8.6-50 mg per tablet 1 Tab  1 Tab Oral BID    bisacodyL (DULCOLAX) suppository 10 mg  10 mg Rectal DAILY PRN    promethazine (PHENERGAN) tablet 12.5 mg  12.5 mg Oral Q6H PRN    Or    ondansetron (ZOFRAN) injection 4 mg  4 mg IntraVENous Q6H PRN    heparin (porcine) injection 5,000 Units  5,000 Units SubCUTAneous Q8H          Lab Review:     Recent Labs     05/17/21  0345 05/16/21  0426 05/15/21  0352   WBC 8.2 8.9 8.5   HGB 7.5* 8.0* 7.7*   HCT 23.9* 26.3* 24.8*    240 224     Recent Labs     05/17/21  0345 05/16/21  0426 05/15/21  0352 05/14/21  1503    137 143 143   K 4.1 4.5 4.0 3.9    106 110* 109*   CO2 26 19* 24 24   GLU 90 102* 96 113*   BUN 57* 91* 83* 83*   CREA 4.49* 5.79* 5.41* 5.22*   CA 7.9* 7.6*  7.9* 8.1* 8.4*   MG 2.3 2.6* 2.6*  --    PHOS 6.1* 7.4* 7.0*  --    ALB 2.6* 2.7* 2.8* 3.1*   TBILI  --   --  0.6 0.6   ALT  --   --  20 23     Lab Results   Component Value Date/Time    Glucose (POC) 109 (H) 02/20/2019 10:13 PM    Glucose (POC) 114 (H) 02/20/2019 11:34 AM          Assessment / Plan:     78 yo hx of HTN, CVA, CKD 5, presented w/ dyspnea, hypoxia, volume overload, CHF, ESRD     1) Acute respiratory failure with hypoxia: due to pulm edema, volume overload, CHF, renal failure. Off bipap     2) Diastolic CHF, acute on chronic: due to ESRD. Monitor on Tele. Echo with EF 55-60%. Will cont IV bumex, dialylsis. Cards and renal following     3) Elevated Trop: likely due to demand ischemia from volume overload, renal failure. Unlikely ACS. Will monitor Trop. Cont ASA, BB, statin. defer to Cards     4) Malignant HTN urgency: BP improving. cont norvasc, coreg, hydralazine, minoxidil, nitro paste, bumex. Use IV hydralazine, IV labetalol prn     5) SHARON/CKD 5: approached ESRD. LUE fistula not ready for use. Right chest dialysis cath placed. Cont HD M,W,Fr.  Renal following     6) Hx of CVA: cont ASA, statin     7) Seizure d/o: cont keppra     8) Anemia: due to CKD, mild iron def.   Will monitor      Code: Full     Total time spent with patient: 39 Minutes **I personally saw and examined the patient during this time period**                 Care Plan discussed with: Patient, nursing     Discussed:  Care Plan    Prophylaxis:  Hep SQ    Disposition:  SNF/LTC vs  ___________________________________________________    Attending Physician: Maegan Hameed MD

## 2021-05-18 ENCOUNTER — APPOINTMENT (OUTPATIENT)
Dept: INTERVENTIONAL RADIOLOGY/VASCULAR | Age: 81
DRG: 280 | End: 2021-05-18
Attending: INTERNAL MEDICINE
Payer: MEDICARE

## 2021-05-18 LAB
ANION GAP SERPL CALC-SCNC: 5 MMOL/L (ref 5–15)
BUN SERPL-MCNC: 19 MG/DL (ref 6–20)
BUN/CREAT SERPL: 11 (ref 12–20)
CALCIUM SERPL-MCNC: 8.4 MG/DL (ref 8.5–10.1)
CHLORIDE SERPL-SCNC: 103 MMOL/L (ref 97–108)
CO2 SERPL-SCNC: 31 MMOL/L (ref 21–32)
CREAT SERPL-MCNC: 1.66 MG/DL (ref 0.55–1.02)
ERYTHROCYTE [DISTWIDTH] IN BLOOD BY AUTOMATED COUNT: 12.5 % (ref 11.5–14.5)
GLUCOSE SERPL-MCNC: 94 MG/DL (ref 65–100)
HCT VFR BLD AUTO: 28.5 % (ref 35–47)
HCV AB SERPL QL IA: NONREACTIVE
HCV COMMENT,HCGAC: NORMAL
HGB BLD-MCNC: 9.2 G/DL (ref 11.5–16)
MAGNESIUM SERPL-MCNC: 2.1 MG/DL (ref 1.6–2.4)
MCH RBC QN AUTO: 30.5 PG (ref 26–34)
MCHC RBC AUTO-ENTMCNC: 32.3 G/DL (ref 30–36.5)
MCV RBC AUTO: 94.4 FL (ref 80–99)
NRBC # BLD: 0 K/UL (ref 0–0.01)
NRBC BLD-RTO: 0 PER 100 WBC
PHOSPHATE SERPL-MCNC: 2.1 MG/DL (ref 2.6–4.7)
PLATELET # BLD AUTO: 222 K/UL (ref 150–400)
PMV BLD AUTO: 9.3 FL (ref 8.9–12.9)
POTASSIUM SERPL-SCNC: 3.1 MMOL/L (ref 3.5–5.1)
RBC # BLD AUTO: 3.02 M/UL (ref 3.8–5.2)
SODIUM SERPL-SCNC: 139 MMOL/L (ref 136–145)
WBC # BLD AUTO: 7.5 K/UL (ref 3.6–11)

## 2021-05-18 PROCEDURE — 77010033678 HC OXYGEN DAILY

## 2021-05-18 PROCEDURE — 0JH63XZ INSERTION OF TUNNELED VASCULAR ACCESS DEVICE INTO CHEST SUBCUTANEOUS TISSUE AND FASCIA, PERCUTANEOUS APPROACH: ICD-10-PCS | Performed by: RADIOLOGY

## 2021-05-18 PROCEDURE — 36558 INSERT TUNNELED CV CATH: CPT

## 2021-05-18 PROCEDURE — 97530 THERAPEUTIC ACTIVITIES: CPT

## 2021-05-18 PROCEDURE — 80048 BASIC METABOLIC PNL TOTAL CA: CPT

## 2021-05-18 PROCEDURE — 85027 COMPLETE CBC AUTOMATED: CPT

## 2021-05-18 PROCEDURE — C1750 CATH, HEMODIALYSIS,LONG-TERM: HCPCS

## 2021-05-18 PROCEDURE — 74011250637 HC RX REV CODE- 250/637: Performed by: INTERNAL MEDICINE

## 2021-05-18 PROCEDURE — 74011000250 HC RX REV CODE- 250: Performed by: RADIOLOGY

## 2021-05-18 PROCEDURE — 74011250636 HC RX REV CODE- 250/636: Performed by: INTERNAL MEDICINE

## 2021-05-18 PROCEDURE — 77030002986 HC SUT PROL J&J -A

## 2021-05-18 PROCEDURE — 77001 FLUOROGUIDE FOR VEIN DEVICE: CPT

## 2021-05-18 PROCEDURE — 02PY33Z REMOVAL OF INFUSION DEVICE FROM GREAT VESSEL, PERCUTANEOUS APPROACH: ICD-10-PCS | Performed by: RADIOLOGY

## 2021-05-18 PROCEDURE — 83735 ASSAY OF MAGNESIUM: CPT

## 2021-05-18 PROCEDURE — 74011250636 HC RX REV CODE- 250/636: Performed by: RADIOLOGY

## 2021-05-18 PROCEDURE — 36415 COLL VENOUS BLD VENIPUNCTURE: CPT

## 2021-05-18 PROCEDURE — 77030029065 HC DRSG HEMO QCLOT ZMED -B

## 2021-05-18 PROCEDURE — 97116 GAIT TRAINING THERAPY: CPT

## 2021-05-18 PROCEDURE — 86704 HEP B CORE ANTIBODY TOTAL: CPT

## 2021-05-18 PROCEDURE — 74011000250 HC RX REV CODE- 250: Performed by: INTERNAL MEDICINE

## 2021-05-18 PROCEDURE — 84100 ASSAY OF PHOSPHORUS: CPT

## 2021-05-18 PROCEDURE — 65660000000 HC RM CCU STEPDOWN

## 2021-05-18 PROCEDURE — 02H633Z INSERTION OF INFUSION DEVICE INTO RIGHT ATRIUM, PERCUTANEOUS APPROACH: ICD-10-PCS | Performed by: RADIOLOGY

## 2021-05-18 RX ORDER — POTASSIUM CHLORIDE 750 MG/1
20 TABLET, FILM COATED, EXTENDED RELEASE ORAL
Status: COMPLETED | OUTPATIENT
Start: 2021-05-18 | End: 2021-05-18

## 2021-05-18 RX ORDER — POLYETHYLENE GLYCOL 3350 17 G/17G
17 POWDER, FOR SOLUTION ORAL 2 TIMES DAILY
Status: DISCONTINUED | OUTPATIENT
Start: 2021-05-18 | End: 2021-05-24 | Stop reason: HOSPADM

## 2021-05-18 RX ORDER — FENTANYL CITRATE 50 UG/ML
25-50 INJECTION, SOLUTION INTRAMUSCULAR; INTRAVENOUS
Status: DISCONTINUED | OUTPATIENT
Start: 2021-05-18 | End: 2021-05-19

## 2021-05-18 RX ORDER — MIDAZOLAM HYDROCHLORIDE 1 MG/ML
.5-5 INJECTION, SOLUTION INTRAMUSCULAR; INTRAVENOUS
Status: DISCONTINUED | OUTPATIENT
Start: 2021-05-18 | End: 2021-05-19

## 2021-05-18 RX ORDER — HEPARIN SODIUM 1000 [USP'U]/ML
1000-5000 INJECTION, SOLUTION INTRAVENOUS; SUBCUTANEOUS ONCE
Status: COMPLETED | OUTPATIENT
Start: 2021-05-18 | End: 2021-05-18

## 2021-05-18 RX ORDER — HEPARIN SODIUM 200 [USP'U]/100ML
500 INJECTION, SOLUTION INTRAVENOUS CONTINUOUS
Status: DISCONTINUED | OUTPATIENT
Start: 2021-05-18 | End: 2021-05-24 | Stop reason: HOSPADM

## 2021-05-18 RX ORDER — LIDOCAINE HYDROCHLORIDE 10 MG/ML
10-30 INJECTION INFILTRATION; PERINEURAL
Status: DISCONTINUED | OUTPATIENT
Start: 2021-05-18 | End: 2021-05-19

## 2021-05-18 RX ADMIN — DOCUSATE SODIUM 50MG AND SENNOSIDES 8.6MG 1 TABLET: 8.6; 5 TABLET, FILM COATED ORAL at 20:30

## 2021-05-18 RX ADMIN — POTASSIUM CHLORIDE 20 MEQ: 750 TABLET, EXTENDED RELEASE ORAL at 09:46

## 2021-05-18 RX ADMIN — ACETAMINOPHEN 650 MG: 325 TABLET ORAL at 16:07

## 2021-05-18 RX ADMIN — PRAVASTATIN SODIUM 40 MG: 20 TABLET ORAL at 21:49

## 2021-05-18 RX ADMIN — POLYETHYLENE GLYCOL 3350 17 G: 17 POWDER, FOR SOLUTION ORAL at 17:20

## 2021-05-18 RX ADMIN — AMLODIPINE BESYLATE 10 MG: 5 TABLET ORAL at 09:46

## 2021-05-18 RX ADMIN — SEVELAMER CARBONATE 800 MG: 800 TABLET, FILM COATED ORAL at 08:51

## 2021-05-18 RX ADMIN — NITROGLYCERIN 1 INCH: 20 OINTMENT TOPICAL at 09:46

## 2021-05-18 RX ADMIN — HEPARIN SODIUM 3800 UNITS: 1000 INJECTION INTRAVENOUS; SUBCUTANEOUS at 10:54

## 2021-05-18 RX ADMIN — LABETALOL HYDROCHLORIDE 20 MG: 5 INJECTION, SOLUTION INTRAVENOUS at 16:07

## 2021-05-18 RX ADMIN — BUMETANIDE 3 MG: 0.25 INJECTION INTRAMUSCULAR; INTRAVENOUS at 22:00

## 2021-05-18 RX ADMIN — SODIUM BICARBONATE 650 MG: 650 TABLET ORAL at 09:46

## 2021-05-18 RX ADMIN — CARVEDILOL 6.25 MG: 6.25 TABLET, FILM COATED ORAL at 09:46

## 2021-05-18 RX ADMIN — ACETAMINOPHEN 650 MG: 325 TABLET ORAL at 03:46

## 2021-05-18 RX ADMIN — NITROGLYCERIN 1 INCH: 20 OINTMENT TOPICAL at 17:20

## 2021-05-18 RX ADMIN — SEVELAMER CARBONATE 800 MG: 800 TABLET, FILM COATED ORAL at 17:21

## 2021-05-18 RX ADMIN — Medication 10 ML: at 06:30

## 2021-05-18 RX ADMIN — SODIUM BICARBONATE 650 MG: 650 TABLET ORAL at 21:49

## 2021-05-18 RX ADMIN — DOCUSATE SODIUM 50MG AND SENNOSIDES 8.6MG 1 TABLET: 8.6; 5 TABLET, FILM COATED ORAL at 09:46

## 2021-05-18 RX ADMIN — BUMETANIDE 3 MG: 0.25 INJECTION INTRAMUSCULAR; INTRAVENOUS at 16:03

## 2021-05-18 RX ADMIN — MINOXIDIL 2.5 MG: 2.5 TABLET ORAL at 16:07

## 2021-05-18 RX ADMIN — HYDRALAZINE HYDROCHLORIDE 100 MG: 25 TABLET, FILM COATED ORAL at 09:46

## 2021-05-18 RX ADMIN — SODIUM BICARBONATE 650 MG: 650 TABLET ORAL at 16:03

## 2021-05-18 RX ADMIN — SEVELAMER CARBONATE 800 MG: 800 TABLET, FILM COATED ORAL at 13:01

## 2021-05-18 RX ADMIN — Medication 10 ML: at 16:04

## 2021-05-18 RX ADMIN — CEFAZOLIN 2 G: 1 INJECTION, POWDER, FOR SOLUTION INTRAMUSCULAR; INTRAVENOUS at 10:16

## 2021-05-18 RX ADMIN — LEVETIRACETAM 250 MG: 250 TABLET, FILM COATED ORAL at 17:21

## 2021-05-18 RX ADMIN — POLYETHYLENE GLYCOL 3350 17 G: 17 POWDER, FOR SOLUTION ORAL at 13:52

## 2021-05-18 RX ADMIN — LIDOCAINE HYDROCHLORIDE 10 ML: 10 INJECTION, SOLUTION INFILTRATION; PERINEURAL at 10:48

## 2021-05-18 RX ADMIN — HEPARIN SODIUM 500 UNITS/HR: 200 INJECTION, SOLUTION INTRAVENOUS at 10:53

## 2021-05-18 RX ADMIN — ASPIRIN 81 MG: 81 TABLET, COATED ORAL at 09:46

## 2021-05-18 RX ADMIN — FENTANYL CITRATE 25 MCG: 50 INJECTION, SOLUTION INTRAMUSCULAR; INTRAVENOUS at 10:37

## 2021-05-18 RX ADMIN — BUMETANIDE 3 MG: 0.25 INJECTION INTRAMUSCULAR; INTRAVENOUS at 09:46

## 2021-05-18 RX ADMIN — FENTANYL CITRATE 25 MCG: 50 INJECTION, SOLUTION INTRAMUSCULAR; INTRAVENOUS at 10:48

## 2021-05-18 RX ADMIN — MINOXIDIL 2.5 MG: 2.5 TABLET ORAL at 09:46

## 2021-05-18 RX ADMIN — FENTANYL CITRATE 25 MCG: 50 INJECTION, SOLUTION INTRAMUSCULAR; INTRAVENOUS at 10:51

## 2021-05-18 RX ADMIN — CARVEDILOL 6.25 MG: 6.25 TABLET, FILM COATED ORAL at 16:07

## 2021-05-18 RX ADMIN — LEVETIRACETAM 250 MG: 250 TABLET, FILM COATED ORAL at 09:30

## 2021-05-18 RX ADMIN — MIDAZOLAM 1 MG: 1 INJECTION INTRAMUSCULAR; INTRAVENOUS at 10:48

## 2021-05-18 RX ADMIN — MONTELUKAST 10 MG: 10 TABLET, FILM COATED ORAL at 09:46

## 2021-05-18 RX ADMIN — HYDRALAZINE HYDROCHLORIDE 100 MG: 25 TABLET, FILM COATED ORAL at 17:20

## 2021-05-18 RX ADMIN — MIDAZOLAM 0.5 MG: 1 INJECTION INTRAMUSCULAR; INTRAVENOUS at 10:51

## 2021-05-18 NOTE — PROGRESS NOTES
[address]   YOB: 1940          Assessment & Plan:   New ESRD  Volume +   HTN  Anemia of CKD  SHPT    Rec:  HD tomorrow and MWF  Arranging outpt HD  On EPO + IV Iron  Continue Hectorol  UF as tolerated.  Volume + but tolerated UF poorly  Change temp IJ to PC today       Subjective:   CC: f/u ESRD  HPI: HD last pm -3kg, complicated by cramping  ROS: no n/v +sob  Current Facility-Administered Medications   Medication Dose Route Frequency    polyethylene glycol (MIRALAX) packet 17 g  17 g Oral BID    iron sucrose (VENOFER) injection 100 mg  100 mg IntraVENous DIALYSIS MON, WED & FRI    doxercalciferoL (HECTOROL) 4 mcg/2 mL injection 2 mcg  2 mcg IntraVENous DIALYSIS MON, WED & FRI    minoxidiL (LONITEN) tablet 2.5 mg  2.5 mg Oral BID WITH MEALS    sodium bicarbonate tablet 650 mg  650 mg Oral TID    lidocaine (XYLOCAINE) 10 mg/mL (1 %) injection 10-30 mL  10-30 mL SubCUTAneous Multiple    hydrALAZINE (APRESOLINE) tablet 100 mg  100 mg Oral BID    bumetanide (BUMEX) injection 3 mg  3 mg IntraVENous TID    epoetin millie-epbx (RETACRIT) injection 20,000 Units  20,000 Units SubCUTAneous Q7D    sevelamer carbonate (RENVELA) tab 800 mg  800 mg Oral TID WITH MEALS    hydrALAZINE (APRESOLINE) 20 mg/mL injection 20 mg  20 mg IntraVENous Q6H PRN    nitroglycerin (NITROBID) 2 % ointment 1 Inch  1 Inch Topical BID    labetaloL (NORMODYNE;TRANDATE) 20 mg/4 mL (5 mg/mL) injection 20 mg  20 mg IntraVENous Q4H PRN    amLODIPine (NORVASC) tablet 10 mg  10 mg Oral DAILY    aspirin delayed-release tablet 81 mg  81 mg Oral DAILY    carvediloL (COREG) tablet 6.25 mg  6.25 mg Oral BID WITH MEALS    levETIRAcetam (KEPPRA) tablet 250 mg  250 mg Oral BID    montelukast (SINGULAIR) tablet 10 mg  10 mg Oral DAILY    pravastatin (PRAVACHOL) tablet 40 mg  40 mg Oral QHS    sodium chloride (NS) flush 5-40 mL  5-40 mL IntraVENous Q8H    sodium chloride (NS) flush 5-40 mL  5-40 mL IntraVENous PRN    0.9% sodium chloride infusion 25 mL  25 mL IntraVENous PRN    acetaminophen (TYLENOL) tablet 650 mg  650 mg Oral Q6H PRN    Or    acetaminophen (TYLENOL) suppository 650 mg  650 mg Rectal Q6H PRN    senna-docusate (PERICOLACE) 8.6-50 mg per tablet 1 Tab  1 Tab Oral BID    bisacodyL (DULCOLAX) suppository 10 mg  10 mg Rectal DAILY PRN    promethazine (PHENERGAN) tablet 12.5 mg  12.5 mg Oral Q6H PRN    Or    ondansetron (ZOFRAN) injection 4 mg  4 mg IntraVENous Q6H PRN    [Held by provider] heparin (porcine) injection 5,000 Units  5,000 Units SubCUTAneous Q8H          Objective:     Vitals:  Blood pressure (!) 173/47, pulse 94, temperature 98.6 °F (37 °C), resp. rate 21, height 5' 1\" (1.549 m), weight 92.1 kg (203 lb), SpO2 97 %. Temp (24hrs), Av.2 °F (36.8 °C), Min:97.9 °F (36.6 °C), Max:98.6 °F (37 °C)      Intake and Output:  No intake/output data recorded.  1901 -  0700  In: 535 [P.O.:535]  Out: 6125 [Urine:1625]    Physical Exam:               GENERAL ASSESSMENT: NAD  NECK: RIJ temp HD cath  CHEST: On oxygen, CTA  HEART: S1S2  ABDOMEN: Soft,NT  EXTREMITY: +EDEMA          ECG/rhythm:    Data Review      No results for input(s): TNIPOC in the last 72 hours. No lab exists for component: ITNL   No results for input(s): CPK, CKMB, TROIQ in the last 72 hours. Recent Labs     21  0310 21  0345 21  0426    140 137   K 3.1* 4.1 4.5    107 106   CO2 31 26 19*   BUN 19 57* 91*   CREA 1.66* 4.49* 5.79*   GLU 94 90 102*   PHOS 2.1* 6.1* 7.4*   MG 2.1 2.3 2.6*   CA 8.4* 7.9* 7.6*  7.9*   ALB  --  2.6* 2.7*   WBC 7.5 8.2 8.9   HGB 9.2* 7.5* 8.0*   HCT 28.5* 23.9* 26.3*    219 240      No results for input(s): INR, PTP, APTT, INREXT, INREXT in the last 72 hours.   Needs: urine analysis, urine sodium, protein and creatinine  No results found for: KAYCEE GIPSON        : Lavenia Bernheim, MD  2021        [facility]:  [email or web address]  Felisha Busing office:  [address] [place] [address], [address] [place] [address],[address]  [place], [address] [facility]  Phone: [phone]  Fax :     [phone]    [place] office:  [address] [facility]  Mickey Sorianomovazquez  Phone - [phone]  Fax - [phone]

## 2021-05-18 NOTE — PROCEDURES
[facility]  [phone]    Vitals   Pre   Post   Assessment   Pre   Post     Temp  Temp: 98.3 °F (36.8 °C) (05/17/21 2345)  97.9 LOC  Alert/oriented x4 Alert/oriented x4   HR   Pulse (Heart Rate): 80 (05/17/21 2345) 86 Lungs   3lpm NC/ unlabored 3lpm NC/ unlabored    B/P   BP: (!) 159/47 (05/17/21 2345) 172/42 Cardiac   NSR/ Monitoed NSR/ Monitoed    Resp   Resp Rate: 23 (05/17/21 2345) 23 Skin   Warm/dry Warm/dry   Pain level  Pain Intensity 1: 0 (05/17/21 0400) 0 Edema  Generalized     Generalized   Orders:    Duration:   Start:   0025 End:   0315 Total:   3.5hrs   Dialyzer:   Dialyzer/Set Up Inspection: Genevia Horse (05/17/21 2345)   K Bath:   Dialysate K (mEq/L): 3 (05/17/21 2345)   Ca Bath:   Dialysate CA (mEq/L): 2.5 (05/17/21 2345)   Na/Bicarb:   Dialysate NA (mEq/L): 138 (05/17/21 2345)   Target Fluid Removal:   Goal/Amount of Fluid to Remove (mL): 3000 mL (05/17/21 2345)   Access     Type & Location:   RIJ CVC, +asp/flush, patent at 400 BFR, dressing CDI   Labs     Obtained/Reviewed   Critical Results Called   Date when labs were drawn-  Hgb-    HGB   Date Value Ref Range Status   05/17/2021 7.5 (L) 11.5 - 16.0 g/dL Final     K-    Potassium   Date Value Ref Range Status   05/17/2021 4.1 3.5 - 5.1 mmol/L Final     Ca-   Calcium   Date Value Ref Range Status   05/17/2021 7.9 (L) 8.5 - 10.1 MG/DL Final     Bun-   BUN   Date Value Ref Range Status   05/17/2021 57 (H) 6 - 20 MG/DL Final     Comment:     INVESTIGATED PER DELTA CHECK PROTOCOL     Creat-   Creatinine   Date Value Ref Range Status   05/17/2021 4.49 (H) 0.55 - 1.02 MG/DL Final        Medications/ Blood Products Given     Name   Dose   Route and Time     None                Blood Volume Processed (BVP):    76.2 Net Fluid   Removed:  3000ml   Comments   Time Out Done: 6871  Primary Nurse Rpt Pre: PRACHI Chaudhry RN  Primary Nurse Rpt Post: PRACHI Chaudhry RN  Pt Education: Hospital dialysis procedures  Care Plan: continue tx as ordered  Tx Summary: Patient tolerated tx well. All possible blood returned with normal saline rinse back. CVC ports locked with normal saline and capped. Admiting Diagnosis: ESRD  Consent signed - Informed Consent Verified: Yes (05/17/21 2345)  Hepatitis Status- Ag negative, Ab susceptible 5/16/21  Machine #- Machine Number: [id number] (05/17/21 2345)  Telemetry status- monitored  Pre-dialysis wt. - Pre-Dialysis Weight: 93.7 kg (206 lb 9.1 oz) (05/17/21 2345)

## 2021-05-18 NOTE — ROUTINE PROCESS
11:14 AM  TRANSFER - IN REPORT:    Verbal report received from miguel Romero.(name) on Jaz Garay  being received from angio lab(unit) for routine progression of care      Report consisted of patients Situation, Background, Assessment and   Recommendations(SBAR). Information from the following report(s) Procedure Summary was reviewed with the receiving nurse. Opportunity for questions and clarification was provided. Assessment completed upon patients arrival to unit and care assumed. TRANSFER - OUT REPORT:    Verbal report given to dayshift nurse(name) on Jaz Garay  being transferred to 321(unit) for routine progression of care       Report consisted of patients Situation, Background, Assessment and   Recommendations(SBAR). Information from the following report(s) Procedure Summary was reviewed with the receiving nurse. Lines:   Peripheral IV 05/14/21 Right Antecubital (Active)   Site Assessment Clean, dry, & intact 05/18/21 0400   Phlebitis Assessment 0 05/18/21 0400   Infiltration Assessment 0 05/18/21 0400   Dressing Status Clean, dry, & intact 05/18/21 0400   Dressing Type Transparent 05/18/21 0400   Hub Color/Line Status Pink;Capped 05/18/21 0400   Action Taken Open ports on tubing capped 05/17/21 2000   Alcohol Cap Used Yes 05/17/21 2000        Opportunity for questions and clarification was provided.       Patient transported with:   Registered Nurse

## 2021-05-18 NOTE — PROGRESS NOTES
0700 Bedside and Verbal shift change report given to [address] [place] [address] (oncoming nurse) by Gabriela Landeros RN (offgoing nurse). Report included the following information SBAR, Kardex, Recent Results and Cardiac Rhythm NSR.     8245 Patient off floor for scheduled procedure. 1235 Patient returned to floor, alert and oriented on 4L nasal cannula. Pure wick replaced, will reorder previous diet as per Dr. Carter Reyez.    Martin Pennant When patient was being assisted onto bedpan IV was noted to be dislodged. Utilizing vein finder attempted x 2, unsuccessful, called ICU, no RN's ultraound trained available,called to ED. Was told they would try to send someone up. Dr. Carter Reyez notified. 1900 Bedside and Verbal shift change report given to [employer] (oncoming nurse) by [address] [place] [address] (offgoing nurse). Report included the following information SBAR, Kardex, Recent Results and Cardiac Rhythm NSR.

## 2021-05-18 NOTE — PROGRESS NOTES
Problem: Mobility Impaired (Adult and Pediatric)  Goal: *Acute Goals and Plan of Care (Insert Text)  Description: FUNCTIONAL STATUS PRIOR TO ADMISSION: Pt ambulates using rollator walker, does not drive. HOME SUPPORT PRIOR TO ADMISSION: Pt lived alone in condo/apartment with family living on different floor of same building. Physical Therapy Goals  Initiated 5/15/2021  1. Patient will move from supine to sit and sit to supine  in bed with supervision/set-up within 7 day(s). 2.  Patient will transfer from bed to chair and chair to bed with minimal assistance/contact guard assist using the least restrictive device within 7 day(s). 3.  Patient will perform sit to stand with minimal assistance/contact guard assist within 7 day(s). 4.  Patient will ambulate with minimal assistance/contact guard assist for 50 feet with the least restrictive device within 7 day(s). Outcome: Progressing Towards Goal  Note:   PHYSICAL THERAPY TREATMENT  Patient: Minh Youssef (89 y.o. female)  Date: 5/18/2021  Diagnosis: Acute respiratory failure with hypoxia ([facility] [J96.01] Acute respiratory failure with hypoxia [facility])       Precautions: Fall  Chart, physical therapy assessment, plan of care and goals were reviewed. ASSESSMENT  Patient continues with skilled PT services and is progressing towards goals. Min A for mobility tasks, using 4L supplemental O2 throughout session O2 sat 94-90%. Decreased activity tolerance and need for rest breaks between tasks. Pt remained in chair at end of session     Current Level of Function Impacting Discharge (mobility/balance): Min A     Other factors to consider for discharge:          PLAN :  Patient continues to benefit from skilled intervention to address the above impairments. Continue treatment per established plan of care. to address goals.     Recommendation for discharge: (in order for the patient to meet his/her long term goals)  Therapy up to 5 days/week in SNF setting    This discharge recommendation:  Has been made in collaboration with the attending provider and/or case management    IF patient discharges home will need the following DME: to be determined (TBD)       SUBJECTIVE:   Patient stated i feel constipated.     OBJECTIVE DATA SUMMARY:   Critical Behavior:  Neurologic State: Alert, Eyes open spontaneously  Orientation Level: Oriented X4  Cognition: Appropriate decision making, Appropriate for age attention/concentration, Appropriate safety awareness, Follows commands  Safety/Judgement: Awareness of environment, Fall prevention, Insight into deficits  Functional Mobility Training:  Bed Mobility:     Supine to Sit: Minimum assistance;Assist x1     Scooting: Moderate assistance;Assist x1;Additional time        Transfers:  Sit to Stand: Minimum assistance;Assist x1  Stand to Sit: Contact guard assistance;Assist x1        Bed to Chair: Minimum assistance;Assist x1;Additional time                    Balance:  Sitting: Intact  Standing: With support  Standing - Static: Constant support;Good  Standing - Dynamic : Fair  Ambulation/Gait Training:                                                        Stairs: Therapeutic Exercises:     Pain Rating:  Denies pain    Activity Tolerance:   Good    After treatment patient left in no apparent distress:   Sitting in chair, Call bell within reach, and Bed / chair alarm activated    COMMUNICATION/COLLABORATION:   The patients plan of care was discussed with: Registered nurse.      Radames Siu   Time Calculation: 36 mins

## 2021-05-18 NOTE — PROGRESS NOTES
5/18/2021  9:44 AM    RUR:  17%  Risk Level: [x]Low []Moderate []High  Value-based purchasing: [] Yes [x] No  Bundle patient: [] Yes [x] No   Specify:     Transition of care plan:  1. Permacath placement today for HD; awaiting results of Hep B Total Core lab to determine if [facility] [address]) will accept patient for OP HD MWF  2. OP HD - awaiting final acceptance from [place] [address]  3. PT/OT recommending SNF - CM met with patient to discuss preference; patient preference given for [facility], with [facility] [address] as second choice. CM awaiting order prior to sending referrals. 4. Outpatient follow-up.   5. CM to continue to follow for needs    Layo Morin RN

## 2021-05-18 NOTE — PROGRESS NOTES
[facility]  [address] [facility], [address] [place] [address]  [phone]      Medical Progress Note      NAME: Sudhir Iglesias   :  1940  MRM:  [id number]    Date/Time of service: 2021  8:57 AM       Subjective:     Chief Complaint:  Patient was personally seen and examined by me during this time period. Chart reviewed. C/o fatigue, cramps after dialysis        Objective:       Vitals:       Last 24hrs VS reviewed since prior progress note. Most recent are:    Visit Vitals  BP (!) 162/57   Pulse 92   Temp 98.6 °F (37 °C)   Resp 16   Ht 5' 1\" (1.549 m)   Wt 92.1 kg (203 lb)   SpO2 97%   BMI 38.36 kg/m²     SpO2 Readings from Last 6 Encounters:   21 97%   03/10/21 99%   21 95%   20 97%   19 98%   19 98%    O2 Flow Rate (L/min): 4 l/min       Intake/Output Summary (Last 24 hours) at 2021 0857  Last data filed at 2021 0400  Gross per 24 hour   Intake 475 ml   Output 4475 ml   Net -4000 ml        Exam:     Physical Exam:    Gen:  Elderly, ill-appearing, morbidly obese, NAD  HEENT:  Pink conjunctivae, PERRL, hearing intact to voice  Neck:  Supple, without masses, thyroid non-tender  Resp:  No accessory muscle use, bilateral rales at bases  Card:  No murmurs, normal S1, S2 without thrills, 2+ edema  Abd:  Soft, non-tender, non-distended, normoactive bowel sounds are present  Lymph:  No cervical adenopathy  Musc:  No cyanosis or clubbing  Skin:  No rashes   Neuro:  Cranial nerves 3-12 are grossly intact, follows commands appropriately  Psych:  Alert with good insight.   Oriented to person, place, and time    Medications Reviewed: (see below)    Lab Data Reviewed: (see below)    ______________________________________________________________________    Medications:     Current Facility-Administered Medications   Medication Dose Route Frequency    polyethylene glycol (MIRALAX) packet 17 g  17 g Oral BID    potassium chloride SR (KLOR-CON 10) tablet 20 mEq  20 mEq Oral NOW    iron sucrose (VENOFER) injection 100 mg  100 mg IntraVENous DIALYSIS MON, WED & FRI    doxercalciferoL (HECTOROL) 4 mcg/2 mL injection 2 mcg  2 mcg IntraVENous DIALYSIS MON, WED & FRI    minoxidiL (LONITEN) tablet 2.5 mg  2.5 mg Oral BID WITH MEALS    sodium bicarbonate tablet 650 mg  650 mg Oral TID    lidocaine (XYLOCAINE) 10 mg/mL (1 %) injection 10-30 mL  10-30 mL SubCUTAneous Multiple    hydrALAZINE (APRESOLINE) tablet 100 mg  100 mg Oral BID    bumetanide (BUMEX) injection 3 mg  3 mg IntraVENous TID    epoetin millie-epbx (RETACRIT) injection 20,000 Units  20,000 Units SubCUTAneous Q7D    sevelamer carbonate (RENVELA) tab 800 mg  800 mg Oral TID WITH MEALS    hydrALAZINE (APRESOLINE) 20 mg/mL injection 20 mg  20 mg IntraVENous Q6H PRN    nitroglycerin (NITROBID) 2 % ointment 1 Inch  1 Inch Topical BID    labetaloL (NORMODYNE;TRANDATE) 20 mg/4 mL (5 mg/mL) injection 20 mg  20 mg IntraVENous Q4H PRN    amLODIPine (NORVASC) tablet 10 mg  10 mg Oral DAILY    aspirin delayed-release tablet 81 mg  81 mg Oral DAILY    carvediloL (COREG) tablet 6.25 mg  6.25 mg Oral BID WITH MEALS    levETIRAcetam (KEPPRA) tablet 250 mg  250 mg Oral BID    montelukast (SINGULAIR) tablet 10 mg  10 mg Oral DAILY    pravastatin (PRAVACHOL) tablet 40 mg  40 mg Oral QHS    sodium chloride (NS) flush 5-40 mL  5-40 mL IntraVENous Q8H    sodium chloride (NS) flush 5-40 mL  5-40 mL IntraVENous PRN    0.9% sodium chloride infusion 25 mL  25 mL IntraVENous PRN    acetaminophen (TYLENOL) tablet 650 mg  650 mg Oral Q6H PRN    Or    acetaminophen (TYLENOL) suppository 650 mg  650 mg Rectal Q6H PRN    senna-docusate (PERICOLACE) 8.6-50 mg per tablet 1 Tab  1 Tab Oral BID    bisacodyL (DULCOLAX) suppository 10 mg  10 mg Rectal DAILY PRN    promethazine (PHENERGAN) tablet 12.5 mg  12.5 mg Oral Q6H PRN    Or    ondansetron (ZOFRAN) injection 4 mg  4 mg IntraVENous Q6H PRN    [Held by provider] heparin (porcine) injection 5,000 Units  5,000 Units SubCUTAneous Q8H          Lab Review:     Recent Labs     05/18/21  0310 05/17/21  0345 05/16/21  0426   WBC 7.5 8.2 8.9   HGB 9.2* 7.5* 8.0*   HCT 28.5* 23.9* 26.3*    219 240     Recent Labs     05/18/21  0310 05/17/21  0345 05/16/21  0426    140 137   K 3.1* 4.1 4.5    107 106   CO2 31 26 19*   GLU 94 90 102*   BUN 19 57* 91*   CREA 1.66* 4.49* 5.79*   CA 8.4* 7.9* 7.6*  7.9*   MG 2.1 2.3 2.6*   PHOS 2.1* 6.1* 7.4*   ALB  --  2.6* 2.7*     Lab Results   Component Value Date/Time    Glucose (POC) 109 (H) 02/20/2019 10:13 PM    Glucose (POC) 114 (H) 02/20/2019 11:34 AM          Assessment / Plan:     78 yo hx of HTN, CVA, CKD 5, presented w/ dyspnea, hypoxia, volume overload, CHF, ESRD     1) Acute respiratory failure with hypoxia: due to pulm edema, volume overload, CHF, renal failure. Off bipap     2) Diastolic CHF, acute on chronic: due to ESRD, volume overload. Monitor on Tele. Echo with EF 55-60%. Will cont IV bumex, dialylsis. Monitor I/O, weight. Cards and renal following     3) Elevated Trop: likely due to demand ischemia from volume overload, renal failure. Unlikely ACS. Will monitor Trop. Cont ASA, BB, statin. defer to Cards     4) Malignant HTN urgency: BP improving. cont norvasc, coreg, hydralazine, minoxidil, nitro paste, bumex. Use IV hydralazine, IV labetalol prn     5) SHARON/CKD 5: now ESRD. LUE fistula not ready for use. Right chest dialysis cath placed. HD started 05/17. Cont HD M,W,Fr.  Renal following     6) Hx of CVA: cont ASA, statin     7) Seizure d/o: cont keppra     8) Anemia: due to CKD, mild iron def.   Will monitor      Code: Full     Total time spent with patient: 22 Minutes **I personally saw and examined the patient during this time period**                 Care Plan discussed with: Patient, nursing     Discussed:  Care Plan    Prophylaxis:  Hep SQ    Disposition:  SNF/LTC vs  ___________________________________________________    Attending Physician: Denver Jacquet, MD

## 2021-05-19 LAB
ANION GAP SERPL CALC-SCNC: 6 MMOL/L (ref 5–15)
BUN SERPL-MCNC: 37 MG/DL (ref 6–20)
BUN/CREAT SERPL: 10 (ref 12–20)
CALCIUM SERPL-MCNC: 7.6 MG/DL (ref 8.5–10.1)
CHLORIDE SERPL-SCNC: 102 MMOL/L (ref 97–108)
CO2 SERPL-SCNC: 30 MMOL/L (ref 21–32)
CREAT SERPL-MCNC: 3.8 MG/DL (ref 0.55–1.02)
ERYTHROCYTE [DISTWIDTH] IN BLOOD BY AUTOMATED COUNT: 12.4 % (ref 11.5–14.5)
GLUCOSE SERPL-MCNC: 99 MG/DL (ref 65–100)
HBV CORE AB SERPL QL IA: NEGATIVE
HCT VFR BLD AUTO: 23 % (ref 35–47)
HGB BLD-MCNC: 7.2 G/DL (ref 11.5–16)
MAGNESIUM SERPL-MCNC: 2.3 MG/DL (ref 1.6–2.4)
MCH RBC QN AUTO: 30.9 PG (ref 26–34)
MCHC RBC AUTO-ENTMCNC: 31.3 G/DL (ref 30–36.5)
MCV RBC AUTO: 98.7 FL (ref 80–99)
NRBC # BLD: 0.05 K/UL (ref 0–0.01)
NRBC BLD-RTO: 0.5 PER 100 WBC
PHOSPHATE SERPL-MCNC: 4.9 MG/DL (ref 2.6–4.7)
PLATELET # BLD AUTO: 218 K/UL (ref 150–400)
PMV BLD AUTO: 9.5 FL (ref 8.9–12.9)
POTASSIUM SERPL-SCNC: 4.1 MMOL/L (ref 3.5–5.1)
RBC # BLD AUTO: 2.33 M/UL (ref 3.8–5.2)
SODIUM SERPL-SCNC: 138 MMOL/L (ref 136–145)
WBC # BLD AUTO: 10 K/UL (ref 3.6–11)

## 2021-05-19 PROCEDURE — 74011250637 HC RX REV CODE- 250/637: Performed by: INTERNAL MEDICINE

## 2021-05-19 PROCEDURE — 90935 HEMODIALYSIS ONE EVALUATION: CPT

## 2021-05-19 PROCEDURE — 97530 THERAPEUTIC ACTIVITIES: CPT

## 2021-05-19 PROCEDURE — 83735 ASSAY OF MAGNESIUM: CPT

## 2021-05-19 PROCEDURE — 85027 COMPLETE CBC AUTOMATED: CPT

## 2021-05-19 PROCEDURE — 36415 COLL VENOUS BLD VENIPUNCTURE: CPT

## 2021-05-19 PROCEDURE — 97116 GAIT TRAINING THERAPY: CPT

## 2021-05-19 PROCEDURE — 84100 ASSAY OF PHOSPHORUS: CPT

## 2021-05-19 PROCEDURE — 65660000000 HC RM CCU STEPDOWN

## 2021-05-19 PROCEDURE — 80048 BASIC METABOLIC PNL TOTAL CA: CPT

## 2021-05-19 RX ORDER — CLONIDINE HYDROCHLORIDE 0.1 MG/1
0.2 TABLET ORAL
Status: DISCONTINUED | OUTPATIENT
Start: 2021-05-19 | End: 2021-05-24 | Stop reason: HOSPADM

## 2021-05-19 RX ORDER — BUMETANIDE 1 MG/1
2 TABLET ORAL 3 TIMES DAILY
Status: DISCONTINUED | OUTPATIENT
Start: 2021-05-19 | End: 2021-05-21

## 2021-05-19 RX ADMIN — SEVELAMER CARBONATE 800 MG: 800 TABLET, FILM COATED ORAL at 09:23

## 2021-05-19 RX ADMIN — ASPIRIN 81 MG: 81 TABLET, COATED ORAL at 09:17

## 2021-05-19 RX ADMIN — HYDRALAZINE HYDROCHLORIDE 100 MG: 25 TABLET, FILM COATED ORAL at 09:17

## 2021-05-19 RX ADMIN — LEVETIRACETAM 250 MG: 250 TABLET, FILM COATED ORAL at 09:23

## 2021-05-19 RX ADMIN — MINOXIDIL 2.5 MG: 2.5 TABLET ORAL at 09:17

## 2021-05-19 RX ADMIN — NITROGLYCERIN 1 INCH: 20 OINTMENT TOPICAL at 09:16

## 2021-05-19 RX ADMIN — SODIUM BICARBONATE 650 MG: 650 TABLET ORAL at 09:17

## 2021-05-19 RX ADMIN — DOCUSATE SODIUM 50MG AND SENNOSIDES 8.6MG 1 TABLET: 8.6; 5 TABLET, FILM COATED ORAL at 09:16

## 2021-05-19 RX ADMIN — SODIUM BICARBONATE 650 MG: 650 TABLET ORAL at 22:26

## 2021-05-19 RX ADMIN — LEVETIRACETAM 250 MG: 250 TABLET, FILM COATED ORAL at 18:25

## 2021-05-19 RX ADMIN — NITROGLYCERIN 1 INCH: 20 OINTMENT TOPICAL at 18:24

## 2021-05-19 RX ADMIN — DOCUSATE SODIUM 50MG AND SENNOSIDES 8.6MG 1 TABLET: 8.6; 5 TABLET, FILM COATED ORAL at 22:26

## 2021-05-19 RX ADMIN — AMLODIPINE BESYLATE 10 MG: 5 TABLET ORAL at 09:16

## 2021-05-19 RX ADMIN — HYDRALAZINE HYDROCHLORIDE 100 MG: 25 TABLET, FILM COATED ORAL at 18:24

## 2021-05-19 RX ADMIN — CARVEDILOL 6.25 MG: 6.25 TABLET, FILM COATED ORAL at 09:17

## 2021-05-19 RX ADMIN — PRAVASTATIN SODIUM 40 MG: 20 TABLET ORAL at 22:26

## 2021-05-19 RX ADMIN — MINOXIDIL 2.5 MG: 2.5 TABLET ORAL at 18:25

## 2021-05-19 RX ADMIN — CARVEDILOL 6.25 MG: 6.25 TABLET, FILM COATED ORAL at 18:25

## 2021-05-19 RX ADMIN — SODIUM BICARBONATE 650 MG: 650 TABLET ORAL at 18:25

## 2021-05-19 RX ADMIN — MONTELUKAST 10 MG: 10 TABLET, FILM COATED ORAL at 09:16

## 2021-05-19 RX ADMIN — BUMETANIDE 2 MG: 1 TABLET ORAL at 18:25

## 2021-05-19 RX ADMIN — POLYETHYLENE GLYCOL 3350 17 G: 17 POWDER, FOR SOLUTION ORAL at 09:18

## 2021-05-19 RX ADMIN — POLYETHYLENE GLYCOL 3350 17 G: 17 POWDER, FOR SOLUTION ORAL at 18:23

## 2021-05-19 RX ADMIN — SEVELAMER CARBONATE 800 MG: 800 TABLET, FILM COATED ORAL at 18:25

## 2021-05-19 NOTE — PROGRESS NOTES
[address]   YOB: 1940          Assessment & Plan:   New ESRD  Volume +   HTN  Anemia of CKD  SHPT    Rec:  HD today and MWF  Arranging outpt HD  On EPO + IV Iron  Continue Hectorol  UF as tolerated. Volume + but tolerated UF poorly  Change to PO Bumex and antihypetensives       Subjective:   CC: f/u ESRD  HPI: Got PC. Feeling better today.  For HD today  ROS: no n/v, less sob  Current Facility-Administered Medications   Medication Dose Route Frequency    polyethylene glycol (MIRALAX) packet 17 g  17 g Oral BID    midazolam (VERSED) injection 0.5-5 mg  0.5-5 mg IntraVENous Multiple    fentaNYL citrate (PF) injection 25-50 mcg  25-50 mcg IntraVENous Multiple    lidocaine (XYLOCAINE) 10 mg/mL (1 %) injection 10-30 mL  10-30 mL SubCUTAneous Multiple    heparinized saline 2 units/mL infusion  500 Units/hr Irrigation CONTINUOUS    iron sucrose (VENOFER) injection 100 mg  100 mg IntraVENous DIALYSIS MON, WED & FRI    doxercalciferoL (HECTOROL) 4 mcg/2 mL injection 2 mcg  2 mcg IntraVENous DIALYSIS MON, WED & FRI    minoxidiL (LONITEN) tablet 2.5 mg  2.5 mg Oral BID WITH MEALS    sodium bicarbonate tablet 650 mg  650 mg Oral TID    lidocaine (XYLOCAINE) 10 mg/mL (1 %) injection 10-30 mL  10-30 mL SubCUTAneous Multiple    hydrALAZINE (APRESOLINE) tablet 100 mg  100 mg Oral BID    bumetanide (BUMEX) injection 3 mg  3 mg IntraVENous TID    epoetin millie-epbx (RETACRIT) injection 20,000 Units  20,000 Units SubCUTAneous Q7D    sevelamer carbonate (RENVELA) tab 800 mg  800 mg Oral TID WITH MEALS    hydrALAZINE (APRESOLINE) 20 mg/mL injection 20 mg  20 mg IntraVENous Q6H PRN    nitroglycerin (NITROBID) 2 % ointment 1 Inch  1 Inch Topical BID    labetaloL (NORMODYNE;TRANDATE) 20 mg/4 mL (5 mg/mL) injection 20 mg  20 mg IntraVENous Q4H PRN    amLODIPine (NORVASC) tablet 10 mg  10 mg Oral DAILY    aspirin delayed-release tablet 81 mg  81 mg Oral DAILY    carvediloL (COREG) tablet 6.25 mg  6.25 mg Oral BID WITH MEALS    levETIRAcetam (KEPPRA) tablet 250 mg  250 mg Oral BID    montelukast (SINGULAIR) tablet 10 mg  10 mg Oral DAILY    pravastatin (PRAVACHOL) tablet 40 mg  40 mg Oral QHS    sodium chloride (NS) flush 5-40 mL  5-40 mL IntraVENous Q8H    sodium chloride (NS) flush 5-40 mL  5-40 mL IntraVENous PRN    0.9% sodium chloride infusion 25 mL  25 mL IntraVENous PRN    acetaminophen (TYLENOL) tablet 650 mg  650 mg Oral Q6H PRN    Or    acetaminophen (TYLENOL) suppository 650 mg  650 mg Rectal Q6H PRN    senna-docusate (PERICOLACE) 8.6-50 mg per tablet 1 Tab  1 Tablet Oral BID    bisacodyL (DULCOLAX) suppository 10 mg  10 mg Rectal DAILY PRN    promethazine (PHENERGAN) tablet 12.5 mg  12.5 mg Oral Q6H PRN    Or    ondansetron (ZOFRAN) injection 4 mg  4 mg IntraVENous Q6H PRN    [Held by provider] heparin (porcine) injection 5,000 Units  5,000 Units SubCUTAneous Q8H          Objective:     Vitals:  Blood pressure (!) 151/43, pulse 74, temperature 98.2 °F (36.8 °C), resp. rate 19, height 5' 1\" (1.549 m), weight 91.2 kg (201 lb), SpO2 97 %. Temp (24hrs), Av.1 °F (36.7 °C), Min:97.3 °F (36.3 °C), Max:98.6 °F (37 °C)      Intake and Output:  No intake/output data recorded.  1901 -  0700  In: 18 [P.O.:860]  Out: 3975 [Urine:975]    Physical Exam:               GENERAL ASSESSMENT: NAD  NECK: RIJ temp HD cath  CHEST: On oxygen, CTA  HEART: S1S2  ABDOMEN: Soft,NT  EXTREMITY: +EDEMA          ECG/rhythm:    Data Review      No results for input(s): TNIPOC in the last 72 hours. No lab exists for component: ITNL   No results for input(s): CPK, CKMB, TROIQ in the last 72 hours.   Recent Labs     21  0315 21  0310 21  0345    139 140   K 4.1 3.1* 4.1    103 107   CO2 30 31 26   BUN 37* 19 57*   CREA 3.80* 1.66* 4.49*   GLU 99 94 90   PHOS 4.9* 2.1* 6.1*   MG 2.3 2.1 2.3   CA 7.6* 8.4* 7.9* ALB  --   --  2.6*   WBC 10.0 7.5 8.2   HGB 7.2* 9.2* 7.5*   HCT 23.0* 28.5* 23.9*    222 219      No results for input(s): INR, PTP, APTT, INREXT, INREXT in the last 72 hours. Needs: urine analysis, urine sodium, protein and creatinine  No results found for: KAYCEE GIPSON        : Jamie Benavides MD  5/19/2021        [facility]:  [email or web address] (formerly known as [email or web address])  Nikki Longoria office:  [address] [place] [address], [address] [place] [address],[address]  [place], [address] [facility]  Phone: [phone]  Fax :     [phone]    [place] office:  [address] [facility], [address]  Phone - [phone]  Fax - [phone]

## 2021-05-19 NOTE — PROGRESS NOTES
1900 Bedside and Verbal shift change report given to Carolynn Aguilar RN (oncoming nurse) by Yoandy Knapp RN (offgoing nurse). Report included the following information SBAR, Kardex, Intake/Output, MAR, Recent Results, Cardiac Rhythm NSR and Alarm Parameters . 2000 Assessment completed. Pt denies pain at this time. Does state that she has felt constipated today, but is passing flatus and feeling relief from that. Pt is lying in bed, watching HGTV. Call bell in reach, will continue to monitor. 2230 Pt had small BM, was incontinent of stool. Missy care and under pad changed. Purewick catheter head also changed. Pt says she feels much better now that she had a BM. Call bell placed in reach, will continue to monitor. 0000 Re-assessment completed. Pt denies pain at this time. Call bell in reach. Will continue to monitor. 0400 Re-assessment completed. Pt denies pain. Call bell in reach. Will continue to monitor. 0700 Bedside and Verbal shift change report given to Yoandy Knapp RN (oncoming nurse) by Carolynn Aguilar RN (offgoing nurse). Report included the following information SBAR, Kardex, Intake/Output, MAR, Recent Results, Cardiac Rhythm NSR with existing ST depression and Alarm Parameters .

## 2021-05-19 NOTE — PROGRESS NOTES
[facility] D[facility] Team [facility]  [phone]    Vitals   Pre   Post   Assessment   Pre   Post     Temp  Temp: 98.2 °F (36.8 °C) (05/19/21 1254)  98.1   LOC  AXOX4 Patient AXOX4   HR   Pulse (Heart Rate): 78 (05/19/21 1300) 20   Lungs   Diminished  Diminshed   B/P   BP: (!) 154/53 (05/19/21 1300) 151.1 Cardiac   HR regular, NSR  NSR   Resp   Resp Rate: 16 (05/19/21 1254) 20   Skin   Warm, dry, and intact  Warm, dry, and intact   Pain level  Pain Intensity 1: 0 (05/19/21 1254) 0/10 on numeric pain scale Edema  Generalized     Generalized   Orders:    Duration:   Start:   Procedurestart: time 1300 End:   Procedure End time: 1630 Total:   3.5 hours    Dialyzer:   Dialyzer/Set Up Inspection: Revaclear (05/19/21 1300)   K Bath:   Dialysate K (mEq/L): 3 (05/19/21 1300)   Ca Bath:   Dialysate CA (mEq/L): 2.5 (05/19/21 1300)   Na/Bicarb:   Dialysate NA (mEq/L): 138 (05/19/21 1300)   Target Fluid Removal:   Goal/Amount of Fluid to Remove (mL): 3000 mL (05/19/21 1300)   Access     Type & Location:   Right SC-Ok to use by chest x-ray. Ports disinfected per policy. Nurse and patient masked per policy. Dressing changed per policy and dated 9/90/69. Labs     Obtained/Reviewed   Critical Results Called   Date when labs were drawn-  Hgb-    HGB   Date Value Ref Range Status   05/19/2021 7.2 (L) 11.5 - 16.0 g/dL Final     K-    Potassium   Date Value Ref Range Status   05/19/2021 4.1 3.5 - 5.1 mmol/L Final     Comment:     INVESTIGATED PER DELTA CHECK PROTOCOL     Ca-   Calcium   Date Value Ref Range Status   05/19/2021 7.6 (L) 8.5 - 10.1 MG/DL Final     Bun-   BUN   Date Value Ref Range Status   05/19/2021 37 (H) 6 - 20 MG/DL Final     Creat-   Creatinine   Date Value Ref Range Status   05/19/2021 3.80 (H) 0.55 - 1.02 MG/DL Final     Comment:     INVESTIGATED PER DELTA CHECK PROTOCOL        Medications/ Blood Products Given     Name   Dose   Route and Time     All dialysis related medications have been reviewed. Blood Volume Processed (BVP):    75.3L Net Fluid   Removed:  3000 ml   Comments   Time Out Done: yes, 4295  Primary Nurse Rpt Pre: Zina Banerjee RN  Primary Nurse Rpt Post: Stephanie Koehler RN  Pt Education: Pt educated about HD and new catheter placement. Care Plan: MWF dialysis while here, TTS outpatient  Tx Summary: Pt tolerated 3.5 hours of HD well. All possible blood returned. Dressing dated 5/19/21. Sterile caps applied to ports. Report given. Pt left resting in bed with call light in reach. Admiting Diagnosis: Acute Resp failure with hypoxia  Pt's previous clinic-New to clinic, possibly [facility]  Consent signed - Informed Consent Verified: Yes (05/19/21 1300)  [place] Consent - yes  Hepatitis Status- negative ag 5/16/21, susceptible 5/16/21  Machine #- Machine Number: [id number] (05/19/21 1300)  Telemetry status-NSR  Pre-dialysis wt. - Pre-Dialysis Weight: 99.3 kg (218 lb 14.7 oz) (05/19/21 1300)

## 2021-05-19 NOTE — PROGRESS NOTES
[facility]  [address]  [phone]      Medical Progress Note      NAME: Heather Miller   :  1940  MRM:  [id number]    Date/Time of service: 2021  11:03 AM       Subjective:     Chief Complaint:  Patient was personally seen and examined by me during this time period. Chart reviewed. Still with weakness, but dyspnea is improving. Spoke to patient's son, Donte Arriola, on the phone. Objective:       Vitals:       Last 24hrs VS reviewed since prior progress note. Most recent are:    Visit Vitals  BP (!) 151/43 (BP 1 Location: Right upper arm, BP Patient Position: At rest)   Pulse 74   Temp 98.2 °F (36.8 °C)   Resp 19   Ht 5' 1\" (1.549 m)   Wt 91.2 kg (201 lb)   SpO2 97%   BMI 37.98 kg/m²     SpO2 Readings from Last 6 Encounters:   21 97%   03/10/21 99%   21 95%   20 97%   19 98%   19 98%    O2 Flow Rate (L/min): 4 l/min       Intake/Output Summary (Last 24 hours) at 2021 1103  Last data filed at 2021 0600  Gross per 24 hour   Intake 860 ml   Output 525 ml   Net 335 ml        Exam:     Physical Exam:    Gen:  Elderly, ill-appearing, morbidly obese, NAD  HEENT:  Pink conjunctivae, PERRL, hearing intact to voice  Neck:  Supple, without masses, thyroid non-tender  Resp:  No accessory muscle use, bilateral rales at bases  Card:  No murmurs, normal S1, S2 without thrills, 2+ edema  Abd:  Soft, non-tender, non-distended, normoactive bowel sounds are present  Lymph:  No cervical adenopathy  Musc:  No cyanosis or clubbing  Skin:  No rashes   Neuro:  Cranial nerves 3-12 are grossly intact, follows commands appropriately  Psych:  Alert with good insight.   Oriented to person, place, and time    Medications Reviewed: (see below)    Lab Data Reviewed: (see below)    ______________________________________________________________________    Medications:     Current Facility-Administered Medications   Medication Dose Route Frequency    bumetanide (BUMEX) tablet 2 mg  2 mg Oral TID    cloNIDine HCL (CATAPRES) tablet 0.2 mg  0.2 mg Oral Q6H PRN    polyethylene glycol (MIRALAX) packet 17 g  17 g Oral BID    midazolam (VERSED) injection 0.5-5 mg  0.5-5 mg IntraVENous Multiple    fentaNYL citrate (PF) injection 25-50 mcg  25-50 mcg IntraVENous Multiple    lidocaine (XYLOCAINE) 10 mg/mL (1 %) injection 10-30 mL  10-30 mL SubCUTAneous Multiple    heparinized saline 2 units/mL infusion  500 Units/hr Irrigation CONTINUOUS    iron sucrose (VENOFER) injection 100 mg  100 mg IntraVENous DIALYSIS MON, WED & FRI    doxercalciferoL (HECTOROL) 4 mcg/2 mL injection 2 mcg  2 mcg IntraVENous DIALYSIS MON, WED & FRI    minoxidiL (LONITEN) tablet 2.5 mg  2.5 mg Oral BID WITH MEALS    sodium bicarbonate tablet 650 mg  650 mg Oral TID    lidocaine (XYLOCAINE) 10 mg/mL (1 %) injection 10-30 mL  10-30 mL SubCUTAneous Multiple    hydrALAZINE (APRESOLINE) tablet 100 mg  100 mg Oral BID    epoetin millie-epbx (RETACRIT) injection 20,000 Units  20,000 Units SubCUTAneous Q7D    sevelamer carbonate (RENVELA) tab 800 mg  800 mg Oral TID WITH MEALS    nitroglycerin (NITROBID) 2 % ointment 1 Inch  1 Inch Topical BID    amLODIPine (NORVASC) tablet 10 mg  10 mg Oral DAILY    aspirin delayed-release tablet 81 mg  81 mg Oral DAILY    carvediloL (COREG) tablet 6.25 mg  6.25 mg Oral BID WITH MEALS    levETIRAcetam (KEPPRA) tablet 250 mg  250 mg Oral BID    montelukast (SINGULAIR) tablet 10 mg  10 mg Oral DAILY    pravastatin (PRAVACHOL) tablet 40 mg  40 mg Oral QHS    sodium chloride (NS) flush 5-40 mL  5-40 mL IntraVENous Q8H    sodium chloride (NS) flush 5-40 mL  5-40 mL IntraVENous PRN    0.9% sodium chloride infusion 25 mL  25 mL IntraVENous PRN    acetaminophen (TYLENOL) tablet 650 mg  650 mg Oral Q6H PRN    Or    acetaminophen (TYLENOL) suppository 650 mg  650 mg Rectal Q6H PRN    senna-docusate (PERICOLACE) 8.6-50 mg per tablet 1 Tab 1 Tablet Oral BID    bisacodyL (DULCOLAX) suppository 10 mg  10 mg Rectal DAILY PRN    promethazine (PHENERGAN) tablet 12.5 mg  12.5 mg Oral Q6H PRN    Or    ondansetron (ZOFRAN) injection 4 mg  4 mg IntraVENous Q6H PRN    [Held by provider] heparin (porcine) injection 5,000 Units  5,000 Units SubCUTAneous Q8H          Lab Review:     Recent Labs     05/19/21 0315 05/18/21 0310 05/17/21  0345   WBC 10.0 7.5 8.2   HGB 7.2* 9.2* 7.5*   HCT 23.0* 28.5* 23.9*    222 219     Recent Labs     05/19/21 0315 05/18/21 0310 05/17/21  0345    139 140   K 4.1 3.1* 4.1    103 107   CO2 30 31 26   GLU 99 94 90   BUN 37* 19 57*   CREA 3.80* 1.66* 4.49*   CA 7.6* 8.4* 7.9*   MG 2.3 2.1 2.3   PHOS 4.9* 2.1* 6.1*   ALB  --   --  2.6*     Lab Results   Component Value Date/Time    Glucose (POC) 109 (H) 02/20/2019 10:13 PM    Glucose (POC) 114 (H) 02/20/2019 11:34 AM          Assessment / Plan:     80 yo hx of HTN, CVA, CKD 5, presented w/ dyspnea, hypoxia, volume overload, CHF, ESRD     1) Acute respiratory failure with hypoxia: slowly improving. Due to pulm edema, volume overload, CHF, renal failure. Cont to wean O2     2) Diastolic CHF, acute on chronic: now improving. Due to ESRD, volume overload. Monitor on Tele. Echo with EF 55-60%. Will cont bumex, dialysis. Monitor I/O, weight. Cards and renal following     3) Elevated Trop: likely due to demand ischemia from volume overload, renal failure. Unlikely ACS. Will monitor Trop. Cont ASA, BB, statin. defer to Cards     4) Malignant HTN urgency: BP improving. cont norvasc, coreg, hydralazine, minoxidil, nitro paste, bumex. Use IV hydralazine, IV labetalol prn     5) SHARON/CKD 5: now ESRD. LUE fistula not ready for use. Right chest dialysis cath placed. HD started 05/17. Cont HD M,W,Fr.  Renal following     6) Hx of CVA: cont ASA, statin     7) Seizure d/o: cont keppra     8) Anemia: due to CKD, mild iron def.   Will monitor      Code: Full Total time spent with patient: 35 Minutes (I spent >50% of time on care coordination) **I personally saw and examined the patient during this time period**                 Care Plan discussed with: Patient, nursing, CM, son    Discussed:  Care Plan    Prophylaxis:  Hep SQ    Disposition:  SNF/LTC , outpatient dialysis            ___________________________________________________    Attending Physician: Samir Zuniga MD

## 2021-05-19 NOTE — PROGRESS NOTES
CM follow up:    Spoke with Mtia Palmer at [facility], she states the patient is being evaluated for SNF care. Patient's COVID test faxed to facility. Spoke with patient at bedside, face mask and goggles on. Discussed SNF placement and outpatient hemodialysis, patient agreeable with plan. Hep B total core has resulted, sent to [facility] via [employer]. Patient has been accepted at [address] [place] [address] on T/Th/Sat. Patient also accepted by [facility] for SNF care. Spoke with patient and her son, they are agreeable with plan. Paln for DC in 1-2 days, [facility] on WILL CALL for [facility]/20/21.     Yajaira Arellano RN, MSN/Care manager  [phone]

## 2021-05-19 NOTE — PROGRESS NOTES
0700 Bedside and Verbal shift change report given to Faby Mackay RN (oncoming nurse) by Jose Schwartz RN (offgoing nurse). Report included the following information SBAR, Kardex, Recent Results and Cardiac Rhythm NSR.     1000 Notified Dr. Teja Santana and Dr. Kurt Costello patient has no PIV at this time due to difficult access. Bumex changed from IV to PO. Will call to ED to see if anyone can use ultrasound. 1200 Hand off report called to April. Call placed to ED again requesting assistance with IV placement.

## 2021-05-19 NOTE — PROGRESS NOTES
1223 Bedside and Verbal shift change report given to Zina Blankenship RN (oncoming nurse) by Edna Benson RN (offgoing nurse). Report included the following information SBAR and Kardex. This patient was assisted with Intentional Toileting every 2 hours during this shift. Documentation of ambulation and output reflected on Flowsheet. 36 Dr Zoila Whitaker notified patient has no peripheral IV access and none obtained today. Dr Zoila Whitaker aware, no orders entered. 1930 Bedside and Verbal shift change report given to Christopher Hernandez (oncoming nurse) by Zina Banerjee RN (offgoing nurse). Report included the following information SBAR and Kardex.

## 2021-05-19 NOTE — PROGRESS NOTES
Problem: Mobility Impaired (Adult and Pediatric)  Goal: *Acute Goals and Plan of Care (Insert Text)  Description: FUNCTIONAL STATUS PRIOR TO ADMISSION: Pt ambulates using rollator walker, does not drive. HOME SUPPORT PRIOR TO ADMISSION: Pt lived alone in condo/apartment with family living on different floor of same building. Physical Therapy Goals  Initiated 5/15/2021  1. Patient will move from supine to sit and sit to supine  in bed with supervision/set-up within 7 day(s). 2.  Patient will transfer from bed to chair and chair to bed with minimal assistance/contact guard assist using the least restrictive device within 7 day(s). 3.  Patient will perform sit to stand with minimal assistance/contact guard assist within 7 day(s). 4.  Patient will ambulate with minimal assistance/contact guard assist for 50 feet with the least restrictive device within 7 day(s). Outcome: Progressing Towards Goal  Note:   PHYSICAL THERAPY TREATMENT  Patient: Heather Miller (43 y.o. female)  Date: 5/19/2021  Diagnosis: [facility] [J96.01] Acute respiratory failure with hypoxia [facility])       Precautions: Fall  Chart, physical therapy assessment, plan of care and goals were reviewed. ASSESSMENT  Patient continues with skilled PT services and is progressing towards goals. Min A for mobility with increased time. O2 sat 89-94% using 4L supplemental O2 throughout session. Rest breaks with PLB during activity. .     Current Level of Function Impacting Discharge (mobility/balance): Min A     Other factors to consider for discharge:          PLAN :  Patient continues to benefit from skilled intervention to address the above impairments. Continue treatment per established plan of care. to address goals.     Recommendation for discharge: (in order for the patient to meet his/her long term goals)  Therapy up to 5 days/week in SNF setting    This discharge recommendation:  Has been made in collaboration with the attending provider and/or case management    IF patient discharges home will need the following DME: to be determined (TBD)       SUBJECTIVE:   Patient stated I am doing a little better each day.     OBJECTIVE DATA SUMMARY:   Critical Behavior:  Neurologic State: Alert, Eyes open spontaneously  Orientation Level: Oriented X4  Cognition: Appropriate decision making, Appropriate for age attention/concentration, Appropriate safety awareness, Follows commands  Safety/Judgement: Awareness of environment, Fall prevention, Insight into deficits  Functional Mobility Training:  Bed Mobility:     Supine to Sit: Minimum assistance     Scooting: Moderate assistance        Transfers:  Sit to Stand: Minimum assistance  Stand to Sit: Contact guard assistance                             Balance:  Sitting: Intact  Standing: With support  Standing - Static: Constant support;Good  Standing - Dynamic : Fair  Ambulation/Gait Training:  Distance (ft): 5 Feet (ft)  Assistive Device: Walker, rolling;Gait belt  Ambulation - Level of Assistance: Contact guard assistance;Minimal assistance        Gait Abnormalities: Decreased step clearance; Step to gait        Base of Support: Widened     Speed/Italia: Slow                       Stairs: Therapeutic Exercises:     Pain Rating:  Denies pain    Activity Tolerance:   Good and observed SOB with activity    After treatment patient left in no apparent distress:   Sitting in chair and Call bell within reach    COMMUNICATION/COLLABORATION:   The patients plan of care was discussed with: Registered nurseLinda Maldonado   Time Calculation: 23 mins

## 2021-05-20 ENCOUNTER — APPOINTMENT (OUTPATIENT)
Dept: GENERAL RADIOLOGY | Age: 81
DRG: 280 | End: 2021-05-20
Attending: INTERNAL MEDICINE
Payer: MEDICARE

## 2021-05-20 LAB
ANION GAP SERPL CALC-SCNC: 4 MMOL/L (ref 5–15)
BUN SERPL-MCNC: 21 MG/DL (ref 6–20)
BUN/CREAT SERPL: 7 (ref 12–20)
CALCIUM SERPL-MCNC: 7.7 MG/DL (ref 8.5–10.1)
CHLORIDE SERPL-SCNC: 102 MMOL/L (ref 97–108)
CO2 SERPL-SCNC: 32 MMOL/L (ref 21–32)
CREAT SERPL-MCNC: 2.92 MG/DL (ref 0.55–1.02)
ERYTHROCYTE [DISTWIDTH] IN BLOOD BY AUTOMATED COUNT: 12.6 % (ref 11.5–14.5)
GLUCOSE BLD STRIP.AUTO-MCNC: 100 MG/DL (ref 65–117)
GLUCOSE SERPL-MCNC: 92 MG/DL (ref 65–100)
HCT VFR BLD AUTO: 23.3 % (ref 35–47)
HGB BLD-MCNC: 7.1 G/DL (ref 11.5–16)
MAGNESIUM SERPL-MCNC: 2.4 MG/DL (ref 1.6–2.4)
MCH RBC QN AUTO: 30.6 PG (ref 26–34)
MCHC RBC AUTO-ENTMCNC: 30.5 G/DL (ref 30–36.5)
MCV RBC AUTO: 100.4 FL (ref 80–99)
NRBC # BLD: 0.02 K/UL (ref 0–0.01)
NRBC BLD-RTO: 0.2 PER 100 WBC
PHOSPHATE SERPL-MCNC: 3.3 MG/DL (ref 2.6–4.7)
PLATELET # BLD AUTO: 219 K/UL (ref 150–400)
PMV BLD AUTO: 9.6 FL (ref 8.9–12.9)
POTASSIUM SERPL-SCNC: 4 MMOL/L (ref 3.5–5.1)
RBC # BLD AUTO: 2.32 M/UL (ref 3.8–5.2)
SERVICE CMNT-IMP: NORMAL
SODIUM SERPL-SCNC: 138 MMOL/L (ref 136–145)
WBC # BLD AUTO: 9.2 K/UL (ref 3.6–11)

## 2021-05-20 PROCEDURE — 80048 BASIC METABOLIC PNL TOTAL CA: CPT

## 2021-05-20 PROCEDURE — 94640 AIRWAY INHALATION TREATMENT: CPT

## 2021-05-20 PROCEDURE — 51798 US URINE CAPACITY MEASURE: CPT

## 2021-05-20 PROCEDURE — 85027 COMPLETE CBC AUTOMATED: CPT

## 2021-05-20 PROCEDURE — 82962 GLUCOSE BLOOD TEST: CPT

## 2021-05-20 PROCEDURE — 74011250637 HC RX REV CODE- 250/637: Performed by: INTERNAL MEDICINE

## 2021-05-20 PROCEDURE — 74011250636 HC RX REV CODE- 250/636: Performed by: INTERNAL MEDICINE

## 2021-05-20 PROCEDURE — 71045 X-RAY EXAM CHEST 1 VIEW: CPT

## 2021-05-20 PROCEDURE — 90935 HEMODIALYSIS ONE EVALUATION: CPT

## 2021-05-20 PROCEDURE — 84100 ASSAY OF PHOSPHORUS: CPT

## 2021-05-20 PROCEDURE — 83735 ASSAY OF MAGNESIUM: CPT

## 2021-05-20 PROCEDURE — 74011250636 HC RX REV CODE- 250/636

## 2021-05-20 PROCEDURE — 65660000000 HC RM CCU STEPDOWN

## 2021-05-20 PROCEDURE — 36415 COLL VENOUS BLD VENIPUNCTURE: CPT

## 2021-05-20 PROCEDURE — 74011000250 HC RX REV CODE- 250: Performed by: INTERNAL MEDICINE

## 2021-05-20 PROCEDURE — 77010033678 HC OXYGEN DAILY

## 2021-05-20 RX ORDER — FUROSEMIDE 10 MG/ML
INJECTION INTRAMUSCULAR; INTRAVENOUS
Status: COMPLETED
Start: 2021-05-20 | End: 2021-05-20

## 2021-05-20 RX ORDER — FUROSEMIDE 10 MG/ML
80 INJECTION INTRAMUSCULAR; INTRAVENOUS ONCE
Status: DISCONTINUED | OUTPATIENT
Start: 2021-05-20 | End: 2021-05-20

## 2021-05-20 RX ORDER — HEPARIN SODIUM 1000 [USP'U]/ML
3800 INJECTION, SOLUTION INTRAVENOUS; SUBCUTANEOUS
Status: DISCONTINUED | OUTPATIENT
Start: 2021-05-20 | End: 2021-05-20

## 2021-05-20 RX ORDER — IPRATROPIUM BROMIDE AND ALBUTEROL SULFATE 2.5; .5 MG/3ML; MG/3ML
3 SOLUTION RESPIRATORY (INHALATION)
Status: COMPLETED | OUTPATIENT
Start: 2021-05-20 | End: 2021-05-20

## 2021-05-20 RX ORDER — FUROSEMIDE 10 MG/ML
80 INJECTION INTRAMUSCULAR; INTRAVENOUS ONCE
Status: COMPLETED | OUTPATIENT
Start: 2021-05-20 | End: 2021-05-20

## 2021-05-20 RX ADMIN — NITROGLYCERIN 1 INCH: 20 OINTMENT TOPICAL at 09:17

## 2021-05-20 RX ADMIN — BISACODYL 10 MG: 10 SUPPOSITORY RECTAL at 21:14

## 2021-05-20 RX ADMIN — SODIUM BICARBONATE 650 MG: 650 TABLET ORAL at 21:19

## 2021-05-20 RX ADMIN — ACETAMINOPHEN 650 MG: 325 TABLET ORAL at 15:13

## 2021-05-20 RX ADMIN — CARVEDILOL 6.25 MG: 6.25 TABLET, FILM COATED ORAL at 09:20

## 2021-05-20 RX ADMIN — ASPIRIN 81 MG: 81 TABLET, COATED ORAL at 09:19

## 2021-05-20 RX ADMIN — Medication 10 ML: at 13:32

## 2021-05-20 RX ADMIN — NITROGLYCERIN 1 INCH: 20 OINTMENT TOPICAL at 17:59

## 2021-05-20 RX ADMIN — NITROGLYCERIN 1 INCH: 20 OINTMENT TOPICAL at 10:30

## 2021-05-20 RX ADMIN — MINOXIDIL 2.5 MG: 2.5 TABLET ORAL at 09:20

## 2021-05-20 RX ADMIN — BUMETANIDE 2 MG: 1 TABLET ORAL at 17:57

## 2021-05-20 RX ADMIN — LEVETIRACETAM 250 MG: 250 TABLET, FILM COATED ORAL at 09:50

## 2021-05-20 RX ADMIN — FUROSEMIDE 80 MG: 10 INJECTION, SOLUTION INTRAMUSCULAR; INTRAVENOUS at 09:55

## 2021-05-20 RX ADMIN — POLYETHYLENE GLYCOL 3350 17 G: 17 POWDER, FOR SOLUTION ORAL at 10:02

## 2021-05-20 RX ADMIN — LEVETIRACETAM 250 MG: 250 TABLET, FILM COATED ORAL at 17:57

## 2021-05-20 RX ADMIN — HYDRALAZINE HYDROCHLORIDE 100 MG: 25 TABLET, FILM COATED ORAL at 17:57

## 2021-05-20 RX ADMIN — ACETAMINOPHEN 650 MG: 325 TABLET ORAL at 00:28

## 2021-05-20 RX ADMIN — SEVELAMER CARBONATE 800 MG: 800 TABLET, FILM COATED ORAL at 09:49

## 2021-05-20 RX ADMIN — CARVEDILOL 6.25 MG: 6.25 TABLET, FILM COATED ORAL at 17:57

## 2021-05-20 RX ADMIN — POLYETHYLENE GLYCOL 3350 17 G: 17 POWDER, FOR SOLUTION ORAL at 17:58

## 2021-05-20 RX ADMIN — HYDRALAZINE HYDROCHLORIDE 100 MG: 25 TABLET, FILM COATED ORAL at 09:19

## 2021-05-20 RX ADMIN — DOCUSATE SODIUM 50MG AND SENNOSIDES 8.6MG 1 TABLET: 8.6; 5 TABLET, FILM COATED ORAL at 21:19

## 2021-05-20 RX ADMIN — DOCUSATE SODIUM 50MG AND SENNOSIDES 8.6MG 1 TABLET: 8.6; 5 TABLET, FILM COATED ORAL at 09:20

## 2021-05-20 RX ADMIN — IPRATROPIUM BROMIDE AND ALBUTEROL SULFATE 3 ML: .5; 2.5 SOLUTION RESPIRATORY (INHALATION) at 09:06

## 2021-05-20 RX ADMIN — AMLODIPINE BESYLATE 10 MG: 5 TABLET ORAL at 09:19

## 2021-05-20 RX ADMIN — Medication 10 ML: at 21:19

## 2021-05-20 RX ADMIN — SODIUM BICARBONATE 650 MG: 650 TABLET ORAL at 17:57

## 2021-05-20 RX ADMIN — MINOXIDIL 2.5 MG: 2.5 TABLET ORAL at 17:57

## 2021-05-20 RX ADMIN — MONTELUKAST 10 MG: 10 TABLET, FILM COATED ORAL at 09:19

## 2021-05-20 RX ADMIN — SEVELAMER CARBONATE 800 MG: 800 TABLET, FILM COATED ORAL at 17:57

## 2021-05-20 RX ADMIN — PRAVASTATIN SODIUM 40 MG: 20 TABLET ORAL at 21:19

## 2021-05-20 RX ADMIN — SODIUM BICARBONATE 650 MG: 650 TABLET ORAL at 09:20

## 2021-05-20 RX ADMIN — FUROSEMIDE 80 MG: 10 INJECTION, SOLUTION INTRAMUSCULAR; INTRAVENOUS at 09:58

## 2021-05-20 NOTE — PROGRESS NOTES
Responded to RRT at 0858. Activated for patient being SOB. 91% o2 stat upon arrival. O2 turned up by [facility] team to 4L NC prior to arrival.   Patient wheezy and diminished to auscultation and HTN with SBP in high 190s. Dr Bobby Sanchez at bedside. Chest xray ordered, nitro paste ordered for HTN. Patient does not complain of Chest Pain. No EKG obtained at this time. No needs from RRT RN at this time. Primary nursing team handling new orders and Dr Bobby Sanchez still at bedside placing orders. Will reassess if needed.

## 2021-05-20 NOTE — PROGRESS NOTES
Physical Therapy  Chart reviewed. Patient with rapid response and we will defer until medically stable. Thank you.   Марина Diaz PT,DPT,NCS

## 2021-05-20 NOTE — PROGRESS NOTES
Bedside shift change recvd from [address] [place] [address] (offgoing nurse) to [facility] (oncoming nurse)  Report included the following information SBAR, Kardex, Intake/Output, MAR, Accordion and Recent Results

## 2021-05-20 NOTE — PROGRESS NOTES
Spiritual Care Assessment/Progress Note  [address]      NAME: Opal Arriaza      MRN: [id number]  AGE: 79 y.o.  SEX: female  Restorationism Affiliation: Zoroastrian   Language: English     5/20/2021     Total Time (in minutes): 10     Spiritual Assessment begun in SF 3 PROG CARE TELE 2 through conversation with:         [x]Patient        [] Family    [] Friend(s)        Reason for Consult: Rapid response team     Spiritual beliefs: (Please include comment if needed)     [x] Identifies with a juanjose tradition:  Did not specify but strong juanjose     [] Supported by a juanjose community:            [] Claims no spiritual orientation:           [] Seeking spiritual identity:                [] Adheres to an individual form of spirituality:           [] Not able to assess:                           Identified resources for coping:      [] Prayer                               [] Music                  [] Guided Imagery     [] Family/friends                 [] Pet visits     [] Devotional reading                         [x] Unknown     [] Other:                                              Interventions offered during this visit: (See comments for more details)    Patient Interventions: Affirmation of emotions/emotional suffering, Affirmation of juanjose, Iconic (affirming the presence of God/Higher Power), Prayer (assurance of), Initial/Spiritual assessment, patient floor           Plan of Care:     [] Support spiritual and/or cultural needs    [] Support AMD and/or advance care planning process      [] Support grieving process   [] Coordinate Rites and/or Rituals    [] Coordination with community clergy   [] No spiritual needs identified at this time   [] Detailed Plan of Care below (See Comments)  [] Make referral to Music Therapy  [] Make referral to Pet Therapy     [] Make referral to Addiction services  [] Make referral to [facility]  [] Make referral to Spiritual Care Partner  [] No future visits requested [x] Follow up upon further referrals     Comments: Responded to RRT on Progressive Care Unit. When able went to Miss Bhargav's bedside. Provided calming presence. She affirmed her juanjose in [address] is with her. Provided gentle breathing meditation. In with the spirit. .. Out with the rest... Provided assurance of prayers as staff continued to work with her. Advised nurse to contact [facility] for any further referrals.   Visited by: Rene Paulson., MS., [address] [facility] [address] [place] ([id number])

## 2021-05-20 NOTE — PROGRESS NOTES
RRT note:    Responded to bedside urgently due to severe dyspnea. Says she has a hard time catching her breath. No CP. No fevers    Patient Vitals for the past 12 hrs:   Temp Pulse Resp BP SpO2   05/20/21 0908 -- -- -- -- 96 %   05/20/21 0903 -- -- -- (!) 158/61 96 %   05/20/21 0859 -- 92 -- (!) 200/47 --   05/20/21 0859 -- -- -- -- 94 %   05/20/21 0858 -- -- 28 -- 91 %   05/20/21 0832 98.7 °F (37.1 °C) 78 18 (!) 149/65 93 %   05/20/21 0700 -- 83 -- -- --   05/20/21 0329 99.3 °F (37.4 °C) 73 20 (!) 136/48 95 %   05/19/21 2344 98.8 °F (37.1 °C) 86 20 (!) 144/66 95 %   05/19/21 2300 -- 76 -- -- --     GEN: mild distress  CV: RRR, no MRG  RESP: tachypneic to 30s, mild acc muscle use. Later improving, and able to speak in full sentences. Diminished. Some exp wheezing. On 4L NC  NEURO: A&O, no focal deficits. Answering questions appropriately    80 yo F w/ hx of HTN, CVA, CKD5 presenting w/ dyspnea, volume overload, found to have ADHF now requiring RRT  Hypertensive emergency: BP 193A systolic. Ddx includes worsening pulmonary edema/flash pulmonary edema    Wheezing may be cardiac wheeze. Okay to try nebs PRN but. Fred Seth now  Repeat CXR. Likely needs UF today, nephrology following    Monitor closely    Ramin Danielle MD  Critical Care:  I personally spent >30 minutes in providing critical care. The reason for providing this level of medical care for this critically ill patient was due to a critical illness (acute ) that impaired one or more vital organ systems such that there was a high probability of imminent or life threatening deterioration in the patient's condition.  This care involved high complexity decision making to assess, manipulate, and support vital system functions.

## 2021-05-20 NOTE — PROGRESS NOTES
Care Management follow up    Patient admitted for acute hypoxic respiratory failure, SHARON. Hx ESRD with fistula placement one month ago, HTN, CVA, CKD5, CHF, seizures, sleep apnea on cpap. RUR score 20%/ moderate risk    Current status  Patient continues to require medical management including IV diuretics and O2 @ 4L/nc. Patient requiring Rapid Response this am due to increased dyspnea and elevation of blood pressure. Await response to treatment. UF today to facilitated fluid removal.    Transition of Care Plan  PLAN:  1. Monitor patient response to treatment and recommendations. 2. Medical management continues. 3. Patient has been accepted at [facility] for SNF care, and [facility] T/Th/Sat.  4. [facility] will need [facility] transport to SNF. 5. CM to monitor clinical progress and disposition recommendations.      Venia Gowers, RN, MSN/Care manager

## 2021-05-20 NOTE — NURSE NAVIGATOR
Met briefly with patient in room. Introduced self and role of NN. Discussed use of her CPAP with her. She states she uses it at home every night but while in the hospital, she has not used it and feels the oxygen helps her enough. She states she had the CPAP machine here but sent it home with her son last night. Encouraged patient to wear her CPAP while in the hospital.  She states she can ask him to bring it back. Discussed with charge nurse who will pass it on to primary nurse for follow up.

## 2021-05-20 NOTE — PROCEDURES
[facility]  [phone]    UF ONLY TREATMENT- [facility]    Vitals   Pre   Post   Assessment   Pre   Post     Temp  Temp: 98.3 °F (36.8 °C) (05/20/21 1239)  98.5 LOC  A/ox4 No change   HR   Pulse (Heart Rate): 71 (05/20/21 1400) 65 Lungs   Diminished bases  no change   B/P   BP: (!) 151/43 (05/20/21 1400) 128/52 Cardiac   Tele/regular  no change   Resp   Resp Rate: 16 (05/20/21 1400) 16 Skin   Warm and dry   no change   Pain level  Pain Intensity 1: 0 (05/20/21 0329) 0 Edema  +4 pitting-BLLE   No change   Orders:    Duration:   Start:   7469 End:   1550 Total:   2   Dialyzer:   Dialyzer/Set Up Inspection: Rosalie Lima (05/20/21 1345)   K Bath:   Dialysate K (mEq/L):  (uf only) (05/20/21 1345)   Ca Bath:   Dialysate CA (mEq/L):  (uf only) (05/20/21 1345)   Na/Bicarb:   Dialysate NA (mEq/L): 140 (05/20/21 1345)   Target Fluid Removal:   Goal/Amount of Fluid to Remove (mL): 3000 mL (05/20/21 1345)   Access     Type & Location:   Right Tunneled catheter   Labs     Obtained/Reviewed   Critical Results Called   Date when labs were drawn-  Hgb-    HGB   Date Value Ref Range Status   05/20/2021 7.1 (L) 11.5 - 16.0 g/dL Final     K-    Potassium   Date Value Ref Range Status   05/20/2021 4.0 3.5 - 5.1 mmol/L Final     Ca-   Calcium   Date Value Ref Range Status   05/20/2021 7.7 (L) 8.5 - 10.1 MG/DL Final     Bun-   BUN   Date Value Ref Range Status   05/20/2021 21 (H) 6 - 20 MG/DL Final     Creat-   Creatinine   Date Value Ref Range Status   05/20/2021 2.92 (H) 0.55 - 1.02 MG/DL Final     Comment:     INVESTIGATED PER DELTA CHECK PROTOCOL        Medications/ Blood Products Given     Name   Dose   Route and Time     None                Blood Volume Processed (BVP):    0 Net Fluid   Removed:  3000mls   Comments   Time Out Done: Yes 1165  Primary Nurse Rpt Jaz Escobar RN  Primary Nurse Rpt Elpidio Silva RN  Pt Education: ongoing  Care Plan:  Dressing CDI. No s/s of infection.  Both lumens aspirate & flush well. Running well at . SBAR received from Primary RN Consent signed & on file. 1340: Each catheter limb disinfected per p&p, caps removed, hubs disinfected per p&p. Each lumen aspirated for blood return and flushed with Normal Saline per policy. VSS. Ultrafiltration initiated at 1349  1550: Tx ended. VSS. Each dialysis catheter limb disinfected per p&p, all possible blood returned per p&p, and each dialysis hub disinfected per p&p. Each lumen flushed and caps applied. No heparin dwell per MD. Bed locked and in the lowest position, call bell and belongings in reach. SBAR given to Primary, RN. Patient is stable at time of  my departure. All Dialysis related medications have been reviewed. Tx Summary:  Admiting Diagnosis:  Pt's previous clinic- New start  Consent signed - Informed Consent Verified: Yes (05/20/21 9845)  [place] Consent -   Hepatitis Status- Neg/Susc 5/16/21  Machine #- Machine Number: [id number] (05/20/21 6276)  Telemetry status- present  Pre-dialysis wt. - Pre-Dialysis Weight: 99.3 kg (218 lb 14.7 oz) (05/19/21 1300)

## 2021-05-20 NOTE — PROGRESS NOTES
Occupational Therapy:  05/20/21    Chart reviewed in prep for OT tx, spoke to RN. Pt with rapid response called this AM for SOB and increased WOB and HTN. MD now at bedside. Will defer this AM and follow up later as able and appropriate.      Thank you,  Zeinab Mata, OTR/L

## 2021-05-20 NOTE — PROGRESS NOTES
[id number]   YOB: 1940          Assessment & Plan:   New ESRD first HD 5/17  Volume +   HTN  Anemia of CKD  SHPT    Rec:  Completed 2 HD session  Plan for isolated UF for 2 hr and will attempt to achieve net UF:3KG  Arranging outpt HD  On EPO + IV Iron  Continue Hectorol  Give Lasix 80 mg IV X1       Subjective:   CC: f/u ESRD  HPI: c/o SOB and audible wheezing +  ROS: no n/v, + sob  Current Facility-Administered Medications   Medication Dose Route Frequency    nitroglycerin (NITROBID) 2 % ointment 1 Inch  1 Inch Topical BID    bumetanide (BUMEX) tablet 2 mg  2 mg Oral TID    cloNIDine HCL (CATAPRES) tablet 0.2 mg  0.2 mg Oral Q6H PRN    polyethylene glycol (MIRALAX) packet 17 g  17 g Oral BID    heparinized saline 2 units/mL infusion  500 Units/hr Irrigation CONTINUOUS    iron sucrose (VENOFER) injection 100 mg  100 mg IntraVENous DIALYSIS MON, WED & FRI    doxercalciferoL (HECTOROL) 4 mcg/2 mL injection 2 mcg  2 mcg IntraVENous DIALYSIS MON, WED & FRI    minoxidiL (LONITEN) tablet 2.5 mg  2.5 mg Oral BID WITH MEALS    sodium bicarbonate tablet 650 mg  650 mg Oral TID    hydrALAZINE (APRESOLINE) tablet 100 mg  100 mg Oral BID    epoetin millie-epbx (RETACRIT) injection 20,000 Units  20,000 Units SubCUTAneous Q7D    sevelamer carbonate (RENVELA) tab 800 mg  800 mg Oral TID WITH MEALS    nitroglycerin (NITROBID) 2 % ointment 1 Inch  1 Inch Topical BID    amLODIPine (NORVASC) tablet 10 mg  10 mg Oral DAILY    aspirin delayed-release tablet 81 mg  81 mg Oral DAILY    carvediloL (COREG) tablet 6.25 mg  6.25 mg Oral BID WITH MEALS    levETIRAcetam (KEPPRA) tablet 250 mg  250 mg Oral BID    montelukast (SINGULAIR) tablet 10 mg  10 mg Oral DAILY    pravastatin (PRAVACHOL) tablet 40 mg  40 mg Oral QHS    sodium chloride (NS) flush 5-40 mL  5-40 mL IntraVENous Q8H    sodium chloride (NS) flush 5-40 mL  5-40 mL IntraVENous PRN    0.9% sodium chloride infusion 25 mL  25 mL IntraVENous PRN    acetaminophen (TYLENOL) tablet 650 mg  650 mg Oral Q6H PRN    Or    acetaminophen (TYLENOL) suppository 650 mg  650 mg Rectal Q6H PRN    senna-docusate (PERICOLACE) 8.6-50 mg per tablet 1 Tab  1 Tablet Oral BID    bisacodyL (DULCOLAX) suppository 10 mg  10 mg Rectal DAILY PRN    promethazine (PHENERGAN) tablet 12.5 mg  12.5 mg Oral Q6H PRN    Or    ondansetron (ZOFRAN) injection 4 mg  4 mg IntraVENous Q6H PRN    [Held by provider] heparin (porcine) injection 5,000 Units  5,000 Units SubCUTAneous Q8H          Objective:     Vitals:  Blood pressure (!) 158/61, pulse 92, temperature 98.7 °F (37.1 °C), resp. rate 28, height 5' 1\" (1.549 m), weight 91.6 kg (202 lb), SpO2 96 %. Temp (24hrs), Av.6 °F (37 °C), Min:98 °F (36.7 °C), Max:99.3 °F (37.4 °C)      Intake and Output:  No intake/output data recorded.  1901 -  0700  In: 900 [P.O.:900]  Out: 3525 [Urine:525]    Physical Exam:               GENERAL ASSESSMENT: NAD  NECK: RIJ temp HD cath  CHEST: On oxygen, wheezing +  HEART: S1S2  ABDOMEN: Soft,NT  EXTREMITY: +EDEMA          ECG/rhythm:    Data Review      No results for input(s): TNIPOC in the last 72 hours. No lab exists for component: ITNL   No results for input(s): CPK, CKMB, TROIQ in the last 72 hours. Recent Labs     21  0348 21  0315 21  0310    138 139   K 4.0 4.1 3.1*    102 103   CO2 32 30 31   BUN 21* 37* 19   CREA 2.92* 3.80* 1.66*   GLU 92 99 94   PHOS 3.3 4.9* 2.1*   MG 2.4 2.3 2.1   CA 7.7* 7.6* 8.4*   WBC 9.2 10.0 7.5   HGB 7.1* 7.2* 9.2*   HCT 23.3* 23.0* 28.5*    218 222      No results for input(s): INR, PTP, APTT, INREXT, INREXT in the last 72 hours.   Needs: urine analysis, urine sodium, protein and creatinine  No results found for: KAYCEE GIPSON        : Ashif Tula Kayser, MD  2021        [facility]:  [email or web address]  [place] office:  [address] [place] [address], [address] [place] [address],[address]  [place], [address] [facility]  Phone: [phone]  Fax :     [phone]    [facility] office:  [address] [facility]  Phone - [phone]  Fax - [phone]

## 2021-05-20 NOTE — PROGRESS NOTES
Pt in progress of doing dialysis at the moment in her room.   Spoke with son , [address] [facility] to give update

## 2021-05-20 NOTE — PROGRESS NOTES
Bedside and Verbal shift change report given to Jaden (oncoming nurse) by Zina TALLEY (offgoing nurse). Report included the following information SBAR, Kardex, Intake/Output, MAR, Recent Results and Cardiac Rhythm sinus. PATIENT HAS NO IV ACCESS. April RN followed up with Dr. Faraz Wilcox and stated, \" Dr. Faraz Wilcox is aware and okay with no access. \"  Cannot give patient IVBP medications at this time.

## 2021-05-20 NOTE — PROGRESS NOTES
Comprehensive Nutrition Assessment      Type and Reason for Visit: Initial, RD nutrition re-screen/LOS    Nutrition Recommendations/Plan:   1. Continue Cardiac diet for lower Na. Monitor need for K/Phos restrictions  2. Monitor Phos closely- large fluctuations- 6.1, 2.1, 4.9, 3.3.   3. Continue ONS - Gelatein and Nepro    Nutrition Assessment:       Pt admitted for Acute respiratory failure with hypoxia ([facility] [address] [J96.01]. Pt  has a past medical history of Anxiety, Arthritis, Basal ganglia stroke, CKD stage 3, GFR 30-59 ml/min (Now ESRD on HD), High cholesterol, seasonal allergies, Hypertension, Ischemic stroke, Monoclonal gammopathy, ABIGAIL on CPAP, Seizures, and Urinary incontinence. Pt screened for LOS. Pt on MWF HD, new on this admission. Pt to transition to TTS HD. Pt may d/c to SNF, but had RR earlier today for SOB. Pt PO has been fair/good. Pt with increased protein needs due to HD. Wt has been mostly stable with some fluid shifts. Wt Readings from Last 10 Encounters:   05/20/21 91.6 kg (202 lb)   03/10/21 92.1 kg (203 lb)   02/19/21 92.3 kg (203 lb 7.8 oz)   01/30/20 93.9 kg (207 lb)   08/22/19 87.2 kg (192 lb 3.9 oz)   08/07/19 87.5 kg (193 lb)   04/12/19 89.7 kg (197 lb 12.8 oz)   02/24/19 84.5 kg (186 lb 4.8 oz)   02/20/19 94.3 kg (207 lb 14.3 oz)   02/20/19 92.5 kg (204 lb)       Malnutrition Assessment:  Malnutrition Status:   At risk for malnutrition (specify)          Estimated Daily Nutrient Needs:  Energy (kcal):  1713  Protein (g):  110 (1.2g/kg)       Fluid (ml/day):  1500 or per renal MD    Documented meal intake:   Patient Vitals for the past 168 hrs:   % Diet Eaten   05/19/21 1841 51 - 75%   05/19/21 1320 1 - 25%   05/19/21 1309 0%   05/19/21 0820 76 - 100%   05/18/21 1900 51 - 75%   05/18/21 1838 51 - 75%   05/18/21 1404 26 - 50%   05/17/21 1230 26 - 50%   05/17/21 0825 76 - 100%   05/16/21 1735 1 - 25%   05/16/21 1350 26 - 50%   05/16/21 1054 1 - 25%   05/15/21 1800 26 - 50%   05/15/21 1111 26 - 50%   05/15/21 0910 1 - 25%       Documented Supplement intake:  Patient Vitals for the past 168 hrs:   Supplement intake %   05/16/21 1735 0   05/16/21 1350 0   05/16/21 1054 0   05/15/21 1800 0   05/15/21 1111 0   05/15/21 0910 0       Nutrition Related Findings:     last BM 5/18, soft. Edema 2/3+ all extremities     Nutritionally Significant Medications:   Bumex, Keppra, Miralax, Pericolace, Renvela,       Wounds:    None       Current Nutrition Therapies:  DIET NUTRITIONAL SUPPLEMENTS AM Snack; Gelatein 20  DIET NUTRITIONAL SUPPLEMENTS PM Snack; Nepro  DIET CARDIAC Regular    Anthropometric Measures:  · Height:  5' 0.98\" (154.9 cm)  · Current Body Wt:  91.6 kg (201 lb 15.1 oz)   · Admission Body Wt:   92.08 kg    · Usual Body Wt:    200 lbs (90.9kg)   · Ideal Body Wt:  105 lbs:  192.3 %  · BMI Category:  Obese class 2 (BMI 35.0-39. 9)       Nutrition Diagnosis:   · Increased nutrient needs related to increased demand for energy/nutrients as evidenced by  (increased protein needs for HD)      Nutrition Interventions:   Food and/or Nutrient Delivery: Continue current diet, Continue oral nutrition supplement  Nutrition Education and Counseling: No recommendations at this time  Coordination of Nutrition Care: Continue to monitor while inpatient    Goals:  pt will consume >50% of meals and 1-2 ONS per day within 3-5 days       Nutrition Monitoring and Evaluation:   Behavioral-Environmental Outcomes: None identified  Food/Nutrient Intake Outcomes: Food and nutrient intake, Supplement intake  Physical Signs/Symptoms Outcomes: Biochemical data, Weight    Discharge Planning:    Continue oral nutrition supplement, Continue current diet     Electronically signed by Erik Conway, [address]     Contact: [phone]

## 2021-05-21 LAB
ALBUMIN SERPL-MCNC: 2.6 G/DL (ref 3.5–5)
ALBUMIN/GLOB SERPL: 0.6 {RATIO} (ref 1.1–2.2)
ALP SERPL-CCNC: 78 U/L (ref 45–117)
ALT SERPL-CCNC: 12 U/L (ref 12–78)
ANION GAP SERPL CALC-SCNC: 5 MMOL/L (ref 5–15)
AST SERPL-CCNC: 32 U/L (ref 15–37)
BASOPHILS # BLD: 0 K/UL (ref 0–0.1)
BASOPHILS NFR BLD: 0 % (ref 0–1)
BILIRUB SERPL-MCNC: 0.5 MG/DL (ref 0.2–1)
BUN SERPL-MCNC: 38 MG/DL (ref 6–20)
BUN/CREAT SERPL: 8 (ref 12–20)
CALCIUM SERPL-MCNC: 8.1 MG/DL (ref 8.5–10.1)
CHLORIDE SERPL-SCNC: 100 MMOL/L (ref 97–108)
CO2 SERPL-SCNC: 30 MMOL/L (ref 21–32)
CREAT SERPL-MCNC: 4.55 MG/DL (ref 0.55–1.02)
DIFFERENTIAL METHOD BLD: ABNORMAL
EOSINOPHIL # BLD: 0.2 K/UL (ref 0–0.4)
EOSINOPHIL NFR BLD: 2 % (ref 0–7)
ERYTHROCYTE [DISTWIDTH] IN BLOOD BY AUTOMATED COUNT: 12.4 % (ref 11.5–14.5)
GLOBULIN SER CALC-MCNC: 4.2 G/DL (ref 2–4)
GLUCOSE SERPL-MCNC: 94 MG/DL (ref 65–100)
HCT VFR BLD AUTO: 26 % (ref 35–47)
HGB BLD-MCNC: 8.1 G/DL (ref 11.5–16)
IMM GRANULOCYTES # BLD AUTO: 0.1 K/UL (ref 0–0.04)
IMM GRANULOCYTES NFR BLD AUTO: 1 % (ref 0–0.5)
LYMPHOCYTES # BLD: 1.5 K/UL (ref 0.8–3.5)
LYMPHOCYTES NFR BLD: 16 % (ref 12–49)
MAGNESIUM SERPL-MCNC: 2.8 MG/DL (ref 1.6–2.4)
MCH RBC QN AUTO: 30.8 PG (ref 26–34)
MCHC RBC AUTO-ENTMCNC: 31.2 G/DL (ref 30–36.5)
MCV RBC AUTO: 98.9 FL (ref 80–99)
MONOCYTES # BLD: 1.4 K/UL (ref 0–1)
MONOCYTES NFR BLD: 15 % (ref 5–13)
NEUTS SEG # BLD: 6.1 K/UL (ref 1.8–8)
NEUTS SEG NFR BLD: 66 % (ref 32–75)
NRBC # BLD: 0 K/UL (ref 0–0.01)
NRBC BLD-RTO: 0 PER 100 WBC
PHOSPHATE SERPL-MCNC: 4.4 MG/DL (ref 2.6–4.7)
PLATELET # BLD AUTO: 242 K/UL (ref 150–400)
PMV BLD AUTO: 9.4 FL (ref 8.9–12.9)
POTASSIUM SERPL-SCNC: 4.3 MMOL/L (ref 3.5–5.1)
PROT SERPL-MCNC: 6.8 G/DL (ref 6.4–8.2)
RBC # BLD AUTO: 2.63 M/UL (ref 3.8–5.2)
SODIUM SERPL-SCNC: 135 MMOL/L (ref 136–145)
WBC # BLD AUTO: 9.2 K/UL (ref 3.6–11)

## 2021-05-21 PROCEDURE — 36415 COLL VENOUS BLD VENIPUNCTURE: CPT

## 2021-05-21 PROCEDURE — 74011250637 HC RX REV CODE- 250/637: Performed by: INTERNAL MEDICINE

## 2021-05-21 PROCEDURE — 74011250636 HC RX REV CODE- 250/636: Performed by: INTERNAL MEDICINE

## 2021-05-21 PROCEDURE — 83735 ASSAY OF MAGNESIUM: CPT

## 2021-05-21 PROCEDURE — 90935 HEMODIALYSIS ONE EVALUATION: CPT

## 2021-05-21 PROCEDURE — 97535 SELF CARE MNGMENT TRAINING: CPT

## 2021-05-21 PROCEDURE — 65660000000 HC RM CCU STEPDOWN

## 2021-05-21 PROCEDURE — 77010033678 HC OXYGEN DAILY

## 2021-05-21 PROCEDURE — 85025 COMPLETE CBC W/AUTO DIFF WBC: CPT

## 2021-05-21 PROCEDURE — 80053 COMPREHEN METABOLIC PANEL: CPT

## 2021-05-21 PROCEDURE — 84100 ASSAY OF PHOSPHORUS: CPT

## 2021-05-21 RX ADMIN — AMLODIPINE BESYLATE 10 MG: 5 TABLET ORAL at 11:26

## 2021-05-21 RX ADMIN — IRON SUCROSE 100 MG: 20 INJECTION, SOLUTION INTRAVENOUS at 21:05

## 2021-05-21 RX ADMIN — CARVEDILOL 6.25 MG: 6.25 TABLET, FILM COATED ORAL at 18:02

## 2021-05-21 RX ADMIN — HYDRALAZINE HYDROCHLORIDE 100 MG: 25 TABLET, FILM COATED ORAL at 11:25

## 2021-05-21 RX ADMIN — POLYETHYLENE GLYCOL 3350 17 G: 17 POWDER, FOR SOLUTION ORAL at 11:25

## 2021-05-21 RX ADMIN — MONTELUKAST 10 MG: 10 TABLET, FILM COATED ORAL at 11:27

## 2021-05-21 RX ADMIN — CARVEDILOL 6.25 MG: 6.25 TABLET, FILM COATED ORAL at 11:27

## 2021-05-21 RX ADMIN — SODIUM BICARBONATE 650 MG: 650 TABLET ORAL at 21:05

## 2021-05-21 RX ADMIN — SODIUM BICARBONATE 650 MG: 650 TABLET ORAL at 17:59

## 2021-05-21 RX ADMIN — NITROGLYCERIN 1 INCH: 20 OINTMENT TOPICAL at 18:00

## 2021-05-21 RX ADMIN — BUMETANIDE 2 MG: 1 TABLET ORAL at 11:26

## 2021-05-21 RX ADMIN — POLYETHYLENE GLYCOL 3350 17 G: 17 POWDER, FOR SOLUTION ORAL at 18:15

## 2021-05-21 RX ADMIN — MINOXIDIL 2.5 MG: 2.5 TABLET ORAL at 11:26

## 2021-05-21 RX ADMIN — NITROGLYCERIN 1 INCH: 20 OINTMENT TOPICAL at 11:27

## 2021-05-21 RX ADMIN — SODIUM BICARBONATE 650 MG: 650 TABLET ORAL at 11:26

## 2021-05-21 RX ADMIN — Medication 10 ML: at 21:07

## 2021-05-21 RX ADMIN — HEPARIN SODIUM 5000 UNITS: 5000 INJECTION INTRAVENOUS; SUBCUTANEOUS at 18:18

## 2021-05-21 RX ADMIN — PRAVASTATIN SODIUM 40 MG: 20 TABLET ORAL at 21:05

## 2021-05-21 RX ADMIN — DOCUSATE SODIUM 50MG AND SENNOSIDES 8.6MG 1 TABLET: 8.6; 5 TABLET, FILM COATED ORAL at 11:25

## 2021-05-21 RX ADMIN — SEVELAMER CARBONATE 800 MG: 800 TABLET, FILM COATED ORAL at 11:32

## 2021-05-21 RX ADMIN — ASPIRIN 81 MG: 81 TABLET, COATED ORAL at 11:26

## 2021-05-21 RX ADMIN — LEVETIRACETAM 250 MG: 250 TABLET, FILM COATED ORAL at 18:15

## 2021-05-21 RX ADMIN — HYDRALAZINE HYDROCHLORIDE 100 MG: 25 TABLET, FILM COATED ORAL at 18:00

## 2021-05-21 RX ADMIN — MINOXIDIL 2.5 MG: 2.5 TABLET ORAL at 18:03

## 2021-05-21 RX ADMIN — LEVETIRACETAM 250 MG: 250 TABLET, FILM COATED ORAL at 11:26

## 2021-05-21 RX ADMIN — ACETAMINOPHEN 650 MG: 325 TABLET ORAL at 03:36

## 2021-05-21 RX ADMIN — Medication 10 ML: at 18:17

## 2021-05-21 RX ADMIN — DOXERCALCIFEROL 2 MCG: 4 INJECTION INTRAVENOUS at 10:38

## 2021-05-21 RX ADMIN — ACETAMINOPHEN 650 MG: 325 TABLET ORAL at 17:59

## 2021-05-21 RX ADMIN — BUMETANIDE 2 MG: 1 TABLET ORAL at 17:00

## 2021-05-21 RX ADMIN — SEVELAMER CARBONATE 800 MG: 800 TABLET, FILM COATED ORAL at 18:15

## 2021-05-21 NOTE — PROGRESS NOTES
Bedside shift change recvd from [facility] (offgoing nurse) to [place] (oncoming nurse)  Report included the following information SBAR, Kardex, Intake/Output, MAR, Accordion and Recent Results

## 2021-05-21 NOTE — PROGRESS NOTES
Problem: Falls - Risk of  Goal: *Absence of Falls  Description: Document Javier Vaughn Fall Risk and appropriate interventions in the flowsheet.   Outcome: Progressing Towards Goal  Note: Fall Risk Interventions:  Mobility Interventions: Bed/chair exit alarm, Patient to call before getting OOB         Medication Interventions: Bed/chair exit alarm, Patient to call before getting OOB, Teach patient to arise slowly    Elimination Interventions: Bed/chair exit alarm, Call light in reach, Patient to call for help with toileting needs    History of Falls Interventions: Bed/chair exit alarm, Door open when patient unattended

## 2021-05-21 NOTE — NURSE NAVIGATOR
Met with patient at bedside and introduced self and role of HF NN. Patient was sitting up in bed and notes she feels much better today. She states she did wear her CPAP overnight last night. Machine noted at bedside. Reviewed diastolic heart failure disease process in setting of ESRD and symptoms of fluid overload. Patient states she had sudden increase in shortness of breath and swelling prior to admission. She had been in the process of having fistula placed for possible start in dialysis in June but has ended up starting it in hospital.  She states she has not passed any urine in about 2 days. She is feeling improved after UF yesterday and HD today. Encouraged patient to keep using CPAP while in hospital and rehab. Provided description of benefits from treating her sleep apnea. She lives in an apartment with her son and DIL in the same complex as well as a friend who is helpful to her. She states she does not yet know who will be able to provide her transportation to dialysis once she is out of rehab but her son and her sister are able to provide most but perhaps not all of her transportation needs. Encouraged her to discuss with SW at dialysis clinic. Reviewed importance of medications to control BP and accuracy with medications. Reviewed rationale for low sodium diet and hidden sources of sodium. Patient states she has already been on a low sodium diet at home and has made other dietary changes for kidney failure. Reviewed availability of dietician at dialysis clinic for further information. Patient also educated on the availability of [facility] as well as their Scope of Services. Patient was given written HF information and [facility] flyer.

## 2021-05-21 NOTE — PROGRESS NOTES
Just spoke with Dialysis nurse and son to give update, would like to have MD call him.   Dialysis removed 2.5L this am, will continue with care and medication

## 2021-05-21 NOTE — PROGRESS NOTES
[address] Morales [address] [place] [address]  [address] [facility], [address] [place] [address]  [phone]      Medical Progress Note      NAME: Yg Desouza   :  1940  MRM:  [id number]    Date/Time: 2021        Assessment / Plan:      80 yo F w/ hx of HTN, CVA, CKD 5 presenting w/ dyspnea, found to have AHRF 2/2 ADHF     Acute respiratory failure with hypoxia: 2/2 ADHF / pulmonary edema, volume overload from renal failure. Off BiPAP. Titrate supplemental O2 PRN     Diastolic CHF, acute on chronic: due to worsening renal failure / volume overload. Started on dialysis. Has not been tolerating it well but better today. Getting HD/UF MWF here, planning TTS at [place]. Patient is not making urine. Will stop her PO Bumex     Elevated troponin: type 2 MI / demand ischemia from volume overload, renal failure. NOT ACS. Cont ASA, BB, statin. Evaluated by cardiology     Malignant HTN urgency: much improved. Cont Norvasc, Coreg, hydralazine, minoxidil     SHARON/CKD 5: now with ESRD and on HD as above    Hx of CVA: cont ASA, statin. Treat HTN    Seizure d/o: Cont keppra     Anemia: likely ACD, CKD however iron panel and ferritin equivocal. Send FOBT     Morbid obesity: Body mass index is 37.11 kg/m². Total time spent: 25 minutes  Time spent in the care of this patient including reviewing records, discussing with nursing and/or other providers on the treatment team, obtaining history and examining the patient, and discussing treatment plans. Care Plan discussed with: Patient, Nursing Staff and >50% of time spent in counseling and coordination of care    Discussed:  Care Plan and D/C Planning    Prophylaxis:  Hep SQ    Disposition:  HH PT, OT, RN         Subjective:     Chief Complaint:  Follow up SOB    Chart/notes/labs/studies reviewed, patient examined. Feels better than yesterday. Less SOB. Tolerated UF better. No CP.  No fevers           Objective:       Vitals:        Last 24hrs VS reviewed since prior progress note. Most recent are:    Visit Vitals  BP (!) 148/53 (BP 1 Location: Right arm, BP Patient Position: At rest;Supine)   Pulse 84   Temp 98.5 °F (36.9 °C)   Resp 20   Ht 5' 0.98\" (1.549 m)   Wt 89 kg (196 lb 4.8 oz)   SpO2 94%   BMI 37.11 kg/m²     SpO2 Readings from Last 6 Encounters:   05/21/21 94%   03/10/21 99%   02/19/21 95%   01/30/20 97%   08/28/19 98%   08/07/19 98%    O2 Flow Rate (L/min): 4 l/min       Intake/Output Summary (Last 24 hours) at 5/21/2021 1712  Last data filed at 5/21/2021 1040  Gross per 24 hour   Intake --   Output 2569 ml   Net -2569 ml          Exam:     Physical Exam:    Gen:  Obese. Elderly, chronically ill-appearing  HEENT:  Atraumatic, normocephalic. Sclerae nonicteric, hearing intact to voice  Neck:  Supple, without apparent masses. Resp:  No accessory muscle use, bibasilar rales without wheezes, or rhonchi  Card: RRR, without m/r/g. 1+ BLE edema. 1+ RUE edema  Abd:  +bowel sounds, soft, NTTP, nondistended. No HSM  Neuro: Face symmetric, speech fluent, follows commands appropriately, no focal weakness or numbness  Psych:  Alert, oriented x 3.  Good insight     Medications Reviewed: (see below)    Lab Data Reviewed: (see below)    ______________________________________________________________________    Medications:     Current Facility-Administered Medications   Medication Dose Route Frequency    nitroglycerin (NITROBID) 2 % ointment 1 Inch  1 Inch Topical BID    bumetanide (BUMEX) tablet 2 mg  2 mg Oral TID    cloNIDine HCL (CATAPRES) tablet 0.2 mg  0.2 mg Oral Q6H PRN    polyethylene glycol (MIRALAX) packet 17 g  17 g Oral BID    heparinized saline 2 units/mL infusion  500 Units/hr Irrigation CONTINUOUS    iron sucrose (VENOFER) injection 100 mg  100 mg IntraVENous DIALYSIS MON, WED & FRI    doxercalciferoL (HECTOROL) 4 mcg/2 mL injection 2 mcg  2 mcg IntraVENous DIALYSIS MON, WED & FRI    minoxidiL (LONITEN) tablet 2.5 mg  2.5 mg Oral BID WITH MEALS    sodium bicarbonate tablet 650 mg  650 mg Oral TID    hydrALAZINE (APRESOLINE) tablet 100 mg  100 mg Oral BID    epoetin millie-epbx (RETACRIT) injection 20,000 Units  20,000 Units SubCUTAneous Q7D    sevelamer carbonate (RENVELA) tab 800 mg  800 mg Oral TID WITH MEALS    amLODIPine (NORVASC) tablet 10 mg  10 mg Oral DAILY    aspirin delayed-release tablet 81 mg  81 mg Oral DAILY    carvediloL (COREG) tablet 6.25 mg  6.25 mg Oral BID WITH MEALS    levETIRAcetam (KEPPRA) tablet 250 mg  250 mg Oral BID    montelukast (SINGULAIR) tablet 10 mg  10 mg Oral DAILY    pravastatin (PRAVACHOL) tablet 40 mg  40 mg Oral QHS    sodium chloride (NS) flush 5-40 mL  5-40 mL IntraVENous Q8H    sodium chloride (NS) flush 5-40 mL  5-40 mL IntraVENous PRN    0.9% sodium chloride infusion 25 mL  25 mL IntraVENous PRN    acetaminophen (TYLENOL) tablet 650 mg  650 mg Oral Q6H PRN    Or    acetaminophen (TYLENOL) suppository 650 mg  650 mg Rectal Q6H PRN    senna-docusate (PERICOLACE) 8.6-50 mg per tablet 1 Tab  1 Tablet Oral BID    bisacodyL (DULCOLAX) suppository 10 mg  10 mg Rectal DAILY PRN    promethazine (PHENERGAN) tablet 12.5 mg  12.5 mg Oral Q6H PRN    Or    ondansetron (ZOFRAN) injection 4 mg  4 mg IntraVENous Q6H PRN    [Held by provider] heparin (porcine) injection 5,000 Units  5,000 Units SubCUTAneous Q8H            Lab Review:     Recent Labs     05/21/21  0335 05/20/21  0348 05/19/21  0315   WBC 9.2 9.2 10.0   HGB 8.1* 7.1* 7.2*   HCT 26.0* 23.3* 23.0*    219 218     Recent Labs     05/21/21  0335 05/20/21  0348 05/19/21  0315   * 138 138   K 4.3 4.0 4.1    102 102   CO2 30 32 30   GLU 94 92 99   BUN 38* 21* 37*   CREA 4.55* 2.92* 3.80*   CA 8.1* 7.7* 7.6*   MG 2.8* 2.4 2.3   PHOS 4.4 3.3 4.9*   ALB 2.6*  --   --    ALT 12  --   --      No components found for: GLPOC  No results for input(s): PH, PCO2, PO2, HCO3, FIO2 in the last 72 hours.   No results for input(s): INR, INREXT, INREXT in the last 72 hours. No results found for: SDES  Lab Results   Component Value Date/Time    Culture result: MRSA NOT PRESENT 08/07/2019 03:57 PM    Culture result:  08/07/2019 03:57 PM         Screening of patient nares for MRSA is for surveillance purposes and, if positive, to facilitate isolation considerations in high risk settings. It is not intended for automatic decolonization interventions per se as regimens are not sufficiently effective to warrant routine use.     Culture result: KLEBSIELLA PNEUMONIAE (A) 08/07/2019 03:57 PM              ___________________________________________________    Attending Physician: Oscar Ramirez MD

## 2021-05-21 NOTE — PROGRESS NOTES
[id number]  YOB: 1940          Assessment & Plan:   New ESRD  Volume +   HTN  Anemia of CKD  SHPT    Rec:  Seen on HD [facility]. Outpt HD TTS at [facility]  On EPO + IV Iron  Continue Hectorol  Tolerating UF better       Subjective:   CC: f/u ESRD  HPI: UF yest -3kg. Seen on HD today.  Almost completed tx, UF goal 3kg, no cramping today  ROS: Tolerating UF better  Current Facility-Administered Medications   Medication Dose Route Frequency    nitroglycerin (NITROBID) 2 % ointment 1 Inch  1 Inch Topical BID    bumetanide (BUMEX) tablet 2 mg  2 mg Oral TID    cloNIDine HCL (CATAPRES) tablet 0.2 mg  0.2 mg Oral Q6H PRN    polyethylene glycol (MIRALAX) packet 17 g  17 g Oral BID    heparinized saline 2 units/mL infusion  500 Units/hr Irrigation CONTINUOUS    iron sucrose (VENOFER) injection 100 mg  100 mg IntraVENous DIALYSIS MON, WED & FRI    doxercalciferoL (HECTOROL) 4 mcg/2 mL injection 2 mcg  2 mcg IntraVENous DIALYSIS MON, WED & FRI    minoxidiL (LONITEN) tablet 2.5 mg  2.5 mg Oral BID WITH MEALS    sodium bicarbonate tablet 650 mg  650 mg Oral TID    hydrALAZINE (APRESOLINE) tablet 100 mg  100 mg Oral BID    epoetin millie-epbx (RETACRIT) injection 20,000 Units  20,000 Units SubCUTAneous Q7D    sevelamer carbonate (RENVELA) tab 800 mg  800 mg Oral TID WITH MEALS    amLODIPine (NORVASC) tablet 10 mg  10 mg Oral DAILY    aspirin delayed-release tablet 81 mg  81 mg Oral DAILY    carvediloL (COREG) tablet 6.25 mg  6.25 mg Oral BID WITH MEALS    levETIRAcetam (KEPPRA) tablet 250 mg  250 mg Oral BID    montelukast (SINGULAIR) tablet 10 mg  10 mg Oral DAILY    pravastatin (PRAVACHOL) tablet 40 mg  40 mg Oral QHS    sodium chloride (NS) flush 5-40 mL  5-40 mL IntraVENous Q8H    sodium chloride (NS) flush 5-40 mL  5-40 mL IntraVENous PRN    0.9% sodium chloride infusion 25 mL  25 mL IntraVENous PRN    acetaminophen (TYLENOL) tablet 650 mg  650 mg Oral Q6H PRN    Or    acetaminophen (TYLENOL) suppository 650 mg  650 mg Rectal Q6H PRN    senna-docusate (PERICOLACE) 8.6-50 mg per tablet 1 Tab  1 Tablet Oral BID    bisacodyL (DULCOLAX) suppository 10 mg  10 mg Rectal DAILY PRN    promethazine (PHENERGAN) tablet 12.5 mg  12.5 mg Oral Q6H PRN    Or    ondansetron (ZOFRAN) injection 4 mg  4 mg IntraVENous Q6H PRN    [Held by provider] heparin (porcine) injection 5,000 Units  5,000 Units SubCUTAneous Q8H          Objective:     Vitals:  Blood pressure (!) 130/54, pulse 69, temperature 98.4 °F (36.9 °C), resp. rate 18, height 5' 0.98\" (1.549 m), weight 89 kg (196 lb 4.8 oz), SpO2 95 %. Temp (24hrs), Av.2 °F (36.8 °C), Min:97 °F (36.1 °C), Max:98.6 °F (37 °C)      Intake and Output:  No intake/output data recorded.  1901 -  0700  In: 240 [P.O.:240]  Out: 3000     Physical Exam:               GENERAL ASSESSMENT: NAD  NECK: RIJ temp HD cath  CHEST: On nasal cpap, CTA  HEART: S1S2  ABDOMEN: Soft,NT  EXTREMITY: less EDEMA          ECG/rhythm:    Data Review      No results for input(s): TNIPOC in the last 72 hours. No lab exists for component: ITNL   No results for input(s): CPK, CKMB, TROIQ in the last 72 hours. Recent Labs     21  0335 21  0348 21  0315   * 138 138   K 4.3 4.0 4.1    102 102   CO2 30 32 30   BUN 38* 21* 37*   CREA 4.55* 2.92* 3.80*   GLU 94 92 99   PHOS 4.4 3.3 4.9*   MG 2.8* 2.4 2.3   CA 8.1* 7.7* 7.6*   ALB 2.6*  --   --    WBC 9.2 9.2 10.0   HGB 8.1* 7.1* 7.2*   HCT 26.0* 23.3* 23.0*    219 218      No results for input(s): INR, PTP, APTT, INREXT, INREXT in the last 72 hours. Needs: urine analysis, urine sodium, protein and creatinine  No results found for: KAYCEE GIPSON        : Jay Jay Mon MD  2021        [facility]:  [email or web address]  Sc[place] office:  [address] [place] [address] [address] [place] [address], [address] [place] [address]  Phone: [phone]  Fax :     [phone]    Christie office:  [address] [place] [address]  [place],  Lucian Cifuentes  Phone - [phone]  Fax - [phone]

## 2021-05-21 NOTE — ROUTINE PROCESS
Bedside shift change report given to [facility] [address] [facility] (oncoming nurse) by Janina Reynolds (offgoing nurse). Report included the following information SBAR, Kardex, Intake/Output, MAR, Accordion and Recent Results.

## 2021-05-21 NOTE — PROGRESS NOTES
Care Management follow up, LOS 6 days     Patient admitted for acute hypoxic respiratory failure, SHARON. Hx ESRD with fistula placement one month ago, HTN, CVA, CKD5, CHF, seizures, sleep apnea on cpap.     RUR score 20%/ moderate risk     Current status  Patient continues to require medical management including IV diuretics and O2 @ 6L/nc. Patient requiring Rapid Response yesterday, increased pulmonary edema and hypertension. Await medical stability to facilitate discharge to SNF.     Transition of Care Plan  PLAN:  1. Monitor patient response to treatment and recommendations. 2. Medical management continues. 3. Patient has been accepted at [facility] [address] [place] [address] for SNF care, and [facility] T/Th/Sat.  4. Patient will need [facility] transport to SNF.   5. CM to monitor clinical progress and disposition recommendations.      Teresa Cuevas RN, MSN/Care manager

## 2021-05-21 NOTE — PROGRESS NOTES
[facility] [address] [facility]  [phone]    Vitals   Pre   Post   Assessment   Pre   Post     Temp  Temp: 98.6 °F (37 °C) (pre vs for hd ) (05/21/21 0702)  98.1 LOC  Patient AXO4 Patient AXOX4   HR   Pulse (Heart Rate): 69 (05/21/21 0710) 77   Lungs   Diminished  Diminished   B/P   BP: (!) 145/50 (hd started ) (05/21/21 0710) 161/51 Cardiac   Regular, NSR  Regular, NSR   Resp   Resp Rate: 18 (05/21/21 0702) 20 Skin   Warm, Dry, and intact  Warm, dry, and intact   Pain level  Pain Intensity 1: 2 (05/20/21 1545) Left hand numbess (not new to pt)  Edema  Generalized     Generalized   Orders:    Duration:   Start:   710 End:   1040 Total:   3.5 hours    Dialyzer:   Dialyzer/Set Up Inspection: [facility] (05/21/21 0710)   K Bath:   Dialysate K (mEq/L): 3 (05/21/21 0710)   Ca Bath:   Dialysate CA (mEq/L): 2.5 (05/21/21 0710)   Na/Bicarb:   Dialysate NA (mEq/L): 138 (05/21/21 0710)   Target Fluid Removal:   Goal/Amount of Fluid to Remove (mL): 3000 mL (05/21/21 0710)   Access     Type & Location:   Right Subclavian-Verified by chest x-ray. Dressing clean, dry, and intact. Dated 5/19/21. Ports disinfected per policy. Hd started.      Left AV fistula-maturing    Labs     Obtained/Reviewed   Critical Results Called   Date when labs were drawn-  Hgb-    HGB   Date Value Ref Range Status   05/21/2021 8.1 (L) 11.5 - 16.0 g/dL Final     K-    Potassium   Date Value Ref Range Status   05/21/2021 4.3 3.5 - 5.1 mmol/L Final     Ca-   Calcium   Date Value Ref Range Status   05/21/2021 8.1 (L) 8.5 - 10.1 MG/DL Final     Bun-   BUN   Date Value Ref Range Status   05/21/2021 38 (H) 6 - 20 MG/DL Final     Creat-   Creatinine   Date Value Ref Range Status   05/21/2021 4.55 (H) 0.55 - 1.02 MG/DL Final     Comment:     INVESTIGATED PER DELTA CHECK PROTOCOL        Medications/ Blood Products Given     Name   Dose   Route and Time     Hectorol 2 mcg-1ml IV, 1040             Blood Volume Processed (BVP):    76.7 Net Fluid Removed:  2569 ml   Comments   Time Out Done: yes, 650   Primary Nurse Rpt Pre: Maureen Davis RN  Primary Nurse Rpt Post: Rich Epstein RN  Pt Education: Patient educated about HD, diet, and medications. Care Plan:MWF dialysis while here. TTS outpatient. Awaiting Rehab. Tx Summary: Pt tolerated 3.5 hours of HD Fair. UF off with 26 minutes left. Pt reported she was cramping(3rd hd session in a row). DR. Donte Lama aware. All possible blood returned. Sterile caps applied. Dressing left intact. Report given to primary RN. Pt left resting in bed with call light in reach. Admiting Diagnosis: Acute Resp Failure with Hypoxia  Pt's previous clinic-Will be TTS [facility]  Consent signed - Informed Consent Verified: Yes (05/21/21 0710)  Plac[place] Consent - yes  Hepatitis Status- Negative 5/16/21, Susceptible 5/16/21  Machine #- Machine Number: [id number] (05/21/21 0710)  Telemetry status-NSR  Pre-dialysis wt. - Pre-Dialysis Weight: 90.5 kg (199 lb 8.3 oz) (05/21/21 0710)

## 2021-05-21 NOTE — PROGRESS NOTES
Problem: Self Care Deficits Care Plan (Adult)  Goal: *Acute Goals and Plan of Care (Insert Text)  Description:   FUNCTIONAL STATUS PRIOR TO ADMISSION: Pt ambulates using rollator walker, does not drive. HOME SUPPORT PRIOR TO ADMISSION: Pt lived alone in condo/apartment with family living on different floor of same building. Occupational Therapy Goals  Initiated 5/16/2021  1. Patient will perform grooming in standing with supervision/set-up and O2 sats 90% and greater within 7 day(s). 2.  Patient will perform lower body dressing with supervision/set-up and O2 sats 90% and greater within 7 day(s). 3.  Patient will perform toilet transfers with supervision/set-up within 7 day(s). 4.  Patient will perform all aspects of toileting with supervision/set-up and O2 sats 90% and greater within 7 day(s). 5.  Patient will participate in upper extremity therapeutic exercise/activities with modified independence for 10 minutes within 7 day(s). 6.  Patient will utilize energy conservation techniques during functional activities with verbal and visual cues within 7 day(s). Outcome: Progressing Towards Goal   OCCUPATIONAL THERAPY TREATMENT  Patient: Aria Montanez (73 y.o. female)  Date: 5/21/2021  Diagnosis: Acute respiratory failure with hypoxia ([facility] [J96.01] Acute respiratory failure with hypoxia [facility])       Precautions: Fall  Chart, occupational therapy assessment, plan of care, and goals were reviewed. ASSESSMENT  Patient continues with skilled OT services and is progressing towards goals. Patient demonstrates improved  activity tolerance with no increase in O2 demands (maintained SPO2 94% on 4L/min), increased standing tolerance (approximately 4 min continuous), improved standing balance (with patient able to complete task with 1 hand release in stand and walker support). Patient remains easily fatigued, and with generalized weakness.  Patient will benefit from SNF level therapy services at discharge Current Level of Function Impacting Discharge (ADLs): min to max assist for basic self care tasks     Other factors to consider for discharge: PLOF- Mod I         PLAN :  Patient continues to benefit from skilled intervention to address the above impairments. Continue treatment per established plan of care to address goals. Recommend with staff: Juno Briseno for all meals     Recommend next OT session: progress POC    Recommendation for discharge: (in order for the patient to meet his/her long term goals)  Therapy up to 5 days/week in SNF setting    This discharge recommendation:  Has been made in collaboration with the attending provider and/or case management    IF patient discharges home will need the following DME: TBD       SUBJECTIVE:   Patient pleasant and cooperative    OBJECTIVE DATA SUMMARY:   Cognitive/Behavioral Status:  Neurologic State: Alert  Orientation Level: Oriented X4  Cognition: Follows commands             Functional Mobility and Transfers for ADLs:  Bed Mobility:  Supine to Sit: Minimum assistance    Transfers:  Sit to Stand: Contact guard assistance  Functional Transfers  Toilet Transfer : Contact guard assistance  Adaptive Equipment: Bedside commode;Walker (comment)  Bed to Chair: Contact guard assistance    Balance:  Sitting: Intact; Without support  Sitting - Static: Good (unsupported)  Sitting - Dynamic: Good (unsupported)  Standing: Impaired; Without support  Standing - Static: Constant support; Fair  Standing - Dynamic : Constant support; Fair    ADL Intervention:          Toileting  Bladder Hygiene: Supervision  Bowel Hygiene: Maximum assistance- increased assistance required due to bowel accident   Clothing Management: Contact guard assistance  Adaptive Equipment: Walker       Pain:  No pain reported     Activity Tolerance:   Fair and maintained SPO2 94% on 4L/min during sustained activity      After treatment patient left in no apparent distress:   Sitting in chair and Call bell within reach    COMMUNICATION/COLLABORATION:   The patients plan of care was discussed with: Registered nurse.      Lorriane Crigler, OT  Time Calculation: 41 mins

## 2021-05-21 NOTE — PROGRESS NOTES
Physical Therapy  Patient currently on HD. We will continue to follow and re-attempt later as able.   Thank you  Sim Raza PT,DPT,NCS

## 2021-05-22 ENCOUNTER — APPOINTMENT (OUTPATIENT)
Dept: GENERAL RADIOLOGY | Age: 81
DRG: 280 | End: 2021-05-22
Attending: INTERNAL MEDICINE
Payer: MEDICARE

## 2021-05-22 LAB
ALBUMIN SERPL-MCNC: 2.5 G/DL (ref 3.5–5)
ANION GAP SERPL CALC-SCNC: 5 MMOL/L (ref 5–15)
BASOPHILS # BLD: 0 K/UL (ref 0–0.1)
BASOPHILS NFR BLD: 0 % (ref 0–1)
BUN SERPL-MCNC: 27 MG/DL (ref 6–20)
BUN/CREAT SERPL: 8 (ref 12–20)
CALCIUM SERPL-MCNC: 8.1 MG/DL (ref 8.5–10.1)
CHLORIDE SERPL-SCNC: 100 MMOL/L (ref 97–108)
CO2 SERPL-SCNC: 31 MMOL/L (ref 21–32)
CREAT SERPL-MCNC: 3.42 MG/DL (ref 0.55–1.02)
DIFFERENTIAL METHOD BLD: ABNORMAL
EOSINOPHIL # BLD: 0.3 K/UL (ref 0–0.4)
EOSINOPHIL NFR BLD: 3 % (ref 0–7)
ERYTHROCYTE [DISTWIDTH] IN BLOOD BY AUTOMATED COUNT: 12.6 % (ref 11.5–14.5)
GLUCOSE SERPL-MCNC: 108 MG/DL (ref 65–100)
HCT VFR BLD AUTO: 26.1 % (ref 35–47)
HGB BLD-MCNC: 8.1 G/DL (ref 11.5–16)
IMM GRANULOCYTES # BLD AUTO: 0.1 K/UL (ref 0–0.04)
IMM GRANULOCYTES NFR BLD AUTO: 1 % (ref 0–0.5)
LYMPHOCYTES # BLD: 1.7 K/UL (ref 0.8–3.5)
LYMPHOCYTES NFR BLD: 21 % (ref 12–49)
MAGNESIUM SERPL-MCNC: 2.7 MG/DL (ref 1.6–2.4)
MCH RBC QN AUTO: 30.8 PG (ref 26–34)
MCHC RBC AUTO-ENTMCNC: 31 G/DL (ref 30–36.5)
MCV RBC AUTO: 99.2 FL (ref 80–99)
MONOCYTES # BLD: 1.3 K/UL (ref 0–1)
MONOCYTES NFR BLD: 16 % (ref 5–13)
NEUTS SEG # BLD: 4.8 K/UL (ref 1.8–8)
NEUTS SEG NFR BLD: 59 % (ref 32–75)
NRBC # BLD: 0 K/UL (ref 0–0.01)
NRBC BLD-RTO: 0 PER 100 WBC
PHOSPHATE SERPL-MCNC: 3.3 MG/DL (ref 2.6–4.7)
PLATELET # BLD AUTO: 237 K/UL (ref 150–400)
PMV BLD AUTO: 9.5 FL (ref 8.9–12.9)
POTASSIUM SERPL-SCNC: 3.9 MMOL/L (ref 3.5–5.1)
RBC # BLD AUTO: 2.63 M/UL (ref 3.8–5.2)
SODIUM SERPL-SCNC: 136 MMOL/L (ref 136–145)
WBC # BLD AUTO: 8.1 K/UL (ref 3.6–11)

## 2021-05-22 PROCEDURE — 77010033678 HC OXYGEN DAILY

## 2021-05-22 PROCEDURE — 83735 ASSAY OF MAGNESIUM: CPT

## 2021-05-22 PROCEDURE — 65660000000 HC RM CCU STEPDOWN

## 2021-05-22 PROCEDURE — 74011000250 HC RX REV CODE- 250: Performed by: INTERNAL MEDICINE

## 2021-05-22 PROCEDURE — 74011250636 HC RX REV CODE- 250/636: Performed by: INTERNAL MEDICINE

## 2021-05-22 PROCEDURE — 94664 DEMO&/EVAL PT USE INHALER: CPT

## 2021-05-22 PROCEDURE — 85025 COMPLETE CBC W/AUTO DIFF WBC: CPT

## 2021-05-22 PROCEDURE — 71045 X-RAY EXAM CHEST 1 VIEW: CPT

## 2021-05-22 PROCEDURE — 94640 AIRWAY INHALATION TREATMENT: CPT

## 2021-05-22 PROCEDURE — 36415 COLL VENOUS BLD VENIPUNCTURE: CPT

## 2021-05-22 PROCEDURE — 80069 RENAL FUNCTION PANEL: CPT

## 2021-05-22 PROCEDURE — 74011250637 HC RX REV CODE- 250/637: Performed by: INTERNAL MEDICINE

## 2021-05-22 PROCEDURE — 94760 N-INVAS EAR/PLS OXIMETRY 1: CPT

## 2021-05-22 RX ORDER — IPRATROPIUM BROMIDE AND ALBUTEROL SULFATE 2.5; .5 MG/3ML; MG/3ML
3 SOLUTION RESPIRATORY (INHALATION) ONCE
Status: COMPLETED | OUTPATIENT
Start: 2021-05-22 | End: 2021-05-22

## 2021-05-22 RX ADMIN — LEVETIRACETAM 250 MG: 250 TABLET, FILM COATED ORAL at 09:19

## 2021-05-22 RX ADMIN — MINOXIDIL 2.5 MG: 2.5 TABLET ORAL at 09:19

## 2021-05-22 RX ADMIN — ACETAMINOPHEN 650 MG: 325 TABLET ORAL at 01:33

## 2021-05-22 RX ADMIN — LEVETIRACETAM 250 MG: 250 TABLET, FILM COATED ORAL at 17:32

## 2021-05-22 RX ADMIN — ASPIRIN 81 MG: 81 TABLET, COATED ORAL at 09:18

## 2021-05-22 RX ADMIN — DOCUSATE SODIUM 50MG AND SENNOSIDES 8.6MG 1 TABLET: 8.6; 5 TABLET, FILM COATED ORAL at 09:18

## 2021-05-22 RX ADMIN — NITROGLYCERIN 1 INCH: 20 OINTMENT TOPICAL at 09:19

## 2021-05-22 RX ADMIN — Medication 10 ML: at 17:34

## 2021-05-22 RX ADMIN — HYDRALAZINE HYDROCHLORIDE 100 MG: 25 TABLET, FILM COATED ORAL at 17:33

## 2021-05-22 RX ADMIN — AMLODIPINE BESYLATE 10 MG: 5 TABLET ORAL at 09:18

## 2021-05-22 RX ADMIN — POLYETHYLENE GLYCOL 3350 17 G: 17 POWDER, FOR SOLUTION ORAL at 17:34

## 2021-05-22 RX ADMIN — HEPARIN SODIUM 5000 UNITS: 5000 INJECTION INTRAVENOUS; SUBCUTANEOUS at 09:17

## 2021-05-22 RX ADMIN — MINOXIDIL 2.5 MG: 2.5 TABLET ORAL at 17:32

## 2021-05-22 RX ADMIN — HEPARIN SODIUM 5000 UNITS: 5000 INJECTION INTRAVENOUS; SUBCUTANEOUS at 17:33

## 2021-05-22 RX ADMIN — SODIUM BICARBONATE 650 MG: 650 TABLET ORAL at 22:23

## 2021-05-22 RX ADMIN — HYDRALAZINE HYDROCHLORIDE 100 MG: 25 TABLET, FILM COATED ORAL at 09:18

## 2021-05-22 RX ADMIN — HEPARIN SODIUM 5000 UNITS: 5000 INJECTION INTRAVENOUS; SUBCUTANEOUS at 23:30

## 2021-05-22 RX ADMIN — EPOETIN ALFA-EPBX 20000 UNITS: 10000 INJECTION, SOLUTION INTRAVENOUS; SUBCUTANEOUS at 23:30

## 2021-05-22 RX ADMIN — POLYETHYLENE GLYCOL 3350 17 G: 17 POWDER, FOR SOLUTION ORAL at 09:17

## 2021-05-22 RX ADMIN — SODIUM BICARBONATE 650 MG: 650 TABLET ORAL at 09:19

## 2021-05-22 RX ADMIN — Medication 10 ML: at 22:24

## 2021-05-22 RX ADMIN — Medication 10 ML: at 09:17

## 2021-05-22 RX ADMIN — HEPARIN SODIUM 5000 UNITS: 5000 INJECTION INTRAVENOUS; SUBCUTANEOUS at 01:36

## 2021-05-22 RX ADMIN — MONTELUKAST 10 MG: 10 TABLET, FILM COATED ORAL at 09:18

## 2021-05-22 RX ADMIN — PRAVASTATIN SODIUM 40 MG: 20 TABLET ORAL at 22:23

## 2021-05-22 RX ADMIN — SEVELAMER CARBONATE 800 MG: 800 TABLET, FILM COATED ORAL at 12:14

## 2021-05-22 RX ADMIN — CARVEDILOL 6.25 MG: 6.25 TABLET, FILM COATED ORAL at 17:32

## 2021-05-22 RX ADMIN — NITROGLYCERIN 1 INCH: 20 OINTMENT TOPICAL at 17:34

## 2021-05-22 RX ADMIN — CARVEDILOL 6.25 MG: 6.25 TABLET, FILM COATED ORAL at 09:18

## 2021-05-22 RX ADMIN — SODIUM BICARBONATE 650 MG: 650 TABLET ORAL at 17:33

## 2021-05-22 RX ADMIN — SEVELAMER CARBONATE 800 MG: 800 TABLET, FILM COATED ORAL at 09:18

## 2021-05-22 RX ADMIN — IPRATROPIUM BROMIDE AND ALBUTEROL SULFATE 3 ML: .5; 2.5 SOLUTION RESPIRATORY (INHALATION) at 14:01

## 2021-05-22 RX ADMIN — SEVELAMER CARBONATE 800 MG: 800 TABLET, FILM COATED ORAL at 17:32

## 2021-05-22 NOTE — PROGRESS NOTES
Pt c/o of SOB, heard audible wheezing, notified respiratory who then notified MD. MD ordered a chest xray and Respitory TX    Thank you

## 2021-05-22 NOTE — PROGRESS NOTES
[facility] [address]  [address] [facility], [address] [place] [address]  [phone]      Medical Progress Note      NAME: Lucian Geiger   :  1940  MRM:  [id number]    Date/Time: 2021        Assessment / Plan:      80 yo F w/ hx of HTN, CVA, CKD 5 presenting w/ dyspnea, found to have AHRF 2/2 ADHF     Acute respiratory failure with hypoxia: 2/2 ADHF / pulmonary edema, volume overload from renal failure. Off BiPAP. Titrate supplemental O2 PRN     Diastolic CHF, acute on chronic: due to worsening renal failure / volume overload. Started on RRT. Has not been tolerating HD/UF well but improving. Has been getting HD MWF here, planning TTS at [place]. Patient appears to be anuric; stopped Bumex     Elevated troponin: type 2 MI / demand ischemia from volume overload, renal failure. NOT ACS. Cont ASA, BB, statin. Evaluated by cardiology     Malignant HTN urgency: BP improved. Cont Norvasc, Coreg, hydralazine, minoxidil     SHARON/CKD 5: now with ESRD and on HD as above    Hx of CVA: cont ASA, statin. Treat HTN    Seizure d/o: Cont Keppra     Anemia: likely ACD, CKD however iron panel and ferritin equivocal. Send FOBT     Morbid obesity: Body mass index is 37.13 kg/m². Total time spent: 25 minutes  Time spent in the care of this patient including reviewing records, discussing with nursing and/or other providers on the treatment team, obtaining history and examining the patient, and discussing treatment plans. Care Plan discussed with: Patient, Nursing Staff and >50% of time spent in counseling and coordination of care    Discussed:  Care Plan and D/C Planning    Prophylaxis:  Hep SQ    Disposition:  HH PT, OT, RN         Subjective:     Chief Complaint:  Follow up SOB    Chart/notes/labs/studies reviewed, patient examined. Feeling dyspnea still. No CP. Weak. No fevers        Objective:       Vitals:        Last 24hrs VS reviewed since prior progress note.  Most recent are:    Visit Vitals  BP (!) 132/51 (BP 1 Location: Right arm, BP Patient Position: At rest)   Pulse 69   Temp 98.4 °F (36.9 °C)   Resp 18   Ht 5' 0.98\" (1.549 m)   Wt 89.1 kg (196 lb 6.4 oz)   SpO2 97%   BMI 37.13 kg/m²     SpO2 Readings from Last 6 Encounters:   05/22/21 97%   03/10/21 99%   02/19/21 95%   01/30/20 97%   08/28/19 98%   08/07/19 98%    O2 Flow Rate (L/min): 3 l/min       Intake/Output Summary (Last 24 hours) at 5/22/2021 1734  Last data filed at 5/22/2021 0949  Gross per 24 hour   Intake 120 ml   Output 0 ml   Net 120 ml          Exam:     Physical Exam:    Gen:  Obese. Elderly, chronically ill-appearing  HEENT:  Atraumatic, normocephalic. Sclerae nonicteric, hearing intact to voice  Neck:  Supple, without apparent masses. Resp:  No accessory muscle use, bibasilar rales without wheezes, or rhonchi  Card: RRR, without m/r/g. 1+ BLE edema. 1+ RUE edema  Abd:  +bowel sounds, soft, NTTP, nondistended. No HSM  Neuro: Face symmetric, speech fluent, follows commands appropriately, no focal weakness or numbness  Psych:  Alert, oriented x 3.  Fair insight     Medications Reviewed: (see below)    Lab Data Reviewed: (see below)    ______________________________________________________________________    Medications:     Current Facility-Administered Medications   Medication Dose Route Frequency    nitroglycerin (NITROBID) 2 % ointment 1 Inch  1 Inch Topical BID    cloNIDine HCL (CATAPRES) tablet 0.2 mg  0.2 mg Oral Q6H PRN    polyethylene glycol (MIRALAX) packet 17 g  17 g Oral BID    heparinized saline 2 units/mL infusion  500 Units/hr Irrigation CONTINUOUS    iron sucrose (VENOFER) injection 100 mg  100 mg IntraVENous DIALYSIS MON, WED & FRI    doxercalciferoL (HECTOROL) 4 mcg/2 mL injection 2 mcg  2 mcg IntraVENous DIALYSIS MON, WED & FRI    minoxidiL (LONITEN) tablet 2.5 mg  2.5 mg Oral BID WITH MEALS    sodium bicarbonate tablet 650 mg  650 mg Oral TID    hydrALAZINE (APRESOLINE) tablet 100 mg 100 mg Oral BID    epoetin millie-epbx (RETACRIT) injection 20,000 Units  20,000 Units SubCUTAneous Q7D    sevelamer carbonate (RENVELA) tab 800 mg  800 mg Oral TID WITH MEALS    amLODIPine (NORVASC) tablet 10 mg  10 mg Oral DAILY    aspirin delayed-release tablet 81 mg  81 mg Oral DAILY    carvediloL (COREG) tablet 6.25 mg  6.25 mg Oral BID WITH MEALS    levETIRAcetam (KEPPRA) tablet 250 mg  250 mg Oral BID    montelukast (SINGULAIR) tablet 10 mg  10 mg Oral DAILY    pravastatin (PRAVACHOL) tablet 40 mg  40 mg Oral QHS    sodium chloride (NS) flush 5-40 mL  5-40 mL IntraVENous Q8H    sodium chloride (NS) flush 5-40 mL  5-40 mL IntraVENous PRN    0.9% sodium chloride infusion 25 mL  25 mL IntraVENous PRN    acetaminophen (TYLENOL) tablet 650 mg  650 mg Oral Q6H PRN    Or    acetaminophen (TYLENOL) suppository 650 mg  650 mg Rectal Q6H PRN    senna-docusate (PERICOLACE) 8.6-50 mg per tablet 1 Tab  1 Tablet Oral BID    bisacodyL (DULCOLAX) suppository 10 mg  10 mg Rectal DAILY PRN    promethazine (PHENERGAN) tablet 12.5 mg  12.5 mg Oral Q6H PRN    Or    ondansetron (ZOFRAN) injection 4 mg  4 mg IntraVENous Q6H PRN    heparin (porcine) injection 5,000 Units  5,000 Units SubCUTAneous Q8H            Lab Review:     Recent Labs     05/22/21  0336 05/21/21  0335 05/20/21  0348   WBC 8.1 9.2 9.2   HGB 8.1* 8.1* 7.1*   HCT 26.1* 26.0* 23.3*    242 219     Recent Labs     05/22/21  0336 05/21/21  0335 05/20/21  0348    135* 138   K 3.9 4.3 4.0    100 102   CO2 31 30 32   * 94 92   BUN 27* 38* 21*   CREA 3.42* 4.55* 2.92*   CA 8.1* 8.1* 7.7*   MG 2.7* 2.8* 2.4   PHOS 3.3 4.4 3.3   ALB 2.5* 2.6*  --    ALT  --  12  --      No components found for: GLPOC  No results for input(s): PH, PCO2, PO2, HCO3, FIO2 in the last 72 hours. No results for input(s): INR, INREXT, INREXT in the last 72 hours.   No results found for: SDES  Lab Results   Component Value Date/Time    Culture result: MRSA NOT PRESENT 08/07/2019 03:57 PM    Culture result:  08/07/2019 03:57 PM         Screening of patient nares for MRSA is for surveillance purposes and, if positive, to facilitate isolation considerations in high risk settings. It is not intended for automatic decolonization interventions per se as regimens are not sufficiently effective to warrant routine use.     Culture result: KLEBSIELLA PNEUMONIAE (A) 08/07/2019 03:57 PM              ___________________________________________________    Attending Physician: Dorise Gowers, MD

## 2021-05-22 NOTE — PROGRESS NOTES
Bedside and Verbal shift change report given to Gagandeep Godfrey (oncoming nurse) by Steffanie Winkler (offgoing nurse). Report included the following information SBAR, Kardex, Cardiac Rhythm NSR and Alarm Parameters . 0230- Pt. c/o left arm numbness and tingling since fistula placed about 5 weeks ago. Per pt. The numbness and tingling has occurred intermittently, but tonight she reports pain 7/10. Tylenol administered as well as elevation of LUE. Dr. Alanna Cabrera on call made aware. Bedside and Verbal shift change report given to Steffanie Winkler (oncoming nurse) by Gagandeep Godfrey (offgoing nurse). Report included the following information SBAR, Kardex and Cardiac Rhythm NSR.

## 2021-05-22 NOTE — PROGRESS NOTES
Bedside and Verbal shift change report received from Sugar Cunha (off going nurse) to Syed (oncoming nurse).  Report included the following information SBAR, Kardex, Cardiac Rhythm NSR

## 2021-05-22 NOTE — PROGRESS NOTES
Received call that patient was SOB. O2 saturations are stable at this time. There are no orders for PRN breathing treatments. RN notified will contact doctor to receive an order. Will check back. 1400- Duo-neb order received. Nebulizer given  Oxygen reduced to 3lpm NC.  Patient sats are 97%

## 2021-05-23 LAB
ALBUMIN SERPL-MCNC: 2.4 G/DL (ref 3.5–5)
ANION GAP SERPL CALC-SCNC: 7 MMOL/L (ref 5–15)
BASOPHILS # BLD: 0 K/UL (ref 0–0.1)
BASOPHILS NFR BLD: 0 % (ref 0–1)
BUN SERPL-MCNC: 49 MG/DL (ref 6–20)
BUN/CREAT SERPL: 10 (ref 12–20)
CALCIUM SERPL-MCNC: 7.8 MG/DL (ref 8.5–10.1)
CHLORIDE SERPL-SCNC: 98 MMOL/L (ref 97–108)
CO2 SERPL-SCNC: 29 MMOL/L (ref 21–32)
CREAT SERPL-MCNC: 4.99 MG/DL (ref 0.55–1.02)
DIFFERENTIAL METHOD BLD: ABNORMAL
EOSINOPHIL # BLD: 0.3 K/UL (ref 0–0.4)
EOSINOPHIL NFR BLD: 3 % (ref 0–7)
ERYTHROCYTE [DISTWIDTH] IN BLOOD BY AUTOMATED COUNT: 12.6 % (ref 11.5–14.5)
GLUCOSE SERPL-MCNC: 109 MG/DL (ref 65–100)
HCT VFR BLD AUTO: 25.3 % (ref 35–47)
HGB BLD-MCNC: 7.9 G/DL (ref 11.5–16)
IMM GRANULOCYTES # BLD AUTO: 0.1 K/UL (ref 0–0.04)
IMM GRANULOCYTES NFR BLD AUTO: 1 % (ref 0–0.5)
LYMPHOCYTES # BLD: 2 K/UL (ref 0.8–3.5)
LYMPHOCYTES NFR BLD: 22 % (ref 12–49)
MAGNESIUM SERPL-MCNC: 2.8 MG/DL (ref 1.6–2.4)
MCH RBC QN AUTO: 30.6 PG (ref 26–34)
MCHC RBC AUTO-ENTMCNC: 31.2 G/DL (ref 30–36.5)
MCV RBC AUTO: 98.1 FL (ref 80–99)
MONOCYTES # BLD: 1.4 K/UL (ref 0–1)
MONOCYTES NFR BLD: 16 % (ref 5–13)
NEUTS SEG # BLD: 5.2 K/UL (ref 1.8–8)
NEUTS SEG NFR BLD: 58 % (ref 32–75)
NRBC # BLD: 0 K/UL (ref 0–0.01)
NRBC BLD-RTO: 0 PER 100 WBC
PHOSPHATE SERPL-MCNC: 4.2 MG/DL (ref 2.6–4.7)
PLATELET # BLD AUTO: 222 K/UL (ref 150–400)
PMV BLD AUTO: 9.5 FL (ref 8.9–12.9)
POTASSIUM SERPL-SCNC: 3.8 MMOL/L (ref 3.5–5.1)
RBC # BLD AUTO: 2.58 M/UL (ref 3.8–5.2)
SODIUM SERPL-SCNC: 134 MMOL/L (ref 136–145)
WBC # BLD AUTO: 8.9 K/UL (ref 3.6–11)

## 2021-05-23 PROCEDURE — 94760 N-INVAS EAR/PLS OXIMETRY 1: CPT

## 2021-05-23 PROCEDURE — 83735 ASSAY OF MAGNESIUM: CPT

## 2021-05-23 PROCEDURE — 36415 COLL VENOUS BLD VENIPUNCTURE: CPT

## 2021-05-23 PROCEDURE — 74011250636 HC RX REV CODE- 250/636: Performed by: INTERNAL MEDICINE

## 2021-05-23 PROCEDURE — 74011250637 HC RX REV CODE- 250/637: Performed by: INTERNAL MEDICINE

## 2021-05-23 PROCEDURE — 77010033678 HC OXYGEN DAILY

## 2021-05-23 PROCEDURE — 85025 COMPLETE CBC W/AUTO DIFF WBC: CPT

## 2021-05-23 PROCEDURE — 65660000000 HC RM CCU STEPDOWN

## 2021-05-23 PROCEDURE — 94660 CPAP INITIATION&MGMT: CPT

## 2021-05-23 PROCEDURE — 80069 RENAL FUNCTION PANEL: CPT

## 2021-05-23 RX ORDER — HYDRALAZINE HYDROCHLORIDE 25 MG/1
100 TABLET, FILM COATED ORAL 2 TIMES DAILY
Status: DISCONTINUED | OUTPATIENT
Start: 2021-05-23 | End: 2021-05-24 | Stop reason: HOSPADM

## 2021-05-23 RX ORDER — HYDRALAZINE HYDROCHLORIDE 25 MG/1
100 TABLET, FILM COATED ORAL 3 TIMES DAILY
Status: DISCONTINUED | OUTPATIENT
Start: 2021-05-23 | End: 2021-05-23

## 2021-05-23 RX ORDER — IPRATROPIUM BROMIDE AND ALBUTEROL SULFATE 2.5; .5 MG/3ML; MG/3ML
3 SOLUTION RESPIRATORY (INHALATION)
Status: DISCONTINUED | OUTPATIENT
Start: 2021-05-23 | End: 2021-05-24 | Stop reason: HOSPADM

## 2021-05-23 RX ADMIN — SEVELAMER CARBONATE 800 MG: 800 TABLET, FILM COATED ORAL at 16:21

## 2021-05-23 RX ADMIN — CARVEDILOL 6.25 MG: 6.25 TABLET, FILM COATED ORAL at 09:37

## 2021-05-23 RX ADMIN — DOCUSATE SODIUM 50MG AND SENNOSIDES 8.6MG 1 TABLET: 8.6; 5 TABLET, FILM COATED ORAL at 09:37

## 2021-05-23 RX ADMIN — SODIUM BICARBONATE 650 MG: 650 TABLET ORAL at 16:22

## 2021-05-23 RX ADMIN — MONTELUKAST 10 MG: 10 TABLET, FILM COATED ORAL at 09:37

## 2021-05-23 RX ADMIN — CARVEDILOL 6.25 MG: 6.25 TABLET, FILM COATED ORAL at 16:21

## 2021-05-23 RX ADMIN — SEVELAMER CARBONATE 800 MG: 800 TABLET, FILM COATED ORAL at 12:48

## 2021-05-23 RX ADMIN — NITROGLYCERIN 1 INCH: 20 OINTMENT TOPICAL at 09:37

## 2021-05-23 RX ADMIN — DOCUSATE SODIUM 50MG AND SENNOSIDES 8.6MG 1 TABLET: 8.6; 5 TABLET, FILM COATED ORAL at 21:20

## 2021-05-23 RX ADMIN — Medication 10 ML: at 21:21

## 2021-05-23 RX ADMIN — MINOXIDIL 2.5 MG: 2.5 TABLET ORAL at 16:21

## 2021-05-23 RX ADMIN — POLYETHYLENE GLYCOL 3350 17 G: 17 POWDER, FOR SOLUTION ORAL at 09:37

## 2021-05-23 RX ADMIN — LEVETIRACETAM 250 MG: 250 TABLET, FILM COATED ORAL at 17:29

## 2021-05-23 RX ADMIN — PRAVASTATIN SODIUM 40 MG: 20 TABLET ORAL at 21:20

## 2021-05-23 RX ADMIN — SODIUM BICARBONATE 650 MG: 650 TABLET ORAL at 09:37

## 2021-05-23 RX ADMIN — POLYETHYLENE GLYCOL 3350 17 G: 17 POWDER, FOR SOLUTION ORAL at 17:30

## 2021-05-23 RX ADMIN — Medication 10 ML: at 12:48

## 2021-05-23 RX ADMIN — AMLODIPINE BESYLATE 10 MG: 5 TABLET ORAL at 09:37

## 2021-05-23 RX ADMIN — LEVETIRACETAM 250 MG: 250 TABLET, FILM COATED ORAL at 09:37

## 2021-05-23 RX ADMIN — Medication 10 ML: at 09:45

## 2021-05-23 RX ADMIN — HYDRALAZINE HYDROCHLORIDE 100 MG: 25 TABLET, FILM COATED ORAL at 17:29

## 2021-05-23 RX ADMIN — ASPIRIN 81 MG: 81 TABLET, COATED ORAL at 09:37

## 2021-05-23 RX ADMIN — HEPARIN SODIUM 5000 UNITS: 5000 INJECTION INTRAVENOUS; SUBCUTANEOUS at 16:20

## 2021-05-23 RX ADMIN — ACETAMINOPHEN 650 MG: 325 TABLET ORAL at 22:36

## 2021-05-23 RX ADMIN — HYDRALAZINE HYDROCHLORIDE 100 MG: 25 TABLET, FILM COATED ORAL at 09:37

## 2021-05-23 RX ADMIN — HEPARIN SODIUM 5000 UNITS: 5000 INJECTION INTRAVENOUS; SUBCUTANEOUS at 09:39

## 2021-05-23 RX ADMIN — SEVELAMER CARBONATE 800 MG: 800 TABLET, FILM COATED ORAL at 09:37

## 2021-05-23 RX ADMIN — NITROGLYCERIN 1 INCH: 20 OINTMENT TOPICAL at 17:29

## 2021-05-23 RX ADMIN — SODIUM BICARBONATE 650 MG: 650 TABLET ORAL at 21:20

## 2021-05-23 RX ADMIN — MINOXIDIL 2.5 MG: 2.5 TABLET ORAL at 09:37

## 2021-05-23 NOTE — PROGRESS NOTES
Bedside shift change report given to Bambi Box RN (oncoming nurse) by Flory Holt RN (offgoing nurse). Report included the following information SBAR.

## 2021-05-23 NOTE — ROUTINE PROCESS
Charge RN reached out to Vivian Schwartz to make sure pt was on schedule for first thing in the morning for dialysis. Vivian Schwartz nurse stated patient will get dialysis in AM.  Plan for discharge tomorrow per Dr. Giuliano Seals.

## 2021-05-23 NOTE — PROGRESS NOTES
Problem: Falls - Risk of  Goal: *Absence of Falls  Description: Document Jorge Kera Fall Risk and appropriate interventions in the flowsheet. Outcome: Progressing Towards Goal  Note: Fall Risk Interventions:  Mobility Interventions: Bed/chair exit alarm         Medication Interventions: Bed/chair exit alarm    Elimination Interventions: Bed/chair exit alarm, Call light in reach    History of Falls Interventions: Bed/chair exit alarm         Problem: Patient Education: Go to Patient Education Activity  Goal: Patient/Family Education  Outcome: Progressing Towards Goal     Problem: Patient Education: Go to Patient Education Activity  Goal: Patient/Family Education  Outcome: Progressing Towards Goal     Problem: Patient Education: Go to Patient Education Activity  Goal: Patient/Family Education  Outcome: Progressing Towards Goal     Problem: Pressure Injury - Risk of  Goal: *Prevention of pressure injury  Description: Document Humble Scale and appropriate interventions in the flowsheet.   Outcome: Progressing Towards Goal  Note: Pressure Injury Interventions:  Sensory Interventions: Assess changes in LOC, Keep linens dry and wrinkle-free, Minimize linen layers    Moisture Interventions: Absorbent underpads    Activity Interventions: PT/OT evaluation    Mobility Interventions: PT/OT evaluation    Nutrition Interventions: Document food/fluid/supplement intake    Friction and Shear Interventions: Minimize layers                Problem: Patient Education: Go to Patient Education Activity  Goal: Patient/Family Education  Outcome: Progressing Towards Goal

## 2021-05-23 NOTE — PROGRESS NOTES
Bedside and Verbal shift change report given to Paolo Carbajal RN (oncoming nurse) by Nuha Alva RN (offgoing nurse). Report included the following information SBAR, Kardex, Intake/Output, MAR, Recent Results, Med Rec Status and Cardiac Rhythm NSR.     0242 Patient c/o feeling like she couldn't breathe and that her chest was tight, denies CP. Patient was just turned to back and pulled up by mobility team and charge nurse. Patient had CPAP nasal mask on. Lung sounds CTA. HOB increased, CPAP removed and nasal cannula replaced. Patient said she was feeling a little better ; breathing improving only a little. Stayed by bedside and repositioned. RT notified of change in O2 device. 0250 Redenned area/blister noted to left inner buttocks/gluteal fold area. Purewick removed. Wound care consult ordered. 2889 RT asked to come to bedside d/t continued c/o difficulty breathing. PRN duoneb administered by RT .    0507 Dialysis nurse arrived. 0730 Bedside and Verbal shift change report given to MAYANK Tyson (oncoming nurse) by Paolo Carbajal RN (offgoing nurse). Report included the following information SBAR, Kardex, Intake/Output, MAR, Recent Results, Med Rec Status and Cardiac Rhythm NSR.

## 2021-05-23 NOTE — PROGRESS NOTES
Advance Care Planning (ACP) Provider Note - Comprehensive      Date of ACP Conversation:  05/23/21    Persons included in Conversation:  patient's son Eleno Jasso of ACP Conversation in minutes:  21 minutes     Authorized [employer] (if patient is incapable of making informed decisions): This person is: Darius       General ACP for ALL Patients with Decision Making Capacity:  Importance of advance care planning, including choosing a healthcare agent to communicate patient's healthcare decisions if patient lost the ability to make decisions. Review of Existing Advance Directive:  Patient and family do not have an advance directive readily available for review. Active Diagnoses:   Patient Active Problem List   Diagnosis Code    ABIGAIL (obstructive sleep apnea) G47.33    Paresthesia R20.2    Nonintractable epilepsy without status epilepticus ([facility] G40.909    ICH (intracerebral hemorrhage) ([facility]) I61.9    Primary osteoarthritis of right knee M17.11    Acute respiratory failure with hypoxia ([facility]) A56.53    Diastolic CHF, acute on chronic ([facility]) I50.33    Volume overload E87.70    SHARON (acute kidney injury) ([facility] N17.9    Elevated troponin R77.8         These active diagnoses are of sufficient risk that focused discussion on advance care planning is indicated in order to allow the patient to thoughtfully consider personal goals of care; and, if situations arise that prevent the ability to personally give input, to ensure appropriate representation of their personal desires for different levels and aggressiveness of care. For Serious or Chronic Illness:  Understanding of medical condition     Reviewed pt's clinical status. Elisha Fairchild has multiple medical problems including HTN, CVA, CKD 5 and is being admitted for SHARON on CKD and now ESRD, ADHF. We reviewed our treatment plan and anticipated discharge plans.      As Berenda Kocher has improved in the past 2 days, we anticipate that she would be ready for discharge to SNF tomorrow. Further, we discussed pt's wishes on how she would like to proceed (aggressive/heroic resuscitation vs not intervening and allowing nature takes its course) if she were to suffer cardiopulmonary arrest.    Understanding of CPR, goals and expected outcomes, benefits and burdens discussed. Information on CPR success provided (many factors lower a patients chance of survival, including advanced age, performance status, malignancy, and presence of multiple comorbidities); Individual asked to communicate understanding of information in his/her own words. Patient's made it very clear to me that pt would not want heroic measures for resuscitation in the event of cardiopulmonary arrest, including chest compressions, defibrillation, intubation/mechanical ventilation. DNR     Son also inquired what would happen if patient continues to not do well with dialysis and if she decides to stop dialysis. We discussed the concept of comfort care and the philosophy of hospice. Son believes that his mother would be comfortable moving towards that process, should she fail to improve on renal replacement therapy. I informed son France Victoria that the  at the skilled nursing facility would facilitate transition to comfort care, should that need arise.

## 2021-05-23 NOTE — PROGRESS NOTES
0800 Bedside and Verbal shift change report given to [address] [place] [address] (oncoming nurse) by Britney Barrett RN (offgoing nurse). Report included the following information SBAR, Kardex and MAR.     1100 Bedside and Verbal shift change report given to [address] [facility] (oncoming nurse) by Eddye Boxer RN (offgoing nurse). Report included the following information SBAR, Kardex and MAR.

## 2021-05-23 NOTE — PROGRESS NOTES
[facility], [place] [address]  [address] [facility], [place], [address] [place] [address]  [phone]      Medical Progress Note      NAME: Bhupendra Obrien   :  1940  MRM:  [id number]    Date/Time: 2021        Assessment / Plan:      80 yo F w/ hx of HTN, CVA, CKD 5 presenting w/ dyspnea, found to have AHRF 2/2 ADHF     Acute respiratory failure with hypoxia: 2/2 ADHF / pulmonary edema, volume overload from renal failure. Off BiPAP. Wean off supplemental O2 as tolerated     Diastolic CHF, acute on chronic: due to worsening renal failure / volume overload. Started on RRT. Has not been tolerating HD/UF well but now better. Has been getting HD MWF here, planning TTS at [place]. Patient appears to be anuric; stopped Bumex. Anticipate DC to SNF tomorrow after dialysis     Elevated troponin: type 2 MI / demand ischemia from volume overload, renal failure. NOT ACS. Cont ASA, BB, statin. Evaluated by cardiology     Malignant HTN urgency: BP improved. Cont hydralazine (increased dose), Coreg, Norvasc, minoxidil     SHARON/CKD 5: now with ESRD and on HD as above    Hx of CVA: cont ASA, statin. Treat HTN    Seizure d/o: Cont Keppra     Anemia: likely ACD, CKD however iron panel and ferritin equivocal. Check FOBT     Morbid obesity: Body mass index is 36.86 kg/m². Total time spent: 25 minutes  Time spent in the care of this patient including reviewing records, discussing with nursing and/or other providers on the treatment team, obtaining history and examining the patient, and discussing treatment plans. Care Plan discussed with: Patient, Nursing Staff and >50% of time spent in counseling and coordination of care    Discussed:  Care Plan and D/C Planning    Prophylaxis:  Hep SQ    Disposition:  HH PT, OT, RN         Subjective:     Chief Complaint:  Follow up SOB    Chart/notes/labs/studies reviewed, patient examined. Had a better night. Feeling better. Less SOB.  No CP         Objective: Vitals:        Last 24hrs VS reviewed since prior progress note. Most recent are:    Visit Vitals  BP (!) 169/63 (BP 1 Location: Right upper arm, BP Patient Position: At rest)   Pulse 68   Temp 98.4 °F (36.9 °C)   Resp 20   Ht 5' 0.98\" (1.549 m)   Wt 88.5 kg (195 lb)   SpO2 97%   BMI 36.86 kg/m²     SpO2 Readings from Last 6 Encounters:   05/23/21 97%   03/10/21 99%   02/19/21 95%   01/30/20 97%   08/28/19 98%   08/07/19 98%    O2 Flow Rate (L/min): 3 l/min     No intake or output data in the 24 hours ending 05/23/21 1626       Exam:     Physical Exam:    Gen:  Obese. Elderly, chronically ill-appearing. NAD  HEENT:  Atraumatic, normocephalic. Sclerae nonicteric, hearing intact to voice  Neck:  Supple, without apparent masses. Resp:  No accessory muscle use, diminished, faint basilar rales without wheezes, or rhonchi  Card: RRR, without m/r/g. 1+ BLE edema  Abd:  +bowel sounds, soft, NTTP, nondistended. No HSM  Neuro: Face symmetric, speech fluent, follows commands appropriately, no focal weakness or numbness  Psych:  Alert, oriented x 3.  Fair insight     Medications Reviewed: (see below)    Lab Data Reviewed: (see below)    ______________________________________________________________________    Medications:     Current Facility-Administered Medications   Medication Dose Route Frequency    albuterol-ipratropium (DUO-NEB) 2.5 MG-0.5 MG/3 ML  3 mL Nebulization Q6H PRN    nitroglycerin (NITROBID) 2 % ointment 1 Inch  1 Inch Topical BID    cloNIDine HCL (CATAPRES) tablet 0.2 mg  0.2 mg Oral Q6H PRN    polyethylene glycol (MIRALAX) packet 17 g  17 g Oral BID    heparinized saline 2 units/mL infusion  500 Units/hr Irrigation CONTINUOUS    iron sucrose (VENOFER) injection 100 mg  100 mg IntraVENous DIALYSIS MON, WED & FRI    doxercalciferoL (HECTOROL) 4 mcg/2 mL injection 2 mcg  2 mcg IntraVENous DIALYSIS MON, WED & FRI    minoxidiL (LONITEN) tablet 2.5 mg  2.5 mg Oral BID WITH MEALS    sodium bicarbonate tablet 650 mg  650 mg Oral TID    hydrALAZINE (APRESOLINE) tablet 100 mg  100 mg Oral BID    epoetin millie-epbx (RETACRIT) injection 20,000 Units  20,000 Units SubCUTAneous Q7D    sevelamer carbonate (RENVELA) tab 800 mg  800 mg Oral TID WITH MEALS    amLODIPine (NORVASC) tablet 10 mg  10 mg Oral DAILY    aspirin delayed-release tablet 81 mg  81 mg Oral DAILY    carvediloL (COREG) tablet 6.25 mg  6.25 mg Oral BID WITH MEALS    levETIRAcetam (KEPPRA) tablet 250 mg  250 mg Oral BID    montelukast (SINGULAIR) tablet 10 mg  10 mg Oral DAILY    pravastatin (PRAVACHOL) tablet 40 mg  40 mg Oral QHS    sodium chloride (NS) flush 5-40 mL  5-40 mL IntraVENous Q8H    sodium chloride (NS) flush 5-40 mL  5-40 mL IntraVENous PRN    0.9% sodium chloride infusion 25 mL  25 mL IntraVENous PRN    acetaminophen (TYLENOL) tablet 650 mg  650 mg Oral Q6H PRN    Or    acetaminophen (TYLENOL) suppository 650 mg  650 mg Rectal Q6H PRN    senna-docusate (PERICOLACE) 8.6-50 mg per tablet 1 Tab  1 Tablet Oral BID    bisacodyL (DULCOLAX) suppository 10 mg  10 mg Rectal DAILY PRN    promethazine (PHENERGAN) tablet 12.5 mg  12.5 mg Oral Q6H PRN    Or    ondansetron (ZOFRAN) injection 4 mg  4 mg IntraVENous Q6H PRN    heparin (porcine) injection 5,000 Units  5,000 Units SubCUTAneous Q8H            Lab Review:     Recent Labs     05/23/21 0347 05/22/21 0336 05/21/21 0335   WBC 8.9 8.1 9.2   HGB 7.9* 8.1* 8.1*   HCT 25.3* 26.1* 26.0*    237 242     Recent Labs     05/23/21 0347 05/22/21 0336 05/21/21  0335   * 136 135*   K 3.8 3.9 4.3   CL 98 100 100   CO2 29 31 30   * 108* 94   BUN 49* 27* 38*   CREA 4.99* 3.42* 4.55*   CA 7.8* 8.1* 8.1*   MG 2.8* 2.7* 2.8*   PHOS 4.2 3.3 4.4   ALB 2.4* 2.5* 2.6*   ALT  --   --  12     No components found for: GLPOC  No results for input(s): PH, PCO2, PO2, HCO3, FIO2 in the last 72 hours. No results for input(s): INR, INREXT, INREXT in the last 72 hours.   No results found for: SDES  Lab Results   Component Value Date/Time    Culture result: MRSA NOT PRESENT 08/07/2019 03:57 PM    Culture result:  08/07/2019 03:57 PM         Screening of patient nares for MRSA is for surveillance purposes and, if positive, to facilitate isolation considerations in high risk settings. It is not intended for automatic decolonization interventions per se as regimens are not sufficiently effective to warrant routine use.     Culture result: KLEBSIELLA PNEUMONIAE (A) 08/07/2019 03:57 PM              ___________________________________________________    Attending Physician: Abran Fulton MD

## 2021-05-23 NOTE — PROGRESS NOTES
Shift change    Bedside and Verbal shift change report given to Justin Manuel RN(oncoming nurse) by Kaden Lopez RN (offgoing nurse). Report included the following information SBAR, Kardex and Recent Results. Shift Change    Bedside and verbal shift change report given to MAYANK WILCOX (oncoming nurse) by Justin Manuel RN (offgoing nurse). Report included the following information SBAR, Kardex and Recent Results.

## 2021-05-24 VITALS
RESPIRATION RATE: 18 BRPM | DIASTOLIC BLOOD PRESSURE: 68 MMHG | TEMPERATURE: 97.9 F | WEIGHT: 196.2 LBS | OXYGEN SATURATION: 98 % | SYSTOLIC BLOOD PRESSURE: 154 MMHG | HEART RATE: 80 BPM | HEIGHT: 61 IN | BODY MASS INDEX: 37.04 KG/M2

## 2021-05-24 PROBLEM — I10 HTN (HYPERTENSION), BENIGN: Status: ACTIVE | Noted: 2021-05-24

## 2021-05-24 PROBLEM — N18.6 ESRD ON HEMODIALYSIS (HCC): Status: ACTIVE | Noted: 2021-05-24

## 2021-05-24 PROBLEM — N18.9 ANEMIA DUE TO CHRONIC KIDNEY DISEASE: Status: ACTIVE | Noted: 2021-05-24

## 2021-05-24 PROBLEM — D63.1 ANEMIA DUE TO CHRONIC KIDNEY DISEASE: Status: ACTIVE | Noted: 2021-05-24

## 2021-05-24 PROBLEM — Z99.2 ESRD ON HEMODIALYSIS (HCC): Status: ACTIVE | Noted: 2021-05-24

## 2021-05-24 LAB
ALBUMIN SERPL-MCNC: 2.5 G/DL (ref 3.5–5)
ALBUMIN/GLOB SERPL: 0.6 {RATIO} (ref 1.1–2.2)
ALP SERPL-CCNC: 70 U/L (ref 45–117)
ALT SERPL-CCNC: 11 U/L (ref 12–78)
ANION GAP SERPL CALC-SCNC: 10 MMOL/L (ref 5–15)
AST SERPL-CCNC: 27 U/L (ref 15–37)
BASOPHILS # BLD: 0 K/UL (ref 0–0.1)
BASOPHILS NFR BLD: 0 % (ref 0–1)
BILIRUB SERPL-MCNC: 0.6 MG/DL (ref 0.2–1)
BUN SERPL-MCNC: 71 MG/DL (ref 6–20)
BUN/CREAT SERPL: 11 (ref 12–20)
CALCIUM SERPL-MCNC: 7.9 MG/DL (ref 8.5–10.1)
CHLORIDE SERPL-SCNC: 97 MMOL/L (ref 97–108)
CO2 SERPL-SCNC: 28 MMOL/L (ref 21–32)
CREAT SERPL-MCNC: 6.25 MG/DL (ref 0.55–1.02)
DIFFERENTIAL METHOD BLD: ABNORMAL
EOSINOPHIL # BLD: 0.3 K/UL (ref 0–0.4)
EOSINOPHIL NFR BLD: 3 % (ref 0–7)
ERYTHROCYTE [DISTWIDTH] IN BLOOD BY AUTOMATED COUNT: 12.7 % (ref 11.5–14.5)
GLOBULIN SER CALC-MCNC: 3.9 G/DL (ref 2–4)
GLUCOSE SERPL-MCNC: 97 MG/DL (ref 65–100)
HCT VFR BLD AUTO: 24.3 % (ref 35–47)
HGB BLD-MCNC: 7.8 G/DL (ref 11.5–16)
IMM GRANULOCYTES # BLD AUTO: 0.1 K/UL (ref 0–0.04)
IMM GRANULOCYTES NFR BLD AUTO: 1 % (ref 0–0.5)
LYMPHOCYTES # BLD: 2.9 K/UL (ref 0.8–3.5)
LYMPHOCYTES NFR BLD: 33 % (ref 12–49)
MAGNESIUM SERPL-MCNC: 3.1 MG/DL (ref 1.6–2.4)
MCH RBC QN AUTO: 30.8 PG (ref 26–34)
MCHC RBC AUTO-ENTMCNC: 32.1 G/DL (ref 30–36.5)
MCV RBC AUTO: 96 FL (ref 80–99)
MONOCYTES # BLD: 0.9 K/UL (ref 0–1)
MONOCYTES NFR BLD: 10 % (ref 5–13)
NEUTS SEG # BLD: 4.7 K/UL (ref 1.8–8)
NEUTS SEG NFR BLD: 53 % (ref 32–75)
NRBC # BLD: 0 K/UL (ref 0–0.01)
NRBC BLD-RTO: 0 PER 100 WBC
PHOSPHATE SERPL-MCNC: 4.7 MG/DL (ref 2.6–4.7)
PLATELET # BLD AUTO: 225 K/UL (ref 150–400)
PMV BLD AUTO: 9.3 FL (ref 8.9–12.9)
POTASSIUM SERPL-SCNC: 3.8 MMOL/L (ref 3.5–5.1)
PROT SERPL-MCNC: 6.4 G/DL (ref 6.4–8.2)
RBC # BLD AUTO: 2.53 M/UL (ref 3.8–5.2)
SODIUM SERPL-SCNC: 135 MMOL/L (ref 136–145)
WBC # BLD AUTO: 8.8 K/UL (ref 3.6–11)

## 2021-05-24 PROCEDURE — 94640 AIRWAY INHALATION TREATMENT: CPT

## 2021-05-24 PROCEDURE — 36415 COLL VENOUS BLD VENIPUNCTURE: CPT

## 2021-05-24 PROCEDURE — 80053 COMPREHEN METABOLIC PANEL: CPT

## 2021-05-24 PROCEDURE — 85025 COMPLETE CBC W/AUTO DIFF WBC: CPT

## 2021-05-24 PROCEDURE — 74011250636 HC RX REV CODE- 250/636: Performed by: INTERNAL MEDICINE

## 2021-05-24 PROCEDURE — 74011250637 HC RX REV CODE- 250/637: Performed by: INTERNAL MEDICINE

## 2021-05-24 PROCEDURE — 83735 ASSAY OF MAGNESIUM: CPT

## 2021-05-24 PROCEDURE — 94760 N-INVAS EAR/PLS OXIMETRY 1: CPT

## 2021-05-24 PROCEDURE — 90935 HEMODIALYSIS ONE EVALUATION: CPT

## 2021-05-24 PROCEDURE — 77010033678 HC OXYGEN DAILY

## 2021-05-24 PROCEDURE — 84100 ASSAY OF PHOSPHORUS: CPT

## 2021-05-24 PROCEDURE — 74011000250 HC RX REV CODE- 250: Performed by: INTERNAL MEDICINE

## 2021-05-24 RX ORDER — SEVELAMER CARBONATE 800 MG/1
800 TABLET, FILM COATED ORAL
Qty: 90 TABLET | Refills: 0 | Status: SHIPPED
Start: 2021-05-24 | End: 2021-06-23

## 2021-05-24 RX ORDER — CLONIDINE HYDROCHLORIDE 0.2 MG/1
0.2 TABLET ORAL
Qty: 30 TABLET | Refills: 0 | Status: ON HOLD
Start: 2021-05-24 | End: 2022-08-23

## 2021-05-24 RX ORDER — DOXERCALCIFEROL 4 UG/2ML
2 INJECTION INTRAVENOUS
Qty: 12 ML | Refills: 0 | Status: SHIPPED
Start: 2021-05-24 | End: 2021-06-23

## 2021-05-24 RX ORDER — HYDRALAZINE HYDROCHLORIDE 100 MG/1
100 TABLET, FILM COATED ORAL 3 TIMES DAILY
Qty: 90 TABLET | Refills: 0 | Status: SHIPPED
Start: 2021-05-24 | End: 2021-06-23

## 2021-05-24 RX ORDER — POLYETHYLENE GLYCOL 3350 17 G/17G
17 POWDER, FOR SOLUTION ORAL 2 TIMES DAILY
Qty: 60 PACKET | Refills: 0 | Status: SHIPPED
Start: 2021-05-24 | End: 2021-06-23

## 2021-05-24 RX ORDER — AMOXICILLIN 250 MG
1 CAPSULE ORAL 2 TIMES DAILY
Qty: 60 TABLET | Refills: 0 | Status: SHIPPED
Start: 2021-05-24 | End: 2021-06-23

## 2021-05-24 RX ADMIN — HEPARIN SODIUM 5000 UNITS: 5000 INJECTION INTRAVENOUS; SUBCUTANEOUS at 09:47

## 2021-05-24 RX ADMIN — HYDRALAZINE HYDROCHLORIDE 100 MG: 25 TABLET, FILM COATED ORAL at 09:46

## 2021-05-24 RX ADMIN — ACETAMINOPHEN 650 MG: 325 TABLET ORAL at 09:56

## 2021-05-24 RX ADMIN — HEPARIN SODIUM 5000 UNITS: 5000 INJECTION INTRAVENOUS; SUBCUTANEOUS at 00:41

## 2021-05-24 RX ADMIN — MINOXIDIL 2.5 MG: 2.5 TABLET ORAL at 09:46

## 2021-05-24 RX ADMIN — AMLODIPINE BESYLATE 10 MG: 5 TABLET ORAL at 09:46

## 2021-05-24 RX ADMIN — LEVETIRACETAM 250 MG: 250 TABLET, FILM COATED ORAL at 09:45

## 2021-05-24 RX ADMIN — ACETAMINOPHEN 650 MG: 325 TABLET ORAL at 05:12

## 2021-05-24 RX ADMIN — ASPIRIN 81 MG: 81 TABLET, COATED ORAL at 09:46

## 2021-05-24 RX ADMIN — DOCUSATE SODIUM 50MG AND SENNOSIDES 8.6MG 1 TABLET: 8.6; 5 TABLET, FILM COATED ORAL at 09:46

## 2021-05-24 RX ADMIN — SEVELAMER CARBONATE 800 MG: 800 TABLET, FILM COATED ORAL at 13:12

## 2021-05-24 RX ADMIN — SODIUM BICARBONATE 650 MG: 650 TABLET ORAL at 09:47

## 2021-05-24 RX ADMIN — CARVEDILOL 6.25 MG: 6.25 TABLET, FILM COATED ORAL at 09:46

## 2021-05-24 RX ADMIN — MONTELUKAST 10 MG: 10 TABLET, FILM COATED ORAL at 09:47

## 2021-05-24 RX ADMIN — Medication 10 ML: at 05:13

## 2021-05-24 RX ADMIN — POLYETHYLENE GLYCOL 3350 17 G: 17 POWDER, FOR SOLUTION ORAL at 12:06

## 2021-05-24 RX ADMIN — SEVELAMER CARBONATE 800 MG: 800 TABLET, FILM COATED ORAL at 09:46

## 2021-05-24 RX ADMIN — IPRATROPIUM BROMIDE AND ALBUTEROL SULFATE 3 ML: .5; 3 SOLUTION RESPIRATORY (INHALATION) at 03:06

## 2021-05-24 RX ADMIN — NITROGLYCERIN 1 INCH: 20 OINTMENT TOPICAL at 09:47

## 2021-05-24 NOTE — PROGRESS NOTES
0700 Bedside shift change report given to Marbella RN (oncoming nurse) by Roxana Mccann RN (offgoing nurse). Report included the following information SBAR, Kardex, ED Summary, Intake/Output, MAR, Recent Results and Cardiac Rhythm NSR.     0715 Pt receiving HD, Dialysis RN at bedside. [address] [facility] called to MAYANK Gresham at [facility]. [phone] I have reviewed discharge instructions with the patient. The patient verbalized understanding. This patient was assisted with Intentional Toileting every 2 hours during this shift. Documentation of ambulation and output reflected on Flowsheet.

## 2021-05-24 NOTE — DIALYSIS
[facility] Dialysis Team [facility]  [phone]    Vitals   Pre   Post   Assessment   Pre   Post     Temp  Temp: 97.8 °F (36.6 °C) (05/24/21 0507)  97.8 LOC  axox4 axox4   HR   Pulse (Heart Rate): 78 (05/24/21 0507) 66 Lungs   clear  clear   B/P   BP: (!) 170/62 (05/24/21 0507) 192/59     Cardiac   Nsr nsr   Resp   Resp Rate: 20 (05/24/21 0507) 18   Skin   wdi  wdi   Pain level  Pain Intensity 1: 6 (05/24/21 0507) 5 Edema  generalized generalized   Orders:    Duration:   Start:   4305 End:   0945 Total:   3.5   Dialyzer:   Dialyzer/Set Up Inspection: Daysi  ([id number]) (05/24/21 0507)   K Bath:   Dialysate K (mEq/L): 3 (05/24/21 0507)   Ca Bath:   Dialysate CA (mEq/L): 2.5 (05/24/21 0507)   Na/Bicarb:   Dialysate NA (mEq/L): 140 (05/24/21 0507)   Target Fluid Removal:   Goal/Amount of Fluid to Remove (mL): 3000 mL (05/24/21 0507)   Access     Type & Location:   RIJ CVC: Dressing CDI. No s/s of infection. Both lumens aspirate & flush well. Running well at .         Labs     Obtained/Reviewed   Critical Results Called   Date when labs were drawn-  Hgb-    HGB   Date Value Ref Range Status   05/24/2021 7.8 (L) 11.5 - 16.0 g/dL Final     K-    Potassium   Date Value Ref Range Status   05/23/2021 3.8 3.5 - 5.1 mmol/L Final     Ca-   Calcium   Date Value Ref Range Status   05/23/2021 7.8 (L) 8.5 - 10.1 MG/DL Final     Bun-   BUN   Date Value Ref Range Status   05/23/2021 49 (H) 6 - 20 MG/DL Final     Comment:     INVESTIGATED PER DELTA CHECK PROTOCOL     Creat-   Creatinine   Date Value Ref Range Status   05/23/2021 4.99 (H) 0.55 - 1.02 MG/DL Final     Comment:     INVESTIGATED PER DELTA CHECK PROTOCOL        Medications/ Blood Products Given     Name   Dose   Route and Time                     Blood Volume Processed (BVP):    66 Net Fluid   Removed:  2300   Comments   Time Out Done: Yes  Primary Nurse Rpt Pre: Phil Salgado RN  Primary Nurse Rpt Margarito Lomeli RN  Pt Education:Access care, tx options  Care Plan:Follow MD protocol  Tx Summary:SBAR received from Primary RN. Pt arrived to HD suite A&Ox4. Consent signed & on file. 8722  Each catheter limb disinfected per p&p, caps removed, hubs disinfected per p&p. Each lumen aspirated for blood return and flushed with Normal Saline per policy. Labs drawn per request/ order. 0610 VSS. Dialysis Tx initiated. 0730 pt resting comfortable, no complaints  0835 Pt complaint of cramping and discomfort, UF reduced to compensate. 0930Tx ended early, pt complaint of pain and cramping. VSS. Each dialysis catheter limb disinfected per p&p, all possible blood returned per p&p, and each dialysis hub disinfected per p&p. Each lumen flushed, post dialysis catheter saline dwell instilled per order, and caps applied. Bed locked and in the lowest position, call bell and belongings in reach. SBAR given to Primary, RN. Patient is stable at time of  my departure. All Dialysis related medications have been reviewed. Admiting Diagnosis:  Pt's previous clinic-New Pt. Consent signed - Informed Consent Verified: Yes (05/24/21 0507)  Orin Consent - on file  [place]- [place]16/21 [place] #- Machine Number: [id number] (05/24/21 0507)  Telemetry status-bedside  Pre-dialysis wt. - Pre-Dialysis Weight: 90.5 kg (199 lb 8.3 oz) (05/21/21 0710)

## 2021-05-24 NOTE — PROGRESS NOTES
[facility]  Y[facility] 1940          Assessment & Plan:   New ESRD  Volume +   HTN  Anemia of CKD  SHPT    Rec:  Seen on HD with no complaints  Outpt HD TTS at [facility]  On EPO + IV Iron  Continue Hectorol  Plan to DC to SNIF today       Subjective:   CC: f/u ESRD  HPI:Seen on HD today. Almost completed tx, UF goal 2.5 kg, cramping at the treatment.   ROS: Tolerating UF better  Current Facility-Administered Medications   Medication Dose Route Frequency    albuterol-ipratropium (DUO-NEB) 2.5 MG-0.5 MG/3 ML  3 mL Nebulization Q6H PRN    hydrALAZINE (APRESOLINE) tablet 100 mg  100 mg Oral BID    nitroglycerin (NITROBID) 2 % ointment 1 Inch  1 Inch Topical BID    cloNIDine HCL (CATAPRES) tablet 0.2 mg  0.2 mg Oral Q6H PRN    polyethylene glycol (MIRALAX) packet 17 g  17 g Oral BID    heparinized saline 2 units/mL infusion  500 Units/hr Irrigation CONTINUOUS    iron sucrose (VENOFER) injection 100 mg  100 mg IntraVENous DIALYSIS MON, WED & FRI    doxercalciferoL (HECTOROL) 4 mcg/2 mL injection 2 mcg  2 mcg IntraVENous DIALYSIS MON, WED & FRI    minoxidiL (LONITEN) tablet 2.5 mg  2.5 mg Oral BID WITH MEALS    sodium bicarbonate tablet 650 mg  650 mg Oral TID    epoetin millie-epbx (RETACRIT) injection 20,000 Units  20,000 Units SubCUTAneous Q7D    sevelamer carbonate (RENVELA) tab 800 mg  800 mg Oral TID WITH MEALS    amLODIPine (NORVASC) tablet 10 mg  10 mg Oral DAILY    aspirin delayed-release tablet 81 mg  81 mg Oral DAILY    carvediloL (COREG) tablet 6.25 mg  6.25 mg Oral BID WITH MEALS    levETIRAcetam (KEPPRA) tablet 250 mg  250 mg Oral BID    montelukast (SINGULAIR) tablet 10 mg  10 mg Oral DAILY    pravastatin (PRAVACHOL) tablet 40 mg  40 mg Oral QHS    sodium chloride (NS) flush 5-40 mL  5-40 mL IntraVENous Q8H    sodium chloride (NS) flush 5-40 mL  5-40 mL IntraVENous PRN    0.9% sodium chloride infusion 25 mL  25 mL IntraVENous PRN    acetaminophen (TYLENOL) tablet 650 mg  650 mg Oral Q6H PRN    Or    acetaminophen (TYLENOL) suppository 650 mg  650 mg Rectal Q6H PRN    senna-docusate (PERICOLACE) 8.6-50 mg per tablet 1 Tab  1 Tablet Oral BID    bisacodyL (DULCOLAX) suppository 10 mg  10 mg Rectal DAILY PRN    promethazine (PHENERGAN) tablet 12.5 mg  12.5 mg Oral Q6H PRN    Or    ondansetron (ZOFRAN) injection 4 mg  4 mg IntraVENous Q6H PRN    heparin (porcine) injection 5,000 Units  5,000 Units SubCUTAneous Q8H          Objective:     Vitals:  Blood pressure (!) (P) 175/82, pulse 63, temperature 97.8 °F (36.6 °C), resp. rate 18, height 5' 0.98\" (1.549 m), weight 89 kg (196 lb 3.2 oz), SpO2 98 %. Temp (24hrs), Av.4 °F (36.9 °C), Min:97.8 °F (36.6 °C), Max:98.9 °F (37.2 °C)      Intake and Output:  No intake/output data recorded.  1901 -  0700  In: 90 [P.O.:90]  Out: 100 [Urine:100]    Physical Exam:               GENERAL ASSESSMENT: NAD  NECK: RIJ temp HD cath  CHEST: On nasal cpap, CTA  HEART: S1S2  ABDOMEN: Soft,NT  EXTREMITY: less EDEMA          ECG/rhythm:    Data Review      No results for input(s): TNIPOC in the last 72 hours. No lab exists for component: ITNL   No results for input(s): CPK, CKMB, TROIQ in the last 72 hours. Recent Labs     21  0545 21  0347 21  0336   * 134* 136   K 3.8 3.8 3.9   CL 97 98 100   CO2 28 29 31   BUN 71* 49* 27*   CREA 6.25* 4.99* 3.42*   GLU 97 109* 108*   PHOS 4.7 4.2 3.3   MG 3.1* 2.8* 2.7*   CA 7.9* 7.8* 8.1*   ALB 2.5* 2.4* 2.5*   WBC 8.8 8.9 8.1   HGB 7.8* 7.9* 8.1*   HCT 24.3* 25.3* 26.1*    222 237      No results for input(s): INR, PTP, APTT, INREXT, INREXT in the last 72 hours. Needs: urine analysis, urine sodium, protein and creatinine  No results found for: Mariaelena Ngo        : Vania Velazquez MD  2021        [facility]:  [email or web address]  Estefani Sal office:  [address], [address]  [place], [address] [facility]  Phone: [phone]  Fax :     [phone]    Christie office:  [address] [place] [address]  [place], [address]  Phone - [phone]  Fax - [phone]

## 2021-05-24 NOTE — PROGRESS NOTES
Transition of Care Plan to SNF/Rehab    SNF/Rehab Transition:  Patient has been accepted to [facility] and meets criteria for admission. Patient will transported by [facility] and expected to leave at 1300. Communication to Patient/Family:  Met with patient she is agreeable to the transition plan. Communication to SNF/Rehab:  Bedside RN, Jamarcus Schwartz, has been notified to update the transition plan to the facility and call report (phone number [phone], Room 502  [facility]). Discharge information has been updated on the AVS.     Discharge instructions to be fax'd to facility at [facility] # [phone]). Nursing Please include all hard scripts for controlled substances, med rec and dc summary, and AVS in packet. Reviewed and confirmed with facility, [facility] can manage the patient care needs for the following:     SNF/Rehab Transition:  PCP/Specialist: TBD    Reviewed and confirmed with facility, [facility] they can manage the patient care needs for the following:     [place] with (X) only those applicable:    Medication:  [x]  Medications will be available at the facility  []  IV Antibiotics   []  Controlled Substance - hard copy to be sent with patient   []  Weekly Labs   Documents:  [] Hard RX  [] MAR  [] Kardex  [x] AVS  []Transfer Summary  []Discharge   Equipment:  [x]  CPAP/BiPAP (patient supplied)  []  Wound Vacuum  []  Marques or Urinary Device  []  PICC/Central Line  []  Nebulizer  []  Ventilator   Treatment:  []Isolation (for MRSA, VRE, etc.)  []Surgical Drain Management  []Tracheostomy Care  []Dressing Changes  [x]Dialysis with transportation and chair time per [facility] transport or family.  Nicole Anguiano T/Th/Sat at [address]  [x]Oxygen - 3L/nc  [x]Daily Weights for Heart Failure   Dietary:  []Any diet limitations  []Tube Feedings   []Total Parenteral Management (TPN)   Eligible for Medicaid Long Term Services and Supports  Yes:  [] Eligible for medical assistance or will become eligible within 180 days and UAI completed. [] Provider/Patient and/or support system has requested screening. [] UAI copy provided to patient or responsible party, .  [] UAI unavailable at discharge will send once processed to SNF provider. [] UAI unavailable at discharged mailed to patient  No:   [] Private pay and is not financially eligible for Medicaid within the next 180 days. [] Reside out-of-state.   [] A residents of a state owned/operated facility that is licensed  by [address] [employer] [facility] or [facility]  [] Enrollment in [facility] hospice services  [] [address] [place] [address]  [] Patient /Family declines to have screening completed or provide financial information for screening     Financial Resources:  Medicaid    [] Initiated and application pending   [] Full coverage     Advanced Care Plan:  []Surrogate Decision Maker of Care  []POA  []Communicated Code Status  DNR    Other     Ana Briceño RN, MSN/Care manager  [phone]

## 2021-05-24 NOTE — DISCHARGE SUMMARY
[facility]iguel Angel Morales [facility] [place] [address]  [address] [facility], [address] [place] [address] Nw  Tel: [phone]    Physician Discharge Summary    Patient ID:    Ulisses Maldonado  Age:              79 y.o.    : 1940  MRN:             [id number]     PCP: Iam Dominguez MD     Date of Admission: 2021    Date of Discharge:  2021    Principal admission Diagnosis:   Acute respiratory failure with hypoxia ([address] [J96.01]    Discharge Diagnoses:  Principal Problem:     Acute respiratory failure with hypoxia ([address] ()    Diastolic CHF, acute on chronic ([address] (2021)    ESRD on hemodialysis ([address] (2021)    Volume overload (2021)    SHARON (acute kidney injury) ([address] (2021)    ABIGAIL (obstructive sleep apnea) (12/10/2014)    Nonintractable epilepsy without status epilepticus ([address] (8/10/2017)    Elevated troponin (2021)    Anemia due to chronic kidney disease (2021)    HTN     Hospital Course:     Ms. Zhanna Godfrey is a 79 y.o. admitted to [facility] and treated for the following:    Acute respiratory failure with hypoxia POA: due to acute on chronic diastolic CHF from volume overload related to ESRD, underlying ABIGAIL. Much improved with dialysis. Continue cPAP at night as well as supplemental oxygen. Titrate to keep saturations >25%    Diastolic CHF, acute on chronic POA: due to worsening renal failure / volume overload. Started on RRT. Initially did not tolerated HD/UF but this improved. She has remained overall stable. Continue HD TTS at [facility]. Bumex was discontinued. Being discharged to [facility]      Elevated troponin: type 2 MI / demand ischemia from volume overload, renal failure. NOT ACS. Seen by cardiology and no further work up recommended. Continue ASA, Coreg, Pravastatin. Malignant HTN urgency: BP improved. Continue Hydralazine, Coreg, Norvasc, Minoxidil and Clonidine PRN     SHARON/CKD 5: now with ESRD and was started on HD.  Seen and followed by nephrology. Further treatments as noted above. Continue Hectoral, Ranvela and sodium bicarb. Hx of CVA: cont ASA, pravastatin. Seizure d/o: she has been stable. Continue Keppra     Anemia: likely ACD, CKD however iron panel and ferritin equivocal. Continue Venofer, Retacrit      Morbid obesity: Body mass index is 36.86 kg/m². Discharge Exam:    Visit Vitals  BP (!) 165/48   Pulse 63   Temp 97.8 °F (36.6 °C)   Resp 18   Ht 5' 0.98\" (1.549 m)   Wt 89 kg (196 lb 3.2 oz)   SpO2 98%   BMI 37.09 kg/m²        Patient has been seen and examined. General: weak and ill looking but not in any acute distress   Pulm: clear breath sounds without wheezes  Card: no murmurs, normal S1, S2 without thrills, bruits   Abd:    soft, non-tender, normoactive bowel sounds  Skin: no rashes and skin turgor is good  Neuro: awake, alert and has a non focal     Activity: Activity as tolerated    Diet: Cardiac Diet and Renal Diet    Current Discharge Medication List        START taking these medications    Details   polyethylene glycol (MIRALAX) 17 gram packet Take 1 Packet by mouth two (2) times a day for 30 days. Hold for diarrhea  Qty: 60 Packet, Refills: 0      senna-docusate (PERICOLACE) 8.6-50 mg per tablet Take 1 Tablet by mouth two (2) times a day for 30 days. Hold for diarrhea  Qty: 60 Tablet, Refills: 0      sevelamer carbonate (RENVELA) 800 mg tab tab Take 1 Tablet by mouth three (3) times daily (with meals) for 30 days. Qty: 90 Tablet, Refills: 0      iron sucrose (VENOFER) 100 mg iron/5 mL injection 5 mL by IntraVENous route DIALYSIS MON, WED & FRI for 30 days. Qty: 60 mL, Refills: 0      epoetin imllie-epbx (RETACRIT) 10,000 unit/mL injection 2 mL by SubCUTAneous route every seven (7) days for 4 doses. Every saturday  Indications: anemia due to kidney failure  Qty: 8 mL, Refills: 0      doxercalciferoL (HECTOROL) 4 mcg/2 mL injection 1 mL by IntraVENous route DIALYSIS MON, WED & FRI for 30 days.   Qty: 12 mL, Refills: 0      cloNIDine HCL (CATAPRES) 0.2 mg tablet Take 1 Tablet by mouth every six (6) hours as needed for Other (sBP >150) for up to 30 doses. Indications: high blood pressure  Qty: 30 Tablet, Refills: 0           CONTINUE these medications which have CHANGED    Details   hydrALAZINE (APRESOLINE) 100 mg tablet Take 1 Tablet by mouth three (3) times daily for 30 days. Indications: high blood pressure  Qty: 90 Tablet, Refills: 0           CONTINUE these medications which have NOT CHANGED    Details   minoxidiL (LONITEN) 2.5 mg tablet Take 2.5 mg by mouth two (2) times a day. sodium bicarbonate 650 mg tablet Take 650 mg by mouth two (2) times a day. omega-3 acid ethyl esters (LOVAZA) 1 gram capsule Take 1 g by mouth two (2) times a day. aspirin delayed-release 81 mg tablet Take 81 mg by mouth daily. amLODIPine (NORVASC) 10 mg tablet Take 10 mg by mouth every evening.      montelukast (Singulair) 10 mg tablet Take 10 mg by mouth every evening. fluticasone propionate (Flonase Allergy Relief) 50 mcg/actuation nasal spray 2 Sprays by Both Nostrils route every evening. acetaminophen (TylenoL) 325 mg tablet Take  by mouth as needed for Pain.      polyvinyl alcohol-povidon,PF, (REFRESH CLASSIC) 1.4-0.6 % ophthalmic solution Administer 1-2 Drops to both eyes as needed. levETIRAcetam (KEPPRA) 250 mg tablet Take 1 Tab by mouth two (2) times a day for 90 days. Anti-seizure medication. Qty: 180 Tab, Refills: 1    Associated Diagnoses: Nonintractable epilepsy without status epilepticus, unspecified epilepsy type ([facility]      carvedilol (COREG) 6.25 mg tablet Take 1 Tab by mouth two (2) times daily (with meals). Qty: 60 Tab, Refills: 0      pravastatin (PRAVACHOL) 40 mg tablet Take 40 mg by mouth nightly.            STOP taking these medications       calcium acetate,phosphat bind, (PHOSLO) 667 mg cap Comments:   Reason for Stopping:         bumetanide (BUMEX) 2 mg tablet Comments:   Reason for Stopping: Follow-up Information       Follow up With Specialties Details Why Contact Info    [address]   Hemodialysis T/Th/Sat at [address] [facility] [address] [place] Mcneill MD Family Medicine Call for the regular follow up [address] [place] [address]  [phone]              Follow-up tests or labs: none     Discharge Condition: Stable    Disposition: SNF    Time taken to arrange discharge:  35 minutes.     Signed:  Maye Alanis MD     [facility]  5/24/2021   8:44 AM

## 2021-05-24 NOTE — DISCHARGE INSTRUCTIONS
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 CPAP at night. Oxygen via nasal canula and titrate to keep sats >90%    Recommended diet:  Cardiac Diet and Renal Diet    Recommended activity: Activity as tolerated    Follow Up: Follow-up Information     Follow up With Specialties Details Why Contact Info    [facility]   Hemodialysis T//Sat at 262 [facility] care [address] [place] [address]  [phone]    Ally Méndez MD Family Medicine Call for the regular follow up [address] [place] [address] [phone]            Information obtained by :  I understand that if any problems occur once I am at home I am to contact my physician. I understand and acknowledge receipt of the instructions indicated above.                                                                                                                                            Physician's or R.N.'s Signature                                                                  Date/Time                                                                                                                                              Patient or Representative Signature Date/Time

## 2021-05-24 NOTE — PROGRESS NOTES
Problem: Falls - Risk of  Goal: *Absence of Falls  Description: Document Peg Rangel Fall Risk and appropriate interventions in the flowsheet. Outcome: Resolved/Met  Note: Fall Risk Interventions:  Mobility Interventions: Bed/chair exit alarm, Patient to call before getting OOB, Communicate number of staff needed for ambulation/transfer, OT consult for ADLs         Medication Interventions: Bed/chair exit alarm, Evaluate medications/consider consulting pharmacy, Patient to call before getting OOB    Elimination Interventions: Bed/chair exit alarm, Call light in reach, Patient to call for help with toileting needs    History of Falls Interventions: Bed/chair exit alarm, Door open when patient unattended, Vital signs minimum Q4HRs X 24 hrs (comment for end date)         Problem: Patient Education: Go to Patient Education Activity  Goal: Patient/Family Education  Outcome: Resolved/Met     Problem: Patient Education: Go to Patient Education Activity  Goal: Patient/Family Education  Outcome: Resolved/Met     Problem: Patient Education: Go to Patient Education Activity  Goal: Patient/Family Education  Outcome: Resolved/Met     Problem: Pressure Injury - Risk of  Goal: *Prevention of pressure injury  Description: Document Humble Scale and appropriate interventions in the flowsheet. Outcome: Resolved/Met  Note: Pressure Injury Interventions:  Sensory Interventions: Assess changes in LOC, Minimize linen layers    Moisture Interventions: Absorbent underpads, Internal/External urinary devices    Activity Interventions: Increase time out of bed, Pressure redistribution bed/mattress(bed type)    Mobility Interventions: Pressure redistribution bed/mattress (bed type), PT/OT evaluation, Turn and reposition approx.  every two hours(pillow and wedges)    Nutrition Interventions: Document food/fluid/supplement intake, Offer support with meals,snacks and hydration    Friction and Shear Interventions: Lift team/patient mobility team, Minimize layers, Transfer aides:transfer board/[place] lift/ceiling lift                Problem: Patient Education: Go to Patient Education Activity  Goal: Patient/Family Education  Outcome: Resolved/Met

## 2021-05-24 NOTE — PROGRESS NOTES
Discharge today to [facility] for SNF care after dialysis. [facility] scheduled for 1pm.  [facility] forms attached to [employer] referral and placed on front of chart. Care Management Interventions  PCP Verified by CM:  Yes (Dr. Jim Padilla)  Transition of Care Consult (CM Consult): SNF  Partner SNF: Yes  Physical Therapy Consult: Yes  Occupational Therapy Consult: Yes  Current Support Network: Family Lives [place], Lives Alone  Confirm Follow Up Transport: Family  The Plan for Transition of Care is Related to the Following Treatment Goals : hemodialysis OP  The Patient and/or Patient Representative was Provided with a Choice of Provider and Agrees with the Discharge Plan?: (S) Yes  Freedom of Choice List was Provided with Basic Dialogue that Supports the Patient's Individualized Plan of Care/Goals, Treatment Preferences and Shares the Quality Data Associated with the Providers?: (S) Yes  Discharge Location  Discharge Placement: Skilled nursing facility    Franci Barrera RN, MSN/Care manager  [phone]

## 2021-05-26 NOTE — PROGRESS NOTES
Physician Progress Note      PATIENT:               Purvi Apodaca  CSN #:                  [id number]  :                       1940  ADMIT DATE:       2021 2:45 PM  [address] [place] DATE:        2021 1:39 PM  RESPONDING  PROVIDER #:        Christian He MD          QUERY TEXT:    Dear Hospitalist Team,  Patient admitted with increased SOB and respiratory failure. It's noted documentation of demand ischemia/Type 2 MI within the H&P, subsequent documentation and the discharge summary on . In order to support the diagnosis of Type 2 MI, please include additional clinical indicators in your documentation. Or please document if the diagnosis of Type 2 MI has been ruled out after further study. The medical record reflects the following:    Risk Factors: 79 Yr F admitted with Acute hypoxic respiratory failure; acute on chronic Diastolic CHF    Clinical Indicators: Patient arrives to the ED with c/o SOB and fatigue via EMS. Work up in the ED revealed rales, mild WOB, BLE edema. CXR: Moderate pulmonary vascular congestive changes. troponin 0.17, 0.21. BNP 3,736. No EKG done during admission. Patient found to be fluid overloaded with new onset ESRD. H&P states,  Elevated Trop: likely due to demand ischemia from volume overload, renal failure. Unlikely ACS. Will monitor Trop. Cont ASA, BB, statin. Consult Cards. Cardiology consulted and saw the patient on 5/15 and states, Volume overload, acute on chronic renal failure. Do not think this represents CHF but rather fluid overload due to renal failure. Echo pending and will review this, if normal will be available prn. Elevated troponin. Trivially elevated and flat in setting of HTN and renal failure. Does not represent acute ischemia. No chest pain. Subsequent progress notes stating, Elevated troponin: type 2 MI / demand ischemia from volume overload, renal failure. NOT ACS. Cont ASA, BB, statin. Seen by cardiology.  Echo completed revealed LV: Estimated LVEF is 55 - 60%. Normal cavity size and systolic function (ejection fraction normal). Mild concentric hypertrophy. Wall motion: normal.MV: Mild to moderate mitral valve regurgitation is present. PA: Moderate pulmonary hypertension. Pulmonary arterial systolic pressure is 65 mmHg. Pericardium: Insignificant pericardial effusion or fat. There is left pleural effusion. Treatment: BMP with cardiac enzymes, Echo, Cardiology consult, frequent monitoring/vital signs. Continue ASA, Coreg, Pravastatin. Thank you,  Bruce Johnson RN, [facility], [address] [place] [address]  Options provided:  -- Type 2 MI present as evidenced by, Please document evidence. -- Type 2 MI was ruled out  -- Other - I will add my own diagnosis  -- Disagree - Not applicable / Not valid  -- Disagree - Clinically unable to determine / Unknown  -- Refer to Clinical Documentation Reviewer    PROVIDER RESPONSE TEXT:    Type 2 MI is present as evidenced by slight elevation of troponin in the setting of HTN    Query created by:  Wai Amin on 5/25/2021 10:15 AM      Electronically signed by:  Franck Augustine MD 5/25/2021 9:42 PM

## 2021-05-27 ENCOUNTER — PATIENT OUTREACH (OUTPATIENT)
Dept: CASE MANAGEMENT | Age: 81
End: 2021-05-27

## 2021-05-27 NOTE — PROGRESS NOTES
Post Acute Facility Update     *The information contained in this note was received during a weekly care coordination call with the post acute facility*    Current Facility: [facility] ([facility])  Anticipated Discharge Date: TBD    Facility Nursing Update: No current updates   Facility Social Work Update: Son would like her weaned off of )2 if possible. Improve mobilitystrength, ambulation. She is a new dialysis patient-[employer]uring [employer] for her transport needs. Upper Extremity Assistance: Minimal Assistance   Lower Extremity Assistance: Maximum Assistance  Bed Mobility: Stand by Assist - Presence and Cueing  Transfers: Minimal Assistance   Ambulation: Minimal Assistance   How far (in feet) is the patient ambulating?  150  Device Used: walker  Barriers to Discharge: WASHINGTON Olivas MSW  [facility]

## 2021-06-02 ENCOUNTER — PATIENT OUTREACH (OUTPATIENT)
Dept: CASE MANAGEMENT | Age: 81
End: 2021-06-02

## 2021-06-03 ENCOUNTER — PATIENT OUTREACH (OUTPATIENT)
Dept: CASE MANAGEMENT | Age: 81
End: 2021-06-03

## 2021-06-03 NOTE — PROGRESS NOTES
Post Acute Facility Update     *The information contained in this note was received during a weekly care coordination call with the post acute facility*    Current Facility: [facility] ([facility])  Anticipated Discharge Date: TBD    Facility Nursing Update: No current updates   Facility Social Work Update: No current updates   Upper Extremity Assistance: Stand by Assist - Presence and Cueing  Lower Extremity Assistance: Moderate Assistance   Bed Mobility: Stand by Assist - Presence and Cueing  Transfers: Contact Guard Assist - hands on patient for balance   Ambulation: Contact Guard Assist - hands on patient for balance   How far (in feet) is the patient ambulating?  200  Device Used: walker  Barriers to Discharge: WASHINGTON Lucas MSW  [facility]

## 2021-06-10 ENCOUNTER — PATIENT OUTREACH (OUTPATIENT)
Dept: CASE MANAGEMENT | Age: 81
End: 2021-06-10

## 2021-06-16 ENCOUNTER — PATIENT OUTREACH (OUTPATIENT)
Dept: CASE MANAGEMENT | Age: 81
End: 2021-06-16

## 2021-06-16 NOTE — PROGRESS NOTES
Transition of care outreach postponed for 14 days due to patient's discharge to SNF.   Bundle still admitted f/u 14d

## 2021-06-17 ENCOUNTER — PATIENT OUTREACH (OUTPATIENT)
Dept: CASE MANAGEMENT | Age: 81
End: 2021-06-17

## 2021-06-17 NOTE — PROGRESS NOTES
Post Acute Facility Update     *The information contained in this note was received during a weekly care coordination call with the post acute facility*    Current Facility: [facility] ([facility])  Anticipated Discharge Date: one week, date TBD    Facility Nursing Update:  pt has dialysis and not makig much progress on dialysis days  Facility Social Work Update: Goal is for guest to improve with therapy and return home to her apartment. Son lives in same complex. Upper Extremity Assistance: Stand by Assist - Presence and Cueing  Lower Extremity Assistance: Minimal Assistance   Bed Mobility: Stand by Assist - Presence and Cueing  Transfers: Stand by Assist - Presence and Cueing  Ambulation: Contact Guard Assist - hands on patient for balance   How far (in feet) is the patient ambulating?  85  Device Used: walker  Barriers to Discharge: TBD    Keegan SPENCERN, RN  [employer]

## 2021-06-22 NOTE — PROGRESS NOTES
Post Acute Facility Update     *The information contained in this note was received during a weekly care coordination call with the post acute facility*    Current Facility: [facility] ([facility])  Anticipated Discharge Date: TBD    Facility Nursing Update:  Pt has dialysis and not makig much progress on dialysis days  Facility Social Work Update: No current updates   Upper Extremity Assistance: Independent  Lower Extremity Assistance: Stand by Assist - Presence and Cueing  Bed Mobility: Independent  Transfers: Stand by Assist - Presence and Cueing  Ambulation: Stand by Assist - Presence and Cueing  How far (in feet) is the patient ambulating?  125  Device Used: Rollator  Barriers to Discharge: WASHINGTON Abraham MSW  [facility]

## 2021-06-24 ENCOUNTER — PATIENT OUTREACH (OUTPATIENT)
Dept: CASE MANAGEMENT | Age: 81
End: 2021-06-24

## 2021-06-29 NOTE — PROGRESS NOTES
Post Acute Facility Update     *The information contained in this note was received during a weekly care coordination call with the post acute facility*    Current Facility: [facility] ([facility])  Anticipated Discharge Date: 7/2/2021    Facility Nursing Update: will increase bumex and monoxidil,  stat chest x-ray done yesterday, with no signs of hypoxia. Needs dialysis to remove more fluids. Current wt gain, dialysis to review. Facility Social Work Update:  stat chest x-ray yesterday. Home visit for early part of next week, plan for discharge home on 7/2/21. Upper Extremity Assistance: Independent  Lower Extremity Assistance: Independent  Bed Mobility: Independent  Transfers: Stand by Assist - Presence and Cueing  Ambulation: Stand by Assist - Presence and Cueing  How far (in feet) is the patient ambulating?  150  Device Used: Rollarto  Barriers to Discharge: WASHNIGTON SPENCERN, RN  [facility]

## 2021-07-01 ENCOUNTER — PATIENT OUTREACH (OUTPATIENT)
Dept: CASE MANAGEMENT | Age: 81
End: 2021-07-01

## 2021-07-06 ENCOUNTER — HOSPITAL ENCOUNTER (INPATIENT)
Age: 81
LOS: 4 days | Discharge: NURSING, REHAB OR LONG-TERM CARE | DRG: 291 | End: 2021-07-10
Attending: EMERGENCY MEDICINE | Admitting: INTERNAL MEDICINE
Payer: MEDICARE

## 2021-07-06 ENCOUNTER — APPOINTMENT (OUTPATIENT)
Dept: VASCULAR SURGERY | Age: 81
DRG: 291 | End: 2021-07-06
Attending: INTERNAL MEDICINE
Payer: MEDICARE

## 2021-07-06 ENCOUNTER — APPOINTMENT (OUTPATIENT)
Dept: GENERAL RADIOLOGY | Age: 81
DRG: 291 | End: 2021-07-06
Attending: EMERGENCY MEDICINE
Payer: MEDICARE

## 2021-07-06 DIAGNOSIS — Z99.2 ESRD ON HEMODIALYSIS (HCC): ICD-10-CM

## 2021-07-06 DIAGNOSIS — I50.9 ACUTE ON CHRONIC CONGESTIVE HEART FAILURE, UNSPECIFIED HEART FAILURE TYPE (HCC): Primary | ICD-10-CM

## 2021-07-06 DIAGNOSIS — Z71.89 DNR (DO NOT RESUSCITATE) DISCUSSION: ICD-10-CM

## 2021-07-06 DIAGNOSIS — D64.9 ANEMIA, UNSPECIFIED TYPE: ICD-10-CM

## 2021-07-06 DIAGNOSIS — N18.6 ESRD ON HEMODIALYSIS (HCC): ICD-10-CM

## 2021-07-06 DIAGNOSIS — G47.33 OSA (OBSTRUCTIVE SLEEP APNEA): ICD-10-CM

## 2021-07-06 DIAGNOSIS — Z71.89 GOALS OF CARE, COUNSELING/DISCUSSION: ICD-10-CM

## 2021-07-06 DIAGNOSIS — R94.31 ABNORMAL EKG: ICD-10-CM

## 2021-07-06 DIAGNOSIS — E87.79 OTHER HYPERVOLEMIA: ICD-10-CM

## 2021-07-06 PROBLEM — J96.01 ACUTE HYPOXEMIC RESPIRATORY FAILURE (HCC): Status: ACTIVE | Noted: 2021-07-06

## 2021-07-06 LAB
ALBUMIN SERPL-MCNC: 3.2 G/DL (ref 3.5–5)
ALBUMIN/GLOB SERPL: 0.8 {RATIO} (ref 1.1–2.2)
ALP SERPL-CCNC: 79 U/L (ref 45–117)
ALT SERPL-CCNC: 18 U/L (ref 12–78)
ANION GAP SERPL CALC-SCNC: 7 MMOL/L (ref 5–15)
AST SERPL-CCNC: 23 U/L (ref 15–37)
ATRIAL RATE: 76 BPM
BASOPHILS # BLD: 0.1 K/UL (ref 0–0.1)
BASOPHILS NFR BLD: 1 % (ref 0–1)
BILIRUB SERPL-MCNC: 1 MG/DL (ref 0.2–1)
BNP SERPL-MCNC: ABNORMAL PG/ML
BUN SERPL-MCNC: 9 MG/DL (ref 6–20)
BUN/CREAT SERPL: 3 (ref 12–20)
CALCIUM SERPL-MCNC: 7.9 MG/DL (ref 8.5–10.1)
CALCULATED P AXIS, ECG09: 41 DEGREES
CALCULATED R AXIS, ECG10: 32 DEGREES
CALCULATED T AXIS, ECG11: -173 DEGREES
CHLORIDE SERPL-SCNC: 100 MMOL/L (ref 97–108)
CO2 SERPL-SCNC: 29 MMOL/L (ref 21–32)
COMMENT, HOLDF: NORMAL
COMMENT, HOLDF: NORMAL
CREAT SERPL-MCNC: 2.9 MG/DL (ref 0.55–1.02)
DIAGNOSIS, 93000: NORMAL
DIFFERENTIAL METHOD BLD: ABNORMAL
EOSINOPHIL # BLD: 0.2 K/UL (ref 0–0.4)
EOSINOPHIL NFR BLD: 3 % (ref 0–7)
ERYTHROCYTE [DISTWIDTH] IN BLOOD BY AUTOMATED COUNT: 13.6 % (ref 11.5–14.5)
GLOBULIN SER CALC-MCNC: 4.1 G/DL (ref 2–4)
GLUCOSE SERPL-MCNC: 118 MG/DL (ref 65–100)
HCT VFR BLD AUTO: 23.8 % (ref 35–47)
HGB BLD-MCNC: 7.5 G/DL (ref 11.5–16)
IMM GRANULOCYTES # BLD AUTO: 0 K/UL (ref 0–0.04)
IMM GRANULOCYTES NFR BLD AUTO: 0 % (ref 0–0.5)
LYMPHOCYTES # BLD: 1.2 K/UL (ref 0.8–3.5)
LYMPHOCYTES NFR BLD: 17 % (ref 12–49)
MCH RBC QN AUTO: 31.1 PG (ref 26–34)
MCHC RBC AUTO-ENTMCNC: 31.5 G/DL (ref 30–36.5)
MCV RBC AUTO: 98.8 FL (ref 80–99)
MONOCYTES # BLD: 0.6 K/UL (ref 0–1)
MONOCYTES NFR BLD: 9 % (ref 5–13)
NEUTS SEG # BLD: 5 K/UL (ref 1.8–8)
NEUTS SEG NFR BLD: 70 % (ref 32–75)
NRBC # BLD: 0 K/UL (ref 0–0.01)
NRBC BLD-RTO: 0 PER 100 WBC
P-R INTERVAL, ECG05: 206 MS
PLATELET # BLD AUTO: 241 K/UL (ref 150–400)
PMV BLD AUTO: 9.6 FL (ref 8.9–12.9)
POTASSIUM SERPL-SCNC: 3.6 MMOL/L (ref 3.5–5.1)
PROCALCITONIN SERPL-MCNC: 0.27 NG/ML
PROT SERPL-MCNC: 7.3 G/DL (ref 6.4–8.2)
Q-T INTERVAL, ECG07: 398 MS
QRS DURATION, ECG06: 84 MS
QTC CALCULATION (BEZET), ECG08: 447 MS
RBC # BLD AUTO: 2.41 M/UL (ref 3.8–5.2)
SAMPLES BEING HELD,HOLD: NORMAL
SAMPLES BEING HELD,HOLD: NORMAL
SODIUM SERPL-SCNC: 136 MMOL/L (ref 136–145)
TROPONIN I SERPL-MCNC: <0.05 NG/ML
VENTRICULAR RATE, ECG03: 76 BPM
WBC # BLD AUTO: 7.1 K/UL (ref 3.6–11)

## 2021-07-06 PROCEDURE — 74011250637 HC RX REV CODE- 250/637: Performed by: INTERNAL MEDICINE

## 2021-07-06 PROCEDURE — 85025 COMPLETE CBC W/AUTO DIFF WBC: CPT

## 2021-07-06 PROCEDURE — 74011250636 HC RX REV CODE- 250/636: Performed by: INTERNAL MEDICINE

## 2021-07-06 PROCEDURE — 36415 COLL VENOUS BLD VENIPUNCTURE: CPT

## 2021-07-06 PROCEDURE — 84145 PROCALCITONIN (PCT): CPT

## 2021-07-06 PROCEDURE — 80053 COMPREHEN METABOLIC PANEL: CPT

## 2021-07-06 PROCEDURE — 93970 EXTREMITY STUDY: CPT

## 2021-07-06 PROCEDURE — 84484 ASSAY OF TROPONIN QUANT: CPT

## 2021-07-06 PROCEDURE — 65660000000 HC RM CCU STEPDOWN

## 2021-07-06 PROCEDURE — 74011000250 HC RX REV CODE- 250: Performed by: INTERNAL MEDICINE

## 2021-07-06 PROCEDURE — 99285 EMERGENCY DEPT VISIT HI MDM: CPT

## 2021-07-06 PROCEDURE — 83880 ASSAY OF NATRIURETIC PEPTIDE: CPT

## 2021-07-06 PROCEDURE — 71045 X-RAY EXAM CHEST 1 VIEW: CPT

## 2021-07-06 PROCEDURE — 93005 ELECTROCARDIOGRAM TRACING: CPT

## 2021-07-06 RX ORDER — SODIUM CHLORIDE 0.9 % (FLUSH) 0.9 %
5-40 SYRINGE (ML) INJECTION AS NEEDED
Status: DISCONTINUED | OUTPATIENT
Start: 2021-07-06 | End: 2021-07-10 | Stop reason: HOSPADM

## 2021-07-06 RX ORDER — ACETAMINOPHEN 650 MG/1
650 SUPPOSITORY RECTAL
Status: DISCONTINUED | OUTPATIENT
Start: 2021-07-06 | End: 2021-07-10 | Stop reason: HOSPADM

## 2021-07-06 RX ORDER — BUMETANIDE 0.25 MG/ML
1 INJECTION INTRAMUSCULAR; INTRAVENOUS 2 TIMES DAILY
Status: DISCONTINUED | OUTPATIENT
Start: 2021-07-06 | End: 2021-07-08

## 2021-07-06 RX ORDER — MINOXIDIL 2.5 MG/1
2.5 TABLET ORAL 2 TIMES DAILY
Status: DISCONTINUED | OUTPATIENT
Start: 2021-07-06 | End: 2021-07-08

## 2021-07-06 RX ORDER — FLUTICASONE PROPIONATE 50 MCG
2 SPRAY, SUSPENSION (ML) NASAL EVERY EVENING
Status: DISCONTINUED | OUTPATIENT
Start: 2021-07-06 | End: 2021-07-10 | Stop reason: HOSPADM

## 2021-07-06 RX ORDER — AMLODIPINE BESYLATE 5 MG/1
10 TABLET ORAL DAILY
Status: DISCONTINUED | OUTPATIENT
Start: 2021-07-07 | End: 2021-07-08

## 2021-07-06 RX ORDER — LANOLIN ALCOHOL/MO/W.PET/CERES
325 CREAM (GRAM) TOPICAL 2 TIMES DAILY
Status: ON HOLD | COMMUNITY
End: 2022-08-23

## 2021-07-06 RX ORDER — CALCIUM ACETATE 667 MG/1
1 CAPSULE ORAL
Status: ON HOLD | COMMUNITY
End: 2022-08-23

## 2021-07-06 RX ORDER — PRAVASTATIN SODIUM 20 MG/1
40 TABLET ORAL
Status: DISCONTINUED | OUTPATIENT
Start: 2021-07-06 | End: 2021-07-10 | Stop reason: HOSPADM

## 2021-07-06 RX ORDER — LEVETIRACETAM 250 MG/1
250 TABLET ORAL 2 TIMES DAILY
COMMUNITY
End: 2021-09-08 | Stop reason: SDUPTHER

## 2021-07-06 RX ORDER — HYDRALAZINE HYDROCHLORIDE 100 MG/1
100 TABLET, FILM COATED ORAL
COMMUNITY
End: 2021-08-01 | Stop reason: DRUGHIGH

## 2021-07-06 RX ORDER — SEVELAMER CARBONATE 800 MG/1
800 TABLET, FILM COATED ORAL
COMMUNITY
End: 2021-07-10

## 2021-07-06 RX ORDER — BUMETANIDE 2 MG/1
2 TABLET ORAL DAILY
COMMUNITY
End: 2021-07-10

## 2021-07-06 RX ORDER — POLYETHYLENE GLYCOL 3350 17 G/17G
17 POWDER, FOR SOLUTION ORAL DAILY PRN
Status: DISCONTINUED | OUTPATIENT
Start: 2021-07-06 | End: 2021-07-10 | Stop reason: HOSPADM

## 2021-07-06 RX ORDER — HEPARIN SODIUM 5000 [USP'U]/ML
5000 INJECTION, SOLUTION INTRAVENOUS; SUBCUTANEOUS EVERY 8 HOURS
Status: DISCONTINUED | OUTPATIENT
Start: 2021-07-06 | End: 2021-07-10 | Stop reason: HOSPADM

## 2021-07-06 RX ORDER — SODIUM CHLORIDE 0.9 % (FLUSH) 0.9 %
5-40 SYRINGE (ML) INJECTION EVERY 8 HOURS
Status: DISCONTINUED | OUTPATIENT
Start: 2021-07-06 | End: 2021-07-10 | Stop reason: HOSPADM

## 2021-07-06 RX ORDER — ASPIRIN 81 MG/1
81 TABLET ORAL DAILY
Status: DISCONTINUED | OUTPATIENT
Start: 2021-07-07 | End: 2021-07-10 | Stop reason: HOSPADM

## 2021-07-06 RX ORDER — ACETAMINOPHEN 325 MG/1
650 TABLET ORAL
Status: DISCONTINUED | OUTPATIENT
Start: 2021-07-06 | End: 2021-07-10 | Stop reason: HOSPADM

## 2021-07-06 RX ORDER — CLONIDINE HYDROCHLORIDE 0.1 MG/1
0.2 TABLET ORAL
Status: DISCONTINUED | OUTPATIENT
Start: 2021-07-06 | End: 2021-07-10 | Stop reason: HOSPADM

## 2021-07-06 RX ORDER — MORPHINE SULFATE 20 MG/ML
20 SOLUTION ORAL
COMMUNITY
End: 2021-07-10

## 2021-07-06 RX ORDER — MONTELUKAST SODIUM 10 MG/1
10 TABLET ORAL EVERY EVENING
Status: DISCONTINUED | OUTPATIENT
Start: 2021-07-06 | End: 2021-07-10 | Stop reason: HOSPADM

## 2021-07-06 RX ORDER — CARVEDILOL 6.25 MG/1
6.25 TABLET ORAL 2 TIMES DAILY WITH MEALS
Status: DISCONTINUED | OUTPATIENT
Start: 2021-07-06 | End: 2021-07-08

## 2021-07-06 RX ORDER — LEVETIRACETAM 250 MG/1
250 TABLET ORAL 2 TIMES DAILY
Status: DISCONTINUED | OUTPATIENT
Start: 2021-07-06 | End: 2021-07-10 | Stop reason: HOSPADM

## 2021-07-06 RX ADMIN — MONTELUKAST 10 MG: 10 TABLET, FILM COATED ORAL at 18:56

## 2021-07-06 RX ADMIN — Medication 10 ML: at 18:37

## 2021-07-06 RX ADMIN — FLUTICASONE PROPIONATE 2 SPRAY: 50 SPRAY, METERED NASAL at 23:15

## 2021-07-06 RX ADMIN — CARVEDILOL 6.25 MG: 6.25 TABLET, FILM COATED ORAL at 18:55

## 2021-07-06 RX ADMIN — BUMETANIDE 1 MG: 0.25 INJECTION, SOLUTION INTRAMUSCULAR; INTRAVENOUS at 23:15

## 2021-07-06 RX ADMIN — HEPARIN SODIUM 5000 UNITS: 5000 INJECTION INTRAVENOUS; SUBCUTANEOUS at 23:15

## 2021-07-06 RX ADMIN — LEVETIRACETAM 250 MG: 250 TABLET, FILM COATED ORAL at 23:16

## 2021-07-06 RX ADMIN — PRAVASTATIN SODIUM 40 MG: 20 TABLET ORAL at 23:19

## 2021-07-06 RX ADMIN — Medication 10 ML: at 23:16

## 2021-07-06 NOTE — ED TRIAGE NOTES
Patient arrives via EMS with complaints of SOB that began during dialysis. Patient was able to complete all but 45mins of her session. Patient does not wear O2 at baseline.  RA O2=92%

## 2021-07-06 NOTE — ED PROVIDER NOTES
69-year-old female history of anxiety, arthritis, previous stroke, CKD on hemodialysis, hypertension, ABIGAIL presents to the emergency department today by EMS from dialysis with a chief complaint of shortness of breath. She was in the hospital in May due to heart failure volume overload started on as needed oxygen as well as started on dialysis. She tells me that she uses oxygen as needed at the [facility] where she is currently residing in rehab. Today she was sent to dialysis without oxygen (normally goes without oxygen) and became hypoxic while on dialysis. She missed the last 45 minutes of her session. She denies any fevers or chills. She endorses an occasional cough as well as occasional chest pressure. No recent fevers or nausea or vomiting or headaches. EMS reports room air saturations in the upper 70s which corrected with 4 L of nasal cannula oxygen. The history is provided by the patient and medical records. Shortness of Breath  This is a recurrent problem. The problem has not changed since onset. Associated symptoms include cough and chest pain. Pertinent negatives include no fever, no headaches, no sore throat, no vomiting, no abdominal pain, no rash and no leg swelling. Associated medical issues include heart failure.         Past Medical History:   Diagnosis Date    Anxiety     Arthritis     Basal ganglia stroke ([facility] [address] 2019    Left    CKD (chronic kidney disease) stage 3, GFR 30-59 ml/min ([facility])     High cholesterol     Hx of seasonal allergies     Hypertension     Ischemic stroke ([address]     Right occipital    Monoclonal gammopathy 2018    ABIGAIL on CPAP     Seizures ([facility] [address]     last keppra level- 2019 @ 15   Last seizure 2017    Urinary incontinence        Past Surgical History:   Procedure Laterality Date    HX APPENDECTOMY N/A     HX CATARACT REMOVAL Bilateral 2018    HX  SECTION N/A     x 2    HX HEMORRHOIDECTOMY      HX HYSTERECTOMY      HX KNEE REPLACEMENT Right 2019  HX TONSILLECTOMY      IR INSERT NON TUNL CVC OVER 5 YRS  5/16/2021    IR INSERT TUNL CVC W/O PORT OVER 5 YR  5/18/2021         Family History:   Problem Relation Age of Onset    Hypertension Mother     Cancer Sister         Skin    Diabetes Sister     Hypertension Sister     Stroke Father     Parkinson's Disease Father     Cancer Sister         Skin    Cancer Sister     No Known Problems Brother        Social History     Socioeconomic History    Marital status:      Spouse name: Not on file    Number of children: Not on file    Years of education: Not on file    Highest education level: Not on file   Occupational History    Not on file   Tobacco Use    Smoking status: Never Smoker    Smokeless tobacco: Never Used   Vaping Use    Vaping Use: Never used   Substance and Sexual Activity    Alcohol use: No    Drug use: No    Sexual activity: Not on file   Other Topics Concern    Not on file   Social History Narrative    Not on file     Social Determinants of Health     Financial Resource Strain:     Difficulty of Paying Living Expenses:    Food Insecurity:     Worried About Running Out of Food in the Last Year:     Ran Out of Food in the Last Year:    Transportation Needs:     Lack of Transportation (Medical):  Lack of Transportation (Non-Medical):    Physical Activity:     Days of Exercise per Week:     Minutes of Exercise per Session:    Stress:     Feeling of Stress :    Social Connections:     Frequency of Communication with Friends and Family:     Frequency of Social Gatherings with Friends and Family:     Attends Yazdanism Services:     Active Member of Clubs or Organizations:     Attends Club or Organization Meetings:     Marital Status:    Intimate Partner Violence:     Fear of Current or Ex-Partner:     Emotionally Abused:     Physically Abused:     Sexually Abused:           ALLERGIES: Latex, Codeine, Levaquin [levofloxacin], Lisinopril, and Sulfa (sulfonamide antibiotics)    Review of Systems   Constitutional: Negative for fatigue and fever. HENT: Negative for sneezing and sore throat. Respiratory: Positive for cough and shortness of breath. Cardiovascular: Positive for chest pain. Negative for leg swelling. Gastrointestinal: Negative for abdominal pain, diarrhea, nausea and vomiting. Genitourinary: Negative for difficulty urinating and dysuria. Musculoskeletal: Negative for arthralgias and myalgias. Skin: Negative for color change and rash. Neurological: Negative for weakness and headaches. Psychiatric/Behavioral: Negative for agitation and behavioral problems. There were no vitals filed for this visit. Physical Exam  Vitals and nursing note reviewed. Constitutional:       General: She is not in acute distress. Appearance: She is well-developed. She is obese. She is ill-appearing (Chronically ill-appearing). She is not toxic-appearing or diaphoretic. HENT:      Head: Normocephalic and atraumatic. Nose: Nose normal.      Mouth/Throat:      Mouth: Mucous membranes are moist.      Pharynx: Oropharynx is clear. Eyes:      Extraocular Movements: Extraocular movements intact. Conjunctiva/sclera: Conjunctivae normal.      Pupils: Pupils are equal, round, and reactive to light. Cardiovascular:      Rate and Rhythm: Normal rate and regular rhythm. Pulses: Normal pulses. Heart sounds: Normal heart sounds. Pulmonary:      Effort: Pulmonary effort is normal. No respiratory distress. Breath sounds: Normal breath sounds. No wheezing. Chest:      Chest wall: No tenderness. Abdominal:      General: Abdomen is flat. There is no distension. Palpations: Abdomen is soft. Tenderness: There is no abdominal tenderness. There is no guarding or rebound. Musculoskeletal:         General: No swelling, tenderness, deformity or signs of injury. Normal range of motion.       Cervical back: Normal range of motion and neck supple. No rigidity. No muscular tenderness. Right lower leg: Edema present. Left lower leg: Edema present. Skin:     General: Skin is warm and dry. Capillary Refill: Capillary refill takes less than 2 seconds. Neurological:      General: No focal deficit present. Mental Status: She is alert and oriented to person, place, and time. Psychiatric:         Mood and Affect: Mood normal.         Behavior: Behavior normal.          MDM  Number of Diagnoses or Management Options  Diagnosis management comments: 50-year-old female presents as above from dialysis with hypoxia and significantly elevated BNP with EKG changes. Plan for admission to hospital for further management. Her hypoxia is likely due to baseline need for oxygen as needed which was not administered. Amount and/or Complexity of Data Reviewed  Clinical lab tests: reviewed  Tests in the radiology section of CPT®: reviewed  Decide to obtain previous medical records or to obtain history from someone other than the patient: yes           Bedside US    Date/Time: 7/6/2021 4:38 PM  Performed by: Ольга Vivar MD  Authorized by: Ольга Vivar MD     Emergent situation    Given by:  Patient  Performed by: Attending  Type of procedure: Focused cardiac (Echo)  Left leg: Indications:  Dyspnea    Subxiphoid (4 chamber): Adequate    Parasternal long axis:  Adequate    Parasternal short axis:  Adequate    Subxiphoid (long axis, IVC view): Adequate    Apical four-chamber:  Not obtained    Pericardial effusion:  Absent    Global Ventricular Function:  Normal    Right Ventricular Size:  Normal    IVC:  Dilated    Interpretation:  Dilated IVC              ED EKG interpretation:  Rhythm: normal sinus rhythm. Rate (approx.): 76. Axis: normal.  ST segment: Diffuse downsloping ST segment depressions. ST segment changes are new compared with EKG from August 2019.   This EKG was interpreted by Mateus Del Rio Shree Ashley MD,ED Provider. Perfect Serve Consult for Admission  5:16 PM    ED Room Number: ER17/17  Patient Name and age:  Kiran Cota 79 y.o.  female  Working Diagnosis:   1. Acute on chronic congestive heart failure, unspecified heart failure type (HCC)    2. Other hypervolemia    3.  Abnormal EKG        COVID-19 Suspicion:  no  Sepsis present:  no  Reassessment needed: N/A  Code Status:  Full Code  Readmission: no  Isolation Requirements:  no  Recommended Level of Care:  telemetry  Department:[facility] ED - [phone]  Other: New diffuse ST segment depressions with significantly elevated BNP, did not complete dialysis today

## 2021-07-06 NOTE — PROGRESS NOTES
7/6/2021  6:09 PM  Case management note    Reason for Admission:   Acute hypoxemic respiratory failure  Patient came to hospital from dialysis for SOB. She has had increased SOB over the last couple of weeks. Patient went to [facility] from [facility]. Patient uses a walker. She had been independent before may admission. Her son lives in the same building she had lived in. Talk of discharge home from rehab had started, but concern for SOB. Patient goes to dialysis at [address] [place], [place] [address]way 13[address], Th and SAT. [employer] for [employer] Score:     moderate             PCP: First and Last name:   Marc Strong MD     Name of Practice:    Are you a current patient: Yes/No: yes   Approximate date of last visit: 4 months   Can you participate in a virtual visit if needed:     Do you (patient/family) have any concerns for transition/discharge? Plan for utilizing home health:   [facility]    Current Advanced Directive/Advance Care Plan:  DNR      Healthcare Decision Maker:               Primary Decision Maker: Yari Anderson - [phone]    Transition of Care Plan:          1. Home with family assistance  2. PCP follow up  3. Return to SNF for completion of rehab  4. Long term planning  5. CM to follow for discharge needs    Care Management Interventions  PCP Verified by CM:  Yes David Miller  Mode of Transport at Discharge: BLS  Transition of Care Consult (CM Consult): SNF  Partner SNF: Yes  Current Support Network: [address] [place] [address]  Confirm Follow Up Transport: Family  The Plan for Transition of Care is Related to the Following Treatment Goals : acute hypoxemic respiratory failure  Discharge Location  Discharge Placement: Skilled nursing facility  [address] [place] [address]

## 2021-07-06 NOTE — PROGRESS NOTES
TRANSFER - IN REPORT:    Verbal report received from Ila(name) on Bg Pae  being received from ED(unit) for routine progression of care      Report consisted of patients Situation, Background, Assessment and   Recommendations(SBAR). Information from the following report(s) SBAR, Kardex, ED Summary, Procedure Summary, Intake/Output, MAR, Recent Results and Cardiac Rhythm NSR was reviewed with the receiving nurse. Opportunity for questions and clarification was provided. Assessment completed upon patients arrival to unit and care assumed. 1815 Pt arrived to the unit. Bedside shift change report given to Princess Dale (oncoming nurse) by Rosalind Echeverria (offgoing nurse). Report included the following information SBAR, Kardex, ED Summary, Intake/Output, MAR, Recent Results and Cardiac Rhythm NSR.

## 2021-07-06 NOTE — H&P
[facility]  [address] [facility], [facility] [address]  [phone]    Hospitalist Admission Note      NAME:  Aliyah Livingston   :   1940   MRN:  [id number]     PCP:  Owen Malone MD     Date of Service/Time:  2021 6:36 PM         Assessment / Plan:       79 y.o. female with hx of HTN, CVA, ESRD recently started on HD presenting w/ dyspnea, admitted for AHRF      Acute respiratory failure with hypoxia: 2/2 volume overload, pulmonary edema. No fevers or leukocytosis or cough or radiographic evidence to suggest PNA. Check LE duplex for DVT. Has been on intermittent supplemental O2 at SNF. HD for volume mgmt (did not complete today); consult nephrology.       Diastolic CHF, acute on chronic: started on HD in May. Initially did not tolerate HD well however per son at bedside, recently had been doing better. CXR showed increased size of cardiac silhouette rasing the possibility of pericardial effusion. Check TTE. HD/UF as above.      Abnormal EKG: ST depressions noted. Prior EKG was from Aug 2019. No chest pain. Trend cardiac biomarkers. TTE. Cardiology consult. Cont ASA and statin and BB     HTN: hx of malignant HTN noted during last admission. Cont Norvasc, Coreg, minoxidil. Also has clonidine PRN     ESRD: HD as above    Hx of CVA: cont ASA, statin     Seizure d/o: Cont Keppra once med rec complete by pharmacist     Anemia: likely ACD, CKD however iron panel and ferritin equivocal. Check FOBT     Morbid obesity: Body mass index is 36.86 kg/m².       Code Status: DNR     Surrogate decision maker: tyrone Manley      ED notes, lab results, and imaging studies reviewed.        Total time spent with patient: 50 Minutes   Time spent in the care of this patient included reviewing records, discussing with nursing, obtaining history and examining the patient, and discussing treatment plans, with >50% time spent counseling/coordinating care    Risk of deterioration: High Care Plan discussed with: ED provider, Patient, Nursing Staff and >50% of time spent in counseling and coordination of care    Discussed:  Care Plan and D/C Planning    Prophylaxis:  Hep SQ    Disposition:  SNF/LTC                 Subjective:     CHIEF COMPLAINT: dyspnea    HISTORY OF PRESENT ILLNESS:     Ms. Codey Gutierrez is a 79 y.o. female w/ hx of HTN, CVA, ESRD recently started on HD presenting w/ dyspnea. Started on HD in May, discharged from [facility] on  to SNF. Initially did not tolerate HD well however subsequently did better. In fact, had been improving at SNF until about 2 weeks ago, when pt became progressive more dyspneic. Had been on O2 intermittently since discharge from hospital. No CP. Occasional cough. No fevers or chills. CXR at [facility] had ? PNA. Was given abx per son, unclear what she was given. ED workup showed no significant hypoxia on 2L NC. CXR showed pulmonary edema, pleural effusion, and increased size of cardiac silhouette rasing the possibility of pericardial effusion. Ms. Codey Gutierrez is admitted for further evaluation and management.       Past Medical History:   Diagnosis Date    Anxiety     Arthritis     Basal ganglia stroke ([facility] [address] 2019    Left    CKD (chronic kidney disease) stage 3, GFR 30-59 ml/min (HCC)     High cholesterol     Hx of seasonal allergies     Hypertension     Ischemic stroke ([facility] [address]     Right occipital    Monoclonal gammopathy 2018    ABIGAIL on CPAP     Seizures (HCC)     last keppra level- 2019 @ 15   Last seizure 2017    Urinary incontinence         Past Surgical History:   Procedure Laterality Date    HX APPENDECTOMY N/A     HX CATARACT REMOVAL Bilateral 2018    HX  SECTION N/A     x 2    HX HEMORRHOIDECTOMY      HX HYSTERECTOMY      HX KNEE REPLACEMENT Right 2019    HX TONSILLECTOMY      IR INSERT NON TUNL CVC OVER 5 YRS  2021    IR INSERT TUNL CVC W/O PORT OVER 5 YR  2021       Social History     Tobacco Use    Smoking status: Never Smoker    Smokeless tobacco: Never Used   Substance Use Topics    Alcohol use: No        Family History   Problem Relation Age of Onset    Hypertension Mother     Cancer Sister         Skin    Diabetes Sister     Hypertension Sister     Stroke Father     Parkinson's Disease Father     Cancer Sister         Skin    Cancer Sister     No Known Problems Brother         Allergies   Allergen Reactions    Latex Rash    Codeine Other (comments)    Levaquin [Levofloxacin] Other (comments)     Hallucinations    Lisinopril Cough    Sulfa (Sulfonamide Antibiotics) Rash and Itching        Prior to Admission medications    Medication Sig Start Date End Date Taking? Authorizing Provider   cloNIDine HCL (CATAPRES) 0.2 mg tablet Take 1 Tablet by mouth every six (6) hours as needed for Other (sBP >150) for up to 30 doses. Indications: high blood pressure 5/24/21   Cornelius Camarillo MD   minoxidiL (LONITEN) 2.5 mg tablet Take 2.5 mg by mouth two (2) times a day. Provider, Historical   sodium bicarbonate 650 mg tablet Take 650 mg by mouth two (2) times a day. Provider, Historical   omega-3 acid ethyl esters (LOVAZA) 1 gram capsule Take 1 g by mouth two (2) times a day. Provider, Historical   aspirin delayed-release 81 mg tablet Take 81 mg by mouth daily. Provider, Historical   amLODIPine (NORVASC) 10 mg tablet Take 10 mg by mouth every evening. Provider, Historical   montelukast (Singulair) 10 mg tablet Take 10 mg by mouth every evening. Provider, Historical   fluticasone propionate (Flonase Allergy Relief) 50 mcg/actuation nasal spray 2 Sprays by Both Nostrils route every evening. Provider, Historical   acetaminophen (TylenoL) 325 mg tablet Take  by mouth as needed for Pain. Provider, Historical   polyvinyl alcohol-povidon,PF, (REFRESH CLASSIC) 1.4-0.6 % ophthalmic solution Administer 1-2 Drops to both eyes as needed.     Provider, Historical   carvedilol (COREG) 6.25 mg tablet Take 1 Tab by mouth two (2) times daily (with meals). 8/28/19   Ottoniel Bolton MD   pravastatin (PRAVACHOL) 40 mg tablet Take 40 mg by mouth nightly. Provider, Historical       Review of Systems:  (bold if positive, if negative)    Gen:  fatigueEyes:  ENT:  CVS:  edemaPulm:  Cough, dyspnea,GI:  :  MS:  weaknessSkin:  Psych:  Endo:  Hem:  Renal:  EdemaNeuro:            Objective:      VITALS:    Vital signs reviewed; most recent are:    Visit Vitals  BP (!) 154/34 (BP 1 Location: Right upper arm, BP Patient Position: Lying;Supine)   Pulse 73   Temp 97.4 °F (36.3 °C)   Resp 20   Ht 5' 1.5\" (1.562 m)   Wt 90.4 kg (199 lb 3.2 oz)   SpO2 96%   BMI 37.03 kg/m²     SpO2 Readings from Last 6 Encounters:   07/06/21 96%   05/24/21 98%   03/10/21 99%   02/19/21 95%   01/30/20 97%   08/28/19 98%    O2 Flow Rate (L/min): 2 l/min   No intake or output data in the 24 hours ending 07/06/21 1836         Exam:     Physical Exam:    Gen:  Elderly, chronically ill-appearing. NAD  HEENT:  No scleral icterus, PERRL, hearing intact to voice, moist mucous membranes  Neck:  Supple, without masses. Resp:  No accessory muscle use. Increased WOB. Diminished in bases, basilar rales. No wheezing or rhonchi  Card: distant, RRR. Normal S1 and S2 without murmurs, rubs, or gallops. 2+ bilateral, symmetric peripheral lower extremity edema. No JVD. Peripheral pulses in tact. Abd:  Normoactive bowel sounds. Soft, non-tender, non-distended. No rebound, no guarding. No appreciable hepatosplenomegaly   Musc:  No cyanosis or clubbing  Skin:  No rashes or ulcers; turgor intact. Bruising around fistula site. Currently using chest HD catheter for HD  Neuro:  Cranial nerves are grossly intact, generalized motor weakness, follows commands appropriately  Psych:  Fair insight, normal affect. Alert, oriented to self and hospital and situation.  Answers questions appropriately       Labs:    Recent Labs     07/06/21  1624   WBC 7.1   HGB 7.5*   HCT 23.8*    Recent Labs     07/06/21  1624      K 3.6      CO2 29   *   BUN 9   CREA 2.90*   CA 7.9*   ALB 3.2*   ALT 18     No components found for: GLPOC  No results for input(s): PH, PCO2, PO2, HCO3, FIO2 in the last 72 hours. No results for input(s): INR, INREXT in the last 72 hours. No results found for: SDES  Lab Results   Component Value Date/Time    Culture result: MRSA NOT PRESENT 08/07/2019 03:57 PM    Culture result:  08/07/2019 03:57 PM         Screening of patient nares for MRSA is for surveillance purposes and, if positive, to facilitate isolation considerations in high risk settings. It is not intended for automatic decolonization interventions per se as regimens are not sufficiently effective to warrant routine use. Culture result: KLEBSIELLA PNEUMONIAE (A) 08/07/2019 03:57 PM     All other current labs reviewed in the computer. Imaging/Studies:    XR CHEST PORT    Result Date: 7/6/2021  1. Increased size of cardiac silhouette raises the possibility of pericardial effusion 2. There is mild pulmonary edema and pulmonary vascular congestion 3. There are small bilateral pleural effusions present    Imaging personally reviewed.     EKG: NSR, ST depression ant lat and inf leads  EKG personally reviewed    ___________________________________________________    Attending Physician: Bernardino Balbuena MD

## 2021-07-06 NOTE — ED NOTES
Verified with Lori of [facility]  that if patient requires supplemental oxygen upon discharge, they will provide it for patient. Patient will not need to provide own O2, if required, upon return to rehab facility.

## 2021-07-06 NOTE — ACP (ADVANCE CARE PLANNING)
Advance Care Planning (ACP) Provider Note - Comprehensive      Date of ACP Conversation:  07/06/21    Persons included in Conversation:  patient and son  Length of ACP Conversation in minutes:  16 minutes     Authorized Decision Maker (if patient is incapable of making informed decisions): This person is: son Rylie Arevalo for ALL Patients with Decision Making Capacity:  Importance of advance care planning, including choosing a healthcare agent to communicate patient's healthcare decisions if patient lost the ability to make decisions. Review of Existing Advance Directive:  Patient and family do not have an advance directive readily available for review. Active Diagnoses:   Patient Active Problem List   Diagnosis Code    ABIGAIL (obstructive sleep apnea) G47.33    Paresthesia R20.2    Nonintractable epilepsy without status epilepticus ([facility] [address] G40.909    ICH (intracerebral hemorrhage) ([facility]) I61.9    Primary osteoarthritis of right knee M17.11    Acute respiratory failure with hypoxia ([facility]) R29.94    Diastolic CHF, acute on chronic ([facility]) I50.33    Volume overload E87.70    SHARON (acute kidney injury) ([facility] [address] N17.9    Elevated troponin R77.8    ESRD on hemodialysis ([facility]) N18.6, Z99.2    Anemia due to chronic kidney disease N18.9, D63.1    HTN (hypertension), benign I10    Acute hypoxemic respiratory failure ([facility]) J96.01         These active diagnoses are of sufficient risk that focused discussion on advance care planning is indicated in order to allow the patient to thoughtfully consider personal goals of care; and, if situations arise that prevent the ability to personally give input, to ensure appropriate representation of their personal desires for different levels and aggressiveness of care. For Serious or Chronic Illness:  Understanding of medical condition     Reviewed pt's clinical status.  Carla Perez has multiple medical problems including ESRD on HD and is being admitted for acute respiratory failure w/ hypoxia. We reviewed our treatment plan and anticipated discharge plans. Further, we discussed pt's wishes on how she would like to proceed (aggressive/heroic resuscitation vs not intervening and allowing nature takes its course) if she were to suffer cardiopulmonary arrest.    Understanding of CPR, goals and expected outcomes, benefits and burdens discussed. Information on CPR success provided (many factors lower a patients chance of survival, including advanced age, performance status, malignancy, and presence of multiple comorbidities); Individual asked to communicate understanding of information in his/her own words. Patient's son made it very clear to me that she would not want heroic measures for resuscitation in the event of cardiopulmonary arrest, including chest compressions, defibrillation, intubation/mechanical ventilation. DNR  Advised that pt would benefit from completion of DDNR.     Interventions Provided:  Referral made for ACP follow-up assistance to: Palliative care

## 2021-07-06 NOTE — PROGRESS NOTES
CTSP 2ND TO HYPOXIC RESP FAILURE. PT IS AN ESRD PT RECENTLY STARTED. WAS AT DIALYSIS TODAY AND BECAME SOB. SEND IN WITH 1.5 HRS LEFT TO GO. ON ADMISSION CXR SHOWED EDEMA. OTHER FINDINGS NOTED. I CALLED AND SPOKE WITH PT'S NURSE, PATIENT (MS. ALMANZA), AND HER SON. SHE IS FEELING BETTER AND LESS SOB. SHE IS ON OXYGEN. HER SON SAYS SHE IS STILL SOB. THEY AGREED TO A 2HR UF TX AS THE BENEFITS OUTWEIGH ANY RISKS. I CALLED [facility] AND ASKED THEM TO COME AND DO THE TREATMENT. ORDER HAS BEEN PLACED.

## 2021-07-06 NOTE — ROUTINE PROCESS
TRANSFER - OUT REPORT:    Verbal report given to [facility] (name) on Sixto Clifford  being transferred to [facility]/Tele (unit) for routine progression of care       Report consisted of patients Situation, Background, Assessment and   Recommendations(SBAR). Information from the following report(s) SBAR, Kardex, ED Summary and Recent Results was reviewed with the receiving nurse. Lines:   Peripheral IV 07/06/21 Right Antecubital (Active)   Site Assessment Clean, dry, & intact 07/06/21 1617   Phlebitis Assessment 0 07/06/21 1617   Infiltration Assessment 0 07/06/21 1617   Dressing Status Clean, dry, & intact 07/06/21 1617   Dressing Type Tape;Transparent 07/06/21 1617   Hub Color/Line Status Pink;Flushed 07/06/21 1617   Action Taken Blood drawn 07/06/21 1617        Opportunity for questions and clarification was provided.       Patient transported with:   Transport +AED

## 2021-07-07 ENCOUNTER — PATIENT OUTREACH (OUTPATIENT)
Dept: CASE MANAGEMENT | Age: 81
End: 2021-07-07

## 2021-07-07 ENCOUNTER — APPOINTMENT (OUTPATIENT)
Dept: NON INVASIVE DIAGNOSTICS | Age: 81
DRG: 291 | End: 2021-07-07
Attending: INTERNAL MEDICINE
Payer: MEDICARE

## 2021-07-07 LAB
ALBUMIN SERPL-MCNC: 2.8 G/DL (ref 3.5–5)
ALBUMIN/GLOB SERPL: 0.8 {RATIO} (ref 1.1–2.2)
ALP SERPL-CCNC: 65 U/L (ref 45–117)
ALT SERPL-CCNC: 21 U/L (ref 12–78)
ANION GAP SERPL CALC-SCNC: 4 MMOL/L (ref 5–15)
AST SERPL-CCNC: 39 U/L (ref 15–37)
BASOPHILS # BLD: 0.1 K/UL (ref 0–0.1)
BASOPHILS NFR BLD: 1 % (ref 0–1)
BILIRUB SERPL-MCNC: 0.8 MG/DL (ref 0.2–1)
BUN SERPL-MCNC: 12 MG/DL (ref 6–20)
BUN/CREAT SERPL: 3 (ref 12–20)
CALCIUM SERPL-MCNC: 7.5 MG/DL (ref 8.5–10.1)
CHLORIDE SERPL-SCNC: 101 MMOL/L (ref 97–108)
CK MB CFR SERPL CALC: 1.6 % (ref 0–2.5)
CK MB SERPL-MCNC: 1.3 NG/ML (ref 5–25)
CK SERPL-CCNC: 81 U/L (ref 26–192)
CO2 SERPL-SCNC: 31 MMOL/L (ref 21–32)
CREAT SERPL-MCNC: 3.79 MG/DL (ref 0.55–1.02)
DIFFERENTIAL METHOD BLD: ABNORMAL
ECHO AO ASC DIAM: 3.26 CM
ECHO AO ROOT DIAM: 2.85 CM
ECHO AV AREA PEAK VELOCITY: 1.8 CM2
ECHO AV AREA/BSA PEAK VELOCITY: 1 CM2/M2
ECHO AV PEAK GRADIENT: 12.87 MMHG
ECHO AV PEAK VELOCITY: 179.38 CM/S
ECHO EST RA PRESSURE: 8 MMHG
ECHO IVC PROX: 2.77 CM
ECHO LA AREA 4C: 16.68 CM2
ECHO LA MAJOR AXIS: 3.64 CM
ECHO LA MINOR AXIS: 1.95 CM
ECHO LA VOL 2C: 46.54 ML (ref 22–52)
ECHO LA VOL 4C: 39.27 ML (ref 22–52)
ECHO LA VOL BP: 51.09 ML (ref 22–52)
ECHO LA VOL/BSA BIPLANE: 27.32 ML/M2 (ref 16–28)
ECHO LA VOLUME INDEX A2C: 24.89 ML/M2 (ref 16–28)
ECHO LA VOLUME INDEX A4C: 21 ML/M2 (ref 16–28)
ECHO LV E' LATERAL VELOCITY: 6.85 CENTIMETER/SECOND
ECHO LV E' SEPTAL VELOCITY: 7.77 CENTIMETER/SECOND
ECHO LV INTERNAL DIMENSION DIASTOLIC: 4.77 CM (ref 3.9–5.3)
ECHO LV INTERNAL DIMENSION SYSTOLIC: 2.46 CM
ECHO LV IVSD: 0.88 CM (ref 0.6–0.9)
ECHO LV MASS 2D: 142 G (ref 67–162)
ECHO LV MASS INDEX 2D: 75.9 G/M2 (ref 43–95)
ECHO LV POSTERIOR WALL DIASTOLIC: 0.88 CM (ref 0.6–0.9)
ECHO LVOT DIAM: 1.76 CM
ECHO LVOT PEAK GRADIENT: 7.09 MMHG
ECHO LVOT PEAK VELOCITY: 133.1 CM/S
ECHO MV A VELOCITY: 118.87 CENTIMETER/SECOND
ECHO MV AREA PHT: 3.74 CM2
ECHO MV E DECELERATION TIME (DT): 202.76 MS
ECHO MV E VELOCITY: 119.89 CENTIMETER/SECOND
ECHO MV EROA PISA: 0.03 CM2
ECHO MV PRESSURE HALF TIME (PHT): 58.8 MS
ECHO MV REGURGITANT RADIUS PISA: 0.28 CM
ECHO MV REGURGITANT VOLUME: 5.6 ML
ECHO MV REGURGITANT VTIA: 171.08 CM
ECHO PV MAX VELOCITY: 113.46 CM/S
ECHO PV PEAK INSTANTANEOUS GRADIENT SYSTOLIC: 5.15 MMHG
ECHO RIGHT VENTRICULAR SYSTOLIC PRESSURE (RVSP): 59.42 MMHG
ECHO RV INTERNAL DIMENSION: 4.23 CM
ECHO RV TAPSE: 2.74 CM (ref 1.5–2)
ECHO TV REGURGITANT MAX VELOCITY: 358.53 CM/S
ECHO TV REGURGITANT PEAK GRADIENT: 51.42 MMHG
EOSINOPHIL # BLD: 0.4 K/UL (ref 0–0.4)
EOSINOPHIL NFR BLD: 6 % (ref 0–7)
ERYTHROCYTE [DISTWIDTH] IN BLOOD BY AUTOMATED COUNT: 13.5 % (ref 11.5–14.5)
FERRITIN SERPL-MCNC: 339 NG/ML (ref 8–252)
FOLATE SERPL-MCNC: 8.8 NG/ML (ref 5–21)
GLOBULIN SER CALC-MCNC: 3.5 G/DL (ref 2–4)
GLUCOSE SERPL-MCNC: 88 MG/DL (ref 65–100)
HCT VFR BLD AUTO: 21 % (ref 35–47)
HGB BLD-MCNC: 6.5 G/DL (ref 11.5–16)
HISTORY CHECKED?,CKHIST: NORMAL
IMM GRANULOCYTES # BLD AUTO: 0.1 K/UL (ref 0–0.04)
IMM GRANULOCYTES NFR BLD AUTO: 1 % (ref 0–0.5)
IRON SATN MFR SERPL: 20 % (ref 20–50)
IRON SERPL-MCNC: 58 UG/DL (ref 35–150)
LA VOL DISK BP: 46.88 ML (ref 22–52)
LYMPHOCYTES # BLD: 1.7 K/UL (ref 0.8–3.5)
LYMPHOCYTES NFR BLD: 29 % (ref 12–49)
MAGNESIUM SERPL-MCNC: 2 MG/DL (ref 1.6–2.4)
MCH RBC QN AUTO: 31.1 PG (ref 26–34)
MCHC RBC AUTO-ENTMCNC: 31 G/DL (ref 30–36.5)
MCV RBC AUTO: 100.5 FL (ref 80–99)
MONOCYTES # BLD: 0.6 K/UL (ref 0–1)
MONOCYTES NFR BLD: 11 % (ref 5–13)
MR PISA PV: 573.79 CM/S
NEUTS SEG # BLD: 3 K/UL (ref 1.8–8)
NEUTS SEG NFR BLD: 52 % (ref 32–75)
NRBC # BLD: 0 K/UL (ref 0–0.01)
NRBC BLD-RTO: 0 PER 100 WBC
PHOSPHATE SERPL-MCNC: 3.7 MG/DL (ref 2.6–4.7)
PLATELET # BLD AUTO: 216 K/UL (ref 150–400)
PMV BLD AUTO: 9.9 FL (ref 8.9–12.9)
POTASSIUM SERPL-SCNC: 4.6 MMOL/L (ref 3.5–5.1)
PROT SERPL-MCNC: 6.3 G/DL (ref 6.4–8.2)
RBC # BLD AUTO: 2.09 M/UL (ref 3.8–5.2)
RBC MORPH BLD: ABNORMAL
SODIUM SERPL-SCNC: 136 MMOL/L (ref 136–145)
TIBC SERPL-MCNC: 284 UG/DL (ref 250–450)
TROPONIN I SERPL-MCNC: <0.05 NG/ML
VIT B12 SERPL-MCNC: 479 PG/ML (ref 193–986)
WBC # BLD AUTO: 5.9 K/UL (ref 3.6–11)

## 2021-07-07 PROCEDURE — 36430 TRANSFUSION BLD/BLD COMPNT: CPT

## 2021-07-07 PROCEDURE — 93306 TTE W/DOPPLER COMPLETE: CPT | Performed by: STUDENT IN AN ORGANIZED HEALTH CARE EDUCATION/TRAINING PROGRAM

## 2021-07-07 PROCEDURE — 36415 COLL VENOUS BLD VENIPUNCTURE: CPT

## 2021-07-07 PROCEDURE — 94660 CPAP INITIATION&MGMT: CPT

## 2021-07-07 PROCEDURE — 86901 BLOOD TYPING SEROLOGIC RH(D): CPT

## 2021-07-07 PROCEDURE — 80053 COMPREHEN METABOLIC PANEL: CPT

## 2021-07-07 PROCEDURE — 86923 COMPATIBILITY TEST ELECTRIC: CPT

## 2021-07-07 PROCEDURE — P9016 RBC LEUKOCYTES REDUCED: HCPCS

## 2021-07-07 PROCEDURE — 84100 ASSAY OF PHOSPHORUS: CPT

## 2021-07-07 PROCEDURE — 5A1D70Z PERFORMANCE OF URINARY FILTRATION, INTERMITTENT, LESS THAN 6 HOURS PER DAY: ICD-10-PCS | Performed by: INTERNAL MEDICINE

## 2021-07-07 PROCEDURE — 83735 ASSAY OF MAGNESIUM: CPT

## 2021-07-07 PROCEDURE — 90935 HEMODIALYSIS ONE EVALUATION: CPT

## 2021-07-07 PROCEDURE — 65660000000 HC RM CCU STEPDOWN

## 2021-07-07 PROCEDURE — 93306 TTE W/DOPPLER COMPLETE: CPT

## 2021-07-07 PROCEDURE — 74011250637 HC RX REV CODE- 250/637: Performed by: INTERNAL MEDICINE

## 2021-07-07 PROCEDURE — 94760 N-INVAS EAR/PLS OXIMETRY 1: CPT

## 2021-07-07 PROCEDURE — 83540 ASSAY OF IRON: CPT

## 2021-07-07 PROCEDURE — 99222 1ST HOSP IP/OBS MODERATE 55: CPT | Performed by: STUDENT IN AN ORGANIZED HEALTH CARE EDUCATION/TRAINING PROGRAM

## 2021-07-07 PROCEDURE — 84484 ASSAY OF TROPONIN QUANT: CPT

## 2021-07-07 PROCEDURE — 82607 VITAMIN B-12: CPT

## 2021-07-07 PROCEDURE — 82728 ASSAY OF FERRITIN: CPT

## 2021-07-07 PROCEDURE — 74011000250 HC RX REV CODE- 250: Performed by: INTERNAL MEDICINE

## 2021-07-07 PROCEDURE — 30233N1 TRANSFUSION OF NONAUTOLOGOUS RED BLOOD CELLS INTO PERIPHERAL VEIN, PERCUTANEOUS APPROACH: ICD-10-PCS | Performed by: INTERNAL MEDICINE

## 2021-07-07 PROCEDURE — 74011250636 HC RX REV CODE- 250/636: Performed by: INTERNAL MEDICINE

## 2021-07-07 PROCEDURE — 82550 ASSAY OF CK (CPK): CPT

## 2021-07-07 PROCEDURE — 85025 COMPLETE CBC W/AUTO DIFF WBC: CPT

## 2021-07-07 PROCEDURE — 82746 ASSAY OF FOLIC ACID SERUM: CPT

## 2021-07-07 RX ORDER — HYDRALAZINE HYDROCHLORIDE 20 MG/ML
20 INJECTION INTRAMUSCULAR; INTRAVENOUS
Status: DISCONTINUED | OUTPATIENT
Start: 2021-07-07 | End: 2021-07-10 | Stop reason: HOSPADM

## 2021-07-07 RX ORDER — SODIUM CHLORIDE 9 MG/ML
250 INJECTION, SOLUTION INTRAVENOUS AS NEEDED
Status: DISPENSED | OUTPATIENT
Start: 2021-07-07 | End: 2021-07-07

## 2021-07-07 RX ADMIN — LEVETIRACETAM 250 MG: 250 TABLET, FILM COATED ORAL at 20:45

## 2021-07-07 RX ADMIN — LEVETIRACETAM 250 MG: 250 TABLET, FILM COATED ORAL at 09:34

## 2021-07-07 RX ADMIN — PRAVASTATIN SODIUM 40 MG: 20 TABLET ORAL at 22:19

## 2021-07-07 RX ADMIN — Medication 10 ML: at 06:43

## 2021-07-07 RX ADMIN — HEPARIN SODIUM 5000 UNITS: 5000 INJECTION INTRAVENOUS; SUBCUTANEOUS at 14:43

## 2021-07-07 RX ADMIN — BUMETANIDE 1 MG: 0.25 INJECTION, SOLUTION INTRAMUSCULAR; INTRAVENOUS at 09:34

## 2021-07-07 RX ADMIN — Medication 10 ML: at 22:19

## 2021-07-07 RX ADMIN — ASPIRIN 81 MG: 81 TABLET, COATED ORAL at 09:34

## 2021-07-07 RX ADMIN — HEPARIN SODIUM 5000 UNITS: 5000 INJECTION INTRAVENOUS; SUBCUTANEOUS at 06:43

## 2021-07-07 RX ADMIN — HEPARIN SODIUM 5000 UNITS: 5000 INJECTION INTRAVENOUS; SUBCUTANEOUS at 22:19

## 2021-07-07 RX ADMIN — FLUTICASONE PROPIONATE 2 SPRAY: 50 SPRAY, METERED NASAL at 18:45

## 2021-07-07 RX ADMIN — MONTELUKAST 10 MG: 10 TABLET, FILM COATED ORAL at 18:42

## 2021-07-07 RX ADMIN — Medication 10 ML: at 14:47

## 2021-07-07 RX ADMIN — BUMETANIDE 1 MG: 0.25 INJECTION, SOLUTION INTRAMUSCULAR; INTRAVENOUS at 18:42

## 2021-07-07 NOTE — PROGRESS NOTES
Occupational Therapy Note:  Orders acknowledged and chart reviewed. Patient is currently on dialysis and unavailable for OT evaluation. Will continue to follow.   Kane Starks OTR/L

## 2021-07-07 NOTE — PROGRESS NOTES
Spiritual Care Assessment/Progress Note  [address]      NAME: Aishwarya Frederick      MRN: [id number]  AGE: 79 y.o. SEX: female  Confucianist Affiliation: Mormon   Language: English     7/7/2021     Total Time (in minutes): 15     Spiritual Assessment begun in [facility] 3 [address] through conversation with:         [x]Patient        [x] Family    [] Friend(s)        Reason for Consult: Palliative Care, Initial/Spiritual Assessment     Spiritual beliefs: (Please include comment if needed)     [x] Identifies with a juanjose tradition:   Confucianism       [] Supported by a juanjose community:            [] Claims no spiritual orientation:           [] Seeking spiritual identity:                [] Adheres to an individual form of spirituality:           [] Not able to assess:                           Identified resources for coping:      [] Prayer                               [] Music                  [] Guided Imagery     [] Family/friends                 [] Pet visits     [] Devotional reading                         [] Unknown     [x] Other: juanjose                                       Interventions offered during this visit: (See comments for more details)    Patient Interventions: Affirmation of juanjose, Iconic (affirming the presence of God/Higher Power), Initial/Spiritual assessment, patient floor, Prayer (assurance of), Prayer (actual)     Family/Friend(s):  Affirmation of emotions/emotional suffering, Affirmation of juanjose, Prayer (assurance of), Issues around forgiveness/closure, Iconic (affirming the presence of God/Higher Power)     Plan of Care:     [] Support spiritual and/or cultural needs    [] Support AMD and/or advance care planning process      [] Support grieving process   [] Coordinate Rites and/or Rituals    [] Coordination with community clergy   [] No spiritual needs identified at this time   [] Detailed Plan of Care below (See Comments)  [] Make referral to Music Therapy  [] Make referral to Pet Therapy     [] Make referral to Addiction services  [] Make referral to [facility]  [] Make referral to Spiritual Care Partner  [] No future visits requested        [x] Follow up upon further referrals     Comments: Initial Palliative Care spiritual assessment in [facility]. Miss Dexter Fierro and her sister were in the room. They shared they are 2 of 5 siblings. 2 of their siblings have . There is a 10 year gap between [employer], sibling number 4, and her sister who was present, sibling number 5. Miss Dexter Fierro shared she has a strong Taoism belief in God. She share she used to go to [employer]. She was raised [facility].  She requested prayer for healing. Nurse came into the room during visit. Provided prayer for healing ans assurance of prayers.     Visited by: Gerhardt Deems., MS., [address] [place] ([id number])

## 2021-07-07 NOTE — PROGRESS NOTES
Miguel Angel Morales juan [place] [address]  [address]  [phone]      Medical Progress Note      NAME: Montana Le   :  1940  MRM:  [id number]    Date/Time of service: 2021  9:07 AM       Subjective:     Chief Complaint:  Patient was personally seen and examined by me during this time period. Chart reviewed. Still with some dyspnea, edema       Objective:       Vitals:       Last 24hrs VS reviewed since prior progress note.  Most recent are:    Visit Vitals  BP (!) 142/71   Pulse 93   Temp 98.2 °F (36.8 °C)   Resp 18   Ht 5' 1.5\" (1.562 m)   Wt 87.2 kg (192 lb 4.8 oz)   SpO2 96%   BMI 35.75 kg/m²     SpO2 Readings from Last 6 Encounters:   21 96%   21 98%   03/10/21 99%   21 95%   20 97%   19 98%    O2 Flow Rate (L/min): 2 l/min   No intake or output data in the 24 hours ending 21 0907     Exam:     Physical Exam:    Gen:  Elderly, ill-appearing, NAD  HEENT:  Pink conjunctivae, PERRL, hearing intact to voice, moist mucous membranes  Neck:  Supple, without masses, thyroid non-tender  Resp:  No accessory muscle use, clear breath sounds without wheezes rales or rhonchi  Card:  No murmurs, normal S1, S2 without thrills, +edema  Abd:  Soft, non-tender, non-distended, normoactive bowel sounds are present  Musc:  No cyanosis or clubbing  Skin:  No rashes   Neuro:  Cranial nerves 3-12 are grossly intact, follows commands appropriately  Psych:  fair insight, oriented to person, place and time, alert    Medications Reviewed: (see below)    Lab Data Reviewed: (see below)    ______________________________________________________________________    Medications:     Current Facility-Administered Medications   Medication Dose Route Frequency    0.9% sodium chloride infusion 250 mL  250 mL IntraVENous PRN    sodium chloride (NS) flush 5-40 mL  5-40 mL IntraVENous Q8H    sodium chloride (NS) flush 5-40 mL  5-40 mL IntraVENous PRN    acetaminophen (TYLENOL) tablet 650 mg  650 mg Oral Q6H PRN    Or    acetaminophen (TYLENOL) suppository 650 mg  650 mg Rectal Q6H PRN    polyethylene glycol (MIRALAX) packet 17 g  17 g Oral DAILY PRN    [Held by provider] amLODIPine (NORVASC) tablet 10 mg  10 mg Oral DAILY    aspirin delayed-release tablet 81 mg  81 mg Oral DAILY    [Held by provider] carvediloL (COREG) tablet 6.25 mg  6.25 mg Oral BID WITH MEALS    cloNIDine HCL (CATAPRES) tablet 0.2 mg  0.2 mg Oral Q6H PRN    fluticasone propionate (FLONASE) 50 mcg/actuation nasal spray 2 Spray  2 Spray Both Nostrils QPM    [Held by provider] minoxidiL (LONITEN) tablet 2.5 mg  2.5 mg Oral BID    montelukast (SINGULAIR) tablet 10 mg  10 mg Oral QPM    pravastatin (PRAVACHOL) tablet 40 mg  40 mg Oral QHS    heparin (porcine) injection 5,000 Units  5,000 Units SubCUTAneous Q8H    levETIRAcetam (KEPPRA) tablet 250 mg  250 mg Oral BID    bumetanide (BUMEX) injection 1 mg  1 mg IntraVENous BID          Lab Review:     Recent Labs     07/07/21  0448 07/06/21  1624   WBC 5.9 7.1   HGB 6.5* 7.5*   HCT 21.0* 23.8*    241     Recent Labs     07/07/21  0448 07/06/21  1624    136   K 4.6 3.6    100   CO2 31 29   GLU 88 118*   BUN 12 9   CREA 3.79* 2.90*   CA 7.5* 7.9*   MG 2.0  --    PHOS 3.7  --    ALB 2.8* 3.2*   TBILI 0.8 1.0   ALT 21 18     Lab Results   Component Value Date/Time    Glucose (POC) 100 05/20/2021 09:00 AM    Glucose (POC) 109 (H) 02/20/2019 10:13 PM    Glucose (POC) 114 (H) 02/20/2019 11:34 AM          Assessment / Plan:     80 yo hx of HTN, dCHF, CVA, seizure d/o, ESRD, presented w/ hypoxia, CHF, volume overload, pericardial effusion    1) Acute resp failure/hypoxia: due to pulm edema, volume overload, dCHF. No evidence of pneumonia. Cont O2, incentive spirometry, dialysis for volume control    2) Acute on chronic dCHF/volume overload/possible pericardial effusion: awaiting echo.   Cont dialysis for volume    3) Malignant HTN: BP low, holding norvasc, coreg, minoxidil. Use IV hydralazine prn    4) Hx of CVA: cont ASA, statin    5) ESRD: cont HD per renal    6) Seizure d/o: cont Keppra    7) Anemia: likely due to ACD from ESRD. Will check iron studies, B12/folate.   Will transfuse 1u RBCs, monitor Hgb    Code: DNR    Total time spent with patient: 39 Minutes **I personally saw and examined the patient during this time period**                 Care Plan discussed with: Patient, nursing     Discussed:  Care Plan    Prophylaxis:  Hep SQ    Disposition:  SNF/LTC           ___________________________________________________    Attending Physician: Aelxis Vega MD

## 2021-07-07 NOTE — PROGRESS NOTES
[facility]  [phone]    Vitals   Pre   Post   Assessment   Pre   Post     Temp  Temp: 98.1 °F (36.7 °C) (07/07/21 1445)  97.7 LOC  Patient AXOX4 Patient AXOX4   HR   Pulse (Heart Rate): 66 (07/07/21 1454) 67 Lungs   Diminished   Diminished   B/P   BP: (!) 144/56 (07/07/21 1454) 155/51 Cardiac   HR Regular  HR Regular   Resp   Resp Rate: 18 (07/07/21 1445) 18 Skin   Warm and Dry  Warm and Dry    Pain level  Pain Intensity 1: 0 (07/07/21 1254) 0 Edema  Generalized     Generalized   Orders:    Duration:   Start:    5665 End:    2772 Total:   3 hours    Dialyzer:   Dialyzer/Set Up Inspection: Deny Sanchez (07/07/21 1454)   K Bath:   Dialysate K (mEq/L): 3 (07/07/21 1454)   Ca Bath:   Dialysate CA (mEq/L): 2.5 (07/07/21 1454)   Na/Bicarb:   Dialysate NA (mEq/L): 140 (07/07/21 1454)   Target Fluid Removal:   Goal/Amount of Fluid to Remove (mL): 3000 mL (07/07/21 1454)   Access     Type & Location:   Left AV fistula- pt arm is bruised but not swollen or painful. The pt states her center uses a cannula in her fistula and access her port for HD. She states they have been having issues with the fistula. Her son said she will be seeing [facility] soon. Right SC- Dressing clean, dry, and intact. Dated 7/6/21. Limbs cleansed per policy. Hub scrubbed with prevantics and allowed to dry. Ports aspirated and flushed with normal saline.     Labs     Obtained/Reviewed   Critical Results Called   Date when labs were drawn-    Hgb-    HGB   Date Value Ref Range Status   07/07/2021 6.5 (L) 11.5 - 16.0 g/dL Final     K-    Potassium   Date Value Ref Range Status   07/07/2021 4.6 3.5 - 5.1 mmol/L Final     Comment:     SPECIMEN HEMOLYZED, RESULTS MAY BE AFFECTED  INVESTIGATED PER DELTA CHECK PROTOCOL       Ca-   Calcium   Date Value Ref Range Status   07/07/2021 7.5 (L) 8.5 - 10.1 MG/DL Final     Bun-   BUN   Date Value Ref Range Status   07/07/2021 12 6 - 20 MG/DL Final     Creat- Creatinine   Date Value Ref Range Status   07/07/2021 3.79 (H) 0.55 - 1.02 MG/DL Final     Comment:     INVESTIGATED PER DELTA CHECK PROTOCOL        Medications/ Blood Products Given     Name   Dose   Route and Time     All dialysis related medications have been reviewed. Blood Volume Processed (BVP):    66.5L Net Fluid   Removed:  3000 ml    Comments   Time Out Done: Yes. 1016  Primary Nurse Rpt Pre: Isa Bosch RN  Primary Nurse Rpt Post: Isa Bosch RN   Pt Education: Pt educated about dialysis and access. Care Plan: MWF HD   Tx Summary:  6848- Treatment  Started   1600-Pt tolerating treatment well.   1700- Pt tolerating treatment well. 1754- Treatment complete. All possible blood returned. Sterile caps applied. Dressing left intact and dated 7/6/21. Pt left resting in bed with call light in reach. Report given to primary RN. Admiting Diagnosis:Acute Hypoxemic Respiratory Failure  Pt's previous clinic- [facility]   Consent signed - Informed Consent Verified: Yes (07/06/21 8137)  [place] Consent - N/A  Hepatitis Status- Negative Ag 6/17/21, Susceptible 5/16/21  Machine #- Machine Number: [id number] (07/07/21 9422)  Telemetry status-Remote, NSR  Pre-dialysis wt. - Pre-Dialysis Weight: 87.8 kg (193 lb 9 oz) (07/07/21 1454)

## 2021-07-07 NOTE — PROGRESS NOTES
Spiritual Care Assessment/Progress Note  [address]      NAME: Timoteo Giordano      MRN: [id number]  AGE: 79 y.o. SEX: female  Islam Affiliation: Synagogue   Language: English     7/7/2021     Total Time (in minutes): 5     Spiritual Assessment begun in [facility] TELE 2 through conversation with:         []Patient        [] Family    [] Friend(s)        Reason for Consult: Palliative Care, Initial/Spiritual Assessment     Spiritual beliefs PER PREVIOUS NOTES: (Please include comment if needed)     [x] Identifies with a juanjose tradition:         [] Supported by a juanjose community:            [] Claims no spiritual orientation:           [] Seeking spiritual identity:                [] Adheres to an individual form of spirituality:           [] Not able to assess:                           Identified resources for coping:      [] Prayer                               [] Music                  [] Guided Imagery     [] Family/friends                 [] Pet visits     [] Devotional reading                         [] Unknown     [] Other:                                Interventions offered during this visit: (See comments for more details)    Patient Interventions: Initial visit (Attempted)           Plan of Care:     [] Support spiritual and/or cultural needs    [] Support AMD and/or advance care planning process      [] Support grieving process   [] Coordinate Rites and/or Rituals    [] Coordination with community clergy   [] No spiritual needs identified at this time   [] Detailed Plan of Care below (See Comments)  [] Make referral to Music Therapy  [] Make referral to Pet Therapy     [] Make referral to Addiction services  [] Make referral to [facility]  [] Make referral to Spiritual Care Partner  [] No future visits requested        [x] Follow up upon further referrals     Comments: Attempted initial Palliative Care Spiritual Care assessment on [facility].   Miss Marlene Baker was receiving a test in her room. Unable to assess at this time. Contact Spiritual Care for any further referrals.   Visited by: Josh Strickland., MS., [address] [place] ([id number])

## 2021-07-07 NOTE — PROGRESS NOTES
Bedside shift change report given to [facility] (oncoming nurse) by Floyd Humphrey (offgoing nurse). Report included the following information SBAR, Kardex, ED Summary, Procedure Summary, Intake/Output, MAR and Recent Results. Bedside shift change report given to Vanes[facility] (oncoming nurse) by [facility] (offgoing nurse). Report included the following information SBAR, Kardex, ED Summary, Intake/Output, MAR, Recent Results and Cardiac Rhythm NSR.

## 2021-07-07 NOTE — PROGRESS NOTES
NEPHROLOGY PROGRESS NOTE         NAME:Jeannine Oreilly                   SNZ:[id number]   :1940    Chief complaints: f/u ESKD on HD TTS     Subjective: c/o mild SOB    24 hr interval history: unable to tolerate UF last night d/t hypotension        Objective:  Vitals:    21 0232 21 0314 21 0700 21 0754   BP:  (!) 135/55  (!) 142/71   Pulse:  69 64 93   Resp:  16  18   Temp:  98 °F (36.7 °C)  98.2 °F (36.8 °C)   SpO2:  94%  96%   Weight: 87.2 kg (192 lb 4.8 oz)      Height:         No intake or output data in the 24 hours ending 21 0946    PHYSICAL EXAM:  GENERAL : Lying down in bed with no acute distress  HEENT: AT [facility]   NECK: Supple no JVP  CVS: S1 S2 RRR, no murmur or gallops heard  RS: diminished BS +  ABDOMEN: soft NT ND positive BS  EXTREMITY: No edema clubbing or cyanosis, pedal pulse +  NEUROLOGY: AAA X3, no focal deficit or asterixis  VASCULAR ACCESS: Permacath    Labs:  CBC:    Lab Results   Component Value Date/Time    WBC 5.9 2021 04:48 AM    HGB 6.5 (L) 2021 04:48 AM    HCT 21.0 (L) 2021 04:48 AM     2021 04:48 AM     BMP:   Lab Results   Component Value Date/Time     2021 04:48 AM    K 4.6 2021 04:48 AM     2021 04:48 AM    CO2 31 2021 04:48 AM    AGAP 4 (L) 2021 04:48 AM    GLU 88 2021 04:48 AM    BUN 12 2021 04:48 AM    CREA 3.79 (H) 2021 04:48 AM    GFRAA 14 (L) 2021 04:48 AM    GFRNA 11 (L) 2021 04:48 AM        Lab Results   Component Value Date/Time    Color YELLOW/STRAW 2019 05:54 PM    Appearance CLOUDY (A) 2019 05:54 PM    Specific gravity 1.009 2019 05:54 PM    Specific gravity 1.020 2019 12:54 PM    pH (UA) 5.5 2019 05:54 PM    Protein 100 (A) 2019 05:54 PM    Glucose NEGATIVE  2019 05:54 PM    Ketone NEGATIVE  2019 05:54 PM    Bilirubin NEGATIVE  2019 05:54 PM    Urobilinogen 0.2 2019 05:54 PM    Nitrites NEGATIVE  08/24/2019 05:54 PM    Leukocyte Esterase NEGATIVE  08/24/2019 05:54 PM    Epithelial cells FEW 08/24/2019 05:54 PM    Bacteria NEGATIVE  08/24/2019 05:54 PM    WBC 0-4 08/24/2019 05:54 PM    RBC 0-5 08/24/2019 05:54 PM       Imaging study:    Assessment &Plan    ESKD on HD MWF @ [facility]  Volume overload  Anemia of CKD  Hypotension    Plan:  HD today for 3 hr and will attempt net UF:3L  Give PRBC to keep HB>7  Use albumin and coolest temp during HD  Hold BP med's on the day of HD  Acute [facility] team is notified      Care Plan discussed with:   Medical Team    Ashif Rexanne Gilford, MD  7/7/2021    [facility]:  [email or web address]  [place]joao Messer office:  [address] [place] [address],[address]  [address]  Phone: [phone]  Fax :     [phone]     [place] office:  [address]  Phone - [phone]  Fax - [phone]

## 2021-07-07 NOTE — PROGRESS NOTES
I have discussed with the patient the rationale for blood component transfusion; its benefits in treating or preventing fatigue, organ damage, or death; and its risk which includes mild transfusion reactions, rare risk of blood borne infection, or more serious but rare reactions. I have discussed the alternatives to transfusion, including the risk and consequences of not receiving transfusion. The patient had an opportunity to ask questions and had agreed to proceed with transfusion of blood components.

## 2021-07-07 NOTE — CONSULTS
China Alejandro DO  [facility] [address] [place] [address], [address] [place] [address], [address] [place] [address]                                       Office [phone],KO [phone]  Office [phone],[facility] [phone]      Date of  Admission: 7/6/2021  3:39 PM  PCP- Tricia Kim MD    Cristela Salazar is a 79 y.o. female admitted for Acute hypoxemic respiratory failure ([facility] [address] [J96.01]. Consult requested by Parris Smith MD    Assessment/Plan       Hypoxic respiratory failure secondary to volume overload and pulmonary edema    Possible pericardial effusion-chest x-ray suggests interval enlargement and cardiomegaly raising question for pericardial effusion. Recommend to proceed with echocardiogram    Macrocytic anemia    Diastolic heart failure/pulmonary hypertension-continue systemic hypertensive regimen. Volume management per nephrology    Hypertension    History of stroke    End-stage renal disease on dialysis         []    High complexity decision making was performed  []    Patient is at high-risk of decompensation with multiple organ involvement      I appreciate the opportunity to be involved in Ms. Muñoz. See below note for details. Please do not hesitate to contact us with questions or concerns. Capri Ureña DO      Subjective:  Cristela Salazar is a 79 y.o. female presents to the ED due to progressive dyspnea. Patient has a history of chronic kidney disease resulting in need for hemodialysis several months ago. Patient was reportedly improving in functional capacity at her SNF over the last several months until about 2 weeks ago. Over the last 2 weeks she reports progressive exertional dyspnea. No chest pain symptoms. Cardiology consulted for possible pericardial effusion. Chest x-ray in the ED showed interval enlargement and cardiomegaly concerning for possible development of pericardial effusion.     Past Medical History:   Diagnosis Date    Anxiety     Arthritis     Basal ganglia stroke ([facility] [address] 2019    Left    CKD (chronic kidney disease) stage 3, GFR 30-59 ml/min (HCC)     High cholesterol     Hx of seasonal allergies     Hypertension     Ischemic stroke ([facility] [address]     Right occipital    Monoclonal gammopathy 2018    ABIGAIL on CPAP     Seizures (HCC)     last keppra level- 2019 @ 15   Last seizure 2017    Urinary incontinence       Past Surgical History:   Procedure Laterality Date    HX APPENDECTOMY N/A     HX CATARACT REMOVAL Bilateral 2018    HX  SECTION N/A     x 2    HX HEMORRHOIDECTOMY      HX HYSTERECTOMY      HX KNEE REPLACEMENT Right 2019    HX TONSILLECTOMY      IR INSERT NON TUNL CVC OVER 5 YRS  2021    IR INSERT TUNL CVC W/O PORT OVER 5 YR  2021     Allergies   Allergen Reactions    Latex Rash    Codeine Other (comments)    Levaquin [Levofloxacin] Other (comments)     Hallucinations    Lisinopril Cough    Sulfa (Sulfonamide Antibiotics) Rash and Itching     Family History   Problem Relation Age of Onset    Hypertension Mother     Cancer Sister         Skin    Diabetes Sister     Hypertension Sister     Stroke Father     Parkinson's Disease Father     Cancer Sister         Skin    Cancer Sister     No Known Problems Brother       Social History     Tobacco Use    Smoking status: Never Smoker    Smokeless tobacco: Never Used   Vaping Use    Vaping Use: Never used   Substance Use Topics    Alcohol use: No    Drug use: No          Medications:  Medications Prior to Admission   Medication Sig    levETIRAcetam (Keppra) 250 mg tablet Take 250 mg by mouth two (2) times a day.  sevelamer carbonate (RENVELA) 800 mg tab tab Take 800 mg by mouth three (3) times daily (with meals).  ferrous sulfate 325 mg (65 mg iron) tablet Take 325 mg by mouth two (2) times a day.  hydrALAZINE (APRESOLINE) 100 mg tablet Take 100 mg by mouth three (3) times daily as needed (high BP).     bumetanide (BUMEX) 2 mg tablet Take 2 mg by mouth daily.  calcium acetate,phosphat bind, (PHOSLO) 667 mg cap Take 1 Capsule by mouth three (3) times daily (with meals).  morphine (ROXANOL) 100 mg/5 mL (20 mg/mL) concentrated solution Take 20 mg by mouth every four (4) hours as needed for Pain.  cloNIDine HCL (CATAPRES) 0.2 mg tablet Take 1 Tablet by mouth every six (6) hours as needed for Other (sBP >150) for up to 30 doses. Indications: high blood pressure    minoxidiL (LONITEN) 2.5 mg tablet Take 2.5 mg by mouth two (2) times a day.  sodium bicarbonate 650 mg tablet Take 650 mg by mouth two (2) times a day.  omega-3 acid ethyl esters (LOVAZA) 1 gram capsule Take 1 g by mouth two (2) times a day.  aspirin delayed-release 81 mg tablet Take 81 mg by mouth daily.  amLODIPine (NORVASC) 10 mg tablet Take 10 mg by mouth every evening.  montelukast (Singulair) 10 mg tablet Take 10 mg by mouth every evening.  fluticasone propionate (Flonase Allergy Relief) 50 mcg/actuation nasal spray 2 Sprays by Both Nostrils route every evening.  acetaminophen (TylenoL) 325 mg tablet Take 650 mg by mouth every six (6) hours as needed for Pain.  polyvinyl alcohol-povidon,PF, (REFRESH CLASSIC) 1.4-0.6 % ophthalmic solution Administer 1-2 Drops to both eyes as needed.  carvedilol (COREG) 6.25 mg tablet Take 1 Tab by mouth two (2) times daily (with meals).  pravastatin (PRAVACHOL) 40 mg tablet Take 40 mg by mouth nightly.      Current Facility-Administered Medications   Medication Dose Route Frequency    0.9% sodium chloride infusion 250 mL  250 mL IntraVENous PRN    hydrALAZINE (APRESOLINE) 20 mg/mL injection 20 mg  20 mg IntraVENous Q6H PRN    sodium chloride (NS) flush 5-40 mL  5-40 mL IntraVENous Q8H    sodium chloride (NS) flush 5-40 mL  5-40 mL IntraVENous PRN    acetaminophen (TYLENOL) tablet 650 mg  650 mg Oral Q6H PRN    Or    acetaminophen (TYLENOL) suppository 650 mg  650 mg Rectal Q6H PRN    polyethylene glycol (MIRALAX) packet 17 g  17 g Oral DAILY PRN    [Held by provider] amLODIPine (NORVASC) tablet 10 mg  10 mg Oral DAILY    aspirin delayed-release tablet 81 mg  81 mg Oral DAILY    [Held by provider] carvediloL (COREG) tablet 6.25 mg  6.25 mg Oral BID WITH MEALS    cloNIDine HCL (CATAPRES) tablet 0.2 mg  0.2 mg Oral Q6H PRN    fluticasone propionate (FLONASE) 50 mcg/actuation nasal spray 2 Spray  2 Spray Both Nostrils QPM    [Held by provider] minoxidiL (LONITEN) tablet 2.5 mg  2.5 mg Oral BID    montelukast (SINGULAIR) tablet 10 mg  10 mg Oral QPM    pravastatin (PRAVACHOL) tablet 40 mg  40 mg Oral QHS    heparin (porcine) injection 5,000 Units  5,000 Units SubCUTAneous Q8H    levETIRAcetam (KEPPRA) tablet 250 mg  250 mg Oral BID    bumetanide (BUMEX) injection 1 mg  1 mg IntraVENous BID         Review of Systems:  Pertinent Positive: Orthopnea, exertional dyspnea  Pertinent Negative: No chest pain  All Other systems reviewed and are negative for a Comprehensive ROS (10+)        Physical Exam:  Visit Vitals  BP (!) 142/71   Pulse 93   Temp 98.2 °F (36.8 °C)   Resp 18   Ht 5' 1.5\" (1.562 m)   Wt 192 lb 4.8 oz (87.2 kg)   SpO2 96%   BMI 35.75 kg/m²           Gen: Elderly, chronic ill-appearing female in no acute distress  HEENT:  Pink conjunctivae, hearing intact to voice, moist mucous membranes  Neck: Supple,No JVD, No Carotid Bruit  Resp: No accessory muscle use, Clear breath sounds, No rales or rhonchi  Card: Normal Rate,Regular Rythm,Normal S1, S2, No murmurs, rubs or gallop. No thrills. Abd:  Soft, non-tender, non-distended, normoactive bowel sounds are present   MSK: No cyanosis or clubbing  Skin: No rashes or ulcers  Neuro:  Cranial nerves are grossly intact, moving all four extremities, no focal deficit, follows commands appropriately  Psych:  Good insight, oriented to person, place and time, alert, Nml Affect  LE: Mild 1+ nonpitting edema  Vascular:Distal Pulses 2+ and symmetric          EKG:        .     Cardiac Testing/ Procedures:  05/14/21    ECHO ADULT COMPLETE 05/16/2021 5/16/2021    Interpretation Summary  · LV: Estimated LVEF is 55 - 60%. Normal cavity size and systolic function (ejection fraction normal). Mild concentric hypertrophy. Wall motion: normal.  · MV: Mild to moderate mitral valve regurgitation is present. · PA: Moderate pulmonary hypertension. Pulmonary arterial systolic pressure is 65 mmHg. · Pericardium: Insignificant pericardial effusion or fat. There is left pleural effusion. Signed by: Michelle Meyer MD on 5/16/2021 11:40 AM        LABS:    Lab Results   Component Value Date/Time    WBC 5.9 07/07/2021 04:48 AM    HGB 6.5 (L) 07/07/2021 04:48 AM    HCT 21.0 (L) 07/07/2021 04:48 AM    PLATELET 658 63/83/7230 04:48 AM    .5 (H) 07/07/2021 04:48 AM     Lab Results   Component Value Date/Time    Sodium 136 07/07/2021 04:48 AM    Potassium 4.6 07/07/2021 04:48 AM    Chloride 101 07/07/2021 04:48 AM    CO2 31 07/07/2021 04:48 AM    Anion gap 4 (L) 07/07/2021 04:48 AM    Glucose 88 07/07/2021 04:48 AM    BUN 12 07/07/2021 04:48 AM    Creatinine 3.79 (H) 07/07/2021 04:48 AM    BUN/Creatinine ratio 3 (L) 07/07/2021 04:48 AM    GFR est AA 14 (L) 07/07/2021 04:48 AM    GFR est non-AA 11 (L) 07/07/2021 04:48 AM    Calcium 7.5 (L) 07/07/2021 04:48 AM     Lab Results   Component Value Date/Time    CK 81 07/07/2021 04:48 AM    CK-MB Index 1.6 07/07/2021 04:48 AM    Troponin-I, Qt. <0.05 07/07/2021 04:48 AM     No results found for: APTT  Lab Results   Component Value Date/Time    INR 1.0 02/20/2019 11:42 AM    Prothrombin time 9.4 02/20/2019 11:42 AM           Ward Copeland DO    ATTENTION:   This medical record was transcribed using an electronic medical records/speech recognition system. Although proofread, it may and can contain electronic, spelling and other errors. Corrections may be executed at a later time. Please feel free to contact us for any clarifications as needed. 100

## 2021-07-07 NOTE — PROGRESS NOTES
Physical Therapy orders acknowledged, chart reviewed and discussed with nurse patient getting dialysis. We will continue to follow up with the patient for therapy thank you.

## 2021-07-07 NOTE — NURSE NAVIGATOR
Chart reviewed by Heart Failure Nurse Navigator. Heart Failure database completed. EF:  55 to 60% with mildly dilated LA and RA, moderate PA HTN. ACEi/ARB/ARNi: Not indicated. BB: Coreg 6.25 mg BID currently on hold by provider. Aldosterone Antagonist: Not indicated. Obstructive Sleep Apnea Screening:   STOP-BANG score:   Referred to Sleep Medicine:     CRT Not indicated. NYHA Functional Class **. Heart Failure Teach Back in Patient Education. Heart Failure Avoiding Triggers on Discharge Instructions. Cardiologist: Dr. Americo Goodwin consulted. Post discharge follow up phone call to be made within 48-72 hours of discharge. Met with patient at bedside. Patient was known to me from previous admission. Patient states she had increasing shortness of breath despite maximally tolerated dialysis. She has been at the [address] [facility] and initially thought she was improving but over the last 2 weeks has become more short of breath and tolerated dialysis poorly due to hypotension. She states she has not left the facility to see any specialists and only left for dialysis. She has been using her CPAP at the facility. She has been wearing oxygen since first discharge. She describes poor appetite and misses her lunch on dialysis days. She had stopped eating a snack during dialysis due to having to keep her acces arm straight. Patient thinks she may have lost some weight. Current weight is a bed weight and is 192 lbs. Difficult to determine weight loss due to fluid status but appears she may have some loss since previous admission weights were low 200's. .  Nutrition consulted for ONS since patient states she likes them and states her appetite since hospital admission remains poor. Patient states she is agreeable to current plan for discharge back to the [facility] for continued rehab.

## 2021-07-07 NOTE — PROGRESS NOTES
Transition of Care Plan - RUR 21% (moderate risk):        1. CM following  2. Patient had been at the [address] [facility] for rehab prior to admission. Choice letter signed and referral sent in [address] [place] [address] link. 3. Patient goes to dialysis at [facility] Tu/Th/Sat.  4. PT/OT consulted  5. Cardiology, nephrology following  6. Palliative care consulted  7. BPCI-A letter regarding Heart Failure has been delivered to the patient.     Angie Bueno LCSW

## 2021-07-07 NOTE — PROGRESS NOTES
Problem: Pressure Injury - Risk of  Goal: *Prevention of pressure injury  Description: Document Humble Scale and appropriate interventions in the flowsheet.   Outcome: Progressing Towards Goal  Note: Pressure Injury Interventions:  Sensory Interventions: Assess need for specialty bed, Check visual cues for pain, Discuss PT/OT consult with provider, Keep linens dry and wrinkle-free    Moisture Interventions: Apply protective barrier, creams and emollients, Absorbent underpads, Check for incontinence Q2 hours and as needed, Internal/External urinary devices    Activity Interventions: Increase time out of bed, Pressure redistribution bed/mattress(bed type)    Mobility Interventions: HOB 30 degrees or less, Pressure redistribution bed/mattress (bed type)    Nutrition Interventions: Document food/fluid/supplement intake, Discuss nutritional consult with provider    Friction and Shear Interventions: HOB 30 degrees or less, Minimize layers                Problem: Patient Education: Go to Patient Education Activity  Goal: Patient/Family Education  Outcome: Progressing Towards Goal

## 2021-07-07 NOTE — PROCEDURES
Pt did not tolerate UF only  treatment well. Low BP at 92/34 15 mins into treatment. Dr Marin Robles notified and he advised to terminate treatment. Treatment ended with Pt completing only 30mins on the machine.

## 2021-07-07 NOTE — PROGRESS NOTES
[facility]: MED RECONCILIATION    Comments/Recommendations:   Pharmacist [facility] medication [facility] [address] but list never received and pharmacist called a third time but no answer. Patient discharged from [facility] on 5/26/21. Current medication list is based on list from [facility] that was sent with patient from dialysis today and from discharge summary from [facility]. Unsure if patient is on both Phoslo and Renvela but both are active on medication list from dialysis, at discharge from [facility] Phoslo was stopped and Renvela started-unclear what patient has been getting at SNF as both medications were added to dialysis medication list after discharge date from [facility]. Bumetanide was also stopped at discharge from [facility] but is active on medication list from dialysis and per list was started after discharge date from [facility]. Medications added:     Keppra  Ferrous sulfate  Renvela  Morphine  Hydralazine   Bumetanide  Phoslo     Medications removed:    none    Medications adjusted:    none    Information obtained from: discharge AVS [facility] 5/26/21, medication list from [facility] dialysis       Allergies: Latex, Codeine, Levaquin [levofloxacin], Lisinopril, and Sulfa (sulfonamide antibiotics)    Prior to Admission Medications:     Medication Documentation Review Audit       Reviewed by Tyesha Boyce (Pharmacist) on 07/06/21 at 2116      Medication Sig Documenting Provider Last Dose Status Taking?   acetaminophen (TylenoL) 325 mg tablet Take 650 mg by mouth every six (6) hours as needed for Pain. Provider, Historical  Active Yes   amLODIPine (NORVASC) 10 mg tablet Take 10 mg by mouth every evening. Provider, Historical  Active Yes   aspirin delayed-release 81 mg tablet Take 81 mg by mouth daily. Provider, Historical  Active Yes   bumetanide (BUMEX) 2 mg tablet Take 2 mg by mouth daily.  Provider, Historical  Active Yes   calcium acetate,phosphat bind, (PHOSLO) 667 mg cap Take 1 Capsule by mouth three (3) times daily (with meals). Provider, Historical  Active Yes   carvedilol (COREG) 6.25 mg tablet Take 1 Tab by mouth two (2) times daily (with meals). Pratik Hale MD  Active Yes   cloNIDine HCL (CATAPRES) 0.2 mg tablet Take 1 Tablet by mouth every six (6) hours as needed for Other (sBP >150) for up to 30 doses. Indications: high blood pressure Nelson Esqueda MD  Active Yes   ferrous sulfate 325 mg (65 mg iron) tablet Take 325 mg by mouth two (2) times a day. Provider, Historical  Active Yes   fluticasone propionate (Flonase Allergy Relief) 50 mcg/actuation nasal spray 2 Sprays by Both Nostrils route every evening. Provider, Historical  Active Yes   hydrALAZINE (APRESOLINE) 100 mg tablet Take 100 mg by mouth three (3) times daily as needed (high BP). Provider, Historical  Active Yes   levETIRAcetam (Keppra) 250 mg tablet Take 250 mg by mouth two (2) times a day. Provider, Historical  Active Yes   minoxidiL (LONITEN) 2.5 mg tablet Take 2.5 mg by mouth two (2) times a day. Provider, Historical  Active Yes   montelukast (Singulair) 10 mg tablet Take 10 mg by mouth every evening. Provider, Historical  Active Yes   morphine (ROXANOL) 100 mg/5 mL (20 mg/mL) concentrated solution Take 20 mg by mouth every four (4) hours as needed for Pain. Provider, Historical  Active Yes   omega-3 acid ethyl esters (LOVAZA) 1 gram capsule Take 1 g by mouth two (2) times a day. Provider, Historical  Active Yes   polyvinyl alcohol-povidon,PF, (REFRESH CLASSIC) 1.4-0.6 % ophthalmic solution Administer 1-2 Drops to both eyes as needed. Provider, Historical  Active Yes   pravastatin (PRAVACHOL) 40 mg tablet Take 40 mg by mouth nightly. Provider, Historical  Active Yes   sevelamer carbonate (RENVELA) 800 mg tab tab Take 800 mg by mouth three (3) times daily (with meals). Provider, Historical  Active Yes   sodium bicarbonate 650 mg tablet Take 650 mg by mouth two (2) times a day.  Provider, Historical Active Yes                    Thank you,   Tyesha Fisher, PharmD, BCPS   Contact: [phone]

## 2021-07-07 NOTE — PROGRESS NOTES
Transition of care outreach postponed for 14 days due to patient's discharge to SNF.   bundle readmit 7/6/21 [address] [facility] f/u 7d

## 2021-07-08 LAB
ABO + RH BLD: NORMAL
ANION GAP SERPL CALC-SCNC: 5 MMOL/L (ref 5–15)
BLD PROD TYP BPU: NORMAL
BLOOD GROUP ANTIBODIES SERPL: NORMAL
BPU ID: NORMAL
BUN SERPL-MCNC: 9 MG/DL (ref 6–20)
BUN/CREAT SERPL: 3 (ref 12–20)
CALCIUM SERPL-MCNC: 7.7 MG/DL (ref 8.5–10.1)
CHLORIDE SERPL-SCNC: 102 MMOL/L (ref 97–108)
CO2 SERPL-SCNC: 31 MMOL/L (ref 21–32)
CREAT SERPL-MCNC: 3.12 MG/DL (ref 0.55–1.02)
CROSSMATCH RESULT,%XM: NORMAL
ERYTHROCYTE [DISTWIDTH] IN BLOOD BY AUTOMATED COUNT: 14.4 % (ref 11.5–14.5)
GLUCOSE SERPL-MCNC: 86 MG/DL (ref 65–100)
HCT VFR BLD AUTO: 27.3 % (ref 35–47)
HGB BLD-MCNC: 8.5 G/DL (ref 11.5–16)
MAGNESIUM SERPL-MCNC: 2.2 MG/DL (ref 1.6–2.4)
MCH RBC QN AUTO: 30.6 PG (ref 26–34)
MCHC RBC AUTO-ENTMCNC: 31.1 G/DL (ref 30–36.5)
MCV RBC AUTO: 98.2 FL (ref 80–99)
NRBC # BLD: 0 K/UL (ref 0–0.01)
NRBC BLD-RTO: 0 PER 100 WBC
PHOSPHATE SERPL-MCNC: 3.3 MG/DL (ref 2.6–4.7)
PLATELET # BLD AUTO: 238 K/UL (ref 150–400)
PMV BLD AUTO: 9.3 FL (ref 8.9–12.9)
POTASSIUM SERPL-SCNC: 3.8 MMOL/L (ref 3.5–5.1)
RBC # BLD AUTO: 2.78 M/UL (ref 3.8–5.2)
SODIUM SERPL-SCNC: 138 MMOL/L (ref 136–145)
SPECIMEN EXP DATE BLD: NORMAL
STATUS OF UNIT,%ST: NORMAL
UNIT DIVISION, %UDIV: 0
WBC # BLD AUTO: 7.6 K/UL (ref 3.6–11)

## 2021-07-08 PROCEDURE — 80048 BASIC METABOLIC PNL TOTAL CA: CPT

## 2021-07-08 PROCEDURE — 83735 ASSAY OF MAGNESIUM: CPT

## 2021-07-08 PROCEDURE — 90935 HEMODIALYSIS ONE EVALUATION: CPT

## 2021-07-08 PROCEDURE — 74011250636 HC RX REV CODE- 250/636: Performed by: INTERNAL MEDICINE

## 2021-07-08 PROCEDURE — 74011250637 HC RX REV CODE- 250/637: Performed by: INTERNAL MEDICINE

## 2021-07-08 PROCEDURE — 65660000000 HC RM CCU STEPDOWN

## 2021-07-08 PROCEDURE — 84100 ASSAY OF PHOSPHORUS: CPT

## 2021-07-08 PROCEDURE — 85027 COMPLETE CBC AUTOMATED: CPT

## 2021-07-08 PROCEDURE — 5A09357 ASSISTANCE WITH RESPIRATORY VENTILATION, LESS THAN 24 CONSECUTIVE HOURS, CONTINUOUS POSITIVE AIRWAY PRESSURE: ICD-10-PCS | Performed by: INTERNAL MEDICINE

## 2021-07-08 PROCEDURE — 94760 N-INVAS EAR/PLS OXIMETRY 1: CPT

## 2021-07-08 PROCEDURE — 99222 1ST HOSP IP/OBS MODERATE 55: CPT | Performed by: INTERNAL MEDICINE

## 2021-07-08 PROCEDURE — 36415 COLL VENOUS BLD VENIPUNCTURE: CPT

## 2021-07-08 RX ORDER — CARVEDILOL 6.25 MG/1
6.25 TABLET ORAL 2 TIMES DAILY WITH MEALS
Status: DISCONTINUED | OUTPATIENT
Start: 2021-07-08 | End: 2021-07-10 | Stop reason: HOSPADM

## 2021-07-08 RX ORDER — AMLODIPINE BESYLATE 5 MG/1
10 TABLET ORAL DAILY
Status: DISCONTINUED | OUTPATIENT
Start: 2021-07-08 | End: 2021-07-10 | Stop reason: HOSPADM

## 2021-07-08 RX ORDER — FACIAL-BODY WIPES
10 EACH TOPICAL DAILY PRN
Status: DISCONTINUED | OUTPATIENT
Start: 2021-07-09 | End: 2021-07-10 | Stop reason: HOSPADM

## 2021-07-08 RX ORDER — AMOXICILLIN 250 MG
1 CAPSULE ORAL 2 TIMES DAILY
Status: DISCONTINUED | OUTPATIENT
Start: 2021-07-08 | End: 2021-07-10 | Stop reason: HOSPADM

## 2021-07-08 RX ORDER — FACIAL-BODY WIPES
10 EACH TOPICAL DAILY PRN
Status: COMPLETED | OUTPATIENT
Start: 2021-07-08 | End: 2021-07-08

## 2021-07-08 RX ORDER — MINOXIDIL 2.5 MG/1
2.5 TABLET ORAL 2 TIMES DAILY
Status: DISCONTINUED | OUTPATIENT
Start: 2021-07-08 | End: 2021-07-10 | Stop reason: HOSPADM

## 2021-07-08 RX ADMIN — AMLODIPINE BESYLATE 10 MG: 5 TABLET ORAL at 08:17

## 2021-07-08 RX ADMIN — POLYETHYLENE GLYCOL 3350 17 G: 17 POWDER, FOR SOLUTION ORAL at 11:08

## 2021-07-08 RX ADMIN — MONTELUKAST 10 MG: 10 TABLET, FILM COATED ORAL at 17:22

## 2021-07-08 RX ADMIN — LEVETIRACETAM 250 MG: 250 TABLET, FILM COATED ORAL at 20:43

## 2021-07-08 RX ADMIN — HYDRALAZINE HYDROCHLORIDE 20 MG: 20 INJECTION INTRAMUSCULAR; INTRAVENOUS at 04:46

## 2021-07-08 RX ADMIN — EPOETIN ALFA-EPBX 10000 UNITS: 10000 INJECTION, SOLUTION INTRAVENOUS; SUBCUTANEOUS at 12:00

## 2021-07-08 RX ADMIN — HEPARIN SODIUM 5000 UNITS: 5000 INJECTION INTRAVENOUS; SUBCUTANEOUS at 21:12

## 2021-07-08 RX ADMIN — HEPARIN SODIUM 5000 UNITS: 5000 INJECTION INTRAVENOUS; SUBCUTANEOUS at 15:13

## 2021-07-08 RX ADMIN — CARVEDILOL 6.25 MG: 6.25 TABLET, FILM COATED ORAL at 17:22

## 2021-07-08 RX ADMIN — Medication 10 ML: at 21:12

## 2021-07-08 RX ADMIN — HEPARIN SODIUM 1900 UNITS: 1000 INJECTION INTRAVENOUS; SUBCUTANEOUS at 14:30

## 2021-07-08 RX ADMIN — DOCUSATE SODIUM 50MG AND SENNOSIDES 8.6MG 1 TABLET: 8.6; 5 TABLET, FILM COATED ORAL at 17:25

## 2021-07-08 RX ADMIN — BISACODYL 10 MG: 10 SUPPOSITORY RECTAL at 15:40

## 2021-07-08 RX ADMIN — HEPARIN SODIUM 5000 UNITS: 5000 INJECTION INTRAVENOUS; SUBCUTANEOUS at 05:03

## 2021-07-08 RX ADMIN — LEVETIRACETAM 250 MG: 250 TABLET, FILM COATED ORAL at 08:17

## 2021-07-08 RX ADMIN — MINOXIDIL 2.5 MG: 2.5 TABLET ORAL at 08:17

## 2021-07-08 RX ADMIN — ASPIRIN 81 MG: 81 TABLET, COATED ORAL at 08:17

## 2021-07-08 RX ADMIN — MINOXIDIL 2.5 MG: 2.5 TABLET ORAL at 17:22

## 2021-07-08 RX ADMIN — CARVEDILOL 6.25 MG: 6.25 TABLET, FILM COATED ORAL at 08:17

## 2021-07-08 RX ADMIN — Medication 10 ML: at 15:12

## 2021-07-08 RX ADMIN — Medication 10 ML: at 05:04

## 2021-07-08 RX ADMIN — PRAVASTATIN SODIUM 40 MG: 20 TABLET ORAL at 21:12

## 2021-07-08 RX ADMIN — FLUTICASONE PROPIONATE 2 SPRAY: 50 SPRAY, METERED NASAL at 17:26

## 2021-07-08 NOTE — PROGRESS NOTES
07/06/21    ECHO ADULT COMPLETE 07/07/2021 7/7/2021    Interpretation Summary  · LV: Estimated LVEF is 55 - 60%. Normal cavity size and systolic function (ejection fraction normal). Mild concentric hypertrophy. Wall motion: normal.  · LA: Mildly dilated left atrium. · RA: Mildly dilated right atrium. · MV: Mitral valve non-specific thickening. Mild mitral valve regurgitation is present. · PA: Moderate pulmonary hypertension. · IVC: Severely elevated central venous pressure (15 mmHg); IVC diameter is larger than 21 mm and collapses less than 50% with respiration. Signed by: Dasia Contreras DO on 7/7/2021 10:47 AM    No evidence of pericardial effusion per echo  Mild concentric hypertrophy  Mod PH  No further cardiac testing as this time.  Will defer BP & volume management to nephrology in the setting of HD

## 2021-07-08 NOTE — PROGRESS NOTES
0700 Bedside shift change report given to MAYANK Tyson (oncoming nurse) by Javad Viera RN (offgoing nurse). Report included the following information SBAR, Kardex, ED Summary, Intake/Output, MAR and Recent Results. 1145 Dialysis RN at bedside to give HD.     1410 HD complete. 1900 Bedside shift change report given to MAYANK Alexis (oncoming nurse) by [address] [facility] [place], RN (offgoing nurse). Report included the following information SBAR, Kardex, ED Summary, Intake/Output, MAR and Recent Results. This patient was assisted with Intentional Toileting every 2 hours during this shift. Documentation of ambulation and output reflected on Flowsheet.

## 2021-07-08 NOTE — PROCEDURES
[facility] Dialysis Team [facility] Ac[facility]  [phone]    Vitals   Pre   Post   Assessment   Pre   Post     Temp  Temp: 98 °F (36.7 °C) (07/08/21 1125)  98.2, oral LOC  A&Ox4 A&Ox4   HR   Pulse (Heart Rate): 62 (07/08/21 1125) 68 Lungs   Dim bases Dim bases   B/P   BP: (!) 152/50 (07/08/21 1125) 164/48 Cardiac   Tele, NSR Tele, NSR   Resp   Resp Rate: 18 (07/08/21 1125) 18 Skin   Bruising to NEIL Bruising to NEIL   Pain level  Pain Intensity 1: 0 (07/08/21 0803) 0 Edema  3+ BLE 3+ BLE   Orders:    Duration:   Start:    2109 End:    1430 Total:   3hr   Dialyzer:   Dialyzer/Set Up Inspection: Revaclear (07/08/21 1125)   K Bath:   Dialysate K (mEq/L): 3 (07/08/21 1125)   Ca Bath:   Dialysate CA (mEq/L): 2.5 (07/08/21 1125)   Na/Bicarb:   Dialysate NA (mEq/L): 140 (07/08/21 1125)   Target Fluid Removal:   Goal/Amount of Fluid to Remove (mL): 3000 mL (07/08/21 1125)   Access     Type & Location:   [facility]: Dressing CDI, dated 7/6/2021. No s/s of infection. Both lumens aspirate & flush well. Running well at .     Labs     Obtained/Reviewed   Critical Results Called   Date when labs were drawn-  Hgb-    HGB   Date Value Ref Range Status   07/08/2021 8.5 (L) 11.5 - 16.0 g/dL Final     K-    Potassium   Date Value Ref Range Status   07/08/2021 3.8 3.5 - 5.1 mmol/L Final     Ca-   Calcium   Date Value Ref Range Status   07/08/2021 7.7 (L) 8.5 - 10.1 MG/DL Final     Bun-   BUN   Date Value Ref Range Status   07/08/2021 9 6 - 20 MG/DL Final     Creat-   Creatinine   Date Value Ref Range Status   07/08/2021 3.12 (H) 0.55 - 1.02 MG/DL Final        Medications/ Blood Products Given     Name   Dose   Route and Time     Epogen 10,000 units At 1200   Heparin 1:1000 1.9ml & 1.9ml Dwell in CVC after HD        Blood Volume Processed (BVP):    65.9L Net Fluid   Removed:  3000ml   Comments   Time Out Done: 1123  Primary Nurse Rpt Pre: Vero Rios RN  Primary Nurse Rpt Post: Vero Rios RN  Pt Education: need for hD to return to usual schedule  Care Plan: ongoing  Tx Summary:  SBAR received from Primary RN. Arrived to patient room.  and tested per policy. Pt A&Ox4. Consent signed & on file. 1125: Each catheter limb disinfected per p&p, caps removed, hubs disinfected per p&p. Each lumen aspirated for blood return and flushed with Normal Saline per policy. VSS. Dialysis Tx initiated. 1345: Patient c/o rib cramp. 100ml NS bolus given. UF off for 2min. Intervetnions helpful. UF back on.   1430: Tx ended. VSS. Each dialysis catheter limb disinfected per p&p, all possible blood returned per p&p, and each dialysis hub disinfected per p&p. Each lumen flushed, post dialysis catheter Heparin dwell instilled per order, and caps applied. Bed locked and in the lowest position, call bell and belongings in reach. SBAR given to Primary, RN. Patient is stable at time of my departure. All Dialysis related medications have been reviewed. Admiting Diagnosis: Acute Hypoxemic Respiratory Failure  Pt's previous clinic- [facility]  Consent signed - Informed Consent Verified: Yes (07/08/21 1125)  [facility] Consent - on file  Hepatitis Status- Neg Ag 6/17/2021  Machine #- Machine Number: [id number] (07/08/21 1125)  Telemetry status- Tele, NSR  Pre-dialysis wt. - Pre-Dialysis Weight: 87.8 kg (193 lb 9 oz) (07/07/21 7388)

## 2021-07-08 NOTE — PROGRESS NOTES
Late chart  Post Acute Facility Update     *The information contained in this note was received during a weekly care coordination call with the post acute facility*    Current Facility: Formerly named [facility] ([facility])  Anticipated Discharge Date: TBD    Facility Nursing Update:  medical O2 sats dropping into the mid 80's impacting therapy, looking pt medically week by week to prepare for discharge. Facility Social Work Update: no current updates  Upper Extremity Assistance: Stand by Assist - Presence and Cueing  Lower Extremity Assistance: Stand by Assist - Presence and Cueing  Bed Mobility: Independent  Transfers: Independent  Ambulation: Stand by Assist - Presence and Cueing  How far (in feet) is the patient ambulating? 70 ft   Device Used: Rollator  Barriers to Discharge: current medical condition  Other: Home Visit went fair at best. SOB and O2 sats dropping still issue - worse, impacting mobility.      Shannan Berry   BSN, RN  [facility]

## 2021-07-08 NOTE — CONSULTS
Comprehensive Nutrition Assessment    Type and Reason for Visit: Initial, Consult    Nutrition Recommendations/Plan:   Add Nepro 1x daily (Butter pecan @ lunch)   Instructions & menu given to order meals  Neph MD - Consider liberalizing diet to DAKOTAH       Nutrition Assessment:     98: 79year old female admitted for Acute hypoxemic respiratory failure (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 Pt was started on HD during last hospital admission in May. She reports she has struggle more with constipation ever since and doesn't make much urine anymore. Her wt is down from last admit likely with fluid shifts, since current wt is near usual from a couple years ago. Some risk of malnutrition is noted. Intake is fair to good from meals. Pt likes Nepro and had it last admission. K, phos and Na are all WDL. Pt with good cholesterol on last check. Pt probably doesn't need renal diet restrictions. Will defer to Nephrology to liberalize. Malnutrition Assessment:  Malnutrition Status:   At risk for malnutrition (specify)    Context:  Acute illness     Findings of the 6 clinical characteristics of malnutrition:   Energy Intake:  Mild decrease in energy intake (specify)  Weight Loss:  No significant weight loss     Body Fat Loss:  No significant body fat loss,     Muscle Mass Loss:  No significant muscle mass loss,    Fluid Accumulation:  No significant fluid accumulation,     Strength:  Not performed     Estimated Daily Nutrient Needs:  Energy (kcal): 1500; Weight Used for Energy Requirements: Current  Protein (g): 120g; Weight Used for Protein Requirements: Current (1.4)  Fluid (ml/day): 1500; Method Used for Fluid Requirements: Standard renal    Nutrition Related Findings:    Last BM: 7/8  - formed, brown, medium  ABD: WDL  Edema: (7/8) LLE 3+, RLE 3+     Wounds:    None       Current Nutrition Therapies:  ADULT ORAL NUTRITION SUPPLEMENT Lunch; Renal Supplement  DIET ONE TIME MESSAGE  ADULT DIET Regular; No Salt Added (3-4 gm); Low Potassium (Less than 3000 mg/day); Low Phosphorus (Less than 1000 mg)    Documented Meal intake:  Patient Vitals for the past 168 hrs:   % Diet Eaten   07/08/21 1741 1 - 25%   07/08/21 1538 0%   07/08/21 1102 51 - 75%   07/07/21 0805 76 - 100%     Documentation of supplement intake:  No data found. Anthropometric Measures:  · Height:  5' 1.5\" (156.2 cm)  · Current Body Wt:  84.5 kg (186 lb 4.6 oz)   · Admission Body Wt:  199 lb 4.7 oz    · Usual Body Wt:  90.7 kg (200 lb)     · Ideal Body Wt:  108 lbs:  172.5 %   · Adjusted Body Weight:   ; Weight Adjustment for: No adjustment   · Adjusted BMI:       · BMI Category:  Obese class 1 (BMI 30.0-34. 9)       Wt Readings from Last 6 Encounters:   07/08/21 84.5 kg (186 lb 4.8 oz)   05/24/21 89 kg (196 lb 3.2 oz)   03/10/21 92.1 kg (203 lb)   02/19/21 92.3 kg (203 lb 7.8 oz)   01/30/20 93.9 kg (207 lb)   08/22/19 87.2 kg (192 lb 3.9 oz)   ]  Nutrition Diagnosis:   · Increased nutrient needs related to renal dysfunction as evidenced by lab values, dialysis    Nutrition Interventions:   Food and/or Nutrient Delivery: Modify current diet, Start oral nutrition supplement  Nutrition Education and Counseling: Education initiated  Coordination of Nutrition Care: Continue to monitor while inpatient    Goals:  Pt will consume >50% of meals & ONS daily over the next 4-5 days       Nutrition Monitoring and Evaluation:   Behavioral-Environmental Outcomes: None identified  Food/Nutrient Intake Outcomes: Food and nutrient intake, Supplement intake  Physical Signs/Symptoms Outcomes: Biochemical data, Meal time behavior, Weight, GI status    Discharge Planning:    Continue oral nutrition supplement, Continue current diet     Lab Results   Component Value Date/Time    Glucose 86 07/08/2021 04:56 AM    Glucose (POC) 100 05/20/2021 09:00 AM Lab Results   Component Value Date/Time    GFR est AA 17 (L) 07/08/2021 04:56 AM    GFR est non-AA 14 (L) 07/08/2021 04:56 AM    Creatinine 3.12 (H) 07/08/2021 04:56 AM    BUN 9 07/08/2021 04:56 AM    Sodium 138 07/08/2021 04:56 AM    Potassium 3.8 07/08/2021 04:56 AM    Chloride 102 07/08/2021 04:56 AM    CO2 31 07/08/2021 04:56 AM     Lab Results   Component Value Date/Time    Calcium 7.7 (L) 07/08/2021 04:56 AM    Phosphorus 3.3 07/08/2021 04:56 AM       Electronically signed by Soni Edwards RD, MS on 7/8/2021 at 6:49 PM   Contact via [employer] or office [phone]

## 2021-07-08 NOTE — PROGRESS NOTES
1915 Bedside and Verbal shift change report given to MAYANK Alexis (oncoming nurse) by Temi Hamilton RN (offgoing nurse). Report included the following information SBAR, Kardex, Intake/Output, MAR, Recent Results and Cardiac Rhythm NSR     This patient was assisted with Intentional Toileting every 2 hours during this shift. Documentation of ambulation and output reflected on Flowsheet. 0730 Bedside and Verbal shift change report given to MAYANK Tyson (oncoming nurse) by Bryce Gottlieb RN (offgoing nurse). Report included the following information SBAR, Kardex, Intake/Output, MAR, Recent Results and Cardiac Rhythm NSR.

## 2021-07-08 NOTE — PROGRESS NOTES
NEPHROLOGY PROGRESS NOTE         NAME:Jeannine Marroquin                   ZVU:[id number]   :1940    Chief complaints: f/u ESKD on HD TTS     Subjective: had HD  feels okay, UF:3L    24 hr interval history:         Objective:  Vitals:    21 0332 21 0616 21 0700 21 0741   BP: (!) 177/74 (!) 158/59  (!) 169/64   Pulse: 80  73 84   Resp: 17   18   Temp: 98.4 °F (36.9 °C)   97.9 °F (36.6 °C)   TempSrc:       SpO2: 95%   99%   Weight: 84.5 kg (186 lb 4.8 oz)      Height:           Intake/Output Summary (Last 24 hours) at 2021 0858  Last data filed at 2021 0409  Gross per 24 hour   Intake 600.4 ml   Output 3000 ml   Net -2399.6 ml       PHYSICAL EXAM:  GENERAL : Lying down in bed with no acute distress  HEENT: AT NC PEERLA   NECK: Supple no JVP  CVS: S1 S2 RRR, no murmur or gallops heard  RS: diminished BS +  ABDOMEN: soft NT ND positive BS  EXTREMITY: + edema no clubbing or cyanosis, pedal pulse +  NEUROLOGY: AAA X3, no focal deficit or asterixis  VASCULAR ACCESS: Permacath    Labs:  CBC:    Lab Results   Component Value Date/Time    WBC 7.6 2021 04:56 AM    HGB 8.5 (L) 2021 04:56 AM    HCT 27.3 (L) 2021 04:56 AM     2021 04:56 AM     BMP:   Lab Results   Component Value Date/Time     2021 04:56 AM    K 3.8 2021 04:56 AM     2021 04:56 AM    CO2 31 2021 04:56 AM    AGAP 5 2021 04:56 AM    GLU 86 2021 04:56 AM    BUN 9 2021 04:56 AM    CREA 3.12 (H) 2021 04:56 AM    GFRAA 17 (L) 2021 04:56 AM    GFRNA 14 (L) 2021 04:56 AM        Lab Results   Component Value Date/Time    Color YELLOW/STRAW 2019 05:54 PM    Appearance CLOUDY (A) 2019 05:54 PM    Specific gravity 1.009 2019 05:54 PM    Specific gravity 1.020 2019 12:54 PM    pH (UA) 5.5 2019 05:54 PM    Protein 100 (A) 2019 05:54 PM    Glucose NEGATIVE  2019 05:54 PM    Ketone NEGATIVE 08/24/2019 05:54 PM    Bilirubin NEGATIVE  08/24/2019 05:54 PM    Urobilinogen 0.2 08/24/2019 05:54 PM    Nitrites NEGATIVE  08/24/2019 05:54 PM    Leukocyte Esterase NEGATIVE  08/24/2019 05:54 PM    Epithelial cells FEW 08/24/2019 05:54 PM    Bacteria NEGATIVE  08/24/2019 05:54 PM    WBC 0-4 08/24/2019 05:54 PM    RBC 0-5 08/24/2019 05:54 PM       Imaging study:    Assessment &Plan    ESKD on HD TTS @ [facility]  Volume overload  Anemia of CKD  Hypotension    Plan:  Had HD 7/7 with net UF:3 l  Plan for HD today (TTS)  Use albumin and coolest temp during HD  Hold BP med's on the day of HD  Acute [facility] team is notified      Care Plan discussed with:   Medical Team    Vania Driver MD  7/8/2021    [address] [place] [address] [facility]:  [email or web address]  Valentina Esteves office:  [address] [place] [address], [address] [place] [address],[address]  [facility], [address] [place] [address]  Phone: [phone]  Fax :     [phone]     [address] [place] [address] office:  [address] [facility]  [address], [address]  Phone - [phone]  Fax - [phone]

## 2021-07-08 NOTE — CONSULTS
Palliative Medicine Consult    Patient Name: Edelmira Bhatt  YOB: 1940    Date of Initial Consult: 7/7/21  Reason for Consult: Care Decisions  Requesting Provider: Jocelin Mckinney MD  Primary Care Physician: Celeste Rodriges MD      SUMMARY:   Edelmira Bhatt is a 79 y.o. with a past history of ESKD on HD, HFpEF, CVA  who was admitted on 7/6/2021 from a SNF with a diagnosis of dyspnea. Current medical issues leading to Palliative Medicine involvement include: ESKD on HD  Patient was living in her own condo (with her son and his wife living  Upstairs) until May of this year, when she was hospitalized for SOB. She was started on HD around the same time and after being discharged from the hospital, she went to a SNF for rehab. Per patient, she had some issues like cramping during her HD early on, but she is tolerating them much better now. PALLIATIVE DIAGNOSES:   1. Goals of care counseling/discussion   2. ESKD on HD  3. Durable DNR discussion        PLAN:   1. Goals of care: Patient would like to continue all restorative measures like HD at this time as long as they are offered to her, as her quality of life remains good. She is able to enjoy her hobbies which include puzzles, scrap booking, crosswords and reading. Patient believed SNF was helpful and valuable to her but she would prefer to go to her condo if possible. 2. Capacity: Patient has full capacity to make complex medical decisions, but was reluctant to discuss durable DNR today. She appears amenable to discussing it at a later time. 3. Pain management: Patient endorses minimal pain when not on HD, which sometimes gives her cramps. No intervention needed at this time. 4. Disposition: Patient will most likely return to SNF for further rehab, with plans to return to her condo after securing some help, which will depend on her level of function at that time.  Her son Jigar Gupta who lives upstairs from her will be a good source of help, but she [de-identified] require more. 5. Initial consult note routed to primary continuity provider  6. Communicated plan of care with: Palliative IDT       GOALS OF CARE / TREATMENT PREFERENCES:   [====Goals of Care====]  GOALS OF CARE:         TREATMENT PREFERENCES:   Code Status: DNR    Advance Care Planning:  Advance Care Planning 7/7/2021   Confirm Advance Directive Yes, on file          Other Instructions: The palliative care team has discussed with patient / health care proxy about goals of care / treatment preferences for patient.  [====Goals of Care====]         HISTORY:     History obtained from:     CHIEF COMPLAINT: Shortness of Breath    HPI/SUBJECTIVE:    The patient is:   [x] Verbal and participatory  [] Non-participatory due to:        Patient was living in her own condo (with her son and his wife living  Upstairs) until May of this year, when she was hospitalized for SOB. She was started on HD around the same time and after being discharged from the hospital, she went to a SNF for rehab. Per patient, she had some issues like cramping during her HD early on, but she is tolerating them much better now. She enjoys solving puzzles, reading and scrap booking which she is still able to do. She has minimal anxiety, and denies depression or any pain at this time. Clinical Pain Assessment (nonverbal scale for severity on nonverbal patients):                FUNCTIONAL ASSESSMENT:     Palliative Performance Scale (PPS):   50%       PSYCHOSOCIAL/SPIRITUAL SCREENING:     Advance Care Planning:  Advance Care Planning 7/7/2021   Confirm Advance Directive Yes, on file        Any spiritual / Protestant concerns:  [] Yes /  [x] No    Caregiver Burnout:  [] Yes /  [] No /  [x] No Caregiver Present      Anticipatory grief assessment:   [x] Normal  / [] Maladaptive       ESAS Anxiety:   0    ESAS Depression:   0       REVIEW OF SYSTEMS:     Positive and pertinent negative findings in ROS are noted above in HPI.   The following systems were [x] reviewed / [] unable to be reviewed as noted in HPI  Other findings are noted below. Systems: constitutional, ears/nose/mouth/throat, respiratory, gastrointestinal, genitourinary, musculoskeletal, integumentary, neurologic, psychiatric, endocrine. Positive findings noted below.  -Some abdominal pain as she has been constipated     Modified ESAS Completed by: provider                                            PHYSICAL EXAM:     From RN flowsheet:  Wt Readings from Last 3 Encounters:   07/08/21 186 lb 4.8 oz (84.5 kg)   05/24/21 196 lb 3.2 oz (89 kg)   03/10/21 203 lb (92.1 kg)     Blood pressure (!) 164/48, pulse 68, temperature 98.2 °F (36.8 °C), temperature source Oral, resp. rate 18, height 5' 1.5\" (1.562 m), weight 186 lb 4.8 oz (84.5 kg), SpO2 99 %.     Pain Scale 1: Numeric (0 - 10)  Pain Intensity 1: 0                 Last bowel movement, if known:     Constitutional: Lying in bed, in no acute distress  Eyes: pupils equal, anicteric  ENMT: no nasal discharge, moist mucous membranes  Respiratory: breathing not labored, symmetric  Gastrointestinal: soft non-tender, +bowel sounds  Musculoskeletal: no deformity, no tenderness to palpation  Skin: warm, dry  Neurologic: following commands, moving all extremities  Psychiatric: full affect, no hallucinations       HISTORY:     Active Problems:    ABIGAIL (obstructive sleep apnea) (12/10/2014)      Acute respiratory failure with hypoxia (HCC) (8/56/0091)      Diastolic CHF, acute on chronic (HCC) (5/14/2021)      Volume overload (5/14/2021)      ESRD on hemodialysis ([facility] [address] (5/24/2021)      Anemia due to chronic kidney disease (5/24/2021)      HTN (hypertension), benign (5/24/2021)      Acute hypoxemic respiratory failure ([address] (7/6/2021)      Past Medical History:   Diagnosis Date    Anxiety     Arthritis     Basal ganglia stroke ([facility] [address] 02/20/2019    Left    CKD (chronic kidney disease) stage 3, GFR 30-59 ml/min ([facility])     High cholesterol     Hx of seasonal allergies     Hypertension     Ischemic stroke ([facility] [address]     Right occipital    Monoclonal gammopathy 2018    ABIGAIL on CPAP     Seizures (HCC)     last keppra level- 2019 @ 15   Last seizure 2017    Urinary incontinence       Past Surgical History:   Procedure Laterality Date    HX APPENDECTOMY N/A     HX CATARACT REMOVAL Bilateral 2018    HX  SECTION N/A     x 2    HX HEMORRHOIDECTOMY      HX HYSTERECTOMY      HX KNEE REPLACEMENT Right 2019    HX TONSILLECTOMY      IR INSERT NON TUNL CVC OVER 5 YRS  2021    IR INSERT TUNL CVC W/O PORT OVER 5 YR  2021      Family History   Problem Relation Age of Onset    Hypertension Mother     Cancer Sister         Skin    Diabetes Sister     Hypertension Sister     Stroke Father     Parkinson's Disease Father     Cancer Sister         Skin    Cancer Sister     No Known Problems Brother       History reviewed, no pertinent family history.   Social History     Tobacco Use    Smoking status: Never Smoker    Smokeless tobacco: Never Used   Substance Use Topics    Alcohol use: No     Allergies   Allergen Reactions    Latex Rash    Codeine Other (comments)    Levaquin [Levofloxacin] Other (comments)     Hallucinations    Lisinopril Cough    Sulfa (Sulfonamide Antibiotics) Rash and Itching      Current Facility-Administered Medications   Medication Dose Route Frequency    minoxidiL (LONITEN) tablet 2.5 mg  2.5 mg Oral BID    carvediloL (COREG) tablet 6.25 mg  6.25 mg Oral BID WITH MEALS    amLODIPine (NORVASC) tablet 10 mg  10 mg Oral DAILY    epoetin millie-epbx (RETACRIT) injection 10,000 Units  10,000 Units SubCUTAneous Q TUE, THU & SAT    heparin (porcine) 1,000 unit/mL injection 1,900 Units  1,900 Units InterCATHeter DIALYSIS PRN    And    heparin (porcine) 1,000 unit/mL injection 1,900 Units  1,900 Units InterCATHeter DIALYSIS PRN    hydrALAZINE (APRESOLINE) 20 mg/mL injection 20 mg  20 mg IntraVENous Q6H PRN    sodium chloride (NS) flush 5-40 mL  5-40 mL IntraVENous Q8H    sodium chloride (NS) flush 5-40 mL  5-40 mL IntraVENous PRN    acetaminophen (TYLENOL) tablet 650 mg  650 mg Oral Q6H PRN    Or    acetaminophen (TYLENOL) suppository 650 mg  650 mg Rectal Q6H PRN    polyethylene glycol (MIRALAX) packet 17 g  17 g Oral DAILY PRN    aspirin delayed-release tablet 81 mg  81 mg Oral DAILY    cloNIDine HCL (CATAPRES) tablet 0.2 mg  0.2 mg Oral Q6H PRN    fluticasone propionate (FLONASE) 50 mcg/actuation nasal spray 2 Spray  2 Spray Both Nostrils QPM    montelukast (SINGULAIR) tablet 10 mg  10 mg Oral QPM    pravastatin (PRAVACHOL) tablet 40 mg  40 mg Oral QHS    heparin (porcine) injection 5,000 Units  5,000 Units SubCUTAneous Q8H    levETIRAcetam (KEPPRA) tablet 250 mg  250 mg Oral BID          LAB AND IMAGING FINDINGS:     Lab Results   Component Value Date/Time    WBC 7.6 07/08/2021 04:56 AM    HGB 8.5 (L) 07/08/2021 04:56 AM    PLATELET 115 59/76/6641 04:56 AM     Lab Results   Component Value Date/Time    Sodium 138 07/08/2021 04:56 AM    Potassium 3.8 07/08/2021 04:56 AM    Chloride 102 07/08/2021 04:56 AM    CO2 31 07/08/2021 04:56 AM    BUN 9 07/08/2021 04:56 AM    Creatinine 3.12 (H) 07/08/2021 04:56 AM    Calcium 7.7 (L) 07/08/2021 04:56 AM    Magnesium 2.2 07/08/2021 04:56 AM    Phosphorus 3.3 07/08/2021 04:56 AM      Lab Results   Component Value Date/Time    Alk.  phosphatase 65 07/07/2021 04:48 AM    Protein, total 6.3 (L) 07/07/2021 04:48 AM    Albumin 2.8 (L) 07/07/2021 04:48 AM    Globulin 3.5 07/07/2021 04:48 AM     Lab Results   Component Value Date/Time    INR 1.0 02/20/2019 11:42 AM    Prothrombin time 9.4 02/20/2019 11:42 AM      Lab Results   Component Value Date/Time    Iron 58 07/07/2021 08:14 AM    TIBC 284 07/07/2021 08:14 AM    Iron % saturation 20 07/07/2021 08:14 AM    Ferritin 339 (H) 07/07/2021 08:14 AM      Lab Results   Component Value Date/Time    pH 7.38 05/14/2021 04:24 PM    PCO2 35 05/14/2021 04:24 PM    PO2 84 05/14/2021 04:24 PM     No components found for: [place]   Lab Results   Component Value Date/Time    CK 81 07/07/2021 04:48 AM    CK - MB 1.3 07/07/2021 04:48 AM                Total time:   Counseling / coordination time, spent as noted above:   > 50% counseling / coordination?:     Prolonged service was provided for  []30 min   []75 min in face to face time in the presence of the patient, spent as noted above. Time Start:   Time End:   Note: this can only be billed with 72030 (initial) or 33198 (follow up). If multiple start / stop times, list each separately.

## 2021-07-08 NOTE — PROGRESS NOTES
Transition of Care Plan - RUR 23% (moderate risk):        1. CM following  2. Patient had been at the [facility] for rehab prior to admission. They have accepted her to return there at d/c  3. Patient goes to dialysis at [facility]  4. PT/OT consulted  5. Cardiology, nephrology following  6. Palliative care met with pt today--following  7. BPCI-A letter regarding Heart Failure has been delivered to the patient.   MAYANK Paula

## 2021-07-08 NOTE — PROGRESS NOTES
HD TRANSFER - OUT REPORT:    Verbal report given to MAYANK Tyson on Marquis Ludwig       Report consisted of patient's Situation, Background, Assessment and   Recommendations(SBAR). Information from the following report(s) SBAR, Procedure Summary, Intake/Output, MAR and Recent Results was reviewed with the receiving nurse. Method:  $$ Method: Hemodialysis (07/08/21 1125)    Fluid Removed  NET Fluid Removed (mL): 3000 ml (07/08/21 1437)     Patient response to treatment:  Stable    End Time  Hemodialysis End Time: 1430 (07/08/21 1437)  If not documented, dialysis nurse to update post-dialysis row in HD/Filtration flowsheet     Medications /Volume expansion agents or Fluid boluses administered during treatment? yes    Post-dialysis medication administration due?  yes  Remind nurse to administer post-HD medication upon return to unit. Fistula hemostasis? no    Line heparinization? yes    Lines: ANNIKA MATIAS    Opportunity for questions and clarification was provided.

## 2021-07-08 NOTE — PROGRESS NOTES
Physical Therapy orders acknowledged, chart reviewed and discussed with nurse patient just started dialysis. We will continue to follow up with the patient thank you.

## 2021-07-08 NOTE — PROGRESS NOTES
[facility]  [address] [facility], [address] [place] [address]  [phone]      Medical Progress Note      NAME: Paty Espino   :  1940  MRM:  [id number]    Date/Time: 2021        Assessment / Plan:     78 yo F w/ hx of HTN, HFpEF, CVA, seizure d/o, ESRD presenting dyspnea, found to have volume overload and hypoxia     Acute resp failure/hypoxia: due to pulm edema, volume overload, HFpEF. No evidence of pneumonia. Cont supplemental O2, HD/UF for volume control. BP mgmt      Acute on chronic diastolic CHF: TTE showed no e/o pericardial effusion. HD/UF for volume mgmt. Needs better BP control     Malignant HTN: BP high, resume Norvasc, Coreg, minoxidil. IV hydralazine prn     Hx of CVA: cont ASA, statin     ESRD: HD TTS per nephrology.      Aeizure d/o: cont Keppra     Anemia: likely due to ACD from ESRD. Transfused one unit on . Follow Hg    Constipation: start scheduled Pericolace. Miralax CA, bisacodyl supp PRN       Total time spent: 35 minutes. Time spent in the care of this patient including reviewing records, discussing with nursing and/or other providers on the treatment team, obtaining history and examining the patient, and discussing treatment plans. Care Plan discussed with: Patient, Nursing Staff and >50% of time spent in counseling and coordination of care    Discussed:  Care Plan and D/C Planning    Prophylaxis:  Hep SQ    Disposition:  Home w/Family         Subjective:     Chief Complaint:  Follow up dyspnea    Chart/notes/labs/studies reviewed, patient examined. Feels weak. +SOB. No CP. No fevers        Objective:       Vitals:        Last 24hrs VS reviewed since prior progress note.  Most recent are:    Visit Vitals  BP (!) 187/71 (BP 1 Location: Right arm, BP Patient Position: At rest)   Pulse 86   Temp 98 °F (36.7 °C)   Resp 18   Ht 5' 1.5\" (1.562 m)   Wt 84.5 kg (186 lb 4.8 oz)   SpO2 95%   BMI 34.63 kg/m²     SpO2 Readings from Last 6 Encounters:   07/08/21 95%   05/24/21 98%   03/10/21 99%   02/19/21 95%   01/30/20 97%   08/28/19 98%    O2 Flow Rate (L/min): 3 l/min       Intake/Output Summary (Last 24 hours) at 7/8/2021 1646  Last data filed at 7/8/2021 1538  Gross per 24 hour   Intake 480 ml   Output 6000 ml   Net -5520 ml          Exam:     Physical Exam:    Gen:  Elderly, chronically ill-appearing. NAD  HEENT:  Atraumatic, normocephalic. Sclerae nonicteric, hearing intact to voice  Neck:  Supple, without apparent masses. Resp:  No accessory muscle use, increased WOB. Mildly diminished in bases  Card: RRR, without m/r/g. BLE edema  Abd:  +bowel sounds, soft, NTTP, nondistended. No HSM  Neuro:  Face symmetric, speech fluent, follows commands appropriately, no focal weakness or numbness  Psych:  Alert, oriented to self and hospital. Fair insight     Medications Reviewed: (see below)    Lab Data Reviewed: (see below)    ______________________________________________________________________    Medications:     Current Facility-Administered Medications   Medication Dose Route Frequency    minoxidiL (LONITEN) tablet 2.5 mg  2.5 mg Oral BID    carvediloL (COREG) tablet 6.25 mg  6.25 mg Oral BID WITH MEALS    amLODIPine (NORVASC) tablet 10 mg  10 mg Oral DAILY    epoetin millie-epbx (RETACRIT) injection 10,000 Units  10,000 Units SubCUTAneous Q TUE, THU & SAT    heparin (porcine) 1,000 unit/mL injection 1,900 Units  1,900 Units InterCATHeter DIALYSIS PRN    And    heparin (porcine) 1,000 unit/mL injection 1,900 Units  1,900 Units InterCATHeter DIALYSIS PRN    hydrALAZINE (APRESOLINE) 20 mg/mL injection 20 mg  20 mg IntraVENous Q6H PRN    sodium chloride (NS) flush 5-40 mL  5-40 mL IntraVENous Q8H    sodium chloride (NS) flush 5-40 mL  5-40 mL IntraVENous PRN    acetaminophen (TYLENOL) tablet 650 mg  650 mg Oral Q6H PRN    Or    acetaminophen (TYLENOL) suppository 650 mg  650 mg Rectal Q6H PRN    polyethylene glycol (MIRALAX) packet 17 g 17 g Oral DAILY PRN    aspirin delayed-release tablet 81 mg  81 mg Oral DAILY    cloNIDine HCL (CATAPRES) tablet 0.2 mg  0.2 mg Oral Q6H PRN    fluticasone propionate (FLONASE) 50 mcg/actuation nasal spray 2 Spray  2 Spray Both Nostrils QPM    montelukast (SINGULAIR) tablet 10 mg  10 mg Oral QPM    pravastatin (PRAVACHOL) tablet 40 mg  40 mg Oral QHS    heparin (porcine) injection 5,000 Units  5,000 Units SubCUTAneous Q8H    levETIRAcetam (KEPPRA) tablet 250 mg  250 mg Oral BID            Lab Review:     Recent Labs     07/08/21 0456 07/07/21 0448 07/06/21  1624   WBC 7.6 5.9 7.1   HGB 8.5* 6.5* 7.5*   HCT 27.3* 21.0* 23.8*    216 241     Recent Labs     07/08/21 0456 07/07/21 0448 07/06/21  1624    136 136   K 3.8 4.6 3.6    101 100   CO2 31 31 29   GLU 86 88 118*   BUN 9 12 9   CREA 3.12* 3.79* 2.90*   CA 7.7* 7.5* 7.9*   MG 2.2 2.0  --    PHOS 3.3 3.7  --    ALB  --  2.8* 3.2*   ALT  --  21 18     No components found for: GLPOC  No results for input(s): PH, PCO2, PO2, HCO3, FIO2 in the last 72 hours. No results for input(s): INR, INREXT in the last 72 hours. No results found for: SDES  Lab Results   Component Value Date/Time    Culture result: MRSA NOT PRESENT 08/07/2019 03:57 PM    Culture result:  08/07/2019 03:57 PM         Screening of patient nares for MRSA is for surveillance purposes and, if positive, to facilitate isolation considerations in high risk settings. It is not intended for automatic decolonization interventions per se as regimens are not sufficiently effective to warrant routine use.     Culture result: KLEBSIELLA PNEUMONIAE (A) 08/07/2019 03:57 PM              ___________________________________________________    Attending Physician: Angie Rodriguez MD

## 2021-07-08 NOTE — PROGRESS NOTES
Occupational Therapy:  07/08/21    Chart reviewed in prep for OT james. Pt currently on HD. Will follow up this afternoon.     Thank you,  Ubaldo Conley, OTDEJAN/L

## 2021-07-09 LAB
ALBUMIN SERPL-MCNC: 3 G/DL (ref 3.5–5)
ANION GAP SERPL CALC-SCNC: 7 MMOL/L (ref 5–15)
BASOPHILS # BLD: 0.1 K/UL (ref 0–0.1)
BASOPHILS NFR BLD: 0 % (ref 0–1)
BUN SERPL-MCNC: 9 MG/DL (ref 6–20)
BUN/CREAT SERPL: 3 (ref 12–20)
CALCIUM SERPL-MCNC: 8.3 MG/DL (ref 8.5–10.1)
CHLORIDE SERPL-SCNC: 102 MMOL/L (ref 97–108)
CO2 SERPL-SCNC: 28 MMOL/L (ref 21–32)
COMMENT, HOLDF: NORMAL
CREAT SERPL-MCNC: 2.71 MG/DL (ref 0.55–1.02)
DIFFERENTIAL METHOD BLD: ABNORMAL
EOSINOPHIL # BLD: 0.3 K/UL (ref 0–0.4)
EOSINOPHIL NFR BLD: 2 % (ref 0–7)
ERYTHROCYTE [DISTWIDTH] IN BLOOD BY AUTOMATED COUNT: 13.8 % (ref 11.5–14.5)
GLUCOSE SERPL-MCNC: 92 MG/DL (ref 65–100)
HCT VFR BLD AUTO: 28.7 % (ref 35–47)
HGB BLD-MCNC: 9 G/DL (ref 11.5–16)
IMM GRANULOCYTES # BLD AUTO: 0.1 K/UL (ref 0–0.04)
IMM GRANULOCYTES NFR BLD AUTO: 1 % (ref 0–0.5)
LYMPHOCYTES # BLD: 2.3 K/UL (ref 0.8–3.5)
LYMPHOCYTES NFR BLD: 20 % (ref 12–49)
MAGNESIUM SERPL-MCNC: 2 MG/DL (ref 1.6–2.4)
MCH RBC QN AUTO: 30.4 PG (ref 26–34)
MCHC RBC AUTO-ENTMCNC: 31.4 G/DL (ref 30–36.5)
MCV RBC AUTO: 97 FL (ref 80–99)
MONOCYTES # BLD: 0.9 K/UL (ref 0–1)
MONOCYTES NFR BLD: 8 % (ref 5–13)
NEUTS SEG # BLD: 8 K/UL (ref 1.8–8)
NEUTS SEG NFR BLD: 69 % (ref 32–75)
NRBC # BLD: 0 K/UL (ref 0–0.01)
NRBC BLD-RTO: 0 PER 100 WBC
PHOSPHATE SERPL-MCNC: 2.4 MG/DL (ref 2.6–4.7)
PLATELET # BLD AUTO: 246 K/UL (ref 150–400)
PMV BLD AUTO: 9.1 FL (ref 8.9–12.9)
POTASSIUM SERPL-SCNC: 3.7 MMOL/L (ref 3.5–5.1)
RBC # BLD AUTO: 2.96 M/UL (ref 3.8–5.2)
SAMPLES BEING HELD,HOLD: NORMAL
SODIUM SERPL-SCNC: 137 MMOL/L (ref 136–145)
WBC # BLD AUTO: 11.6 K/UL (ref 3.6–11)

## 2021-07-09 PROCEDURE — 97161 PT EVAL LOW COMPLEX 20 MIN: CPT

## 2021-07-09 PROCEDURE — 83735 ASSAY OF MAGNESIUM: CPT

## 2021-07-09 PROCEDURE — 74011250636 HC RX REV CODE- 250/636: Performed by: INTERNAL MEDICINE

## 2021-07-09 PROCEDURE — 65270000029 HC RM PRIVATE

## 2021-07-09 PROCEDURE — 74011250637 HC RX REV CODE- 250/637: Performed by: INTERNAL MEDICINE

## 2021-07-09 PROCEDURE — 97530 THERAPEUTIC ACTIVITIES: CPT

## 2021-07-09 PROCEDURE — 97165 OT EVAL LOW COMPLEX 30 MIN: CPT

## 2021-07-09 PROCEDURE — 97535 SELF CARE MNGMENT TRAINING: CPT

## 2021-07-09 PROCEDURE — 80069 RENAL FUNCTION PANEL: CPT

## 2021-07-09 PROCEDURE — 97116 GAIT TRAINING THERAPY: CPT

## 2021-07-09 PROCEDURE — 85025 COMPLETE CBC W/AUTO DIFF WBC: CPT

## 2021-07-09 PROCEDURE — 36415 COLL VENOUS BLD VENIPUNCTURE: CPT

## 2021-07-09 RX ORDER — HYDRALAZINE HYDROCHLORIDE 25 MG/1
50 TABLET, FILM COATED ORAL EVERY 8 HOURS
Status: DISCONTINUED | OUTPATIENT
Start: 2021-07-09 | End: 2021-07-10 | Stop reason: HOSPADM

## 2021-07-09 RX ORDER — HYDRALAZINE HYDROCHLORIDE 25 MG/1
50 TABLET, FILM COATED ORAL
Status: DISCONTINUED | OUTPATIENT
Start: 2021-07-09 | End: 2021-07-09

## 2021-07-09 RX ADMIN — MINOXIDIL 2.5 MG: 2.5 TABLET ORAL at 09:01

## 2021-07-09 RX ADMIN — HYDRALAZINE HYDROCHLORIDE 50 MG: 25 TABLET, FILM COATED ORAL at 22:07

## 2021-07-09 RX ADMIN — Medication 10 ML: at 17:53

## 2021-07-09 RX ADMIN — AMLODIPINE BESYLATE 10 MG: 5 TABLET ORAL at 09:01

## 2021-07-09 RX ADMIN — CARVEDILOL 6.25 MG: 6.25 TABLET, FILM COATED ORAL at 17:52

## 2021-07-09 RX ADMIN — ASPIRIN 81 MG: 81 TABLET, COATED ORAL at 09:01

## 2021-07-09 RX ADMIN — Medication 10 ML: at 22:07

## 2021-07-09 RX ADMIN — HEPARIN SODIUM 5000 UNITS: 5000 INJECTION INTRAVENOUS; SUBCUTANEOUS at 05:59

## 2021-07-09 RX ADMIN — FLUTICASONE PROPIONATE 2 SPRAY: 50 SPRAY, METERED NASAL at 17:55

## 2021-07-09 RX ADMIN — CARVEDILOL 6.25 MG: 6.25 TABLET, FILM COATED ORAL at 09:00

## 2021-07-09 RX ADMIN — HEPARIN SODIUM 5000 UNITS: 5000 INJECTION INTRAVENOUS; SUBCUTANEOUS at 15:04

## 2021-07-09 RX ADMIN — PRAVASTATIN SODIUM 40 MG: 20 TABLET ORAL at 22:07

## 2021-07-09 RX ADMIN — HYDRALAZINE HYDROCHLORIDE 50 MG: 25 TABLET, FILM COATED ORAL at 15:04

## 2021-07-09 RX ADMIN — DOCUSATE SODIUM 50MG AND SENNOSIDES 8.6MG 1 TABLET: 8.6; 5 TABLET, FILM COATED ORAL at 17:52

## 2021-07-09 RX ADMIN — MINOXIDIL 2.5 MG: 2.5 TABLET ORAL at 17:52

## 2021-07-09 RX ADMIN — Medication 10 ML: at 06:08

## 2021-07-09 RX ADMIN — HEPARIN SODIUM 5000 UNITS: 5000 INJECTION INTRAVENOUS; SUBCUTANEOUS at 22:07

## 2021-07-09 RX ADMIN — LEVETIRACETAM 250 MG: 250 TABLET, FILM COATED ORAL at 09:01

## 2021-07-09 RX ADMIN — HYDRALAZINE HYDROCHLORIDE 20 MG: 20 INJECTION INTRAMUSCULAR; INTRAVENOUS at 06:26

## 2021-07-09 RX ADMIN — MONTELUKAST 10 MG: 10 TABLET, FILM COATED ORAL at 17:52

## 2021-07-09 RX ADMIN — DOCUSATE SODIUM 50MG AND SENNOSIDES 8.6MG 1 TABLET: 8.6; 5 TABLET, FILM COATED ORAL at 09:01

## 2021-07-09 RX ADMIN — LEVETIRACETAM 250 MG: 250 TABLET, FILM COATED ORAL at 22:07

## 2021-07-09 NOTE — PROGRESS NOTES
Problem: Mobility Impaired (Adult and Pediatric)  Goal: *Acute Goals and Plan of Care (Insert Text)  Description: FUNCTIONAL STATUS PRIOR TO ADMISSION: Patient was modified independent using a rollator for functional mobility. HOME SUPPORT PRIOR TO ADMISSION: The patient lived alone with family to provide assistance. Physical Therapy Goals  Initiated 7/9/2021  1. Patient will move from supine to sit and sit to supine , scoot up and down, and roll side to side in bed with independence within 7 day(s). 2.  Patient will transfer from bed to chair and chair to bed with modified independence using the least restrictive device within 7 day(s). 3.  Patient will perform sit to stand with modified independence within 7 day(s). 4.  Patient will ambulate with modified independence for 200 feet with the least restrictive device within 7 day(s).         Outcome: Progressing Towards Goal

## 2021-07-09 NOTE — PROGRESS NOTES
1900 Bedside and Verbal shift change report given to MAYANK Alexis (oncoming nurse) by Harini Boogie RN (offgoing nurse). Report included the following information SBAR, Kardex, Intake/Output, MAR, Recent Results and Cardiac Rhythm NSR. This patient was assisted with Intentional Toileting every 2 hours during this shift. Documentation of ambulation and output reflected on Flowsheet. 5330 Bedside and Verbal shift change report given to Mariah Downs RN (oncoming nurse) by Enriqueta Zuluaga RN (offgoing nurse). Report included the following information SBAR, Kardex, Intake/Output, MAR, Recent Results and Cardiac Rhythm NSR.

## 2021-07-09 NOTE — PROGRESS NOTES
Bedside and Verbal shift change report given to Joseph Sibley RN (oncoming nurse) by Thalia Caban (offgoing nurse). Report included the following information SBAR, Kardex, ED Summary, MAR, Recent Results and Cardiac Rhythm SR. This patient was assisted with Intentional Toileting every 2 hours during this shift. Documentation of ambulation and output reflected on Flowsheet. Bedside and Verbal shift change report given to MAYANK Alexis (oncoming nurse) by Joseph Sibley RN (offgoing nurse).  Report included the following information SBAR, Kardex, ED Summary, MAR, Recent Results and Cardiac Rhythm SR.

## 2021-07-09 NOTE — PROGRESS NOTES
Updated son on hospital course.        Mobile   Darius Durant Son  [phone]     Anticipate DC back to SNF tomorrow

## 2021-07-09 NOTE — PROGRESS NOTES
Problem: Self Care Deficits Care Plan (Adult)  Goal: *Acute Goals and Plan of Care (Insert Text)  Description:   FUNCTIONAL STATUS PRIOR TO ADMISSION: Patient was independent and active without use of DME.     HOME SUPPORT: The patient lived alone with son and daughter-in-law living nearby. Occupational Therapy Goals  Initiated 7/9/2021  1. Patient will perform upper body dressing and bathing with modified independence within 7 day(s). 2.  Patient will perform lower body dressing and bathing with supervision/set-up within 7 day(s). 3.  Patient will perform toilet transfers with supervision using rolling walker within 7 day(s). 4.  Patient will perform all aspects of toileting with supervision/set-up within 7 day(s). 5.  Patient will participate in upper extremity therapeutic exercise/activities with modified independence for 10 minutes within 7 day(s). 6.  Patient will utilize energy conservation techniques during functional activities with verbal cues within 7 day(s). 7/9/2021 1447 by Earl Ng OT  Outcome: Progressing Towards Goal  7/9/2021 1437 by Earl Ng OT  Outcome: Progressing Towards Goal   OCCUPATIONAL THERAPY EVALUATION  Patient: Sixto Clifford (39 y.o. female)  Date: 7/9/2021  Primary Diagnosis: Acute hypoxemic respiratory failure (HCC) [J96.01]        Precautions: fall       ASSESSMENT  Based on the objective data described below, the patient presents with decreased activity tolerance, generalized weakness, impaired balance, and decreased endurance following admission on 7/6 for respiratory failure. Patient was received on 3L O2; Attempted to wean to room air however reapplied 2L O2 to maintain SpO2 >90% with activity. Patient performed transfers with up to CGA and cues for improved technique. Patient engaged in ADLs with fair tolerance; Increased assistance needed for LB and standing ADLs.   Patient was educated on adaptive ADL techniques, energy conservation, pacing, and pursed lip breathing techniques. Patient would benefit from continued skilled OT to progress towards goals and improve overall independence. Current Level of Function Impacting Discharge (ADLs/self-care): Patient required CGA for OOB transfers. Patient was independent to setup level for UB ADLs and CGA to min A for LB ADLs. Functional Outcome Measure: The patient scored Total: 50/100 on the Barthel Index outcome measure. Patient will benefit from skilled therapy intervention to address the above noted impairments. PLAN :  Recommendations and Planned Interventions: self care training, functional mobility training, therapeutic exercise, therapeutic activities, endurance activities, patient education, home safety training, and family training/education    Frequency/Duration: Patient will be followed by occupational therapy 3 times a week to address goals. Recommendation for discharge: (in order for the patient to meet his/her long term goals)  Occupational therapy at least 2 days/week in the home     This discharge recommendation:  Has been made in collaboration with the attending provider and/or case management    IF patient discharges home will need the following DME: none       SUBJECTIVE:   Patient stated I feel good sitting up.     OBJECTIVE DATA SUMMARY:   HISTORY:   Past Medical History:   Diagnosis Date    Anxiety     Arthritis     Basal ganglia stroke ([facility] [address] 2019    Left    CKD (chronic kidney disease) stage 3, GFR 30-59 ml/min ([facility])     High cholesterol     Hx of seasonal allergies     Hypertension     Ischemic stroke ([facility] [address]     Right occipital    Monoclonal gammopathy 2018    ABIGAIL on CPAP     Seizures ([facility] [address]     last keppra level- 2019 @ 15   Last seizure 2017    Urinary incontinence      Past Surgical History:   Procedure Laterality Date    HX APPENDECTOMY N/A     HX CATARACT REMOVAL Bilateral 2018    HX  SECTION N/A     x 2    HX HEMORRHOIDECTOMY      HX HYSTERECTOMY      HX KNEE REPLACEMENT Right 2019    HX TONSILLECTOMY      IR INSERT NON TUNL CVC OVER 5 YRS  5/16/2021    IR INSERT TUNL CVC W/O PORT OVER 5 YR  5/18/2021       Expanded or extensive additional review of patient history:     Home Situation  Home Environment: [address] to Enter: No  One/Two Story Residence: Other (Comment)  # of Interior Steps:  (condominium)  Living Alone: Yes  Support Systems: Family member(s)  Patient Expects to be Discharged to[de-identified] Assisted living  Current DME Used/Available at Home: Adaptive dressing aides, Rayetta Saida, rollator, Wheelchair, Walker, [address] chair, Raised toilet seat, Cane, straight, Grab bars  Tub or Shower Type: Shower    Hand dominance: Right    EXAMINATION OF PERFORMANCE DEFICITS:  Cognitive/Behavioral Status:  Neurologic State: Alert  Orientation Level: Oriented X4  Cognition: Appropriate decision making; Appropriate for age attention/concentration; Appropriate safety awareness  Perception: Appears intact  Perseveration: No perseveration noted  Safety/Judgement: Awareness of environment    Skin: Intact in the uppers    Edema: None noted in the uppers    Hearing: Auditory  Auditory Impairment: None    Vision/Perceptual:    Tracking: Able to track stimulus in all quadrants w/o difficulty    Diplopia: No    Acuity: Within Defined Limits       Range of Motion:  AROM: Within functional limits  PROM: Within functional limits    Strength:  Strength: Generally decreased, functional in the uppers     Coordination:  Fine Motor Skills-Upper: Left Intact; Right Intact    Gross Motor Skills-Upper: Left Intact; Right Intact    Tone & Sensation:  Tone: Normal  Sensation: intact     Balance:  Sitting: Intact  Standing: Intact; With support    Functional Mobility and Transfers for ADLs:  Bed Mobility:  Rolling:  (pt was received up and remained up)    Transfers:  Sit to Stand: Contact guard assistance  Stand to Sit: Contact guard assistance  Bed to Chair: Contact guard assistance  Bathroom Mobility: Contact guard assistance  Toilet Transfer : Contact guard assistance    ADL Assessment:  Feeding: Independent    Oral Facial Hygiene/Grooming: Setup (CGA standing at the sink)    Bathing: Minimum assistance    Upper Body Dressing: Setup    Lower Body Dressing: Minimum assistance    Toileting: Contact guard assistance    Cognitive Retraining  Safety/Judgement: Awareness of environment    Functional Measure:  Barthel Index:    Bathin  Bladder: 10  Bowels: 10  Groomin  Dressin  Feeding: 10  Mobility: 0  Stairs: 0  Toilet Use: 5  Transfer (Bed to Chair and Back): 10  Total: 50/100        The Barthel ADL Index: Guidelines  1. The index should be used as a record of what a patient does, not as a record of what a patient could do. 2. The main aim is to establish degree of independence from any help, physical or verbal, however minor and for whatever reason. 3. The need for supervision renders the patient not independent. 4. A patient's performance should be established using the best available evidence. Asking the patient, friends/relatives and nurses are the usual sources, but direct observation and common sense are also important. However direct testing is not needed. 5. Usually the patient's performance over the preceding 24-48 hours is important, but occasionally longer periods will be relevant. 6. Middle categories imply that the patient supplies over 50 per cent of the effort. 7. Use of aids to be independent is allowed. Robert Dailey., Barthel, D.W. (2145). Functional evaluation: the Barthel Index. [address] [facility] (14)2. BRUCE GuevaraJKYLE, Demetrius Chakraborty, Mariel Ascencio., [place], [address] [place] [address] (). Measuring the change indisability after inpatient rehabilitation; comparison of the responsiveness of the Barthel Index and Functional Stow Measure. Journal of Neurology, Neurosurgery, and Psychiatry, 66(4), 290-494.   OXANA Judd, Azul Guevara  [email or web address], & Gracia Chi M.A. (2004.) Assessment of post-stroke quality of life in cost-effectiveness studies: The usefulness of the Barthel Index and the EuroQoL-5D. Quality of Life Research, 15, 595-60         Occupational Therapy Evaluation Charge Determination   History Examination Decision-Making   LOW Complexity : Brief history review  LOW Complexity : 1-3 performance deficits relating to physical, cognitive , or psychosocial skils that result in activity limitations and / or participation restrictions  LOW Complexity : No comorbidities that affect functional and no verbal or physical assistance needed to complete eval tasks       Based on the above components, the patient evaluation is determined to be of the following complexity level: LOW   Activity Tolerance:   Good    After treatment patient left in no apparent distress:    Sitting in chair, Call bell within reach, and Bed / chair alarm activated    COMMUNICATION/EDUCATION:   The patients plan of care was discussed with: Physical therapist, Registered nurse, and patient . Home safety education was provided and the patient/caregiver indicated understanding., Patient/family have participated as able in goal setting and plan of care. , and Patient/family agree to work toward stated goals and plan of care. This patients plan of care is appropriate for delegation to [facility].     Thank you for this referral.  Maddie Ulrich, ZOR/L  Time Calculation: 36 mins

## 2021-07-09 NOTE — PROGRESS NOTES
Problem: Pressure Injury - Risk of  Goal: *Prevention of pressure injury  Description: Document Humble Scale and appropriate interventions in the flowsheet. Outcome: Progressing Towards Goal  Note: Pressure Injury Interventions:  Sensory Interventions: Assess changes in LOC    Moisture Interventions: Absorbent underpads, Assess need for specialty bed, Maintain skin hydration (lotion/cream), Minimize layers, Moisture barrier, Offer toileting Q_hr    Activity Interventions: Increase time out of bed, Pressure redistribution bed/mattress(bed type), PT/OT evaluation    Mobility Interventions: HOB 30 degrees or less, Pressure redistribution bed/mattress (bed type), PT/OT evaluation    Nutrition Interventions: Offer support with meals,snacks and hydration    Friction and Shear Interventions: Minimize layers, HOB 30 degrees or less                Problem: Patient Education: Go to Patient Education Activity  Goal: Patient/Family Education  Outcome: Progressing Towards Goal     Problem: Falls - Risk of  Goal: *Absence of Falls  Description: Document Chava Fall Risk and appropriate interventions in the flowsheet.   Outcome: Progressing Towards Goal  Note: Fall Risk Interventions:  Mobility Interventions: Bed/chair exit alarm, PT Consult for mobility concerns, Utilize walker, cane, or other assistive device, Utilize gait belt for transfers/ambulation         Medication Interventions: Bed/chair exit alarm, Patient to call before getting OOB, Teach patient to arise slowly, Utilize gait belt for transfers/ambulation    Elimination Interventions: Bed/chair exit alarm, Call light in reach, Patient to call for help with toileting needs, Stay With Me (per policy), Toilet paper/wipes in reach, Toileting schedule/hourly rounds              Problem: Patient Education: Go to Patient Education Activity  Goal: Patient/Family Education  Outcome: Progressing Towards Goal

## 2021-07-09 NOTE — PROGRESS NOTES
Miguel Angel Morales [facility] [address]  [address] [facility], [address] [place] [address]  [phone]      Medical Progress Note      NAME: Kiran Cota   :  1940  MRM:  [id number]    Date/Time: 2021        Assessment / Plan:     78 yo F w/ hx of HTN, HFpEF, CVA, seizure d/o, ESRD presenting dyspnea, found to have volume overload and hypoxia     Acute resp failure/hypoxia: due to pulm edema, volume overload, HFpEF. No evidence of pneumonia. Cont supplemental O2, HD/UF for volume control     Acute on chronic diastolic CHF: TTE showed no e/o pericardial effusion. HD/UF for volume mgmt. Control BP     Malignant HTN: BP still high, resumed Norvasc, Coreg, minoxidil. Add hydralazine     Hx of CVA: cont ASA, statin     ESRD: HD TTS per nephrology     Aeizure d/o: cont Keppra     Anemia: likely due to ACD from ESRD. Transfused one unit on . Follow Hg    Constipation: start scheduled Pericolace. Miralax AL, bisacodyl supp PRN       Total time spent: 35 minutes. Time spent in the care of this patient including reviewing records, discussing with nursing and/or other providers on the treatment team, obtaining history and examining the patient, and discussing treatment plans. Care Plan discussed with: Patient, Nursing Staff and >50% of time spent in counseling and coordination of care    Discussed:  Care Plan and D/C Planning    Prophylaxis:  Hep SQ    Disposition:  Home w/Family         Subjective:     Chief Complaint:  Follow up dyspnea    Chart/notes/labs/studies reviewed, patient examined. Feels better today. Less SOB. No CP. No F/C         Objective:       Vitals:        Last 24hrs VS reviewed since prior progress note.  Most recent are:    Visit Vitals  BP (!) 158/52 (BP 1 Location: Left arm, BP Patient Position: At rest)   Pulse 78   Temp 98.3 °F (36.8 °C)   Resp 16   Ht 5' 1.5\" (1.562 m)   Wt 81.6 kg (180 lb)   SpO2 96%   BMI 33.46 kg/m²     SpO2 Readings from Last 6 Encounters: 07/09/21 96%   05/24/21 98%   03/10/21 99%   02/19/21 95%   01/30/20 97%   08/28/19 98%    O2 Flow Rate (L/min): 3 l/min       Intake/Output Summary (Last 24 hours) at 7/9/2021 1638  Last data filed at 7/9/2021 0900  Gross per 24 hour   Intake 300 ml   Output --   Net 300 ml          Exam:     Physical Exam:    Gen:  Elderly, chronically ill-appearing. NAD  HEENT:  Atraumatic, normocephalic. Sclerae nonicteric, hearing intact to voice  Neck:  Supple, without apparent masses. Resp:  No accessory muscle use, increased WOB. Mildly diminished in bases  Card: RRR, without m/r/g. BLE edema improved  Abd:  +bowel sounds, soft, NTTP, nondistended. No HSM  Neuro:  Face symmetric, speech fluent, follows commands appropriately, no focal weakness or numbness  Psych:  Alert, oriented to self and hospital. Fair insight     Medications Reviewed: (see below)    Lab Data Reviewed: (see below)    ______________________________________________________________________    Medications:     Current Facility-Administered Medications   Medication Dose Route Frequency    hydrALAZINE (APRESOLINE) tablet 50 mg  50 mg Oral Q8H    minoxidiL (LONITEN) tablet 2.5 mg  2.5 mg Oral BID    carvediloL (COREG) tablet 6.25 mg  6.25 mg Oral BID WITH MEALS    amLODIPine (NORVASC) tablet 10 mg  10 mg Oral DAILY    epoetin millie-epbx (RETACRIT) injection 10,000 Units  10,000 Units SubCUTAneous Q TUE, THU & SAT    heparin (porcine) 1,000 unit/mL injection 1,900 Units  1,900 Units InterCATHeter DIALYSIS PRN    And    heparin (porcine) 1,000 unit/mL injection 1,900 Units  1,900 Units InterCATHeter DIALYSIS PRN    senna-docusate (PERICOLACE) 8.6-50 mg per tablet 1 Tablet  1 Tablet Oral BID    bisacodyL (DULCOLAX) suppository 10 mg  10 mg Rectal DAILY PRN    hydrALAZINE (APRESOLINE) 20 mg/mL injection 20 mg  20 mg IntraVENous Q6H PRN    sodium chloride (NS) flush 5-40 mL  5-40 mL IntraVENous Q8H    sodium chloride (NS) flush 5-40 mL  5-40 mL IntraVENous PRN    acetaminophen (TYLENOL) tablet 650 mg  650 mg Oral Q6H PRN    Or    acetaminophen (TYLENOL) suppository 650 mg  650 mg Rectal Q6H PRN    polyethylene glycol (MIRALAX) packet 17 g  17 g Oral DAILY PRN    aspirin delayed-release tablet 81 mg  81 mg Oral DAILY    cloNIDine HCL (CATAPRES) tablet 0.2 mg  0.2 mg Oral Q6H PRN    fluticasone propionate (FLONASE) 50 mcg/actuation nasal spray 2 Spray  2 Spray Both Nostrils QPM    montelukast (SINGULAIR) tablet 10 mg  10 mg Oral QPM    pravastatin (PRAVACHOL) tablet 40 mg  40 mg Oral QHS    heparin (porcine) injection 5,000 Units  5,000 Units SubCUTAneous Q8H    levETIRAcetam (KEPPRA) tablet 250 mg  250 mg Oral BID            Lab Review:     Recent Labs     07/09/21 0051 07/08/21 0456 07/07/21  0448   WBC 11.6* 7.6 5.9   HGB 9.0* 8.5* 6.5*   HCT 28.7* 27.3* 21.0*    238 216     Recent Labs     07/09/21 0051 07/08/21 0456 07/07/21  0448    138 136   K 3.7 3.8 4.6    102 101   CO2 28 31 31   GLU 92 86 88   BUN 9 9 12   CREA 2.71* 3.12* 3.79*   CA 8.3* 7.7* 7.5*   MG 2.0 2.2 2.0   PHOS 2.4* 3.3 3.7   ALB 3.0*  --  2.8*   ALT  --   --  21     No components found for: GLPOC  No results for input(s): PH, PCO2, PO2, HCO3, FIO2 in the last 72 hours. No results for input(s): INR, INREXT, INREXT in the last 72 hours. No results found for: SDES  Lab Results   Component Value Date/Time    Culture result: MRSA NOT PRESENT 08/07/2019 03:57 PM    Culture result:  08/07/2019 03:57 PM         Screening of patient nares for MRSA is for surveillance purposes and, if positive, to facilitate isolation considerations in high risk settings. It is not intended for automatic decolonization interventions per se as regimens are not sufficiently effective to warrant routine use.     Culture result: KLEBSIELLA PNEUMONIAE (A) 08/07/2019 03:57 PM              ___________________________________________________    Attending Physician: María Elena Judd MD

## 2021-07-09 NOTE — PROGRESS NOTES
1533:  Pt's son notified she will d/c to [facility] on Saturday. [facility] is able to accept her tomorrow. Please call Kim Singh ([phone]) on Saturday prior to d/c. She is to be d/c'd after HD. Transition of Care Plan - RUR 23% (moderate risk):        1. CM following  2. Patient had been at the [address] of [facility] for rehab prior to admission. They have accepted her to return there at d/c  3. Patient goes to dialysis [facility] Tu/Th/Sat. 4. PT/OT following  5. Cardiology, nephrology and palliative care following  6. On a will call for [facility] stretcher transport for 7/10; packet on chart  7. BPCI-A letter regarding Heart Failure has been delivered to the patient.   MAYANK Paula

## 2021-07-09 NOTE — PROGRESS NOTES
Problem: Mobility Impaired (Adult and Pediatric)  Goal: *Acute Goals and Plan of Care (Insert Text)  Description: FUNCTIONAL STATUS PRIOR TO ADMISSION: Patient was modified independent using a rollator for functional mobility. HOME SUPPORT PRIOR TO ADMISSION: The patient lived alone with family to provide assistance. Physical Therapy Goals  Initiated 7/9/2021  1. Patient will move from supine to sit and sit to supine , scoot up and down, and roll side to side in bed with independence within 7 day(s). 2.  Patient will transfer from bed to chair and chair to bed with modified independence using the least restrictive device within 7 day(s). 3.  Patient will perform sit to stand with modified independence within 7 day(s). 4.  Patient will ambulate with modified independence for 200 feet with the least restrictive device within 7 day(s). 7/9/2021 1330 by July Loving PT  Outcome: Progressing Towards Goal  PHYSICAL THERAPY EVALUATION  Patient: Bg Mcdowell (33 y.o. female)  Date: 7/9/2021  Primary Diagnosis: Acute hypoxemic respiratory failure (HCC) [J96.01]        Precautions: fall       ASSESSMENT  Based on the objective data described below, the patient presents with generalized weakness  and debility. Communicated with nurse cleared for therapy. Patient already up on the chair. Sit to  stand CGA, ambulate with rolling walker CGA, in  the room and around the bed CGA  no loss of balance  steady sitting and standing balance. OOB to chair  as  tolerated  performed some active range of motion on both LE all planes. Educate and instructed patient to continue active range of motion exercise on both legs while up on chair or on bed multiple times. Recommend patient to be up on the chair at least 3 times a day every meal times as tolerated. Communicated with nurse who agreed to monitor patient. Current Level of Function Impacting Discharge (mobility/balance): CGA with rolling  walker for transfers and  ambulation    Functional Outcome Measure: The patient scored 65/100 on the barthel index outcome measure . Other factors to consider for discharge: fall     Patient will benefit from skilled therapy intervention to address the above noted impairments. PLAN :  Recommendations and Planned Interventions: bed mobility training, transfer training, gait training, therapeutic exercises, neuromuscular re-education, orthotic/prosthetic training, patient and family training/education, and therapeutic activities      Frequency/Duration: Patient will be followed by physical therapy:  5 times a week to address goals. Recommendation for discharge: (in order for the patient to meet his/her long term goals)  Physical therapy at least 2 days/week in the home     This discharge recommendation:  Has been made in collaboration with the attending provider and/or case management    IF patient discharges home will need the following DME: patient owns DME required for discharge         SUBJECTIVE:   Patient stated [address]     OBJECTIVE DATA SUMMARY:   HISTORY:    Past Medical History:   Diagnosis Date    Anxiety     Arthritis     Basal ganglia stroke ([facility] [address] 02/20/2019    Left    CKD (chronic kidney disease) stage 3, GFR 30-59 ml/min ([facility])     High cholesterol     Hx of seasonal allergies     Hypertension     Ischemic stroke ([facility] [address]     Right occipital    Monoclonal gammopathy 2018    ABIGAIL on CPAP     Seizures ([facility] [address]     last keppra level- 2019 @ 15   Last seizure 2017    Urinary incontinence      Past Surgical History:   Procedure Laterality Date    HX APPENDECTOMY N/A     HX CATARACT REMOVAL Bilateral 2018    HX  SECTION N/A     x 2    HX HEMORRHOIDECTOMY      HX HYSTERECTOMY      HX KNEE REPLACEMENT Right 2019    HX TONSILLECTOMY      IR INSERT NON TUNL CVC OVER 5 YRS  2021    IR INSERT TUNL CVC W/O PORT OVER 5 YR  2021       Personal factors and/or comorbidities impacting plan of care:     Home Situation  Home Environment: Apartment  Rails to Enter: No  One/Two Story Residence: Other (Comment)  # of Interior Steps:  (condominium)  Living Alone: Yes  Support Systems: Family member(s)  Patient Expects to be Discharged to[de-identified] Assisted living  Current DME Used/Available at Home: Adaptive dressing aides, [employer], rollator, Wheelchair, Walker, Shower chair, Raised toilet seat, Cane, straight, Grab bars    EXAMINATION/PRESENTATION/DECISION MAKING:   Critical Behavior:  Neurologic State: Alert  Orientation Level: Oriented X4  Cognition: Follows commands     Hearing: Auditory  Auditory Impairment: None    Range Of Motion:  AROM: Within functional limits           PROM: Within functional limits           Strength:    Strength: Generally decreased, functional                    Tone & Sensation:                                  Coordination:  Coordination: Within functional limits  Vision:      Functional Mobility:  Bed Mobility:  Rolling:  (already up on the chair)           Transfers:  Sit to Stand: Contact guard assistance  Stand to Sit: Contact guard assistance  Stand Pivot Transfers: Contact guard assistance     Bed to Chair: Contact guard assistance              Balance:   Sitting: Intact  Standing: Intact; With support  Ambulation/Gait Training:  Distance (ft): 25 Feet (ft)  Assistive Device: Walker, rolling;Gait belt  Ambulation - Level of Assistance: Contact guard assistance     Gait Description (WDL): Exceptions to WDL  Gait Abnormalities: Path deviations        Base of Support: Widened     Speed/Italia: Pace decreased (<100 feet/min)  Step Length: Right shortened;Left shortened                            Therapeutic Exercises:   Educate and instructed patient to continue active range of motion exercise on both legs while up on chair or on bed multiple times. Recommend patient to be up on the chair at least 3 times a day every meal times as tolerated. Functional Measure:  Barthel Index:    Bathin  Bladder: 10  Bowels: 10  Groomin  Dressing: 10  Feeding: 10  Mobility: 0  Stairs: 0  Toilet Use: 5  Transfer (Bed to Chair and Back): 10  Total: 65/100       The Barthel ADL Index: Guidelines  1. The index should be used as a record of what a patient does, not as a record of what a patient could do. 2. The main aim is to establish degree of independence from any help, physical or verbal, however minor and for whatever reason. 3. The need for supervision renders the patient not independent. 4. A patient's performance should be established using the best available evidence. Asking the patient, friends/relatives and nurses are the usual sources, but direct observation and common sense are also important. However direct testing is not needed. 5. Usually the patient's performance over the preceding 24-48 hours is important, but occasionally longer periods will be relevant. 6. Middle categories imply that the patient supplies over 50 per cent of the effort. 7. Use of aids to be independent is allowed. Mello Novak., Barthel, D.W. (3958). Functional evaluation: the Barthel Index. [address] [facility] (14)2. MATHEUS Kincaid, Dilma Copeland., Dillon Bowman.[place], [address] ().  Measuring the change indisability after inpatient rehabilitation; comparison of the responsiveness of the Barthel Index and Functional Glenn Measure. Journal of Neurology, Neurosurgery, and Psychiatry, 66(4), 450-512. OXANA Grissom, EDITA Ramirez, & Balta Hussein M.A. (2004.) Assessment of post-stroke quality of life in cost-effectiveness studies: The usefulness of the Barthel Index and the EuroQoL-5D. Quality of Life Research, 15, 863-94           Physical Therapy Evaluation Charge Determination   History Examination Presentation Decision-Making   LOW Complexity : Zero comorbidities / personal factors that will impact the outcome / POC LOW Complexity : 1-2 Standardized tests and measures addressing body structure, function, activity limitation and / or participation in recreation  LOW Complexity : Stable, uncomplicated  Other outcome measures barthel  LOW       Based on the above components, the patient evaluation is determined to be of the following complexity level: LOW     Pain Ratin/10    Activity Tolerance:   Good    After treatment patient left in no apparent distress:   Sitting in chair, Heels elevated for pressure relief, and Call bell within reach    COMMUNICATION/EDUCATION:   The patients plan of care was discussed with: Occupational therapist and Registered nurse. Fall prevention education was provided and the patient/caregiver indicated understanding. Thank you for this referral.  Junito Vergara PT,WCC.    Time Calculation: 28 mins

## 2021-07-10 VITALS
OXYGEN SATURATION: 95 % | HEART RATE: 58 BPM | RESPIRATION RATE: 18 BRPM | HEIGHT: 62 IN | SYSTOLIC BLOOD PRESSURE: 151 MMHG | WEIGHT: 179.2 LBS | TEMPERATURE: 99.5 F | DIASTOLIC BLOOD PRESSURE: 57 MMHG | BODY MASS INDEX: 32.97 KG/M2

## 2021-07-10 LAB
ALBUMIN SERPL-MCNC: 3 G/DL (ref 3.5–5)
ALBUMIN/GLOB SERPL: 0.7 {RATIO} (ref 1.1–2.2)
ALP SERPL-CCNC: 75 U/L (ref 45–117)
ALT SERPL-CCNC: 23 U/L (ref 12–78)
ANION GAP SERPL CALC-SCNC: 6 MMOL/L (ref 5–15)
AST SERPL-CCNC: 29 U/L (ref 15–37)
BASOPHILS # BLD: 0.1 K/UL (ref 0–0.1)
BASOPHILS NFR BLD: 1 % (ref 0–1)
BILIRUB SERPL-MCNC: 1 MG/DL (ref 0.2–1)
BUN SERPL-MCNC: 21 MG/DL (ref 6–20)
BUN/CREAT SERPL: 4 (ref 12–20)
CALCIUM SERPL-MCNC: 8.4 MG/DL (ref 8.5–10.1)
CHLORIDE SERPL-SCNC: 100 MMOL/L (ref 97–108)
CO2 SERPL-SCNC: 30 MMOL/L (ref 21–32)
CREAT SERPL-MCNC: 4.78 MG/DL (ref 0.55–1.02)
DIFFERENTIAL METHOD BLD: ABNORMAL
EOSINOPHIL # BLD: 0.4 K/UL (ref 0–0.4)
EOSINOPHIL NFR BLD: 4 % (ref 0–7)
ERYTHROCYTE [DISTWIDTH] IN BLOOD BY AUTOMATED COUNT: 13.7 % (ref 11.5–14.5)
GLOBULIN SER CALC-MCNC: 4.5 G/DL (ref 2–4)
GLUCOSE SERPL-MCNC: 90 MG/DL (ref 65–100)
HCT VFR BLD AUTO: 28.3 % (ref 35–47)
HGB BLD-MCNC: 9.1 G/DL (ref 11.5–16)
IMM GRANULOCYTES # BLD AUTO: 0.1 K/UL (ref 0–0.04)
IMM GRANULOCYTES NFR BLD AUTO: 1 % (ref 0–0.5)
LYMPHOCYTES # BLD: 2.7 K/UL (ref 0.8–3.5)
LYMPHOCYTES NFR BLD: 28 % (ref 12–49)
MAGNESIUM SERPL-MCNC: 2.3 MG/DL (ref 1.6–2.4)
MCH RBC QN AUTO: 30.4 PG (ref 26–34)
MCHC RBC AUTO-ENTMCNC: 32.2 G/DL (ref 30–36.5)
MCV RBC AUTO: 94.6 FL (ref 80–99)
MONOCYTES # BLD: 0.9 K/UL (ref 0–1)
MONOCYTES NFR BLD: 9 % (ref 5–13)
NEUTS SEG # BLD: 5.6 K/UL (ref 1.8–8)
NEUTS SEG NFR BLD: 57 % (ref 32–75)
NRBC # BLD: 0 K/UL (ref 0–0.01)
NRBC BLD-RTO: 0 PER 100 WBC
PHOSPHATE SERPL-MCNC: 2.7 MG/DL (ref 2.6–4.7)
PLATELET # BLD AUTO: 253 K/UL (ref 150–400)
PMV BLD AUTO: 9.3 FL (ref 8.9–12.9)
POTASSIUM SERPL-SCNC: 3.7 MMOL/L (ref 3.5–5.1)
PROT SERPL-MCNC: 7.5 G/DL (ref 6.4–8.2)
RBC # BLD AUTO: 2.99 M/UL (ref 3.8–5.2)
SODIUM SERPL-SCNC: 136 MMOL/L (ref 136–145)
WBC # BLD AUTO: 9.7 K/UL (ref 3.6–11)

## 2021-07-10 PROCEDURE — 36415 COLL VENOUS BLD VENIPUNCTURE: CPT

## 2021-07-10 PROCEDURE — 74011250637 HC RX REV CODE- 250/637: Performed by: INTERNAL MEDICINE

## 2021-07-10 PROCEDURE — 74011250636 HC RX REV CODE- 250/636: Performed by: INTERNAL MEDICINE

## 2021-07-10 PROCEDURE — 77010033678 HC OXYGEN DAILY

## 2021-07-10 PROCEDURE — 83735 ASSAY OF MAGNESIUM: CPT

## 2021-07-10 PROCEDURE — 84100 ASSAY OF PHOSPHORUS: CPT

## 2021-07-10 PROCEDURE — 94760 N-INVAS EAR/PLS OXIMETRY 1: CPT

## 2021-07-10 PROCEDURE — 90935 HEMODIALYSIS ONE EVALUATION: CPT

## 2021-07-10 PROCEDURE — 85025 COMPLETE CBC W/AUTO DIFF WBC: CPT

## 2021-07-10 PROCEDURE — 94660 CPAP INITIATION&MGMT: CPT

## 2021-07-10 PROCEDURE — 80053 COMPREHEN METABOLIC PANEL: CPT

## 2021-07-10 RX ADMIN — HEPARIN SODIUM 5000 UNITS: 5000 INJECTION INTRAVENOUS; SUBCUTANEOUS at 05:12

## 2021-07-10 RX ADMIN — HYDRALAZINE HYDROCHLORIDE 50 MG: 25 TABLET, FILM COATED ORAL at 15:11

## 2021-07-10 RX ADMIN — MINOXIDIL 2.5 MG: 2.5 TABLET ORAL at 13:00

## 2021-07-10 RX ADMIN — HYDRALAZINE HYDROCHLORIDE 50 MG: 25 TABLET, FILM COATED ORAL at 05:13

## 2021-07-10 RX ADMIN — DOCUSATE SODIUM 50MG AND SENNOSIDES 8.6MG 1 TABLET: 8.6; 5 TABLET, FILM COATED ORAL at 12:59

## 2021-07-10 RX ADMIN — HEPARIN SODIUM 5000 UNITS: 5000 INJECTION INTRAVENOUS; SUBCUTANEOUS at 13:04

## 2021-07-10 RX ADMIN — AMLODIPINE BESYLATE 10 MG: 5 TABLET ORAL at 13:00

## 2021-07-10 RX ADMIN — Medication 10 ML: at 05:13

## 2021-07-10 RX ADMIN — CARVEDILOL 6.25 MG: 6.25 TABLET, FILM COATED ORAL at 13:04

## 2021-07-10 RX ADMIN — Medication 10 ML: at 13:00

## 2021-07-10 RX ADMIN — HEPARIN SODIUM 1900 UNITS: 1000 INJECTION INTRAVENOUS; SUBCUTANEOUS at 09:27

## 2021-07-10 RX ADMIN — HEPARIN SODIUM 1900 UNITS: 1000 INJECTION INTRAVENOUS; SUBCUTANEOUS at 09:28

## 2021-07-10 RX ADMIN — LEVETIRACETAM 250 MG: 250 TABLET, FILM COATED ORAL at 12:59

## 2021-07-10 RX ADMIN — EPOETIN ALFA-EPBX 10000 UNITS: 10000 INJECTION, SOLUTION INTRAVENOUS; SUBCUTANEOUS at 09:26

## 2021-07-10 RX ADMIN — ASPIRIN 81 MG: 81 TABLET, COATED ORAL at 13:00

## 2021-07-10 NOTE — DISCHARGE SUMMARY
Physician Discharge Summary     Patient ID:  Ashley Roy  [id number]  61 y.o.  1940    Admit date: 7/6/2021    Discharge date: 7/10/2021    Admission Diagnoses: Acute hypoxemic respiratory failure ([facility] [address] [J96.01]    [facility]    OTHER PROBLEMS ADDRESSEDS  Principal Diagnosis <principal problem not specified>                                            Active Problems:    ABIGAIL (obstructive sleep apnea) (12/10/2014)      Acute respiratory failure with hypoxia (HCC) (3/50/0309)      Diastolic CHF, acute on chronic (HCC) (5/14/2021)      Volume overload (5/14/2021)      ESRD on hemodialysis ([facility] [address] (5/24/2021)      Anemia due to chronic kidney disease (5/24/2021)      HTN (hypertension), benign (5/24/2021)      Acute hypoxemic respiratory failure ([address] (7/6/2021)       Patient Active Problem List   Diagnosis Code    ABIGAIL (obstructive sleep apnea) G47.33    Paresthesia R20.2    Nonintractable epilepsy without status epilepticus ([facility] [address] G40.909    ICH (intracerebral hemorrhage) ([address] I61.9    Primary osteoarthritis of right knee M17.11    Acute respiratory failure with hypoxia (HCC) X19.10    Diastolic CHF, acute on chronic (HCC) I50.33    Volume overload E87.70    SHARON (acute kidney injury) ([address] N17.9    Elevated troponin R77.8    ESRD on hemodialysis ([facility] [address] N18.6, Z99.2    Anemia due to chronic kidney disease N18.9, D63.1    HTN (hypertension), benign I10    Acute hypoxemic respiratory failure ([address] J96.01         Hospital Course:   Ms. Thalia Peguero is a 80 yo F w/ hx of HTN, HFpEF, CVA, seizure d/o, ESRD presenting dyspnea, found to have volume overload and hypoxia     Acute resp failure with hypoxia: due to pulm edema, volume overload, HFpEF.  No evidence of pneumonia.  Cont supplemental O2 and wean as possible, HD/UF for volume control. Stable for discharge     Acute on chronic diastolic CHF: TTE showed no e/o pericardial effusion. HD/UF for volume mgmt.  Control BP     Malignant HTN: BP improved, cont Norvasc, Coreg, minoxidil, hydralazine. Cont to titrate antihypertensives outpatient     Hx of CVA: cont ASA, statin     ESRD: HD TTS per nephrology     Aeizure d/o: cont Keppra     Anemia: likely due to ACD from ESRD. Transfused one unit on 7/7. Follow Hg     Constipation: treated w/ bowel regimen    Pt discharged in improved and stable condition. Procedures performed: see above    Imaging studies: see above  XR CHEST PORT    Result Date: 7/6/2021  1. Increased size of cardiac silhouette raises the possibility of pericardial effusion 2. There is mild pulmonary edema and pulmonary vascular congestion 3. There are small bilateral pleural effusions present          PCP: Radha Anderson MD    Consults: Cardiology, Nephrology and Palliative Care    Discharge Exam:  Visit Vitals  BP (!) 151/57 (BP 1 Location: Right upper arm, BP Patient Position: At rest)   Pulse (!) 58   Temp 99.5 °F (37.5 °C)   Resp 18   Ht 5' 1.5\" (1.562 m)   Wt 81.3 kg (179 lb 3.2 oz)   SpO2 95%   BMI 33.31 kg/m²     GEN: NAD  CV: RRR  RESP: CTAB    Disposition: SNF    Patient Instructions:   Current Discharge Medication List      CONTINUE these medications which have NOT CHANGED    Details   levETIRAcetam (Keppra) 250 mg tablet Take 250 mg by mouth two (2) times a day. ferrous sulfate 325 mg (65 mg iron) tablet Take 325 mg by mouth two (2) times a day. hydrALAZINE (APRESOLINE) 100 mg tablet Take 100 mg by mouth three (3) times daily as needed (high BP). calcium acetate,phosphat bind, (PHOSLO) 667 mg cap Take 1 Capsule by mouth three (3) times daily (with meals). cloNIDine HCL (CATAPRES) 0.2 mg tablet Take 1 Tablet by mouth every six (6) hours as needed for Other (sBP >150) for up to 30 doses. Indications: high blood pressure  Qty: 30 Tablet, Refills: 0      minoxidiL (LONITEN) 2.5 mg tablet Take 2.5 mg by mouth two (2) times a day.       omega-3 acid ethyl esters (LOVAZA) 1 gram capsule Take 1 g by mouth two (2) times a day. aspirin delayed-release 81 mg tablet Take 81 mg by mouth daily. amLODIPine (NORVASC) 10 mg tablet Take 10 mg by mouth every evening.      montelukast (Singulair) 10 mg tablet Take 10 mg by mouth every evening. fluticasone propionate (Flonase Allergy Relief) 50 mcg/actuation nasal spray 2 Sprays by Both Nostrils route every evening. acetaminophen (TylenoL) 325 mg tablet Take 650 mg by mouth every six (6) hours as needed for Pain.      polyvinyl alcohol-povidon,PF, (REFRESH CLASSIC) 1.4-0.6 % ophthalmic solution Administer 1-2 Drops to both eyes as needed. carvedilol (COREG) 6.25 mg tablet Take 1 Tab by mouth two (2) times daily (with meals). Qty: 60 Tab, Refills: 0      pravastatin (PRAVACHOL) 40 mg tablet Take 40 mg by mouth nightly. STOP taking these medications       sevelamer carbonate (RENVELA) 800 mg tab tab Comments:   Reason for Stopping:         bumetanide (BUMEX) 2 mg tablet Comments:   Reason for Stopping:         morphine (ROXANOL) 100 mg/5 mL (20 mg/mL) concentrated solution Comments:   Reason for Stopping:         sodium bicarbonate 650 mg tablet Comments:   Reason for Stopping:               Activity: See discharge instructions  Diet: See discharge instructions  Wound Care: See discharge instructions    Follow-up Information     Follow up With Specialties Details [facility] [address]  [address] [facility] Abelino Smith, [address]  [phone]            I spent 35 minutes on this discharge. Signed:  Rosanne Rubio MD  7/10/2021  11:43 AM    CC: PCP Dr. Ean Beavers. [facility]   .

## 2021-07-10 NOTE — PROGRESS NOTES
Bedside and Verbal shift change report given to Tessy Dennis RN (oncoming nurse) by Katherine Wheatley (offgoing nurse). Report included the following information SBAR, Kardex, Intake/Output, MAR, Accordion and Recent Results.

## 2021-07-10 NOTE — DISCHARGE INSTRUCTIONS
HOSPITALIST DISCHARGE INSTRUCTIONS  NAME: Kiran Cota   :  1940   MRN:  [id number]     Date/Time:  7/10/2021 11:42 AM    ADMIT DATE: 2021     DISCHARGE DATE: 7/10/2021     PRINCIPAL DISCHARGE DIAGNOSES:  Volume overload    MEDICATIONS:  · It is important that you take the medication exactly as they are prescribed. Note the changes and additions to your medications. Be sure you understand these changes before you are discharged today. · Keep your medication in the bottles provided by the pharmacist and keep a list of the medication names, dosages, and times to be taken in your wallet. · Do not take other medications without consulting your doctor. Pain Management: per above medications    What to do at Home    Recommended diet:  Low salt, heart-heathy diet    Recommended activity: PT/OT Eval and Treat    If you experience any of the following symptoms then please call your primary care physician or return to the emergency room if you cannot get hold of your doctor:  severe chest pain, shortness of breath, dizziness, palpitations, weakness, confusion, or other severe concerning symptoms    Follow Up: Follow-up Information     Follow up With Specialties Details [facility]. [address]  [address] Ric Schirmer, [address]  [phone]              Information obtained by :  I understand that if any problems occur once I am at home I am to contact my physician. I understand and acknowledge receipt of the instructions indicated above.                                                                                                                                            Physician's or R.N.'s Signature                                                                  Date/Time Patient or Representative Signature                                                          Date/Time

## 2021-07-10 NOTE — PROGRESS NOTES
Bedside and Verbal shift change report given to April Lay RN (oncoming nurse) by Jem Mac RN (offgoing nurse). Report included the following information SBAR, ED Summary, Procedure Summary, Intake/Output, MAR, Recent Results, Med Rec Status and Cardiac Rhythm NSR .    0550 TRANSFER - OUT REPORT:    Verbal report given to Ty Anderson RN (name) on Abiodun Chavez  being transferred to 5th Floor (unit) for routine progression of care       Report consisted of patients Situation, Background, Assessment and   Recommendations(SBAR). Information from the following report(s) SBAR, Kardex, Intake/Output, MAR, Recent Results, Med Rec Status and Cardiac Rhythm NSR, BBB was reviewed with the receiving nurse. Lines:   Peripheral IV 07/10/21 Anterior;Right Forearm (Active)   Site Assessment Clean, dry, & intact 07/10/21 0343   Phlebitis Assessment 0 07/10/21 0343   Infiltration Assessment 0 07/10/21 0343   Dressing Status Clean, dry, & intact 07/10/21 0343   Dressing Type Transparent;Tape 07/10/21 0343   Hub Color/Line Status Blue;Flushed;Capped 07/10/21 0343   Action Taken Open ports on tubing capped 07/10/21 0343   Alcohol Cap Used Yes 07/10/21 0343        Opportunity for questions and clarification was provided.       Patient transported with:   Monitor  O2 @ 2 liters  Tech

## 2021-07-10 NOTE — PROGRESS NOTES
Pt discharged per MD order, being transferred to [address] [facility] by [facility]. Pt in no apparent distress at time of discharge and with no complaints. [facility] staff Melisa Hoffman was given full pt report prior to transfer. Verbal/written post procedure instructions were given to patient/caregiver/Care for catheter as per unit/ICU protocols

## 2021-07-10 NOTE — PROCEDURES
[facility]  [phone]    Vitals   Pre   Post   Assessment   Pre   Post     Temp  99.2  98.5 LOC  A & O X 4 A & O X 4   HR   89 73 Lungs   Clear bilateral anterior; 2L NC  clear bilateral anterior; 2L NC   B/P   154/53 155/55 Cardiac   regular  regular   Resp   20 20 Skin   warm  warm   Pain level  0/10 0/10 Edema    None    None    Orders:    Duration:   Start:    0828 End:    1145 Total:   3 hours    Dialyzer:   Revaclear   K Bath:   3   Ca Bath:   2.5   Na/Bicarb:   140/35   Target Fluid Removal:   3000 ml   Access     Type & Location:   Right cvc; patent; dressing dated 7-6-21 dry and intact; biopatch in place; no s/s of infection noted; post treatment both ports clamped and new caps applied   Labs     Obtained/Reviewed   Critical Results Called   Date when labs were drawn-  Hgb-    HGB   Date Value Ref Range Status   07/10/2021 9.1 (L) 11.5 - 16.0 g/dL Final     K-    Potassium   Date Value Ref Range Status   07/10/2021 3.7 3.5 - 5.1 mmol/L Final     Ca-   Calcium   Date Value Ref Range Status   07/10/2021 8.4 (L) 8.5 - 10.1 MG/DL Final     Bun-   BUN   Date Value Ref Range Status   07/10/2021 21 (H) 6 - 20 MG/DL Final     Creat-   Creatinine   Date Value Ref Range Status   07/10/2021 4.78 (H) 0.55 - 1.02 MG/DL Final     Comment:     INVESTIGATED PER DELTA CHECK PROTOCOL        Medications/ Blood Products Given     Name   Dose   Route and Time     Retacrit  37445 units  Subcutaneous , 0926   Heparin 1900 units Dwell; venous port 1150   Heparn 1900 units  Dwell; arterial port; 1150   Blood Volume Processed (BVP):    65.2 Net Fluid   Removed:  3000 ml    Comments   Time Out Done: 5634  Primary Nurse Rpt Pre: Ryan Garcia RN  Primary Nurse Rpt Post: Ryan Garcia RN  Pt Education: dialysis procedural  Care Plan: per nephrologist   Tx Summary: Treatment prolonged 15 mins due to machine low conductivity issue which was fixed without any other issues noted; patient tolerated treatment well   Admiting Diagnosis: Acute hypoxemic respiratory failure  Pt's previous clinic- [address]  Consent signed - yes  Orin Consent -   Hepatitis Status- HepBsAG wally as of 6-17-21  Machine #- Machine Number: [id number] (07/10/21 3580)  Telemetry status- yes  Pre-dialysis wt.- 81.3 kgs

## 2021-07-10 NOTE — PROGRESS NOTES
Transition of Care Plan to SNF/Rehab    SNF/Rehab Transition:  Patient has been accepted to [facility] of [address] [place] [address] and meets criteria for admission. Patient will transported by [facility] and expected to leave at 3:30pm. PCS attached in AllScripts. Packet on chart. Communication to Patient/Family:  Met with patient and Alejo Liu (son) and they are agreeable to the transition plan. Communication to SNF/Rehab:  Bedside RN, Renetta Story, has been notified to update the transition plan to the facility and call report (phone number [phone]). Discharge information has been updated on the AVS.     Discharge instructions to be fax'd to facility at [facility] # [phone]). [x] BCPI-A  Patient has been identified as part of the AMI BCPI-A Program.  For Care Coordination associated with that Bundle Program, please contact [phone]. Bundle information has been communication to Betty Perry at [address] [place] [address]. Nursing Please include all hard scripts for controlled substances, med rec and dc summary, and AVS in packet.      Reviewed and confirmed with facility, Betty Perry, can manage the patient care needs for the following:     Nurse please discuss applicable information when calling report:    Medication:  []  Medications will be available at the facility  []  IV Antibiotics  []  Controlled Substance - hard copy to be sent with patient   []  Weekly Labs   Documents:  [] Hard RX  [] MAR  [] Kardex  [] AVS  []Transfer Summary  []Discharge   Equipment:  []  CPAP/BiPAP  []  Wound Vacuum  []  Marques or Urinary Device  []  PICC/Central Line  []  Nebulizer  []  Ventilator   Treatment:  []Isolation (for MRSA, VRE, etc.)  []Surgical Drain Management  []Tracheostomy Care  []Dressing Changes  []Dialysis with transportation and chair time  []PEG Care  []Oxygen  []Daily Weights for Heart Failure   Dietary:  []Any diet limitations  []Tube Feedings   []Total Parenteral Management (TPN)   Eligible for Medicaid Long Term Services and Supports  Yes:  [] Eligible for medical assistance or will become eligible within 180 days and UAI completed. [] Provider/Patient and/or support system has requested screening. [] UAI copy provided to patient or responsible party,   [] UAI unavailable at discharge will send once processed to SNF provider. [] UAI unavailable at discharged mailed to patient  No:   [x] Private pay and is not financially eligible for Medicaid within the next 180 days. [] Reside out-of-state. [] A residents of a state owned/operated facility that is licensed  by [facility] and [facility] or [facility]  [] Enrollment in [facility] hospice services  [] [address]  [x] Patient /Family declines to have screening completed or provide financial information for screening. Pt will return home after SNF rehab. Financial Resources:  Medicaid    [] Initiated and application pending   [] Full coverage     Advanced Care Plan:  []Surrogate Decision Maker of Care  []POA  []Communicated Code Status (DDNR\", \"Full\")    Other       Medicare pt has received, reviewed, and signed 2nd IM letter informing them of their right to appeal the discharge. Signed copied has been placed on pt bedside chart.    _____________________________________  YOSELYN Mcclain - Care Management  7/10/2021   3:46 PM    Care Management Interventions  PCP Verified by CM:  Yes Lena Mejias)  Mode of Transport at Discharge: BLS  Transition of Care Consult (CM Consult): SNF  Partner SNF: Yes  Current Support Network: [address] [place] [address] [address]  Confirm Follow Up Transport: Family  The Plan for Transition of Care is Related to the Following Treatment Goals : acute hypoxemic respiratory failure  Discharge Location  Discharge Placement: Skilled nursing facility

## 2021-07-10 NOTE — PROGRESS NOTES
Full and detailed pt report given to Tara, receiving RN at [id number] [place] [address].

## 2021-07-12 ENCOUNTER — PATIENT OUTREACH (OUTPATIENT)
Dept: CASE MANAGEMENT | Age: 81
End: 2021-07-12

## 2021-07-12 NOTE — PROGRESS NOTES
Transition of care outreach postponed for 14 days due to patient's discharge to SNF.   BUNDLE readmit 7/6-7/10/21 d/c [address]  F/u 14d

## 2021-07-15 ENCOUNTER — PATIENT OUTREACH (OUTPATIENT)
Dept: CASE MANAGEMENT | Age: 81
End: 2021-07-15

## 2021-07-22 ENCOUNTER — PATIENT OUTREACH (OUTPATIENT)
Dept: CASE MANAGEMENT | Age: 81
End: 2021-07-22

## 2021-07-27 ENCOUNTER — PATIENT OUTREACH (OUTPATIENT)
Dept: CASE MANAGEMENT | Age: 81
End: 2021-07-27

## 2021-07-27 NOTE — PROGRESS NOTES
Transition of care outreach postponed for 14 days due to patient's discharge to SNF.   [facility]

## 2021-07-27 NOTE — PROGRESS NOTES
Post Acute Facility Update     *The information contained in this note was received during a weekly care coordination call with the post acute facility*    Current Facility: [facility] (SNF)  Anticipated Discharge Date: 7/31/2021    Facility Nursing Update:no current updates  Facility Social Work Update:goal is to discharge home with son  Bundle Program Status: Patient is currently enrolled in the [facility] AMI Bundle, [facility] Arrhythmia Bundle. Bundle start date: 5/24/3032 Bundle end date: 8/24/2021    Reviewed goals for LOS with facility: Yes     Cardiac Bundle recommendations reviewed with SNF team to include: daily weights: 174.2, specialty diet: Heart healthy and AMI medications: Hydralazine 25 mg q8h, norvasc 10 mg q day, careg 6.25 mg BID   Upper Extremity Assistance: Independent  Lower Extremity Assistance: Minimal Assistance   Bed Mobility: Stand by Assist - Presence and Cueing  Transfers: Independent  Ambulation: Stand by Assist - Presence and Cueing  How far (in feet) is the patient ambulating?  150   Device Used: rollator  Barriers to Discharge: WASHINGTON SPENCERN, RN  [employer]

## 2021-07-27 NOTE — PROGRESS NOTES
Post Acute Facility Update     *The information contained in this note was received during a weekly care coordination call with the post acute facility*    Current Facility: [facility] ([facility])  Anticipated Discharge Date: 7/31/2021    Facility Nursing Update: no current updates  Facility Social Work Update:no current updates  Upper Extremity Assistance: Stand by Assist - Presence and Cueing  Lower Extremity Assistance: Stand by Assist - Presence and Cueing  Bed Mobility: Contact Guard Assist - hands on patient for balance   Transfers: Stand by Assist - Presence and Cueing  Ambulation: Stand by Assist - Presence and Cueing  How far (in feet) is the patient ambulating?  175  Device Used: rollator  Barriers to Discharge: WASHINGTON SPENCERN, RN  [facility]

## 2021-07-29 ENCOUNTER — HOME HEALTH ADMISSION (OUTPATIENT)
Dept: HOME HEALTH SERVICES | Facility: HOME HEALTH | Age: 81
End: 2021-07-29
Payer: MEDICARE

## 2021-07-29 ENCOUNTER — PATIENT OUTREACH (OUTPATIENT)
Dept: CASE MANAGEMENT | Age: 81
End: 2021-07-29

## 2021-08-01 ENCOUNTER — HOME CARE VISIT (OUTPATIENT)
Dept: SCHEDULING | Facility: HOME HEALTH | Age: 81
End: 2021-08-01
Payer: MEDICARE

## 2021-08-01 VITALS
HEART RATE: 68 BPM | RESPIRATION RATE: 20 BRPM | SYSTOLIC BLOOD PRESSURE: 154 MMHG | DIASTOLIC BLOOD PRESSURE: 60 MMHG | TEMPERATURE: 98.2 F | OXYGEN SATURATION: 96 %

## 2021-08-01 PROCEDURE — 3331090001 HH PPS REVENUE CREDIT

## 2021-08-01 PROCEDURE — G0299 HHS/HOSPICE OF RN EA 15 MIN: HCPCS

## 2021-08-01 PROCEDURE — 3331090002 HH PPS REVENUE DEBIT

## 2021-08-01 PROCEDURE — 400013 HH SOC

## 2021-08-01 PROCEDURE — 400018 HH-NO PAY CLAIM PROCEDURE

## 2021-08-02 ENCOUNTER — HOME CARE VISIT (OUTPATIENT)
Dept: SCHEDULING | Facility: HOME HEALTH | Age: 81
End: 2021-08-02
Payer: MEDICARE

## 2021-08-02 VITALS
OXYGEN SATURATION: 96 % | HEART RATE: 60 BPM | TEMPERATURE: 97.6 F | SYSTOLIC BLOOD PRESSURE: 146 MMHG | RESPIRATION RATE: 12 BRPM | DIASTOLIC BLOOD PRESSURE: 60 MMHG

## 2021-08-02 PROCEDURE — 3331090002 HH PPS REVENUE DEBIT

## 2021-08-02 PROCEDURE — G0151 HHCP-SERV OF PT,EA 15 MIN: HCPCS

## 2021-08-02 PROCEDURE — 3331090001 HH PPS REVENUE CREDIT

## 2021-08-03 PROCEDURE — 3331090001 HH PPS REVENUE CREDIT

## 2021-08-03 PROCEDURE — 3331090002 HH PPS REVENUE DEBIT

## 2021-08-04 ENCOUNTER — HOME CARE VISIT (OUTPATIENT)
Dept: SCHEDULING | Facility: HOME HEALTH | Age: 81
End: 2021-08-04
Payer: MEDICARE

## 2021-08-04 ENCOUNTER — HOME CARE VISIT (OUTPATIENT)
Dept: HOME HEALTH SERVICES | Facility: HOME HEALTH | Age: 81
End: 2021-08-04
Payer: MEDICARE

## 2021-08-04 VITALS
DIASTOLIC BLOOD PRESSURE: 62 MMHG | SYSTOLIC BLOOD PRESSURE: 135 MMHG | HEART RATE: 74 BPM | TEMPERATURE: 97.3 F | OXYGEN SATURATION: 96 %

## 2021-08-04 PROCEDURE — G0151 HHCP-SERV OF PT,EA 15 MIN: HCPCS

## 2021-08-04 PROCEDURE — G0152 HHCP-SERV OF OT,EA 15 MIN: HCPCS

## 2021-08-04 PROCEDURE — 3331090001 HH PPS REVENUE CREDIT

## 2021-08-04 PROCEDURE — 3331090002 HH PPS REVENUE DEBIT

## 2021-08-05 ENCOUNTER — PATIENT OUTREACH (OUTPATIENT)
Dept: CASE MANAGEMENT | Age: 81
End: 2021-08-05

## 2021-08-05 VITALS
OXYGEN SATURATION: 96 % | TEMPERATURE: 97.2 F | SYSTOLIC BLOOD PRESSURE: 148 MMHG | DIASTOLIC BLOOD PRESSURE: 58 MMHG | HEART RATE: 77 BPM

## 2021-08-05 PROCEDURE — 3331090002 HH PPS REVENUE DEBIT

## 2021-08-05 PROCEDURE — 3331090001 HH PPS REVENUE CREDIT

## 2021-08-05 NOTE — PROGRESS NOTES
Post Acute Facility Update     *The information contained in this note was received during a weekly care coordination call with the post acute facility*    Current Facility: [facility] (SNF)  Anticipated Discharge Date: 7/31/2021    Facility Nursing Update:   Facility Social Work Update:  goal to leave Saturday, will have dialysis on Sat moring and then home from dialysis  Bundle Program Status: Patient is currently enrolled in the [facility] AMI Bundle, or [facility] Arrhythmia Bundle. Bundle start date: 5/24/2021  Bundle end date: 8/22/2021    Reviewed goals for LOS with facility: Yes     Cardiac Bundle recommendations reviewed with SNF team to include: heart healthy diet  Upper Extremity Assistance: Stand by Assist - Presence and Cueing  Lower Extremity Assistance: Contact Guard Assist - hands on patient for balance   Bed Mobility: Independent  Transfers: Independent  Ambulation: Independent  How far (in feet) is the patient ambulating?  200 ft  Device Used: walker  Barriers to Discharge: WASHINGTON SPENCERN, RN  [facility]

## 2021-08-06 ENCOUNTER — PATIENT OUTREACH (OUTPATIENT)
Dept: CASE MANAGEMENT | Age: 81
End: 2021-08-06

## 2021-08-06 PROCEDURE — 3331090002 HH PPS REVENUE DEBIT

## 2021-08-06 PROCEDURE — 3331090001 HH PPS REVENUE CREDIT

## 2021-08-06 NOTE — PROGRESS NOTES
[address] [place] [address] Dr Discharge Follow-Up      Date/Time:  2021 2:23 PM    Patient was admitted to [facility] on 21 and discharged to [place] [address] on 7/10/21. The patients discharge diagnosis was respiratory fx. . Patient was discharge on 21 from [facility]. The discharge summary from the post acute facilty was not available at the time of outreach. Patient was contacted within 5 business days of discharge from the post acute facility. N Care Coordinator contacted the family son Berna Muhammad by telephone to perform post hospital discharge follow up. Verified name and  with family as identifiers. Provided introduction to self, and explanation of the LPN Care Coordinator role. Family received post acute facility discharge instructions. LPN Care Coordinator reviewed discharge instructions and red flags with family who verbalized understanding. Family given an opportunity to ask questions and does not have any further questions or concerns at this time. The family agrees to contact the PCP office for questions related to their healthcare. N Care Coordinator provided contact information for future reference. Advance Care Planning:   Does patient have an Advance Directive:  patient declined education     Home Health orders at discharge: PT, OT, [address]: LUIS CARLOS MICHAEL [facility]  Date of initial visit: 21    Durable Medical Equipment ordered at discharge: none  [address] received: patient has her own equipment shower chair, grab bars for shower, rollator and W/C and cane    Medication(s):   The post acute facility medication discharge list was not available for this call. hospital Medication review was performed with family, who verbalizes understanding of administration of home medications. There were no barriers to obtaining medications identified at this time.     [facility] follow up appointment(s):   Future Appointments   Date Time Provider Brianna Acuna   8/9/2021  5:00 AM [facility]   8/9/2021 To Be Determined Deidre Hawk LPN [facility]   8/12/2021 To Be Determined Mikey Mai RN [address] [facility]   8/13/2021  4:00 AM Marilee Briscoe PT [place] [address] [facility]   8/16/2021 To Be Determined Xiao Pollen [address]   8/16/2021 To Be Determined Deidre Hawk LPN [address] [facility]   8/18/2021 To Be Determined Xiao Pollen [address]   8/19/2021 To Be Determined Deidre Hawk LPN [address] [facility]   8/23/2021 To Be Determined Xiao [facility]   8/25/2021 To Be Determined Xiao Pollen [address] [facility]   8/25/2021 To Be Determined Deidre Hawk LPN [address] [facility]   9/1/2021 To Be Determined Mikey Mai RN [address] [facility]   9/8/2021 10:40 AM Ant Boles  S [facility] AMB   9/8/2021 To Be Determined Deidre Hawk LPN [address] [facility]   9/15/2021 To Be Determined Deidre Hawk LPN [address] [facility]   9/22/2021 To Be Determined Deidre Hawk LPN [address] [facility]   9/29/2021 To Be Determined Mikey Mai RN [facility]      Non-[facility] follow up appointment(s): seen by PCP today 8/6/21  [facility]:  information provided as a resource

## 2021-08-06 NOTE — PROGRESS NOTES
[address] [place] [address] Dr Discharge Note    *The information contained in this note was received during a weekly care coordination call with the post acute facility*      Patient was discharged from [address] [place] [address] ([facility]) on 7/31/2021. PCP: MD PIERCE Murphy appointment: family to schedule  Future Appointments   Date Time Provider Brianna Kalpana   8/9/2021  5:00 AM Carissa Ferreira PT [facility]   8/9/2021 To Be Determined Tamsen Messing, LPN [address]   8/12/2021 To Be Determined Gen Chávez RN [address]   8/13/2021  4:00 AM Carissa Ferreira PT [facility] [place] [address]   8/16/2021 To Be Determined Mariluz Saint [address]   8/16/2021 To Be Determined Tamsen Messing, LPN [address]   8/18/2021 To Be Determined Mariluz Saint [address]   8/19/2021 To Be Determined Tamsen Messing, LPN [address]   8/23/2021 To Be Determined Mariluz Saint [address]   8/25/2021 To Be Determined Mariluz Saint [address]   8/25/2021 To Be Determined Tamsen Messing, LPN [address]   9/1/2021 To Be Determined Gen Chávez RN [facility]   9/8/2021 10:40 AM Shyam Dave  [address] BS AMB   9/8/2021 To Be Determined Tamsen Messing, LPN [address]   9/15/2021 To Be Determined Tamsen Messing, LPN [address]   9/22/2021 To Be Determined Tamsen Messing, LPN [address]   9/29/2021 To Be Determined Gen Chávez RN [address]       Home Health/Outpatient orders at discharge: home health care  [address]: [address] ordered at discharge: none  [address] received prior to discharge: Manuel Hodges Coordinator managing patient: [facility] Team will sign off at this time.    Medications were not reconciled and general patient assessment was not completed during this skilled nursing facility outreach.      Tai SPENCERN, RN  [employer]

## 2021-08-07 PROCEDURE — 3331090002 HH PPS REVENUE DEBIT

## 2021-08-07 PROCEDURE — 3331090001 HH PPS REVENUE CREDIT

## 2021-08-08 PROCEDURE — 3331090001 HH PPS REVENUE CREDIT

## 2021-08-08 PROCEDURE — 3331090002 HH PPS REVENUE DEBIT

## 2021-08-09 ENCOUNTER — HOME CARE VISIT (OUTPATIENT)
Dept: SCHEDULING | Facility: HOME HEALTH | Age: 81
End: 2021-08-09
Payer: MEDICARE

## 2021-08-09 ENCOUNTER — HOME CARE VISIT (OUTPATIENT)
Dept: HOME HEALTH SERVICES | Facility: HOME HEALTH | Age: 81
End: 2021-08-09
Payer: MEDICARE

## 2021-08-09 VITALS
SYSTOLIC BLOOD PRESSURE: 160 MMHG | OXYGEN SATURATION: 96 % | HEART RATE: 71 BPM | DIASTOLIC BLOOD PRESSURE: 68 MMHG | RESPIRATION RATE: 12 BRPM | TEMPERATURE: 98.2 F

## 2021-08-09 PROCEDURE — 3331090001 HH PPS REVENUE CREDIT

## 2021-08-09 PROCEDURE — 3331090002 HH PPS REVENUE DEBIT

## 2021-08-09 PROCEDURE — G0151 HHCP-SERV OF PT,EA 15 MIN: HCPCS

## 2021-08-10 PROCEDURE — 3331090002 HH PPS REVENUE DEBIT

## 2021-08-10 PROCEDURE — 3331090001 HH PPS REVENUE CREDIT

## 2021-08-11 ENCOUNTER — PATIENT OUTREACH (OUTPATIENT)
Dept: CASE MANAGEMENT | Age: 81
End: 2021-08-11

## 2021-08-11 PROCEDURE — 3331090002 HH PPS REVENUE DEBIT

## 2021-08-11 PROCEDURE — 3331090001 HH PPS REVENUE CREDIT

## 2021-08-12 ENCOUNTER — HOME CARE VISIT (OUTPATIENT)
Dept: HOME HEALTH SERVICES | Facility: HOME HEALTH | Age: 81
End: 2021-08-12
Payer: MEDICARE

## 2021-08-12 PROCEDURE — 3331090001 HH PPS REVENUE CREDIT

## 2021-08-12 PROCEDURE — 3331090002 HH PPS REVENUE DEBIT

## 2021-08-13 ENCOUNTER — HOME CARE VISIT (OUTPATIENT)
Dept: SCHEDULING | Facility: HOME HEALTH | Age: 81
End: 2021-08-13
Payer: MEDICARE

## 2021-08-13 VITALS
DIASTOLIC BLOOD PRESSURE: 60 MMHG | RESPIRATION RATE: 12 BRPM | TEMPERATURE: 98 F | HEART RATE: 62 BPM | SYSTOLIC BLOOD PRESSURE: 158 MMHG | OXYGEN SATURATION: 97 %

## 2021-08-13 PROCEDURE — G0151 HHCP-SERV OF PT,EA 15 MIN: HCPCS

## 2021-08-13 PROCEDURE — 3331090002 HH PPS REVENUE DEBIT

## 2021-08-13 PROCEDURE — 3331090001 HH PPS REVENUE CREDIT

## 2021-08-14 PROCEDURE — 3331090002 HH PPS REVENUE DEBIT

## 2021-08-14 PROCEDURE — 3331090001 HH PPS REVENUE CREDIT

## 2021-08-15 PROCEDURE — 3331090002 HH PPS REVENUE DEBIT

## 2021-08-15 PROCEDURE — 3331090001 HH PPS REVENUE CREDIT

## 2021-08-16 ENCOUNTER — HOME CARE VISIT (OUTPATIENT)
Dept: SCHEDULING | Facility: HOME HEALTH | Age: 81
End: 2021-08-16
Payer: MEDICARE

## 2021-08-16 VITALS
RESPIRATION RATE: 15 BRPM | TEMPERATURE: 97.7 F | SYSTOLIC BLOOD PRESSURE: 160 MMHG | DIASTOLIC BLOOD PRESSURE: 76 MMHG | OXYGEN SATURATION: 98 % | HEART RATE: 67 BPM

## 2021-08-16 PROCEDURE — G0300 HHS/HOSPICE OF LPN EA 15 MIN: HCPCS

## 2021-08-16 PROCEDURE — 3331090002 HH PPS REVENUE DEBIT

## 2021-08-16 PROCEDURE — 3331090001 HH PPS REVENUE CREDIT

## 2021-08-16 PROCEDURE — G0151 HHCP-SERV OF PT,EA 15 MIN: HCPCS

## 2021-08-17 VITALS
RESPIRATION RATE: 12 BRPM | HEART RATE: 87 BPM | TEMPERATURE: 98 F | OXYGEN SATURATION: 96 % | SYSTOLIC BLOOD PRESSURE: 154 MMHG | DIASTOLIC BLOOD PRESSURE: 62 MMHG

## 2021-08-17 PROCEDURE — 3331090002 HH PPS REVENUE DEBIT

## 2021-08-17 PROCEDURE — 3331090001 HH PPS REVENUE CREDIT

## 2021-08-18 PROCEDURE — 3331090001 HH PPS REVENUE CREDIT

## 2021-08-18 PROCEDURE — 3331090002 HH PPS REVENUE DEBIT

## 2021-08-19 PROCEDURE — 3331090002 HH PPS REVENUE DEBIT

## 2021-08-19 PROCEDURE — 3331090001 HH PPS REVENUE CREDIT

## 2021-08-20 ENCOUNTER — HOME CARE VISIT (OUTPATIENT)
Dept: SCHEDULING | Facility: HOME HEALTH | Age: 81
End: 2021-08-20
Payer: MEDICARE

## 2021-08-20 VITALS
RESPIRATION RATE: 12 BRPM | DIASTOLIC BLOOD PRESSURE: 68 MMHG | OXYGEN SATURATION: 95 % | TEMPERATURE: 98 F | SYSTOLIC BLOOD PRESSURE: 142 MMHG | HEART RATE: 68 BPM

## 2021-08-20 PROCEDURE — G0151 HHCP-SERV OF PT,EA 15 MIN: HCPCS

## 2021-08-20 PROCEDURE — G0300 HHS/HOSPICE OF LPN EA 15 MIN: HCPCS

## 2021-08-20 PROCEDURE — 3331090002 HH PPS REVENUE DEBIT

## 2021-08-20 PROCEDURE — 3331090001 HH PPS REVENUE CREDIT

## 2021-08-21 PROCEDURE — 3331090001 HH PPS REVENUE CREDIT

## 2021-08-21 PROCEDURE — 3331090002 HH PPS REVENUE DEBIT

## 2021-08-22 VITALS
SYSTOLIC BLOOD PRESSURE: 161 MMHG | DIASTOLIC BLOOD PRESSURE: 75 MMHG | RESPIRATION RATE: 16 BRPM | OXYGEN SATURATION: 97 % | HEART RATE: 73 BPM | TEMPERATURE: 97.9 F

## 2021-08-22 PROCEDURE — 3331090001 HH PPS REVENUE CREDIT

## 2021-08-22 PROCEDURE — 3331090002 HH PPS REVENUE DEBIT

## 2021-08-23 ENCOUNTER — HOME CARE VISIT (OUTPATIENT)
Dept: SCHEDULING | Facility: HOME HEALTH | Age: 81
End: 2021-08-23
Payer: MEDICARE

## 2021-08-23 ENCOUNTER — PATIENT OUTREACH (OUTPATIENT)
Dept: CASE MANAGEMENT | Age: 81
End: 2021-08-23

## 2021-08-23 VITALS
TEMPERATURE: 98.3 F | RESPIRATION RATE: 12 BRPM | HEART RATE: 73 BPM | DIASTOLIC BLOOD PRESSURE: 62 MMHG | OXYGEN SATURATION: 96 % | SYSTOLIC BLOOD PRESSURE: 150 MMHG

## 2021-08-23 PROCEDURE — 3331090002 HH PPS REVENUE DEBIT

## 2021-08-23 PROCEDURE — G0151 HHCP-SERV OF PT,EA 15 MIN: HCPCS

## 2021-08-23 PROCEDURE — 3331090001 HH PPS REVENUE CREDIT

## 2021-08-23 PROCEDURE — G0300 HHS/HOSPICE OF LPN EA 15 MIN: HCPCS

## 2021-08-24 VITALS
SYSTOLIC BLOOD PRESSURE: 149 MMHG | TEMPERATURE: 98.2 F | OXYGEN SATURATION: 97 % | BODY MASS INDEX: 32.17 KG/M2 | HEART RATE: 73 BPM | RESPIRATION RATE: 15 BRPM | DIASTOLIC BLOOD PRESSURE: 71 MMHG | WEIGHT: 173.06 LBS

## 2021-08-24 PROCEDURE — 3331090001 HH PPS REVENUE CREDIT

## 2021-08-24 PROCEDURE — 3331090002 HH PPS REVENUE DEBIT

## 2021-08-25 PROCEDURE — 3331090001 HH PPS REVENUE CREDIT

## 2021-08-25 PROCEDURE — 3331090002 HH PPS REVENUE DEBIT

## 2021-08-26 PROCEDURE — 3331090001 HH PPS REVENUE CREDIT

## 2021-08-26 PROCEDURE — 3331090002 HH PPS REVENUE DEBIT

## 2021-08-27 PROCEDURE — 3331090001 HH PPS REVENUE CREDIT

## 2021-08-27 PROCEDURE — 3331090002 HH PPS REVENUE DEBIT

## 2021-08-28 PROCEDURE — 3331090002 HH PPS REVENUE DEBIT

## 2021-08-28 PROCEDURE — 3331090001 HH PPS REVENUE CREDIT

## 2021-08-29 PROCEDURE — 3331090001 HH PPS REVENUE CREDIT

## 2021-08-29 PROCEDURE — 3331090002 HH PPS REVENUE DEBIT

## 2021-08-30 PROCEDURE — 3331090002 HH PPS REVENUE DEBIT

## 2021-08-30 PROCEDURE — 3331090001 HH PPS REVENUE CREDIT

## 2021-08-31 PROCEDURE — 3331090002 HH PPS REVENUE DEBIT

## 2021-08-31 PROCEDURE — 3331090001 HH PPS REVENUE CREDIT

## 2021-08-31 PROCEDURE — 400018 HH-NO PAY CLAIM PROCEDURE

## 2021-09-01 ENCOUNTER — HOME CARE VISIT (OUTPATIENT)
Dept: SCHEDULING | Facility: HOME HEALTH | Age: 81
End: 2021-09-01
Payer: MEDICARE

## 2021-09-01 PROCEDURE — 3331090001 HH PPS REVENUE CREDIT

## 2021-09-01 PROCEDURE — G0299 HHS/HOSPICE OF RN EA 15 MIN: HCPCS

## 2021-09-01 PROCEDURE — 400013 HH SOC

## 2021-09-01 PROCEDURE — 3331090002 HH PPS REVENUE DEBIT

## 2021-09-02 VITALS
HEART RATE: 76 BPM | RESPIRATION RATE: 18 BRPM | SYSTOLIC BLOOD PRESSURE: 132 MMHG | TEMPERATURE: 98 F | DIASTOLIC BLOOD PRESSURE: 75 MMHG | OXYGEN SATURATION: 98 %

## 2021-09-02 PROCEDURE — 3331090002 HH PPS REVENUE DEBIT

## 2021-09-02 PROCEDURE — 3331090001 HH PPS REVENUE CREDIT

## 2021-09-03 PROCEDURE — 3331090001 HH PPS REVENUE CREDIT

## 2021-09-03 PROCEDURE — 3331090002 HH PPS REVENUE DEBIT

## 2021-09-04 PROCEDURE — 3331090001 HH PPS REVENUE CREDIT

## 2021-09-04 PROCEDURE — 3331090002 HH PPS REVENUE DEBIT

## 2021-09-05 PROCEDURE — 3331090002 HH PPS REVENUE DEBIT

## 2021-09-05 PROCEDURE — 3331090001 HH PPS REVENUE CREDIT

## 2021-09-06 PROCEDURE — 3331090001 HH PPS REVENUE CREDIT

## 2021-09-06 PROCEDURE — 3331090002 HH PPS REVENUE DEBIT

## 2021-09-07 PROCEDURE — 3331090001 HH PPS REVENUE CREDIT

## 2021-09-07 PROCEDURE — 3331090002 HH PPS REVENUE DEBIT

## 2021-09-08 ENCOUNTER — OFFICE VISIT (OUTPATIENT)
Dept: NEUROLOGY | Age: 81
End: 2021-09-08
Payer: MEDICARE

## 2021-09-08 VITALS
RESPIRATION RATE: 16 BRPM | SYSTOLIC BLOOD PRESSURE: 122 MMHG | HEIGHT: 62 IN | BODY MASS INDEX: 31.47 KG/M2 | WEIGHT: 171 LBS | HEART RATE: 78 BPM | DIASTOLIC BLOOD PRESSURE: 72 MMHG | OXYGEN SATURATION: 97 %

## 2021-09-08 DIAGNOSIS — G40.909 NONINTRACTABLE EPILEPSY WITHOUT STATUS EPILEPTICUS, UNSPECIFIED EPILEPSY TYPE (HCC): Primary | ICD-10-CM

## 2021-09-08 DIAGNOSIS — I63.9 CEREBROVASCULAR ACCIDENT (CVA), UNSPECIFIED MECHANISM (HCC): ICD-10-CM

## 2021-09-08 PROCEDURE — 1090F PRES/ABSN URINE INCON ASSESS: CPT | Performed by: PSYCHIATRY & NEUROLOGY

## 2021-09-08 PROCEDURE — 3331090002 HH PPS REVENUE DEBIT

## 2021-09-08 PROCEDURE — G8536 NO DOC ELDER MAL SCRN: HCPCS | Performed by: PSYCHIATRY & NEUROLOGY

## 2021-09-08 PROCEDURE — G8427 DOCREV CUR MEDS BY ELIG CLIN: HCPCS | Performed by: PSYCHIATRY & NEUROLOGY

## 2021-09-08 PROCEDURE — 3331090001 HH PPS REVENUE CREDIT

## 2021-09-08 PROCEDURE — 1101F PT FALLS ASSESS-DOCD LE1/YR: CPT | Performed by: PSYCHIATRY & NEUROLOGY

## 2021-09-08 PROCEDURE — G8417 CALC BMI ABV UP PARAM F/U: HCPCS | Performed by: PSYCHIATRY & NEUROLOGY

## 2021-09-08 PROCEDURE — G8400 PT W/DXA NO RESULTS DOC: HCPCS | Performed by: PSYCHIATRY & NEUROLOGY

## 2021-09-08 PROCEDURE — G9231 DOC ESRD DIA TRANS PREG: HCPCS | Performed by: PSYCHIATRY & NEUROLOGY

## 2021-09-08 PROCEDURE — G8432 DEP SCR NOT DOC, RNG: HCPCS | Performed by: PSYCHIATRY & NEUROLOGY

## 2021-09-08 PROCEDURE — 99214 OFFICE O/P EST MOD 30 MIN: CPT | Performed by: PSYCHIATRY & NEUROLOGY

## 2021-09-08 RX ORDER — LEVETIRACETAM 250 MG/1
TABLET ORAL
Qty: 216 TABLET | Refills: 1 | Status: SHIPPED | OUTPATIENT
Start: 2021-09-08 | End: 2022-03-09 | Stop reason: SDUPTHER

## 2021-09-08 NOTE — PATIENT INSTRUCTIONS
Since you started Dialysis, you should take an extra dose of Keppra after each dialysis session (because dialysis cleans it out of your blood). So take Keppra one tab in morning, one tab evening, and one tablet after dialysis sessions. Sent a 90 day prescription of keppra to your pharmacy    If your right hand numbness persists, you can call back and ask to be scheduled for a nerve test of your right arm. Follow up in 6 months, sooner if needed.

## 2021-09-08 NOTE — PROGRESS NOTES
Juan Antonio Bennett (1940) is a 79 y.o. female, established patient, here for evaluation of the following     Chief complaint(s):   Chief Complaint   Patient presents with    Follow-up     epilepsy       SUBJECTIVE/ OBJECTIVE:    HPI: 79 y.o. 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 and Incidental old right medial occipital lobe stroke seen on MRI Brain Feb 2019)  Remains on ASA 81 mg/ day, Pravastatin 40 mg QHS, HTN meds. I Reviewed last Lipid Panel 5-15-21: LDL was 53 (at target goal of < 70, in setting of prior CVA or TIA). No indication to increase statin at this time      Review of Systems: as above    ========================================     Brief Hx: Remote hx of epilepsy, well controlled on combination of low dose Phenytoin (? Dose) and Mysoline. Last witnessed seizure in 1967. Those meds stopped around 2014 d/t no seizure for many years. Had an episode on 5-10-16 where patient fell out of bed in early AM, urinary incontinence, tongue or cheek bite, that may have been a seizure.  Was started on Keppra 500 mg BID at that point.  EEG (8-29-26): normal.  Keppra level (9-4-18): 32.8 (normal, rr 10-40).   When she was admitted for her basal ganglia stroke (February 2019) her Keppra had been reduced to 250 mg once a day for renal disease (GFR 31, creatinine 1.6).  At her subsequent neurology follow-up visit I increased her Keppra to 250 mg twice a day.       Feb 2019: Hypertensive left basal ganglia hemorrhagic stroke (initial symptoms are right facial droop and slurred speech). CT head showed acute left basal ganglia hemorrhage. Seen by Neurosurgery but no intervention required.  BP control was optimized with Norvasc, carvedilol, chlorthalidone, and losartan.  MRI Brain confirmed the hemorrhage, no change in size.   She was seen by Dr Weinberg/ Neurology who recommended keeping SBP < 140 and withholding ASA x 2 weeks.       MRI brain at that time also showed a remote right medial occipital ischemic stroke.      Allergies   Allergen Reactions    Latex Rash    Codeine Other (comments)    Levaquin [Levofloxacin] Other (comments)     Hallucinations    Lisinopril Cough    Sulfa (Sulfonamide Antibiotics) Rash and Itching         Current Outpatient Medications:     levETIRAcetam (Keppra) 250 mg tablet, Take one tablet in morning and evening. After dialysis session, take one additional tablet. 90 day prescription, Disp: 216 Tablet, Rfl: 1    acetaminophen (TYLENOL) 650 mg TbER, Take 650 mg by mouth every eight (8) hours as needed for Pain., Disp: , Rfl:     hydrALAZINE (APRESOLINE) 25 mg tablet, Take 25 mg by mouth three (3) times daily. , Disp: , Rfl:     calcium acetate,phosphat bind, (PHOSLO) 667 mg cap, Take 1 Capsule by mouth three (3) times daily (with meals). , Disp: , Rfl:     minoxidiL (LONITEN) 2.5 mg tablet, Take 2.5 mg by mouth two (2) times a day., Disp: , Rfl:     omega-3 acid ethyl esters (LOVAZA) 1 gram capsule, Take 1 g by mouth two (2) times a day., Disp: , Rfl:     aspirin delayed-release 81 mg tablet, Take 81 mg by mouth daily. , Disp: , Rfl:     amLODIPine (NORVASC) 10 mg tablet, Take 10 mg by mouth every evening., Disp: , Rfl:     montelukast (Singulair) 10 mg tablet, Take 10 mg by mouth every evening., Disp: , Rfl:   fluticasone propionate (Flonase Allergy Relief) 50 mcg/actuation nasal spray, 2 Sprays by Both Nostrils route every evening., Disp: , Rfl:     polyvinyl alcohol-povidon,PF, (REFRESH CLASSIC) 1.4-0.6 % ophthalmic solution, Administer 1-2 Drops to both eyes as needed. , Disp: , Rfl:     carvedilol (COREG) 6.25 mg tablet, Take 1 Tab by mouth two (2) times daily (with meals). , Disp: 60 Tab, Rfl: 0    pravastatin (PRAVACHOL) 40 mg tablet, Take 40 mg by mouth nightly., Disp: , Rfl:     ferrous sulfate 325 mg (65 mg iron) tablet, Take 325 mg by mouth two (2) times a day. (Patient not taking: Reported on 2021), Disp: , Rfl:     cloNIDine HCL (CATAPRES) 0.2 mg tablet, Take 1 Tablet by mouth every six (6) hours as needed for Other (sBP >150) for up to 30 doses. Indications: high blood pressure (Patient not taking: Reported on 2021), Disp: 30 Tablet, Rfl: 0     has a past medical history of Anxiety, Arthritis, Basal ganglia stroke ([address] (2019), CKD (chronic kidney disease) stage 3, GFR 30-59 ml/min (HCC), High cholesterol, seasonal allergies, Hypertension, Ischemic stroke ([address] Monoclonal gammopathy (), ABIGAIL on CPAP, Seizures ([address] and Urinary incontinence. She also has no past medical history of Adverse effect of anesthesia, Difficult intubation, Malignant hyperthermia due to anesthesia, or Pseudocholinesterase deficiency. has a past surgical history that includes hx tonsillectomy; hx hysterectomy; hx hemorrhoidectomy; hx  section (N/A); hx cataract removal (Bilateral, 2018); hx appendectomy (N/A); hx knee replacement (Right, ); ir insert non tunl cvc over 5 yrs (2021); and ir insert tunl cvc w/o port over 5 yr (2021).       Physical Exam:    Vitals:    21 1047   BP: 122/72   BP 1 Location: Right upper arm   Pulse: 78   Resp: 16   Height: 5' 2\" (1.575 m)   Weight: 77.6 kg (171 lb)   SpO2: 97%     No exam performed today  Pt was awake, resting, conversant, moving all limbs spontaneously    ========================================    ASSESSMENT/ PLAN:       ICD-10-CM ICD-9-CM    1. Nonintractable epilepsy without status epilepticus, unspecified epilepsy type ([facility]  G40.909 345.90 levETIRAcetam (Keppra) 250 mg tablet   2. Cerebrovascular accident (CVA), unspecified mechanism ([facility]  I63.9 434.91         1) Epilepsy  In setting of Dialysis, advised pt to continue taking Keppra 250 mg BID but also to add one Keppra tablet after each dialysis session. 2) Hx of ICH, Hx of Ischemic stroke  Continue ASA 81 mg/ day, statin, HTN meds  BP looks good today    3) F/u in 6 months          An electronic signature was used to authenticate this note.   -- Maria Antonia Lawson MD

## 2021-09-08 NOTE — PROGRESS NOTES
Chief Complaint   Patient presents with    Follow-up     epilepsy     Visit Vitals  /72 (BP 1 Location: Right upper arm)   Pulse 78   Resp 16   Ht 5' 2\" (1.575 m)   Wt 77.6 kg (171 lb)   SpO2 97%   BMI 31.28 kg/m²

## 2021-09-09 PROCEDURE — 3331090001 HH PPS REVENUE CREDIT

## 2021-09-09 PROCEDURE — 3331090002 HH PPS REVENUE DEBIT

## 2021-09-10 ENCOUNTER — HOME CARE VISIT (OUTPATIENT)
Dept: SCHEDULING | Facility: HOME HEALTH | Age: 81
End: 2021-09-10
Payer: MEDICARE

## 2021-09-10 PROCEDURE — 3331090002 HH PPS REVENUE DEBIT

## 2021-09-10 PROCEDURE — 3331090001 HH PPS REVENUE CREDIT

## 2021-09-10 PROCEDURE — G0300 HHS/HOSPICE OF LPN EA 15 MIN: HCPCS

## 2021-09-11 PROCEDURE — 3331090001 HH PPS REVENUE CREDIT

## 2021-09-11 PROCEDURE — 3331090002 HH PPS REVENUE DEBIT

## 2021-09-12 VITALS
SYSTOLIC BLOOD PRESSURE: 149 MMHG | BODY MASS INDEX: 31.77 KG/M2 | RESPIRATION RATE: 15 BRPM | WEIGHT: 173.72 LBS | DIASTOLIC BLOOD PRESSURE: 76 MMHG | HEART RATE: 66 BPM | TEMPERATURE: 97.8 F | OXYGEN SATURATION: 97 %

## 2021-09-12 PROCEDURE — 3331090002 HH PPS REVENUE DEBIT

## 2021-09-12 PROCEDURE — 3331090001 HH PPS REVENUE CREDIT

## 2021-09-13 PROCEDURE — 3331090002 HH PPS REVENUE DEBIT

## 2021-09-13 PROCEDURE — 3331090001 HH PPS REVENUE CREDIT

## 2021-09-14 ENCOUNTER — TELEPHONE (OUTPATIENT)
Dept: NEUROLOGY | Age: 81
End: 2021-09-14

## 2021-09-14 DIAGNOSIS — R20.2 NUMBNESS AND TINGLING IN RIGHT HAND: Primary | ICD-10-CM

## 2021-09-14 DIAGNOSIS — R20.0 NUMBNESS AND TINGLING IN RIGHT HAND: Primary | ICD-10-CM

## 2021-09-14 PROCEDURE — 3331090002 HH PPS REVENUE DEBIT

## 2021-09-14 PROCEDURE — 3331090001 HH PPS REVENUE CREDIT

## 2021-09-15 ENCOUNTER — HOME CARE VISIT (OUTPATIENT)
Dept: SCHEDULING | Facility: HOME HEALTH | Age: 81
End: 2021-09-15
Payer: MEDICARE

## 2021-09-15 PROCEDURE — 3331090001 HH PPS REVENUE CREDIT

## 2021-09-15 PROCEDURE — G0300 HHS/HOSPICE OF LPN EA 15 MIN: HCPCS

## 2021-09-15 PROCEDURE — 3331090002 HH PPS REVENUE DEBIT

## 2021-09-16 VITALS
BODY MASS INDEX: 31.85 KG/M2 | TEMPERATURE: 97.8 F | HEART RATE: 67 BPM | WEIGHT: 174.16 LBS | SYSTOLIC BLOOD PRESSURE: 141 MMHG | DIASTOLIC BLOOD PRESSURE: 67 MMHG | RESPIRATION RATE: 15 BRPM | OXYGEN SATURATION: 98 %

## 2021-09-16 PROCEDURE — 3331090002 HH PPS REVENUE DEBIT

## 2021-09-16 PROCEDURE — 3331090001 HH PPS REVENUE CREDIT

## 2021-09-17 PROCEDURE — 3331090002 HH PPS REVENUE DEBIT

## 2021-09-17 PROCEDURE — 3331090001 HH PPS REVENUE CREDIT

## 2021-09-18 PROCEDURE — 3331090001 HH PPS REVENUE CREDIT

## 2021-09-18 PROCEDURE — 3331090002 HH PPS REVENUE DEBIT

## 2021-09-19 PROCEDURE — 3331090001 HH PPS REVENUE CREDIT

## 2021-09-19 PROCEDURE — 3331090002 HH PPS REVENUE DEBIT

## 2021-09-20 PROCEDURE — 3331090002 HH PPS REVENUE DEBIT

## 2021-09-20 PROCEDURE — 3331090001 HH PPS REVENUE CREDIT

## 2021-09-21 PROCEDURE — 3331090001 HH PPS REVENUE CREDIT

## 2021-09-21 PROCEDURE — 3331090002 HH PPS REVENUE DEBIT

## 2021-09-22 PROCEDURE — 3331090001 HH PPS REVENUE CREDIT

## 2021-09-22 PROCEDURE — 3331090002 HH PPS REVENUE DEBIT

## 2021-09-23 PROCEDURE — 3331090002 HH PPS REVENUE DEBIT

## 2021-09-23 PROCEDURE — 3331090001 HH PPS REVENUE CREDIT

## 2021-09-24 ENCOUNTER — HOME CARE VISIT (OUTPATIENT)
Dept: SCHEDULING | Facility: HOME HEALTH | Age: 81
End: 2021-09-24
Payer: MEDICARE

## 2021-09-24 PROCEDURE — G0300 HHS/HOSPICE OF LPN EA 15 MIN: HCPCS

## 2021-09-24 PROCEDURE — 3331090001 HH PPS REVENUE CREDIT

## 2021-09-24 PROCEDURE — 3331090002 HH PPS REVENUE DEBIT

## 2021-09-25 PROCEDURE — 3331090001 HH PPS REVENUE CREDIT

## 2021-09-25 PROCEDURE — 3331090002 HH PPS REVENUE DEBIT

## 2021-09-26 VITALS
OXYGEN SATURATION: 97 % | TEMPERATURE: 97.8 F | RESPIRATION RATE: 16 BRPM | BODY MASS INDEX: 31.49 KG/M2 | WEIGHT: 172.18 LBS | HEART RATE: 69 BPM

## 2021-09-26 PROCEDURE — 3331090002 HH PPS REVENUE DEBIT

## 2021-09-26 PROCEDURE — 3331090001 HH PPS REVENUE CREDIT

## 2021-09-27 PROCEDURE — 3331090001 HH PPS REVENUE CREDIT

## 2021-09-27 PROCEDURE — 3331090002 HH PPS REVENUE DEBIT

## 2021-09-28 PROCEDURE — 3331090002 HH PPS REVENUE DEBIT

## 2021-09-28 PROCEDURE — 3331090001 HH PPS REVENUE CREDIT

## 2021-09-29 ENCOUNTER — HOME CARE VISIT (OUTPATIENT)
Dept: SCHEDULING | Facility: HOME HEALTH | Age: 81
End: 2021-09-29
Payer: MEDICARE

## 2021-09-29 PROCEDURE — 3331090002 HH PPS REVENUE DEBIT

## 2021-09-29 PROCEDURE — 3331090001 HH PPS REVENUE CREDIT

## 2021-09-29 PROCEDURE — G0299 HHS/HOSPICE OF RN EA 15 MIN: HCPCS

## 2021-10-03 VITALS
WEIGHT: 171.9 LBS | RESPIRATION RATE: 16 BRPM | DIASTOLIC BLOOD PRESSURE: 70 MMHG | OXYGEN SATURATION: 98 % | HEART RATE: 77 BPM | SYSTOLIC BLOOD PRESSURE: 125 MMHG | BODY MASS INDEX: 31.44 KG/M2 | TEMPERATURE: 97.7 F

## 2021-10-11 ENCOUNTER — OFFICE VISIT (OUTPATIENT)
Dept: NEUROLOGY | Age: 81
End: 2021-10-11
Payer: MEDICARE

## 2021-10-11 DIAGNOSIS — M54.12 LEFT CERVICAL RADICULOPATHY: ICD-10-CM

## 2021-10-11 DIAGNOSIS — G56.21 ULNAR NEUROPATHY AT ELBOW OF RIGHT UPPER EXTREMITY: ICD-10-CM

## 2021-10-11 DIAGNOSIS — G56.01 MILD CARPAL TUNNEL SYNDROME, RIGHT: ICD-10-CM

## 2021-10-11 DIAGNOSIS — G56.22 ULNAR NEUROPATHY AT WRIST, LEFT: ICD-10-CM

## 2021-10-11 DIAGNOSIS — G56.02 SEVERE CARPAL TUNNEL SYNDROME OF LEFT WRIST: Primary | ICD-10-CM

## 2021-10-11 PROCEDURE — 95886 MUSC TEST DONE W/N TEST COMP: CPT | Performed by: PSYCHIATRY & NEUROLOGY

## 2021-10-11 PROCEDURE — 95912 NRV CNDJ TEST 11-12 STUDIES: CPT | Performed by: PSYCHIATRY & NEUROLOGY

## 2021-10-11 NOTE — PROCEDURES
EMG/ NCS Report  DRUG REHABILITATION  - [facility]  [facility]  [facility], [address] [place], [address]   Ph: [phone]   FAX: [phone]/ [phone]    Test Date:  10/11/2021    Patient: Lina Calderon : 1940 Physician: Sobeida Coates M.D. Sex: Female Height: ' \" Ref Phys: Sobeida Coates M.D.   ID#: [id number] Weight:  lbs. Technician: Aurea Cranker     Patient History:    CC: Numbness in the left hand x 6 months, after dialysis fistula placed in left forearm. EMG & NCV Findings:  Evaluation of the left median motor nerve showed no response (Wrist) and no response (Elbow). The right median motor nerve showed prolonged distal onset latency (5.3 ms), normal amplitude (5.0 mV), and decreased conduction velocity (Elbow-Wrist, 43 m/s). The left ulnar motor nerve showed prolonged distal onset latency (4.1 ms), reduced amplitude (0.4 mV), decreased conduction velocity (B Elbow-Wrist, 39 m/s), and normal conduction velocity (A Elbow-B Elbow, 83 m/s). The right ulnar motor nerve showed normal distal onset latency (3.1 ms), normal amplitude (7.1 mV), normal conduction velocity (B Elbow-Wrist, 56 m/s), and decreased conduction velocity (A Elbow-B Elbow, 42 m/s). The left median sensory and the left ulnar sensory nerves showed no response (Wrist). The right median sensory and the left radial sensory nerves showed prolonged distal peak latency (R4.9, L5.1 ms) and reduced amplitude (R3.2, L3.3 µV). The right radial sensory nerve showed normal distal peak latency (2.1 ms) and normal amplitude (17.1 µV). The right ulnar sensory nerve showed normal distal peak latency (3.2 ms) and reduced amplitude (8.3 µV). F Wave studies indicate that the left ulnar F wave has prolonged latency (40.00 ms). The right ulnar F wave has prolonged latency (69.78 ms). Left vs. Right comparison data for the ulnar F wave indicates abnormal L-R latency difference (29.78 ms).       Needle evaluation of the left abductor pollicis brevis muscle showed increased insertional activity, moderately increased spontaneous activity, diminished recruitment, Decr Duration, decreased motor unit amplitude, and slightly increased polyphasic potentials. The left first dorsal interosseous muscle showed increased insertional activity, moderately increased spontaneous activity, diminished recruitment, Decr Duration, and decreased motor unit amplitude. The left extensor indicis muscle showed diminished recruitment, Incr Duration, and increased motor unit amplitude. The left pronator teres muscle showed insertional activity and recruitment. The left Lower Cerv Parasp muscle showed increased insertional activity. All remaining muscles (as indicated in the following table) showed no evidence of electrical instability. Summary:     Right median sensory with mild prolonged peak latency, low amplitude  Right median motor with normal amplitude, mild prolonged onset latency, mild slow conduction velocity  Right radial sensory response normal  Right ulnar sensory with minimal reduced amplitude, normal peak latency  Right ulnar motor response with normal onset latency, normal amplitude, focal slowing across elbow segment (conduction block)    Left median sensory response absent  Left median motor response absent  Left radial sensory response significant prolonged peak latency, significant reduced amplitude (20% compared to right side)  Left ulnar sensory response absent  Left ulnar motor response with prolonged onset latency, severely reduced amplitude, moderately reduced conduction velocity    Left ulnar F-wave mildly prolonged   Right ulnar F-wave does not show any consistent waveforms    Impression:    1) Severe left median neuropathy (ie.  CTS)    2) Severe left ulnar neuropathy    3) Significantly reduced amplitude of left radial sensory response as compared to right side    4) Mild right median neuropathy    5) Right ulnar neuropathy with focal slowing/ conduction block across elbow    6) EMG left upper extremity reveals severe neurogenic motor unit changes in left ABP (median, C8-T1), FDI (ulnar,C8-T1) and EIP (radial, C7-C8) muscles consistent with a severe left C8 cervical motor radiculopathy.      Recommend checking MRI Cervical spine w/o contrast to evaluate for left cervical radiculopathy    Because of the significant side to side difference of nerve conduction studies (left side notably worse than right side), after having dialysis fistula placed in left forearm (per pt report), recommend checking imaging study (MRI vs Ultrasound) of left forearm to evaluate for any compression of left median and ulnar nerve in vicinity of fistula.     ___________________________  Sindy Matute M.D.      Nerve Conduction Studies  Anti Sensory Summary Table     Stim Site NR Peak (ms) Norm Peak (ms) P-T Amp (µV) Norm P-T Amp Site1 Site2 Dist (cm)   Left Median Anti Sensory (2nd Digit)  29.7°C   Wrist NR  <4  >13 Wrist 2nd Digit 14.0   Right Median Anti Sensory (2nd Digit)  31.9°C   Wrist    4.9 <4 3.2 >13 Wrist 2nd Digit 14.0   Elbow    4.4  4.8  Elbow Wrist 0.0   Left Radial Anti Sensory (Base 1st Digit)  30.1°C   Wrist    5.1 <2.8 3.3 >11 Wrist Base 1st Digit 10.0       5.6  12.9       Right Radial Anti Sensory (Base 1st Digit)  32.4°C   Wrist    2.1 <2.8 17.1 >11 Wrist Base 1st Digit 10.0   Site 2    2.1  18.0       Left Ulnar Anti Sensory (5th Digit)  30°C   Wrist NR  <4.0  >9 Wrist 5th Digit 14.0   Right Ulnar Anti Sensory (5th Digit)  32.2°C   Wrist    3.2 <4.0 8.3 >9 Wrist 5th Digit 14.0   B Elbow    3.3  7.8  B Elbow Wrist 0.0     Motor Summary Table     Stim Site NR Onset (ms) Norm Onset (ms) O-P Amp (mV) Norm O-P Amp Amp (Prev) (%) Site1 Site2 Dist (cm) Chris (m/s) Norm Chris (m/s)   Left Median Motor (Abd Poll Brev)  30.2°C   Wrist NR  <4.5  >4.1  Wrist Abd Poll Brev 8.0     Elbow NR      Elbow Wrist 0.0  >49   Right Median Motor (Abd Poll Brev)  32.4°C   Wrist    5.3 <4.5 5.0 >4.1 100.0 Wrist Abd Poll Brev 8.0     Elbow    9.5  5.7  114.0 Elbow Wrist 18.0 43 >49   Axilla    5.2  5.3  93.0 Axilla Elbow 0.0     Left Ulnar Motor (Abd Dig Minimi)  30.3°C   Wrist    4.1 <3.1 0.4 >7.0 100.0 Wrist Abd Dig Minimi 8.0  >50   B Elbow    9.2  0.4  100.0 B Elbow Wrist 20.0 39 >50   A Elbow    10.4  0.0   A Elbow B Elbow 10.0 83 >50   Right Ulnar Motor (Abd Dig Minimi)  32.6°C   Wrist    3.1 <3.1 7.1 >7.0 100.0 Wrist Abd Dig Minimi 8.0  >50   B Elbow    6.7  5.9  83.1 B Elbow Wrist 20.0 56 >50   A Elbow    9.1  4.8  81.4 A Elbow B Elbow 10.0 42 >50     F Wave Studies     NR F-Lat (ms) Lat Norm (ms) L-R F-Lat (ms) L-R Lat Norm   Left Ulnar (Mrkrs) (Abd Dig Min)  30.2°C      40.00 <32 29.78 <2.5   Right Ulnar (Mrkrs) (Abd Dig Min)  32.2°C      69.78 <32 29.78 <2.5     EMG     Side Muscle Nerve Root Ins Act Fibs Psw Recrt Duration Amp Poly Comment   Left Abd Poll Brev Median C8-T1 Incr Nml 2+ Reduced Decr Decr 1+    Left 1stDorInt Ulnar C8-T1 Incr 1+ 2+ Reduced Decr Decr Nml severe reduced   Left ExtIndicis Radial (Post Int) C7-8 Nml Nml Nml Reduced Incr Incr Nml    Left PronatorTeres Median C6-7 Decr Nml Nml NE Nml Nml Nml Barely found units motor units/ small/ distant   Left Biceps Musculocut C5-6 Nml Nml Nml Nml Nml Nml Nml    Left Triceps Radial C6-7-8 Nml Nml Nml Nml Nml Nml Nml    Left Deltoid Axillary C5-6 Nml Nml Nml Nml Nml Nml Nml    Left Mid Cerv Parasp Rami C5,6 Nml Nml Nml Nml Nml Nml Nml    Left Lower Cerv Parasp Rami C7,T1 Incr Nml Nml Nml Nml Nml Nml incomplete relax     Waveforms:

## 2021-10-12 ENCOUNTER — TELEPHONE (OUTPATIENT)
Dept: NEUROLOGY | Age: 81
End: 2021-10-12

## 2021-10-12 NOTE — TELEPHONE ENCOUNTER
Found incidentally on CT abdomen pelvis:1   No acute CT findings in the chest, abdomen, or pelvis within the limits of unenhanced technique   2   Large hiatal hernia Spoke with son to advise of results and advised  would call her to schedule cervical MRI and we can move her follow up he will call me back for that

## 2021-10-22 ENCOUNTER — HOSPITAL ENCOUNTER (OUTPATIENT)
Dept: MRI IMAGING | Age: 81
Discharge: HOME | End: 2021-10-22
Attending: PSYCHIATRY & NEUROLOGY
Payer: MEDICARE

## 2021-10-22 DIAGNOSIS — M54.12 LEFT CERVICAL RADICULOPATHY: ICD-10-CM

## 2021-10-22 PROCEDURE — 72141 MRI NECK SPINE W/O DYE: CPT

## 2021-11-03 ENCOUNTER — TELEPHONE (OUTPATIENT)
Dept: NEUROLOGY | Age: 81
End: 2021-11-03

## 2021-11-03 NOTE — TELEPHONE ENCOUNTER
----- Message from Doren Jeans sent at 11/2/2021  3:22 PM EDT -----  Regarding: \telephone  Contact: [phone]  General Message/Vendor Calls    Caller's first and last name:self      Reason for call:will like to know about her MRI results       Callback required yes/no and why:y      Best contact number(s):[phone]      Details to clarify the request:n\a      Doren Jeans

## 2021-11-04 ENCOUNTER — TELEPHONE (OUTPATIENT)
Dept: NEUROLOGY | Age: 81
End: 2021-11-04

## 2021-11-04 NOTE — TELEPHONE ENCOUNTER
----- Message from [facility] sent at 11/4/2021  9:21 AM EDT -----  Regarding: /Telephone  General Message/Vendor Calls    Caller's first and last name:NA      Reason for call:MRI      Callback required yes/no and why:Y      Best contact number(s):[phone]      Details to clarify the request:Need to speak to nurse about MRI       [facility]

## 2021-12-02 NOTE — HOME HEALTH
Agree with physical therapy, but let's change the pain medication so he can participate in PT.  Recommend Percocet 5/325 BID PRN severe pain, #30 Skilled Reason for admission/summary of clinical condition:Per H&P: 79 y.o. female with hx of HTN, CVA, ESRD recently started on HD presenting w/ dyspnea, admitted for AHRF   Acute respiratory failure with hypoxia: 2/2 volume overload, pulmonary edema. No fevers or leukocytosis or cough or radiographic evidence to suggest PNA. Check LE duplex for DVT. Has been on intermittent supplemental O2 at SNF. HD for volume mgmt Diastolic CHF, acute on chronic: started on HD in May. Initially did not tolerate HD well however per son at bedside, recently had been doing better. CXR showed increased size of cardiac silhouette rasing the possibility of pericardial effusion. Check TTE. HD/UF as above. Abnormal EKG: ST depressions noted. Prior EKG was from Aug 2019. No chest pain. Trend cardiac biomarkers. TTE. Cardiology consult. Cont ASA and statin and BB  HTN: hx of malignant HTN noted during last admission. Cont Norvasc, Coreg, minoxidil. Also has clonidine PRN, ESRD: HD as above  Diagnosis:chronic pain, ESRD  Disciplines involved in care/ visit frequency:  SN 2w, 1w6  PT   1w1   OT  1w1  Caregiver: Deb Nuñez- son takes to md appointments and daughter in law assists with bathing  Medications reconciled and all medications are available in the home this visit. The following education was provided regarding medications: medication interactions, and look alike medications:  Medications are effective at this time. notified of any discrepancies/medication interactions n/a  Home health supplies by type and quantity ordered/delivered this visit include: none  Patient education provided this visit to include: [place] handbook review with pill bottles in home and discharge instructions in home. Family present for visit and assists patient with care needs. fall safety review, emergency planning, and infection control.  Pain reivew and managment until surgery to correct non functioning fistula   Patient/caregiver degree of understanding: good  Home exercise program/Homework provided: up as tolerated  Pt/Caregiver instructed on plan of care and are agreeable to plan of care at this time. Discharge planning discussed with patient and caregiver. Discharge planning as follows: When goals of education on disease process complete and goals of therapy met  Pt/Caregiver did verbalize understanding of discharge planning. Plan of care and admission to home health status called to attending physician (physician to sign POC -verified)  Dr Estefanía Montanez. PCP: Dr Estefanía Montanez  Next scheduled doctor appointment: to make  Discussed the following with patient and/or caregiver  Have one gallon of water per person for at least 3 days on hand. Have non-perishable food for at least 3 days that do not need to be cooked. Have flashlights and batteries. Charge your cell phones and any back up lithium batteries for your cell phones. Have medication for a week in your home. Make sure you have a caregiver in the home to provide care in case your home health nurse cannot get to your house. Make sure you have all of your paperwork i.e. Identification, insurance cards, DME phone number, physician and pharmacy phone number, agency phone number, and your medications in one place for easy access and in a zip lock bag to protect them. Call agency if you relocate so we can contact you.   Patient and/or caregiver verbalized understanding unless noted above

## 2022-03-09 ENCOUNTER — OFFICE VISIT (OUTPATIENT)
Dept: NEUROLOGY | Age: 82
End: 2022-03-09
Payer: MEDICARE

## 2022-03-09 VITALS
BODY MASS INDEX: 31.28 KG/M2 | HEIGHT: 62 IN | RESPIRATION RATE: 16 BRPM | SYSTOLIC BLOOD PRESSURE: 160 MMHG | DIASTOLIC BLOOD PRESSURE: 74 MMHG | HEART RATE: 58 BPM | OXYGEN SATURATION: 95 % | WEIGHT: 170 LBS

## 2022-03-09 DIAGNOSIS — G56.01 MILD CARPAL TUNNEL SYNDROME, RIGHT: ICD-10-CM

## 2022-03-09 DIAGNOSIS — G56.21 ULNAR NEUROPATHY AT ELBOW OF RIGHT UPPER EXTREMITY: ICD-10-CM

## 2022-03-09 DIAGNOSIS — G56.22 ULNAR NEUROPATHY AT WRIST, LEFT: ICD-10-CM

## 2022-03-09 DIAGNOSIS — G56.02 SEVERE CARPAL TUNNEL SYNDROME OF LEFT WRIST: ICD-10-CM

## 2022-03-09 DIAGNOSIS — G40.909 NONINTRACTABLE EPILEPSY WITHOUT STATUS EPILEPTICUS, UNSPECIFIED EPILEPSY TYPE (HCC): Primary | ICD-10-CM

## 2022-03-09 DIAGNOSIS — Z86.73 HX OF COMPLETED STROKE: ICD-10-CM

## 2022-03-09 PROCEDURE — 1090F PRES/ABSN URINE INCON ASSESS: CPT | Performed by: PSYCHIATRY & NEUROLOGY

## 2022-03-09 PROCEDURE — G9231 DOC ESRD DIA TRANS PREG: HCPCS | Performed by: PSYCHIATRY & NEUROLOGY

## 2022-03-09 PROCEDURE — G8417 CALC BMI ABV UP PARAM F/U: HCPCS | Performed by: PSYCHIATRY & NEUROLOGY

## 2022-03-09 PROCEDURE — G8536 NO DOC ELDER MAL SCRN: HCPCS | Performed by: PSYCHIATRY & NEUROLOGY

## 2022-03-09 PROCEDURE — G8432 DEP SCR NOT DOC, RNG: HCPCS | Performed by: PSYCHIATRY & NEUROLOGY

## 2022-03-09 PROCEDURE — G8427 DOCREV CUR MEDS BY ELIG CLIN: HCPCS | Performed by: PSYCHIATRY & NEUROLOGY

## 2022-03-09 PROCEDURE — 99215 OFFICE O/P EST HI 40 MIN: CPT | Performed by: PSYCHIATRY & NEUROLOGY

## 2022-03-09 PROCEDURE — G8400 PT W/DXA NO RESULTS DOC: HCPCS | Performed by: PSYCHIATRY & NEUROLOGY

## 2022-03-09 PROCEDURE — 1101F PT FALLS ASSESS-DOCD LE1/YR: CPT | Performed by: PSYCHIATRY & NEUROLOGY

## 2022-03-09 RX ORDER — LEVETIRACETAM 250 MG/1
TABLET ORAL
Qty: 216 TABLET | Refills: 1 | Status: SHIPPED | OUTPATIENT
Start: 2022-03-09 | End: 2022-09-14 | Stop reason: SDUPTHER

## 2022-03-09 NOTE — PROGRESS NOTES
Nelly Mustafa (1940) is a 79 y.o. female, established patient, here for evaluation of the following     Chief complaint(s):   Chief Complaint   Patient presents with    Seizure     6 months follow up, stable on medication, patient states no recent seizure activity, recent elevated B/P       SUBJECTIVE/ OBJECTIVE:    HPI: 79 y.o. female with Pertinent Hx:   1) Hx of Left Basal Ganglia Hemorrhage in February 2019 (treated at [facility])  2) Incidental old right medial occipital lobe stroke seen on MRI brain in February 2019  3) History of epilepsy (well-controlled)    1) Epilepsy:  Pt continues taking Keppra 250 mg twice a day  She has been on Dialysis since May 2021  After each dialysis session, she was instructed to add one additional keppra tablet      2) Hx of Stroke (Left basal ganglia hemorrhage Feb/ 2019, [facility] and Incidental old right medial occipital lobe stroke seen on MRI Brain Feb 2019)  Remains on ASA 81 mg/ day, Pravastatin 40 mg QHS, HTN meds. BP today is 160s/ 70s. Says when she has gone to dialysis, her BPs have been mostly 150s to 160s, occasionally up to 170s. Says when she checks at home, \"it's between 140/ 160\" and bottom numbers are between 60s and 70s. 3) Numbness/ tingling left hand    Pt reported this started after having dialysis fistula placed in left forearm    Had EMG/ NCS in Oct 2021:    1) Severe left median neuropathy (ie. CTS). 2) Severe left ulnar neuropathy at wrist (no slowing at elbow)    3) Significantly reduced amplitude of left radial sensory response as compared to right side. 4) Mild right median neuropathy. 5) Right ulnar neuropathy with focal slowing/ conduction block across elbow. 6) EMG left upper extremity reveals severe neurogenic motor unit changes in left ABP (median, C8-T1), FDI (ulnar,C8-T1) and EIP (radial, C7-C8) muscles consistent with a severe left C8 cervical motor radiculopathy.      Recommend checking MRI Cervical spine w/o contrast to evaluate for left cervical radiculopathy. Because of the significant side to side difference of nerve conduction studies (left side notably worse than right side), after having dialysis fistula placed in left forearm (per pt report), recommend checking imaging study (MRI vs Ultrasound) of left forearm to evaluate for any compression of left median and ulnar nerve in vicinity of fistula. MRI C-spine (10-): 1) Multifactorial moderate central canal stenosis C5-6. 2) Mild central canal stenosis C3-4, C4-5, and C6-7.  3) Moderate severe left C4-5 neural foraminal stenosis. 4) Severe left C5-6 neural foraminal stenosis    Discussed EMG/ MRI results with patient (severe left CTS, severe left ulnar neuropathy, mild right CTS, right ulnar neuropathy across elbow, cervical radiculopathy, and MRI C-spine findings). She tells me that her Vascular Surgeon has \"tied off\" her left forearm fistula and plans to put a AV fistula in her right forearm. I d/w her that as she reported her left hand symptoms started after left forearm AV fistula was placed, and the EMG of her left arm is much worse than right arm, I think the left AV fistula has compressed the nerves in her left forearm, causing the numbness.         ========================================     Brief Hx: Remote hx of epilepsy, well controlled on combination of low dose Phenytoin (? Dose) and Mysoline. Last witnessed seizure in 1967. Those meds stopped around 2014 d/t no seizure for many years. Had an episode on 5-10-16 where patient fell out of bed in early AM, urinary incontinence, tongue or cheek bite, that may have been a seizure.  Was started on Keppra 500 mg BID at that point.  EEG (8-29-26): normal.  Keppra level (9-4-18): 32.8 (normal, rr 10-40).  When she was admitted for her basal ganglia stroke (February 2019) her Keppra had been reduced to 250 mg once a day for renal disease (GFR 31, creatinine 1.6).   At her subsequent neurology follow-up visit I increased her Keppra to 250 mg twice a day.       Feb 2019: Hypertensive left basal ganglia hemorrhagic stroke (initial symptoms are right facial droop and slurred speech). CT head showed acute left basal ganglia hemorrhage. Seen by Neurosurgery but no intervention required.  BP control was optimized with Norvasc, carvedilol, chlorthalidone, and losartan.  MRI Brain confirmed the hemorrhage, no change in size.   She was seen by Dr Weinberg/ Neurology who recommended keeping SBP < 140 and withholding ASA x 2 weeks.       MRI brain at that time also showed a remote right medial occipital ischemic stroke.      Allergies   Allergen Reactions    Latex Rash    Codeine Other (comments)    Levaquin [Levofloxacin] Other (comments)     Hallucinations    Lisinopril Cough    Sulfa (Sulfonamide Antibiotics) Rash and Itching         Current Outpatient Medications:     levETIRAcetam (Keppra) 250 mg tablet, Take one tablet in morning and evening. After dialysis session, take one additional tablet. 90 day prescription, Disp: 216 Tablet, Rfl: 1    acetaminophen (TYLENOL) 650 mg TbER, Take 650 mg by mouth every eight (8) hours as needed for Pain., Disp: , Rfl:     hydrALAZINE (APRESOLINE) 25 mg tablet, Take 25 mg by mouth three (3) times daily. , Disp: , Rfl:     minoxidiL (LONITEN) 2.5 mg tablet, Take 2.5 mg by mouth two (2) times a day., Disp: , Rfl:     omega-3 acid ethyl esters (LOVAZA) 1 gram capsule, Take 1 g by mouth two (2) times a day., Disp: , Rfl:     aspirin delayed-release 81 mg tablet, Take 81 mg by mouth daily. , Disp: , Rfl:     amLODIPine (NORVASC) 10 mg tablet, Take 10 mg by mouth every evening., Disp: , Rfl:     montelukast (Singulair) 10 mg tablet, Take 10 mg by mouth every evening., Disp: , Rfl:     polyvinyl alcohol-povidon,PF, (REFRESH CLASSIC) 1.4-0.6 % ophthalmic solution, Administer 1-2 Drops to both eyes as needed. , Disp: , Rfl:     carvedilol (COREG) 6.25 mg tablet, Take 1 Tab by mouth two (2) times daily (with meals). , Disp: 60 Tab, Rfl: 0    pravastatin (PRAVACHOL) 40 mg tablet, Take 40 mg by mouth nightly., Disp: , Rfl:     ferrous sulfate 325 mg (65 mg iron) tablet, Take 325 mg by mouth two (2) times a day. (Patient not taking: Reported on 2021), Disp: , Rfl:     calcium acetate,phosphat bind, (PHOSLO) 667 mg cap, Take 1 Capsule by mouth three (3) times daily (with meals). , Disp: , Rfl:     cloNIDine HCL (CATAPRES) 0.2 mg tablet, Take 1 Tablet by mouth every six (6) hours as needed for Other (sBP >150) for up to 30 doses. Indications: high blood pressure (Patient not taking: Reported on 2021), Disp: 30 Tablet, Rfl: 0    fluticasone propionate (Flonase Allergy Relief) 50 mcg/actuation nasal spray, 2 Sprays by Both Nostrils route every evening., Disp: , Rfl:      has a past medical history of Anxiety, Arthritis, Basal ganglia stroke ([facility] (2019), CKD (chronic kidney disease) stage 3, GFR 30-59 ml/min (HCC), High cholesterol, seasonal allergies, Hypertension, Ischemic stroke ([address] Monoclonal gammopathy (), ABIGAIL on CPAP, Seizures ([address] and Urinary incontinence. She has no past medical history of Adverse effect of anesthesia, Difficult intubation, Malignant hyperthermia due to anesthesia, or Pseudocholinesterase deficiency. has a past surgical history that includes hx tonsillectomy; hx hysterectomy; hx hemorrhoidectomy; hx  section (N/A); hx cataract removal (Bilateral, 2018); hx appendectomy (N/A); hx knee replacement (Right, ); ir insert non tunl cvc over 5 yrs (2021); and ir insert tunl cvc w/o port over 5 yr (2021).       Physical Exam:    Vitals:    22 1050   BP: (!) 160/74   BP 1 Location: Right upper arm   BP Patient Position: Sitting   Pulse: (!) 58   Resp: 16   Height: 5' 2\" (1.575 m)   Weight: 77.1 kg (170 lb)   SpO2: 95%     Entirety of visit spent discussing multiple issues as noted above    ========================================    ASSESSMENT/ PLAN:       ICD-10-CM ICD-9-CM    1. Nonintractable epilepsy without status epilepticus, unspecified epilepsy type ([facility]  G40.909 345.90 levETIRAcetam (Keppra) 250 mg tablet      LEVETIRACETAM (KEPPRA)      LEVETIRACETAM (KEPPRA)   2. Hx of completed stroke  Z86.73 V12.54    3. Severe carpal tunnel syndrome of left wrist  G56.02 354.0 REFERRAL TO ORTHOPEDICS   4. Ulnar neuropathy at wrist, left  G56.22 354.2 REFERRAL TO ORTHOPEDICS   5. Mild carpal tunnel syndrome, right  G56.01 354.0 REFERRAL TO ORTHOPEDICS   6. Ulnar neuropathy at elbow of right upper extremity  G56.21 354.2 REFERRAL TO ORTHOPEDICS        1) Epilepsy  Continue Keprpa 250 mg BID with one additional Keppra tablet after each dialysis session  Check Keppra level today at [facility]    2) Hx of ICH, Hx of Ischemic stroke  Continue ASA 81 mg/ day, statin, HTN meds  BP mild elevated today and patient reports it has been mild elevated when she checks and when she goes to/during dialysis. D/w her that because of her hx of Intracranial Hemorrhage, she should work with PCP and/ or Nephrologist to tighten BP control to keep SBP closer to 140 range most of the time    3) Severe left CTS, severe left ulnar neuropathy, mild right CTS, right ulnar neuropathy with conduction block at elbow. Discussed referral to Ortho-Hand surgeon to address the above, particularly the left upper extremity findings. Pt seems somewhat reluctant about the idea of \"another\" surgery but I explained that the findings / symptoms will only get worse with time. She is open to having the referral and will consider scheduling it. Entered Ortho-Hand referral to Dr Jackie Manzo. 4) F/u in 6 months          An electronic signature was used to authenticate this note.   -- Tae Sykes MD

## 2022-03-09 NOTE — LETTER
3/9/2022    Patient: Sofi Desai   YOB: 1940   Date of Visit: 3/9/2022     Matheus Reyna MD  [address] [place] [address].  [phone]  Via Fax: [phone]    Dear Matheus Reyna MD,      Thank you for referring Ms. Kecia Brar to [facility] for evaluation. My notes for this consultation are attached. If you have questions, please do not hesitate to call me. I look forward to following your patient along with you.       Sincerely,    Nidhi Bailey MD

## 2022-03-09 NOTE — PROGRESS NOTES
Chief Complaint   Patient presents with    Seizure     6 months follow up, stable on medication, patient states no recent seizure activity     Patient states her B/P has been high recently at dialysis, she has an appt with cardio provider next week    Visit Vitals  BP (!) 160/74 (BP 1 Location: Right upper arm, BP Patient Position: Sitting)   Pulse (!) 58   Resp 16   Ht 5' 2\" (1.575 m)   Wt 77.1 kg (170 lb)   SpO2 95%   BMI 31.09 kg/m²

## 2022-03-14 LAB — LEVETIRACETAM SERPL-MCNC: 25.1 UG/ML (ref 10–40)

## 2022-03-18 PROBLEM — I61.9 ICH (INTRACEREBRAL HEMORRHAGE) (HCC): Status: ACTIVE | Noted: 2019-02-20

## 2022-03-18 PROBLEM — M17.11 PRIMARY OSTEOARTHRITIS OF RIGHT KNEE: Status: ACTIVE | Noted: 2019-08-22

## 2022-03-18 PROBLEM — R20.2 PARESTHESIA: Status: ACTIVE | Noted: 2017-08-10

## 2022-03-19 PROBLEM — I50.33 DIASTOLIC CHF, ACUTE ON CHRONIC (HCC): Status: ACTIVE | Noted: 2021-05-14

## 2022-03-19 PROBLEM — R77.8 ELEVATED TROPONIN: Status: ACTIVE | Noted: 2021-05-14

## 2022-03-19 PROBLEM — D63.1 ANEMIA DUE TO CHRONIC KIDNEY DISEASE: Status: ACTIVE | Noted: 2021-05-24

## 2022-03-19 PROBLEM — R79.89 ELEVATED TROPONIN: Status: ACTIVE | Noted: 2021-05-14

## 2022-03-19 PROBLEM — N18.9 ANEMIA DUE TO CHRONIC KIDNEY DISEASE: Status: ACTIVE | Noted: 2021-05-24

## 2022-03-19 PROBLEM — Z99.2 ESRD ON HEMODIALYSIS (HCC): Status: ACTIVE | Noted: 2021-05-24

## 2022-03-19 PROBLEM — E87.70 VOLUME OVERLOAD: Status: ACTIVE | Noted: 2021-05-14

## 2022-03-19 PROBLEM — I10 HTN (HYPERTENSION), BENIGN: Status: ACTIVE | Noted: 2021-05-24

## 2022-03-19 PROBLEM — J96.01 ACUTE HYPOXEMIC RESPIRATORY FAILURE (HCC): Status: ACTIVE | Noted: 2021-07-06

## 2022-03-19 PROBLEM — N18.6 ESRD ON HEMODIALYSIS (HCC): Status: ACTIVE | Noted: 2021-05-24

## 2022-03-19 PROBLEM — J96.01 ACUTE RESPIRATORY FAILURE WITH HYPOXIA (HCC): Status: ACTIVE | Noted: 2021-05-14

## 2022-03-19 PROBLEM — N17.9 AKI (ACUTE KIDNEY INJURY) (HCC): Status: ACTIVE | Noted: 2021-05-14

## 2022-03-20 PROBLEM — G40.909 NONINTRACTABLE EPILEPSY WITHOUT STATUS EPILEPTICUS (HCC): Status: ACTIVE | Noted: 2017-08-10

## 2022-04-05 ENCOUNTER — HOSPITAL ENCOUNTER (INPATIENT)
Age: 82
LOS: 1 days | Discharge: HOME HEALTH | DRG: 291 | End: 2022-04-07
Attending: EMERGENCY MEDICINE | Admitting: INTERNAL MEDICINE
Payer: MEDICARE

## 2022-04-05 ENCOUNTER — APPOINTMENT (OUTPATIENT)
Dept: CT IMAGING | Age: 82
DRG: 291 | End: 2022-04-05
Attending: EMERGENCY MEDICINE
Payer: MEDICARE

## 2022-04-05 DIAGNOSIS — J81.0 ACUTE PULMONARY EDEMA (HCC): ICD-10-CM

## 2022-04-05 DIAGNOSIS — R06.02 SOB (SHORTNESS OF BREATH): ICD-10-CM

## 2022-04-05 DIAGNOSIS — R09.02 HYPOXEMIA: Primary | ICD-10-CM

## 2022-04-05 LAB
ALBUMIN SERPL-MCNC: 3.5 G/DL (ref 3.5–5)
ALBUMIN/GLOB SERPL: 0.8 {RATIO} (ref 1.1–2.2)
ALP SERPL-CCNC: 67 U/L (ref 45–117)
ALT SERPL-CCNC: 37 U/L (ref 12–78)
ANION GAP SERPL CALC-SCNC: 11 MMOL/L (ref 5–15)
AST SERPL-CCNC: 28 U/L (ref 15–37)
BASOPHILS # BLD: 0 K/UL (ref 0–0.1)
BASOPHILS NFR BLD: 1 % (ref 0–1)
BILIRUB SERPL-MCNC: 1.1 MG/DL (ref 0.2–1)
BUN SERPL-MCNC: 23 MG/DL (ref 6–20)
BUN/CREAT SERPL: 6 (ref 12–20)
CALCIUM SERPL-MCNC: 8.7 MG/DL (ref 8.5–10.1)
CHLORIDE SERPL-SCNC: 99 MMOL/L (ref 97–108)
CO2 SERPL-SCNC: 29 MMOL/L (ref 21–32)
CREAT SERPL-MCNC: 3.57 MG/DL (ref 0.55–1.02)
DIFFERENTIAL METHOD BLD: ABNORMAL
EOSINOPHIL # BLD: 0.1 K/UL (ref 0–0.4)
EOSINOPHIL NFR BLD: 2 % (ref 0–7)
ERYTHROCYTE [DISTWIDTH] IN BLOOD BY AUTOMATED COUNT: 14.1 % (ref 11.5–14.5)
GLOBULIN SER CALC-MCNC: 4.4 G/DL (ref 2–4)
GLUCOSE SERPL-MCNC: 120 MG/DL (ref 65–100)
HCT VFR BLD AUTO: 28.4 % (ref 35–47)
HGB BLD-MCNC: 9.4 G/DL (ref 11.5–16)
IMM GRANULOCYTES # BLD AUTO: 0 K/UL (ref 0–0.04)
IMM GRANULOCYTES NFR BLD AUTO: 1 % (ref 0–0.5)
LYMPHOCYTES # BLD: 1.6 K/UL (ref 0.8–3.5)
LYMPHOCYTES NFR BLD: 28 % (ref 12–49)
MCH RBC QN AUTO: 31.6 PG (ref 26–34)
MCHC RBC AUTO-ENTMCNC: 33.1 G/DL (ref 30–36.5)
MCV RBC AUTO: 95.6 FL (ref 80–99)
MONOCYTES # BLD: 0.5 K/UL (ref 0–1)
MONOCYTES NFR BLD: 9 % (ref 5–13)
NEUTS SEG # BLD: 3.3 K/UL (ref 1.8–8)
NEUTS SEG NFR BLD: 59 % (ref 32–75)
NRBC # BLD: 0 K/UL (ref 0–0.01)
NRBC BLD-RTO: 0 PER 100 WBC
PLATELET # BLD AUTO: 220 K/UL (ref 150–400)
PMV BLD AUTO: 8.9 FL (ref 8.9–12.9)
POTASSIUM SERPL-SCNC: 3.7 MMOL/L (ref 3.5–5.1)
PROT SERPL-MCNC: 7.9 G/DL (ref 6.4–8.2)
RBC # BLD AUTO: 2.97 M/UL (ref 3.8–5.2)
SODIUM SERPL-SCNC: 139 MMOL/L (ref 136–145)
WBC # BLD AUTO: 5.6 K/UL (ref 3.6–11)

## 2022-04-05 PROCEDURE — 80053 COMPREHEN METABOLIC PANEL: CPT

## 2022-04-05 PROCEDURE — 93005 ELECTROCARDIOGRAM TRACING: CPT

## 2022-04-05 PROCEDURE — 74174 CTA ABD&PLVS W/CONTRAST: CPT

## 2022-04-05 PROCEDURE — 71275 CT ANGIOGRAPHY CHEST: CPT

## 2022-04-05 PROCEDURE — 36415 COLL VENOUS BLD VENIPUNCTURE: CPT

## 2022-04-05 PROCEDURE — 99285 EMERGENCY DEPT VISIT HI MDM: CPT

## 2022-04-05 PROCEDURE — 74011000636 HC RX REV CODE- 636: Performed by: EMERGENCY MEDICINE

## 2022-04-05 PROCEDURE — 85025 COMPLETE CBC W/AUTO DIFF WBC: CPT

## 2022-04-05 RX ADMIN — IOPAMIDOL 100 ML: 755 INJECTION, SOLUTION INTRAVENOUS at 19:23

## 2022-04-05 NOTE — ED TRIAGE NOTES
Patient arrives with c/o SOB x 1 week, and varying blood pressures in bilateral arms. States had BP  of 118/59 and 109/49 in left arm, and 179/49 and 180/74 in right arm today prior to arrival.    Patient reports left sided headache. Per son, patient is a dialysis patient (T, TH, SA). Son states patient had dialysis today and he took blood pressures this afternoon in each extremity, with results above. Patient denies new cough, fever, N/V/D, chest pain. Dr. Yanira Melo at bedside assessing patient.

## 2022-04-06 PROBLEM — D47.2 MONOCLONAL GAMMOPATHY: Status: ACTIVE | Noted: 2018-01-01

## 2022-04-06 PROBLEM — R06.09 DOE (DYSPNEA ON EXERTION): Status: ACTIVE | Noted: 2022-04-06

## 2022-04-06 PROBLEM — J81.1 PULMONARY EDEMA: Status: ACTIVE | Noted: 2022-04-06

## 2022-04-06 LAB
APPEARANCE UR: CLEAR
BACTERIA URNS QL MICRO: ABNORMAL /HPF
BILIRUB UR QL: NEGATIVE
COLOR UR: ABNORMAL
EPITH CASTS URNS QL MICRO: ABNORMAL /LPF
GLUCOSE UR STRIP.AUTO-MCNC: NEGATIVE MG/DL
HGB UR QL STRIP: NEGATIVE
KETONES UR QL STRIP.AUTO: NEGATIVE MG/DL
LEUKOCYTE ESTERASE UR QL STRIP.AUTO: ABNORMAL
NITRITE UR QL STRIP.AUTO: NEGATIVE
PH UR STRIP: 7 [PH] (ref 5–8)
PROT UR STRIP-MCNC: 300 MG/DL
RBC #/AREA URNS HPF: ABNORMAL /HPF (ref 0–5)
SP GR UR REFRACTOMETRY: 1.02 (ref 1–1.03)
UROBILINOGEN UR QL STRIP.AUTO: 1 EU/DL (ref 0.2–1)
WBC URNS QL MICRO: ABNORMAL /HPF (ref 0–4)

## 2022-04-06 PROCEDURE — 74011000250 HC RX REV CODE- 250: Performed by: INTERNAL MEDICINE

## 2022-04-06 PROCEDURE — 77010033678 HC OXYGEN DAILY

## 2022-04-06 PROCEDURE — 94761 N-INVAS EAR/PLS OXIMETRY MLT: CPT

## 2022-04-06 PROCEDURE — 5A1D70Z PERFORMANCE OF URINARY FILTRATION, INTERMITTENT, LESS THAN 6 HOURS PER DAY: ICD-10-PCS | Performed by: INTERNAL MEDICINE

## 2022-04-06 PROCEDURE — 74011250636 HC RX REV CODE- 250/636: Performed by: INTERNAL MEDICINE

## 2022-04-06 PROCEDURE — 90935 HEMODIALYSIS ONE EVALUATION: CPT

## 2022-04-06 PROCEDURE — 2709999900 HC NON-CHARGEABLE SUPPLY

## 2022-04-06 PROCEDURE — 81001 URINALYSIS AUTO W/SCOPE: CPT

## 2022-04-06 PROCEDURE — 87086 URINE CULTURE/COLONY COUNT: CPT

## 2022-04-06 PROCEDURE — 65270000029 HC RM PRIVATE

## 2022-04-06 PROCEDURE — 97165 OT EVAL LOW COMPLEX 30 MIN: CPT

## 2022-04-06 PROCEDURE — 97535 SELF CARE MNGMENT TRAINING: CPT

## 2022-04-06 PROCEDURE — 74011250637 HC RX REV CODE- 250/637: Performed by: INTERNAL MEDICINE

## 2022-04-06 RX ORDER — MINOXIDIL 2.5 MG/1
2.5 TABLET ORAL 2 TIMES DAILY
Status: DISCONTINUED | OUTPATIENT
Start: 2022-04-06 | End: 2022-04-07 | Stop reason: HOSPADM

## 2022-04-06 RX ORDER — PRAVASTATIN SODIUM 20 MG/1
40 TABLET ORAL
Status: DISCONTINUED | OUTPATIENT
Start: 2022-04-06 | End: 2022-04-07 | Stop reason: HOSPADM

## 2022-04-06 RX ORDER — HEPARIN SODIUM 5000 [USP'U]/ML
5000 INJECTION, SOLUTION INTRAVENOUS; SUBCUTANEOUS EVERY 8 HOURS
Status: DISCONTINUED | OUTPATIENT
Start: 2022-04-06 | End: 2022-04-07 | Stop reason: HOSPADM

## 2022-04-06 RX ORDER — SODIUM CHLORIDE 0.9 % (FLUSH) 0.9 %
5-40 SYRINGE (ML) INJECTION AS NEEDED
Status: DISCONTINUED | OUTPATIENT
Start: 2022-04-06 | End: 2022-04-07 | Stop reason: HOSPADM

## 2022-04-06 RX ORDER — CARVEDILOL 6.25 MG/1
6.25 TABLET ORAL 2 TIMES DAILY WITH MEALS
Status: DISCONTINUED | OUTPATIENT
Start: 2022-04-06 | End: 2022-04-07 | Stop reason: HOSPADM

## 2022-04-06 RX ORDER — LEVETIRACETAM 250 MG/1
250 TABLET ORAL 2 TIMES DAILY
Status: DISCONTINUED | OUTPATIENT
Start: 2022-04-06 | End: 2022-04-07 | Stop reason: HOSPADM

## 2022-04-06 RX ORDER — LEVETIRACETAM 250 MG/1
250 TABLET ORAL
Status: DISCONTINUED | OUTPATIENT
Start: 2022-04-07 | End: 2022-04-07 | Stop reason: HOSPADM

## 2022-04-06 RX ORDER — AMLODIPINE BESYLATE 5 MG/1
10 TABLET ORAL EVERY EVENING
Status: DISCONTINUED | OUTPATIENT
Start: 2022-04-06 | End: 2022-04-07 | Stop reason: HOSPADM

## 2022-04-06 RX ORDER — ACETAMINOPHEN 650 MG/1
650 SUPPOSITORY RECTAL
Status: DISCONTINUED | OUTPATIENT
Start: 2022-04-06 | End: 2022-04-07 | Stop reason: HOSPADM

## 2022-04-06 RX ORDER — FLUTICASONE PROPIONATE 50 MCG
2 SPRAY, SUSPENSION (ML) NASAL EVERY EVENING
Status: DISCONTINUED | OUTPATIENT
Start: 2022-04-06 | End: 2022-04-07 | Stop reason: HOSPADM

## 2022-04-06 RX ORDER — CLONIDINE HYDROCHLORIDE 0.1 MG/1
0.2 TABLET ORAL
Status: DISCONTINUED | OUTPATIENT
Start: 2022-04-06 | End: 2022-04-07 | Stop reason: HOSPADM

## 2022-04-06 RX ORDER — HYDRALAZINE HYDROCHLORIDE 25 MG/1
25 TABLET, FILM COATED ORAL 3 TIMES DAILY
Status: DISCONTINUED | OUTPATIENT
Start: 2022-04-06 | End: 2022-04-07 | Stop reason: HOSPADM

## 2022-04-06 RX ORDER — ONDANSETRON 4 MG/1
4 TABLET, ORALLY DISINTEGRATING ORAL
Status: DISCONTINUED | OUTPATIENT
Start: 2022-04-06 | End: 2022-04-07 | Stop reason: HOSPADM

## 2022-04-06 RX ORDER — ONDANSETRON 2 MG/ML
4 INJECTION INTRAMUSCULAR; INTRAVENOUS
Status: DISCONTINUED | OUTPATIENT
Start: 2022-04-06 | End: 2022-04-07 | Stop reason: HOSPADM

## 2022-04-06 RX ORDER — LEVETIRACETAM 250 MG/1
250 TABLET ORAL 2 TIMES DAILY
Status: DISCONTINUED | OUTPATIENT
Start: 2022-04-06 | End: 2022-04-06 | Stop reason: DRUGHIGH

## 2022-04-06 RX ORDER — ASPIRIN 81 MG/1
81 TABLET ORAL DAILY
Status: DISCONTINUED | OUTPATIENT
Start: 2022-04-06 | End: 2022-04-07 | Stop reason: HOSPADM

## 2022-04-06 RX ORDER — POLYETHYLENE GLYCOL 3350 17 G/17G
17 POWDER, FOR SOLUTION ORAL DAILY PRN
Status: DISCONTINUED | OUTPATIENT
Start: 2022-04-06 | End: 2022-04-07 | Stop reason: HOSPADM

## 2022-04-06 RX ORDER — ACETAMINOPHEN 325 MG/1
650 TABLET ORAL
Status: DISCONTINUED | OUTPATIENT
Start: 2022-04-06 | End: 2022-04-07 | Stop reason: HOSPADM

## 2022-04-06 RX ORDER — SODIUM CHLORIDE 0.9 % (FLUSH) 0.9 %
5-40 SYRINGE (ML) INJECTION EVERY 8 HOURS
Status: DISCONTINUED | OUTPATIENT
Start: 2022-04-06 | End: 2022-04-07 | Stop reason: HOSPADM

## 2022-04-06 RX ADMIN — MINOXIDIL 2.5 MG: 2.5 TABLET ORAL at 10:22

## 2022-04-06 RX ADMIN — SODIUM CHLORIDE, PRESERVATIVE FREE 10 ML: 5 INJECTION INTRAVENOUS at 12:36

## 2022-04-06 RX ADMIN — HEPARIN SODIUM 5000 UNITS: 5000 INJECTION INTRAVENOUS; SUBCUTANEOUS at 21:00

## 2022-04-06 RX ADMIN — MINOXIDIL 2.5 MG: 2.5 TABLET ORAL at 17:42

## 2022-04-06 RX ADMIN — HYDRALAZINE HYDROCHLORIDE 25 MG: 25 TABLET, FILM COATED ORAL at 17:17

## 2022-04-06 RX ADMIN — ASPIRIN 81 MG: 81 TABLET, COATED ORAL at 10:15

## 2022-04-06 RX ADMIN — LEVETIRACETAM 250 MG: 250 TABLET, FILM COATED ORAL at 17:17

## 2022-04-06 RX ADMIN — CARVEDILOL 6.25 MG: 6.25 TABLET, FILM COATED ORAL at 17:17

## 2022-04-06 RX ADMIN — CARVEDILOL 6.25 MG: 6.25 TABLET, FILM COATED ORAL at 10:16

## 2022-04-06 RX ADMIN — HYDRALAZINE HYDROCHLORIDE 25 MG: 25 TABLET, FILM COATED ORAL at 10:15

## 2022-04-06 RX ADMIN — FLUTICASONE PROPIONATE 2 SPRAY: 50 SPRAY, METERED NASAL at 17:42

## 2022-04-06 RX ADMIN — AMLODIPINE BESYLATE 10 MG: 5 TABLET ORAL at 17:17

## 2022-04-06 RX ADMIN — SODIUM CHLORIDE, PRESERVATIVE FREE 10 ML: 5 INJECTION INTRAVENOUS at 21:00

## 2022-04-06 RX ADMIN — HEPARIN SODIUM 5000 UNITS: 5000 INJECTION INTRAVENOUS; SUBCUTANEOUS at 06:47

## 2022-04-06 RX ADMIN — HYDRALAZINE HYDROCHLORIDE 25 MG: 25 TABLET, FILM COATED ORAL at 21:00

## 2022-04-06 RX ADMIN — HEPARIN SODIUM 5000 UNITS: 5000 INJECTION INTRAVENOUS; SUBCUTANEOUS at 12:36

## 2022-04-06 RX ADMIN — PRAVASTATIN SODIUM 40 MG: 20 TABLET ORAL at 21:00

## 2022-04-06 RX ADMIN — LEVETIRACETAM 250 MG: 250 TABLET, FILM COATED ORAL at 10:15

## 2022-04-06 RX ADMIN — SODIUM CHLORIDE, PRESERVATIVE FREE 10 ML: 5 INJECTION INTRAVENOUS at 06:48

## 2022-04-06 NOTE — ED PROVIDER NOTES
Patient is an 74-year-old woman with a history of end-stage renal disease on dialysis, last dialysis earlier today, who comes into the emergency department with shortness of breath and uneven blood pressures in her arms, blood pressure on the left arm is significantly lower than the blood pressure on the right and this is a phenomenon that was not noticed prior to today. The patient reports that she had a dialysis shunt in her left arm that was recently tied off and she is scheduled to get a shunt placed on the right arm tomorrow. The patient has had shortness of breath over the course of the last week and denies any cough, fevers, nausea, vomiting, diarrhea, chest pain, and back pain. The history is provided by the patient.         Past Medical History:   Diagnosis Date    Anxiety     Arthritis     Basal ganglia stroke ([address] 2019    Left    CKD (chronic kidney disease) stage 3, GFR 30-59 ml/min ([facility])     High cholesterol     Hx of seasonal allergies     Hypertension     Ischemic stroke ([address]     Right occipital    Monoclonal gammopathy 2018    ABIGAIL on CPAP     Seizure ([address]     Seizures ([address]     last keppra level- 2019 @ 15   Last seizure 2017    Stroke ([address]     Urinary incontinence        Past Surgical History:   Procedure Laterality Date    HX APPENDECTOMY N/A     HX CATARACT REMOVAL Bilateral 2018    HX  SECTION N/A     x 2    HX HEMORRHOIDECTOMY      HX HYSTERECTOMY      HX KNEE REPLACEMENT Right 2019    HX TONSILLECTOMY      IR INSERT NON TUNL CVC OVER 5 YRS  2021    IR INSERT TUNL CVC W/O PORT OVER 5 YR  2021         Family History:   Problem Relation Age of Onset    Hypertension Mother     Cancer Sister         Skin    Diabetes Sister     Hypertension Sister     Stroke Father     Parkinson's Disease Father     Cancer Sister         Skin    Cancer Sister     No Known Problems Brother        Social History     Socioeconomic History    Marital status:      Spouse name: Not on file    Number of children: Not on file    Years of education: Not on file    Highest education level: Not on file   Occupational History    Not on file   Tobacco Use    Smoking status: Never Smoker    Smokeless tobacco: Never Used   Vaping Use    Vaping Use: Never used   Substance and Sexual Activity    Alcohol use: No    Drug use: No    Sexual activity: Not on file   Other Topics Concern    Not on file   Social History Narrative    Not on file     Social Determinants of Health     Financial Resource Strain:     Difficulty of Paying Living Expenses: Not on file   Food Insecurity:     Worried About [address] [employer] in the Last Year: Not on file    Jerald of Food in the Last Year: Not on file   Transportation Needs:     Lack of Transportation (Medical): Not on file    Lack of Transportation (Non-Medical): Not on file   Physical Activity:     Days of Exercise per Week: Not on file    Minutes of Exercise per Session: Not on file   Stress:     Feeling of Stress : Not on file   Social Connections:     Frequency of Communication with Friends and Family: Not on file    Frequency of Social Gatherings with Friends and Family: Not on file    Attends Shinto Services: Not on file    Active Member of [address] [place] [address] or Organizations: Not on file    Attends Club or Organization Meetings: Not on file    Marital Status: Not on file   Intimate Partner Violence:     Fear of Current or Ex-Partner: Not on file    Emotionally Abused: Not on file    Physically Abused: Not on file    Sexually Abused: Not on file   Housing Stability:     Unable to Pay for Housing in the Last Year: Not on file    Number of [place] in the Last Year: Not on file    Unstable Housing in the Last Year: Not on file         ALLERGIES: Latex, Codeine, Levaquin [levofloxacin], Lisinopril, and Sulfa (sulfonamide antibiotics)    Review of Systems   Constitutional: Positive for fatigue. Respiratory: Positive for shortness of breath. Cardiovascular: Negative for chest pain. Gastrointestinal: Negative for diarrhea, nausea and vomiting. All other systems reviewed and are negative. Vitals:    22 2100 223   BP: (!) 106/52 (!) 101/53 (!) 110/49 (!) 177/47   Pulse: 84 74 73 86   Resp:    Temp:       SpO2: 90% 95% 94% 90%   Weight:       Height:                Physical Exam  Vitals and nursing note reviewed. Constitutional:       Appearance: She is well-developed. HENT:      Head: Normocephalic and atraumatic. Eyes:      Pupils: Pupils are equal, round, and reactive to light. Cardiovascular:      Rate and Rhythm: Normal rate and regular rhythm. Pulmonary:      Effort: Pulmonary effort is normal.      Breath sounds: Examination of the right-lower field reveals rales. Examination of the left-lower field reveals rales. Rales present. Abdominal:      General: There is no distension. Palpations: Abdomen is soft. Tenderness: There is no abdominal tenderness. Musculoskeletal:      Cervical back: Normal range of motion and neck supple. Skin:     General: Skin is warm and dry. Capillary Refill: Capillary refill takes less than 2 seconds. Neurological:      General: No focal deficit present. Mental Status: She is alert and oriented to person, place, and time. Psychiatric:         Mood and Affect: Mood normal.         Behavior: Behavior normal.          MDM       Procedures        EKG at 6:07 PM shows normal sinus rhythm, first-degree AV block, 76 bpm, frequent premature atrial and ventricular complexes, lateral ST depressions, biphasic T waves in the lateral leads, no STEMI.   EKG is interpreted by Jihan Astorga MD        MEDICAL DECISION MAKIN y.o. female presents with Shortness of Breath and Elevated Blood Pressure    Differential diagnosis includes but not limited to: Pulmonary embolism, aortic dissection, pneumonia, pneumothorax, pulmonary edema, congestive heart failure, renal failure, fluid overload, STEMI, arrhythmia    LABORATORY TESTS:  Labs Reviewed   CBC WITH AUTOMATED DIFF - Abnormal; Notable for the following components:       Result Value    RBC 2.97 (*)     HGB 9.4 (*)     HCT 28.4 (*)     IMMATURE GRANULOCYTES 1 (*)     All other components within normal limits   METABOLIC PANEL, COMPREHENSIVE - Abnormal; Notable for the following components:    Glucose 120 (*)     BUN 23 (*)     Creatinine 3.57 (*)     BUN/Creatinine ratio 6 (*)     GFR est AA 15 (*)     GFR est non-AA 12 (*)     Bilirubin, total 1.1 (*)     Globulin 4.4 (*)     A-G Ratio 0.8 (*)     All other components within normal limits       IMAGING RESULTS:  CTA ABDOMEN PELV W CONT   Final Result   1. Study limited by motion. 2.  No thoracoabdominal aortic dissection. Heavy calcified plaque in the   proximal left subclavian artery. 3.  Pulmonary edema pattern with small bilateral pleural effusions. 4.  Bulky mediastinal and mild bilateral hilar lymphadenopathy is nonspecific,   may be reactive, but cannot exclude neoplastic etiology. Consider follow-up CT   in 3-6 months to document stability versus resolution. 5.  2.2 cm right thyroid lobe nodule, nonemergent dedicated ultrasound can be   obtained as an outpatient if clinically indicated         CTA CHEST W OR W WO CONT   Final Result   1. Study limited by motion. 2.  No thoracoabdominal aortic dissection. Heavy calcified plaque in the   proximal left subclavian artery. 3.  Pulmonary edema pattern with small bilateral pleural effusions. 4.  Bulky mediastinal and mild bilateral hilar lymphadenopathy is nonspecific,   may be reactive, but cannot exclude neoplastic etiology. Consider follow-up CT   in 3-6 months to document stability versus resolution.    5.  2.2 cm right thyroid lobe nodule, nonemergent dedicated ultrasound can be   obtained as an outpatient if clinically indicated             MEDICATIONS GIVEN:  Medications   iopamidoL (ISOVUE-370) 76 % injection 100 mL (100 mL IntraVENous Given 4/5/22 1923)     CONSULTS:  Hospitalist Consult: [address] for Admission  9:55 PM    ED Room Number: WER03/03  Patient Name and age:  Krian Cota 79 y.o.  female  Working Diagnosis:   1. Hypoxemia    2. Acute pulmonary edema (HCC)    3. SOB (shortness of breath)        COVID-19 Suspicion:  no  Sepsis present:  no  Reassessment needed: N/A  Code Status:  Full Code  Readmission: no  Isolation Requirements:  no  Recommended Level of Care:  telemetry  Department:[facility] ED - [phone]        Total critical care time spent exclusive of procedures:  35 minutes    Waqar Weller MD          Please note that this dictation was completed with [employer], the computer voice recognition software. Quite often unanticipated grammatical, syntax, homophones, and other interpretive errors are inadvertently transcribed by the computer software. Please disregard these errors. Please excuse any errors that have escaped final proofreading.

## 2022-04-06 NOTE — PROGRESS NOTES
Pt ordered for Home O2 evaluation. Cleared by Physical Therapy for RT to assess. Pt just started HD and will be unable to ambulate for the next several hours. Will attempt to evaluate later today or assess early in the morning. Spoke with CM to update.

## 2022-04-06 NOTE — H&P
History and Physical  NAME: Larissa Bee  MRN: [id number]  YOB: 1940  AGE: 79 y.o.  female  SOCIAL SECURITY NUMBER: [id number]  Primary Care Provider: Vanessa Pillai MD  CODE STATUS: Full Code      ASSESSMENT/PLAN:  1.  Pulmonary edema/small bilateral pleural effusions. Patient states that she makes minimal urine, no more than 8 ounces daily. She may need extra dialysis session. 2.  Hypoxia. Suspect that this is due to pulmonary edema. Patient does not use home oxygen. 3.  End-stage renal disease on hemodialysis. Consult nephrology. 4.  Monoclonal gammopathy of undetermined significance. Will defer to nephrologist.  5.  Bulky mediastinal and mild bilateral hilar lymphadenopathy. May be there is progression of monoclonal gammopathy to multiple myeloma? 6. Labile hypertension. Resume home medications for now. 7.  Seizure disorder. Per office notes by neurologist on 3/9/2022, patient continues taking Keppra 250 mg twice a day. She has been on Dialysis since May 2021. After each dialysis session, she was instructed to add one additional keppra tablet. 8. Moderate pulmonary hypertension per echocardiogram of 7/7/2021. Same study showed normal LVEF of 55-60%. Patient may have age-related diastolic dysfunction. History of Presenting Illness:   Larissa Bee is a 79 y.o. female  who was started on dialysis this past May, 2021. She states that prior to starting dialysis, she would get short of breath and lower extremity quite often. However, since starting dialysis, shortness of breath has improved much. Patient states that she continues to make some urine, but it is not much, probably less than 8 ounces daily. Patient states that her legs do not swell up anymore on a regular basis, but recently, there has been some swelling. Patient states that she has been feeling gradually short of breath over the past one week.  She states that the shortness of breath is worse with walking than it is with laying down, although she does experienced some orthopnea as well. During exam, head of the bed is at 30°, and patient states that she does not feel much dyspnea. Patient states that she has not missed any of her sessions. Patient states that she has had some sinus congestion and postnasal drip which she attributes to seasonal allergies. Patient states that she does cough once in a while, but it is more of a tearing of the throat. She denies fever, chills, denies chest pain, palpitations, wheezing. However, patient states that she can hear her murmur in her ear sometimes. Patient denies any history of coronary artery disease. She believes that she may have been told once before that she has heart failure, but it is not something that was prominent. She believes that it was not her cardiologist who told her that she has heart failure, although her cardiologist does address her murmur. She states that if she had been told of heart failure before, it was by another doctor. Review of Systems:  A comprehensive review of systems was negative except for that written in the History of Present Illness. 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;  Old right posterior medial occipital lobe cortical infarct    Hx of seasonal allergies     Hypertension     Moderate pulmonary hypertension ([address]     Per echocardiogram of 7/7/2021; same study showed normal LVEF of 55-60%    Monoclonal gammopathy of undetermined significance     Obstructive sleep apnea     Seizures ([address]     last keppra level- 8/2019 @ 15   Last seizure 2017    Urinary incontinence         PAST SURGICAL HISTORY: Past Surgical History:   Procedure Laterality Date    HX APPENDECTOMY N/A     HX CATARACT REMOVAL Bilateral 2018    HX  SECTION N/A     x 2    HX HEMORRHOIDECTOMY      HX HYSTERECTOMY      HX KNEE REPLACEMENT Right 2019    HX TONSILLECTOMY      IR INSERT NON TUNL CVC OVER 5 YRS  2021    IR INSERT TUNL CVC W/O PORT OVER 5 YR  2021       Prior to Admission medications    Medication Sig Start Date Authorizing Provider   acetaminophen (TYLENOL) 650 mg TbER Take 650 mg by mouth every eight (8) hours as needed for Pain. Provider, Historical   hydrALAZINE (APRESOLINE) 25 mg tablet Take 25 mg by mouth three (3) times daily. Provider, Historical   calcium acetate,phosphat bind, (PHOSLO) 667 mg cap Take 1 Capsule by mouth three (3) times daily (with meals). Provider, Historical   minoxidiL (LONITEN) 2.5 mg tablet Take 2.5 mg by mouth two (2) times a day. Indications: high blood pressure  Provider, Historical   omega-3 acid ethyl esters (LOVAZA) 1 gram capsule Take 1 g by mouth two (2) times a day. Provider, Historical   aspirin delayed-release 81 mg tablet Take 81 mg by mouth daily. Provider, Historical   amLODIPine (NORVASC) 10 mg tablet Take 10 mg by mouth every evening. Provider, Historical   montelukast (Singulair) 10 mg tablet Take 10 mg by mouth every evening. Provider, Historical   fluticasone propionate (Flonase Allergy Relief) 50 mcg/actuation nasal spray 2 Sprays by Both Nostrils route every evening. Provider, Historical   polyvinyl alcohol-povidon,PF, (REFRESH CLASSIC) 1.4-0.6 % ophthalmic solution Administer 1-2 Drops to both eyes as needed. Provider, Historical   carvedilol (COREG) 6.25 mg tablet Take 1 Tab by mouth two (2) times daily (with meals). Patient taking differently: Take 6.25 mg by mouth three (3) times daily (with meals). Indications: high blood pressure 19 Evaristo Kilpatrick MD   pravastatin (PRAVACHOL) 40 mg tablet Take 40 mg by mouth nightly.   Provider, Historical   levETIRAcetam (Keppra) 250 mg tablet Take one tablet in morning and evening. After dialysis session, take one additional tablet. 90 day prescription 3/9/22 Darrell Farris MD   ferrous sulfate 325 mg (65 mg iron) tablet Take 325 mg by mouth two (2) times a day. Patient not taking: Reported on 9/8/2021  Provider, Historical   cloNIDine HCL (CATAPRES) 0.2 mg tablet Take 1 Tablet by mouth every six (6) hours as needed for Other (sBP >150) for up to 30 doses.  Indications: high blood pressure  Patient not taking: Reported on 9/8/2021 5/24/21 Nelia Underwood MD       Allergies   Allergen Reactions    Latex Rash    Codeine Other (comments)    Levaquin [Levofloxacin] Other (comments)     Hallucinations    Lisinopril Cough    Sulfa (Sulfonamide Antibiotics) Rash and Itching        Family History   Problem Relation Age of Onset    Hypertension Mother     Diabetes Sister     Hypertension Sister     SKIN CANCER Sister     Stroke Father     Parkinson's Disease Father     SKIN CANCER Sister     Cancer Sister         Social History     Tobacco Use    Smoking status: Passive Smoke Exposure - Never Smoker    Smokeless tobacco: Never Used    Tobacco comment: Passive smoke exposure  who passed when patient was 57   Vaping Use    Vaping Use: Never used   Substance Use Topics    Alcohol use: No    Drug use: No       Physical exam  Patient Vitals for the past 24 hrs:   BP Temp Pulse Resp SpO2  oxygen therapy   04/06/22 0733 (!) 174/75 98.1 °F (36.7 °C) 71 16 93 %    04/06/22 0339 (!) 179/59 -- -- -- --    04/06/22 0322 112/60 97.9 °F (36.6 °C) 73 18 93 %    04/06/22 0319 -- 98 °F (36.7 °C) 73 18 94 %    04/06/22 0100 (!) 153/39 -- 69 20 94 %    04/05/22 2245 (!) 174/49 -- 74 21 95 %     04/05/22 2130 (!) 167/45 -- 80 16 (!) 88 %  room air   04/05/22 2113 (!) 177/47 -- 86 22 90 %  room air   04/05/22 2100 (!) 110/49 -- 73 22 94 % 1 L/min   04/05/22 2030 (!) 101/53 -- 74 19 95 %  2 L/min 04/05/22 2000 (!) 106/52 -- 84 21 90 %  room air   04/05/22 1930 (!) 109/51 -- 83 19 91 % room air   04/05/22 1802 (!) 195/60 97.9 °F (36.6 °C) 77 18 96 % room air   04/05/22 1746 -- -- -- -- 96 % room air   04/05/22 1745 -- -- -- -- 96 % room air     Estimated body mass index is 32.74 kg/m² as calculated from the following:    Height as of this encounter: 5' 1\" (1.549 m). Weight as of this encounter: 78.6 kg (173 lb 4.5 oz). General: In no acute distress. Well developed, well nourished. Head: Normocephalic, atraumatic. Eyes: Anicteric sclera. PERRL. Extraocular muscles intact. ENT: External ears and nose appear normal.  Oral mucosa moist.  Neck: Supple. No jugular venous distention. Heart: Regular rate and rhythm. No murmurs appreciated. Chest: Symmetrical excursion. Crackles in the right base. Abdomen: Soft, nontender. No abnormal distention. Bowel sounds are present throughout. Extremities: No gross deformities. No particular edema, no cyanosis. Feet are warm to touch. Neurological: No lateralizing deficits. Alert, oriented X3. Skin: No jaundice. No rashes.           Recent Results (from the past 24 hour(s))   EKG, 12 LEAD, INITIAL    Collection Time: 04/05/22  6:07 PM   Result Value Ref Range    Ventricular Rate 76 BPM    Atrial Rate 76 BPM    P-R Interval 232 ms    QRS Duration 88 ms    Q-T Interval 424 ms    QTC Calculation (Bezet) 477 ms    Calculated P Axis 30 degrees    Calculated R Axis 27 degrees    Calculated T Axis 20 degrees    Diagnosis       Sinus rhythm with 1st degree AV block with frequent premature ventricular   complexes and premature atrial complexes  Cannot rule out Anterior infarct , age undetermined  ST & T wave abnormality, consider lateral ischemia  Abnormal ECG  When compared with ECG of 06-JUL-2021 16:09,  premature ventricular complexes are now present  premature atrial complexes are now present  T wave inversion no longer evident in Anterior leads     CBC WITH AUTOMATED DIFF    Collection Time: 04/05/22  6:08 PM   Result Value Ref Range    WBC 5.6 3.6 - 11.0 K/uL    RBC 2.97 (L) 3.80 - 5.20 M/uL    HGB 9.4 (L) 11.5 - 16.0 g/dL    HCT 28.4 (L) 35.0 - 47.0 %    MCV 95.6 80.0 - 99.0 FL    MCH 31.6 26.0 - 34.0 PG    MCHC 33.1 30.0 - 36.5 g/dL    RDW 14.1 11.5 - 14.5 %    PLATELET 130 164 - 148 K/uL    MPV 8.9 8.9 - 12.9 FL    NRBC 0.0 0.0  WBC    ABSOLUTE NRBC 0.00 0.00 - 0.01 K/uL    NEUTROPHILS 59 32 - 75 %    LYMPHOCYTES 28 12 - 49 %    MONOCYTES 9 5 - 13 %    EOSINOPHILS 2 0 - 7 %    BASOPHILS 1 0 - 1 %    IMMATURE GRANULOCYTES 1 (H) 0 - 0.5 %    ABS. NEUTROPHILS 3.3 1.8 - 8.0 K/UL    ABS. LYMPHOCYTES 1.6 0.8 - 3.5 K/UL    ABS. MONOCYTES 0.5 0.0 - 1.0 K/UL    ABS. EOSINOPHILS 0.1 0.0 - 0.4 K/UL    ABS. BASOPHILS 0.0 0.0 - 0.1 K/UL    ABS. IMM. GRANS. 0.0 0.00 - 0.04 K/UL    DF AUTOMATED     METABOLIC PANEL, COMPREHENSIVE    Collection Time: 04/05/22  6:08 PM   Result Value Ref Range    Sodium 139 136 - 145 mmol/L    Potassium 3.7 3.5 - 5.1 mmol/L    Chloride 99 97 - 108 mmol/L    CO2 29 21 - 32 mmol/L    Anion gap 11 5 - 15 mmol/L    Glucose 120 (H) 65 - 100 mg/dL    BUN 23 (H) 6 - 20 MG/DL    Creatinine 3.57 (H) 0.55 - 1.02 MG/DL    BUN/Creatinine ratio 6 (L) 12 - 20      GFR est AA 15 (L) >60 ml/min/1.73m2    GFR est non-AA 12 (L) >60 ml/min/1.73m2    Calcium 8.7 8.5 - 10.1 MG/DL    Bilirubin, total 1.1 (H) 0.2 - 1.0 MG/DL    ALT (SGPT) 37 12 - 78 U/L    AST (SGOT) 28 15 - 37 U/L    Alk. phosphatase 67 45 - 117 U/L    Protein, total 7.9 6.4 - 8.2 g/dL    Albumin 3.5 3.5 - 5.0 g/dL    Globulin 4.4 (H) 2.0 - 4.0 g/dL    A-G Ratio 0.8 (L) 1.1 - 2.2         CTA CHEST W OR W WO CONT on 4/5/2022  Narrative: EXAM: CTA CHEST W OR W WO CONT,    EXAM: CTA ABDOMEN PELV W CONT    INDICATION: Uneven blood pressure    COMPARISON: Chest radiograph 7/6/2021.     TECHNIQUE: Helical CT of the chest, abdomen, and pelvis following the uneventful  intravenous administration of 100 mL Isovue-370. Oral contrast was utilized. Coronal and sagittal reformats were generated. CT dose reduction was achieved  through use of a standardized protocol tailored for this examination and  automatic exposure control for dose modulation. 3-D post processing was  performed. FINDINGS:  Study limited by motion. THYROID: Heterogeneous and multinodular with dominant 2.2 cm right lobe nodule. CHEST WALL: Right internal jugular approach central venous catheter  MEDIASTINUM: Bulky lymphadenopathy. IKE: Mild bilateral lymphadenopathy. THORACIC AORTA: No aneurysm nor dissection. Atherosclerosis. Heavy calcified  plaque in the left subclavian artery proximally  MAIN PULMONARY ARTERY: Normal in caliber. HEART: Cardiomegaly. Coronary artery calcifications. Small pericardial effusion. ESOPHAGUS: No wall thickening or dilatation. TRACHEA/BRONCHI: Patent. PLEURA: Small bilateral pleural effusions. Pneumothorax. LUNGS: No nodule nor mass. Diffuse faint patchy groundglass and interlobular  septal thickening, compatible with edema. .    Liver: No mass or biliary dilatation. Biliary tree: Cholecystectomy. The CBD is not dilated. Spleen: Within normal limits. Pancreas: No inflammation, mass or ductal dilatation. Adrenals: Unremarkable. Kidneys: No mass or hydronephrosis. Under rotated right kidney. Subcentimeter  partially calcified left lower pole lesion is too small to accurately  characterize; no dedicated follow-up recommended  Stomach: Unremarkable. Small bowel: No dilatation or wall thickening. Periampullary duodenal  diverticulum. Colon: No dilatation or wall thickening. Appendix: No discrete identified. Peritoneum: No ascites or pneumoperitoneum. Retroperitoneum: No lymphadenopathy or aortic aneurysm. Reproductive organs: Hysterectomy. No suspicious adnexal lesion  Urinary bladder: No mass or calculus. Bones: No destructive bone lesion. Degenerative changes.   Abdominal wall: No mass or hernia. Additional comments: N/A  Impression: 1. Study limited by motion. 2.  No thoracoabdominal aortic dissection. Heavy calcified plaque in the  proximal left subclavian artery. 3.  Pulmonary edema pattern with small bilateral pleural effusions. 4.  Bulky mediastinal and mild bilateral hilar lymphadenopathy is nonspecific,  may be reactive, but cannot exclude neoplastic etiology. Consider follow-up CT  in 3-6 months to document stability versus resolution. 5.  2.2 cm right thyroid lobe nodule, nonemergent dedicated ultrasound can be  obtained as an outpatient if clinically indicated      CTA ABDOMEN PELV W CONT on 4/5/2022  Narrative: EXAM: CTA CHEST W OR W WO CONT,    EXAM: CTA ABDOMEN PELV W CONT    INDICATION: Uneven blood pressure    COMPARISON: Chest radiograph 7/6/2021. TECHNIQUE: Helical CT of the chest, abdomen, and pelvis following the uneventful  intravenous administration of 100 mL Isovue-370. Oral contrast was utilized. Coronal and sagittal reformats were generated. CT dose reduction was achieved  through use of a standardized protocol tailored for this examination and  automatic exposure control for dose modulation. 3-D post processing was  performed. FINDINGS:  Study limited by motion. THYROID: Heterogeneous and multinodular with dominant 2.2 cm right lobe nodule. CHEST WALL: Right internal jugular approach central venous catheter  MEDIASTINUM: Bulky lymphadenopathy. IKE: Mild bilateral lymphadenopathy. THORACIC AORTA: No aneurysm nor dissection. Atherosclerosis. Heavy calcified  plaque in the left subclavian artery proximally  MAIN PULMONARY ARTERY: Normal in caliber. HEART: Cardiomegaly. Coronary artery calcifications. Small pericardial effusion. ESOPHAGUS: No wall thickening or dilatation. TRACHEA/BRONCHI: Patent. PLEURA: Small bilateral pleural effusions. Pneumothorax. LUNGS: No nodule nor mass.  Diffuse faint patchy groundglass and interlobular  septal thickening, compatible with edema. .    Liver: No mass or biliary dilatation. Biliary tree: Cholecystectomy. The CBD is not dilated. Spleen: Within normal limits. Pancreas: No inflammation, mass or ductal dilatation. Adrenals: Unremarkable. Kidneys: No mass or hydronephrosis. Under rotated right kidney. Subcentimeter  partially calcified left lower pole lesion is too small to accurately  characterize; no dedicated follow-up recommended  Stomach: Unremarkable. Small bowel: No dilatation or wall thickening. Periampullary duodenal  diverticulum. Colon: No dilatation or wall thickening. Appendix: No discrete identified. Peritoneum: No ascites or pneumoperitoneum. Retroperitoneum: No lymphadenopathy or aortic aneurysm. Reproductive organs: Hysterectomy. No suspicious adnexal lesion  Urinary bladder: No mass or calculus. Bones: No destructive bone lesion. Degenerative changes. Abdominal wall: No mass or hernia. Additional comments: N/A  Impression: 1. Study limited by motion. 2.  No thoracoabdominal aortic dissection. Heavy calcified plaque in the  proximal left subclavian artery. 3.  Pulmonary edema pattern with small bilateral pleural effusions. 4.  Bulky mediastinal and mild bilateral hilar lymphadenopathy is nonspecific,  may be reactive, but cannot exclude neoplastic etiology. Consider follow-up CT  in 3-6 months to document stability versus resolution. 5.  2.2 cm right thyroid lobe nodule, nonemergent dedicated ultrasound can be  obtained as an outpatient if clinically indicated      07/06/21    ECHO ADULT COMPLETE 07/07/2021 7/7/2021    Interpretation Summary  · LV: Estimated LVEF is 55 - 60%. Normal cavity size and systolic function (ejection fraction normal). Mild concentric hypertrophy. Wall motion: normal.  · LA: Mildly dilated left atrium. · RA: Mildly dilated right atrium. · MV: Mitral valve non-specific thickening. Mild mitral valve regurgitation is present.   · PA: Moderate pulmonary hypertension. · IVC: Severely elevated central venous pressure (15 mmHg); IVC diameter is larger than 21 mm and collapses less than 50% with respiration.     Signed by: Marlene Saldaña DO on 7/7/2021 10:47 AM

## 2022-04-06 NOTE — PROGRESS NOTES
Reason for Admission:  Pulmonary edema                   RUR Score:    14%                 Plan for utilizing home health:     TBD- per PT recommendation     PCP: First and Last name:  Angelita Ponce MD   Name of Practice:    Are you a current patient: Yes/No: yes   Approximate date of last visit: about 6 months ago   Can you participate in a virtual visit with your PCP: no                    Current Advanced Directive/Advance Care Plan: Full Code    Healthcare Decision Maker:         Primary Decision Rohini Booth - [phone]                  Transition of Care Plan:                    CM met with patient to begin discharge planning. Patient lives alone in a condo with elevator access. Patient's son and daughter-in-law live in the same condo on a different floor. Patient reports that she is able to provide her own self care. She uses a walker. Additional DME in the home includes a CPAP, rollator, bsc, shower chair and wheelchair. [facility] with [facility] and is agreeable to using them again if recommended. Patient receives dialysis at [facility] Tu/Th/Sat. She relies on family for transportation. Her preferred pharmacy is [facility]. 1. CM to follow  2. PT/OT consulted  3. Home with family and HH if indicated  4. Outpatient follow-up  5. Son will transport at d/c    Care Management Interventions  PCP Verified by CM:  Yes  Transition of Care Consult (CM Consult): Discharge Planning  Physical Therapy Consult: Yes  Occupational Therapy Consult: Yes  Support Systems: Child(shauna),Other Family Member(s)  Confirm Follow Up Transport: Family  The Plan for Transition of Care is Related to the Following Treatment Goals : Pulmonary edema  Discharge Location  Patient Expects to be Discharged to[de-identified] Home with home health     [facility] [place], [facility]

## 2022-04-06 NOTE — PROGRESS NOTES
Problem: Falls - Risk of  Goal: *Absence of Falls  Description: Document Stephie Coates Fall Risk and appropriate interventions in the flowsheet.   Outcome: Progressing Towards Goal  Note: Fall Risk Interventions:  Mobility Interventions: Patient to call before getting OOB,Bed/chair exit alarm         Medication Interventions: Bed/chair exit alarm,Patient to call before getting OOB                   Problem: Patient Education: Go to Patient Education Activity  Goal: Patient/Family Education  Outcome: Progressing Towards Goal     Problem: Hypertension  Goal: *Blood pressure within specified parameters  Outcome: Progressing Towards Goal     Problem: Hypertension  Goal: *Labs within defined limits  Outcome: Progressing Towards Goal

## 2022-04-06 NOTE — ED NOTES
RN transferred pt to [facility] 4-1 per MD orders. RN gave phone report to Huma Mckay RN. Pt AOx4 and resting comfortably on 2L NC. Pt VSS and L ac 20g PIV in place.  [facility] eta [id number]- Pt left the ER in stable condition via stretcher with [facility]

## 2022-04-06 NOTE — PROGRESS NOTES
[facility]    Medical Progress Note      NAME: Alexandria Nelson   :  1940  MRM:  [id number]    Date/Time of service 2022  2:30 PM          Assessment and Plan:     Pulmonary edema / CARRANZA (dyspnea on exertion) - POA, due to a bit excess fluid. Consult renal for HD. PT eval for oxygen needs. ECHO in  with dCHF and pHTN. Hypoventilation may contribute. Outpatient follow up for reactive lymphedenopathy    ESRD on dialysis - consult renal. Renal diet. Fluid restriction. Hypertension - Continue Norvasc, ASA, Coreg, clonidine, hydralazine and minoxidil. Renal can comment. Check orthostatics. Discordant L vs R BP likely related to closed fistula. Outpatient vascular follow up    Anemia - Stable. EPO per renal    Hx of completed stroke / Hx Seizures - continue ASA, BB, statin    Debilitated patient / Arthritis - PT OT eval.    Obese / ABIGAIL on CPAP - May resume home CPAP    Urinary incontinence - Not on medss    Monoclonal gammopathy - Outpatient  Follow up    High cholesterol - Continue pravastatin    Anxiety and depression - Not on meds         Subjective:     Chief Complaint:  dyspnea    ROS:  (bold if positive, if negative)    Tolerating PT  Tolerating Diet        Objective:     Last 24hrs VS reviewed since prior progress note.  Most recent are:    Visit Vitals  BP (!) 179/59 (BP 1 Location: Right upper arm, BP Patient Position: At rest)   Pulse 73   Temp 97.9 °F (36.6 °C)   Resp 18   Ht 5' 1\" (1.549 m)   Wt 78.6 kg (173 lb 4.5 oz)   SpO2 93%   Breastfeeding No   BMI 32.74 kg/m²     SpO2 Readings from Last 6 Encounters:   22 93%   22 95%   21 98%   21 97%   09/15/21 98%   09/10/21 97%    O2 Flow Rate (L/min): 0 l/min   No intake or output data in the 24 hours ending 22 0732     Physical Exam:    Gen:  Obese, in no acute distress  HEENT:  Pink conjunctivae, PERRL, hearing intact to voice, moist mucous membranes  Neck:  Supple, without masses, thyroid non-tender  Resp:  No accessory muscle use, distant breath sounds without wheezes rales or rhonchi  Card:  No murmurs, normal S1, S2 without thrills, bruits or peripheral edema  Abd:  Soft, non-tender, non-distended, normoactive bowel sounds are present, no mass  Lymph:  No cervical or inguinal adenopathy  Musc:  No cyanosis or clubbing  Skin:  No rashes or ulcers, skin turgor is good  Neuro:  Cranial nerves are grossly intact, general motor weakness, follows commands   Psych:  Good insight, oriented to person, place and time, alert    Telemetry reviewed:   normal sinus rhythm  __________________________________________________________________  Medications Reviewed: (see below)  Medications:     Current Facility-Administered Medications   Medication Dose Route Frequency    sodium chloride (NS) flush 5-40 mL  5-40 mL IntraVENous Q8H    sodium chloride (NS) flush 5-40 mL  5-40 mL IntraVENous PRN    acetaminophen (TYLENOL) tablet 650 mg  650 mg Oral Q6H PRN    Or    acetaminophen (TYLENOL) suppository 650 mg  650 mg Rectal Q6H PRN    polyethylene glycol (MIRALAX) packet 17 g  17 g Oral DAILY PRN    ondansetron (ZOFRAN ODT) tablet 4 mg  4 mg Oral Q8H PRN    Or    ondansetron (ZOFRAN) injection 4 mg  4 mg IntraVENous Q6H PRN    heparin (porcine) injection 5,000 Units  5,000 Units SubCUTAneous Q8H    amLODIPine (NORVASC) tablet 10 mg  10 mg Oral QPM    aspirin delayed-release tablet 81 mg  81 mg Oral DAILY    carvediloL (COREG) tablet 6.25 mg  6.25 mg Oral BID WITH MEALS    cloNIDine HCL (CATAPRES) tablet 0.2 mg  0.2 mg Oral Q6H PRN    fluticasone propionate (FLONASE) 50 mcg/actuation nasal spray 2 Spray  2 Spray Both Nostrils QPM    hydrALAZINE (APRESOLINE) tablet 25 mg  25 mg Oral TID    levETIRAcetam (KEPPRA) tablet 250 mg  250 mg Oral BID    minoxidiL (LONITEN) tablet 2.5 mg  2.5 mg Oral BID    pravastatin (PRAVACHOL) tablet 40 mg  40 mg Oral QHS        Lab Data Reviewed: (see below)  Lab Review: Recent Labs     04/05/22  1808   WBC 5.6   HGB 9.4*   HCT 28.4*        Recent Labs     04/05/22  1808      K 3.7   CL 99   CO2 29   *   BUN 23*   CREA 3.57*   CA 8.7   ALB 3.5   TBILI 1.1*   ALT 37     Lab Results   Component Value Date/Time    Glucose (POC) 100 05/20/2021 09:00 AM    Glucose (POC) 109 (H) 02/20/2019 10:13 PM    Glucose (POC) 114 (H) 02/20/2019 11:34 AM     No results for input(s): PH, PCO2, PO2, HCO3, FIO2 in the last 72 hours. No results for input(s): INR, INREXT in the last 72 hours. All Micro Results     Procedure Component Value Units Date/Time    CULTURE, URINE [[id number]]     Order Status: Sent Specimen: Cath Urine           Other pertinent lab: none    Total time spent with patient: 45 Minutes I personally rounded from 1:00 to 1:45 PM, in addition to the time spent by my partner, Dr Shon Watts, earlier today. I personally reviewed chart, notes, data and current medications in the medical record. I have personally examined and treated the patient at bedside during this period. I personally made additional diagnoses, placed additional orders and had additional discussions.                  Care Plan discussed with: Patient, Care Manager, Nursing Staff, Consultant/Specialist and >50% of time spent in counseling and coordination of care    Discussed:  Care Plan and D/C Planning    Prophylaxis:  Hep SQ and H2B/PPI    Disposition:   PT, OT, RN           ___________________________________________________    Attending Physician: Jose M Cardozo MD

## 2022-04-06 NOTE — PROGRESS NOTES
Problem: Self Care Deficits Care Plan (Adult)  Goal: *Acute Goals and Plan of Care (Insert Text)  Description: FUNCTIONAL STATUS PRIOR TO ADMISSION: Patient was modified independent using a rollator for functional mobility. Patient was modified independent for basic and instrumental ADLs. HOME SUPPORT: The patient lived alone with children locally to provide assistance. Occupational Therapy Goals  Initiated 4/6/2022  1. Patient will perform grooming, standing at sink, with modified independence within 7 day(s). 2.  Patient will perform lower body dressing with modified independence using AE within 7 day(s). 3.  Patient will perform bathing with modified independence within 7 day(s). 4.  Patient will perform toilet transfers with modified independence within 7 day(s). 5.  Patient will perform all aspects of toileting with modified independence within 7 day(s). 6.  Patient will participate in upper extremity therapeutic exercise/activities with supervision/set-up for 10 minutes within 7 day(s). 7.  Patient will utilize energy conservation techniques during functional activities with verbal cues within 7 day(s). Outcome: Progressing Towards Goal     OCCUPATIONAL THERAPY EVALUATION  Patient: Mary Ellen Bejarano (68 y.o. female)  Date: 4/6/2022  Primary Diagnosis: Pulmonary edema [J81.1]        Precautions:       ASSESSMENT  Based on the objective data described below, the patient presents with decreased endurance and activity tolerance following admission for pulmonary edema. Pt today received in bed on 1.5L O2 via NC with stable saturations at 94% at rest. Removed NC for activity assessment, noted to desaturate to 87% with activity on RA, even just talking while sitting EOB. If pt performs focused PLB, it will improve to 90% on RA at rest. Saturations stable >90% with 1.5L in place and she is able to perform serial ADL tasks both seated and in bathroom with overall SBA/CGA using RW.  Pt is receptive to all education regarding energy conservation, PLB, activity pacing, and overall safety in prep for transition home. She is seated up in chair at end of session with all needs in reach. BP still slightly elevated (taken in R UE). Anticipate no skilled OT services indicated at this time, but will continue to follow in acute setting to maximize safety and independence. Current Level of Function Impacting Discharge (ADLs/self-care): overall SBA/CGA for ADLs and mobility with RW    Functional Outcome Measure: The patient scored Total: 65/100 on the Barthel Index outcome measure which is indicative of being minimally impaired in basic self-care. Other factors to consider for discharge: lives alone in a condo; has all needed DME     Patient will benefit from skilled therapy intervention to address the above noted impairments. PLAN :  Recommendations and Planned Interventions: self care training, functional mobility training, therapeutic exercise, balance training, therapeutic activities, endurance activities, patient education, home safety training and family training/education    Frequency/Duration: Patient will be followed by occupational therapy 3 times a week to address goals. Recommendation for discharge: (in order for the patient to meet his/her long term goals)  No skilled occupational therapy/ follow up rehabilitation needs identified at this time. May benefit from follow up [facility] PT - please see PT note. This discharge recommendation:  Has not yet been discussed the attending provider and/or case management    IF patient discharges home will need the following DME: TBD on home O2       SUBJECTIVE:   Patient stated I hope I'm not going to have to need it [home O2]. 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; Old right posterior medial occipital lobe cortical infarct    Hx of seasonal allergies     Hypertension     Moderate pulmonary hypertension ([facility]     Per echocardiogram of 2021; same study showed normal LVEF of 55-60%    Monoclonal gammopathy of undetermined significance     Obstructive sleep apnea     Seizures ([facility]     last keppra level- 2019 @ 15   Last seizure 2017    Urinary incontinence      Past Surgical History:   Procedure Laterality Date    HX APPENDECTOMY N/A     HX CATARACT REMOVAL Bilateral 2018    HX  SECTION N/A     x 2    HX HEMORRHOIDECTOMY      HX HYSTERECTOMY      HX KNEE REPLACEMENT Right 2019    HX TONSILLECTOMY      IR INSERT NON TUNL CVC OVER 5 YRS  2021    IR INSERT TUNL CVC W/O PORT OVER 5 YR  2021       Expanded or extensive additional review of patient history:     Home Situation  Home Environment: Apartment (condo; elevator access)  # Steps to Enter: 0  One/Two Story Residence: One story  Living Alone: Yes  Support Systems: Child(shauna) (son lives upstairs)  Patient Expects to be Discharged to[de-identified] Home  Current DME Used/Available at Home: Adaptive dressing aides,Walker, rollator,Commode, bedside,Grab bars,Shower chair  Tub or Shower Type:  (pt sponge bathes)    Hand dominance: Right    EXAMINATION OF PERFORMANCE DEFICITS:  Cognitive/Behavioral Status:  Neurologic State: Alert  Orientation Level: Oriented X4  Cognition: Appropriate decision making; Appropriate for age attention/concentration; Appropriate safety awareness; Follows commands  Perception: Appears intact  Perseveration: No perseveration noted  Safety/Judgement: Awareness of environment; Fall prevention;Good awareness of safety precautions; Home safety; Insight into deficits    Hearing: Auditory  Auditory Impairment: Hard of hearing, bilateral  Hearing Aids/Status:  At bedside    Vision/Perceptual:    Tracking: Able to track stimulus in all quadrants w/o difficulty       Range of Motion:  AROM: Within functional limits    Strength:  Strength: Within functional limits    Coordination:  Coordination: Within functional limits  Fine Motor Skills-Upper: Left Intact; Right Intact    Gross Motor Skills-Upper: Left Intact; Right Intact    Tone:  Tone: Normal    Balance:  Sitting: Intact  Standing: Intact; With support  Standing - Static: Good  Standing - Dynamic : Good    Functional Mobility and Transfers for ADLs:  Bed Mobility:  Supine to Sit: Independent  Scooting: Independent    Transfers:  Sit to Stand: Stand-by assistance  Stand to Sit: Stand-by assistance  Bed to Chair: Stand-by assistance;Contact guard assistance  Toilet Transfer : Stand-by assistance;Contact guard assistance    ADL Assessment:  Feeding: Independent    Oral Facial Hygiene/Grooming: Supervision;Modified Independent    Bathing: Contact guard assistance    Upper Body Dressing: Modified independent    Lower Body Dressing: Minimum assistance (for distal portions; MOD I with use of AE)    Toileting: Stand by assistance;Contact guard assistance    ADL Intervention and task modifications: Toileting  Clothing Management: Contact guard assistance  Adaptive Equipment: Grab bars; Walker    Cognitive Retraining  Safety/Judgement: Awareness of environment; Fall prevention;Good awareness of safety precautions; Home safety; Insight into deficits    Patient instructed and indicated understanding energy conservation techniques to increase independence and safety during ADLs. Instructed pt on pursed lip breathing (PLB) to assist with maintaining oxygen saturations >90% during activity and at rest. Pt instructed to inhale through nose and exhale through mouth while creating \"O\" shape with verbal, tactile, and visual cues provided to increase clarity and carry-over of technique.  Instructed pt to complete focused PLB during rest breaks in order to increase ease while performing functional tasks. Instructed patient to increase time sitting OOB in chair for pulmonary hygiene, skin integrity, and to increase endurance in preparation for ADLs. Instructed patient to sit OOB for all meals. Patient verbalized understanding of same. Functional Measure:    Barthel Index:  Bathin  Bladder: 5  Bowels: 10  Groomin  Dressin  Feeding: 10  Mobility: 10  Stairs: 0  Toilet Use: 5  Transfer (Bed to Chair and Back): 15  Total: 65/100      The Barthel ADL Index: Guidelines  1. The index should be used as a record of what a patient does, not as a record of what a patient could do. 2. The main aim is to establish degree of independence from any help, physical or verbal, however minor and for whatever reason. 3. The need for supervision renders the patient not independent. 4. A patient's performance should be established using the best available evidence. Asking the patient, friends/relatives and nurses are the usual sources, but direct observation and common sense are also important. However direct testing is not needed. 5. Usually the patient's performance over the preceding 24-48 hours is important, but occasionally longer periods will be relevant. 6. Middle categories imply that the patient supplies over 50 per cent of the effort. 7. Use of aids to be independent is allowed. Score Interpretation (from [address] [facility] [address])    Independent   60-79 Minimally independent   40-59 Partially dependent   20-39 Very dependent   <20 Totally dependent     -Marbella Escamilla., Barthel, D.W. (1965). Functional evaluation: the Barthel Index. [address] [facility] ([address] (). The Barthel activities of daily living index: self-reporting versus actual performance in the old (> or = 75 years). Journal of [address] [place] [address] 45(7), [facility], J.JKYLE, Param Akhtar., Senthil Copeland. (1999).  Measuring the change in disability after inpatient rehabilitation; comparison of the responsiveness of the Barthel Index and Functional [place] Measure. Journal of Neurology, Neurosurgery, and Psychiatry, 66(4), 937-670. OXANA Lnua, EDITA Ramirez, & Terrance Fischer M.A. (2004) Assessment of post-stroke quality of life in cost-effectiveness studies: The usefulness of the Barthel Index and the EuroQoL-5D. Quality of Life Research, 15, 059-23     Occupational Therapy Evaluation Charge Determination   History Examination Decision-Making   LOW Complexity : Brief history review  LOW Complexity : 1-3 performance deficits relating to physical, cognitive , or psychosocial skils that result in activity limitations and / or participation restrictions  LOW Complexity : No comorbidities that affect functional and no verbal or physical assistance needed to complete eval tasks       Based on the above components, the patient evaluation is determined to be of the following complexity level: LOW   Pain Rating:  Pt reporting no pain    Activity Tolerance:   Fair, desaturates with exertion and requires oxygen and requires rest breaks    After treatment patient left in no apparent distress:    Sitting in chair, Call bell within reach and Bed / chair alarm activated    COMMUNICATION/EDUCATION:   The patients plan of care was discussed with: Physical therapist and Registered nurse. Home safety education was provided and the patient/caregiver indicated understanding., Patient/family have participated as able in goal setting and plan of care. and Patient/family agree to work toward stated goals and plan of care. This patients plan of care is appropriate for delegation to [facility] [place].     Thank you for this referral.  Leonila Pope OT  Time Calculation: 28 mins

## 2022-04-06 NOTE — PROCEDURES
Hemodialysis / [phone]    Vitals Pre Post Assessment Pre Post   /59 171/56 LOC Alert & orient X3 Alert & [place] X3   HR 75 69 Lungs Clear; denied any sob; 2L NC 2L NC; no sob   Resp 18 18 Cardiac regular regular   Temp 98.1 98.1 Skin Warm and dry Warm and dry   Weight  N/A N/A Edema BLE BLE   Tele status   Pain 0/10 patient denied any pain 0/10     Orders   Duration: Start: 1334 End: 1534 Total: 2 hours   Dialyzer: Dialyzer/Set Up Inspection: Tammy Sher (04/06/22 1334)   K Bath: Dialysate K (mEq/L): 3 (04/06/22 1334)   Ca Bath: Dialysate CA (mEq/L): 2.5 (04/06/22 1334)   Na: Dialysate NA (mEq/L): 138 (04/06/22 1334)   Bicarb: Dialysate HCO3 (mEq/L): 35 (04/06/22 1334)   Target Fluid Removal: Goal/Amount of Fluid to Remove (mL): 2000 mL (04/06/22 1334)     Access   Type & Location: Right cvc   Comments:    Patent; exit site cleaned, biopatch applied, and covered with new dressing; no s/s of infection noted; post treatment both ports clamped and new caps applied securely                                   Labs   HBsAg (Antigen) / date:  Negative on 12/16/21                                             HBsAb (Antibody) / date: Immune on 3/17/22   Source: Jo Guzman   Obtained/Reviewed  Critical Results Called HGB   Date Value Ref Range Status   04/05/2022 9.4 (L) 11.5 - 16.0 g/dL Final     Potassium   Date Value Ref Range Status   04/05/2022 3.7 3.5 - 5.1 mmol/L Final     Calcium   Date Value Ref Range Status   04/05/2022 8.7 8.5 - 10.1 MG/DL Final     BUN   Date Value Ref Range Status   04/05/2022 23 (H) 6 - 20 MG/DL Final     Creatinine   Date Value Ref Range Status   04/05/2022 3.57 (H) 0.55 - 1.02 MG/DL Final        Meds Given   Name Dose Route   None ordered                 Adequacy / Fluid    Total Liters Process: 44.4   Net Fluid Removed: 2 Liters      Comments   Time Out Done:   (Time) Yes, 1330   Admitting Diagnosis: SOB; Pulmonary edema   Consent obtained/signed: Informed Consent Verified: Yes (obtained) (04/06/22 1334)   Machine / RO # Machine Number: [id number] (04/06/22 1334)   Primary Nurse Rpt Pre: Michell Pallas, RN   Primary Nurse Rpt Post: Michell Pallas, RN   Pt Education: Hemodialysis treatment    Care Plan: Continue hemodialysis per nephrologist   Pts outpatient clinic: [facility] Summary   Comments:       Tolerated treatment uneventful

## 2022-04-06 NOTE — PROGRESS NOTES
[address] [facility]  YOB: 1940          Assessment & Plan:   ESRD on HD TTS  SOB/pulm edema  Discordant UE BPs. No dissection on CT but L SCA stenosis  HTN  Mediastinal LAD    Rec:  R arm BP is presumed to be accurate. LUE BP will be artificially low due to L SCA stenosis  Will do HD x 2 hours for volume  W/u of LAD at some point  She can follow up with Dr. Lucinda Barrera about the L SCA stenosis. AVF has been ligated, not currently having ischemic symptoms. Probably not much to do at present. Subjective:   CC: f/u ESRD  HPI: Patient of ours with ESRD on HD TTS. Had HD yest. Increasing sob recently and CT showed pulm edema. She came in for low BP in L arm relative to R arm.  CT showed L SCA calcification   ROS: +sob no n/v  Current Facility-Administered Medications   Medication Dose Route Frequency    sodium chloride (NS) flush 5-40 mL  5-40 mL IntraVENous Q8H    sodium chloride (NS) flush 5-40 mL  5-40 mL IntraVENous PRN    acetaminophen (TYLENOL) tablet 650 mg  650 mg Oral Q6H PRN    Or    acetaminophen (TYLENOL) suppository 650 mg  650 mg Rectal Q6H PRN    polyethylene glycol (MIRALAX) packet 17 g  17 g Oral DAILY PRN    ondansetron (ZOFRAN ODT) tablet 4 mg  4 mg Oral Q8H PRN    Or    ondansetron (ZOFRAN) injection 4 mg  4 mg IntraVENous Q6H PRN    heparin (porcine) injection 5,000 Units  5,000 Units SubCUTAneous Q8H    amLODIPine (NORVASC) tablet 10 mg  10 mg Oral QPM    aspirin delayed-release tablet 81 mg  81 mg Oral DAILY    carvediloL (COREG) tablet 6.25 mg  6.25 mg Oral BID WITH MEALS    cloNIDine HCL (CATAPRES) tablet 0.2 mg  0.2 mg Oral Q6H PRN    fluticasone propionate (FLONASE) 50 mcg/actuation nasal spray 2 Spray  2 Spray Both Nostrils QPM    hydrALAZINE (APRESOLINE) tablet 25 mg  25 mg Oral TID    minoxidiL (LONITEN) tablet 2.5 mg  2.5 mg Oral BID    pravastatin (PRAVACHOL) tablet 40 mg  40 mg Oral QHS    levETIRAcetam (KEPPRA) tablet 250 mg  250 mg Oral BID    [START ON 2022] levETIRAcetam (KEPPRA) tablet 250 mg  250 mg Oral DIALYSIS TUE, THU & SAT          Objective:     Vitals:  Blood pressure (!) 174/75, pulse 71, temperature 98.1 °F (36.7 °C), resp. rate 16, height 5' 1\" (1.549 m), weight 78.6 kg (173 lb 4.5 oz), SpO2 93 %, not currently breastfeeding. Temp (24hrs), Av °F (36.7 °C), Min:97.9 °F (36.6 °C), Max:98.1 °F (36.7 °C)      Intake and Output:  No intake/output data recorded. No intake/output data recorded. Physical Exam:               GENERAL ASSESSMENT: NAD  Neck RIJ PC   CHEST: CTA  HEART: S1S2  ABDOMEN: Soft,NT  EXTREMITY: no EDEMA, hands warm  NEURO: Grossly non focal          ECG/rhythm:    Data Review      No results for input(s): TNIPOC in the last 72 hours. No lab exists for component: ITNL   No results for input(s): CPK, CKMB, TROIQ in the last 72 hours. Recent Labs     22  1808      K 3.7   CL 99   CO2 29   BUN 23*   CREA 3.57*   *   CA 8.7   ALB 3.5   WBC 5.6   HGB 9.4*   HCT 28.4*         No results for input(s): INR, PTP, APTT, INREXT in the last 72 hours. Needs: urine analysis, urine sodium, protein and creatinine  No results found for: KAYCEE GIPSON        : Chester Rock MD  2022        [facility]:  [email or web address]  Licha Quijano office:  [address] [place] [address], [address] [place] [address] [address]  [place], [address] [facility]  Phone: [phone]  Fax :     [phone]    [facility] office:  [address] [facility], [address]  Phone - [phone]  Fax - [phone]

## 2022-04-07 VITALS
TEMPERATURE: 97.9 F | BODY MASS INDEX: 34.01 KG/M2 | HEIGHT: 61 IN | DIASTOLIC BLOOD PRESSURE: 58 MMHG | SYSTOLIC BLOOD PRESSURE: 150 MMHG | RESPIRATION RATE: 16 BRPM | HEART RATE: 72 BPM | OXYGEN SATURATION: 95 % | WEIGHT: 180.12 LBS

## 2022-04-07 LAB
BACTERIA SPEC CULT: NORMAL
SERVICE CMNT-IMP: NORMAL

## 2022-04-07 PROCEDURE — 90935 HEMODIALYSIS ONE EVALUATION: CPT

## 2022-04-07 PROCEDURE — 77010033678 HC OXYGEN DAILY

## 2022-04-07 PROCEDURE — 97161 PT EVAL LOW COMPLEX 20 MIN: CPT

## 2022-04-07 PROCEDURE — 74011000250 HC RX REV CODE- 250: Performed by: INTERNAL MEDICINE

## 2022-04-07 PROCEDURE — 94618 PULMONARY STRESS TESTING: CPT

## 2022-04-07 PROCEDURE — 94761 N-INVAS EAR/PLS OXIMETRY MLT: CPT

## 2022-04-07 PROCEDURE — 74011250637 HC RX REV CODE- 250/637: Performed by: INTERNAL MEDICINE

## 2022-04-07 PROCEDURE — 74011250636 HC RX REV CODE- 250/636: Performed by: INTERNAL MEDICINE

## 2022-04-07 PROCEDURE — 97530 THERAPEUTIC ACTIVITIES: CPT

## 2022-04-07 RX ADMIN — HEPARIN SODIUM 5000 UNITS: 5000 INJECTION INTRAVENOUS; SUBCUTANEOUS at 05:23

## 2022-04-07 RX ADMIN — ASPIRIN 81 MG: 81 TABLET, COATED ORAL at 10:43

## 2022-04-07 RX ADMIN — CARVEDILOL 6.25 MG: 6.25 TABLET, FILM COATED ORAL at 10:41

## 2022-04-07 RX ADMIN — HEPARIN SODIUM 5000 UNITS: 5000 INJECTION INTRAVENOUS; SUBCUTANEOUS at 14:05

## 2022-04-07 RX ADMIN — SODIUM CHLORIDE, PRESERVATIVE FREE 10 ML: 5 INJECTION INTRAVENOUS at 05:24

## 2022-04-07 RX ADMIN — ACETAMINOPHEN 650 MG: 325 TABLET ORAL at 01:51

## 2022-04-07 RX ADMIN — HYDRALAZINE HYDROCHLORIDE 25 MG: 25 TABLET, FILM COATED ORAL at 08:57

## 2022-04-07 RX ADMIN — MINOXIDIL 2.5 MG: 2.5 TABLET ORAL at 10:48

## 2022-04-07 RX ADMIN — SODIUM CHLORIDE, PRESERVATIVE FREE 10 ML: 5 INJECTION INTRAVENOUS at 14:07

## 2022-04-07 RX ADMIN — LEVETIRACETAM 250 MG: 250 TABLET, FILM COATED ORAL at 10:39

## 2022-04-07 NOTE — PROGRESS NOTES
Problem: Falls - Risk of  Goal: *Absence of Falls  Description: Document Reginald Ashley Fall Risk and appropriate interventions in the flowsheet.   Outcome: Progressing Towards Goal  Note: Fall Risk Interventions:  Mobility Interventions: Bed/chair exit alarm         Medication Interventions: Bed/chair exit alarm,Patient to call before getting OOB                   Problem: Patient Education: Go to Patient Education Activity  Goal: Patient/Family Education  Outcome: Progressing Towards Goal     Problem: Hypertension  Goal: *Blood pressure within specified parameters  Outcome: Progressing Towards Goal     Problem: Hypertension  Goal: *Labs within defined limits  Outcome: Progressing Towards Goal

## 2022-04-07 NOTE — PROGRESS NOTES
Physical Therapy    Consult received, chart reviewed. Patient currently on HD. Will f/u later today to complete evaluation.     Lon Hoang, MS, PT

## 2022-04-07 NOTE — PROGRESS NOTES
[id number]   YOB: 1940          Assessment & Plan:   ESRD on HD TTS  SOB/pulm edema  Discordant UE BPs. No dissection on CT but L SCA stenosis  HTN  Mediastinal LAD  Anemia    Rec:  Sen on HD, luis enrique well  UF goal 3 kg  EPO with HD. Subjective:   CC: f/u ESRD  HPI: Seen on HD luis enrique well. Had 2 hr with 2 kg uf yesterday  ROS: Not sure if sob better, no n/v  Current Facility-Administered Medications   Medication Dose Route Frequency    sodium chloride (NS) flush 5-40 mL  5-40 mL IntraVENous Q8H    sodium chloride (NS) flush 5-40 mL  5-40 mL IntraVENous PRN    acetaminophen (TYLENOL) tablet 650 mg  650 mg Oral Q6H PRN    Or    acetaminophen (TYLENOL) suppository 650 mg  650 mg Rectal Q6H PRN    polyethylene glycol (MIRALAX) packet 17 g  17 g Oral DAILY PRN    ondansetron (ZOFRAN ODT) tablet 4 mg  4 mg Oral Q8H PRN    Or    ondansetron (ZOFRAN) injection 4 mg  4 mg IntraVENous Q6H PRN    heparin (porcine) injection 5,000 Units  5,000 Units SubCUTAneous Q8H    amLODIPine (NORVASC) tablet 10 mg  10 mg Oral QPM    aspirin delayed-release tablet 81 mg  81 mg Oral DAILY    carvediloL (COREG) tablet 6.25 mg  6.25 mg Oral BID WITH MEALS    cloNIDine HCL (CATAPRES) tablet 0.2 mg  0.2 mg Oral Q6H PRN    fluticasone propionate (FLONASE) 50 mcg/actuation nasal spray 2 Spray  2 Spray Both Nostrils QPM    hydrALAZINE (APRESOLINE) tablet 25 mg  25 mg Oral TID    minoxidiL (LONITEN) tablet 2.5 mg  2.5 mg Oral BID    pravastatin (PRAVACHOL) tablet 40 mg  40 mg Oral QHS    levETIRAcetam (KEPPRA) tablet 250 mg  250 mg Oral BID    levETIRAcetam (KEPPRA) tablet 250 mg  250 mg Oral DIALYSIS TUE, THU & SAT          Objective:     Vitals:  Blood pressure (!) 160/51, pulse (!) 58, temperature 98 °F (36.7 °C), resp. rate 16, height 5' 1\" (1.549 m), weight 81.7 kg (180 lb 1.9 oz), SpO2 95 %, not currently breastfeeding.   Temp (24hrs), Av °F (36.7 °C), Min:97.7 °F (36.5 °C), Max:98.4 °F (36.9 °C)      Intake and Output:  No intake/output data recorded. 04/05 1901 - 04/07 0700  In: 80 [P.O.:690]  Out: 2000     Physical Exam:               GENERAL ASSESSMENT: NAD  Neck RIJ PC   CHEST: CTA  HEART: S1S2  ABDOMEN: Soft,NT  EXTREMITY: no EDEMA  NEURO: Grossly non focal          ECG/rhythm:    Data Review      No results for input(s): TNIPOC in the last 72 hours. No lab exists for component: ITNL   No results for input(s): CPK, CKMB, TROIQ in the last 72 hours. Recent Labs     04/05/22  1808      K 3.7   CL 99   CO2 29   BUN 23*   CREA 3.57*   *   CA 8.7   ALB 3.5   WBC 5.6   HGB 9.4*   HCT 28.4*         No results for input(s): INR, PTP, APTT, INREXT, INREXT in the last 72 hours. Needs: urine analysis, urine sodium, protein and creatinine  No results found for: KAYCEE GIPSON        : Sudheer Glez MD  4/7/2022        [facility]:  [email or web address]  [place] office:  [address] [place] [address], [address] [place] [address],[address]  [address]  Phone: [phone]  Fax :     [phone]    [place] office:  [address] [facility], [address] [place] [address]  Phone - [phone]  Fax - [phone]

## 2022-04-07 NOTE — PROGRESS NOTES
[facility]    Medical Progress Note      NAME: Marina Swan   :  1940  MRM:  [id number]    Date/Time of service 2022  11:51 AM          Assessment and Plan:     Pulmonary edema / CARRANZA (dyspnea on exertion) - POA, due to a excess fluid. Consult renal for HD. They removed 5L over 2 days. PT eval for oxygen needs, arrange Home oxygen for chronic CHF if hypoxia persists. ECHO in  with dCHF and pHTN. Hypoventilation may contribute. Outpatient follow up for reactive lymphedenopathy    ESRD on dialysis - consulted renal. Renal diet. Fluid restriction. Hypertension - Continue Norvasc, ASA, Coreg, clonidine, hydralazine and minoxidil. Check orthostatics. Discordant L vs R BP likely related to closed fistula. Outpatient vascular follow up    Anemia - Stable. EPO per renal    Hx of completed stroke / Hx Seizures - continue ASA, BB, statin    Debilitated patient / Arthritis - PT OT eval.    Obese / ABIGAIL on CPAP - May resume home CPAP    Urinary incontinence - Not on medss    Monoclonal gammopathy - Outpatient  Follow up    High cholesterol - Continue pravastatin    Anxiety and depression - Not on meds         Subjective:     Chief Complaint:  Dyspnea better, tolerated HD    ROS:  (bold if positive, if negative)    Tolerating PT  Tolerating Diet        Objective:     Last 24hrs VS reviewed since prior progress note.  Most recent are:    Visit Vitals  BP (!) 183/61   Pulse 66   Temp 98 °F (36.7 °C)   Resp 16   Ht 5' 1\" (1.549 m)   Wt 81.7 kg (180 lb 1.9 oz)   SpO2 95%   Breastfeeding No   BMI 34.03 kg/m²     SpO2 Readings from Last 6 Encounters:   22 95%   22 95%   21 98%   21 97%   09/15/21 98%   09/10/21 97%    O2 Flow Rate (L/min): 0 l/min       Intake/Output Summary (Last 24 hours) at 2022 1151  Last data filed at 2022 1054  Gross per 24 hour   Intake 500 ml   Output 4500 ml   Net -4000 ml        Physical Exam:    Gen:  Obese, in no acute distress  HEENT:  Pink conjunctivae, PERRL, hearing intact to voice, moist mucous membranes  Neck:  Supple, without masses, thyroid non-tender  Resp:  No accessory muscle use, distant breath sounds without wheezes rales or rhonchi  Card:  No murmurs, normal S1, S2 without thrills, bruits or peripheral edema  Abd:  Soft, non-tender, non-distended, normoactive bowel sounds are present, no mass  Lymph:  No cervical or inguinal adenopathy  Musc:  No cyanosis or clubbing  Skin:  No rashes or ulcers, skin turgor is good  Neuro:  Cranial nerves are grossly intact, general motor weakness, follows commands   Psych:  Good insight, oriented to person, place and time, alert    Telemetry reviewed:   normal sinus rhythm  __________________________________________________________________  Medications Reviewed: (see below)  Medications:     Current Facility-Administered Medications   Medication Dose Route Frequency    epoetin millie-epbx (RETACRIT) injection 4,000 Units  4,000 Units SubCUTAneous Q TUE, THU & SAT    sodium chloride (NS) flush 5-40 mL  5-40 mL IntraVENous Q8H    sodium chloride (NS) flush 5-40 mL  5-40 mL IntraVENous PRN    acetaminophen (TYLENOL) tablet 650 mg  650 mg Oral Q6H PRN    Or    acetaminophen (TYLENOL) suppository 650 mg  650 mg Rectal Q6H PRN    polyethylene glycol (MIRALAX) packet 17 g  17 g Oral DAILY PRN    ondansetron (ZOFRAN ODT) tablet 4 mg  4 mg Oral Q8H PRN    Or    ondansetron (ZOFRAN) injection 4 mg  4 mg IntraVENous Q6H PRN    heparin (porcine) injection 5,000 Units  5,000 Units SubCUTAneous Q8H    amLODIPine (NORVASC) tablet 10 mg  10 mg Oral QPM    aspirin delayed-release tablet 81 mg  81 mg Oral DAILY    carvediloL (COREG) tablet 6.25 mg  6.25 mg Oral BID WITH MEALS    cloNIDine HCL (CATAPRES) tablet 0.2 mg  0.2 mg Oral Q6H PRN    fluticasone propionate (FLONASE) 50 mcg/actuation nasal spray 2 Spray  2 Spray Both Nostrils QPM    hydrALAZINE (APRESOLINE) tablet 25 mg  25 mg Oral TID    minoxidiL (LONITEN) tablet 2.5 mg  2.5 mg Oral BID    pravastatin (PRAVACHOL) tablet 40 mg  40 mg Oral QHS    levETIRAcetam (KEPPRA) tablet 250 mg  250 mg Oral BID    levETIRAcetam (KEPPRA) tablet 250 mg  250 mg Oral DIALYSIS TUE, THU & SAT        Lab Data Reviewed: (see below)  Lab Review:     Recent Labs     04/05/22  1808   WBC 5.6   HGB 9.4*   HCT 28.4*        Recent Labs     04/05/22  1808      K 3.7   CL 99   CO2 29   *   BUN 23*   CREA 3.57*   CA 8.7   ALB 3.5   TBILI 1.1*   ALT 37     Lab Results   Component Value Date/Time    Glucose (POC) 100 05/20/2021 09:00 AM    Glucose (POC) 109 (H) 02/20/2019 10:13 PM    Glucose (POC) 114 (H) 02/20/2019 11:34 AM     No results for input(s): PH, PCO2, PO2, HCO3, FIO2 in the last 72 hours. No results for input(s): INR, INREXT, INREXT in the last 72 hours. All Micro Results     Procedure Component Value Units Date/Time    CULTURE, URINE [[id number]] Collected: 04/06/22 1900    Order Status: Completed Specimen: Cath Urine Updated: 04/06/22 1501          Other pertinent lab: none    Total time spent with patient: 35 Minutes I personally reviewed chart, notes, data and current medications in the medical record. I have personally examined and treated the patient at bedside during this period.                   Care Plan discussed with: Patient, Care Manager, Nursing Staff, Consultant/Specialist and >50% of time spent in counseling and coordination of care    Discussed:  Care Plan and D/C Planning    Prophylaxis:  Hep SQ and H2B/PPI    Disposition:  HH PT, OT, RN           ___________________________________________________    Attending Physician: Jose Manuel Fuentes MD

## 2022-04-07 NOTE — PROCEDURES
Hemodialysis / [phone]    Vitals Pre Post Assessment Pre Post   BP BP: (!) 172/55 (04/07/22 0630) 178/52 LOC AXOX4 NO CHANGE   HR Pulse (Heart Rate): 72 (04/07/22 0630) 72 Lungs CLEAR NO CHANGE   Resp Resp Rate: 16 (04/07/22 0630) 16 Cardiac NSR NO CHANGE   Temp Temp: 98 °F (36.7 °C) (04/07/22 0321) 98.2 Skin WDI NO CHANGE   Weight    Edema GENERALIZED NO CHANGE   Tele status bedside bedside Pain Pain Intensity 1: 7 (04/07/22 0151) NO CHANGE     Orders   Duration: Start: 0645 End: 2826 Total: 3.5   Dialyzer: Dialyzer/Set Up Inspection: Duane Cea (04/07/22 0630)   K Bath: Dialysate K (mEq/L): 3 (04/07/22 0630)   Ca Bath: Dialysate CA (mEq/L): 2.5 (04/07/22 0630)   Na: Dialysate NA (mEq/L): 140 (04/07/22 0630)   Bicarb: Dialysate HCO3 (mEq/L): 38 (04/07/22 0630)   Target Fluid Removal: Goal/Amount of Fluid to Remove (mL): 2500 mL (04/07/22 0630)     Access   Type & Location: RIJ CVC: Dressing CDI. No s/s of infection. Both lumens aspirate & flush well. Running well at .        Comments:                                        Labs   HBsAg (Antigen) / date:         12/16/21 NEG                                      HBsAb (Antibody) / date: 03/17/22 IMMUNE   Source:    Obtained/Reviewed  Critical Results Called HGB   Date Value Ref Range Status   04/05/2022 9.4 (L) 11.5 - 16.0 g/dL Final     Potassium   Date Value Ref Range Status   04/05/2022 3.7 3.5 - 5.1 mmol/L Final     Calcium   Date Value Ref Range Status   04/05/2022 8.7 8.5 - 10.1 MG/DL Final     BUN   Date Value Ref Range Status   04/05/2022 23 (H) 6 - 20 MG/DL Final     Creatinine   Date Value Ref Range Status   04/05/2022 3.57 (H) 0.55 - 1.02 MG/DL Final        Meds Given   Name Dose Route                    Adequacy / Fluid    Total Liters Process: 80 L   Net Fluid Removed: 2500 ML      Comments   Time Out Done:   (Time) 0630   Admitting Diagnosis: SOB   Consent obtained/signed: Informed Consent Verified: Yes (04/07/22 0630)   Machine / RO # Machine Number: [id number] (04/07/22 0630)   Primary Nurse Rpt Pre: Sangeeta Justin RN   Primary Nurse Rpt Post: Ruth garrett Student RN   Pt Education: FLUID REMOVAL, O2 USE   Care Plan: FOLLOW MD [address]   Pts outpatient clinic: UNKNOWN     Tx Summary   SBAR received from Primary RN. Pt A&Ox4. Consent signed & on file. 7032  Each catheter limb disinfected per p&p, caps removed, hubs disinfected per p&p. Each lumen aspirated for blood return and flushed with Normal Saline per policy. 0645 VSS. Dialysis Tx initiated. 0915: Tx ended. VSS. Each dialysis catheter limb disinfected per p&p, all possible blood returned per p&p, and each dialysis hub disinfected per p&p. Each lumen flushed, post dialysis catheter saline dwell instilled per order, and caps applied. Bed locked and in the lowest position, call bell and belongings in reach. SBAR given to Primary, RN. Patient is stable at time of  my departure. All Dialysis related medications have been reviewed.        Comments:

## 2022-04-07 NOTE — PROGRESS NOTES
Pt.'s BP was elevated with a systolic in the 973'P with schedule hydralazine. MD notified and he states we should just continue with the scheduled hydralazine.

## 2022-04-07 NOTE — PROGRESS NOTES
4/7/2022   4:31 PM  [facility] has accepted for [facility] services, AVS updated and sent in Allscripts. Pt is ready for DC from CM standpoint. JOHANNA Schmitz       4:09 PM  [facility] is unable to accept pt d/t staffing, CM met w/ pt and choice offered, pt prefers [facility], referral sent in Allscripts, await response. JOHANNA Schmitz         3:13 PM  [facility] [address] [place] [address], JOHANNA       12:33 PM  · Pt medically stable for DC.  · PT completed walking oximetry, pt did NOT qualify for home O2 at DC. · Order or HH noted, pt prefers  [facility], order sent in  CC, await response. Transitions of Care Plan:  RUR 16 % Moderate Risk of Readmission  LOS 1 Day   1. Pt medically stable for DC today  2. PT recommending HH at DC   3. Home with family   4. Outpatient PCP, specialists  5. Resume HD Da Nand[facility] Guzman, TTS, current flow sheet sent in Allscripts  6. Son will transport after 5:00PM    JOHANNA Schmitz       12:02 PM  DC pending RT eval for Home O2, PT for [facility] services. CM will follow and assist w/ DC needs.   JOHANNA Schmitz

## 2022-04-07 NOTE — PROGRESS NOTES
Pt ordered for Home O2 evaluation. Pt receiving HD. Spoke with HD RN, pt still has 2 hrs remaining on HD. Will return later this morning to assess. CM notified.

## 2022-04-07 NOTE — PROGRESS NOTES
PHYSICAL THERAPY EVALUATION/DISCHARGE  Patient: Cachorro Baum (85 y.o. female)  Date: 4/7/2022  Primary Diagnosis: Pulmonary edema [J81.1]       Precautions:          ASSESSMENT  Based on the objective data described below, the patient presents with decreased activity and pulmonary tolerance but otherwise normal strength, AROM and steady gait consistent with baseline functional mobility s/p admission for pulmonary edema. Patient received in bathroom with RW, exhibiting safe RW technique and steady balance without UE support during clothing and handwashing routines. Completed home O2 assessment, see below, patient able to maintain >90% on RA with toileting and gait x 200'. Educated patient on PLB with cues to improve effectiveness and provided education regarding activity modification and pacing once home. Patient lives alone in a condo with elevator, uses rollator at baseline. Family lives in the same condo complex. Patient would benefit from HHPT to work on improved endurance with her higher level household activities but otherwise no further needs noted in the acute setting. Pulse Oximetry Assessment    96% at rest on room air  90-92% while ambulating on room air x 200'    Functional Outcome Measure: The patient scored 75 on the Barthel outcome measure which is indicative of near independence for mobility and self care. Other factors to consider for discharge: family in same building     Further skilled acute physical therapy is not indicated at this time. PLAN :  Recommendation for discharge: (in order for the patient to meet his/her long term goals)  Physical therapy at least 2 days/week in the home     This discharge recommendation:  Has been made in collaboration with the attending provider and/or case management    IF patient discharges home will need the following DME: patient owns DME required for discharge       SUBJECTIVE:   Patient stated I'm feeling a lot better. 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;  Old right posterior medial occipital lobe cortical infarct    Hx of seasonal allergies     Hypertension     Moderate pulmonary hypertension ([facility]     Per echocardiogram of 2021; same study showed normal LVEF of 55-60%    Monoclonal gammopathy of undetermined significance     Obstructive sleep apnea     Seizures ([facility]     last keppra level- 2019 @ 15   Last seizure 2017    Urinary incontinence      Past Surgical History:   Procedure Laterality Date    HX APPENDECTOMY N/A     HX CATARACT REMOVAL Bilateral 2018    HX  SECTION N/A     x 2    HX HEMORRHOIDECTOMY      HX HYSTERECTOMY      HX KNEE REPLACEMENT Right 2019    HX TONSILLECTOMY      IR INSERT NON TUNL CVC OVER 5 YRS  2021    IR INSERT TUNL CVC W/O PORT OVER 5 YR  2021       Prior level of function: mod indep with rollator  Personal factors and/or comorbidities impacting plan of care:     Home Situation  Home Environment: Apartment  # Steps to Enter: 0  One/Two Story Residence:  (elevator)  Living Alone: Yes  Support Systems: Child(shauna),Other Family Member(s)  Patient Expects to be Discharged to[de-identified] Home with home health ([facility])  Current DME Used/Available at Home: Cane, straight,Walker, rollator,Shower chair,Commode, bedside,Adaptive dressing aides,Grab bars,Wheelchair  Tub or Shower Type: Shower    EXAMINATION/PRESENTATION/DECISION MAKING:   Critical Behavior:  Neurologic State: Alert  Orientation Level: Oriented X4  Cognition: Appropriate for age attention/concentration  Safety/Judgement: Awareness of environment,Fall prevention,Good awareness of safety precautions,Home safety,Insight into deficits  Hearing: Auditory  Auditory Impairment: Hard of hearing, bilateral  Hearing Aids/Status: At bedside    Range Of Motion:  AROM: Within functional limits                       Strength:    Strength: Generally decreased, functional                    Tone & Sensation:   Tone: Normal                              Coordination:  Coordination: Within functional limits  Vision:      Functional Mobility:  Bed Mobility:  Rolling: Independent  Supine to Sit: Independent        Transfers:  Sit to Stand: Modified independent  Stand to Sit: Modified independent        Bed to Chair: Modified independent              Balance:   Sitting: Intact  Standing: Intact  Ambulation/Gait Training:  Distance (ft): 200 Feet (ft)  Assistive Device: Gait belt;Walker, rolling  Ambulation - Level of Assistance: Modified independent        Gait Abnormalities: Decreased step clearance              Speed/Italia: Pace decreased (<100 feet/min)  Step Length: Right shortened;Left shortened                       Functional Measure:  Barthel Index:    Bathin  Bladder: 5  Bowels: 10  Groomin  Dressin  Feeding: 10  Mobility: 10  Stairs: 5  Toilet Use: 10  Transfer (Bed to Chair and Back): 15  Total: 75/100       The Barthel ADL Index: Guidelines  1. The index should be used as a record of what a patient does, not as a record of what a patient could do. 2. The main aim is to establish degree of independence from any help, physical or verbal, however minor and for whatever reason. 3. The need for supervision renders the patient not independent. 4. A patient's performance should be established using the best available evidence. Asking the patient, friends/relatives and nurses are the usual sources, but direct observation and common sense are also important. However direct testing is not needed.   5. Usually the patient's performance over the preceding 24-48 hours is important, but occasionally longer periods will be relevant. 6. Middle categories imply that the patient supplies over 50 per cent of the effort. 7. Use of aids to be independent is allowed. Score Interpretation (from [address])    Independent   60-79 Minimally independent   40-59 Partially dependent   20-39 Very dependent   <20 Totally dependent     -Marbella Escamilla., Barthel, D.W. (1965). Functional evaluation: the Barthel Index. [address] ([address] (1997). The Barthel activities of daily living index: self-reporting versus actual performance in the old (> or = 75 years). Journal of [address] [place] [address] 45(7), 14 [facility], MATHEUS, Ernestina Cisneros., Melinda Trevino. (1999). Measuring the change in disability after inpatient rehabilitation; comparison of the responsiveness of the Barthel Index and Functional [place] Measure. Journal of Neurology, Neurosurgery, and Psychiatry, 66(4), 738-742. Emory Keita, N.J.A, EDITA Ramirez, & Merced Leija, MLindaA. (2004) Assessment of post-stroke quality of life in cost-effectiveness studies: The usefulness of the Barthel Index and the EuroQoL-5D.  Quality of Life Research, 15, 918-69            Physical Therapy Evaluation Charge Determination   History Examination Presentation Decision-Making   HIGH Complexity :3+ comorbidities / personal factors will impact the outcome/ POC  LOW Complexity : 1-2 Standardized tests and measures addressing body structure, function, activity limitation and / or participation in recreation  LOW Complexity : Stable, uncomplicated  Other outcome measures Barthel 75  LOW       Based on the above components, the patient evaluation is determined to be of the following complexity level: LOW     Pain Rating:  None reported    Activity Tolerance:   Fair, SpO2 stable on RA and requires rest breaks      After treatment patient left in no apparent distress:   Sitting in chair, Call bell within reach and RN present    COMMUNICATION/EDUCATION:   The patients plan of care was discussed with: Registered nurse and Case management. Fall prevention education was provided and the patient/caregiver indicated understanding.     Thank you for this referral.  Earlene Bae, PT   Time Calculation: 20 mins

## 2022-04-07 NOTE — DISCHARGE INSTRUCTIONS
Patient Discharge Instructions    Lázaro Tesfaye / [id number] : 1940    Admitted 2022 Discharged: 2022     Primary Diagnoses  Problem List as of 2022 Date Reviewed: 2021           Pulmonary edema   Urinary incontinence   Seizures (HCC)   ABIGAIL on CPAP   Hypertension   Hx of seasonal allergies   Hx of completed stroke   High cholesterol   CKD (chronic kidney disease) stage 4, GFR 15-29 ml/min (HCC)   Arthritis   Anxiety and depression   Anemia   ESRD on dialysis ([facility] [address]   CARRANZA (dyspnea on exertion)   Acute hypoxemic respiratory failure (HCC)   ESRD on hemodialysis ([facility]   Anemia due to chronic kidney disease   HTN (hypertension), benign   Acute respiratory failure with hypoxia (HCC)   Diastolic CHF, acute on chronic (HCC)   Volume overload   SHARON (acute kidney injury) ([facility] [address]   Elevated troponin   Primary osteoarthritis of right knee   ICH (intracerebral hemorrhage) (HCC)   Monoclonal gammopathy   Paresthesia   Nonintractable epilepsy without status epilepticus (HCC)   ABIGAIL (obstructive sleep apnea)          Take Home Medications     · It is important that you take the medication exactly as they are prescribed. · Keep your medication in the bottles provided by the pharmacist and keep a list of the medication names, dosages, and times to be taken in your wallet. · Do not take other medications without consulting your doctor.        What to do at Home    Recommended diet: Cardiac Diet, Diabetic Diet, Low fat, Low cholesterol and Renal Diet    Recommended activity: Activity as tolerated    If you experience worse symptoms, please follow up with PCP or nephrologist.    Follow-up with your PCP in a few week    Follow-up Information     Follow up With Specialties Details Why Contact Info    Lisa Ortega MD [facility] Medicine Schedule an appointment as soon as possible for a visit in 1 week  [address] [place] [address].  [facility] [phone]             Information obtained by :  I understand that if any problems occur once I am at home I am to contact my physician. I understand and acknowledge receipt of the instructions indicated above.                                                                                                                                            Physician's or R.N.'s Signature                                                                  Date/Time                                                                                                                                              Patient or Representative Signature                                                          Date/Time

## 2022-04-07 NOTE — DISCHARGE SUMMARY
Physician Discharge Summary     Patient ID:  Ahsley Roy  [id number]  79 y.o.  1940    Admit date: 4/5/2022    Discharge date of service and time: 4/7/2022    Admission Diagnoses: Pulmonary edema [J81.1]    Discharge Diagnoses:    Principal Diagnosis     Pulmonary edema                                             Hospital Course and other diagnoses  Pulmonary edema / CARRANZA (dyspnea on exertion) - POA, due to a excess fluid. Consult renal for HD. They removed 5L over 2 days. PT eval for oxygen needs, arrange Home oxygen for chronic CHF if hypoxia persists. ECHO in 7/21 with dCHF and pHTN. Hypoventilation may contribute. Outpatient follow up for reactive lymphedenopathy     ESRD on dialysis - consulted renal. Renal diet. Fluid restriction.     Hypertension - Continue Norvasc, ASA, Coreg, clonidine, hydralazine and minoxidil. Check orthostatics. Discordant L vs R BP likely related to closed fistula. Outpatient vascular follow up     Anemia - Stable. EPO per renal     Hx of completed stroke / Hx Seizures - continue ASA, BB, statin     Debilitated patient / Arthritis - PT OT eval.     Obese / ABIGAIL on CPAP - May resume home CPAP     Urinary incontinence - Not on medss     Monoclonal gammopathy - Outpatient  Follow up     High cholesterol - Continue pravastatin     Anxiety and depression - Not on meds    PCP: Bernadine Mendes MD    Consults: Nephrology    Significant Diagnostic Studies: See Hospital Course    Discharged home in improved condition.     Discharge Exam:  BP (!) 183/61   Pulse 66   Temp 98 °F (36.7 °C)   Resp 16   Ht 5' 1\" (1.549 m)   Wt 81.7 kg (180 lb 1.9 oz)   SpO2 95%   Breastfeeding No   BMI 34.03 kg/m²      Gen:  Obese, in no acute distress  HEENT:  Pink conjunctivae, PERRL, hearing intact to voice, moist mucous membranes  Neck:  Supple, without masses, thyroid non-tender  Resp:  No accessory muscle use, distant breath sounds without wheezes rales or rhonchi  Card:  No murmurs, normal S1, S2 without thrills, bruits or peripheral edema  Abd:  Soft, non-tender, non-distended, normoactive bowel sounds are present, no mass  Lymph:  No cervical or inguinal adenopathy  Musc:  No cyanosis or clubbing  Skin:  No rashes or ulcers, skin turgor is good  Neuro:  Cranial nerves are grossly intact, general motor weakness, follows commands   Psych:  Good insight, oriented to person, place and time, alert    Patient Instructions:   Current Discharge Medication List      CONTINUE these medications which have NOT CHANGED    Details   acetaminophen (TYLENOL) 650 mg TbER Take 650 mg by mouth every eight (8) hours as needed for Pain.      hydrALAZINE (APRESOLINE) 25 mg tablet Take 25 mg by mouth three (3) times daily. calcium acetate,phosphat bind, (PHOSLO) 667 mg cap Take 1 Capsule by mouth three (3) times daily (with meals). minoxidiL (LONITEN) 2.5 mg tablet Take 2.5 mg by mouth two (2) times a day. Indications: high blood pressure      omega-3 acid ethyl esters (LOVAZA) 1 gram capsule Take 1 g by mouth two (2) times a day. aspirin delayed-release 81 mg tablet Take 81 mg by mouth daily. amLODIPine (NORVASC) 10 mg tablet Take 10 mg by mouth every evening.      montelukast (Singulair) 10 mg tablet Take 10 mg by mouth every evening. fluticasone propionate (Flonase Allergy Relief) 50 mcg/actuation nasal spray 2 Sprays by Both Nostrils route every evening. polyvinyl alcohol-povidon,PF, (REFRESH CLASSIC) 1.4-0.6 % ophthalmic solution Administer 1-2 Drops to both eyes as needed. carvedilol (COREG) 6.25 mg tablet Take 1 Tab by mouth two (2) times daily (with meals). Qty: 60 Tab, Refills: 0      pravastatin (PRAVACHOL) 40 mg tablet Take 40 mg by mouth nightly. levETIRAcetam (Keppra) 250 mg tablet Take one tablet in morning and evening. After dialysis session, take one additional tablet.   90 day prescription  Qty: 216 Tablet, Refills: 1    Associated Diagnoses: Nonintractable epilepsy without status epilepticus, unspecified epilepsy type (HCC)      ferrous sulfate 325 mg (65 mg iron) tablet Take 325 mg by mouth two (2) times a day. cloNIDine HCL (CATAPRES) 0.2 mg tablet Take 1 Tablet by mouth every six (6) hours as needed for Other (sBP >150) for up to 30 doses. Indications: high blood pressure  Qty: 30 Tablet, Refills: 0           Activity: Activity as tolerated  Diet: Cardiac Diet, Diabetic Diet, Low fat, Low cholesterol and Renal Diet  Wound Care: None needed    Follow-up with your PCP and nephrology in a few weeks.   Follow-up tests/labs - none    Signed:  Emre Nails MD  4/7/2022  11:55 AM

## 2022-04-07 NOTE — PROGRESS NOTES
Pt states that they felt they had a lot of the sxs of neuropathy that they had seen on TV. Took a piece of gauze, made the patient close their eyes to check sensation in the lower extremities. Pt was able to feel about 80% of the different points that I tried. Told pt that I would notify RN, which was done.

## 2022-04-10 LAB
ATRIAL RATE: 76 BPM
CALCULATED P AXIS, ECG09: 30 DEGREES
CALCULATED R AXIS, ECG10: 27 DEGREES
CALCULATED T AXIS, ECG11: 20 DEGREES
DIAGNOSIS, 93000: NORMAL
P-R INTERVAL, ECG05: 232 MS
Q-T INTERVAL, ECG07: 424 MS
QRS DURATION, ECG06: 88 MS
QTC CALCULATION (BEZET), ECG08: 477 MS
VENTRICULAR RATE, ECG03: 76 BPM

## 2022-05-11 ENCOUNTER — TELEPHONE (OUTPATIENT)
Dept: NEUROLOGY | Age: 82
End: 2022-05-11

## 2022-05-11 NOTE — TELEPHONE ENCOUNTER
Patient requesting her nerve test results to be sent to Dr. Karie Scheuermann of Ortho at [facility].    Her appointment to see the  Is May 18, 2022

## 2022-08-02 ENCOUNTER — TRANSCRIBE ORDER (OUTPATIENT)
Dept: SCHEDULING | Age: 82
End: 2022-08-02

## 2022-08-02 DIAGNOSIS — R93.89 ABNORMAL CHEST CT: Primary | ICD-10-CM

## 2022-08-23 ENCOUNTER — APPOINTMENT (OUTPATIENT)
Dept: GENERAL RADIOLOGY | Age: 82
DRG: 291 | End: 2022-08-23
Attending: STUDENT IN AN ORGANIZED HEALTH CARE EDUCATION/TRAINING PROGRAM
Payer: MEDICARE

## 2022-08-23 ENCOUNTER — HOSPITAL ENCOUNTER (INPATIENT)
Age: 82
LOS: 2 days | Discharge: HOME HEALTH | DRG: 291 | End: 2022-08-25
Attending: STUDENT IN AN ORGANIZED HEALTH CARE EDUCATION/TRAINING PROGRAM | Admitting: INTERNAL MEDICINE
Payer: MEDICARE

## 2022-08-23 DIAGNOSIS — J81.0 ACUTE PULMONARY EDEMA (HCC): ICD-10-CM

## 2022-08-23 DIAGNOSIS — Z71.89 ADVANCED CARE PLANNING/COUNSELING DISCUSSION: ICD-10-CM

## 2022-08-23 DIAGNOSIS — R06.02 SHORTNESS OF BREATH: ICD-10-CM

## 2022-08-23 DIAGNOSIS — Z99.2 ESRD ON HEMODIALYSIS (HCC): ICD-10-CM

## 2022-08-23 DIAGNOSIS — Z51.5 PALLIATIVE CARE BY SPECIALIST: ICD-10-CM

## 2022-08-23 DIAGNOSIS — R53.81 PHYSICAL DEBILITY: ICD-10-CM

## 2022-08-23 DIAGNOSIS — J96.01 ACUTE RESPIRATORY FAILURE WITH HYPOXIA (HCC): Primary | ICD-10-CM

## 2022-08-23 DIAGNOSIS — Z71.89 GOALS OF CARE, COUNSELING/DISCUSSION: ICD-10-CM

## 2022-08-23 DIAGNOSIS — I16.1 HYPERTENSIVE EMERGENCY: ICD-10-CM

## 2022-08-23 DIAGNOSIS — N18.6 ESRD ON HEMODIALYSIS (HCC): ICD-10-CM

## 2022-08-23 DIAGNOSIS — I44.7 LEFT BUNDLE BRANCH BLOCK: ICD-10-CM

## 2022-08-23 PROBLEM — R79.89 ELEVATED TROPONIN: Status: RESOLVED | Noted: 2021-05-14 | Resolved: 2022-08-23

## 2022-08-23 PROBLEM — I50.32 DIASTOLIC CHF, CHRONIC (HCC): Status: ACTIVE | Noted: 2021-05-14

## 2022-08-23 PROBLEM — I61.9 ICH (INTRACEREBRAL HEMORRHAGE) (HCC): Status: RESOLVED | Noted: 2019-02-20 | Resolved: 2022-08-23

## 2022-08-23 PROBLEM — R20.2 PARESTHESIA: Status: RESOLVED | Noted: 2017-08-10 | Resolved: 2022-08-23

## 2022-08-23 PROBLEM — M17.11 PRIMARY OSTEOARTHRITIS OF RIGHT KNEE: Status: RESOLVED | Noted: 2019-08-22 | Resolved: 2022-08-23

## 2022-08-23 PROBLEM — I50.33 DIASTOLIC CHF, ACUTE ON CHRONIC (HCC): Status: RESOLVED | Noted: 2021-05-14 | Resolved: 2022-08-23

## 2022-08-23 PROBLEM — G40.909 NONINTRACTABLE EPILEPSY WITHOUT STATUS EPILEPTICUS (HCC): Status: RESOLVED | Noted: 2017-08-10 | Resolved: 2022-08-23

## 2022-08-23 PROBLEM — I10 HTN (HYPERTENSION), BENIGN: Status: RESOLVED | Noted: 2021-05-24 | Resolved: 2022-08-23

## 2022-08-23 PROBLEM — N17.9 AKI (ACUTE KIDNEY INJURY) (HCC): Status: RESOLVED | Noted: 2021-05-14 | Resolved: 2022-08-23

## 2022-08-23 PROBLEM — R77.8 ELEVATED TROPONIN: Status: RESOLVED | Noted: 2021-05-14 | Resolved: 2022-08-23

## 2022-08-23 LAB
ALBUMIN SERPL-MCNC: 3.5 G/DL (ref 3.5–5)
ALBUMIN/GLOB SERPL: 0.7 {RATIO} (ref 1.1–2.2)
ALP SERPL-CCNC: 72 U/L (ref 45–117)
ALT SERPL-CCNC: 22 U/L (ref 12–78)
ANION GAP SERPL CALC-SCNC: 9 MMOL/L (ref 5–15)
ARTERIAL PATENCY WRIST A: POSITIVE
AST SERPL-CCNC: 22 U/L (ref 15–37)
BASE EXCESS BLD CALC-SCNC: 1.5 MMOL/L
BASOPHILS # BLD: 0 K/UL (ref 0–0.1)
BASOPHILS NFR BLD: 0 % (ref 0–1)
BDY SITE: ABNORMAL
BILIRUB SERPL-MCNC: 1.2 MG/DL (ref 0.2–1)
BNP SERPL-MCNC: 8160 PG/ML
BUN SERPL-MCNC: 54 MG/DL (ref 6–20)
BUN/CREAT SERPL: 7 (ref 12–20)
CALCIUM SERPL-MCNC: 10.4 MG/DL (ref 8.5–10.1)
CHLORIDE SERPL-SCNC: 101 MMOL/L (ref 97–108)
CO2 SERPL-SCNC: 28 MMOL/L (ref 21–32)
COMMENT, HOLDF: NORMAL
CREAT SERPL-MCNC: 7.92 MG/DL (ref 0.55–1.02)
DIFFERENTIAL METHOD BLD: ABNORMAL
EOSINOPHIL # BLD: 0.1 K/UL (ref 0–0.4)
EOSINOPHIL NFR BLD: 1 % (ref 0–7)
ERYTHROCYTE [DISTWIDTH] IN BLOOD BY AUTOMATED COUNT: 12.9 % (ref 11.5–14.5)
GAS FLOW.O2 O2 DELIVERY SYS: ABNORMAL L/MIN
GLOBULIN SER CALC-MCNC: 4.7 G/DL (ref 2–4)
GLUCOSE SERPL-MCNC: 125 MG/DL (ref 65–100)
HCO3 BLD-SCNC: 26.3 MMOL/L (ref 22–26)
HCT VFR BLD AUTO: 26.5 % (ref 35–47)
HGB BLD-MCNC: 8.8 G/DL (ref 11.5–16)
IMM GRANULOCYTES # BLD AUTO: 0 K/UL (ref 0–0.04)
IMM GRANULOCYTES NFR BLD AUTO: 0 % (ref 0–0.5)
LYMPHOCYTES # BLD: 1.3 K/UL (ref 0.8–3.5)
LYMPHOCYTES NFR BLD: 15 % (ref 12–49)
MAGNESIUM SERPL-MCNC: 2.4 MG/DL (ref 1.6–2.4)
MCH RBC QN AUTO: 32.1 PG (ref 26–34)
MCHC RBC AUTO-ENTMCNC: 33.2 G/DL (ref 30–36.5)
MCV RBC AUTO: 96.7 FL (ref 80–99)
MONOCYTES # BLD: 0.5 K/UL (ref 0–1)
MONOCYTES NFR BLD: 6 % (ref 5–13)
NEUTS SEG # BLD: 7.1 K/UL (ref 1.8–8)
NEUTS SEG NFR BLD: 78 % (ref 32–75)
NRBC # BLD: 0 K/UL (ref 0–0.01)
NRBC BLD-RTO: 0 PER 100 WBC
O2/TOTAL GAS SETTING VFR VENT: 30 %
PCO2 BLD: 41.2 MMHG (ref 35–45)
PEEP RESPIRATORY: 6 CMH2O
PH BLD: 7.41 [PH] (ref 7.35–7.45)
PHOSPHATE SERPL-MCNC: 4.6 MG/DL (ref 2.6–4.7)
PLATELET # BLD AUTO: 251 K/UL (ref 150–400)
PMV BLD AUTO: 9.1 FL (ref 8.9–12.9)
PO2 BLD: 79 MMHG (ref 80–100)
POTASSIUM SERPL-SCNC: 4.1 MMOL/L (ref 3.5–5.1)
PRESSURE SUPPORT SETTING VENT: 12 CMH2O
PROT SERPL-MCNC: 8.2 G/DL (ref 6.4–8.2)
RBC # BLD AUTO: 2.74 M/UL (ref 3.8–5.2)
SAMPLES BEING HELD,HOLD: NORMAL
SAO2 % BLD: 95.7 % (ref 92–97)
SODIUM SERPL-SCNC: 138 MMOL/L (ref 136–145)
SPECIMEN TYPE: ABNORMAL
TROPONIN-HIGH SENSITIVITY: 35 NG/L (ref 0–51)
WBC # BLD AUTO: 9.1 K/UL (ref 3.6–11)

## 2022-08-23 PROCEDURE — 80053 COMPREHEN METABOLIC PANEL: CPT

## 2022-08-23 PROCEDURE — 36415 COLL VENOUS BLD VENIPUNCTURE: CPT

## 2022-08-23 PROCEDURE — 71045 X-RAY EXAM CHEST 1 VIEW: CPT

## 2022-08-23 PROCEDURE — 85025 COMPLETE CBC W/AUTO DIFF WBC: CPT

## 2022-08-23 PROCEDURE — 74011250636 HC RX REV CODE- 250/636: Performed by: INTERNAL MEDICINE

## 2022-08-23 PROCEDURE — 36600 WITHDRAWAL OF ARTERIAL BLOOD: CPT

## 2022-08-23 PROCEDURE — 74011250637 HC RX REV CODE- 250/637: Performed by: STUDENT IN AN ORGANIZED HEALTH CARE EDUCATION/TRAINING PROGRAM

## 2022-08-23 PROCEDURE — 93005 ELECTROCARDIOGRAM TRACING: CPT

## 2022-08-23 PROCEDURE — 90935 HEMODIALYSIS ONE EVALUATION: CPT

## 2022-08-23 PROCEDURE — 94660 CPAP INITIATION&MGMT: CPT

## 2022-08-23 PROCEDURE — 84100 ASSAY OF PHOSPHORUS: CPT

## 2022-08-23 PROCEDURE — 5A09357 ASSISTANCE WITH RESPIRATORY VENTILATION, LESS THAN 24 CONSECUTIVE HOURS, CONTINUOUS POSITIVE AIRWAY PRESSURE: ICD-10-PCS | Performed by: INTERNAL MEDICINE

## 2022-08-23 PROCEDURE — 84484 ASSAY OF TROPONIN QUANT: CPT

## 2022-08-23 PROCEDURE — 74011000250 HC RX REV CODE- 250: Performed by: INTERNAL MEDICINE

## 2022-08-23 PROCEDURE — 77010033678 HC OXYGEN DAILY

## 2022-08-23 PROCEDURE — 99285 EMERGENCY DEPT VISIT HI MDM: CPT

## 2022-08-23 PROCEDURE — 74011250637 HC RX REV CODE- 250/637: Performed by: INTERNAL MEDICINE

## 2022-08-23 PROCEDURE — 94761 N-INVAS EAR/PLS OXIMETRY MLT: CPT

## 2022-08-23 PROCEDURE — 5A1D70Z PERFORMANCE OF URINARY FILTRATION, INTERMITTENT, LESS THAN 6 HOURS PER DAY: ICD-10-PCS | Performed by: INTERNAL MEDICINE

## 2022-08-23 PROCEDURE — 83880 ASSAY OF NATRIURETIC PEPTIDE: CPT

## 2022-08-23 PROCEDURE — 83735 ASSAY OF MAGNESIUM: CPT

## 2022-08-23 PROCEDURE — 82803 BLOOD GASES ANY COMBINATION: CPT

## 2022-08-23 PROCEDURE — 65270000046 HC RM TELEMETRY

## 2022-08-23 RX ORDER — AMLODIPINE BESYLATE 5 MG/1
10 TABLET ORAL EVERY EVENING
Status: DISCONTINUED | OUTPATIENT
Start: 2022-08-23 | End: 2022-08-25 | Stop reason: HOSPADM

## 2022-08-23 RX ORDER — CALCIUM ACETATE 667 MG/1
1 CAPSULE ORAL
Status: DISCONTINUED | OUTPATIENT
Start: 2022-08-23 | End: 2022-08-25 | Stop reason: HOSPADM

## 2022-08-23 RX ORDER — AMLODIPINE BESYLATE 10 MG/1
10 TABLET ORAL DAILY
Status: ON HOLD | COMMUNITY
End: 2022-08-23

## 2022-08-23 RX ORDER — PRAVASTATIN SODIUM 20 MG/1
40 TABLET ORAL
Status: DISCONTINUED | OUTPATIENT
Start: 2022-08-23 | End: 2022-08-25 | Stop reason: HOSPADM

## 2022-08-23 RX ORDER — SODIUM CHLORIDE 0.9 % (FLUSH) 0.9 %
5-40 SYRINGE (ML) INJECTION AS NEEDED
Status: DISCONTINUED | OUTPATIENT
Start: 2022-08-23 | End: 2022-08-25 | Stop reason: HOSPADM

## 2022-08-23 RX ORDER — ONDANSETRON 2 MG/ML
4 INJECTION INTRAMUSCULAR; INTRAVENOUS
Status: DISCONTINUED | OUTPATIENT
Start: 2022-08-23 | End: 2022-08-25 | Stop reason: HOSPADM

## 2022-08-23 RX ORDER — HEPARIN SODIUM 5000 [USP'U]/ML
5000 INJECTION, SOLUTION INTRAVENOUS; SUBCUTANEOUS EVERY 8 HOURS
Status: DISCONTINUED | OUTPATIENT
Start: 2022-08-23 | End: 2022-08-25 | Stop reason: HOSPADM

## 2022-08-23 RX ORDER — ACETAMINOPHEN 325 MG/1
650 TABLET ORAL
Status: DISCONTINUED | OUTPATIENT
Start: 2022-08-23 | End: 2022-08-25 | Stop reason: HOSPADM

## 2022-08-23 RX ORDER — POLYETHYLENE GLYCOL 3350 17 G/17G
17 POWDER, FOR SOLUTION ORAL DAILY PRN
Status: DISCONTINUED | OUTPATIENT
Start: 2022-08-23 | End: 2022-08-25 | Stop reason: HOSPADM

## 2022-08-23 RX ORDER — CARVEDILOL 3.12 MG/1
6.25 TABLET ORAL
Status: DISCONTINUED | OUTPATIENT
Start: 2022-08-23 | End: 2022-08-23

## 2022-08-23 RX ORDER — MONTELUKAST SODIUM 10 MG/1
10 TABLET ORAL EVERY EVENING
Status: DISCONTINUED | OUTPATIENT
Start: 2022-08-23 | End: 2022-08-25 | Stop reason: HOSPADM

## 2022-08-23 RX ORDER — HYDRALAZINE HYDROCHLORIDE 100 MG/1
100 TABLET, FILM COATED ORAL 2 TIMES DAILY
COMMUNITY

## 2022-08-23 RX ORDER — CARVEDILOL 6.25 MG/1
6.25 TABLET ORAL 2 TIMES DAILY WITH MEALS
Status: DISCONTINUED | OUTPATIENT
Start: 2022-08-23 | End: 2022-08-25 | Stop reason: HOSPADM

## 2022-08-23 RX ORDER — SODIUM CHLORIDE 0.9 % (FLUSH) 0.9 %
5-40 SYRINGE (ML) INJECTION EVERY 8 HOURS
Status: DISCONTINUED | OUTPATIENT
Start: 2022-08-23 | End: 2022-08-25 | Stop reason: HOSPADM

## 2022-08-23 RX ORDER — CALCIUM ACETATE 667 MG/1
1 CAPSULE ORAL AS NEEDED
COMMUNITY

## 2022-08-23 RX ORDER — ACETAMINOPHEN 650 MG/1
650 SUPPOSITORY RECTAL
Status: DISCONTINUED | OUTPATIENT
Start: 2022-08-23 | End: 2022-08-25 | Stop reason: HOSPADM

## 2022-08-23 RX ORDER — LEVETIRACETAM 250 MG/1
250 TABLET ORAL 2 TIMES DAILY
Status: DISCONTINUED | OUTPATIENT
Start: 2022-08-23 | End: 2022-08-25 | Stop reason: HOSPADM

## 2022-08-23 RX ORDER — CARVEDILOL 6.25 MG/1
6.25 TABLET ORAL 2 TIMES DAILY WITH MEALS
COMMUNITY

## 2022-08-23 RX ORDER — NITROGLYCERIN 0.4 MG/1
0.4 TABLET SUBLINGUAL
Status: COMPLETED | OUTPATIENT
Start: 2022-08-23 | End: 2022-08-23

## 2022-08-23 RX ORDER — ONDANSETRON 4 MG/1
4 TABLET, ORALLY DISINTEGRATING ORAL
Status: DISCONTINUED | OUTPATIENT
Start: 2022-08-23 | End: 2022-08-25 | Stop reason: HOSPADM

## 2022-08-23 RX ORDER — ASPIRIN 81 MG/1
81 TABLET ORAL DAILY
Status: DISCONTINUED | OUTPATIENT
Start: 2022-08-24 | End: 2022-08-25 | Stop reason: HOSPADM

## 2022-08-23 RX ORDER — MINOXIDIL 2.5 MG/1
2.5 TABLET ORAL 2 TIMES DAILY
Status: DISCONTINUED | OUTPATIENT
Start: 2022-08-23 | End: 2022-08-25 | Stop reason: HOSPADM

## 2022-08-23 RX ORDER — CALCIUM ACETATE 667 MG/1
2 CAPSULE ORAL
COMMUNITY

## 2022-08-23 RX ORDER — LEVETIRACETAM 250 MG/1
250 TABLET ORAL
Status: DISCONTINUED | OUTPATIENT
Start: 2022-08-23 | End: 2022-08-25 | Stop reason: HOSPADM

## 2022-08-23 RX ADMIN — CALCIUM ACETATE 667 MG: 667 CAPSULE ORAL at 20:54

## 2022-08-23 RX ADMIN — NITROGLYCERIN 0.4 MG: 0.4 TABLET, ORALLY DISINTEGRATING SUBLINGUAL at 12:16

## 2022-08-23 RX ADMIN — LEVETIRACETAM 250 MG: 250 TABLET, FILM COATED ORAL at 20:54

## 2022-08-23 RX ADMIN — NITROGLYCERIN 0.4 MG: 0.4 TABLET, ORALLY DISINTEGRATING SUBLINGUAL at 12:21

## 2022-08-23 RX ADMIN — NITROGLYCERIN 0.4 MG: 0.4 TABLET, ORALLY DISINTEGRATING SUBLINGUAL at 12:11

## 2022-08-23 RX ADMIN — SODIUM CHLORIDE, PRESERVATIVE FREE 10 ML: 5 INJECTION INTRAVENOUS at 21:00

## 2022-08-23 RX ADMIN — HEPARIN SODIUM 5000 UNITS: 5000 INJECTION INTRAVENOUS; SUBCUTANEOUS at 17:20

## 2022-08-23 RX ADMIN — MONTELUKAST 10 MG: 10 TABLET, FILM COATED ORAL at 21:03

## 2022-08-23 RX ADMIN — NITROGLYCERIN 1 INCH: 20 OINTMENT TOPICAL at 12:20

## 2022-08-23 RX ADMIN — PRAVASTATIN SODIUM 40 MG: 20 TABLET ORAL at 21:00

## 2022-08-23 RX ADMIN — ACETAMINOPHEN 650 MG: 325 TABLET ORAL at 20:53

## 2022-08-23 NOTE — ED NOTES
Pt seen by Doctor Lena Lai Upon arrival to ER. Pt on BiPap at this time with HOB elevated. Pt is tachypnic with c/o SOB. Pt SpO2 is 94%. Pt's blood pressure is elevated (last 180/54 (83)). Doctor Lena Lai aware. Pt denies chest pain, n/v, dizziness. Pt lungs sounds clear but diminished. Pt is A+Ox4, GCS 15, with an NIH of 0. Pt was to get dialysis today (Schedule of Tuesday/Thursday/Saturday) but states did not get today's dialysis. Nephrology consulted and awaiting physician. Pt denies any further needs at this time.

## 2022-08-23 NOTE — PROGRESS NOTES
TRANSFER - IN REPORT:    Verbal report received from Grayson(name) on Capri Frost  being received from ED(unit) for routine progression of care      Report consisted of patients Situation, Background, Assessment and   Recommendations(SBAR). Information from the following report(s) SBAR, Kardex, ED Summary, Procedure Summary, Intake/Output, MAR, Recent Results, and Cardiac Rhythm NSR  was reviewed with the receiving nurse. Opportunity for questions and clarification was provided. Assessment completed upon patients arrival to unit and care assumed. 1510 Pt arrived to the unit. Bedside and Verbal shift change report given to Tereso Jones (oncoming nurse) by Violetta Headley (offgoing nurse). Report included the following information SBAR, Kardex, ED Summary, Procedure Summary, Intake/Output, MAR, Recent Results, and Cardiac Rhythm NSR .

## 2022-08-23 NOTE — ED NOTES
Pt reports new fistula placed to right arm 2 weeks ago. Limb alert bracelet placed. Pt states old inactive fistula in left arm. Reports okay to use left arm for IV stick but does report to use leg for blood pressures.

## 2022-08-23 NOTE — PROGRESS NOTES
[facility]: MED RECONCILIATION  Comment: could not confirm current/compliant  Medications adjusted:    Carvedilol  Calcium Acetate    Information obtained from: [facility] faxed information    Allergies: Latex, Codeine, Levaquin [levofloxacin], Lisinopril, and Sulfa (sulfonamide antibiotics)    Prior to Admission Medications:     Medication Documentation Review Audit       Reviewed by Alejandra Ball (Pharmacist) on 08/23/22 at 1823      Medication Sig Documenting Provider Last Dose Status Taking?   acetaminophen (TYLENOL) 650 mg TbER Take 650 mg by mouth every eight (8) hours as needed for Pain. Provider, Historical  Active    amLODIPine (NORVASC) 10 mg tablet Take 10 mg by mouth every evening. Provider, Historical  Active Yes   aspirin delayed-release 81 mg tablet Take 81 mg by mouth daily. Provider, Historical  Active Yes   calcium acetate,phosphat bind, (PHOSLO) 667 mg cap Take 2 Capsules by mouth three (3) times daily (with meals). Provider, Historical  Active Yes   calcium acetate,phosphat bind, (PHOSLO) 667 mg cap Take 1 Capsule by mouth as needed (with snacks). Provider, Historical  Active Yes   carvediloL (COREG) 6.25 mg tablet Take 6.25 mg by mouth two (2) times daily (with meals). Provider, Historical  Active Yes   fluticasone propionate (FLONASE) 50 mcg/actuation nasal spray 2 Sprays by Both Nostrils route every evening. Provider, Historical  Active Yes   hydrALAZINE (APRESOLINE) 100 mg tablet Take 100 mg by mouth two (2) times a day. Provider, Historical  Active Yes   levETIRAcetam (Keppra) 250 mg tablet Take one tablet in morning and evening. After dialysis session, take one additional tablet. 90 day prescription Yumiko Tamayo MD  Active Yes           Med Note Josejosee Rasmussen   Wed Apr 6, 2022  3:42 AM) Take three times daily on dialysis days. minoxidiL (LONITEN) 2.5 mg tablet Take 2.5 mg by mouth two (2) times a day.  Indications: high blood pressure Provider, Historical  Active Yes montelukast (SINGULAIR) 10 mg tablet Take 10 mg by mouth every evening. Provider, Historical  Active Yes   omega-3 acid ethyl esters (LOVAZA) 1 gram capsule Take 1 g by mouth two (2) times a day. Provider, Historical  Active Yes   polyvinyl alcohol-povidon,PF, (REFRESH CLASSIC) 1.4-0.6 % ophthalmic solution Administer 1-2 Drops to both eyes as needed. Provider, Historical  Active    pravastatin (PRAVACHOL) 40 mg tablet Take 40 mg by mouth nightly.  Provider, Historical  Active Yes           Med Note Sasha Mccarty   Wed Apr 6, 2022  3:43 AM) Taking every night                      [facility]: [phone]

## 2022-08-23 NOTE — H&P
[facility]  [address], [facility] [address]  [phone]    Hospitalist Admission Note      NAME:  Sanjiv Devries   :   1940   MRN:  [id number]     PCP:  Myrna Mahmood MD     Date/Time of service:  2022 2:34 PM          Subjective:     CHIEF COMPLAINT: dyspnea     HISTORY OF PRESENT ILLNESS:     Ms. Tresa Gomez is a 79 y.o.  female who presented to the Emergency Department complaining of dyspnea. Noted at HD. Sent by EMS to ER. Here we see edema on xray and hypoxia requiring BiPAP. We will admit her for management. 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;  Old right posterior medial occipital lobe cortical infarct    Hx of seasonal allergies     Hypertension     Moderate pulmonary hypertension ([address]     Per echocardiogram of 2021; same study showed normal LVEF of 55-60%    Monoclonal gammopathy of undetermined significance     Obstructive sleep apnea     Seizures ([address]     last keppra level- 2019 @ 15   Last seizure 2017    Urinary incontinence         Past Surgical History:   Procedure Laterality Date    HX APPENDECTOMY N/A     HX CATARACT REMOVAL Bilateral 2018    HX  SECTION N/A     x 2    HX HEMORRHOIDECTOMY      HX HYSTERECTOMY      HX KNEE REPLACEMENT Right 2019    HX TONSILLECTOMY      IR INSERT NON TUNL CVC OVER 5 YRS  2021    IR INSERT TUNL CVC W/O PORT OVER 5 YR  2021       Social History     Tobacco Use    Smoking status: Passive Smoke Exposure - Never Smoker    Smokeless tobacco: Never    Tobacco comments:     Passive smoke exposure  who passed when patient was 57   Substance Use Topics    Alcohol use: No        Family History   Problem Relation Age of Onset    Hypertension Mother     Diabetes Sister     Hypertension Sister     SKIN CANCER Sister     Stroke Father     Parkinson's Disease Father     SKIN CANCER Sister     Cancer Sister       Family hx cannot be fully assessed, since the patient cannot provide information    Allergies   Allergen Reactions    Latex Rash    Codeine Other (comments)    Levaquin [Levofloxacin] Other (comments)     Hallucinations    Lisinopril Cough    Sulfa (Sulfonamide Antibiotics) Rash and Itching        Prior to Admission medications    Medication Sig Start Date End Date Taking? Authorizing Provider   levETIRAcetam (Keppra) 250 mg tablet Take one tablet in morning and evening. After dialysis session, take one additional tablet. 90 day prescription 3/9/22   Minda Jacobsen MD   acetaminophen (TYLENOL) 650 mg TbER Take 650 mg by mouth every eight (8) hours as needed for Pain. Provider, Historical   hydrALAZINE (APRESOLINE) 25 mg tablet Take 25 mg by mouth three (3) times daily. Provider, Historical   ferrous sulfate 325 mg (65 mg iron) tablet Take 325 mg by mouth two (2) times a day. Patient not taking: Reported on 9/8/2021    Provider, Historical   calcium acetate,phosphat bind, (PHOSLO) 667 mg cap Take 1 Capsule by mouth three (3) times daily (with meals). Provider, Historical   cloNIDine HCL (CATAPRES) 0.2 mg tablet Take 1 Tablet by mouth every six (6) hours as needed for Other (sBP >150) for up to 30 doses. Indications: high blood pressure  Patient not taking: Reported on 9/8/2021 5/24/21   Khris Burnham MD   minoxidiL (LONITEN) 2.5 mg tablet Take 2.5 mg by mouth two (2) times a day. Indications: high blood pressure    Provider, Historical   omega-3 acid ethyl esters (LOVAZA) 1 gram capsule Take 1 g by mouth two (2) times a day. Provider, Historical   aspirin delayed-release 81 mg tablet Take 81 mg by mouth daily. Provider, Historical   amLODIPine (NORVASC) 10 mg tablet Take 10 mg by mouth every evening. Provider, Historical   montelukast (Singulair) 10 mg tablet Take 10 mg by mouth every evening. Provider, Historical   fluticasone propionate (Flonase Allergy Relief) 50 mcg/actuation nasal spray 2 Sprays by Both Nostrils route every evening. Provider, Historical   polyvinyl alcohol-povidon,PF, (REFRESH CLASSIC) 1.4-0.6 % ophthalmic solution Administer 1-2 Drops to both eyes as needed. Provider, Historical   carvedilol (COREG) 6.25 mg tablet Take 1 Tab by mouth two (2) times daily (with meals). Patient taking differently: Take 6.25 mg by mouth three (3) times daily (with meals). Indications: high blood pressure 8/28/19   Atul Torres MD   pravastatin (PRAVACHOL) 40 mg tablet Take 40 mg by mouth nightly.     Provider, Historical       Review of Systems:  (bold if positive, if negative)    Gen:  fatigue  Eyes:  ENT:  CVS:  Pulm:  Cough, dyspnea, GI:  :  MS:  Skin:  Psych:  Endo:  Hem:  Renal:  Neuro:  weakness      Objective:      VITALS:    Vital signs reviewed; most recent are:    Visit Vitals  BP (!) 178/59   Pulse 85   Temp 98.3 °F (36.8 °C)   Resp 27   Ht 5' 1\" (1.549 m)   Wt 74.4 kg (164 lb)   SpO2 96%   BMI 30.99 kg/m²     SpO2 Readings from Last 6 Encounters:   08/23/22 96%   04/07/22 95%   03/09/22 95%   09/29/21 98%   09/24/21 97%   09/15/21 98%    O2 Flow Rate (L/min): 5 l/min   No intake or output data in the 24 hours ending 08/23/22 1434     Exam:     Physical Exam:    Gen:  Obese, in no acute distress  HEENT:  Pink conjunctivae, PERRL, hearing intact to voice, moist mucous membranes  Neck:  Supple, without masses, thyroid non-tender  Resp:  No accessory muscle use, distant breath sounds without wheezes rales or rhonchi  Card:  No murmurs, normal S1, S2 without thrills, bruits or peripheral edema  Abd:  Soft, non-tender, non-distended, normoactive bowel sounds are present, no mass  Lymph:  No cervical or inguinal adenopathy  Musc:  No cyanosis or clubbing  Skin:  No rashes or ulcers, skin turgor is good  Neuro:  Cranial nerves are grossly intact, general motor weakness, follows commands   Psych:  Poor insight, oriented to person, place and time, alert     Labs:    Recent Labs     08/23/22  1114   WBC 9.1   HGB 8.8*   HCT 26.5*        Recent Labs     08/23/22  1114      K 4.1      CO2 28   *   BUN 54*   CREA 7.92*   CA 10.4*   MG 2.4   PHOS 4.6   ALB 3.5   TBILI 1.2*   ALT 22     Lab Results   Component Value Date/Time    Glucose (POC) 100 05/20/2021 09:00 AM    Glucose (POC) 109 (H) 02/20/2019 10:13 PM     No results for input(s): PH, PCO2, PO2, HCO3, FIO2 in the last 72 hours. No results for input(s): INR, INREXT in the last 72 hours. All Micro Results       None            I have reviewed previous records       Assessment and Plan:      Acute respiratory failure with hypoxia / Pulmonary edema / CARRANZA (dyspnea on exertion) - POA, due to a excess fluid. Consult renal for HD. On the last admit they removed 5L over 2 days. PT eval for oxygen needs, arrange Home oxygen for chronic CHF if hypoxia persists. ECHO in 7/21 with dCHF and pHTN. Hypoventilation may contribute to hypoxia too. Consult palliative care. ESRD on dialysis - Consulted renal. Renal diet. Fluid restriction. Hypertension / Diastolic CHF, chronic - Continue Norvasc, ASA, Coreg, clonidine, hydralazine and minoxidil. Check orthostatics. Discordant L vs R BP likely related to closed fistula. Outpatient vascular follow up     Anemia due to chronic kidney disease - Stable.  EPO per renal     Hx of completed stroke / Hx Seizures - continue ASA, BB, statin, keppra     Debilitated patient / Arthritis - PT OT eval.  Prn tylenol     Obese / ABIGAIL on CPAP - May resume home CPAP     Urinary incontinence - Not on meds     Monoclonal gammopathy - Outpatient follow up     High cholesterol - Continue pravastatin     Anxiety and depression - Not on meds    Telemetry reviewed:   normal sinus rhythm    Risk of deterioration: high      Total time spent with patient: 48 Minutes I personally reviewed chart, notes, data and current medications in the medical record. I have personally examined and treated the patient at bedside during this period. To assist coordination of care and communication with nursing and staff, this note may be preliminary early in the day, but finalized by end of the day.                  Care Plan discussed with: Patient, Care Manager, Nursing Staff, Consultant/Specialist, and >50% of time spent in counseling and coordination of care    Discussed:  Care Plan and D/C Planning       ___________________________________________________    Attending Physician: Fito Cartwright MD

## 2022-08-23 NOTE — ED NOTES
TRANSFER - OUT REPORT:    Verbal report given to Jose RN(name) on Hermes Murrieta  being transferred to [facility](unit) for routine progression of care       Report consisted of patients Situation, Background, Assessment and   Recommendations(SBAR). Information from the following report(s) SBAR, Kardex, ED Summary, Intake/Output, MAR and Recent Results was reviewed with the receiving nurse. Lines:   Peripheral IV 08/23/22 Left Wrist (Active)   Site Assessment Clean, dry, & intact 08/23/22 1117   Phlebitis Assessment 0 08/23/22 1117   Infiltration Assessment 0 08/23/22 1117   Dressing Status Clean, dry, & intact 08/23/22 1117   Dressing Type Transparent;Tape 08/23/22 1117   Hub Color/Line Status Pink;Patent; Flushed 08/23/22 1117   Action Taken Blood drawn 08/23/22 1117        Opportunity for questions and clarification was provided.       Patient transported with:   Registered Nurse   BiPap

## 2022-08-23 NOTE — ED PROVIDER NOTES
HPI, PMH, ROS, PE are limited 2/2 patient's acuity of condition    HISTORY OF PRESENT ILLNESS  79 yof hx esrd dialysis TTS + pulm htn no known hx chf BIBA from dialysis where she was found to have low O2 sat low 80s  EMS placed on NRB and 100%  Reports SOB progressive since last evening  +cough  -fevers, cp, ill contacts  -palpitations  Pain 0/10  Sob worse with laying flat/exertion    The history is provided by the patient and the EMS personnel. Shortness of Breath  This is a recurrent problem. The average episode lasts 1 day. The problem occurs continuously. The current episode started yesterday. The problem has been gradually worsening. Associated symptoms include cough and orthopnea. Pertinent negatives include no chest pain. It is unknown what precipitated the problem. She has tried nothing for the symptoms. The treatment provided no relief. 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;  Old right posterior medial occipital lobe cortical infarct    Hx of seasonal allergies     Hypertension     Moderate pulmonary hypertension ([address]     Per echocardiogram of 2021; same study showed normal LVEF of 55-60%    Monoclonal gammopathy of undetermined significance     Obstructive sleep apnea     Seizures ([address]     last keppra level- 2019 @ 15   Last seizure 2017    Urinary incontinence      Past Surgical History:   Procedure Laterality Date    HX APPENDECTOMY N/A     HX CATARACT REMOVAL Bilateral 2018    HX  SECTION N/A     x 2    HX HEMORRHOIDECTOMY      HX HYSTERECTOMY      HX KNEE REPLACEMENT Right 2019    HX TONSILLECTOMY      IR INSERT NON TUNL CVC OVER 5 YRS  2021    IR INSERT TUNL CVC W/O PORT OVER 5 YR 5/18/2021     Family History:   Problem Relation Age of Onset    Hypertension Mother     Diabetes Sister     Hypertension Sister     SKIN CANCER Sister     Stroke Father     Parkinson's Disease Father     SKIN CANCER Sister     Cancer Sister      Social History     Socioeconomic History    Marital status:      Spouse name: Not on file    Number of children: Not on file    Years of education: Not on file    Highest education level: Not on file   Occupational History    Not on file   Tobacco Use    Smoking status: Passive Smoke Exposure - Never Smoker    Smokeless tobacco: Never    Tobacco comments:     Passive smoke exposure  who passed when patient was 57   Vaping Use    Vaping Use: Never used   Substance and Sexual Activity    Alcohol use: No    Drug use: No    Sexual activity: Not on file   Other Topics Concern     Service Not Asked    Blood Transfusions Not Asked    Caffeine Concern Not Asked    Occupational Exposure Not Asked    Hobby Hazards Not Asked    Sleep Concern Not Asked    Stress Concern Not Asked    Weight Concern Not Asked    Special Diet Not Asked    Back Care Not Asked    Exercise Not Asked    Bike Helmet Not Asked    Seat Belt Not Asked    Self-Exams Not Asked   Social History Narrative    Not on file     Social Determinants of Health     Financial Resource Strain: Not on file   Food Insecurity: Not on file   Transportation Needs: Not on file   Physical Activity: Not on file   Stress: Not on file   Social Connections: Not on file   Intimate Partner Violence: Not on file   Housing Stability: Not on file     ALLERGIES: Latex, Codeine, Levaquin [levofloxacin], Lisinopril, and Sulfa (sulfonamide antibiotics)    REVIEW OF SYSTEMS  Review of Systems   Respiratory:  Positive for cough and shortness of breath. Cardiovascular:  Positive for orthopnea. Negative for chest pain.    A 10-POINT SYSTEMS HAS BEEN REVIEWED, AND ALL SYSTEMS ARE NEGATIVE UNLESS OTHERWISE STATED IN THE HPI ARE OTHERWISE NEGATIVE    PHYSICAL EXAM  Vitals:    08/23/22 1430 08/23/22 1436 08/23/22 1511 08/23/22 1517   BP: (!) 186/54   (!) 156/60   Pulse: 87 86  95   Resp:  23  29   Temp:    97.7 °F (36.5 °C)   SpO2: 95% 95% 94% 94%   Weight:       Height:         Physical Exam  Vitals and nursing note reviewed. Constitutional:       General: She is in acute distress (mild-moderate resp distress speaking 3-5 word sentences). Appearance: She is well-developed. She is not ill-appearing. HENT:      Head: Normocephalic and atraumatic. Right Ear: External ear normal.      Left Ear: External ear normal.   Eyes:      Extraocular Movements: Extraocular movements intact. Conjunctiva/sclera: Conjunctivae normal.   Cardiovascular:      Rate and Rhythm: Normal rate and regular rhythm. Pulses: Normal pulses. Heart sounds: No murmur heard. No friction rub. No gallop. Pulmonary:      Effort: Pulmonary effort is normal.      Breath sounds: Rales present. No wheezing or rhonchi. Comments: Tachypneic slight accessory muscle use diffuse rales  Abdominal:      General: There is no distension. Palpations: Abdomen is soft. Tenderness: There is no abdominal tenderness. There is no guarding or rebound. Musculoskeletal:         General: No swelling, tenderness or deformity. Normal range of motion. Cervical back: No rigidity. Right lower leg: Edema (1+) present. Left lower leg: Edema (1+) present. Skin:     General: Skin is warm. Capillary Refill: Capillary refill takes less than 2 seconds. Coloration: Skin is not jaundiced. Findings: No rash. Neurological:      General: No focal deficit present. Mental Status: She is alert and oriented to person, place, and time. Motor: No weakness.        INITIAL ASSESSMENT AND PLAN  BA Gamino is a 79 y.o. female who presents with sob/tachypnea/hypoxemia  Differential diagnosis includes but is not limited to pna, ptx, aechf/pulm edema, anemia, SCAPE, ami    Prior records reviewed and do see pt admitted previously for pulm edema  Last echo 7/21 though has had admission more recently for pulm edema  Obvious fluid overload vs pna though hypertensive and did not get dialysis, benja more fluid overload  Placed on bipap    ED Course as of 08/23/22 1628   Tue Aug 23, 2022   1208 CBC WITH AUTOMATED DIFF(!):    WBC 9.1   RBC 2.74(!)   HGB 8.8(!)   HCT 26.5(!)   MCV 96.7   MCH 32.1   MCHC 33.2   RDW 12.9   PLATELET 396   MPV 9.1   NRBC 0.0   ABSOLUTE NRBC 0.00   NEUTROPHILS 78(!)   LYMPHOCYTES 15   MONOCYTES 6   EOSINOPHILS 1   BASOPHILS 0   IMMATURE GRANULOCYTES 0   ABS. NEUTROPHILS 7.1   ABS. LYMPHOCYTES 1.3   ABS. MONOCYTES 0.5   ABS. EOSINOPHILS 0.1   ABS. BASOPHILS 0.0   ABS. IMM. GRANS. 0.0   DF AUTOMATED  <1pt drop hgb otherwise nl [AL]   1208 COMPREHENSIVE METABOLIC PANEL(!):    Sodium 138   Potassium 4.1   Chloride 101   CO2 28   Anion gap 9   Glucose 125(!)   BUN 54(!)   Creatinine 7.92(!)   BUN/Creatinine ratio 7(!)   GFR est AA 6(!)   GFR est non-AA 5(!)   Calcium 10.4(!)   Bilirubin, total 1.2(!)   ALT 22   AST 22   Alk.  phosphatase 72   Protein, total 8.2   Albumin 3.5   Globulin 4.7(!)   A-G Ratio 0.7(!)  No gap nl k otherwise consistent esrd [AL]   1208 MAGNESIUM:    Magnesium 2.4 [AL]   1208 TROPONIN-HIGH SENSITIVITY:    Troponin-High Sensitivity 35 [AL]   1208 PRO-BNP(!):    NT pro-BNP 8,160(!)  Likely both poor renal clearance + fluid overload [AL]   1208 POC G3 - PUL(!):    FIO2 (POC) 30   pH (POC) 7.41   pCO2 (POC) 41.2   pO2 (POC) 79(!)   HCO3 (POC) 26.3(!)   sO2 (POC) 95.7   Base excess (POC) 1.5   Site LEFT RADIAL   Device: BIPAP MASK   PEEP/CPAP (POC) 6   Pressure support 12   Allens test (POC) Positive   Specimen type (POC) ARTERIAL  hypoxemia [AL]   1347 Discussed pts case at length with dr Cortes Ray who will arrange for [facility] [AL]      ED Course User Index  [AL] Tyler Purdy, DO       DIAGNOSTIC STUDIES  Recent Results (from the past 6 hour(s))   CBC WITH AUTOMATED DIFF    Collection Time: 08/23/22 11:14 AM   Result Value Ref Range    WBC 9.1 3.6 - 11.0 K/uL    RBC 2.74 (L) 3.80 - 5.20 M/uL    HGB 8.8 (L) 11.5 - 16.0 g/dL    HCT 26.5 (L) 35.0 - 47.0 %    MCV 96.7 80.0 - 99.0 FL    MCH 32.1 26.0 - 34.0 PG    MCHC 33.2 30.0 - 36.5 g/dL    RDW 12.9 11.5 - 14.5 %    PLATELET 559 226 - 214 K/uL    MPV 9.1 8.9 - 12.9 FL    NRBC 0.0 0  WBC    ABSOLUTE NRBC 0.00 0.00 - 0.01 K/uL    NEUTROPHILS 78 (H) 32 - 75 %    LYMPHOCYTES 15 12 - 49 %    MONOCYTES 6 5 - 13 %    EOSINOPHILS 1 0 - 7 %    BASOPHILS 0 0 - 1 %    IMMATURE GRANULOCYTES 0 0.0 - 0.5 %    ABS. NEUTROPHILS 7.1 1.8 - 8.0 K/UL    ABS. LYMPHOCYTES 1.3 0.8 - 3.5 K/UL    ABS. MONOCYTES 0.5 0.0 - 1.0 K/UL    ABS. EOSINOPHILS 0.1 0.0 - 0.4 K/UL    ABS. BASOPHILS 0.0 0.0 - 0.1 K/UL    ABS. IMM. GRANS. 0.0 0.00 - 0.04 K/UL    DF AUTOMATED     METABOLIC PANEL, COMPREHENSIVE    Collection Time: 08/23/22 11:14 AM   Result Value Ref Range    Sodium 138 136 - 145 mmol/L    Potassium 4.1 3.5 - 5.1 mmol/L    Chloride 101 97 - 108 mmol/L    CO2 28 21 - 32 mmol/L    Anion gap 9 5 - 15 mmol/L    Glucose 125 (H) 65 - 100 mg/dL    BUN 54 (H) 6 - 20 MG/DL    Creatinine 7.92 (H) 0.55 - 1.02 MG/DL    BUN/Creatinine ratio 7 (L) 12 - 20      GFR est AA 6 (L) >60 ml/min/1.73m2    GFR est non-AA 5 (L) >60 ml/min/1.73m2    Calcium 10.4 (H) 8.5 - 10.1 MG/DL    Bilirubin, total 1.2 (H) 0.2 - 1.0 MG/DL    ALT (SGPT) 22 12 - 78 U/L    AST (SGOT) 22 15 - 37 U/L    Alk.  phosphatase 72 45 - 117 U/L    Protein, total 8.2 6.4 - 8.2 g/dL    Albumin 3.5 3.5 - 5.0 g/dL    Globulin 4.7 (H) 2.0 - 4.0 g/dL    A-G Ratio 0.7 (L) 1.1 - 2.2     NT-PRO BNP    Collection Time: 08/23/22 11:14 AM   Result Value Ref Range    NT pro-BNP 8,160 (H) <450 PG/ML   TROPONIN-HIGH SENSITIVITY    Collection Time: 08/23/22 11:14 AM   Result Value Ref Range    Troponin-High Sensitivity 35 0 - 51 ng/L PHOSPHORUS    Collection Time: 08/23/22 11:14 AM   Result Value Ref Range    Phosphorus 4.6 2.6 - 4.7 MG/DL   MAGNESIUM    Collection Time: 08/23/22 11:14 AM   Result Value Ref Range    Magnesium 2.4 1.6 - 2.4 mg/dL   SAMPLES BEING HELD    Collection Time: 08/23/22 11:14 AM   Result Value Ref Range    SAMPLES BEING HELD 1SST,1RED     COMMENT        Add-on orders for these samples will be processed based on acceptable specimen integrity and analyte stability, which may vary by analyte. POC G3 - PUL    Collection Time: 08/23/22 11:31 AM   Result Value Ref Range    FIO2 (POC) 30 %    pH (POC) 7.41 7.35 - 7.45      pCO2 (POC) 41.2 35.0 - 45.0 MMHG    pO2 (POC) 79 (L) 80 - 100 MMHG    HCO3 (POC) 26.3 (H) 22 - 26 MMOL/L    sO2 (POC) 95.7 92 - 97 %    Base excess (POC) 1.5 mmol/L    Site LEFT RADIAL      Device: BIPAP MASK      PEEP/CPAP (POC) 6 cmH2O    Pressure support 12 cmH2O    Allens test (POC) Positive      Specimen type (POC) ARTERIAL       XR CHEST PORT   Final Result   Moderate interstitial pulmonary edema with trace bilateral pleural   effusions. FINAL ASSESSMENT  Still requiring bipap at time of disposition however much improved WOB  BP initially improved with NTG x 3 + paste, did slightly increase howveer given improvement with bipap with plans for dialysis (discussed w/ dr Mahi Mendoza who will place orders), will defer gtt for such now  No indication for intubaion at this time  Stable for admission, awaiting dialysis at this time    ED DIAGNOSIS  1. Acute respiratory failure with hypoxia ([address]    2. Acute pulmonary edema (HCC)    3. Hypertensive emergency    4.  ESRD on hemodialysis ([address]        DISPOSITION  Admitted      MEDICATIONS ADMINISTERED IN THE EMERGENCY DEPARTMENT  Medications   amLODIPine (NORVASC) tablet 10 mg (has no administration in time range)   aspirin delayed-release tablet 81 mg (has no administration in time range)   carvediloL (COREG) tablet 6.25 mg (has no administration in time range)   levETIRAcetam (KEPPRA) tablet 250 mg (has no administration in time range)   minoxidiL (LONITEN) tablet 2.5 mg (has no administration in time range)   montelukast (SINGULAIR) tablet 10 mg (has no administration in time range)   pravastatin (PRAVACHOL) tablet 40 mg (has no administration in time range)   calcium acetate(phosphat bind) (PHOSLO) capsule 667 mg (has no administration in time range)   sodium chloride (NS) flush 5-40 mL (has no administration in time range)   sodium chloride (NS) flush 5-40 mL (has no administration in time range)   acetaminophen (TYLENOL) tablet 650 mg (has no administration in time range)     Or   acetaminophen (TYLENOL) suppository 650 mg (has no administration in time range)   polyethylene glycol (MIRALAX) packet 17 g (has no administration in time range)   ondansetron (ZOFRAN ODT) tablet 4 mg (has no administration in time range)     Or   ondansetron (ZOFRAN) injection 4 mg (has no administration in time range)   heparin (porcine) injection 5,000 Units (has no administration in time range)   levETIRAcetam (KEPPRA) tablet 250 mg (has no administration in time range)   nitroglycerin (NITROSTAT) tablet 0.4 mg (0.4 mg SubLINGual Given 8/23/22 1221)   nitroglycerin (NITROBID) 2 % ointment 1 Inch (1 Inch Topical Given 8/23/22 1220)       PROCEDURES  EKG    Date/Time: 8/23/2022 11:45 AM  Performed by: Annabel Garnett DO  Authorized by: Annabel Garnett DO     ECG reviewed by ED Physician in the absence of a cardiologist: yes    Previous ECG:     Previous ECG:  Compared to current    Similarity:  Changes noted    Comparison ECG info:  New LBBB  Interpretation:     Interpretation: normal    Rate:     ECG rate:  90    ECG rate assessment: normal    Rhythm:     Rhythm: sinus rhythm and A-V block      A-V block: 1st Degree    QRS:     QRS axis:  Normal    QRS intervals:  Normal    QRS conduction: normal    ST segments:     ST segments:  Normal  Comments:      New LBBB but -sgarbossa's for AMI  Twi I, aVL, V6  Critical Care    Date/Time: 8/23/2022 4:27 PM  Performed by: Indio Tejeda DO  Authorized by: Indio Tejeda DO     Critical care provider statement:     Critical care time (minutes):  61    Critical care time was exclusive of:  Separately billable procedures and treating other patients and teaching time    Critical care was necessary to treat or prevent imminent or life-threatening deterioration of the following conditions:  Circulatory failure and respiratory failure    Critical care was time spent personally by me on the following activities:  Obtaining history from patient or surrogate, evaluation of patient's response to treatment, examination of patient, development of treatment plan with patient or surrogate, ordering and performing treatments and interventions, ordering and review of laboratory studies, ordering and review of radiographic studies, pulse oximetry, re-evaluation of patient's condition, review of old charts and discussions with consultants (titrationg of cpap/bipap, ntg/bp antihypertenveis)

## 2022-08-23 NOTE — CONSULTS
..                            [id number]  YOB: 1940          Assessment & Plan:     IMP:  ESRD TTS  ACUTE RESP FAILURE  HTN - UNCONTROLLED - ? ACCURATE GIVEN LEFT LEG CUFF  ANEMIA  HYPERCALCEMIA  H/O CVA  H/O CHFpEF  ABIGAIL  MGUS    PLAN:  HD ASAP   ORDER IN  DISCUSSED WITH JASON   NO TIFF UNTIL BP IS BETTER  MAY NEED ANOTHER UF TX IN AM       Subjective:   CC: ESRD TTS  HPI: CTSP 2ND TO NEED FOR HD. PT WAS SEEN AT HD TODAY AND FOUND TO BE HYPOXIC. EMS CALLED. HAS CXR WITH EDEMA. NOW ON BIPAP. DOING BETTER. DENIES F/C/NS, N/V/D/ABD PAIN, CP, WORSENING EDEMA. ROS: SEE ABOVE   Current Facility-Administered Medications   Medication Dose Route Frequency    amLODIPine (NORVASC) tablet 10 mg  10 mg Oral QPM    [START ON 8/24/2022] aspirin delayed-release tablet 81 mg  81 mg Oral DAILY    carvediloL (COREG) tablet 6.25 mg  6.25 mg Oral TID WITH MEALS    levETIRAcetam (KEPPRA) tablet 250 mg  250 mg Oral BID    minoxidiL (LONITEN) tablet 2.5 mg  2.5 mg Oral BID    montelukast (SINGULAIR) tablet 10 mg  10 mg Oral QPM    pravastatin (PRAVACHOL) tablet 40 mg  40 mg Oral QHS    calcium acetate(phosphat bind) (PHOSLO) capsule 667 mg  1 Capsule Oral TID WITH MEALS    sodium chloride (NS) flush 5-40 mL  5-40 mL IntraVENous Q8H    sodium chloride (NS) flush 5-40 mL  5-40 mL IntraVENous PRN    acetaminophen (TYLENOL) tablet 650 mg  650 mg Oral Q6H PRN    Or    acetaminophen (TYLENOL) suppository 650 mg  650 mg Rectal Q6H PRN    polyethylene glycol (MIRALAX) packet 17 g  17 g Oral DAILY PRN    ondansetron (ZOFRAN ODT) tablet 4 mg  4 mg Oral Q8H PRN    Or    ondansetron (ZOFRAN) injection 4 mg  4 mg IntraVENous Q6H PRN    heparin (porcine) injection 5,000 Units  5,000 Units SubCUTAneous Q8H     Current Outpatient Medications   Medication Sig    levETIRAcetam (Keppra) 250 mg tablet Take one tablet in morning and evening. After dialysis session, take one additional tablet.   80 day prescription    acetaminophen (TYLENOL) 650 mg TbER Take 650 mg by mouth every eight (8) hours as needed for Pain.    hydrALAZINE (APRESOLINE) 25 mg tablet Take 25 mg by mouth three (3) times daily. ferrous sulfate 325 mg (65 mg iron) tablet Take 325 mg by mouth two (2) times a day. (Patient not taking: Reported on 2021)    calcium acetate,phosphat bind, (PHOSLO) 667 mg cap Take 1 Capsule by mouth three (3) times daily (with meals). cloNIDine HCL (CATAPRES) 0.2 mg tablet Take 1 Tablet by mouth every six (6) hours as needed for Other (sBP >150) for up to 30 doses. Indications: high blood pressure (Patient not taking: Reported on 2021)    minoxidiL (LONITEN) 2.5 mg tablet Take 2.5 mg by mouth two (2) times a day. Indications: high blood pressure    omega-3 acid ethyl esters (LOVAZA) 1 gram capsule Take 1 g by mouth two (2) times a day. aspirin delayed-release 81 mg tablet Take 81 mg by mouth daily. amLODIPine (NORVASC) 10 mg tablet Take 10 mg by mouth every evening.    montelukast (Singulair) 10 mg tablet Take 10 mg by mouth every evening. fluticasone propionate (Flonase Allergy Relief) 50 mcg/actuation nasal spray 2 Sprays by Both Nostrils route every evening. polyvinyl alcohol-povidon,PF, (REFRESH CLASSIC) 1.4-0.6 % ophthalmic solution Administer 1-2 Drops to both eyes as needed. carvedilol (COREG) 6.25 mg tablet Take 1 Tab by mouth two (2) times daily (with meals). (Patient taking differently: Take 6.25 mg by mouth three (3) times daily (with meals). Indications: high blood pressure)    pravastatin (PRAVACHOL) 40 mg tablet Take 40 mg by mouth nightly. Objective:     Vitals:  Blood pressure (!) 186/54, pulse 86, temperature 98.3 °F (36.8 °C), resp. rate 23, height 5' 1\" (1.549 m), weight 74.4 kg (164 lb), SpO2 95 %. Temp (24hrs), Av.3 °F (36.8 °C), Min:98.3 °F (36.8 °C), Max:98.3 °F (36.8 °C)      Intake and Output:  No intake/output data recorded.   No intake/output data recorded. Physical Exam:                Patient is intubated:  no    Physical Examination:   GENERAL ASSESSMENT: NAD  HEENT:Nontraumatic   CHEST: on bipap, no resp distress  HEART: cannot hear over bipap  ABDOMEN: no sig destention   :Marques:   EXTREMITY: EDEMA  NEURO:Grossly non focal          ECG/rhythm:    Data Review      No results for input(s): TNIPOC in the last 72 hours. No lab exists for component: ITNL   No results for input(s): CPK, CKMB, TROIQ in the last 72 hours. Recent Labs     08/23/22  1114      K 4.1      CO2 28   BUN 54*   CREA 7.92*   *   PHOS 4.6   MG 2.4   CA 10.4*   ALB 3.5   WBC 9.1   HGB 8.8*   HCT 26.5*         No results for input(s): INR, PTP, APTT, INREXT in the last 72 hours. Needs: urine analysis, urine sodium, protein and creatinine  No results found for: Jossuemayra Jennie      Discussed with:  Family    : Marlen Graham MD  8/23/2022        [facility]:  [email or web address]  Femi Lopez office:  [address] [place] [address], [address] [place] [address],[address]  [address]  Phone: [phone]  Fax :     [phone]    [place] office:  [address] [place] [address]  Phone - [phone]  Fax - [phone]

## 2022-08-23 NOTE — PROGRESS NOTES
Nutrition Note    RD modified diet order:    Original: Clear Liquid, Low Potassium, 1200 mL fluid restriction    New: Clear Liquid, 1200 mL fluid restriction    Low K restriction not indicated on clear liquid diet order, as this diet order only provides ~16% of DRI for potassium on average/day.      Electronically signed by Mary Mejia RD on 3/70/9565  Contact: Ext: [phone], or via [facility]

## 2022-08-23 NOTE — ED TRIAGE NOTES
PT to ER via ambulance from dialysis appointment. Pt reports SOB starting last night feeling more short of breath this am.  At dialysis center room air sat in 70's. Did not start treatment and called EMS. Pt on 10L oxygen mask on arrival. Pt tachyepnic. Pt denies chest pain. Last dialysis treatment Saturday per normal schedule.

## 2022-08-23 NOTE — PROCEDURES
[facility]                      [phone]  Vitals Pre Post Assessment Pre Post   BP BP: (!) 131/49 (08/23/22 1715)   120/40 LOC AOX4 AOX4   HR Pulse (Heart Rate): 86 (08/23/22 1715) 71 Lungs On BIPAP On BIPAP   Resp Resp Rate: 27 (08/23/22 1715) 23 Cardiac NRR NRR   Temp Temp: 97.7 °F (36.5 °C) (08/23/22 1517) 36.6 Skin WDI WDI   Weight 74.4 kg 71.9 kg Edema NO NO   Pain Pain Intensity 1: 0 (08/23/22 1517) 0/10 Tele Status Telemonitor elemonitor     Orders   Duration: Start: 5698 End: 2010 Total: 3.5   Dialyzer: Dialyzer/Set Up Inspection: Claudean Shave (08/23/22 1635)   K Bath: Dialysate K (mEq/L): 3 (08/23/22 1635)   Ca Bath: Dialysate CA (mEq/L): 2.5 (08/23/22 1635)   Na: Dialysate NA (mEq/L): 140 (08/23/22 1635)   Bicarb:  35   Target Fluid Removal: Goal/Amount of Fluid to Remove (mL): 3000 mL (08/23/22 1635)     Access   Type & Location: R chest tunneled catheter   Comments: Access Dressing is clean and intact, no evidence of used and dated, 08/23/2022  Each catheter limb disinfected per p&p, caps removed, hubs disinfected per p&p. Both lumen flushes without issues.                                        Labs   HBsAg (Antigen) / date: Negative 12/16/2021                             HBsAb (Antibody) / date: Immune 3/17/2022   Source: P. Portal   Obtained/Reviewed  Critical Results Called HGB   Date Value Ref Range Status   08/23/2022 8.8 (L) 11.5 - 16.0 g/dL Final     Potassium   Date Value Ref Range Status   08/23/2022 4.1 3.5 - 5.1 mmol/L Final     Calcium   Date Value Ref Range Status   08/23/2022 10.4 (H) 8.5 - 10.1 MG/DL Final     BUN   Date Value Ref Range Status   08/23/2022 54 (H) 6 - 20 MG/DL Final     Creatinine   Date Value Ref Range Status   08/23/2022 7.92 (H) 0.55 - 1.02 MG/DL Final        Meds Given   Name Dose Route                    Adequacy / Fluid    Total Liters Process:                  75 Liters   Net Fluid Removed:                   2500 ml      Comments   Time Out Done: 8746 Admitting Diagnosis: ESRD on HD, Acute hypoxemic respiratory failure   Consent obtained/signed: Informed Consent Verified: Yes (cont. of chronic care) (08/23/22 1635)   Machine / RO # Machine Number: [id number] (08/23/22 5358)   Primary Nurse Rpt Pre: Gale Hines R.N   Primary Nurse Rpt Post: Marisol Cobian R.N   Pt Education: HD procedure   Care Plan: HD as ordered   Pts outpatient clinic: [facility] Summary   Comments:                         1630:Time out done, order parameters checked, Dialysis related medications and consent have been reviewed. 1635: Treatment started with slow flow, gradually increased to ordered BF. Monitored closely. 1900: BP soft, map below 60, c/o lightheadedness, UF turned off X 15 mins and decreased to 2.5L  Extracorporeal lines flushed with 100CC NS to monitor for clots. Patients vitally stable. 2010:Treatment completed, all blood returned. Each dialysis catheter limb disinfected per p&p,  post dialysis catheter flushed with saline and caps applied. Dressing kept intact and dated. Treatment tolerated  with modified UFG. Comfort and care provided, needs attended, questions answered. Call bell within reach, bed to lowest position. Patent stated \"  I feel better now\".   Reports given to [facility]

## 2022-08-24 LAB
ALBUMIN SERPL-MCNC: 2.9 G/DL (ref 3.5–5)
ALBUMIN/GLOB SERPL: 0.7 {RATIO} (ref 1.1–2.2)
ALP SERPL-CCNC: 58 U/L (ref 45–117)
ALT SERPL-CCNC: 19 U/L (ref 12–78)
ANION GAP SERPL CALC-SCNC: 6 MMOL/L (ref 5–15)
AST SERPL-CCNC: 19 U/L (ref 15–37)
BILIRUB SERPL-MCNC: 1.3 MG/DL (ref 0.2–1)
BUN SERPL-MCNC: 21 MG/DL (ref 6–20)
BUN/CREAT SERPL: 5 (ref 12–20)
CALCIUM SERPL-MCNC: 9.2 MG/DL (ref 8.5–10.1)
CHLORIDE SERPL-SCNC: 104 MMOL/L (ref 97–108)
CO2 SERPL-SCNC: 30 MMOL/L (ref 21–32)
CREAT SERPL-MCNC: 4.3 MG/DL (ref 0.55–1.02)
ERYTHROCYTE [DISTWIDTH] IN BLOOD BY AUTOMATED COUNT: 12.7 % (ref 11.5–14.5)
GLOBULIN SER CALC-MCNC: 4 G/DL (ref 2–4)
GLUCOSE SERPL-MCNC: 83 MG/DL (ref 65–100)
HCT VFR BLD AUTO: 22.9 % (ref 35–47)
HGB BLD-MCNC: 7.5 G/DL (ref 11.5–16)
MAGNESIUM SERPL-MCNC: 2.3 MG/DL (ref 1.6–2.4)
MCH RBC QN AUTO: 31.6 PG (ref 26–34)
MCHC RBC AUTO-ENTMCNC: 32.8 G/DL (ref 30–36.5)
MCV RBC AUTO: 96.6 FL (ref 80–99)
NRBC # BLD: 0 K/UL (ref 0–0.01)
NRBC BLD-RTO: 0 PER 100 WBC
PLATELET # BLD AUTO: 207 K/UL (ref 150–400)
PMV BLD AUTO: 9.3 FL (ref 8.9–12.9)
POTASSIUM SERPL-SCNC: 3.9 MMOL/L (ref 3.5–5.1)
PROT SERPL-MCNC: 6.9 G/DL (ref 6.4–8.2)
RBC # BLD AUTO: 2.37 M/UL (ref 3.8–5.2)
SODIUM SERPL-SCNC: 140 MMOL/L (ref 136–145)
WBC # BLD AUTO: 5.3 K/UL (ref 3.6–11)

## 2022-08-24 PROCEDURE — 74011250637 HC RX REV CODE- 250/637: Performed by: INTERNAL MEDICINE

## 2022-08-24 PROCEDURE — 97535 SELF CARE MNGMENT TRAINING: CPT

## 2022-08-24 PROCEDURE — 97162 PT EVAL MOD COMPLEX 30 MIN: CPT

## 2022-08-24 PROCEDURE — 74011250636 HC RX REV CODE- 250/636: Performed by: INTERNAL MEDICINE

## 2022-08-24 PROCEDURE — 77010033678 HC OXYGEN DAILY

## 2022-08-24 PROCEDURE — 94660 CPAP INITIATION&MGMT: CPT

## 2022-08-24 PROCEDURE — 97530 THERAPEUTIC ACTIVITIES: CPT

## 2022-08-24 PROCEDURE — 83735 ASSAY OF MAGNESIUM: CPT

## 2022-08-24 PROCEDURE — 97116 GAIT TRAINING THERAPY: CPT

## 2022-08-24 PROCEDURE — 80053 COMPREHEN METABOLIC PANEL: CPT

## 2022-08-24 PROCEDURE — 65270000029 HC RM PRIVATE

## 2022-08-24 PROCEDURE — 97165 OT EVAL LOW COMPLEX 30 MIN: CPT

## 2022-08-24 PROCEDURE — 90935 HEMODIALYSIS ONE EVALUATION: CPT

## 2022-08-24 PROCEDURE — 94761 N-INVAS EAR/PLS OXIMETRY MLT: CPT

## 2022-08-24 PROCEDURE — 99222 1ST HOSP IP/OBS MODERATE 55: CPT | Performed by: FAMILY MEDICINE

## 2022-08-24 PROCEDURE — 74011000250 HC RX REV CODE- 250: Performed by: INTERNAL MEDICINE

## 2022-08-24 PROCEDURE — 36415 COLL VENOUS BLD VENIPUNCTURE: CPT

## 2022-08-24 PROCEDURE — 85027 COMPLETE CBC AUTOMATED: CPT

## 2022-08-24 RX ORDER — FLUTICASONE PROPIONATE 50 MCG
2 SPRAY, SUSPENSION (ML) NASAL EVERY EVENING
Status: DISCONTINUED | OUTPATIENT
Start: 2022-08-24 | End: 2022-08-25 | Stop reason: HOSPADM

## 2022-08-24 RX ADMIN — CALCIUM ACETATE 667 MG: 667 CAPSULE ORAL at 09:31

## 2022-08-24 RX ADMIN — CALCIUM ACETATE 667 MG: 667 CAPSULE ORAL at 11:57

## 2022-08-24 RX ADMIN — ASPIRIN 81 MG: 81 TABLET, COATED ORAL at 09:31

## 2022-08-24 RX ADMIN — LEVETIRACETAM 250 MG: 250 TABLET, FILM COATED ORAL at 09:31

## 2022-08-24 RX ADMIN — PRAVASTATIN SODIUM 40 MG: 20 TABLET ORAL at 21:12

## 2022-08-24 RX ADMIN — HEPARIN SODIUM 5000 UNITS: 5000 INJECTION INTRAVENOUS; SUBCUTANEOUS at 03:51

## 2022-08-24 RX ADMIN — HEPARIN SODIUM 5000 UNITS: 5000 INJECTION INTRAVENOUS; SUBCUTANEOUS at 09:31

## 2022-08-24 RX ADMIN — MONTELUKAST 10 MG: 10 TABLET, FILM COATED ORAL at 17:54

## 2022-08-24 RX ADMIN — LEVETIRACETAM 250 MG: 250 TABLET, FILM COATED ORAL at 21:12

## 2022-08-24 RX ADMIN — SODIUM CHLORIDE, PRESERVATIVE FREE 10 ML: 5 INJECTION INTRAVENOUS at 17:53

## 2022-08-24 RX ADMIN — HEPARIN SODIUM 5000 UNITS: 5000 INJECTION INTRAVENOUS; SUBCUTANEOUS at 17:53

## 2022-08-24 RX ADMIN — FLUTICASONE PROPIONATE 2 SPRAY: 50 SPRAY, METERED NASAL at 21:12

## 2022-08-24 RX ADMIN — CARVEDILOL 6.25 MG: 6.25 TABLET, FILM COATED ORAL at 18:11

## 2022-08-24 RX ADMIN — AMLODIPINE BESYLATE 10 MG: 5 TABLET ORAL at 18:11

## 2022-08-24 RX ADMIN — SODIUM CHLORIDE, PRESERVATIVE FREE 10 ML: 5 INJECTION INTRAVENOUS at 21:13

## 2022-08-24 RX ADMIN — CALCIUM ACETATE 667 MG: 667 CAPSULE ORAL at 17:54

## 2022-08-24 RX ADMIN — MINOXIDIL 2.5 MG: 2.5 TABLET ORAL at 18:11

## 2022-08-24 RX ADMIN — DEXTRAN 70 AND HYPROMELLOSE 2910 1 DROP: 1; 3 SOLUTION/ DROPS OPHTHALMIC at 21:12

## 2022-08-24 NOTE — PROGRESS NOTES
Problem: Mobility Impaired (Adult and Pediatric)  Goal: *Acute Goals and Plan of Care (Insert Text)  Description: FUNCTIONAL STATUS PRIOR TO ADMISSION: Patient was modified independent using a rollator for functional mobility. HOME SUPPORT PRIOR TO ADMISSION: The patient lived alone with daughter to provide assistance as needed. Physical Therapy Goals  Initiated 8/24/2022  1. Patient will move from supine to sit and sit to supine  in bed with modified independence within 7 day(s). 2.  Patient will transfer from bed to chair and chair to bed with modified independence using the least restrictive device within 7 day(s). 3.  Patient will perform sit to stand with modified independence within 7 day(s). 4.  Patient will ambulate with modified independence for 150 feet with the least restrictive device within 7 day(s). Outcome: Not Met   PHYSICAL THERAPY EVALUATION  Patient: Mikhail Kerr (89 y.o. female)  Date: 8/24/2022  Primary Diagnosis: Acute hypoxemic respiratory failure (HCC) [J96.01]       Precautions: Universal       ASSESSMENT  Based on the objective data described below, the patient presents with generally decreased strength, decreased endurance, dyspnea on exertion, and new supplemental O2 need on day 1 of admission with respiratory failure and pulmonary edema. Pt reports she does not use home O2 at baseline. Today she requires 2. 5LNCO2 to maintain SpO2 90-92% with activity. Using 1L/min SpO2 86% with activity and she requires 1 min seated rest for recovery. Pt educated regarding pursed lip breathing with proper return demonstration. She ambulates at supervision level with assist only for line management at this time. Current Level of Function Impacting Discharge (mobility/balance): SBA + assist for lines    Functional Outcome Measure: The patient scored 55 on the Barthel outcome measure which is indicative of partially dependent status.       Other factors to consider for discharge: as above     Patient will benefit from skilled therapy intervention to address the above noted impairments. PLAN :  Recommendations and Planned Interventions: bed mobility training, transfer training, gait training, therapeutic exercises, neuromuscular re-education, modalities, edema management/control, patient and family training/education, and therapeutic activities      Frequency/Duration: Patient will be followed by physical therapy:  5 times a week to address goals. Recommendation for discharge: (in order for the patient to meet his/her long term goals)  Physical therapy at least 2 days/week in the home     This discharge recommendation:  Has not yet been discussed the attending provider and/or case management    IF patient discharges home will need the following DME: patient owns DME required for discharge         SUBJECTIVE:   Patient stated I like to play games.     Pt received seated in chair immediately after OT evaluation, agreeable to PT and cleared by RN. 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;  Old right posterior medial occipital lobe cortical infarct    Hx of seasonal allergies     Hypertension     Moderate pulmonary hypertension ([address]     Per echocardiogram of 2021; same study showed normal LVEF of 55-60%    Monoclonal gammopathy of undetermined significance     Obstructive sleep apnea     Seizures ([facility] [address]     last keppra level- 2019 @ 15   Last seizure 2017    Urinary incontinence      Past Surgical History:   Procedure Laterality Date    HX APPENDECTOMY N/A     HX CATARACT REMOVAL Bilateral 2018    HX  SECTION N/A     x 2    HX HEMORRHOIDECTOMY      HX HYSTERECTOMY HX KNEE REPLACEMENT Right 2019    HX TONSILLECTOMY      IR INSERT NON TUNL CVC OVER 5 YRS  5/16/2021    IR INSERT TUNL CVC W/O PORT OVER 5 YR  5/18/2021       Personal factors and/or comorbidities impacting plan of care: as above    Home Situation  Home Environment: Private residence ([facility] 2nd level with elevator access)  One/Two Story Residence: One story  Living Alone: Yes  Support Systems: Child(shauna) (son and DIL live on 4th floor of same [facility] building)  Patient Expects to be Discharged to[de-identified] Home  Current DME Used/Available at Home: Adrienne Marbella, rollator, Walker, rolling, Shower chair, Grab bars, Raised toilet seat, Commode, bedside, Cane, straight (transport chair, reacher, sock aid, shoe horn)  Tub or Shower Type: Shower    EXAMINATION/PRESENTATION/DECISION MAKING:   Critical Behavior:  Neurologic State: Alert  Orientation Level: Oriented X4        Hearing:   Auditory  Auditory Impairment: Hearing aid(s)  Hearing Aids/Status: Bilateral, With patient  Skin:  Le exposed skin intact  Edema: 1+ LEs  Range Of Motion:  AROM: Within functional limits           PROM: Within functional limits           Strength:    Strength: Generally decreased, functional                    Tone & Sensation:   Tone: Normal                              Coordination:  Coordination: Within functional limits       Functional Mobility:  Bed Mobility:  Rolling: Stand-by assistance  Supine to Sit: Stand-by assistance     Scooting: Modified independent  Transfers:  Sit to Stand: Stand-by assistance (assist for line management)  Stand to Sit: Stand-by assistance        Bed to Chair: Stand-by assistance              Balance:   Sitting: Without support  Sitting - Static: Good (unsupported)  Sitting - Dynamic: Good (unsupported)  Standing: With support  Standing - Static: Good  Standing - Dynamic : Good  Ambulation/Gait Training:  Distance (ft): 50 Feet (ft)  Assistive Device: Walker, rolling;Gait belt  Ambulation - Level of Assistance: Supervision No loss of balance; appropriate RW techniques           Therapeutic Exercises:   Pt educated regarding and encouraged to perform AROM of lower extremities while in bed or chair. Pt verbalizes understanding. Functional Measure:  Barthel Index:    Bathin  Bladder: 5  Bowels: 10  Groomin  Dressin  Feeding: 10  Mobility: 0  Stairs: 5  Toilet Use: 5  Transfer (Bed to Chair and Back): 10  Total: 55/100       The Barthel ADL Index: Guidelines  1. The index should be used as a record of what a patient does, not as a record of what a patient could do. 2. The main aim is to establish degree of independence from any help, physical or verbal, however minor and for whatever reason. 3. The need for supervision renders the patient not independent. 4. A patient's performance should be established using the best available evidence. Asking the patient, friends/relatives and nurses are the usual sources, but direct observation and common sense are also important. However direct testing is not needed. 5. Usually the patient's performance over the preceding 24-48 hours is important, but occasionally longer periods will be relevant. 6. Middle categories imply that the patient supplies over 50 per cent of the effort. 7. Use of aids to be independent is allowed. Score Interpretation (from [address] [address])    Independent   60-79 Minimally independent   40-59 Partially dependent   20-39 Very dependent   <20 Totally dependent     -Marbella Esacmilla., Barthel, D.W. (1965). Functional evaluation: the Barthel Index. [facility] ([address] (1997). The Barthel activities of daily living index: self-reporting versus actual performance in the old (> or = 75 years). Journal of [address] [place] [address] 45(7), [facility], J.JERNAF, Marlyn Marques., Anshu Zhao. (1999).  Measuring the change in disability after inpatient rehabilitation; comparison of the responsiveness of the Barthel Index and Functional [place] Measure. Journal of Neurology, Neurosurgery, and Psychiatry, 66(4), 933-799. OXANA Mcintosh, EDITA Ramirez, & Darcie Landry M.A. (2004) Assessment of post-stroke quality of life in cost-effectiveness studies: The usefulness of the Barthel Index and the EuroQoL-5D. Quality of Life Research, 15, 169-02            Physical Therapy Evaluation Charge Determination   History Examination Presentation Decision-Making   HIGH Complexity :3+ comorbidities / personal factors will impact the outcome/ POC  HIGH Complexity : 4+ Standardized tests and measures addressing body structure, function, activity limitation and / or participation in recreation  MEDIUM Complexity : Evolving with changing characteristics  Other outcome measures barthel  MEDIUM      Based on the above components, the patient evaluation is determined to be of the following complexity level: MEDIUM    Pain Rating:  Denies pain    Activity Tolerance:   requires rest breaks    After treatment patient left in no apparent distress:   Sitting in chair, Call bell within reach, Bed / chair alarm activated, and Caregiver / family present    COMMUNICATION/EDUCATION:   The patients plan of care was discussed with: Occupational therapist and Registered nurse. Fall prevention education was provided and the patient/caregiver indicated understanding., Patient/family have participated as able in goal setting and plan of care. , and Patient/family agree to work toward stated goals and plan of care.     Thank you for this referral.  Leticia Do, PT, DPT   Time Calculation: 31 mins

## 2022-08-24 NOTE — PROGRESS NOTES
[id number]  YOB: 1940          Assessment & Plan:     ESRD on HD TTS at [facility]  Volume overload, improving  Hypoxia  HTN  Anemia of CKD    Rec:  Try UF x 2 hours today  HD TTS  EPO with HD  Continue BP meds       Subjective:   CC: ESRD, volume overload  HPI: HD yest -2.5 kg, UF goal reduced due to low BP. Feels better but remains on 3L NC   ROS: no n/v/sob  Current Facility-Administered Medications   Medication Dose Route Frequency    amLODIPine (NORVASC) tablet 10 mg  10 mg Oral QPM    aspirin delayed-release tablet 81 mg  81 mg Oral DAILY    levETIRAcetam (KEPPRA) tablet 250 mg  250 mg Oral BID    minoxidiL (LONITEN) tablet 2.5 mg  2.5 mg Oral BID    montelukast (SINGULAIR) tablet 10 mg  10 mg Oral QPM    pravastatin (PRAVACHOL) tablet 40 mg  40 mg Oral QHS    calcium acetate(phosphat bind) (PHOSLO) capsule 667 mg  1 Capsule Oral TID WITH MEALS    sodium chloride (NS) flush 5-40 mL  5-40 mL IntraVENous Q8H    sodium chloride (NS) flush 5-40 mL  5-40 mL IntraVENous PRN    acetaminophen (TYLENOL) tablet 650 mg  650 mg Oral Q6H PRN    Or    acetaminophen (TYLENOL) suppository 650 mg  650 mg Rectal Q6H PRN    polyethylene glycol (MIRALAX) packet 17 g  17 g Oral DAILY PRN    ondansetron (ZOFRAN ODT) tablet 4 mg  4 mg Oral Q8H PRN    Or    ondansetron (ZOFRAN) injection 4 mg  4 mg IntraVENous Q6H PRN    heparin (porcine) injection 5,000 Units  5,000 Units SubCUTAneous Q8H    levETIRAcetam (KEPPRA) tablet 250 mg  250 mg Oral DIALYSIS TUE, THU & SAT    carvediloL (COREG) tablet 6.25 mg  6.25 mg Oral BID WITH MEALS          Objective:     Vitals:  Blood pressure (!) 143/42, pulse 85, temperature 98.3 °F (36.8 °C), resp. rate 23, height 5' 1\" (1.549 m), weight 74.4 kg (164 lb), SpO2 96 %. Temp (24hrs), Av.2 °F (36.8 °C), Min:97.7 °F (36.5 °C), Max:98.4 °F (36.9 °C)      Intake and Output:  No intake/output data recorded.   1901 - 08/24 0700  In: 360 [P.O.:360]  Out: 2500     Physical Exam:               GENERAL ASSESSMENT: NAD  NECK RIJ PC  HEENT: Nontraumatic   CHEST: On oxygen, clear anteriorly  HEART: S1S2  ABDOMEN: Soft,NT  EXTREMITY: min EDEMA  NEURO: Grossly non focal          ECG/rhythm:    Data Review      No results for input(s): TNIPOC in the last 72 hours. No lab exists for component: ITNL   No results for input(s): CPK, CKMB, TROIQ in the last 72 hours. Recent Labs     08/24/22  0353 08/23/22  1114    138   K 3.9 4.1    101   CO2 30 28   BUN 21* 54*   CREA 4.30* 7.92*   GLU 83 125*   PHOS  --  4.6   MG 2.3 2.4   CA 9.2 10.4*   ALB 2.9* 3.5   WBC 5.3 9.1   HGB 7.5* 8.8*   HCT 22.9* 26.5*    251      No results for input(s): INR, PTP, APTT, INREXT in the last 72 hours. Needs: urine analysis, urine sodium, protein and creatinine  No results found for: KAYCEE GIPSON        : Angel Mcadams MD  8/24/2022        [facility]:  [email or web address]. [employer]  [facility] office:  [address] [place] [address],[address]  [place], [address] [place] [address]  Phone: [phone]  Fax :     [phone]    [facility] office:  [address] [facility], [facility] [address]  Phone - [phone]  Fax - [phone]

## 2022-08-24 NOTE — PROGRESS NOTES
Palliative Medicine      Code Status: Full Code    Advance Care Planning:  Advance Care Planning 8/24/2022   Confirm Advance Directive Yes, on file       Patient / Family Encounter Documentation    Participants (names):  Pt, Palliative Medicine (Dr. Sharon Vega, [address])    Narrative:  Pt was up in chair, alert and oriented, no family currently present. Pt is , has two sons, a granddtr, and will soon be a great-grandmother. Pt lives alone in a condo; son German Damico and dtr-in-law Davida live in the same building on another floor. Son Martha Morrison resides in [place]. Pt has been on dialysis since May of 2021, reports she is tolerating HD well aside from fatigue. German Damico or pt's sister assist with transportation to doctors appts; pt has medical transport for dialysis. Pt performs her own ADLs, son assist with household tasks. Pt has Natural Death Act on file dated 2/9/1991. Pt reports dtr-in-law Billy Morgan is $POA, was uncertain if POA document pertains to finances as well. Pt was advised that in absence of verified Medical POA, sons German Damico and Martha Morrison would be surrogate decision makers. Pt currently has Full Code order in place, stated she had been under the impression that her Natural Death Act was a DNR. Pt was educated re: differences between documents and the importance of having a DDNR in place if her wish is to allow natural death. Copy of DDNR and a blank AMD were provided for pt to review and discuss with family. Psychosocial Issues Identified/ Resilience Factors:  Pt appears to be coping well at this time, has good support in place from local son and DIL. Pt finds comfort in her juanjose, previously attended an [employer]; Chaplains are following for support. Caregiver [place]: N/a, pt lives alone, performs her own ADLs  Does the caregiver feel confident administering medication? N/a  Does the caregiver need any help connecting with community resources?  Not at this time  Does the caregiver feel confident assisting with activities of daily living? N/a    Goals of Care / Plan: Blank AMD and DDNR were provided for pt to review; Palliative team can assist with completion of forms while pt is inpt if desired. Pt continues to live alone, goes to  Tu,Th,Sat. Pt reports she had home health in the past but is uncertain if she wishes to have services again in the home. SW will follow for support. Thank you for including Palliative Medicine in the care of Ms. Celina Torres.      Javier  MIRZA Sebastian, [facility]-  [phone])

## 2022-08-24 NOTE — PROGRESS NOTES
Bedside and Verbal shift change report given to Amparo Britt (oncoming nurse) by Rinku Luong (offgoing nurse). Report included the following information SBAR, Kardex, ED Summary, Intake/Output, MAR, Accordion, Recent Results, Med Rec Status, and Cardiac Rhythm NSR w/ BBB .     2353: I held the patient's normal BP meds to see if BP would rebound after HD. Most recent BP is 101/41 with map of 55. All BP meds are held. Spoke with Dr. Vilma Gamble and informed him of the above. Manual BP obtained and reported. No additional orders at this time.

## 2022-08-24 NOTE — CONSULTS
Palliative Medicine Consult  [place]: [phone] ([phone])    Patient Name: Juvencio Figueroa  YOB: 1940    Date of Initial Consult: 2022  Reason for Consult: Care Decisions  Requesting Provider: Dr. Deng Bailey   Primary Care Physician: Sobeida Riley MD     SUMMARY:   Juvencio Figueroa is a 79 y.o. with ESRD on HD, CHF, pulmonary HTN, anemia, MGUS, ABIGAIL, anxiety and depression, h/o seizure d/o, h/o CVA who was admitted on 2022 from HD session with a diagnosis of AHRF. She initially required BiPAP and has been able to be weaned to [place]. Current medical issues leading to Palliative Medicine involvement include: Care Decisions. Social:  [facility]. Her younger son Gabriele James and his daughter Saira Swanson live upstairs in the [facility]. She states this has been their living situation for about the last 10 years. Her older son Roberto Lacy lives in [place]. She states that at home she is able to mostly take care of herself, including meal prep and bathing, and that her son and his wife live upstairs in her condo and come to check on her regularly. PALLIATIVE DIAGNOSES:   Encounter for Palliative Care  Goals of Care discussion  Advance Care Plan discussion  Code Status discussion  Physical debility: HH PT has been recommended  Shortness of breath: improving     PLAN:   We met patient at bedside this afternoon. Patient feels her breathing is much better today. She is hopeful that she continues to improve. Patient shares that her HD sessions have been going relatively well recently and that she was more short of breath than usual prior to her last appointment. She is very glad that her breathing feels better today. She is able to share what the Nephrologist discussed with her earlier today. She is anticipating HD later today. She hopes to ultimately return home. She says that home health nursing and PT were recommended but that she is not sure she needs it ([facility]   Discussed importance of ACP in detail. Patient does have a Natural Death Act on file but no AMD or DDNR. We discussed both of these forms in detail and provided her with copies. She wishes to read and consider these documents further before signing. She thinks she would want her daughter-in-law Elva Mendieta and her son Debbie Pitt together to be her medical power of . She thinks she has completed financial POA paperwork which appoints her DIL Elva Mendieta as power of . She will ask Elva Mendieta to look at the documents to see if medical POA is specified within. Thank you for asking our team to participate in the care of Robyn Gutierrez. I let her know I would try to follow up with her tomorrow to see if she would like to complete the AMD or DDNR while she is here. GOALS OF CARE / TREATMENT PREFERENCES:     GOALS OF CARE:       TREATMENT PREFERENCES:   Code Status: Full Code    Patient and family's personal goals include: to continue to improve    Important upcoming milestones or family events: she has a great-grandchild who will be born in the coming days    The patient identifies the following as important for living well: to continue to improve      Advance Care Planning:  [] The [facility] Interdisciplinary Team has updated the ACP Navigator with [address] and Patient Capacity      Primary Decision Maker: Larry Lubin - [phone]  Advance Care Planning 8/24/2022   Confirm Advance Directive Yes, on file               Other:    As far as possible, the palliative care team has discussed with patient / health care proxy about goals of care / treatment preferences for patient. HISTORY:     History obtained from: chart, patient    CHIEF COMPLAINT: my breathing feels better    HPI/SUBJECTIVE:    The patient is:   [x] Verbal and participatory  [] Non-participatory due to:     8/24: Patient feels her breathing is much better today. She is hopeful that she continues to improve.      Clinical Pain Assessment (nonverbal scale for severity on nonverbal patients):   Clinical Pain Assessment  Severity: 0     Activity (Movement): Lying quietly, normal position    Duration: for how long has pt been experiencing pain (e.g., 2 days, 1 month, years)  Frequency: how often pain is an issue (e.g., several times per day, once every few days, constant)     FUNCTIONAL ASSESSMENT:     Palliative Performance Scale (PPS):  PPS:  (50+)       PSYCHOSOCIAL/SPIRITUAL SCREENING:     Palliative IDT has assessed this patient for cultural preferences / practices and a referral made as appropriate to needs (Cultural Services, Patient Advocacy, Ethics, etc.)    Any spiritual / Worship concerns:  [] Yes /  [x] No   If \"Yes\" to discuss with pastoral care during IDT     Does caregiver feel burdened by caring for their loved one:   [] Yes /  [] No /  [x] No Caregiver Present/Available [] No Caregiver [] Pt Lives at Facility  If \"Yes\" to discuss with social work during IDT    Anticipatory grief assessment:   [x] Normal  / [] Maladaptive     If \"Maladaptive\" to discuss with social work during IDT    ESAS Anxiety:      ESAS Depression:          REVIEW OF SYSTEMS:     Positive and pertinent negative findings in ROS are noted above in HPI. The following systems were [x] reviewed / [] unable to be reviewed as noted in HPI  Other findings are noted below. Systems: constitutional, ears/nose/mouth/throat, respiratory, gastrointestinal, genitourinary, musculoskeletal, integumentary, neurologic, psychiatric, endocrine. Positive findings noted below. Modified ESAS Completed by: provider   Fatigue: 2       Pain: 0           Dyspnea: 2                    PHYSICAL EXAM:     From RN flowsheet:  Wt Readings from Last 3 Encounters:   08/23/22 164 lb (74.4 kg)   04/07/22 180 lb 1.9 oz (81.7 kg)   03/09/22 170 lb (77.1 kg)     Blood pressure (!) 145/42, pulse 75, temperature 98 °F (36.7 °C), resp. rate 24, height 5' 1\" (1.549 m), weight 164 lb (74.4 kg), SpO2 97 %.     Pain Scale 1: Numeric (0 - 10)  Pain Intensity 1: 0  Pain Onset 1: acute  Pain Location 1: Head     Pain Description 1: Aching  Pain Intervention(s) 1: Medication (see MAR)  Last bowel movement, if known:     General: awake, sitting up in bedside chair wearing NC, NAD  Eyes: lids normal, no drainage  Nose: nares normal, no drainage  Mouth: mmm, no drainage  Pulm: rate is 20s, respirations are unlabored, she can speak in complete sentences without appearance of shortness of breath  Neuro: alert, participates in conversation, moves extremities  Psych: full affect, speech and behavior appropriate     HISTORY:     Principal Problem:    Acute hypoxemic respiratory failure (HCC) (7/6/2021)    Active Problems:    Diastolic CHF, chronic (HCC) (5/14/2021)      Volume overload (5/14/2021)      ESRD on hemodialysis ([address] (5/24/2021)      Anemia due to chronic kidney disease (5/24/2021)      Pulmonary edema (4/6/2022)      Urinary incontinence ()      Seizures (HCC) ()      Overview: last keppra [facility]- 8/2019 @ 15   Last seizure 2017      ABIGAIL on CPAP ()      Monoclonal gammopathy (2018)      Hypertension ()      Hx of completed stroke ()      Overview: R occip, Bas Ganglial      High cholesterol ()      Arthritis ()      Anxiety and depression ()      CARRANZA (dyspnea on exertion) (4/6/2022)    Past Medical History:   Diagnosis Date    Anemia     Anxiety and depression     Arthritis     ESRD on dialysis ([address]     Heart failure with preserved ejection fraction (HCC)     High cholesterol     Hx of completed stroke     Per MRI of the brain on 2/21/2019, 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;  Old right posterior medial occipital lobe cortical infarct    Hx of seasonal allergies     Hypertension     Moderate pulmonary hypertension ([address]     Per echocardiogram of 7/7/2021; same study showed normal LVEF of 55-60%    Monoclonal gammopathy of undetermined significance     Obstructive sleep apnea     Seizures ([address]     last keppra level- 2019 @ 15   Last seizure 2017    Urinary incontinence       Past Surgical History:   Procedure Laterality Date    HX APPENDECTOMY N/A     HX CATARACT REMOVAL Bilateral 2018    HX  SECTION N/A     x 2    HX HEMORRHOIDECTOMY      HX HYSTERECTOMY      HX KNEE REPLACEMENT Right 2019    HX TONSILLECTOMY      IR INSERT NON TUNL CVC OVER 5 YRS  2021    IR INSERT TUNL CVC W/O PORT OVER 5 YR  2021      Family History   Problem Relation Age of Onset    Hypertension Mother     Diabetes Sister     Hypertension Sister     SKIN CANCER Sister     Stroke Father     Parkinson's Disease Father     SKIN CANCER Sister     Cancer Sister       History reviewed, no pertinent family history.   Social History     Tobacco Use    Smoking status: Passive Smoke Exposure - Never Smoker    Smokeless tobacco: Never    Tobacco comments:     Passive smoke exposure  who passed when patient was 57   Substance Use Topics    Alcohol use: No     Allergies   Allergen Reactions    Latex Rash    Codeine Other (comments)    Levaquin [Levofloxacin] Other (comments)     Hallucinations    Lisinopril Cough    Sulfa (Sulfonamide Antibiotics) Rash and Itching      Current Facility-Administered Medications   Medication Dose Route Frequency    [START ON 2022] epoetin millie-epbx (RETACRIT) injection 20,000 Units  20,000 Units SubCUTAneous DIALYSIS TUE, THU & SAT    amLODIPine (NORVASC) tablet 10 mg  10 mg Oral QPM    aspirin delayed-release tablet 81 mg  81 mg Oral DAILY    levETIRAcetam (KEPPRA) tablet 250 mg  250 mg Oral BID    minoxidiL (LONITEN) tablet 2.5 mg  2.5 mg Oral BID    montelukast (SINGULAIR) tablet 10 mg  10 mg Oral QPM    pravastatin (PRAVACHOL) tablet 40 mg  40 mg Oral QHS    calcium acetate(phosphat bind) (PHOSLO) capsule 667 mg  1 Capsule Oral TID WITH MEALS    sodium chloride (NS) flush 5-40 mL  5-40 mL IntraVENous Q8H    sodium chloride (NS) flush 5-40 mL  5-40 mL IntraVENous PRN    acetaminophen (TYLENOL) tablet 650 mg  650 mg Oral Q6H PRN    Or    acetaminophen (TYLENOL) suppository 650 mg  650 mg Rectal Q6H PRN    polyethylene glycol (MIRALAX) packet 17 g  17 g Oral DAILY PRN    ondansetron (ZOFRAN ODT) tablet 4 mg  4 mg Oral Q8H PRN    Or    ondansetron (ZOFRAN) injection 4 mg  4 mg IntraVENous Q6H PRN    heparin (porcine) injection 5,000 Units  5,000 Units SubCUTAneous Q8H    levETIRAcetam (KEPPRA) tablet 250 mg  250 mg Oral DIALYSIS TUE, THU & SAT    carvediloL (COREG) tablet 6.25 mg  6.25 mg Oral BID WITH MEALS          LAB AND IMAGING FINDINGS:     Lab Results   Component Value Date/Time    WBC 5.3 08/24/2022 03:53 AM    HGB 7.5 (L) 08/24/2022 03:53 AM    PLATELET 738 62/52/7537 03:53 AM     Lab Results   Component Value Date/Time    Sodium 140 08/24/2022 03:53 AM    Potassium 3.9 08/24/2022 03:53 AM    Chloride 104 08/24/2022 03:53 AM    CO2 30 08/24/2022 03:53 AM    BUN 21 (H) 08/24/2022 03:53 AM    Creatinine 4.30 (H) 08/24/2022 03:53 AM    Calcium 9.2 08/24/2022 03:53 AM    Magnesium 2.3 08/24/2022 03:53 AM    Phosphorus 4.6 08/23/2022 11:14 AM      Lab Results   Component Value Date/Time    Alk.  phosphatase 58 08/24/2022 03:53 AM    Protein, total 6.9 08/24/2022 03:53 AM    Albumin 2.9 (L) 08/24/2022 03:53 AM    Globulin 4.0 08/24/2022 03:53 AM     Lab Results   Component Value Date/Time    INR 1.0 02/20/2019 11:42 AM    Prothrombin time 9.4 02/20/2019 11:42 AM      Lab Results   Component Value Date/Time    Iron 58 07/07/2021 08:14 AM    TIBC 284 07/07/2021 08:14 AM    Iron % saturation 20 07/07/2021 08:14 AM    Ferritin 339 (H) 07/07/2021 08:14 AM      Lab Results   Component Value Date/Time    pH 7.38 05/14/2021 04:24 PM    PCO2 35 05/14/2021 04:24 PM    PO2 84 05/14/2021 04:24 PM     No components found for: [place]   Lab Results   Component Value Date/Time    CK 81 07/07/2021 04:48 AM    CK - MB 1.3 07/07/2021 04:48 AM                Total time: 50 mins  Counseling / coordination time, spent as noted above: 30 mins  > 50% counseling / coordination?: yes    Prolonged service was provided for  []30 min   []75 min in face to face time in the presence of the patient, spent as noted above. Time Start:   Time End:   Note: this can only be billed with 24513 (initial) or 11947 (follow up). If multiple start / stop times, list each separately.

## 2022-08-24 NOTE — PROGRESS NOTES
Spiritual Care Assessment/Progress Note  [address]      NAME: Van Kelly      MRN: [id number]  AGE: 79 y.o.  SEX: female  Shinto Affiliation: Adventist   Language: English     8/24/2022     Total Time (in minutes): 26     Spiritual Assessment begun in [facility] [address] through conversation with:         [x]Patient        [] Family    [] Friend(s)        Reason for Consult: Palliative Care, Initial/Spiritual Assessment     Spiritual beliefs: (Please include comment if needed)     [x] Identifies with a juanjose tradition: Synagogue         [] Supported by a juanjose community:            [] Claims no spiritual orientation:           [] Seeking spiritual identity:                [] Adheres to an individual form of spirituality:           [] Not able to assess:                           Identified resources for coping:      [x] Prayer                               [] Music                  [] Guided Imagery     [x] Family/friends                 [] Pet visits     [] Devotional reading                         [] Unknown     [] Other:                                               Interventions offered during this visit: (See comments for more details)    Patient Interventions: Affirmation of emotions/emotional suffering, Affirmation of juanjose, Catharsis/review of pertinent events in supportive environment, Coping skills reviewed/reinforced, Iconic (affirming the presence of God/Higher Power), Normalization of emotional/spiritual concerns, Prayer (assurance of)           Plan of Care:     [] Support spiritual and/or cultural needs    [] Support AMD and/or advance care planning process      [] Support grieving process   [] Coordinate Rites and/or Rituals    [] Coordination with community clergy   [] No spiritual needs identified at this time   [] Detailed Plan of Care below (See Comments)  [] Make referral to Music Therapy  [] Make referral to Pet Therapy     [] Make referral to Addiction services  [] Make referral to [facility]  [] Make referral to Spiritual Care Partner  [] No future visits requested        [x] Follow up visits as needed     Visited patient for initial palliative spiritual assessment. Her chart was consulted. A , for over 25 years, she lives in a condo with her son and daughter-in-law living two floors above her. Both are supportive of her. Originally she had three sisters and a brother; one sister and the brother are now . She spoke of her [place] juanjose and prayer being a constant source of support to her.     Chaplain Joshi MDiv, MS, [facility]

## 2022-08-24 NOTE — ACP (ADVANCE CARE PLANNING)
Primary Decision [facility] - [phone]  Advance Care Planning 8/24/2022   Confirm Advance Directive Yes, on file      Pt has Natural Death Act on file dated 2/9/1991. Pt reports dtr-in-law Frieda Calhoun is $POA, was uncertain if POA document pertains to finances as well. Pt was advised that in absence of verified Medical POA, ayad Annsergio Dow and Tamiko Linda would be surrogate decision makers.

## 2022-08-24 NOTE — PROGRESS NOTES
8/24/2022  3:35 PM  CM completed assessment w/ pt in person, CM wore mask at all times. Charted demographics verified, pt lives alone in a 2nd floor condo w/ elevator, her son an daughter in law live in the same building. At baseline pt is ambulatory w/a rollator, independent w/ ADLs, she does not drive and her family assists her, she uses [facility] for HD, No fall history. Reason for Admission:   Emergent for Acute Hypoxic Respiratory Failure   Pt is a 80 yo  female who [facility] from her dialysis unit for dyspnea. 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; Old right posterior medial occipital lobe cortical infarct    Hx of seasonal allergies      Hypertension      Moderate pulmonary hypertension ([facility]       Per echocardiogram of 7/7/2021; same study showed normal LVEF of 55-60%    Monoclonal gammopathy of undetermined significance      Obstructive sleep apnea      Seizures ([facility]       last keppra level- 8/2019 @ 15   Last seizure 2017    Urinary incontinence                      RUR Score:    16 % Moderate Risk of Readmission/Yellow               PCP: First and Last name:   Ruby Bridges MD     Name of Practice:    Are you a current patient: Yes/No: yes    Approximate date of last visit: 30 days    Can you participate in a virtual visit if needed: NO     Do you (patient/family) have any concerns for transition/discharge?      No  patient has a very supportive family               Plan for utilizing home health:   PT, OT recommending [facility] at [place], pt has had [facility] in the past but can not recall agency, pt has stayed at [facility] last year, she is unsure if she wants HH at [place]   DME: CPaP, shower chair,RW, rollator, cane, PT does NOT have home O2  Rx: [employer]. Pt uses [employer] ans is usually covered for her medications  COVID Vax Hx: Dana Elvie, pt received 2 jabs ans 1 booster   Current Advanced Directive/Advance Care Plan:  Full Code  [facility] Anderson [phone]    Healthcare Decision Maker:   Click here to complete [address] including selection of the Healthcare Decision Maker Relationship (ie \"Primary\")            Primary Decision MakerJocrys Martin - [place] - [phone]    Transition of Care Plan:        RUR 16 % Moderate Risk of Readmission/Yellow  LOS 1 Day   Hospital admission for medical management  Pt on 2 L O2 NC, wean as tolerated, CM to follow for home O2, oximetry prior to DC   Nephrology following,  HD today,ERSD on HD TTS [facility] [place] 10:30 AM    PT, TO werner, recommending HH at [place], pt has had this in thee past, she is unsure if she wants HH at [place]   Palliative consult, to complete AMD  DC when stable to home w/ family assistance and HH vs None  Outpatient follow up PCP, specialists, resume HD  [facility] resources added to AVS  Family will transport at DC   CM will follow and assist w/ DC needs  Care Management Interventions  PCP Verified by CM:  Yes Chelsey Hale MD , last visit  30 days )  Palliative Care Criteria Met (RRAT>21 & CHF Dx)?: Yes  Palliative Consult Recommended?: Yes  Mode of Transport at Discharge: Self (Family)  Transition of Care Consult (CM Consult): Discharge Planning  Physical Therapy Consult: Yes  Occupational Therapy Consult: Yes  Support Systems: Child(shauna) (pt lives jay in pvt rsidence, at [facility] pt is ambulatory w/ rollator, iADLS, does not drive )  Confirm Follow Up Transport: Family  Discharge Location  Patient Expects to be Discharged to[de-identified] Home with home health  JOHANNA Ramirez

## 2022-08-24 NOTE — PROGRESS NOTES
Physical Therapy Note:    Orders received, chart reviewed and patient evaluated by physical therapy. Recommend: Physical therapy at least 2 days/week in the home  at discharge. Full evaluation to follow.      Angelika Hearn, PT, DPT, [facility]

## 2022-08-24 NOTE — PROCEDURES
Hemodialysis / [phone]    Vitals Pre Post Assessment Pre Post   BP BP: (!) 124/36 (08/24/22 1510)   149/47 LOC A/O A/O   HR Pulse (Heart Rate): 64 (08/24/22 1510) 69 Lungs Dim  Dim   Resp Resp Rate: 16 (08/24/22 1510) 23 Cardiac Regular rate Regular rate   Temp Temp: 98 °F (36.7 °C) (08/24/22 1510) 98.1 Skin CD CD   Weight  na na Edema le le   Tele status bedside bedside Pain Pain Intensity 1: 0 (08/24/22 1152) 0     Orders   Duration: Start: 1510 End: 1710 Total: 2.0   Dialyzer: Dialyzer/Set Up Inspection: Revaclear (08/24/22 1510)   K Bath: Dialysate K (mEq/L): 3 (08/23/22 1635)   Ca Bath: Dialysate CA (mEq/L): 2.5 (08/23/22 1635)   Na: Dialysate NA (mEq/L): 140 (08/23/22 1635)   Bicarb: Dialysate HCO3 (mEq/L): 35 (08/23/22 1635)   Target Fluid Removal: Goal/Amount of Fluid to Remove (mL): 2000 mL (08/24/22 1510)     Access   Type & Location: PC : Dressing CDI. No s/s of infection. Both lumens aspirate & flush well. Each catheter limb disinfected per p&p, caps removed, hubs disinfected per p&p.    Comments:                                        Labs   HBsAg (Antigen) / date:12/16/21 neg   HBsAb (Antibody) / date:3/17/22 immi   Source: pp   Obtained/Reviewed  Critical Results Called HGB   Date Value Ref Range Status   08/24/2022 7.5 (L) 11.5 - 16.0 g/dL Final     Potassium   Date Value Ref Range Status   08/24/2022 3.9 3.5 - 5.1 mmol/L Final     Calcium   Date Value Ref Range Status   08/24/2022 9.2 8.5 - 10.1 MG/DL Final     BUN   Date Value Ref Range Status   08/24/2022 21 (H) 6 - 20 MG/DL Final     Creatinine   Date Value Ref Range Status   08/24/2022 4.30 (H) 0.55 - 1.02 MG/DL Final     Comment:     INVESTIGATED PER DELTA CHECK PROTOCOL        Meds Given   Name Dose Route                    Adequacy / Fluid    Total Liters Process: 0.0   Net Fluid Removed: 2000mL      Comments   Time Out Done:   (Time) 1508   Admitting Diagnosis: Admission Dx: [facility] hypoxemic [facility] failure [facility]   Principal Problem: Acute hypoxemic respiratory failure [facility] [J96.01]       Consent obtained/signed: Informed Consent Verified: Yes (08/24/22 1510)   Machine / RO # Machine Number: Paige Jackson (08/24/22 1510)   Primary Nurse Rpt Pre: Chaim Faarh, RN   Primary Nurse Rpt Post: Chaim Farah, RN   Pt Education: procedural   Care Plan: HD POC   Pts outpatient clinic: [facility] Summary **1520. Dr. Patricia Baker verbal to continue UF as long as SBP>90 and pt. Asymptomatic  **abbreviations used due to keys not working properly    Comments:      1510: Treatment start  1710: Tx ended. VSS. Each dialysis catheter limb disinfected per p&p, all possible blood returned per p&p, and each dialysis hub disinfected per p&p. Each lumen flushed, post dialysis catheter Heparin dwell instilled per order, and caps applied. Bed locked and in the lowest position, call bell and belongings in reach. SBAR given to Primary, RN. Patient is stable at time of my departure. All Dialysis related medications have been reviewed.

## 2022-08-24 NOTE — PROGRESS NOTES
[facility]  [address], [facility]  [phone]    Medical Progress Note      NAME: Mikhail Kerr   :  1940  MRM:  [id number]    Date/Time of service 2022  9:30 AM          Assessment and Plan:     Acute respiratory failure with hypoxia / Pulmonary edema / CARRANZA (dyspnea on exertion) - POA, due to a excess fluid. Consulted renal for HD.  2.5L removed. On the last admit we removed 5L over 2 days. Hypoventilation may contribute to hypoxia too. Consult palliative care. She initially required BiPAP in ER, but now is on 2L NC oxygen. PT eval for oxygen needs, arrange home oxygen for chronic CHF if hypoxia persists. ESRD on dialysis - Consulted renal. Renal diet. Fluid restriction. Hypertension / Diastolic CHF, chronic - ECHO in 2021 with dCHF and pHTN. Continue Norvasc, ASA, Coreg, clonidine, hydralazine and minoxidil. Check orthostatics. Discordant L vs R BP likely related to closed fistula. Outpatient vascular and cardiology follow up     Anemia due to chronic kidney disease - Stable. EPO per renal     Hx of completed stroke / Hx Seizures - continue ASA, BB, statin, keppra     Debilitated patient / Arthritis - PT OT eval.  Prn tylenol     Obese / ABIGAIL on CPAP - May resume home CPAP     Urinary incontinence - Not on meds     Monoclonal gammopathy - Outpatient follow up     High cholesterol - Continue pravastatin     Anxiety and depression - Not on meds       Subjective:     Chief Complaint:  better breathing    ROS:  (bold if positive, if negative)    SOB/DOETolerating PT  Tolerating Diet        Objective:     Last 24hrs VS reviewed since prior progress note.  Most recent are:    Visit Vitals  BP (!) 135/45 (BP 1 Location: Right leg, BP Patient Position: At rest)   Pulse 74   Temp 98.3 °F (36.8 °C)   Resp 18   Ht 5' 1\" (1.549 m)   Wt 74.4 kg (164 lb)   SpO2 95%   BMI 30.99 kg/m²     SpO2 Readings from Last 6 Encounters:   22 95% 04/07/22 95%   03/09/22 95%   09/29/21 98%   09/24/21 97%   09/15/21 98%    O2 Flow Rate (L/min): 2 l/min     Intake/Output Summary (Last 24 hours) at 8/24/2022 0750  Last data filed at 8/23/2022 2030  Gross per 24 hour   Intake 360 ml   Output 2500 ml   Net -2140 ml        Physical Exam:    Gen:  Well-developed, well-nourished, in no acute distress  HEENT:  Pink conjunctivae, PERRL, hearing intact to voice, moist mucous membranes  Neck:  Supple, without masses, thyroid non-tender  Resp:  No accessory muscle use, clear breath sounds without wheezes rales or rhonchi  Card:  No murmurs, normal S1, S2 without thrills, bruits or peripheral edema  Abd:  Soft, non-tender, non-distended, normoactive bowel sounds are present, no mass  Lymph:  No cervical or inguinal adenopathy  Musc:  No cyanosis or clubbing  Skin:  No rashes or ulcers, skin turgor is good  Neuro:  Cranial nerves are grossly intact, no focal motor weakness, follows commands appropriately  Psych:  Good insight, oriented to person, place and time, alert    Telemetry reviewed:   normal sinus rhythm  __________________________________________________________________  Medications Reviewed: (see below)  Medications:     Current Facility-Administered Medications   Medication Dose Route Frequency    amLODIPine (NORVASC) tablet 10 mg  10 mg Oral QPM    aspirin delayed-release tablet 81 mg  81 mg Oral DAILY    levETIRAcetam (KEPPRA) tablet 250 mg  250 mg Oral BID    minoxidiL (LONITEN) tablet 2.5 mg  2.5 mg Oral BID    montelukast (SINGULAIR) tablet 10 mg  10 mg Oral QPM    pravastatin (PRAVACHOL) tablet 40 mg  40 mg Oral QHS    calcium acetate(phosphat bind) (PHOSLO) capsule 667 mg  1 Capsule Oral TID WITH MEALS    sodium chloride (NS) flush 5-40 mL  5-40 mL IntraVENous Q8H    sodium chloride (NS) flush 5-40 mL  5-40 mL IntraVENous PRN    acetaminophen (TYLENOL) tablet 650 mg  650 mg Oral Q6H PRN    Or    acetaminophen (TYLENOL) suppository 650 mg  650 mg Rectal Q6H PRN    polyethylene glycol (MIRALAX) packet 17 g  17 g Oral DAILY PRN    ondansetron (ZOFRAN ODT) tablet 4 mg  4 mg Oral Q8H PRN    Or    ondansetron (ZOFRAN) injection 4 mg  4 mg IntraVENous Q6H PRN    heparin (porcine) injection 5,000 Units  5,000 Units SubCUTAneous Q8H    levETIRAcetam (KEPPRA) tablet 250 mg  250 mg Oral DIALYSIS TUE, THU & SAT    carvediloL (COREG) tablet 6.25 mg  6.25 mg Oral BID WITH MEALS        Lab Data Reviewed: (see below)  Lab Review:     Recent Labs     08/24/22  0353 08/23/22  1114   WBC 5.3 9.1   HGB 7.5* 8.8*   HCT 22.9* 26.5*    251     Recent Labs     08/24/22  0353 08/23/22  1114    138   K 3.9 4.1    101   CO2 30 28   GLU 83 125*   BUN 21* 54*   CREA 4.30* 7.92*   CA 9.2 10.4*   MG 2.3 2.4   PHOS  --  4.6   ALB 2.9* 3.5   TBILI 1.3* 1.2*   ALT 19 22     Lab Results   Component Value Date/Time    Glucose (POC) 100 05/20/2021 09:00 AM    Glucose (POC) 109 (H) 02/20/2019 10:13 PM    Glucose (POC) 114 (H) 02/20/2019 11:34 AM     No results for input(s): PH, PCO2, PO2, HCO3, FIO2 in the last 72 hours. No results for input(s): INR, INREXT in the last 72 hours. All Micro Results       None            Other pertinent lab: none    Total time spent with patient: 30 Minutes I personally reviewed chart, notes, data and current medications in the medical record. I have personally examined and treated the patient at bedside during this period. To assist coordination of care and communication with nursing and staff, this note may be preliminary early in the day, but finalized by end of the day.                  Care Plan discussed with: Patient, Care Manager, Nursing Staff, Consultant/Specialist, and >50% of time spent in counseling and coordination of care    Discussed:  Care Plan and D/C Planning    Prophylaxis:  Hep SQ and H2B/PPI    Disposition:   PT, OT, RN           ___________________________________________________    Attending Physician: Maggie Molina MD

## 2022-08-24 NOTE — PROGRESS NOTES
Pt declined to be placed back on bipap. She stated she does not like the machine and she will ask her son to bring in her home unit.

## 2022-08-24 NOTE — PROGRESS NOTES
Problem: Self Care Deficits Care Plan (Adult)  Goal: *Acute Goals and Plan of Care (Insert Text)  Description: FUNCTIONAL STATUS PRIOR TO ADMISSION: Patient was modified independent using a rollator for functional mobility. Patient was modified independent for basic and instrumental ADLs (bathing at sink, has shower chair, RW, cane, grab bars). HOME SUPPORT: The patient lived alone with son and DIL living in same building  to provide assistance as needed. Occupational Therapy Goals  Initiated 8/24/2022  1. Patient will perform grooming with modified independence within 7 day(s). 2.  Patient will perform bathing with modified independence within 7 day(s). 3.  Patient will perform upper body dressing and lower body dressing with modified independence within 7 day(s) with borrowed AE as needed. 4.  Patient will perform toilet transfers with modified independence within 7 day(s). 5.  Patient will perform all aspects of toileting with modified independence within 7 day(s). 6.  Patient will participate in upper extremity therapeutic exercise/activities with independence for 10 minutes within 7 day(s). 7.  Patient will utilize energy conservation techniques during functional activities with verbal cues within 7 day(s). Outcome: Not Met     OCCUPATIONAL THERAPY EVALUATION  Patient: Jeffrey Hargrove (56 y.o. female)  Date: 8/24/2022  Primary Diagnosis: Acute hypoxemic respiratory failure (HCC) [J96.01]       Precautions:        ASSESSMENT  Pt received semi supine in bed A&OX4 and agreeable for OT eval/tx. Per pt report, [facility] and is MI for self care  (bathing at sink & using reacher/sock aid for LB dressing) and functional transfers/mobility using rollator (also has cane, RW, transport chair, shower chair, grab bars).      Pt currently presents with generalized weakness, decreased activity tolerance (on new 2L O2 via NC with SPO2 of 93% after activity), decreased balance and increased need for assist with self care (SBA grooming chair level, min A toileting/cloth mgmt simulated, pt verbalizing how to use reacher/sock aid for LB dressing) and functional transfers/mobility (SBA sup->sit and scooting EOB, SBA sit<->stand and toilet transfer with Rw and gait belt). Pt would benefit from continued skilled OT services while at [facility] in order to increase safety and independence with self care and functional transfers/mobility. Recommend discharge to home with [facility] when medically appropriate. Functional Outcome Measure: The patient scored Total: 55/100 on the Barthel Index outcome measure       Other factors to consider for discharge: time since onset, severity of deficit, PLOF        PLAN :  Recommendations and Planned Interventions: self care training, functional mobility training, therapeutic exercise, balance training, therapeutic activities, endurance activities, patient education, and home safety training    Frequency/Duration: Patient will be followed by occupational therapy 5 times a week to address goals. Recommendation for discharge: (in order for the patient to meet his/her long term goals)  Occupational therapy at least 2 days/week in the home     This discharge recommendation:  Has been made in collaboration with the attending provider and/or case management    IF patient discharges home will need the following DME: none       SUBJECTIVE:   Patient stated I feel better today. 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;  Old right posterior medial occipital lobe cortical infarct    Hx of seasonal allergies     Hypertension     Moderate pulmonary hypertension [facility])     Per echocardiogram of 2021; same study showed normal LVEF of 55-60%    Monoclonal gammopathy of undetermined significance     Obstructive sleep apnea     Seizures ([facility] [address]     last keppra level- 2019 @ 15   Last seizure 2017    Urinary incontinence      Past Surgical History:   Procedure Laterality Date    HX APPENDECTOMY N/A     HX CATARACT REMOVAL Bilateral 2018    HX  SECTION N/A     x 2    HX HEMORRHOIDECTOMY      HX HYSTERECTOMY      HX KNEE REPLACEMENT Right 2019    HX TONSILLECTOMY      IR INSERT NON TUNL CVC OVER 5 YRS  2021    IR INSERT TUNL CVC W/O PORT OVER 5 YR  2021       Expanded or extensive additional review of patient history:     Home Situation  Home Environment: Private residence (condo 2nd level with elevator access)  One/Two Story Residence: One story  Living Alone: Yes  Support Systems: Child(shauna) (son and DIL live on 4th floor of same condo building)  Patient Expects to be Discharged to[de-identified] Home  Current DME Used/Available at Home: Todd Shore, rollator, Walker, rolling, Shower chair, Grab bars, Raised toilet seat, Commode, bedside, Cane, straight (transport chair, reacher, sock aid, shoe horn)  Tub or Shower Type: Shower    EXAMINATION OF PERFORMANCE DEFICITS:  Cognitive/Behavioral Status:  Neurologic State: Alert  Orientation Level: Oriented X4     Hearing: Auditory  Auditory Impairment: Hearing aid(s)  Hearing Aids/Status: Bilateral, With patient     Range of Motion:  AROM: Within functional limits  PROM: Within functional limits     Strength:  Strength: Generally decreased, functional (mannie UE grossly observed to be 3+/5)     Balance:  Sitting: Intact; With support  Standing: Impaired; Without support  Standing - Static: Constant support;Good  Standing - Dynamic : Constant support; Fair    Functional Mobility and Transfers for ADLs:  Bed Mobility:  Rolling: Stand-by assistance  Supine to Sit: Stand-by assistance  Scooting: Stand-by assistance    Transfers:  Sit to Stand: Stand-by assistance  Stand to Sit: Stand-by assistance  Bed to Chair: Stand-by assistance  Toilet Transfer : Stand-by assistance (simulated 2/2 short lines)  Assistive Device : Gait Belt;Walker, rolling    ADL Assessment:     Oral Facial Hygiene/Grooming: Stand-by assistance (chair level)    Lower Body Dressing:  (pt stating use of sock aid and reacher at home (not present at time of eval))    Toileting: Minimum assistance (simulated)     ADL Intervention and task modifications:     Grooming  Grooming Assistance: Stand-by assistance  Position Performed: Seated in chair  Washing Face: Stand-by assistance  Washing Hands: Stand-by assistance  Brushing Teeth: Stand-by assistance  Brushing/Combing Hair: Stand-by assistance    Lower Body Dressing Assistance  Socks:  (pt verbalizing use of reacher and sock aid at baseline)    Toileting  Toileting Assistance: Minimum assistance (simulated)  Clothing Management: Minimum assistance    Functional Measure:    Barthel Index:  Bathin  Bladder: 5  Bowels: 10  Groomin  Dressin  Feeding: 10  Mobility: 0  Stairs: 5  Toilet Use: 5  Transfer (Bed to Chair and Back): 10  Total: 55/100      The Barthel ADL Index: Guidelines  1. The index should be used as a record of what a patient does, not as a record of what a patient could do. 2. The main aim is to establish degree of independence from any help, physical or verbal, however minor and for whatever reason. 3. The need for supervision renders the patient not independent. 4. A patient's performance should be established using the best available evidence. Asking the patient, friends/relatives and nurses are the usual sources, but direct observation and common sense are also important. However direct testing is not needed. 5. Usually the patient's performance over the preceding 24-48 hours is important, but occasionally longer periods will be relevant.   6. Middle categories imply that the patient supplies over 50 per cent of the effort. 7. Use of aids to be independent is allowed. Score Interpretation (from [address])    Independent   60-79 Minimally independent   40-59 Partially dependent   20-39 Very dependent   <20 Totally dependent     -Marbella Escamilla., Barthel, D.W. (1965). Functional evaluation: the Barthel Index. [address] ([address] (1997). The Barthel activities of daily living index: self-reporting versus actual performance in the old (> or = 75 years). Journal of [facility]4 [facility] 45(7), 14 [facility], J.JOSE DF, Darlyn Mccann., May Emanuel. (1999). Measuring the change in disability after inpatient rehabilitation; comparison of the responsiveness of the Barthel Index and Functional [place] Measure. Journal of Neurology, Neurosurgery, and Psychiatry, 66(4), 569-789. Janelle Norman, NLindaJ.A, EDITA Ramirez, & Deep Schulz M.A. (2004) Assessment of post-stroke quality of life in cost-effectiveness studies: The usefulness of the Barthel Index and the EuroQoL-5D.  Quality of Life Research, 15, 365-77     Occupational Therapy Evaluation Charge Determination   History Examination Decision-Making   LOW Complexity : Brief history review  LOW Complexity : 1-3 performance deficits relating to physical, cognitive , or psychosocial skils that result in activity limitations and / or participation restrictions  LOW Complexity : No comorbidities that affect functional and no verbal or physical assistance needed to complete eval tasks       Based on the above components, the patient evaluation is determined to be of the following complexity level: LOW   Pain Rating:  No pain reported    Activity Tolerance:   Good    After treatment patient left in no apparent distress:    Sitting in chair, Call bell within reach, Bed / chair alarm activated, and PT present    COMMUNICATION/EDUCATION:   The patients plan of care was discussed with: Physical therapist and Registered nurse. Home safety education was provided and the patient/caregiver indicated understanding. and Patient/family have participated as able in goal setting and plan of care. This patients plan of care is appropriate for delegation to [facility].     Thank you for this referral.  Rupert Samuel  Time Calculation: 36 mins

## 2022-08-25 ENCOUNTER — HOME HEALTH ADMISSION (OUTPATIENT)
Dept: HOME HEALTH SERVICES | Facility: HOME HEALTH | Age: 82
End: 2022-08-25
Payer: MEDICARE

## 2022-08-25 VITALS
HEART RATE: 68 BPM | TEMPERATURE: 98.1 F | SYSTOLIC BLOOD PRESSURE: 151 MMHG | DIASTOLIC BLOOD PRESSURE: 42 MMHG | BODY MASS INDEX: 30.96 KG/M2 | OXYGEN SATURATION: 93 % | RESPIRATION RATE: 22 BRPM | WEIGHT: 164 LBS | HEIGHT: 61 IN

## 2022-08-25 PROCEDURE — 97535 SELF CARE MNGMENT TRAINING: CPT

## 2022-08-25 PROCEDURE — 90935 HEMODIALYSIS ONE EVALUATION: CPT

## 2022-08-25 PROCEDURE — 74011250636 HC RX REV CODE- 250/636: Performed by: INTERNAL MEDICINE

## 2022-08-25 PROCEDURE — 99231 SBSQ HOSP IP/OBS SF/LOW 25: CPT | Performed by: FAMILY MEDICINE

## 2022-08-25 PROCEDURE — 97116 GAIT TRAINING THERAPY: CPT

## 2022-08-25 PROCEDURE — 74011000250 HC RX REV CODE- 250: Performed by: INTERNAL MEDICINE

## 2022-08-25 PROCEDURE — 97530 THERAPEUTIC ACTIVITIES: CPT

## 2022-08-25 PROCEDURE — 74011250637 HC RX REV CODE- 250/637: Performed by: INTERNAL MEDICINE

## 2022-08-25 PROCEDURE — 77010033678 HC OXYGEN DAILY

## 2022-08-25 RX ADMIN — CALCIUM ACETATE 667 MG: 667 CAPSULE ORAL at 11:38

## 2022-08-25 RX ADMIN — SODIUM CHLORIDE, PRESERVATIVE FREE 10 ML: 5 INJECTION INTRAVENOUS at 15:06

## 2022-08-25 RX ADMIN — HEPARIN SODIUM 5000 UNITS: 5000 INJECTION INTRAVENOUS; SUBCUTANEOUS at 01:35

## 2022-08-25 NOTE — PROGRESS NOTES
[id number]  YOB: 1940          Assessment & Plan:     ESRD on HD TTS at [facility]  Volume overload, improving  Hypoxia  HTN  Anemia of CKD    Rec:  UF yesterday and HD this am, [facility]  HD TTS  EPO with HD  Continue BP meds       Subjective:   CC: ESRD, volume overload  HPI: 2L off with UF yesterday, 2L off with HD today.    ROS: no n/v/sob  Current Facility-Administered Medications   Medication Dose Route Frequency    epoetin millie-epbx (RETACRIT) injection 20,000 Units  20,000 Units SubCUTAneous DIALYSIS TUE, THU & SAT    fluticasone propionate (FLONASE) 50 mcg/actuation nasal spray 2 Spray  2 Spray Both Nostrils QPM    artificial tears (dextran 70-hypromellose) (GENTEAL TEARS MILD) 0.1-0.3 % ophthalmic solution 1 Drop  1 Drop Both Eyes DAILY PRN    amLODIPine (NORVASC) tablet 10 mg  10 mg Oral QPM    aspirin delayed-release tablet 81 mg  81 mg Oral DAILY    levETIRAcetam (KEPPRA) tablet 250 mg  250 mg Oral BID    minoxidiL (LONITEN) tablet 2.5 mg  2.5 mg Oral BID    montelukast (SINGULAIR) tablet 10 mg  10 mg Oral QPM    pravastatin (PRAVACHOL) tablet 40 mg  40 mg Oral QHS    calcium acetate(phosphat bind) (PHOSLO) capsule 667 mg  1 Capsule Oral TID WITH MEALS    sodium chloride (NS) flush 5-40 mL  5-40 mL IntraVENous Q8H    sodium chloride (NS) flush 5-40 mL  5-40 mL IntraVENous PRN    acetaminophen (TYLENOL) tablet 650 mg  650 mg Oral Q6H PRN    Or    acetaminophen (TYLENOL) suppository 650 mg  650 mg Rectal Q6H PRN    polyethylene glycol (MIRALAX) packet 17 g  17 g Oral DAILY PRN    ondansetron (ZOFRAN ODT) tablet 4 mg  4 mg Oral Q8H PRN    Or    ondansetron (ZOFRAN) injection 4 mg  4 mg IntraVENous Q6H PRN    heparin (porcine) injection 5,000 Units  5,000 Units SubCUTAneous Q8H    levETIRAcetam (KEPPRA) tablet 250 mg  250 mg Oral DIALYSIS TUE, THU & SAT    carvediloL (COREG) tablet 6.25 mg  6.25 mg Oral BID WITH MEALS Objective:     Vitals:  Blood pressure (!) 131/46, pulse 62, temperature 98.2 °F (36.8 °C), resp. rate 16, height 5' 1\" (1.549 m), weight 74.4 kg (164 lb), SpO2 96 %. Temp (24hrs), Av.1 °F (36.7 °C), Min:98 °F (36.7 °C), Max:98.2 °F (36.8 °C)      Intake and Output:  701 - 1900  In: -   Out:  - 700  In: 1080 [P.O.:1080]  Out: 4500     Physical Exam:               GENERAL ASSESSMENT: NAD  NECK RIJ PC  HEENT: Nontraumatic   CHEST: On nasal CPAP, clear anteriorly  HEART: S1S2  ABDOMEN: Soft,NT  EXTREMITY: min EDEMA  NEURO: Grossly non focal          ECG/rhythm:    Data Review      No results for input(s): TNIPOC in the last 72 hours. No lab exists for component: ITNL   No results for input(s): CPK, CKMB, TROIQ in the last 72 hours. Recent Labs     22  0353 22  1114    138   K 3.9 4.1    101   CO2 30 28   BUN 21* 54*   CREA 4.30* 7.92*   GLU 83 125*   PHOS  --  4.6   MG 2.3 2.4   CA 9.2 10.4*   ALB 2.9* 3.5   WBC 5.3 9.1   HGB 7.5* 8.8*   HCT 22.9* 26.5*    251        No results for input(s): INR, PTP, APTT, INREXT, INREXT in the last 72 hours. Needs: urine analysis, urine sodium, protein and creatinine  No results found for: KAYCEE GIPSON        : Davida Gasca MD  2022        [facility]:  [email or web address] que Vos  Marino Moore office:  [address] [place] [address], [address] [place] [address] [address]  [place], [address] [facility]  Phone: [phone]  Fax :     [phone]    [place] office:  [address] [facility], [address]  Phone - [phone]  Fax - [phone]

## 2022-08-25 NOTE — PROGRESS NOTES
Palliative Medicine Social Work Note      Palliative Medicine (Dr. Jolly Juan, Trini Mireles) made follow up visit to offer assistance with completion of AMD and DDNR prior to discharge, as discussed during yesterday's visit. Pt was sitting up in chair, no family present. Pt was on room air, denied pain/distress, was aware that discharge order has been placed. Pt stated she had given son the blank AMD and DDNR yesterday when he visited, indicated she would review and potentially complete documents once she is home. Provided instructions re: completion of forms, encouraged pt to provide copy of completed forms to PCP, for [address] record, and for chart at Dialysis. Pt has contact information for Palliative team, should questions arise. Thank you for including Palliative Medicine in the care of Ms. Rafaela Romero.      [address] [place] [address], [facility]  Palliative Medicine:  [phone])

## 2022-08-25 NOTE — PROGRESS NOTES
[facility]  [address], [facility] [address]  [phone]    Medical Progress Note      NAME: Billie Kearns   :  1940  MRM:  [id number]    Date/Time of service 2022  12:41 PM          Assessment and Plan:     Acute respiratory failure with hypoxia / Pulmonary edema / CARRANZA (dyspnea on exertion) - POA, due to a excess fluid. Consulted renal for HD.  2.5L removed. On the last admit we removed 5L over 2 days. Hypoventilation may contribute to hypoxia too. Consult palliative care. She initially required BiPAP in ER, but now is on 2L NC oxygen. PT eval for oxygen needs, arrange home oxygen for chronic CHF if hypoxia persists. ESRD on dialysis - Consulted renal. Renal diet. Fluid restriction. Hypertension / Diastolic CHF, chronic - ECHO in 2021 with dCHF and pHTN. Continue Norvasc, ASA, Coreg, clonidine, hydralazine and minoxidil. Check orthostatics. Discordant L vs R BP likely related to closed fistula. Outpatient vascular and cardiology follow up     Anemia due to chronic kidney disease - Stable. EPO per renal     Hx of completed stroke / Hx Seizures - continue ASA, BB, statin, keppra     Debilitated patient / Arthritis - PT OT eval.  Prn tylenol     Obese / ABIGAIL on CPAP - May resume home CPAP     Urinary incontinence - Not on meds     Monoclonal gammopathy - Outpatient follow up     High cholesterol - Continue pravastatin     Anxiety and depression - Not on meds       Subjective:     Chief Complaint:  better breathing, HD removed another 2L this AM    ROS:  (bold if positive, if negative)    SOB/DOETolerating some PT  Tolerating Diet        Objective:     Last 24hrs VS reviewed since prior progress note.  Most recent are:    Visit Vitals  BP (!) 153/40   Pulse 67   Temp 98 °F (36.7 °C)   Resp 27   Ht 5' 1\" (1.549 m)   Wt 74.4 kg (164 lb)   SpO2 92%   BMI 30.99 kg/m²     SpO2 Readings from Last 6 Encounters:   22 92%   22 95% 03/09/22 95%   09/29/21 98%   09/24/21 97%   09/15/21 98%    O2 Flow Rate (L/min): 1 l/min     Intake/Output Summary (Last 24 hours) at 8/25/2022 1409  Last data filed at 8/25/2022 3198  Gross per 24 hour   Intake 240 ml   Output 4000 ml   Net -3760 ml        Physical Exam:    Gen:  Obese, frail, in no acute distress  HEENT:  Pink conjunctivae, PERRL, hearing intact to voice, moist mucous membranes  Neck:  Supple, without masses, thyroid non-tender  Resp:  No accessory muscle use, clear breath sounds without wheezes rales or rhonchi  Card:  No murmurs, normal S1, S2 without thrills, bruits or peripheral edema  Abd:  Soft, non-tender, non-distended, normoactive bowel sounds are present, no mass  Lymph:  No cervical or inguinal adenopathy  Musc:  No cyanosis or clubbing  Skin:  No rashes or ulcers, skin turgor is good  Neuro:  Cranial nerves are grossly intact, general motor weakness, follows commands appropriately  Psych:   Moderate insight, oriented to person, place    Telemetry reviewed:   normal sinus rhythm  __________________________________________________________________  Medications Reviewed: (see below)  Medications:     Current Facility-Administered Medications   Medication Dose Route Frequency    epoetin millie-epbx (RETACRIT) injection 20,000 Units  20,000 Units SubCUTAneous DIALYSIS TUE, THU & SAT    fluticasone propionate (FLONASE) 50 mcg/actuation nasal spray 2 Spray  2 Spray Both Nostrils QPM    artificial tears (dextran 70-hypromellose) (GENTEAL TEARS MILD) 0.1-0.3 % ophthalmic solution 1 Drop  1 Drop Both Eyes DAILY PRN    amLODIPine (NORVASC) tablet 10 mg  10 mg Oral QPM    aspirin delayed-release tablet 81 mg  81 mg Oral DAILY    levETIRAcetam (KEPPRA) tablet 250 mg  250 mg Oral BID    minoxidiL (LONITEN) tablet 2.5 mg  2.5 mg Oral BID    montelukast (SINGULAIR) tablet 10 mg  10 mg Oral QPM    pravastatin (PRAVACHOL) tablet 40 mg  40 mg Oral QHS    calcium acetate(phosphat bind) (PHOSLO) capsule 667 mg  1 Capsule Oral TID WITH MEALS    sodium chloride (NS) flush 5-40 mL  5-40 mL IntraVENous Q8H    sodium chloride (NS) flush 5-40 mL  5-40 mL IntraVENous PRN    acetaminophen (TYLENOL) tablet 650 mg  650 mg Oral Q6H PRN    Or    acetaminophen (TYLENOL) suppository 650 mg  650 mg Rectal Q6H PRN    polyethylene glycol (MIRALAX) packet 17 g  17 g Oral DAILY PRN    ondansetron (ZOFRAN ODT) tablet 4 mg  4 mg Oral Q8H PRN    Or    ondansetron (ZOFRAN) injection 4 mg  4 mg IntraVENous Q6H PRN    heparin (porcine) injection 5,000 Units  5,000 Units SubCUTAneous Q8H    levETIRAcetam (KEPPRA) tablet 250 mg  250 mg Oral DIALYSIS TUE, THU & SAT    carvediloL (COREG) tablet 6.25 mg  6.25 mg Oral BID WITH MEALS        Lab Data Reviewed: (see below)  Lab Review:     Recent Labs     08/24/22  0353 08/23/22  1114   WBC 5.3 9.1   HGB 7.5* 8.8*   HCT 22.9* 26.5*    251     Recent Labs     08/24/22  0353 08/23/22  1114    138   K 3.9 4.1    101   CO2 30 28   GLU 83 125*   BUN 21* 54*   CREA 4.30* 7.92*   CA 9.2 10.4*   MG 2.3 2.4   PHOS  --  4.6   ALB 2.9* 3.5   TBILI 1.3* 1.2*   ALT 19 22     Lab Results   Component Value Date/Time    Glucose (POC) 100 05/20/2021 09:00 AM    Glucose (POC) 109 (H) 02/20/2019 10:13 PM    Glucose (POC) 114 (H) 02/20/2019 11:34 AM     No results for input(s): PH, PCO2, PO2, HCO3, FIO2 in the last 72 hours. No results for input(s): INR, INREXT, INREXT in the last 72 hours. All Micro Results       None            Other pertinent lab: none    Total time spent with patient: 30 Minutes I personally reviewed chart, notes, data and current medications in the medical record. I have personally examined and treated the patient at bedside during this period. To assist coordination of care and communication with nursing and staff, this note may be preliminary early in the day, but finalized by end of the day.                  Care Plan discussed with: Patient, Family, Care Manager, Nursing Staff, Consultant/Specialist, and >50% of time spent in counseling and coordination of care    Discussed:  Care Plan and D/C Planning    Prophylaxis:  Hep SQ and H2B/PPI    Disposition:   PT, OT, RN           ___________________________________________________    Attending Physician: Citlaly Middleton MD

## 2022-08-25 NOTE — DISCHARGE SUMMARY
Physician Discharge Summary     Patient ID:  Aicha Castellanos  [id number]  79 y.o.  1940    Admit date: 8/23/2022    Discharge date of service and time: 8/25/2022  Greater than 30 minutes were spent providing discharge related services for this patient    Admission Diagnoses: Acute hypoxemic respiratory failure ([facility] [J96.01]    Discharge Diagnoses:    Principal Diagnosis   Acute hypoxemic respiratory failure [facility])                                             Hospital Course and other diagnoses  Acute and chronic respiratory failure with hypoxia / Pulmonary edema / CARRANZA (dyspnea on exertion) - POA, due to a excess fluid, on top of underlying pulmonary hypertension and dCHF. Consulted renal for HD.  2.5L removed. On the last admit we removed 5L over 2 days. Hypoventilation may contribute to hypoxia too. Consult palliative care. She initially required BiPAP in ER, but now is on 2L NC oxygen. PT eval for oxygen needs. We will arrange home oxygen for pulmonary HTN and chronic dCHF since hypoxia persists. ESRD on dialysis - Consulted renal. Renal diet. Fluid restriction. Hypertension / Diastolic CHF, chronic / Pulmonary HTN - ECHO in July 2021 with dCHF and pHTN. Continue Norvasc, ASA, Coreg, clonidine, hydralazine and minoxidil. Check orthostatics. Discordant L vs R BP likely related to closed fistula. Outpatient vascular and cardiology follow up     Anemia due to chronic kidney disease - Stable.  EPO per renal     Hx of completed stroke / Hx Seizures - continue ASA, BB, statin, keppra     Debilitated patient / Arthritis - PT OT eval.  Prn tylenol     Obese / ABIGAIL on CPAP - May resume home CPAP     Urinary incontinence - Not on meds     Monoclonal gammopathy - Outpatient follow up     High cholesterol - Continue pravastatin     Anxiety and depression - Not on meds     PCP: Ana Funk MD    Consults: Pulmonary/Intensive care, Nephrology, and Palliative Care    Significant Diagnostic Studies: See Hospital Course    Discharged home in improved condition. Discharge Exam:  BP (!) 153/40   Pulse 67   Temp 98 °F (36.7 °C)   Resp 27   Ht 5' 1\" (1.549 m)   Wt 74.4 kg (164 lb)   SpO2 92%   BMI 30.99 kg/m²      Gen:  Obese, frail, in no acute distress  HEENT:  Pink conjunctivae, PERRL, hearing intact to voice, moist mucous membranes  Neck:  Supple, without masses, thyroid non-tender  Resp:  No accessory muscle use, clear breath sounds without wheezes rales or rhonchi  Card:  No murmurs, normal S1, S2 without thrills, bruits or peripheral edema  Abd:  Soft, non-tender, non-distended, normoactive bowel sounds are present, no mass  Lymph:  No cervical or inguinal adenopathy  Musc:  No cyanosis or clubbing  Skin:  No rashes or ulcers, skin turgor is good  Neuro:  Cranial nerves are grossly intact, general motor weakness, follows commands appropriately  Psych: Moderate insight, oriented to person, place    Patient Instructions:   Current Discharge Medication List        CONTINUE these medications which have NOT CHANGED    Details   !! calcium acetate,phosphat bind, (PHOSLO) 667 mg cap Take 2 Capsules by mouth three (3) times daily (with meals). !! calcium acetate,phosphat bind, (PHOSLO) 667 mg cap Take 1 Capsule by mouth as needed (with snacks). carvediloL (COREG) 6.25 mg tablet Take 6.25 mg by mouth two (2) times daily (with meals). hydrALAZINE (APRESOLINE) 100 mg tablet Take 100 mg by mouth two (2) times a day. levETIRAcetam (Keppra) 250 mg tablet Take one tablet in morning and evening. After dialysis session, take one additional tablet. 90 day prescription  Qty: 216 Tablet, Refills: 1    Associated Diagnoses: Nonintractable epilepsy without status epilepticus, unspecified epilepsy type (HCC)      minoxidiL (LONITEN) 2.5 mg tablet Take 2.5 mg by mouth two (2) times a day.  Indications: high blood pressure      omega-3 acid ethyl esters (LOVAZA) 1 gram capsule Take 1 g by mouth two (2) times a day.      aspirin delayed-release 81 mg tablet Take 81 mg by mouth daily. amLODIPine (NORVASC) 10 mg tablet Take 10 mg by mouth every evening.      montelukast (SINGULAIR) 10 mg tablet Take 10 mg by mouth every evening. fluticasone propionate (FLONASE) 50 mcg/actuation nasal spray 2 Sprays by Both Nostrils route every evening. polyvinyl alcohol-povidon,PF, (REFRESH CLASSIC) 1.4-0.6 % ophthalmic solution Administer 1-2 Drops to both eyes as needed. pravastatin (PRAVACHOL) 40 mg tablet Take 40 mg by mouth nightly. acetaminophen (TYLENOL) 650 mg TbER Take 650 mg by mouth every eight (8) hours as needed for Pain. !! - Potential duplicate medications found. Please discuss with provider. Activity: Activity as tolerated and PT/OT per Home Health  Diet: Cardiac Diet and Renal Diet  Wound Care: Routine catheter care and As directed    Follow-up with your PCP in a few weeks.   Follow-up tests/labs - none    Signed:  Delvin Brooke MD  8/25/2022  2:10 PM

## 2022-08-25 NOTE — PROGRESS NOTES
1445:  Pt was accepted by [address] [facility]    7724:  Pt was accepted by [facility]. Pt will also need home O2. Pt would like [employer]  A referral was sent in [facility]. CM Note:  Pt to d/c home today. PT recommending hh.  Pt couldn't remember the agency she had in the past & requested [facility]. A referral was sent in [address] [place] [address]. Family to transport her. Pt is to follow up with providers as scheduled.   Nisa RN

## 2022-08-25 NOTE — PROGRESS NOTES
1905: Bedside and Verbal shift change report given to Eddie Samuel (oncoming nurse) by Britt Beaver (offgoing nurse). Report included the following information SBAR, Kardex, Procedure Summary, Intake/Output, MAR, Accordion, Recent Results, Med Rec Status, and Cardiac Rhythm NSR .     2350: Patient spo2 88-89% and sustaining on CPAP. P/c to Resp Therapy. RT attached oxygen to CPAP. SPO2 increased to 94%.

## 2022-08-25 NOTE — PROGRESS NOTES
Problem: Mobility Impaired (Adult and Pediatric)  Goal: *Acute Goals and Plan of Care (Insert Text)  Description: FUNCTIONAL STATUS PRIOR TO ADMISSION: Patient was modified independent using a rollator for functional mobility. HOME SUPPORT PRIOR TO ADMISSION: The patient lived alone with daughter to provide assistance as needed. Physical Therapy Goals  Initiated 8/24/2022  1. Patient will move from supine to sit and sit to supine  in bed with modified independence within 7 day(s). 2.  Patient will transfer from bed to chair and chair to bed with modified independence using the least restrictive device within 7 day(s). 3.  Patient will perform sit to stand with modified independence within 7 day(s). 4.  Patient will ambulate with modified independence for 150 feet with the least restrictive device within 7 day(s). Outcome: Progressing Towards Goal     PHYSICAL THERAPY TREATMENT  Patient: Hima Copeland (33 y.o. female)  Date: 8/25/2022  Diagnosis: Acute hypoxemic respiratory failure ([facility] [J96.01] Acute hypoxemic respiratory failure [facility])      Precautions:  Fall  Chart, physical therapy assessment, plan of care and goals were reviewed. ASSESSMENT  Patient continues with skilled PT services and is progressing towards goals. Able to progress gait distance but continues to need oxygen with mobility- 3LPM with activity to maintain SpO2 90%. She needs increased time to recover but able to wean to room air at rest only. She lives alone but has supportive family. Recommend discharge home with family support and HHPT. At this time, she would need portable oxygen based on oxygen walk test. Acute PT will continue to follow and progress as able.      Current Level of Function Impacting Discharge (mobility/balance): stand-by-assist sit to stand    Other factors to consider for discharge: lives alone, new O2 requirement         PLAN :  Patient continues to benefit from skilled intervention to address the above impairments. Continue treatment per established plan of care. to address goals. Recommendation for discharge: (in order for the patient to meet his/her long term goals)  Physical therapy at least 2 days/week in the home     This discharge recommendation:  Has been made in collaboration with the attending provider and/or case management    IF patient discharges home will need the following DME: portable oxygen       SUBJECTIVE:   Patient stated I feel a little winded.     OBJECTIVE DATA SUMMARY:   Critical Behavior:  Neurologic State: Alert  Orientation Level: Oriented X4  Cognition: Follows commands     Functional Mobility Training:  Transfers:  Sit to Stand: Stand-by assistance  Stand to Sit: Stand-by assistance  Balance:  Sitting: Intact  Standing: With support  Standing - Static: Good  Standing - Dynamic : Good  Ambulation/Gait Training:  Distance (ft): 75 Feet (ft) (multiple standing rest breaks to monitor SpO2 and for rest)  Assistive Device: Gait belt;Walker, rolling  Ambulation - Level of Assistance: Supervision    Pain Rating:  Did not interfere    Activity Tolerance:   Fair, desaturates with exertion and requires oxygen, and requires rest breaks    Pulse oximetry assessment   93% at rest on room air (if 88% or less, skip next steps)  87% while ambulating on room air  94% at rest on 3LPM   90% while ambulating on 3LPM    Patient reports at baseline she is normally 91-93% and she does not wear O2 at baseline. She does feel winded with activity especially on room air. Results discussed with RN and CM. After treatment patient left in no apparent distress:   Sitting in chair, Call bell within reach, and Caregiver / family present, aware of need to call for assist. On room air at 93%    COMMUNICATION/COLLABORATION:   The patients plan of care was discussed with: Occupational therapist, Registered nurse, and Case management.      Forrest Vaughan, PT   Time Calculation: 33 mins

## 2022-08-25 NOTE — PROGRESS NOTES
Oxygen Walking Test    Pulse oximetry assessment   93% at rest on room air (if 88% or less, skip next steps)  87% while ambulating on room air  94% at rest on 3LPM   90% while ambulating on 3LPM    Discussed results with RN and CM.     Riley James, PT, DPT

## 2022-08-25 NOTE — PROGRESS NOTES
Discharge instructions were reviewed with patient. All questions were answered. Patient understood her follow up appointments. Portable oxygen tank had been delivered to her room for transport home. Patient will be discharged home with her son. Primary nurse updated. IV and heart monitor will be removed prior to discharge.

## 2022-08-25 NOTE — PROCEDURES
Hemodialysis / [phone]    Vitals Pre Post Assessment Pre Post   BP BP: (!) 153/38 (08/25/22 0530)   134/40 LOC AXOX4 AXOX4   HR Pulse (Heart Rate): 73 (08/25/22 0530) 63 Lungs CLEAR NO CHANGE   Resp Resp Rate: 26 (08/25/22 0530) 23 Cardiac NSR NO CHANGE   Temp Temp: 98.2 °F (36.8 °C) (08/25/22 0319) 98.1 Skin WDI NO CHANGE   Weight   Edema GENERALIZED GENERALIZED   Tele status Bedside Bedside Pain PT DENIES PT DENIES        Orders   Duration: Start: 8725 End: 1482 Total: 3.5   Dialyzer: Dialyzer/Set Up Inspection: Severo Colon (08/25/22 0530)   K Bath: Dialysate K (mEq/L): 2 (08/25/22 0530)   Ca Bath: Dialysate CA (mEq/L): 2.5 (08/25/22 0530)   Na: Dialysate NA (mEq/L): 140 (08/25/22 0530)   Bicarb: Dialysate HCO3 (mEq/L): 35 (08/25/22 0530)   Target Fluid Removal: Goal/Amount of Fluid to Remove (mL): 2000 mL (08/25/22 0530)     Access   Type & Location: Sarahi Serene / PC : Dressing CDI. No s/s of infection. Both lumens aspirate & flush well. Running well at .       Comments:                                        Labs   HBsAg (Antigen) / date:              12/16/21 neg                                HBsAb (Antibody) / date: 03/17/22 immune   Source:    Obtained/Reviewed  Critical Results Called HGB   Date Value Ref Range Status   08/24/2022 7.5 (L) 11.5 - 16.0 g/dL Final     Potassium   Date Value Ref Range Status   08/24/2022 3.9 3.5 - 5.1 mmol/L Final     Calcium   Date Value Ref Range Status   08/24/2022 9.2 8.5 - 10.1 MG/DL Final     BUN   Date Value Ref Range Status   08/24/2022 21 (H) 6 - 20 MG/DL Final     Creatinine   Date Value Ref Range Status   08/24/2022 4.30 (H) 0.55 - 1.02 MG/DL Final     Comment:     INVESTIGATED PER DELTA CHECK PROTOCOL        Meds Given   Name Dose Route                    Adequacy / Fluid    Total Liters Process: 80 L   Net Fluid Removed: 2000 ML      Comments   Time Out Done:   (Time) 0530 YES   Admitting Diagnosis: SOB   Consent obtained/signed: Informed Consent Verified: Yes (08/25/22 0530)   Machine / Sonja Rader # Machine Number: [id number] (08/25/22 0530)   Primary Nurse Rpt Pre: Billie Pena RN   Primary Nurse Rpt Post: Aina Solano RN   Pt Education: FLUID RETENTION   Care Plan: FOLLOW MD [address]   Pts outpatient clinic:      Tx Summary    SBAR received from Primary RN. Pt  A&Ox4. Consent signed & on file. 0530: Each catheter limb disinfected per p&p, caps removed, hubs disinfected per p&p. Each lumen aspirated for blood return and flushed with Normal Saline per policy. 0180. VSS. Dialysis Tx initiated. 6400: Tx ended. VSS. Each dialysis catheter limb disinfected per p&p, all possible blood returned per p&p, and each dialysis hub disinfected per p&p. Each lumen flushed, post dialysis catheter saline dwell instilled per order, and caps applied. Bed locked and in the lowest position, call bell and belongings in reach. SBAR given to Primary, RN. Patient is stable at time of my departure. All Dialysis related medications have been reviewed.        Comments:

## 2022-08-25 NOTE — PROGRESS NOTES
Problem: Self Care Deficits Care Plan (Adult)  Goal: *Acute Goals and Plan of Care (Insert Text)  Description: FUNCTIONAL STATUS PRIOR TO ADMISSION: Patient was modified independent using a rollator for functional mobility. Patient was modified independent for basic and instrumental ADLs (bathing at sink, has shower chair, RW, cane, grab bars). HOME SUPPORT: The patient lived alone with son and DIL living in same building  to provide assistance as needed. Occupational Therapy Goals  Initiated 8/24/2022  1. Patient will perform grooming with modified independence within 7 day(s). 2.  Patient will perform bathing with modified independence within 7 day(s). 3.  Patient will perform upper body dressing and lower body dressing with modified independence within 7 day(s) with borrowed AE as needed. 4.  Patient will perform toilet transfers with modified independence within 7 day(s). 5.  Patient will perform all aspects of toileting with modified independence within 7 day(s). 6.  Patient will participate in upper extremity therapeutic exercise/activities with independence for 10 minutes within 7 day(s). 7.  Patient will utilize energy conservation techniques during functional activities with verbal cues within 7 day(s). Outcome: Progressing Towards Goal   OCCUPATIONAL THERAPY TREATMENT  Patient: Aicha Castellanos (87 y.o. female)  Date: 8/25/2022  Diagnosis: Acute hypoxemic respiratory failure ([address] [J96.01] Acute hypoxemic respiratory failure ([address]      Precautions:    Chart, occupational therapy assessment, plan of care, and goals were reviewed. ASSESSMENT  Patient continues with skilled OT services and is progressing towards goals. Pt participated with ADL re-training seated in chair. She used bath cloths for UB stand by assist as well as doff/don gown. She had some difficulty opening deodorant due to numbness in hands but was able to manage brushing her teeth after set-up.  Educated pt as to [employer] conservation techniques, pacing herself with activity. /75 seated in chair. Current Level of Function Impacting Discharge (ADLs): stand by assist UB bathe/dress, set-up grooming    Other factors to consider for discharge:          PLAN :  Patient continues to benefit from skilled intervention to address the above impairments. Continue treatment per established plan of care to address goals. Recommend with staff: out of bed for ADL's, there ex, there act, meals    Recommend next OT session: cont towards goals    Recommendation for discharge: (in order for the patient to meet his/her long term goals)  Occupational therapy at least 2 days/week in the home     This discharge recommendation:  Has not yet been discussed the attending provider and/or case management    IF patient discharges home will need the following DME:        SUBJECTIVE:   Patient stated I have been getting dialysis for about a year now.     OBJECTIVE DATA SUMMARY:   Cognitive/Behavioral Status:  Neurologic State: Alert  Orientation Level: Oriented X4  Cognition: Follows commands             Functional Mobility and Transfers for ADLs:  Bed Mobility:   Supervision supine to sit    Transfers:  Sit to Stand: Stand-by assistance          Balance:  Sitting: Intact  Standing: With support    ADL Intervention:       Grooming  Grooming Assistance: Set-up  Position Performed: Seated in chair  Washing Face: Set-up  Brushing Teeth: Set-up  Brushing/Combing Hair: Set-up    Upper Body Bathing  Bathing Assistance: Stand-by assistance  Position Performed: Seated in chair              Upper Body Dressing Assistance  Dressing Assistance: [place]: Set-up            Activity Tolerance:   Fair    After treatment patient left in no apparent distress:   Sitting in chair and Call bell within reach    COMMUNICATION/COLLABORATION:   The patients plan of care was discussed with: Occupational therapist and Registered nurse.      Nancy Rosen, ESPINOSA/L  Time Calculation: 25 mins

## 2022-08-25 NOTE — PALLIATIVE CARE DISCHARGE
The Palliative Medicine team was consulted as part of your / your loved one's care in the hospital. Our team is a supportive service; we strive to relieve suffering and improve quality of life. You identified the following goal(s) as your main focus for healthcare: To continue to enjoy your independence     We reviewed advance care planning information, which includes the following:  Advance Care Planning 8/24/2022   Confirm Advance Directive Yes, on file (Natural Death Act)     We reviewed / discussed your code status as: Full Code     Full Code means perform CPR in the event of cardiac arrest     [facility] means do NOT perform CPR in the event of cardiac arrest     Partial Code means you have specific preferences, please discuss with your health care team     No Order means this issue was not addressed / resolved during your stay    You are going to give some thought to [address], which includes having conversations about and putting in writing your wishes for future and end of life care. Some possible decisions to consider would be whether you would want a feeding tube placed if you are unable to safely swallow and whether you would want attempts at resuscitation in the event of cardiac/respiratory arrest.  While these can be difficult conversations to have, it's important that your loved ones and your health care team are aware of your wishes and how you as an individual define \"good quality of life. \"  Some possible ways to document your wishes include an Advance Medical Directive, a Durable Do Not Resuscitate Order, and a POST form (Physician Orders for Scope of Treatment). Your Primary Care Physician or other members of your health care team can assist with completion of these forms. Because of the importance of this information, we are providing you with a printed copy to share with other healthcare providers after this hospitalization is complete.     If any of the above information is incomplete or incorrect, please contact the Palliative Medicine team at [phone].

## 2022-08-25 NOTE — PROGRESS NOTES
Palliative Medicine Consult  [place]: [phone] ([phone])    Patient Name: William Dexter  YOB: 1940    Date of Initial Consult: 8/24/2022  Reason for Consult: Care Decisions  Requesting Provider: Dr. Belia Gloria   Primary Care Physician: Kyler Bruce MD     SUMMARY:   William Dexter is a 79 y.o. with ESRD on HD, CHF, pulmonary HTN, anemia, MGUS, ABIGAIL, anxiety and depression, h/o seizure d/o, h/o CVA who was admitted on 8/23/2022 from HD session with a diagnosis of AHRF. She initially required BiPAP and has been able to be weaned to [place]. Current medical issues leading to Palliative Medicine involvement include: Care Decisions. Social:  Patient lives in a [facility]. Her younger son Tawanda Baugh and his daughter Loan Strong live upstairs in the [facility]. She states this has been their living situation for about the last 10 years. Her older son Bailey Stewart lives in [place]. She states that at home she is able to mostly take care of herself, including meal prep and bathing, and that her son and his wife live upstairs in her [facility] and come to check on her regularly. PALLIATIVE DIAGNOSES:   Encounter for Palliative Care  Goals of Care discussion  Advance Care Plan discussion  Code Status discussion  Physical debility: HH PT has been recommended  Shortness of breath: improving/resolved     PLAN:   We met patient at bedside this afternoon. Patient feels her breathing is better today. She is anticipating going home later today. We asked her about the AMD and DDNR we discussed yesterday. She says she gave these forms to her son for him to review. Discussed that if she chooses to complete them, then she can take them to one of her future doctor's appointments of dialysis session for assistance with completion. Encouraged her to make sure her different healthcare teams have a copy of the forms for their system. Thank you for asking our team to participate in the care of William Dexter.       GOALS OF CARE / TREATMENT PREFERENCES: GOALS OF CARE:       TREATMENT PREFERENCES:   Code Status: Full Code    Patient and family's personal goals include: to continue to improve    Important upcoming milestones or family events: she has a great-grandchild who will be born in the coming days    The patient identifies the following as important for living well: to continue to improve      Advance Care Planning:  [] The [facility] Interdisciplinary Team has updated the ACP Navigator with [address] and Patient Capacity      Primary Decision Maker: Heike Merchant - [phone]    Primary Decision Maker: [place]on Man - [place]  Advance Care Planning 8/24/2022   Confirm Advance Directive Yes, on file               Other:    As far as possible, the palliative care team has discussed with patient / health care proxy about goals of care / treatment preferences for patient. HISTORY:     History obtained from: chart, patient    CHIEF COMPLAINT: my breathing feels better    HPI/SUBJECTIVE:    The patient is:   [x] Verbal and participatory  [] Non-participatory due to:     8/24: Patient feels her breathing is much better today. She is hopeful that she continues to improve. 8/25: Patient feels her breathing is better today. She is anticipating going home later today.     Clinical Pain Assessment (nonverbal scale for severity on nonverbal patients):   Clinical Pain Assessment  Severity: 0     Activity (Movement): Lying quietly, normal position    Duration: for how long has pt been experiencing pain (e.g., 2 days, 1 month, years)  Frequency: how often pain is an issue (e.g., several times per day, once every few days, constant)     FUNCTIONAL ASSESSMENT:     Palliative Performance Scale (PPS):  PPS: 50       PSYCHOSOCIAL/SPIRITUAL SCREENING:     Palliative IDT has assessed this patient for cultural preferences / practices and a referral made as appropriate to needs (Cultural Services, Patient Advocacy, Ethics, etc.)    Any spiritual / Marcus Beatriz concerns:  [] Yes /  [x] No   If \"Yes\" to discuss with pastoral care during IDT     Does caregiver feel burdened by caring for their loved one:   [] Yes /  [] No /  [x] No Caregiver Present/Available [] No Caregiver [] Pt Lives at Facility  If \"Yes\" to discuss with social work during IDT    Anticipatory grief assessment:   [x] Normal  / [] Maladaptive     If \"Maladaptive\" to discuss with social work during IDT    ESAS Anxiety:      ESAS Depression:          REVIEW OF SYSTEMS:     Positive and pertinent negative findings in ROS are noted above in HPI. The following systems were [x] reviewed / [] unable to be reviewed as noted in HPI  Other findings are noted below. Systems: constitutional, ears/nose/mouth/throat, respiratory, gastrointestinal, genitourinary, musculoskeletal, integumentary, neurologic, psychiatric, endocrine. Positive findings noted below. Modified ESAS Completed by: provider   Fatigue: 2       Pain: 0           Dyspnea: 0           Stool Occurrence(s): 0        PHYSICAL EXAM:     From RN flowsheet:  Wt Readings from Last 3 Encounters:   08/23/22 164 lb (74.4 kg)   04/07/22 180 lb 1.9 oz (81.7 kg)   03/09/22 170 lb (77.1 kg)     Blood pressure (!) 153/40, pulse 67, temperature 98 °F (36.7 °C), resp. rate 27, height 5' 1\" (1.549 m), weight 164 lb (74.4 kg), SpO2 92 %.     Pain Scale 1: Numeric (0 - 10)  Pain Intensity 1: 0  Pain Onset 1: acute  Pain Location 1: Head     Pain Description 1: Aching  Pain Intervention(s) 1: Medication (see MAR)  Last bowel movement, if known:     General: awake, sitting up in bedside chair off NC, NAD  Eyes: lids normal, no drainage  Nose: nares normal, no drainage  Mouth: mmm, no drainage  Pulm: respirations are unlabored, she can speak in complete sentences without appearance of shortness of breath  Neuro: alert, participates in conversation, moves extremities  Psych: full affect, speech and behavior appropriate     HISTORY:     Principal Problem:    Acute hypoxemic respiratory failure ([address] (2021)    Active Problems:    Diastolic CHF, chronic ([address] (2021)      Volume overload (2021)      ESRD on hemodialysis ([address] (2021)      Anemia due to chronic kidney disease (2021)      Pulmonary edema (2022)      Urinary incontinence ()      Seizures ([address] ()      Overview: last [facility]- 2019 @ 15   Last seizure 2017      ABIGAIL on CPAP ()      Monoclonal gammopathy (2018)      Hypertension ()      Hx of completed stroke ()      Overview: R occip, Bas Ganglial      High cholesterol ()      Arthritis ()      Anxiety and depression ()      CARRANZA (dyspnea on exertion) (2022)    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;  Old right posterior medial occipital lobe cortical infarct    Hx of seasonal allergies     Hypertension     Moderate pulmonary hypertension ([address]     Per echocardiogram of 2021; same study showed normal LVEF of 55-60%    Monoclonal gammopathy of undetermined significance     Obstructive sleep apnea     Seizures ([address]     last [facility]- 2019 @ 15   Last seizure 2017    Urinary incontinence       Past Surgical History:   Procedure Laterality Date    HX APPENDECTOMY N/A     HX CATARACT REMOVAL Bilateral 2018    HX  SECTION N/A     x 2    HX HEMORRHOIDECTOMY      HX HYSTERECTOMY      HX KNEE REPLACEMENT Right 2019    HX TONSILLECTOMY      IR INSERT NON TUNL CVC OVER 5 YRS  2021    IR INSERT TUNL CVC W/O PORT OVER 5 YR  2021      Family History   Problem Relation Age of Onset    Hypertension Mother     Diabetes Sister     Hypertension Sister     SKIN CANCER Sister     Stroke Father     Parkinson's Disease Father     SKIN CANCER Sister     Cancer Sister History reviewed, no pertinent family history.   Social History     Tobacco Use    Smoking status: Passive Smoke Exposure - Never Smoker    Smokeless tobacco: Never    Tobacco comments:     Passive smoke exposure  who passed when patient was 57   Substance Use Topics    Alcohol use: No     Allergies   Allergen Reactions    Latex Rash    Codeine Other (comments)    Levaquin [Levofloxacin] Other (comments)     Hallucinations    Lisinopril Cough    Sulfa (Sulfonamide Antibiotics) Rash and Itching      Current Facility-Administered Medications   Medication Dose Route Frequency    epoetin millie-epbx (RETACRIT) injection 20,000 Units  20,000 Units SubCUTAneous DIALYSIS TUE, THU & SAT    fluticasone propionate (FLONASE) 50 mcg/actuation nasal spray 2 Spray  2 Spray Both Nostrils QPM    artificial tears (dextran 70-hypromellose) (GENTEAL TEARS MILD) 0.1-0.3 % ophthalmic solution 1 Drop  1 Drop Both Eyes DAILY PRN    amLODIPine (NORVASC) tablet 10 mg  10 mg Oral QPM    aspirin delayed-release tablet 81 mg  81 mg Oral DAILY    levETIRAcetam (KEPPRA) tablet 250 mg  250 mg Oral BID    minoxidiL (LONITEN) tablet 2.5 mg  2.5 mg Oral BID    montelukast (SINGULAIR) tablet 10 mg  10 mg Oral QPM    pravastatin (PRAVACHOL) tablet 40 mg  40 mg Oral QHS    calcium acetate(phosphat bind) (PHOSLO) capsule 667 mg  1 Capsule Oral TID WITH MEALS    sodium chloride (NS) flush 5-40 mL  5-40 mL IntraVENous Q8H    sodium chloride (NS) flush 5-40 mL  5-40 mL IntraVENous PRN    acetaminophen (TYLENOL) tablet 650 mg  650 mg Oral Q6H PRN    Or    acetaminophen (TYLENOL) suppository 650 mg  650 mg Rectal Q6H PRN    polyethylene glycol (MIRALAX) packet 17 g  17 g Oral DAILY PRN    ondansetron (ZOFRAN ODT) tablet 4 mg  4 mg Oral Q8H PRN    Or    ondansetron (ZOFRAN) injection 4 mg  4 mg IntraVENous Q6H PRN    heparin (porcine) injection 5,000 Units  5,000 Units SubCUTAneous Q8H    levETIRAcetam (KEPPRA) tablet 250 mg  250 mg Oral DIALYSIS BUTCH KEY & SAT    carvediloL (COREG) tablet 6.25 mg  6.25 mg Oral BID WITH MEALS          LAB AND IMAGING FINDINGS:     Lab Results   Component Value Date/Time    WBC 5.3 08/24/2022 03:53 AM    HGB 7.5 (L) 08/24/2022 03:53 AM    PLATELET 268 81/18/3143 03:53 AM     Lab Results   Component Value Date/Time    Sodium 140 08/24/2022 03:53 AM    Potassium 3.9 08/24/2022 03:53 AM    Chloride 104 08/24/2022 03:53 AM    CO2 30 08/24/2022 03:53 AM    BUN 21 (H) 08/24/2022 03:53 AM    Creatinine 4.30 (H) 08/24/2022 03:53 AM    Calcium 9.2 08/24/2022 03:53 AM    Magnesium 2.3 08/24/2022 03:53 AM    Phosphorus 4.6 08/23/2022 11:14 AM      Lab Results   Component Value Date/Time    Alk. phosphatase 58 08/24/2022 03:53 AM    Protein, total 6.9 08/24/2022 03:53 AM    Albumin 2.9 (L) 08/24/2022 03:53 AM    Globulin 4.0 08/24/2022 03:53 AM     Lab Results   Component Value Date/Time    INR 1.0 02/20/2019 11:42 AM    Prothrombin time 9.4 02/20/2019 11:42 AM      Lab Results   Component Value Date/Time    Iron 58 07/07/2021 08:14 AM    TIBC 284 07/07/2021 08:14 AM    Iron % saturation 20 07/07/2021 08:14 AM    Ferritin 339 (H) 07/07/2021 08:14 AM      Lab Results   Component Value Date/Time    pH 7.38 05/14/2021 04:24 PM    PCO2 35 05/14/2021 04:24 PM    PO2 84 05/14/2021 04:24 PM     No components found for: [place]   Lab Results   Component Value Date/Time    CK 81 07/07/2021 04:48 AM    CK - MB 1.3 07/07/2021 04:48 AM                Total time:   Counseling / coordination time, spent as noted above:   > 50% counseling / coordination?:     Prolonged service was provided for  []30 min   []75 min in face to face time in the presence of the patient, spent as noted above. Time Start:   Time End:   Note: this can only be billed with 74270 (initial) or 56209 (follow up). If multiple start / stop times, list each separately.

## 2022-08-25 NOTE — DISCHARGE INSTRUCTIONS
Patient Discharge Instructions    Osmin Marques / [id number] : 1940    Admitted 2022 Discharged: 2022     Primary Diagnoses  @Rprob@    Take Home Medications     It is important that you take the medication exactly as they are prescribed. Keep your medication in the bottles provided by the pharmacist and keep a list of the medication names, dosages, and times to be taken in your wallet. Do not take other medications without consulting your doctor. What to do at Home    Recommended diet: Cardiac Diet and Renal Diet    Recommended activity: Activity as tolerated and PT/OT per Home Health    If you experience worse symptoms, please follow up with your nephrologist or PCP. Follow-up with your PCP in a few weeks    [unfilled]     Information obtained by :  I understand that if any problems occur once I am at home I am to contact my physician. I understand and acknowledge receipt of the instructions indicated above.                                                                                                                                            Physician's or R.N.'s Signature                                                                  Date/Time                                                                                                                                              Patient or Representative Signature                                                          Date/Time

## 2022-08-25 NOTE — PROGRESS NOTES
Bedside and Verbal shift change report given to Parviz Mckenzie RN (oncoming nurse) by Claire Stephens RN (offgoing nurse). Report included the following information SBAR, Kardex, Intake/Output, MAR, Accordion, and Recent Results. This patient was assisted with Intentional Toileting every 2 hours during this shift as appropriate. Documentation of ambulation and output reflected on Flowsheet as appropriate. Purposeful hourly rounding was completed using AIDET and 5Ps. Outcomes of PHR documented as they occurred. Bed alarm in use as appropriate. Dual Suction and ambubag in place.

## 2022-08-26 LAB
ATRIAL RATE: 90 BPM
CALCULATED P AXIS, ECG09: 36 DEGREES
CALCULATED R AXIS, ECG10: 9 DEGREES
CALCULATED T AXIS, ECG11: 85 DEGREES
DIAGNOSIS, 93000: NORMAL
P-R INTERVAL, ECG05: 238 MS
Q-T INTERVAL, ECG07: 390 MS
QRS DURATION, ECG06: 134 MS
QTC CALCULATION (BEZET), ECG08: 477 MS
VENTRICULAR RATE, ECG03: 90 BPM

## 2022-08-27 ENCOUNTER — HOME CARE VISIT (OUTPATIENT)
Dept: HOME HEALTH SERVICES | Facility: HOME HEALTH | Age: 82
End: 2022-08-27

## 2022-08-28 ENCOUNTER — HOME CARE VISIT (OUTPATIENT)
Dept: SCHEDULING | Facility: HOME HEALTH | Age: 82
End: 2022-08-28
Payer: MEDICARE

## 2022-08-28 VITALS
OXYGEN SATURATION: 98 % | RESPIRATION RATE: 16 BRPM | HEART RATE: 66 BPM | TEMPERATURE: 98.5 F | SYSTOLIC BLOOD PRESSURE: 122 MMHG | DIASTOLIC BLOOD PRESSURE: 68 MMHG

## 2022-08-28 PROCEDURE — 3331090001 HH PPS REVENUE CREDIT

## 2022-08-28 PROCEDURE — 3331090002 HH PPS REVENUE DEBIT

## 2022-08-28 PROCEDURE — 400018 HH-NO PAY CLAIM PROCEDURE

## 2022-08-28 PROCEDURE — 400013 HH SOC

## 2022-08-28 PROCEDURE — G0151 HHCP-SERV OF PT,EA 15 MIN: HCPCS

## 2022-08-29 ENCOUNTER — HOME CARE VISIT (OUTPATIENT)
Dept: HOME HEALTH SERVICES | Facility: HOME HEALTH | Age: 82
End: 2022-08-29
Payer: MEDICARE

## 2022-08-29 PROCEDURE — 3331090002 HH PPS REVENUE DEBIT

## 2022-08-29 PROCEDURE — 3331090001 HH PPS REVENUE CREDIT

## 2022-08-30 PROCEDURE — 3331090002 HH PPS REVENUE DEBIT

## 2022-08-30 PROCEDURE — 3331090001 HH PPS REVENUE CREDIT

## 2022-08-31 PROCEDURE — 3331090002 HH PPS REVENUE DEBIT

## 2022-08-31 PROCEDURE — 3331090001 HH PPS REVENUE CREDIT

## 2022-09-01 PROCEDURE — 3331090001 HH PPS REVENUE CREDIT

## 2022-09-01 PROCEDURE — 3331090002 HH PPS REVENUE DEBIT

## 2022-09-02 ENCOUNTER — HOME CARE VISIT (OUTPATIENT)
Dept: SCHEDULING | Facility: HOME HEALTH | Age: 82
End: 2022-09-02
Payer: MEDICARE

## 2022-09-02 PROCEDURE — G0157 HHC PT ASSISTANT EA 15: HCPCS

## 2022-09-02 PROCEDURE — 3331090002 HH PPS REVENUE DEBIT

## 2022-09-02 PROCEDURE — 3331090001 HH PPS REVENUE CREDIT

## 2022-09-03 PROCEDURE — 3331090002 HH PPS REVENUE DEBIT

## 2022-09-03 PROCEDURE — 3331090001 HH PPS REVENUE CREDIT

## 2022-09-04 PROCEDURE — 3331090001 HH PPS REVENUE CREDIT

## 2022-09-04 PROCEDURE — 3331090002 HH PPS REVENUE DEBIT

## 2022-09-05 VITALS
OXYGEN SATURATION: 93 % | RESPIRATION RATE: 16 BRPM | TEMPERATURE: 98 F | SYSTOLIC BLOOD PRESSURE: 118 MMHG | DIASTOLIC BLOOD PRESSURE: 68 MMHG | HEART RATE: 76 BPM

## 2022-09-05 PROCEDURE — 3331090001 HH PPS REVENUE CREDIT

## 2022-09-05 PROCEDURE — 3331090002 HH PPS REVENUE DEBIT

## 2022-09-06 PROCEDURE — 3331090001 HH PPS REVENUE CREDIT

## 2022-09-06 PROCEDURE — 3331090002 HH PPS REVENUE DEBIT

## 2022-09-07 ENCOUNTER — HOME CARE VISIT (OUTPATIENT)
Dept: SCHEDULING | Facility: HOME HEALTH | Age: 82
End: 2022-09-07
Payer: MEDICARE

## 2022-09-07 VITALS
DIASTOLIC BLOOD PRESSURE: 78 MMHG | OXYGEN SATURATION: 97 % | SYSTOLIC BLOOD PRESSURE: 116 MMHG | TEMPERATURE: 98 F | RESPIRATION RATE: 18 BRPM | HEART RATE: 63 BPM

## 2022-09-07 PROCEDURE — 3331090002 HH PPS REVENUE DEBIT

## 2022-09-07 PROCEDURE — G0151 HHCP-SERV OF PT,EA 15 MIN: HCPCS

## 2022-09-07 PROCEDURE — 3331090001 HH PPS REVENUE CREDIT

## 2022-09-08 PROCEDURE — 3331090002 HH PPS REVENUE DEBIT

## 2022-09-08 PROCEDURE — 3331090001 HH PPS REVENUE CREDIT

## 2022-09-09 ENCOUNTER — HOSPITAL ENCOUNTER (OUTPATIENT)
Dept: CT IMAGING | Age: 82
Discharge: HOME | End: 2022-09-09
Attending: PHYSICIAN ASSISTANT
Payer: MEDICARE

## 2022-09-09 ENCOUNTER — HOME CARE VISIT (OUTPATIENT)
Dept: SCHEDULING | Facility: HOME HEALTH | Age: 82
End: 2022-09-09
Payer: MEDICARE

## 2022-09-09 DIAGNOSIS — R93.89 ABNORMAL CHEST CT: ICD-10-CM

## 2022-09-09 PROCEDURE — 3331090001 HH PPS REVENUE CREDIT

## 2022-09-09 PROCEDURE — G0151 HHCP-SERV OF PT,EA 15 MIN: HCPCS

## 2022-09-09 PROCEDURE — 71250 CT THORAX DX C-: CPT

## 2022-09-09 PROCEDURE — 3331090002 HH PPS REVENUE DEBIT

## 2022-09-10 PROCEDURE — 3331090001 HH PPS REVENUE CREDIT

## 2022-09-10 PROCEDURE — 3331090002 HH PPS REVENUE DEBIT

## 2022-09-11 PROCEDURE — 3331090001 HH PPS REVENUE CREDIT

## 2022-09-11 PROCEDURE — 3331090002 HH PPS REVENUE DEBIT

## 2022-09-12 ENCOUNTER — HOME CARE VISIT (OUTPATIENT)
Dept: SCHEDULING | Facility: HOME HEALTH | Age: 82
End: 2022-09-12
Payer: MEDICARE

## 2022-09-12 VITALS
OXYGEN SATURATION: 96 % | RESPIRATION RATE: 17 BRPM | DIASTOLIC BLOOD PRESSURE: 72 MMHG | WEIGHT: 153 LBS | HEART RATE: 68 BPM | BODY MASS INDEX: 28.91 KG/M2 | SYSTOLIC BLOOD PRESSURE: 128 MMHG | TEMPERATURE: 98 F

## 2022-09-12 PROCEDURE — 3331090002 HH PPS REVENUE DEBIT

## 2022-09-12 PROCEDURE — 3331090001 HH PPS REVENUE CREDIT

## 2022-09-12 PROCEDURE — G0151 HHCP-SERV OF PT,EA 15 MIN: HCPCS

## 2022-09-14 ENCOUNTER — OFFICE VISIT (OUTPATIENT)
Dept: NEUROLOGY | Age: 82
End: 2022-09-14
Payer: MEDICARE

## 2022-09-14 VITALS
DIASTOLIC BLOOD PRESSURE: 74 MMHG | SYSTOLIC BLOOD PRESSURE: 124 MMHG | HEART RATE: 76 BPM | RESPIRATION RATE: 16 BRPM | OXYGEN SATURATION: 98 %

## 2022-09-14 DIAGNOSIS — G56.22 ULNAR NEUROPATHY AT WRIST, LEFT: ICD-10-CM

## 2022-09-14 DIAGNOSIS — M54.12 LEFT CERVICAL RADICULOPATHY: ICD-10-CM

## 2022-09-14 DIAGNOSIS — G56.02 SEVERE CARPAL TUNNEL SYNDROME OF LEFT WRIST: ICD-10-CM

## 2022-09-14 DIAGNOSIS — Z86.73 HX OF COMPLETED STROKE: ICD-10-CM

## 2022-09-14 DIAGNOSIS — I63.81 CEREBROVASCULAR ACCIDENT (CVA) OF LEFT BASAL GANGLIA (HCC): ICD-10-CM

## 2022-09-14 DIAGNOSIS — G40.909 NONINTRACTABLE EPILEPSY WITHOUT STATUS EPILEPTICUS, UNSPECIFIED EPILEPSY TYPE (HCC): Primary | ICD-10-CM

## 2022-09-14 DIAGNOSIS — G56.01 MILD CARPAL TUNNEL SYNDROME, RIGHT: ICD-10-CM

## 2022-09-14 DIAGNOSIS — G56.21 ULNAR NEUROPATHY AT ELBOW OF RIGHT UPPER EXTREMITY: ICD-10-CM

## 2022-09-14 PROCEDURE — 1090F PRES/ABSN URINE INCON ASSESS: CPT | Performed by: PSYCHIATRY & NEUROLOGY

## 2022-09-14 PROCEDURE — 1123F ACP DISCUSS/DSCN MKR DOCD: CPT | Performed by: PSYCHIATRY & NEUROLOGY

## 2022-09-14 PROCEDURE — G9717 DOC PT DX DEP/BP F/U NT REQ: HCPCS | Performed by: PSYCHIATRY & NEUROLOGY

## 2022-09-14 PROCEDURE — G8400 PT W/DXA NO RESULTS DOC: HCPCS | Performed by: PSYCHIATRY & NEUROLOGY

## 2022-09-14 PROCEDURE — 99215 OFFICE O/P EST HI 40 MIN: CPT | Performed by: PSYCHIATRY & NEUROLOGY

## 2022-09-14 PROCEDURE — 1101F PT FALLS ASSESS-DOCD LE1/YR: CPT | Performed by: PSYCHIATRY & NEUROLOGY

## 2022-09-14 PROCEDURE — G9231 DOC ESRD DIA TRANS PREG: HCPCS | Performed by: PSYCHIATRY & NEUROLOGY

## 2022-09-14 PROCEDURE — G8417 CALC BMI ABV UP PARAM F/U: HCPCS | Performed by: PSYCHIATRY & NEUROLOGY

## 2022-09-14 PROCEDURE — G8427 DOCREV CUR MEDS BY ELIG CLIN: HCPCS | Performed by: PSYCHIATRY & NEUROLOGY

## 2022-09-14 PROCEDURE — G8536 NO DOC ELDER MAL SCRN: HCPCS | Performed by: PSYCHIATRY & NEUROLOGY

## 2022-09-14 PROCEDURE — 1111F DSCHRG MED/CURRENT MED MERGE: CPT | Performed by: PSYCHIATRY & NEUROLOGY

## 2022-09-14 RX ORDER — LEVETIRACETAM 250 MG/1
TABLET ORAL
Qty: 216 TABLET | Refills: 1 | Status: SHIPPED | OUTPATIENT
Start: 2022-09-14

## 2022-09-14 NOTE — PROGRESS NOTES
Chief Complaint   Patient presents with    Epilepsy     6 months follow up, patient states she has not had any recent seizure activity, patient states she had an admission recently at [facility] due to SOB, and fluid due to CKD, she was on 02 and that has been d/c'd now by pulmonary

## 2022-09-14 NOTE — PROGRESS NOTES
Joselin Chand (1940) is a 79 y.o. female, established patient, here for evaluation of the following     Chief complaint(s):   Chief Complaint   Patient presents with    Epilepsy     6 months follow up, patient states she has not had any recent seizure activity, patient states she had an admission recently at [facility] due to SOB, and fluid due to CKD, she was on 02 and that has been d/c'd now by pulmonary        SUBJECTIVE/ OBJECTIVE:    HPI: 79 y.o. female with  has a past medical history of Anemia, Anxiety and depression, Arthritis, ESRD on dialysis ([address] Heart failure with preserved ejection fraction ([address] High cholesterol, completed stroke, seasonal allergies, Hypertension, Moderate pulmonary hypertension ([address] Monoclonal gammopathy of undetermined significance, Obstructive sleep apnea, Seizures ([address] and Urinary incontinence. Following up regardin) Hx of Left Basal Ganglia Hemorrhage in 2019 (treated at [facility])  2) Incidental old right medial occipital lobe stroke seen on MRI brain in 2019    Denies any new TIA or stroke symptoms since last visit  Reports she continues taking ASA, Pravastatin     3) History of epilepsy (well-controlled)  Denies any seizures since last visit. 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   EMG left upper extremity reveals severe neurogenic motor unit changes in left ABP (median, C8-T1), FDI (ulnar,C8-T1) and EIP (radial, C7-C8) muscles consistent with a severe left C8 cervical motor radiculopathy. D/w patient at prior visits that I believe the left forearm AV fistula had compresses the nerves in her left forearm, causing the severe left CTS/ severe left ulnar neuropathy/ and significantly reduced amplitude of left radial sensory response as compared to right. She has told me that the left AV Fistula was tied off by Vascular Surgeon and they had plans to put AV fistula in right forearm. Last visit referred her to [facility]/ Dr Marques Gudino. Pt says she saw him, had a CTS injection, and is wearing a wrist brace. Has not had time to schedule follow up visit with him.        ========================================     Brief Hx: Remote hx of epilepsy, well controlled on combination of low dose Phenytoin (? Dose) and Mysoline. Last witnessed seizure in 1967. Those meds stopped around 2014 d/t no seizure for many years. Had an episode on 5-10-16 where patient fell out of bed in early AM, urinary incontinence, tongue or cheek bite, that may have been a seizure. Was started on Keppra 500 mg BID at that point. EEG (8-29-26): normal.  Keppra level (9-4-18): 32.8 (normal, rr 10-40). When she was admitted for her basal ganglia stroke (February 2019) her Keppra had been reduced to 250 mg once a day for renal disease (GFR 31, creatinine 1.6). At her subsequent neurology follow-up visit I increased her Keppra to 250 mg twice a day. Feb 2019: Hypertensive left basal ganglia hemorrhagic stroke (initial symptoms are right facial droop and slurred speech). CT head showed acute left basal ganglia hemorrhage. Seen by Neurosurgery but no intervention required. BP control was optimized with Norvasc, carvedilol, chlorthalidone, and losartan. MRI Brain confirmed the hemorrhage, no change in size. She was seen by Dr Weinberg/ Neurology who recommended keeping SBP < 140 and withholding ASA x 2 weeks. MRI brain at that time also showed a remote right medial occipital ischemic stroke.       MRI C-spine (10-): 1) Multifactorial moderate central canal stenosis C5-6. 2) Mild central canal stenosis C3-4, C4-5, and C6-7.  3) Moderate severe left C4-5 neural foraminal stenosis. 4) Severe left C5-6 neural foraminal stenosis      Allergies   Allergen Reactions    Latex Rash    Codeine Other (comments)    Levaquin [Levofloxacin] Other (comments)     Hallucinations    Lisinopril Cough    Sulfa (Sulfonamide Antibiotics) Rash and Itching         Current Outpatient Medications:     levETIRAcetam (Keppra) 250 mg tablet, Take one tablet in morning and evening. After dialysis session, take one additional tablet. 90 day prescription, Disp: 216 Tablet, Rfl: 1    calcium acetate,phosphat bind, (PHOSLO) 667 mg cap, Take 2 Capsules by mouth three (3) times daily (with meals). , Disp: , Rfl:     calcium acetate,phosphat bind, (PHOSLO) 667 mg cap, Take 1 Capsule by mouth as needed (with snacks). , Disp: , Rfl:     carvediloL (COREG) 6.25 mg tablet, Take 6.25 mg by mouth two (2) times daily (with meals). , Disp: , Rfl:     hydrALAZINE (APRESOLINE) 100 mg tablet, Take 100 mg by mouth two (2) times a day., Disp: , Rfl:     acetaminophen (TYLENOL) 650 mg TbER, Take 650 mg by mouth every eight (8) hours as needed for Pain., Disp: , Rfl:     minoxidiL (LONITEN) 2.5 mg tablet, Take 2.5 mg by mouth two (2) times a day. Indications: high blood pressure, Disp: , Rfl:     omega-3 acid ethyl esters (LOVAZA) 1 gram capsule, Take 1 g by mouth two (2) times a day., Disp: , Rfl:     aspirin delayed-release 81 mg tablet, Take 81 mg by mouth daily. , Disp: , Rfl:     amLODIPine (NORVASC) 10 mg tablet, Take 10 mg by mouth every evening., Disp: , Rfl:     fluticasone propionate (FLONASE) 50 mcg/actuation nasal spray, 2 Sprays by Both Nostrils route every evening., Disp: , Rfl:     polyvinyl alcohol-povidon,PF, (REFRESH CLASSIC) 1.4-0.6 % ophthalmic solution, Administer 1-2 Drops to both eyes as needed for PRN Reason (Other) (eye irritation). administer 1-2 drops to both eyes daily as needed for eye irritation, Disp: , Rfl:     pravastatin (PRAVACHOL) 40 mg tablet, Take 40 mg by mouth nightly., Disp: , Rfl:     OXYGEN-AIR DELIVERY SYSTEMS, 2 L by Nasal route continuous. (Patient not taking: Reported on 2022), Disp: , Rfl:     montelukast (SINGULAIR) 10 mg tablet, Take 10 mg by mouth every evening., Disp: , Rfl:      has a past medical history of Anemia, Anxiety and depression, Arthritis, ESRD on dialysis ([facility] Heart failure with preserved ejection fraction ([facility] High cholesterol, completed stroke, seasonal allergies, Hypertension, Moderate pulmonary hypertension ([facility] Monoclonal gammopathy of undetermined significance, Obstructive sleep apnea, Seizures ([facility] and Urinary incontinence. has a past surgical history that includes hx tonsillectomy; hx hysterectomy; hx hemorrhoidectomy; hx  section (N/A); hx cataract removal (Bilateral, ); hx appendectomy (N/A); hx knee replacement (Right, ); ir insert non tunl cvc over 5 yrs (2021); and ir insert tunl cvc w/o port over 5 yr (2021). Physical Exam:    Vitals:    22 1110   BP: 124/74   BP Patient Position: Other (Comment)   Pulse: 76   Resp: 16   SpO2: 98%     Entirety of visit spent discussing multiple issues as noted above    ========================================    ASSESSMENT/ PLAN:       ICD-10-CM ICD-9-CM    1. Nonintractable epilepsy without status epilepticus, unspecified epilepsy type ([facility]  G40.909 345.90 levETIRAcetam (Keppra) 250 mg tablet      LEVETIRACETAM (KEPPRA)      LEVETIRACETAM (KEPPRA)      2. Hx of completed stroke  Z86.73 V12.54       3. Cerebrovascular accident (CVA) of left basal ganglia ([facility]  I63.81 434.91       4. 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 Ulnar neuropathy at wrist, left  G56.22 354.2       6. Left cervical radiculopathy  M54.12 723.4       7.  Ulnar neuropathy at elbow of right upper extremity G56.21 354.2       8. Mild carpal tunnel syndrome, right  G56.01 354.0           1) Epilepsy  Continue Keprpa 250 mg BID with one additional Keppra tablet after each dialysis session  Check of Keppra Level in March 2023    2) Hx of Basal Ganglia ICH, Hx of Ischemic stroke  Continue ASA 81 mg/ day, statin, HTN meds     3) Severe left CTS, severe left ulnar neuropathy, mild right CTS, right ulnar neuropathy   Per [facility]    4) Pt requested to change follow up visits to annually as she is caught up with multiple other Dr visits and going to dialysis    Follow up in 1 year         An electronic signature was used to authenticate this note.   -- Miguel Angel Jean MD

## 2022-11-25 NOTE — PROGRESS NOTES
Bedside and Verbal shift change report given to Simone Danielle, RN (oncoming nurse) by Regan Kelley RN (offgoing nurse). Report included the following information SBAR, Kardex, Intake/Output, MAR, Accordion, and Recent Results. [address] [facility] dialysis inquiring about time of HD treatment scheduled for to determine giving/holding meds. Awaiting returned phone call. 5910 - Per dialysis RN, treatment to be run this evening, okay to give meds. 2378 - Pt hypotensive overnight. /45, repeat 143/42. Order received from Dr. Bethany Elise to hold BP meds. 1800 - Pt finished with ultrafiltration treatment. SBPS 140's, DBPs 40's with MAPs in the 80's. Per Dr. Bethany Elise, okay to administer meds. This patient was assisted with Intentional Toileting every 2 hours during this shift as appropriate. Documentation of ambulation and output reflected on Flowsheet as appropriate. Purposeful hourly rounding was completed using AIDET and 5Ps. Outcomes of PHR documented as they occurred. Bed alarm in use as appropriate. Dual Suction and ambubag in place. Bedside and Verbal shift change report given to Regan Kelley RN (oncoming nurse) by Simone Danielle RN (offgoing nurse). Report included the following information SBAR, Kardex, Intake/Output, MAR, Accordion, and Recent Results. no numbness/no tingling

## 2023-01-16 NOTE — PROGRESS NOTES
Transition of care outreach postponed for 14 days due to patient's discharge to SNF.   d/c to [facility] 5/24/21 BUNDLE f/u 14d Cephalexin Counseling: I counseled the patient regarding use of cephalexin as an antibiotic for prophylactic and/or therapeutic purposes. Cephalexin (commonly prescribed under brand name Keflex) is a cephalosporin antibiotic which is active against numerous classes of bacteria, including most skin bacteria. Side effects may include nausea, diarrhea, gastrointestinal upset, rash, hives, yeast infections, and in rare cases, hepatitis, kidney disease, seizures, fever, confusion, neurologic symptoms, and others. Patients with severe allergies to penicillin medications are cautioned that there is about a 10% incidence of cross-reactivity with cephalosporins. When possible, patients with penicillin allergies should use alternatives to cephalosporins for antibiotic therapy.

## 2023-02-28 ENCOUNTER — ANESTHESIA EVENT (OUTPATIENT)
Dept: SURGERY | Age: 83
End: 2023-02-28
Payer: MEDICARE

## 2023-02-28 NOTE — DISCHARGE INSTRUCTIONS
MD Dr. Maggie Evans Dr., MD Dr. Gypsy Budds. Lionel Cosby MD    You have undergone surgery by one of our hand specialists. Please follow these instructions to ensure a safe and speedy recovery. SURGICAL DRESSING (bandage): Your bandage should be kept dry and in place until you return to the office for your follow up visit. This helps to guard against infection. [x]         You can shower if you place a plastic bag over your dressing.    []         You will need to sponge bathe until you are seen in the office. ELEVATION:  It is VERY IMPORTANT that you keep your arm and hand above the level of your heart at all times, awake or asleep. The higher you elevate your hand, the less it will swell, and the less it will hurt. It is best to elevate the hand as shown below:              Laying down, especially at night,  with your arm elevated on pillows help keep your shoulder and elbow from getting stiff. Ice bags / ice packs can be used to help control pain. If you make your own ice bag, double-bagging helps to prevent leaks. MEDICATIONS:  You have been given a prescription for pain medications. Take it according to the instructions on the bottle. DO NOT drink alcohol while taking pain medications. DO NOT drive, operate heavy machinery, or make important personal or business decisions since the medications will make you drowsy. We do NOT refill prescriptions over the weekend. Please arrange to call for prescription refills during regular office hours.                            PRESCRIPTION SENT TO:  [facility]      APPOINTMENT:  Your post operative appointment has been made for the following time:    TIME:     1:45pm        DATE: 3/13/23        At the:         [x]  [address] [place] [address] Rd or IV SEDATION:   DO NOT drink alcohol or drive, work around [place] [address], or make important personal or business decisions. Limit your activity for 24 hours. Resume your diet with light foods (Jell-o ®, clear soups, clear fluids, caffeine-free drinks). If you do not have any nausea, you may resume your regular diet. We at [address] [place] [address] want your surgery and recovery to be as comfortable and successful as possible. Should you have problems, please call our office during the morning hours. In particular, call if your pain is not adequately controlled by prescribed medication, temperature is over 101 degrees, or your bandage is wet or has a four odor. Your doctor contact information:   [phone], [phone] [facility] END OFFICE - [address] [facility], [address]  [address] from Your Nurse    PATIENT INSTRUCTIONS:    AFTER ANESTHESIA & SEDATION, and WHILE TAKING PAIN MEDICINE  After general anesthesia / intravenous sedation and the 24 hours following, and/or while taking prescription Opiates:  Limit your activities  Do not drive and operate hazardous machinery until you have been of all narcotics and sedatives for over 24 hours  Do not make important personal or business decisions  Do  not drink alcoholic beverages  If you have not urinated within 8 hours after discharge, please contact your surgeon on call.         SIGNS OF INFECTION, THINGS TO REPORT TO YOUR DOCTOR  Report the following Signs of Infection or General Problems after surgery to your surgeon:  Excessive pain, swelling, redness, drainage, pus or odor of or around the surgical area  Fever/ temperature over 101; Temperature over 100 if on medications (chemotherapy or medicines which affect your ability to fight infections)  Nausea and vomiting lasting longer than 4 hours or if unable to take medications  Any signs of decreased circulation or nerve impairment to extremity: change in color, persistent  numbness, tingling, coldness or increase pain  If you have any questions. GOOD HELP TO FIGHT AN INFECTION  Here are a few tip to help reduce the chance of getting an infection after surgery:  Wash Your Hands  Good handwashing is the most important thing you and your caregiver can do. Wash before and after caring for any wounds. Dry your hand with a clean towel. Wash with soap and water for at least 20 seconds. A TIP: sing the \"Happy Birthday\" song through one time while washing to help with the timing. Use a hand  in between washings. Shower  When your surgeon says it is OK to take a shower, use a new bar of antibacterial soap (if that is what you use, and keep that bar of soap ONLY for your use), or antibacterial body wash. Use a clean wash cloth or sponge when you bathe. Dry off with a clean towel  after every bath - be careful around any wounds, skin staples, sutures or surgical glue over/on wounds. Do not enter swimming pools, hot tubs, lakes, rivers and/or ocean until wounds are healed and your doctor/surgeon says it is OK. Use Clean Sheets  Sleep on freshly laundered sheets after your surgery. Keep the surgery site covered with a clean, dry bandage (if instructed to do so). If the bandage becomes soiled, reapply a new, dry, clean bandage. Do not allow pets to sleep with you while your wound is healing. Lifestyle Modification and Controlling Your Blood Sugar  Smoking slows wound healing. Stop smoking and limit exposure to second-hand smoke. High blood sugar slows wound healing. Eat a well-balanced diet to provide proper nutrition while healing  Monitor your blood sugar (if you are a diabetic) and take your medications as you are suppose to so you can control you blood sugar after surgery.       URINARY RETENTION AFTER SURGERY  Some surgery (a planned, long surgery, bladder surgery, or some surgeries of the abdomen) require the placement of a rice catheter (a drain tube in the bladder.)  This drain is often removed prior to you coming out of the OR, or is occasionally left in place to be removed before discharge, or sometimes the drain tube is left to be removed in the surgeon's office at a later time. Other surgeries never require a drain in the bladder. But sometimes after surgery, even if a drain was never placed, going to the bathroom can become a problem (you feel like you have to pee, your bladder feels full, but you just cant go.)  In this case, you might need to return to the hospital to have a drain catheter placed. Call your surgeon and tell them if this problem happens to you. COUGH AND DEEP BREATHE  Breathing deep and coughing are very important exercises to do after surgery. Deep breathing and coughing open the little air tubes and air sacks in your lungs. You take deep breaths every day. You may not even notice - it is just something you do when you sigh or yawn. It is a natural exercise you do to keep these air passages open. After surgery, take deep breaths and cough, on purpose. Coughing and deep breathing help prevent bronchitis and pneumonia after surgery. If you had chest or belly surgery, use a pillow as a \"hug christina\" and hold it tightly to your chest or belly when you cough. DIRECTIONS:  Take 10 to 15 slow deep breaths every hour while awake. Breathe in deeply, and hold it for 2 seconds. Exhale slowly through puckered lips, like blowing up a balloon. After every 4th or 5th deep breath, hug your pillow to your chest or belly and give a hard, deep cough. Yes, it will probably hurt if you had abdominal surgery. But doing this exercise is very important part of healing after surgery. Take your pain medicine to help you do this exercise without too much pain. ANKLE PUMPS    Ankle pumps increase the circulation of oxygenated blood to your lower extremities and decrease your risk for circulation problems such as blood clots.  They also stretch the muscles, tendons and ligaments in your foot and ankle, and prevent joint contracture in the ankle and foot, especially after surgeries on the legs. It is important to do ankle pump exercises regularly after surgery because immobility increases your risk for developing a blood clot. Your doctor may also have you take an Aspirin for the next few days as well. If your doctor did not ask you to take an Aspirin, consult with him before starting Aspirin therapy on your own. The exercise is quite simple. Slowly point your foot forward, feeling the muscles on the top of your lower leg stretch, and hold this position for 5 seconds. Next, pull your foot back toward you as far as possible, stretching the calf muscles, and hold that position for 5 seconds. Repeat with the other foot. Perform 10 repetitions every hour while awake for both ankles if possible (down and then up with the foot once is one repetition). You should feel gentle stretching of the muscles in your lower leg when doing this exercise. If you feel pain, or your range of motion is limited, don't push too hard. Only go the limit your joint and muscles will let you go. If you have increasing pain, progressively worsening leg warmth or swelling, STOP the exercise and call your doctor. Other Wound Care information:  [] No additional recommendations. P.R.I.C.E. INSTRUCTIONS    PRICE is an acronym that stands for Protect, Rest, Ice, Compression, and Elevation (sometimes you might see the acronym RICE.)   Listed below are five activities one can do for an injured limb or soft tissue injury.   While much anecdotal understanding learned through many years of experience supports these seemingly common sense treatments, building scientific evidence is showing how and why these treatment principles are proving to be so beneficial.  Below is a breakdown of what the PRICE principles entail to speed healing along.     PROTECT may sound like an obvious thing to do, and really, it is common sense. After an injury or surgery, protecting the site that hurts help to prevent further injury. REST is essential for an injured limb. Like protection, the more you are up on an injured limb, especially in the early stages of an injury, the more damage you can do. Rest means no activities that would involve the use of the injured tissues so that the early stages of healing can begin without  interruption. ICE \"is perhaps the simplest and oldest [therapy] in the treatment of soft tissue injuries. \"4    Ice help decrease swelling in inflamed and damaged tissues, can diminish the feeling of pain and decrease muscle spasms, and, immediately after an injury,  can slow cellular metabolism and help to prevent further tissue injury from oxygen starvation caused by the swelling. 5      COMPRESSION help decrease pain by limiting movement of an injured limb. Compression can be found through the use of an elastic wrap bandage, a cast, splint, or simply a snug cooling cuff or an ice pack and pillow. ELEVATION is a very important intervention. Placing the injury above the level of the heart whenever possible helps decrease swelling by using gravity to one's advantage . Placing the injury above the level of the heart also helps prevent, or at least decrease, the throbbing pain that is sometimes experienced after surgery or injury. Sources:  Muscle injuries: optimizing recovery (2007) Best Practice & Research Clinical Rheumatology Vol. 21, No. 2, pp 317-331, Accessed 9/26/11    PRICE first aid guidelines - Protection, Rest, Ice, Compression and Elevation By Payton Mead [facility]. com Guide, Updated March 27, 2011, Accessed 9/26/11 http://[email or web address]. [employer].com/cs/rehab/a/rice. htm    Rest Ice Compression Elevation: RICE for injuries, Accessed 9/26/11 LipLotion.ch. com/rest-ice-compression-elevation. html    The use of ice in the treatment of acute soft-tissue injury (2004) Michel, Vol. 32, No. 1, pp 251-261, Accessed 9/26/11 http://ajs.[employer].SEWORKS/content/32/1/251.full.pdf+html    Soft tissue damage and healing; theory and techniques, www.iaaf.org, Ch. 9 of  medical page, by casper Lou And Haider Abreu 9/27/11 FormerIdols.gl. pdf                Going Home After A  Peripheral Nerve Block    Patient Information    The anesthesiology team has provided for your pain control through a technique called regional anesthesia. As the name implies, anesthesia (decreased or no pain, sensation, or motor control) has been provided to a specific region, whether that be an arm or a leg. How does this happen?  you might ask. While you were sleepy, the anesthesia provider provided medicine to a specific group of nerves either in the neck/shoulder region or in the groin and/or buttock region, similar to the way a dentist might numb a tooth (teeth.)  They typically use an ultrasound machine to know where the medicine is placed in relation to the nerves they wish to numb up or block.   What this means is that for the next few hours, you should expect to have a numb limb. Below are some things we wish for you to read and be familiar with concerning your anesthetized limb. Caring For a Blocked Body Part    General Considerations: The numbness may last up to 24 hours  You must protect your arm or leg. The blocked extremity is numb, weak, and difficult for your brain to locate and communicate with. To do this you should:  Pay attention to the position of the blocked limb at all times. Be very careful when placing hot or cod items on a numb limb. You could cause tissue damage like burns or frostbite if you are unable to feel temperature.   Carefully follow your discharge instructions regarding the use of these therapies in you post-operative care. Carefully pad the affected limb. Normally we continually move and adjust the position of our bodies without thinking about it. This movement and continuous repositioning helps to prevent injuries from immobility. When a limb is numb it still requires this care  Be extremely careful not to bump or hit the numb body part. This can result in an unrecognized injury, at lease until the blocked limb wakes up. It can also result in worse pain of your already post-surgical limb. Arm/Shoulder Blocks: You may experience a droopy eyelid, nasal stuffiness, and redness of the eye after receiving an arm/shoulder block. This is called Tonias Syndrome, and is very common. There is no need for concern. You may also experience mild hoarseness, but all of these symptoms should resolve within 24 hours. Some patients may experience mild shortness of breath. A sitting position will help alleviate this and it should resolve within 24 hours. If you experience significant or progressive worsening of the shortness of breath, seek medical attention immediately. Contact Phone Numbers    CALL 911 IN CASE OF AN EMERGENCY. For all other non-emergency inquiries call the [facility]  at [phone] and ask for the anesthesiologist on call to be paged. Below is information on the medication(s) your doctor is prescribing for you: The maximum daily dose of acetaminophen was discussed with the patient. She was encouraged not to exceed 3,000 mg of acetaminophen during a 24 hour period and was asked to keep in mind that acetaminophen can also be found in many over-the-counter cold medications as well as narcotics that may be given for pain. The patient expresses understanding of these issues and questions were answered.       4 THINGS ABOUT PAIN MEDICINE I ALWAYS TALK ABOUT:  There are 4 side effects I always talk about for pain medications. They make you sleepy and drowsy. Do not drive a car or operate machines while taking pain medication. Do not make any major decisions. Take a nap. Relax. Let your body recover from the affects of anesthesia and surgery. Some people have quite a problem with itching and... Nausea and/or vomiting. These are mention together because they are a related genetic issue; while some people experience these problems, others do not. These are expected and know side effects. Itching is caused by histamine release - practically all opiate medications can cause this. An over-the-counter anti-histamine can help. Over-the-counter Benadryl® (the generic drug name is diphenhydramine) can help, but may cause increased drowsiness which can be intensified by pain medications. Over-the-counter Claritin® (the generic drug name is loratadine) or Zyrtec® (the generic drug name is cetirizine) may be effective without as much drowsiness as with the Benadryl/diphenhydramine. If you have nausea, like the itching, practically all opioids can cause this. Hopefully your surgeon may have given you some medicine for nausea. If your surgeon did not give you anti-nausea medications, and you are experiencing nausea/vomiting that prevent you from drinking clear liquids, CALL HIM/HER and request them, especially if these issues seem to get worse after you leave the hospital.    Last but not least is the problem of constipation (not erendira able to have a bowel movement - poop.)  All pain medicine can slow down the movement of food through the gut. The slower it goes, the worse it can be. This only adds insult to the injury of surgery. And if you had tummy surgery, like having your gall bladder removed or a hernia repair, YOU DO NOT WANT THIS PROBLEM. There are 4 things I recommend. Drink lots of fluids.   For healthy people with no heart problems, this means at least 64 ounces of liquids or more per day. For example, a Big Gulp® from [employer] is 32 ounces. So you need to drink at least 2  Big Gulp®'s of fluids every day. If you have heart problems you may not be able to do this. Talk to your doctor about what you should do to prevent constipation. Drinking fruit juice like apple, pear, or prune juice gives you extra \"BANG\" for your beverage. These drinks are high in natural fiber. If you are a diabetic, drink sugar-free fluids with fiber additives (see next 2 points.)  Avoid drinking extra fruit juice unless this is a regular part of your diet plan. Eat extra fresh fruits and vegetables. Add extra fiber-products. Fiber products like Metamucil®, Citrucel®, Miralax® or Benefiber® can help. These products are over-the-counter and you do not need a prescription from your doctor. If you have followed these recommendations and still have some difficulty having a good poop, take and over-the-counter stimulant like Dulcolax® (biscodyl)  or Senokot® (senna concentrate). These may help get things moving. Matt Bonilla MEDICATION AND   SIDE EFFECT GUIDE    The [address] [place] [address] MEDICATION AND SIDE EFFECT GUIDE was provided to the PATIENT AND CARE PROVIDER. Information provided includes instruction about drug purpose and common side effects for the following medications:   Deniz Duarte      Medication information added to discharge record on March 1, 2023 at 12:40 PM.      Some information we wish all of our patients to be familiar with and General Information for Healthy Lifestyle choices:    Make a list of your current medications with your Primary Care Provider. Update this list whenever your medications are discontinued, doses are changed, or new medications (including over-the-counter products like ibuprofen, vitamins, or herbal remedies) are added.   Carry medication information at all times in case of emergency situations      No smoking / No tobacco products / Avoid exposure to second hand smoke    Surgeon General's Warning:  Quitting smoking now greatly reduces serious risk to your health. Obesity, smoking, and sedentary lifestyle greatly increases your risk for illness. A healthy diet, regular physical exercise & weight monitoring are important for maintaining a healthy lifestyle. A Note About Congestive Heart Failure: You may be retaining fluid if you have a history of heart failure or if you experience any of the following symptoms:      Weight gain of 3 pounds or more overnight or 5 pounds in a week  Increased swelling in our hands or feet  Shortness of breath while lying flat in bed      Please call your doctor as soon as you notice any of these symptoms; do not wait until your next office visit. A Note About Strokes:  Recognize signs and symptoms of STROKE. The simple mnemonic, F.A.S.T., can help you remember signs of a stroke and what to do if you suspect a stroke is occuring to you or someone you are with:    F - Face looks uneven  A - Arms unable to move, or move evenly  S - Speech is slurred or non-existent  T - Time - CALL 911 as soon as signs and symptoms begin - DO NOT go to bed or wait to see if you get better - TIME IS BRAIN. Warning Signs of HEART ATTACK   Call 911 if you have these symptoms:    Chest discomfort. Most heart attacks involve discomfort in the center of the chest that lasts more than a few minutes, or that goes away and comes back. It can feel like uncomfortable pressure, squeezing, fullness, or pain. Discomfort in other areas of the upper body. Symptoms can include pain or discomfort in one or both arms, the back, neck, jaw, or stomach. Shortness of breath with or without chest discomfort. Other signs may include breaking out in a cold sweat, nausea, or lightheadedness. Don't wait more than five minutes to call 911 - MINUTES MATTER! Fast action can save your life.  Calling 911 is almost always the fastest way to get lifesaving treatment. Emergency Medical Services staff can begin treatment when they arrive -- up to an hour sooner than if someone gets to the hospital by car. Learning About Coronavirus [phone])  Coronavirus [phone]): Overview  What is coronavirus (COVID-19)? The coronavirus disease (COVID-19) is caused by a virus. It is an illness that was first found in [place], [place], in December 2019. It has since spread worldwide. The virus can cause fever, cough, and trouble breathing. In severe cases, it can cause pneumonia and make it hard to breathe without help. It can cause death. Coronaviruses are a large group of viruses. They cause the common cold. They also cause more serious illnesses like Middle East respiratory syndrome (MERS) and severe acute respiratory syndrome (SARS). COVID-19 is caused by a novel coronavirus. That means it's a new type that has not been seen in people before. This virus spreads person-to-person through droplets from coughing and sneezing. It can also spread when you are close to someone who is infected. And it can spread when you touch something that has the virus on it, such as a doorknob or a tabletop. What can you do to protect yourself from coronavirus (COVID-19)? The best way to protect yourself from getting sick is to: Avoid areas where there is an outbreak. Avoid contact with people who may be infected. Wash your hands often with soap or alcohol-based hand sanitizers. Avoid crowds and try to stay at least 6 feet away from other people. Wash your hands often, especially after you cough or sneeze. Use soap and water, and scrub for at least 20 seconds. If soap and water aren't available, use an alcohol-based hand . Avoid touching your mouth, nose, and eyes. What can you do to avoid spreading the virus to others? To help avoid spreading the virus to others:  Cover your mouth with a tissue when you cough or sneeze.  Then throw the tissue in the trash. Use a disinfectant to clean things that you touch often. Stay home if you are sick or have been exposed to the virus. Don't go to school, work, or public areas. And don't use public transportation. If you are sick:  Leave your home only if you need to get medical care. But call the doctor's office first so they know you're coming. And wear a face mask, if you have one. If you have a face mask, wear it whenever you're around other people. It can help stop the spread of the virus when you cough or sneeze. Clean and disinfect your home every day. Use household  and disinfectant wipes or sprays. Take special care to clean things that you grab with your hands. These include doorknobs, remote controls, phones, and handles on your refrigerator and microwave. And don't forget countertops, tabletops, bathrooms, and computer keyboards. When to call for help  Call 911 anytime you think you may need emergency care. For example, call if:  You have severe trouble breathing. (You can't talk at all.)  You have constant chest pain or pressure. You are severely dizzy or lightheaded. You are confused or can't think clearly. Your face and lips have a blue color. You pass out (lose consciousness) or are very hard to wake up. Call your doctor now if you develop symptoms such as:  Shortness of breath. Fever. Cough. If you need to get care, call ahead to the doctor's office for instructions before you go. Make sure you wear a face mask, if you have one, to prevent exposing other people to the virus. Where can you get the latest information? The following health organizations are tracking and studying this virus. Their websites contain the most up-to-date information. Nimesh Salmeron also learn what to do if you think you may have been exposed to the virus. U.S. Centers for Disease Control and Prevention (CDC): The CDC provides updated news about the disease and travel advice.  The website also tells you how to prevent the spread of infection. www.cdc.gov  World Health Organization [facility]): WHO offers information about the virus outbreaks. WHO also has travel advice. www.who.int  Current as of: April 1, 2020               Content Version: 12.4  © 1072-7844 Healthwise, Incorporated. Care instructions adapted under license by your healthcare professional. If you have questions about a medical condition or this instruction, always ask your healthcare professional. [place] [address] any warranty or liability for your use of this information. AT THE COMPLETION OF DISCHARGE INSTRUCTION REVIEW, WE VERIFY:  The discharge information has been reviewed with the patient and caregiver. Questions have been asked and answered meeting patient and caregiver expectations. The patient and caregiver verbalized understanding. Your discharge nurse was Caleb Barlow RN-BC       Board Certified - Pain Management      CONTENTS FOUND IN YOUR DISCHARGE ENVELOPE:  [x]     Surgeon and Hospital Discharge Instructions  []     [facility] Care Provider Card  [x]     Medication & Side Effect Guide            (your newly prescribed medications have been marked/highlighted showing the most common side effects from the classes of drugs on your prescriptions)  [x]     Medication Prescription(s) x 1 ( [x] These have been sent electronically to your pharmacy by your surgeon,   - OR -       your surgeon has already provided these to you during a previous/pre-op office visit)  [x]     Pain block and/or block with On-Q Catheter from Anesthesia Service (information included in your instructions above)        []    [facility] Education Information  []     Physical Therapy Prescription  []     Follow-up Appointment Cards  []     Surgery-related Pictures/Media  []     Medical device information sheets/pamphlets from their    []     School/work excuse note. []     /parent work excuse note.       The following personal items collected during your admission for safe keeping are returned to you:     Dental Appliance: Dental Appliances: At home  Vi yadi:    Hearing Aid:    Jewelry: Jewelry: None  Clothing: Clothing: Footwear, Jacket/Coat, Pants, Shirt, Socks  Other Valuables:  Other Valuables: None  Valuables sent to safe:

## 2023-03-01 ENCOUNTER — ANESTHESIA (OUTPATIENT)
Dept: SURGERY | Age: 83
End: 2023-03-01
Payer: MEDICARE

## 2023-03-01 ENCOUNTER — HOSPITAL ENCOUNTER (OUTPATIENT)
Age: 83
Setting detail: OUTPATIENT SURGERY
Discharge: HOME | End: 2023-03-01
Attending: ORTHOPAEDIC SURGERY | Admitting: ORTHOPAEDIC SURGERY
Payer: MEDICARE

## 2023-03-01 VITALS
BODY MASS INDEX: 27.51 KG/M2 | SYSTOLIC BLOOD PRESSURE: 164 MMHG | HEART RATE: 66 BPM | OXYGEN SATURATION: 94 % | RESPIRATION RATE: 20 BRPM | WEIGHT: 149.47 LBS | DIASTOLIC BLOOD PRESSURE: 51 MMHG | TEMPERATURE: 98.7 F | HEIGHT: 62 IN

## 2023-03-01 DIAGNOSIS — G56.02 CARPAL TUNNEL SYNDROME ON LEFT: Primary | ICD-10-CM

## 2023-03-01 LAB
ANION GAP SERPL CALC-SCNC: 6 MMOL/L (ref 5–15)
BUN SERPL-MCNC: 31 MG/DL (ref 6–20)
BUN/CREAT SERPL: 5 (ref 12–20)
CALCIUM SERPL-MCNC: 8.8 MG/DL (ref 8.5–10.1)
CHLORIDE SERPL-SCNC: 105 MMOL/L (ref 97–108)
CO2 SERPL-SCNC: 28 MMOL/L (ref 21–32)
CREAT SERPL-MCNC: 6.3 MG/DL (ref 0.55–1.02)
ERYTHROCYTE [DISTWIDTH] IN BLOOD BY AUTOMATED COUNT: 15.3 % (ref 11.5–14.5)
GLUCOSE SERPL-MCNC: 92 MG/DL (ref 65–100)
HCT VFR BLD AUTO: 38.5 % (ref 35–47)
HGB BLD-MCNC: 12 G/DL (ref 11.5–16)
MCH RBC QN AUTO: 32 PG (ref 26–34)
MCHC RBC AUTO-ENTMCNC: 31.2 G/DL (ref 30–36.5)
MCV RBC AUTO: 102.7 FL (ref 80–99)
NRBC # BLD: 0 K/UL (ref 0–0.01)
NRBC BLD-RTO: 0 PER 100 WBC
PLATELET # BLD AUTO: 206 K/UL (ref 150–400)
PMV BLD AUTO: 9.6 FL (ref 8.9–12.9)
POTASSIUM SERPL-SCNC: 4 MMOL/L (ref 3.5–5.1)
RBC # BLD AUTO: 3.75 M/UL (ref 3.8–5.2)
SODIUM SERPL-SCNC: 139 MMOL/L (ref 136–145)
WBC # BLD AUTO: 5.9 K/UL (ref 3.6–11)

## 2023-03-01 PROCEDURE — 76030000002 HC AMB SURG OR TIME FIRST 0.: Performed by: ORTHOPAEDIC SURGERY

## 2023-03-01 PROCEDURE — 74011250636 HC RX REV CODE- 250/636: Performed by: ANESTHESIOLOGY

## 2023-03-01 PROCEDURE — 77030040922 HC BLNKT HYPOTHRM STRY -A

## 2023-03-01 PROCEDURE — 74011000258 HC RX REV CODE- 258: Performed by: NURSE ANESTHETIST, CERTIFIED REGISTERED

## 2023-03-01 PROCEDURE — 77030040361 HC SLV COMPR DVT MDII -B

## 2023-03-01 PROCEDURE — 76210000046 HC AMBSU PH II REC FIRST 0.5 HR: Performed by: ORTHOPAEDIC SURGERY

## 2023-03-01 PROCEDURE — 77030000032 HC CUF TRNQT ZIMM -B: Performed by: ORTHOPAEDIC SURGERY

## 2023-03-01 PROCEDURE — 74011250636 HC RX REV CODE- 250/636: Performed by: ORTHOPAEDIC SURGERY

## 2023-03-01 PROCEDURE — 76060000073 HC AMB SURG ANES FIRST 0.5 HR: Performed by: ORTHOPAEDIC SURGERY

## 2023-03-01 PROCEDURE — 77030006689 HC BLD OPHTH BVR BD -A: Performed by: ORTHOPAEDIC SURGERY

## 2023-03-01 PROCEDURE — 77030006602 HC BLD CRPL AGEE MCRA -C: Performed by: ORTHOPAEDIC SURGERY

## 2023-03-01 PROCEDURE — 85027 COMPLETE CBC AUTOMATED: CPT

## 2023-03-01 PROCEDURE — 36415 COLL VENOUS BLD VENIPUNCTURE: CPT

## 2023-03-01 PROCEDURE — 80048 BASIC METABOLIC PNL TOTAL CA: CPT

## 2023-03-01 PROCEDURE — 2709999900 HC NON-CHARGEABLE SUPPLY: Performed by: ORTHOPAEDIC SURGERY

## 2023-03-01 PROCEDURE — 76210000034 HC AMBSU PH I REC 0.5 TO 1 HR: Performed by: ORTHOPAEDIC SURGERY

## 2023-03-01 PROCEDURE — 74011000250 HC RX REV CODE- 250: Performed by: NURSE ANESTHETIST, CERTIFIED REGISTERED

## 2023-03-01 PROCEDURE — A4565 SLINGS: HCPCS

## 2023-03-01 PROCEDURE — 74011250636 HC RX REV CODE- 250/636: Performed by: NURSE ANESTHETIST, CERTIFIED REGISTERED

## 2023-03-01 PROCEDURE — 74011000250 HC RX REV CODE- 250: Performed by: ORTHOPAEDIC SURGERY

## 2023-03-01 PROCEDURE — 77030002986 HC SUT PROL J&J -A: Performed by: ORTHOPAEDIC SURGERY

## 2023-03-01 PROCEDURE — 77030003601 HC NDL NRV BLK BBMI -A

## 2023-03-01 RX ORDER — EPHEDRINE SULFATE/0.9% NACL/PF 50 MG/5 ML
SYRINGE (ML) INTRAVENOUS AS NEEDED
Status: DISCONTINUED | OUTPATIENT
Start: 2023-03-01 | End: 2023-03-01 | Stop reason: HOSPADM

## 2023-03-01 RX ORDER — LIDOCAINE HYDROCHLORIDE 20 MG/ML
INJECTION, SOLUTION INFILTRATION; PERINEURAL AS NEEDED
Status: DISCONTINUED | OUTPATIENT
Start: 2023-03-01 | End: 2023-03-01 | Stop reason: HOSPADM

## 2023-03-01 RX ORDER — ONDANSETRON 2 MG/ML
4 INJECTION INTRAMUSCULAR; INTRAVENOUS AS NEEDED
Status: DISCONTINUED | OUTPATIENT
Start: 2023-03-01 | End: 2023-03-01 | Stop reason: HOSPADM

## 2023-03-01 RX ORDER — ACETAMINOPHEN 325 MG/1
650 TABLET ORAL ONCE
Status: DISCONTINUED | OUTPATIENT
Start: 2023-03-01 | End: 2023-03-01 | Stop reason: HOSPADM

## 2023-03-01 RX ORDER — HYDROMORPHONE HYDROCHLORIDE 1 MG/ML
0.5 INJECTION, SOLUTION INTRAMUSCULAR; INTRAVENOUS; SUBCUTANEOUS
Status: DISCONTINUED | OUTPATIENT
Start: 2023-03-01 | End: 2023-03-01 | Stop reason: HOSPADM

## 2023-03-01 RX ORDER — FENTANYL CITRATE 50 UG/ML
INJECTION, SOLUTION INTRAMUSCULAR; INTRAVENOUS AS NEEDED
Status: DISCONTINUED | OUTPATIENT
Start: 2023-03-01 | End: 2023-03-01 | Stop reason: HOSPADM

## 2023-03-01 RX ORDER — ALBUTEROL SULFATE 0.83 MG/ML
2.5 SOLUTION RESPIRATORY (INHALATION) AS NEEDED
Status: DISCONTINUED | OUTPATIENT
Start: 2023-03-01 | End: 2023-03-01 | Stop reason: HOSPADM

## 2023-03-01 RX ORDER — SODIUM CHLORIDE, SODIUM LACTATE, POTASSIUM CHLORIDE, CALCIUM CHLORIDE 600; 310; 30; 20 MG/100ML; MG/100ML; MG/100ML; MG/100ML
125 INJECTION, SOLUTION INTRAVENOUS CONTINUOUS
Status: DISCONTINUED | OUTPATIENT
Start: 2023-03-01 | End: 2023-03-01 | Stop reason: HOSPADM

## 2023-03-01 RX ORDER — PROPOFOL 10 MG/ML
INJECTION, EMULSION INTRAVENOUS
Status: DISCONTINUED | OUTPATIENT
Start: 2023-03-01 | End: 2023-03-01 | Stop reason: HOSPADM

## 2023-03-01 RX ORDER — DIPHENHYDRAMINE HYDROCHLORIDE 50 MG/ML
12.5 INJECTION, SOLUTION INTRAMUSCULAR; INTRAVENOUS AS NEEDED
Status: DISCONTINUED | OUTPATIENT
Start: 2023-03-01 | End: 2023-03-01 | Stop reason: HOSPADM

## 2023-03-01 RX ORDER — SODIUM CHLORIDE, SODIUM LACTATE, POTASSIUM CHLORIDE, CALCIUM CHLORIDE 600; 310; 30; 20 MG/100ML; MG/100ML; MG/100ML; MG/100ML
150 INJECTION, SOLUTION INTRAVENOUS CONTINUOUS
Status: DISCONTINUED | OUTPATIENT
Start: 2023-03-01 | End: 2023-03-01 | Stop reason: HOSPADM

## 2023-03-01 RX ORDER — LIDOCAINE HYDROCHLORIDE 10 MG/ML
0.1 INJECTION, SOLUTION EPIDURAL; INFILTRATION; INTRACAUDAL; PERINEURAL AS NEEDED
Status: DISCONTINUED | OUTPATIENT
Start: 2023-03-01 | End: 2023-03-01 | Stop reason: HOSPADM

## 2023-03-01 RX ORDER — HYDROCODONE BITARTRATE AND ACETAMINOPHEN 5; 325 MG/1; MG/1
1 TABLET ORAL
Qty: 10 TABLET | Refills: 0 | Status: SHIPPED
Start: 2023-03-01 | End: 2023-03-04

## 2023-03-01 RX ADMIN — FENTANYL CITRATE 50 MCG: 50 INJECTION, SOLUTION INTRAMUSCULAR; INTRAVENOUS at 12:04

## 2023-03-01 RX ADMIN — Medication 10 MG: at 12:42

## 2023-03-01 RX ADMIN — SODIUM CHLORIDE, POTASSIUM CHLORIDE, SODIUM LACTATE AND CALCIUM CHLORIDE: 600; 310; 30; 20 INJECTION, SOLUTION INTRAVENOUS at 12:15

## 2023-03-01 RX ADMIN — Medication 10 MG: at 12:45

## 2023-03-01 RX ADMIN — PROPOFOL 50 MCG/KG/MIN: 10 INJECTION, EMULSION INTRAVENOUS at 12:35

## 2023-03-01 RX ADMIN — CEFAZOLIN SODIUM 2 G: 1 POWDER, FOR SOLUTION INTRAMUSCULAR; INTRAVENOUS at 12:34

## 2023-03-01 RX ADMIN — FENTANYL CITRATE 50 MCG: 50 INJECTION, SOLUTION INTRAMUSCULAR; INTRAVENOUS at 12:02

## 2023-03-01 RX ADMIN — LIDOCAINE HYDROCHLORIDE 40 MG: 20 INJECTION, SOLUTION INFILTRATION; PERINEURAL at 12:35

## 2023-03-01 RX ADMIN — MEPIVACAINE HYDROCHLORIDE 30 ML: 20 INJECTION, SOLUTION EPIDURAL; INFILTRATION at 12:12

## 2023-03-01 RX ADMIN — PHENYLEPHRINE HYDROCHLORIDE 40 MCG: 10 INJECTION INTRAVENOUS at 12:45

## 2023-03-01 NOTE — PERIOP NOTES
PACU IN REPORT FROM ANESTHESIA    Verbal report received from   Duane Fair   [] MD/DO-Anesthesiologist    [x] CRNA   [] with student    CHOICE ANESTHESIA:  [] GENERAL  [] TIVA  [x] MAC  [] LOCAL  [x] REGIONAL  [] SPINAL   [] EPIDURAL   **Note the anesthesia record for medications given intraoperatively. **           [] E.R.A.S. PROTOCOL    SURGICAL PROCEDURE: Procedure(s) (LRB):  LEFT ENDOSCOPIC CARPAL TUNNEL RELEASE (MAC/REG) (Left)     SURGEON: Daphne Long MD.    Brief Initial Visual Assessment:    Patient Age: [] Infant(1-12mo)      []Pediatric(1-13yrs)    [] Adolescent(13-18yrs)    [] Adult(18-65yrs)      [x]Geriatric Adult(>65yrs). Patient    [] Alert           [x]Calm & Cooperative      [] Anxious  Appearance: [x] Drowsy      [] Sedated      [] Unresponsive     Oriented x  4            Airway:     [x] Patent          [] \"Difficult Airway\" report by Anesthesia                        [] Obstructs easily/Obstructed on arrival          [] Manual stimulation and/or airway assistance necessary                         [] Airway improved with head/airway repositioning                       Airway Adjuncts Present: [] Oral Airway    [] Nasal Trumpet    [] ETT    [] LMA            Respiratory  [x] Even   [] Labored   [] Shallow   [] Tachypnea   [] Bradypnea  Pattern:    [x] Non-Labored  [] VENT and/or respiratory assistance     being provided. Skin:     [x] Pink [] Dusky    [] Pale        [x] Warm    [] Hot [] Cool       [] Cold   [x]Dry [] Moist [] Diaphoretic     Membranes:  [x] Pink [] Pale       [x] Moist [] Dry     [] Crusty     Pain:   [x] No Acute Discomfort. 0  /10 Scale [x] Verbal Numeric / Visual   [] Moderate Discomfort.     [] V.A.S. [] Acute Discomfort.                [] A.N.V.    [] Chronic-Issue Related Discomfort.   [] F.L.A.C.C. Note E-MAR for medications administered.   []Faces, Browne/Baker    Note assessments documented in flowsheets; any assessment variants to be found in comments or narrative perioperative nurse notes.        Post-anesthesia care now assumed by [facility] BSN, RN-BC

## 2023-03-01 NOTE — ANESTHESIA PROCEDURE NOTES
Peripheral Block    Start time: 3/1/2023 12:02 PM  End time: 3/1/2023 12:12 PM  Performed by: Rupert Martinez MD  Authorized by: Rupert Martinez MD       Pre-procedure: Indications: at surgeon's request and primary anesthetic    Preanesthetic Checklist: patient identified, risks and benefits discussed, site marked, timeout performed, anesthesia consent given, patient being monitored and fire risk safety assessment completed and verbalized    Timeout Time: 12:02 EST      Block Type:   Block Type:  Supraclavicular  Laterality:  Left  Monitoring:  Continuous pulse ox, frequent vital sign checks, heart rate, responsive to questions and oxygen  Injection Technique:  Single shot  Procedures: ultrasound guided    Patient Position: supine  Prep: chlorhexidine    Location:  Supraclavicular  Needle Type:  Stimuplex  Needle Gauge:  21 G  Needle Localization:  Anatomical landmarks and ultrasound guidance  Med Admin Time: 3/1/2023 12:12 PM    Assessment:  Number of attempts:  1  Injection Assessment:  Incremental injection every 5 mL, local visualized surrounding nerve on ultrasound, negative aspiration for blood, no paresthesia and no intravascular symptoms  Patient tolerance:  Patient tolerated the procedure well with no immediate complications  Janine TALLEY witnessed timeout and block written on correct side.

## 2023-03-01 NOTE — OP NOTES
[facility] 78  OPERATIVE REPORT    Name:  Roula Abbasi  MR#:  [id number]  :  1940  ACCOUNT #:  [de-identified]  DATE OF SERVICE:  2023    PREOPERATIVE DIAGNOSIS:  Left carpal tunnel syndrome. POSTOPERATIVE DIAGNOSIS:  Left carpal tunnel syndrome. PROCEDURE PERFORMED:  Left endoscopic carpal tunnel release. SURGEON:  Landy Todd MD    ASSISTANT:  TRISH Ellington    ANESTHESIA:  Axillary block. COMPLICATIONS:  None. SPECIMENS REMOVED:  none    IMPLANTS:  none    ESTIMATED BLOOD LOSS:  Zero. TOURNIQUET TIME:  10 minutes. INDICATIONS:  The patient is an 79-year-old female with history of numbness, tingling and pain in the left hand. Nerve testing revealed evidence of carpal tunnel syndrome. She was counseled regarding surgical and nonsurgical treatment options and nonsurgical injection has been performed in the past without long-term success. She elected to proceed with the above procedure. Risks and benefits were outlined. PROCEDURE IN DETAIL:  The patient was taken to the operating room and placed supine on the operating table. Following administration of axillary block anesthetic, the left arm was prepped and draped in sterile fashion with Hibiclens and alcohol. A tourniquet applied to the left proximal arm. Arm was exsanguinated with an Esmarch bandage and the tourniquet inflated 250 mmHg. An incision was made in the right distal forearm parallel to volar wrist crease. Subcutaneous dissection was carried out and the palmaris longus tendon retracted. The distal forearm fascia was released exposing the median nerve. Sequential dilators placed along the median nerve beneath the transverse carpal ligament from proximal to distal.  The endoscope was inserted into the space created by the dilators. The median nerve and distal portion of the transverse carpal ligament were identified.   The endoscopic blade was deployed and under maximum protection of the median nerve, the transverse carpal ligament divided from distal to proximal using the endoscopic blade under visualization. The wound was irrigated with sterile saline. Hemostasis was achieved with bipolar cautery. The nerve was noted to be flat and hyperemic at the level of the canal.  The wound was repaired using a 5-0 Prolene subcuticular with benzoin and Steri-Strips. A sterile bulky soft hand dressing was applied and the tourniquet let down. The patient was awakened from anesthesia and discharged to the recovery room in stable condition. During the procedure, a physician assistant was vital to the outcome of the case providing stability to the arm, retraction of crucial structures, assistance with wound closure, assistance in handling soft tissue and dressing application. DISCHARGE PLAN:  The patient was instructed to keep arm elevated, clean and dry at all times. To call with any questions or concerns. Follow up in 10 days for suture removal and wound check. The patient was given a prescription for hydrocodone for pain control.       Des Shepherd MD      [id number]  D:  03/01/2023 13:30  T:  03/01/2023 16:49  JOB #:  [id number]

## 2023-03-01 NOTE — ANESTHESIA POSTPROCEDURE EVALUATION
Procedure(s):  LEFT ENDOSCOPIC CARPAL TUNNEL RELEASE (MAC/REG). MAC, regional    Anesthesia Post Evaluation      Multimodal analgesia: multimodal analgesia used between 6 hours prior to anesthesia start to PACU discharge  Patient location during evaluation: bedside  Patient participation: complete - patient participated  Level of consciousness: awake and sleepy but conscious  Pain score: 2  Pain management: adequate  Airway patency: patent  Anesthetic complications: no  Cardiovascular status: acceptable  Respiratory status: acceptable  Hydration status: acceptable  Comments: Immediate cv/pulm status within acceptable preop limits. Post anesthesia nausea and vomiting:  controlled  Final Post Anesthesia Temperature Assessment:  Normothermia (36.0-37.5 degrees C)      INITIAL Post-op Vital signs:   Vitals Value Taken Time   /47 03/01/23 1300   Temp 37.1 °C (98.7 °F) 03/01/23 1300   Pulse 63 03/01/23 1305   Resp 23 03/01/23 1305   SpO2 96 % 03/01/23 1305   Vitals shown include unvalidated device data.

## 2023-03-01 NOTE — PERIOP NOTES
POST ANESTHESIA CARE    DISCHARGE / TRANSFER NOTE  Elia Felipe was:    [x] discharged        via   [x] Wheelchair          to [x] Private Vehicle     [] transferred   [] Carried   [] Taxi / Vehicle \"for Hire\"  [] Walk out  [] Ambulance / Medical Transportation   [] Stretcher  [] Hospital room _**_          [] Bed      Patient was escorted by:      [x] Nurse   [] Volunteer [] Transporter / Technician  [] Parent      [] Spouse / Family /      Patient verbalized     [x] appreciation and was very pleased with care received   [] frustration with care received       throughout their stay. Patient was discharged in     [x] pleasant mood  [] sad mood  [] mad mood . Pain at discharge/transfer was      0  /10. Discharge, medication and follow-up instructions were verbalized as understood prior to discharge  (if applicable for same-day procedures being discharged.)    All personal belongings have been returned to patient, and patient/family verbally confirm receiving belongings as all present.

## 2023-03-01 NOTE — ANESTHESIA PREPROCEDURE EVALUATION
Relevant Problems   No relevant active problems       Anesthetic History               Review of Systems / Medical History  Patient summary reviewed, nursing notes reviewed and pertinent labs reviewed    Pulmonary        Sleep apnea           Neuro/Psych     seizures    Psychiatric history     Cardiovascular    Hypertension              Exercise tolerance: <4 METS  Comments: Denied recent cv/pulm complaints or changes. GI/Hepatic/Renal         Renal disease: ESRD      Comments: Denied active reflux symptoms Endo/Other        Arthritis     Other Findings   Comments: Frail, cachexia           Physical Exam    Airway  Mallampati: III  TM Distance: > 6 cm  Neck ROM: normal range of motion   Mouth opening: Normal     Cardiovascular  Regular rate and rhythm,  S1 and S2 normal,  no murmur, click, rub, or gallop  Rhythm: regular  Rate: normal         Dental      Comments: Upper and lower partials. No loose teeth.    Pulmonary  Breath sounds clear to auscultation               Abdominal  Abdominal exam normal       Other Findings            Anesthetic Plan    ASA: 4  Anesthesia type: MAC and regional - supraclavicular block          Induction: Intravenous  Anesthetic plan and risks discussed with: Patient and Family

## 2023-03-01 NOTE — H&P
Date of Surgery Update:  Mariela Guy was seen and examined. History and physical has been reviewed. The patient has been examined.  There have been no significant clinical changes since the completion of the originally dated History and Physical.    Signed By: TRISH Mosley     March 1, 2023 12:16 PM

## 2023-05-24 NOTE — DISCHARGE INSTRUCTIONS
----- Message from Leland Orozco MD sent at 5/24/2023 12:16 AM CDT -----  Uti: duricef 500mg bid x 5 days then chelle ua   Patient to follow a Heart Healthy diet. Patient to ambulate with aid of a Front wheeled walker as tolerated. Patient to check and record BP at least three times a day. Patient to have HomeHealth arrangements in place on discharge for monitoring of the chronic medical problems. Patient to resume previously prescribed Aspirin in 2 weeks if no further uncontrolled blood pressure readings. Patient to return to the ER if any recurrence of or new symptoms develop.

## 2023-10-04 ENCOUNTER — OFFICE VISIT (OUTPATIENT)
Age: 83
End: 2023-10-04
Payer: MEDICARE

## 2023-10-04 VITALS
WEIGHT: 149 LBS | OXYGEN SATURATION: 91 % | DIASTOLIC BLOOD PRESSURE: 53 MMHG | SYSTOLIC BLOOD PRESSURE: 93 MMHG | BODY MASS INDEX: 27.42 KG/M2 | RESPIRATION RATE: 18 BRPM | HEIGHT: 62 IN | HEART RATE: 73 BPM

## 2023-10-04 DIAGNOSIS — Z86.73 PERSONAL HISTORY OF TRANSIENT ISCHEMIC ATTACK (TIA), AND CEREBRAL INFARCTION WITHOUT RESIDUAL DEFICITS: ICD-10-CM

## 2023-10-04 DIAGNOSIS — G40.009 PARTIAL IDIOPATHIC EPILEPSY WITH SEIZURES OF LOCALIZED ONSET, NOT INTRACTABLE, WITHOUT STATUS EPILEPTICUS (HCC): Primary | ICD-10-CM

## 2023-10-04 PROCEDURE — 1123F ACP DISCUSS/DSCN MKR DOCD: CPT | Performed by: PSYCHIATRY & NEUROLOGY

## 2023-10-04 PROCEDURE — 1090F PRES/ABSN URINE INCON ASSESS: CPT | Performed by: PSYCHIATRY & NEUROLOGY

## 2023-10-04 PROCEDURE — 3078F DIAST BP <80 MM HG: CPT | Performed by: PSYCHIATRY & NEUROLOGY

## 2023-10-04 PROCEDURE — 99214 OFFICE O/P EST MOD 30 MIN: CPT | Performed by: PSYCHIATRY & NEUROLOGY

## 2023-10-04 PROCEDURE — G8419 CALC BMI OUT NRM PARAM NOF/U: HCPCS | Performed by: PSYCHIATRY & NEUROLOGY

## 2023-10-04 PROCEDURE — G8400 PT W/DXA NO RESULTS DOC: HCPCS | Performed by: PSYCHIATRY & NEUROLOGY

## 2023-10-04 PROCEDURE — 3074F SYST BP LT 130 MM HG: CPT | Performed by: PSYCHIATRY & NEUROLOGY

## 2023-10-04 PROCEDURE — G8427 DOCREV CUR MEDS BY ELIG CLIN: HCPCS | Performed by: PSYCHIATRY & NEUROLOGY

## 2023-10-04 PROCEDURE — G8484 FLU IMMUNIZE NO ADMIN: HCPCS | Performed by: PSYCHIATRY & NEUROLOGY

## 2023-10-04 PROCEDURE — 4004F PT TOBACCO SCREEN RCVD TLK: CPT | Performed by: PSYCHIATRY & NEUROLOGY

## 2023-10-04 RX ORDER — LEVETIRACETAM 250 MG/1
TABLET ORAL
Qty: 225 TABLET | Refills: 3 | Status: SHIPPED | OUTPATIENT
Start: 2023-10-04

## 2023-10-04 NOTE — PROGRESS NOTES
[facility] and [address] at [facility] at [address], [address] [place] [address]   [phone]              Chief Complaint   Patient presents with    Follow-up     Patient is here following up for Epilepsy. Patient reports  no seizures     Current Outpatient Medications   Medication Sig Dispense Refill    amLODIPine (NORVASC) 10 MG tablet Take 1 tablet by mouth every evening      aspirin 81 MG EC tablet Take 1 tablet by mouth daily      calcium acetate (PHOSLO) 667 MG CAPS capsule Take 2 capsules by mouth 3 times daily (with meals)      carvedilol (COREG) 6.25 MG tablet Take 1 tablet by mouth 2 times daily (with meals)      fluticasone (FLONASE) 50 MCG/ACT nasal spray 2 sprays by Nasal route every evening      hydrALAZINE (APRESOLINE) 100 MG tablet Take 1 tablet by mouth 2 times daily      levETIRAcetam (KEPPRA) 250 MG tablet Take one tablet in morning and evening. After dialysis session, take one additional tablet. 90 day prescription      minoxidil (LONITEN) 2.5 MG tablet Take 1 tablet by mouth 2 times daily      montelukast (SINGULAIR) 10 MG tablet Take 1 tablet by mouth every evening      Omega-3 Fatty Acids (FISH OIL) 1000 MG capsule Take 1 capsule by mouth 2 times daily      Polyvinyl Alcohol-Povidone PF (REFRESH) 1.4-0.6 % SOLN ophthalmic solution Apply 1-2 drops to eye as needed      pravastatin (PRAVACHOL) 40 MG tablet Take 1 tablet by mouth       No current facility-administered medications for this visit.       Allergies   Allergen Reactions    Latex Rash    Codeine Other (See Comments)    Levofloxacin Other (See Comments)     Hallucinations    Lisinopril Cough    Sulfa Antibiotics Itching and Rash     Social History     Tobacco Use    Smoking status: Passive Smoke Exposure - Never Smoker    Smokeless tobacco: Never    Tobacco comments:     Quit smoking:

## 2023-11-12 ENCOUNTER — HOSPITAL ENCOUNTER (INPATIENT)
Facility: HOSPITAL | Age: 83
LOS: 3 days | Discharge: HOME | DRG: 286 | End: 2023-11-17
Attending: STUDENT IN AN ORGANIZED HEALTH CARE EDUCATION/TRAINING PROGRAM | Admitting: HOSPITALIST
Payer: MEDICARE

## 2023-11-12 ENCOUNTER — APPOINTMENT (OUTPATIENT)
Facility: HOSPITAL | Age: 83
DRG: 286 | End: 2023-11-12
Payer: MEDICARE

## 2023-11-12 DIAGNOSIS — R06.09 DOE (DYSPNEA ON EXERTION): ICD-10-CM

## 2023-11-12 DIAGNOSIS — Z99.2 ESRD ON HEMODIALYSIS (HCC): ICD-10-CM

## 2023-11-12 DIAGNOSIS — E78.00 HIGH CHOLESTEROL: ICD-10-CM

## 2023-11-12 DIAGNOSIS — J96.01 ACUTE HYPOXEMIC RESPIRATORY FAILURE (HCC): ICD-10-CM

## 2023-11-12 DIAGNOSIS — Z86.73 HX OF COMPLETED STROKE: ICD-10-CM

## 2023-11-12 DIAGNOSIS — I50.32 DIASTOLIC CHF, CHRONIC (HCC): ICD-10-CM

## 2023-11-12 DIAGNOSIS — R56.9 SEIZURES (HCC): ICD-10-CM

## 2023-11-12 DIAGNOSIS — F32.A ANXIETY AND DEPRESSION: ICD-10-CM

## 2023-11-12 DIAGNOSIS — G47.33 OSA ON CPAP: ICD-10-CM

## 2023-11-12 DIAGNOSIS — R79.89 ELEVATED BRAIN NATRIURETIC PEPTIDE (BNP) LEVEL: ICD-10-CM

## 2023-11-12 DIAGNOSIS — M19.90 ARTHRITIS: ICD-10-CM

## 2023-11-12 DIAGNOSIS — D47.2 MONOCLONAL GAMMOPATHY: ICD-10-CM

## 2023-11-12 DIAGNOSIS — R06.09 DYSPNEA ON EXERTION: ICD-10-CM

## 2023-11-12 DIAGNOSIS — R94.39 ABNORMAL STRESS TEST: ICD-10-CM

## 2023-11-12 DIAGNOSIS — N18.6 ESRD ON HEMODIALYSIS (HCC): ICD-10-CM

## 2023-11-12 DIAGNOSIS — R06.00 DYSPNEA, UNSPECIFIED TYPE: Primary | ICD-10-CM

## 2023-11-12 DIAGNOSIS — F41.9 ANXIETY AND DEPRESSION: ICD-10-CM

## 2023-11-12 LAB
BASOPHILS # BLD: 0.1 K/UL (ref 0–0.1)
BASOPHILS NFR BLD: 1 % (ref 0–1)
DIFFERENTIAL METHOD BLD: ABNORMAL
EOSINOPHIL # BLD: 0.2 K/UL (ref 0–0.4)
EOSINOPHIL NFR BLD: 2 % (ref 0–7)
ERYTHROCYTE [DISTWIDTH] IN BLOOD BY AUTOMATED COUNT: 14.9 % (ref 11.5–14.5)
HCT VFR BLD AUTO: 31.5 % (ref 35–47)
HGB BLD-MCNC: 10.1 G/DL (ref 11.5–16)
IMM GRANULOCYTES # BLD AUTO: 0 K/UL (ref 0–0.04)
IMM GRANULOCYTES NFR BLD AUTO: 0 % (ref 0–0.5)
LYMPHOCYTES # BLD: 2 K/UL (ref 0.8–3.5)
LYMPHOCYTES NFR BLD: 18 % (ref 12–49)
MCH RBC QN AUTO: 31.5 PG (ref 26–34)
MCHC RBC AUTO-ENTMCNC: 32.1 G/DL (ref 30–36.5)
MCV RBC AUTO: 98.1 FL (ref 80–99)
MONOCYTES # BLD: 1.2 K/UL (ref 0–1)
MONOCYTES NFR BLD: 11 % (ref 5–13)
NEUTS SEG # BLD: 7.5 K/UL (ref 1.8–8)
NEUTS SEG NFR BLD: 68 % (ref 32–75)
NRBC # BLD: 0 K/UL (ref 0–0.01)
NRBC BLD-RTO: 0 PER 100 WBC
PLATELET # BLD AUTO: 238 K/UL (ref 150–400)
PMV BLD AUTO: 9.1 FL (ref 8.9–12.9)
RBC # BLD AUTO: 3.21 M/UL (ref 3.8–5.2)
WBC # BLD AUTO: 10.9 K/UL (ref 3.6–11)

## 2023-11-12 PROCEDURE — 80053 COMPREHEN METABOLIC PANEL: CPT

## 2023-11-12 PROCEDURE — 84484 ASSAY OF TROPONIN QUANT: CPT

## 2023-11-12 PROCEDURE — 99285 EMERGENCY DEPT VISIT HI MDM: CPT

## 2023-11-12 PROCEDURE — 84443 ASSAY THYROID STIM HORMONE: CPT

## 2023-11-12 PROCEDURE — 83880 ASSAY OF NATRIURETIC PEPTIDE: CPT

## 2023-11-12 PROCEDURE — 93005 ELECTROCARDIOGRAM TRACING: CPT | Performed by: STUDENT IN AN ORGANIZED HEALTH CARE EDUCATION/TRAINING PROGRAM

## 2023-11-12 PROCEDURE — 36415 COLL VENOUS BLD VENIPUNCTURE: CPT

## 2023-11-12 PROCEDURE — 85025 COMPLETE CBC W/AUTO DIFF WBC: CPT

## 2023-11-12 PROCEDURE — 71045 X-RAY EXAM CHEST 1 VIEW: CPT

## 2023-11-12 PROCEDURE — 6370000000 HC RX 637 (ALT 250 FOR IP): Performed by: STUDENT IN AN ORGANIZED HEALTH CARE EDUCATION/TRAINING PROGRAM

## 2023-11-12 PROCEDURE — 83735 ASSAY OF MAGNESIUM: CPT

## 2023-11-12 RX ORDER — IPRATROPIUM BROMIDE AND ALBUTEROL SULFATE 2.5; .5 MG/3ML; MG/3ML
1 SOLUTION RESPIRATORY (INHALATION)
Status: COMPLETED | OUTPATIENT
Start: 2023-11-12 | End: 2023-11-12

## 2023-11-12 RX ADMIN — IPRATROPIUM BROMIDE AND ALBUTEROL SULFATE 1 DOSE: .5; 3 SOLUTION RESPIRATORY (INHALATION) at 23:34

## 2023-11-12 ASSESSMENT — PAIN - FUNCTIONAL ASSESSMENT: PAIN_FUNCTIONAL_ASSESSMENT: 0-10

## 2023-11-12 ASSESSMENT — ENCOUNTER SYMPTOMS
SHORTNESS OF BREATH: 1
ABDOMINAL PAIN: 0

## 2023-11-12 ASSESSMENT — PAIN SCALES - GENERAL: PAINLEVEL_OUTOF10: 0

## 2023-11-13 ENCOUNTER — APPOINTMENT (OUTPATIENT)
Facility: HOSPITAL | Age: 83
DRG: 286 | End: 2023-11-13
Payer: MEDICARE

## 2023-11-13 ENCOUNTER — APPOINTMENT (OUTPATIENT)
Facility: HOSPITAL | Age: 83
DRG: 286 | End: 2023-11-13
Attending: SPECIALIST
Payer: MEDICARE

## 2023-11-13 PROBLEM — R06.09 DYSPNEA ON EXERTION: Status: ACTIVE | Noted: 2023-11-13

## 2023-11-13 LAB
ALBUMIN SERPL-MCNC: 3 G/DL (ref 3.5–5)
ALBUMIN/GLOB SERPL: 0.6 (ref 1.1–2.2)
ALP SERPL-CCNC: 83 U/L (ref 45–117)
ALT SERPL-CCNC: 18 U/L (ref 12–78)
ANION GAP SERPL CALC-SCNC: 11 MMOL/L (ref 5–15)
AST SERPL-CCNC: 15 U/L (ref 15–37)
BILIRUB SERPL-MCNC: 0.7 MG/DL (ref 0.2–1)
BUN SERPL-MCNC: 42 MG/DL (ref 6–20)
BUN/CREAT SERPL: 7 (ref 12–20)
CALCIUM SERPL-MCNC: 8.9 MG/DL (ref 8.5–10.1)
CHLORIDE SERPL-SCNC: 99 MMOL/L (ref 97–108)
CO2 SERPL-SCNC: 27 MMOL/L (ref 21–32)
CREAT SERPL-MCNC: 6.29 MG/DL (ref 0.55–1.02)
EKG ATRIAL RATE: 92 BPM
EKG DIAGNOSIS: NORMAL
EKG P AXIS: 50 DEGREES
EKG P-R INTERVAL: 236 MS
EKG Q-T INTERVAL: 364 MS
EKG QRS DURATION: 92 MS
EKG QTC CALCULATION (BAZETT): 450 MS
EKG R AXIS: 41 DEGREES
EKG T AXIS: 34 DEGREES
EKG VENTRICULAR RATE: 92 BPM
GLOBULIN SER CALC-MCNC: 4.7 G/DL (ref 2–4)
GLUCOSE SERPL-MCNC: 103 MG/DL (ref 65–100)
MAGNESIUM SERPL-MCNC: 2.3 MG/DL (ref 1.6–2.4)
NT PRO BNP: 4821 PG/ML
POTASSIUM SERPL-SCNC: 4.6 MMOL/L (ref 3.5–5.1)
PROT SERPL-MCNC: 7.7 G/DL (ref 6.4–8.2)
SODIUM SERPL-SCNC: 137 MMOL/L (ref 136–145)
TROPONIN I SERPL HS-MCNC: 24 NG/L (ref 0–51)
TROPONIN I SERPL HS-MCNC: 29 NG/L (ref 0–51)
TSH SERPL DL<=0.05 MIU/L-ACNC: 1.68 UIU/ML (ref 0.36–3.74)

## 2023-11-13 PROCEDURE — 36415 COLL VENOUS BLD VENIPUNCTURE: CPT

## 2023-11-13 PROCEDURE — 6370000000 HC RX 637 (ALT 250 FOR IP): Performed by: HOSPITALIST

## 2023-11-13 PROCEDURE — 96374 THER/PROPH/DIAG INJ IV PUSH: CPT

## 2023-11-13 PROCEDURE — 6360000002 HC RX W HCPCS: Performed by: HOSPITALIST

## 2023-11-13 PROCEDURE — 93010 ELECTROCARDIOGRAM REPORT: CPT | Performed by: SPECIALIST

## 2023-11-13 PROCEDURE — 84484 ASSAY OF TROPONIN QUANT: CPT

## 2023-11-13 PROCEDURE — 99223 1ST HOSP IP/OBS HIGH 75: CPT | Performed by: SPECIALIST

## 2023-11-13 PROCEDURE — G0378 HOSPITAL OBSERVATION PER HR: HCPCS

## 2023-11-13 PROCEDURE — 2500000003 HC RX 250 WO HCPCS: Performed by: HOSPITALIST

## 2023-11-13 PROCEDURE — 96375 TX/PRO/DX INJ NEW DRUG ADDON: CPT

## 2023-11-13 PROCEDURE — 96372 THER/PROPH/DIAG INJ SC/IM: CPT

## 2023-11-13 PROCEDURE — 6360000002 HC RX W HCPCS: Performed by: STUDENT IN AN ORGANIZED HEALTH CARE EDUCATION/TRAINING PROGRAM

## 2023-11-13 PROCEDURE — 94761 N-INVAS EAR/PLS OXIMETRY MLT: CPT

## 2023-11-13 PROCEDURE — 2580000003 HC RX 258: Performed by: HOSPITALIST

## 2023-11-13 RX ORDER — POLYETHYLENE GLYCOL 3350 17 G/17G
17 POWDER, FOR SOLUTION ORAL DAILY PRN
Status: DISCONTINUED | OUTPATIENT
Start: 2023-11-13 | End: 2023-11-17 | Stop reason: HOSPADM

## 2023-11-13 RX ORDER — CALCIUM ACETATE 667 MG/1
3 CAPSULE ORAL
Status: DISCONTINUED | OUTPATIENT
Start: 2023-11-13 | End: 2023-11-17 | Stop reason: HOSPADM

## 2023-11-13 RX ORDER — LEVETIRACETAM 500 MG/1
250 TABLET ORAL 2 TIMES DAILY
Status: DISCONTINUED | OUTPATIENT
Start: 2023-11-13 | End: 2023-11-17 | Stop reason: HOSPADM

## 2023-11-13 RX ORDER — HYDRALAZINE HYDROCHLORIDE 25 MG/1
100 TABLET, FILM COATED ORAL
Status: DISCONTINUED | OUTPATIENT
Start: 2023-11-13 | End: 2023-11-17 | Stop reason: HOSPADM

## 2023-11-13 RX ORDER — SODIUM CHLORIDE 0.9 % (FLUSH) 0.9 %
5-40 SYRINGE (ML) INJECTION PRN
Status: DISCONTINUED | OUTPATIENT
Start: 2023-11-13 | End: 2023-11-17 | Stop reason: HOSPADM

## 2023-11-13 RX ORDER — LEVETIRACETAM 500 MG/1
250 TABLET ORAL
Status: DISCONTINUED | OUTPATIENT
Start: 2023-11-14 | End: 2023-11-17 | Stop reason: HOSPADM

## 2023-11-13 RX ORDER — MINOXIDIL 2.5 MG/1
2.5 TABLET ORAL
Status: DISCONTINUED | OUTPATIENT
Start: 2023-11-13 | End: 2023-11-17 | Stop reason: HOSPADM

## 2023-11-13 RX ORDER — ASPIRIN 81 MG/1
81 TABLET ORAL DAILY
Status: DISCONTINUED | OUTPATIENT
Start: 2023-11-13 | End: 2023-11-17 | Stop reason: HOSPADM

## 2023-11-13 RX ORDER — CHLORAL HYDRATE 500 MG
1 CAPSULE ORAL 2 TIMES DAILY
Status: DISCONTINUED | OUTPATIENT
Start: 2023-11-13 | End: 2023-11-13 | Stop reason: CLARIF

## 2023-11-13 RX ORDER — SODIUM CHLORIDE 9 MG/ML
INJECTION, SOLUTION INTRAVENOUS PRN
Status: DISCONTINUED | OUTPATIENT
Start: 2023-11-13 | End: 2023-11-17 | Stop reason: HOSPADM

## 2023-11-13 RX ORDER — FUROSEMIDE 10 MG/ML
40 INJECTION INTRAMUSCULAR; INTRAVENOUS
Status: COMPLETED | OUTPATIENT
Start: 2023-11-13 | End: 2023-11-13

## 2023-11-13 RX ORDER — SODIUM CHLORIDE 0.9 % (FLUSH) 0.9 %
5-40 SYRINGE (ML) INJECTION EVERY 12 HOURS SCHEDULED
Status: DISCONTINUED | OUTPATIENT
Start: 2023-11-13 | End: 2023-11-17 | Stop reason: HOSPADM

## 2023-11-13 RX ORDER — HYDRALAZINE HYDROCHLORIDE 25 MG/1
100 TABLET, FILM COATED ORAL
Status: DISCONTINUED | OUTPATIENT
Start: 2023-11-14 | End: 2023-11-13

## 2023-11-13 RX ORDER — ONDANSETRON 2 MG/ML
4 INJECTION INTRAMUSCULAR; INTRAVENOUS EVERY 6 HOURS PRN
Status: DISCONTINUED | OUTPATIENT
Start: 2023-11-13 | End: 2023-11-17 | Stop reason: HOSPADM

## 2023-11-13 RX ORDER — FLUTICASONE PROPIONATE 50 MCG
2 SPRAY, SUSPENSION (ML) NASAL EVERY EVENING
Status: DISCONTINUED | OUTPATIENT
Start: 2023-11-13 | End: 2023-11-17 | Stop reason: HOSPADM

## 2023-11-13 RX ORDER — ONDANSETRON 4 MG/1
4 TABLET, ORALLY DISINTEGRATING ORAL EVERY 8 HOURS PRN
Status: DISCONTINUED | OUTPATIENT
Start: 2023-11-13 | End: 2023-11-17 | Stop reason: HOSPADM

## 2023-11-13 RX ORDER — MIDODRINE HYDROCHLORIDE 5 MG/1
5 TABLET ORAL 3 TIMES DAILY PRN
Status: DISCONTINUED | OUTPATIENT
Start: 2023-11-13 | End: 2023-11-17 | Stop reason: HOSPADM

## 2023-11-13 RX ORDER — HEPARIN SODIUM 5000 [USP'U]/ML
5000 INJECTION, SOLUTION INTRAVENOUS; SUBCUTANEOUS EVERY 8 HOURS SCHEDULED
Status: DISCONTINUED | OUTPATIENT
Start: 2023-11-13 | End: 2023-11-17 | Stop reason: HOSPADM

## 2023-11-13 RX ORDER — MONTELUKAST SODIUM 10 MG/1
10 TABLET ORAL EVERY EVENING
Status: DISCONTINUED | OUTPATIENT
Start: 2023-11-13 | End: 2023-11-17 | Stop reason: HOSPADM

## 2023-11-13 RX ORDER — CARVEDILOL 6.25 MG/1
6.25 TABLET ORAL 2 TIMES DAILY WITH MEALS
Status: DISCONTINUED | OUTPATIENT
Start: 2023-11-13 | End: 2023-11-17 | Stop reason: HOSPADM

## 2023-11-13 RX ORDER — ARTIFICIAL TEARS 1; 2; 3 MG/ML; MG/ML; MG/ML
2 SOLUTION/ DROPS OPHTHALMIC PRN
Status: DISCONTINUED | OUTPATIENT
Start: 2023-11-13 | End: 2023-11-17 | Stop reason: HOSPADM

## 2023-11-13 RX ORDER — ACETAMINOPHEN 325 MG/1
650 TABLET ORAL EVERY 6 HOURS PRN
Status: DISCONTINUED | OUTPATIENT
Start: 2023-11-13 | End: 2023-11-17 | Stop reason: HOSPADM

## 2023-11-13 RX ORDER — ACETAMINOPHEN 650 MG/1
650 SUPPOSITORY RECTAL EVERY 6 HOURS PRN
Status: DISCONTINUED | OUTPATIENT
Start: 2023-11-13 | End: 2023-11-17 | Stop reason: HOSPADM

## 2023-11-13 RX ORDER — AMLODIPINE BESYLATE 5 MG/1
10 TABLET ORAL EVERY EVENING
Status: DISCONTINUED | OUTPATIENT
Start: 2023-11-13 | End: 2023-11-17 | Stop reason: HOSPADM

## 2023-11-13 RX ORDER — MINOXIDIL 2.5 MG/1
2.5 TABLET ORAL
Status: DISCONTINUED | OUTPATIENT
Start: 2023-11-14 | End: 2023-11-13

## 2023-11-13 RX ORDER — PRAVASTATIN SODIUM 20 MG
40 TABLET ORAL
Status: DISCONTINUED | OUTPATIENT
Start: 2023-11-13 | End: 2023-11-17 | Stop reason: HOSPADM

## 2023-11-13 RX ADMIN — CALCIUM ACETATE 2001 MG: 667 CAPSULE ORAL at 13:10

## 2023-11-13 RX ADMIN — LEVETIRACETAM 250 MG: 500 TABLET, FILM COATED ORAL at 16:40

## 2023-11-13 RX ADMIN — MONTELUKAST 10 MG: 10 TABLET, FILM COATED ORAL at 20:19

## 2023-11-13 RX ADMIN — CARVEDILOL 6.25 MG: 6.25 TABLET, FILM COATED ORAL at 16:40

## 2023-11-13 RX ADMIN — CALCIUM ACETATE 2001 MG: 667 CAPSULE ORAL at 17:04

## 2023-11-13 RX ADMIN — LEVETIRACETAM 250 MG: 500 TABLET, FILM COATED ORAL at 09:07

## 2023-11-13 RX ADMIN — HEPARIN SODIUM 5000 UNITS: 5000 INJECTION INTRAVENOUS; SUBCUTANEOUS at 21:45

## 2023-11-13 RX ADMIN — ASPIRIN 81 MG: 81 TABLET, COATED ORAL at 09:07

## 2023-11-13 RX ADMIN — FLUTICASONE PROPIONATE 2 SPRAY: 50 SPRAY, METERED NASAL at 20:13

## 2023-11-13 RX ADMIN — SODIUM CHLORIDE, PRESERVATIVE FREE 10 ML: 5 INJECTION INTRAVENOUS at 20:14

## 2023-11-13 RX ADMIN — SODIUM CHLORIDE, PRESERVATIVE FREE 10 ML: 5 INJECTION INTRAVENOUS at 09:09

## 2023-11-13 RX ADMIN — FUROSEMIDE 40 MG: 10 INJECTION, SOLUTION INTRAMUSCULAR; INTRAVENOUS at 01:23

## 2023-11-13 RX ADMIN — PRAVASTATIN SODIUM 40 MG: 20 TABLET ORAL at 20:13

## 2023-11-13 RX ADMIN — HEPARIN SODIUM 5000 UNITS: 5000 INJECTION INTRAVENOUS; SUBCUTANEOUS at 15:05

## 2023-11-13 RX ADMIN — CALCIUM ACETATE 2001 MG: 667 CAPSULE ORAL at 09:07

## 2023-11-13 ASSESSMENT — PAIN SCALES - GENERAL: PAINLEVEL_OUTOF10: 0

## 2023-11-13 NOTE — CONSULTS
[facility]  Cardiology Care Note                  [x]Initial visit     []Established visit     Marko Choi MD, [facility] [address], [place]  Phone [phone]; Fax [phone]  Mobile [phone]   Voice Mail [phone]      Patient Name: Fredo Ramirez - :1940 - VAUGHN:[id number]  Primary Cardiologist:  Cary Reynaga  Consulting Cardiologist: Bryan Choi MD       2023 11:02 PM    Reason for initial visit:Acute onset of SOB at 9pm. Was dialyzed on Saturday. HPI:       Ray Medina is a 79 y.o.  female followed by . she has a PMHx significant for diastolic CHF, HTN, ESRD, MR and pulmonary hypertension on echo in  on TTS dialysis. She also has hx of SUSAN. In ER initial sats were 99%. no lower extremity edema. Her son is present. She states last time she was admitted to hospital for heart failure was in . This episode was sudden. She has no orthopnea and no PND. She does feel that she did not have the usual amount of fluid removed during  her dialysis on Saturday      . SUBJECTIVE:           Cardiac risk factors: advanced age (older than 54 for men, 72 for women), dyslipidemia, hypertension, obesity (BMI >= 30 kg/m2), and sedentary lifestyle.        Assessment  and  Plan     1)Acute SOB with elevated pBNP  -unclear provoking event  -BNP elevated however higher in the past.  -she states she had stress test with in the last 6 mo with   -will order echo to look at MR and pulmonary Hypertension  -is this an anginal equivalent and if so will need another perfusion study  2)HTN  3)SUSAN  -wears religiously  4)Hx CVA  5)ESRD on HD   6) Obesity  7) Dyslipidemia  -on pravachol  Lab Results   Component Value Date    CHOL 128 05/15/2021    TRIG 93 05/15/2021    HDL 56 05/15/2021    LDLCALC 53.4 05/15/2021    LABVLDL 18.6 05/15/2021    CHOLHDLRATIO 2.3 05/15/2021 well.    Cris Morales is in agreement to the plan listed above and wishes to proceed. she  was instructed not to smoke, eat heart healthy diet  and to exercise. Thank you for the consult and the opportunity to be involved in the care of Cris Morales. Elise Umana MD    ATTENTION:   This medical record was transcribed using an electronic medical records/speech recognition system. Although proofread, it may and can contain electronic, spelling and other errors. Corrections may be executed at a later time. Please feel free to contact us for any clarifications as needed.

## 2023-11-13 NOTE — CONSULTS
Hospitalist Admission Note    NAME:  Jaclyn Fan   :  1940   MRN:  [id number]     Date/Time:  2023 2:21 AM    Patient PCP: Jesus Larsen MD    Please note that this dictation was completed with [employer], the computer voice recognition software. Quite often unanticipated grammatical, syntax, homophones, and other interpretive errors are inadvertently transcribed by the computer software. Please disregard these errors. Please excuse any errors that have escaped final proofreading  ________________________________________________________________________    Given the patient's current clinical presentation, I have a high level of concern for decompensation if discharged from the emergency department. Complex decision making was performed, which includes reviewing the patient's available past medical records, laboratory results, and x-ray films. My assessment of this patient's clinical condition and my plan of care is as follows. Assessment / Plan:    Dyspnea with exertion, ?anginal equivalence  Chronic diastolic chf, mild mitral regurgitation and tricuspid regurgitation  --no hypoxia, CXR is clear and lungs clear. EKG reviewed personally sinus with 1st AVB, no ischemia, compared to , LBBB is resolved  --probnp is elevated but not unusual to be elevated ESRD. Previous BNP  8,000-11,000K range  --repeat troponin now, dw nurse to ambulate patient and observe for hypoxia, increased WOB, return of dyspnea. If asymptomatic, patient can be discharged and follow up with cardiology. If she is symptomatic, will keep and ask cardiology to see for ischemic evaluation and get echo    Addendum:  patient ambulated about 50 feet, reported DUKES with slight increased WOB, sats 94% RA per RN    ESRD on HD TTS  -- nephrology consulted    HTN  --continue amlodipine, coreg.    --continue hydralazine and minoxidil bid only on non dialysis days    SUSAN on home cpap  Moderate pulmonary HTN  --continue home Per echocardiogram of 2021; same study showed normal LVEF of 55-60%    Monoclonal gammopathy of undetermined significance     Obstructive sleep apnea     Seizures ([address])     last keppra level- 2019 @ 15   Last seizure 2017    Urinary incontinence       Past Surgical History:   Procedure Laterality Date    APPENDECTOMY N/A     CATARACT REMOVAL Bilateral 2018     SECTION N/A     x 2    HEMORRHOID SURGERY      HYSTERECTOMY (CERVIX STATUS UNKNOWN)      IR NONTUNNELED VASCULAR CATHETER  2021    IR NONTUNNELED VASCULAR CATHETER 2021 [facility]    IR NONTUNNELED VASCULAR CATHETER  2021    IR TUNNELED CATHETER PLACEMENT GREATER THAN 5 YEARS  2021    IR TUNNELED CATHETER PLACEMENT GREATER THAN 5 YEARS 2021 [facility]    IR TUNNELED CATHETER PLACEMENT GREATER THAN 5 YEARS  2021    TONSILLECTOMY      TOTAL KNEE ARTHROPLASTY Right 2019     Social History     Tobacco Use    Smoking status: Passive Smoke Exposure - Never Smoker    Smokeless tobacco: Never    Tobacco comments:     Quit smoking: Passive smoke exposure  who passed when patient was 57   Substance Use Topics    Alcohol use: No      Family History   Problem Relation Age of Onset    Hypertension Sister     Diabetes Sister     Hypertension Mother     Stroke Father     Parkinson's Disease Father     Cancer Sister         Allergies   Allergen Reactions    Latex Rash    Codeine Other (See Comments)    Levofloxacin Other (See Comments)     Hallucinations    Lisinopril Cough    Sulfa Antibiotics Itching and Rash        Prior to Admission medications    Medication Sig Start Date End Date Taking? Authorizing Provider   levETIRAcetam (KEPPRA) 250 MG tablet Take one tablet in morning and evening. After dialysis session, take one additional tablet.   90 day prescription 10/4/23   LaottoLaura MD   amLODIPine (NORVASC) 10 MG tablet Take 1 tablet by mouth every evening    Automatic Reconciliation, Ar   aspirin 81 MG EC

## 2023-11-13 NOTE — PROGRESS NOTES
Hospitalist    Came to see patient as ER nurse reports blood pressure readings 65/41. Patient sleeping but arousable. Says she feels fine. No dizziness, CP, SOB. Says she usually gets her BP checks in left lower leg as she has dialysis fistula in right arm, had a previous fistula in left arm that didn't work. Reposition BP cuff in left arm and got 81/37, BP in left lower leg and got reading 99/37, MAP 58. Bilateral DP and PT pulses palpable.      Hold coreg if sbp <110  Hold amlodipine, hydralazine and minoxidil for now  Midodrine 5mg tid prn sbp <90    Teagan Roger MD

## 2023-11-13 NOTE — ED TRIAGE NOTES
Patient arrives to ED via EMS with c/o shortness of breath that started around 9:00 PM.     Patient reports that she is a Dialysis patient and that her last treatment was yesterday. Patient 99% on RA. Patient denies any CP.

## 2023-11-13 NOTE — CONSULTS
[address]  YOB: 1940          Assessment & Plan:     ESRD on HD TTS at [facility]  DUKES  Hypotension  PHTN  SUSAN  Anemia    Rec:  Xray clear and BP low. I am not sure we can improve this situation with additional dialysis at this time. Hb over 10, so hard to attribute her symptoms to anemia. ? PHTN  Plan for HD tomorrow and TTS       Subjective:   CC: SOB, ESRD  HPI: Patient of our group with ESRD on HD TTS came in with DUKES. Lungs are clear on xray. She has no edema. BP has been low in the ER. She has h/o SUSAN and PHTN and cardiology is evaluating.   ROS: +sob with exertion, no n/v  Current Facility-Administered Medications   Medication Dose Route Frequency    sodium chloride flush 0.9 % injection 5-40 mL  5-40 mL IntraVENous 2 times per day    sodium chloride flush 0.9 % injection 5-40 mL  5-40 mL IntraVENous PRN    0.9 % sodium chloride infusion   IntraVENous PRN    heparin (porcine) injection 5,000 Units  5,000 Units SubCUTAneous 3 times per day    ondansetron (ZOFRAN-ODT) disintegrating tablet 4 mg  4 mg Oral Q8H PRN    Or    ondansetron (ZOFRAN) injection 4 mg  4 mg IntraVENous Q6H PRN    polyethylene glycol (GLYCOLAX) packet 17 g  17 g Oral Daily PRN    acetaminophen (TYLENOL) tablet 650 mg  650 mg Oral Q6H PRN    Or    acetaminophen (TYLENOL) suppository 650 mg  650 mg Rectal Q6H PRN    [Held by provider] amLODIPine (NORVASC) tablet 10 mg  10 mg Oral QPM    aspirin EC tablet 81 mg  81 mg Oral Daily    calcium acetate (PHOSLO) capsule 2,001 mg  3 capsule Oral TID     carvedilol (COREG) tablet 6.25 mg  6.25 mg Oral BID     fluticasone (FLONASE) 50 MCG/ACT nasal spray 2 spray  2 spray Nasal QPM    levETIRAcetam (KEPPRA) tablet 250 mg  250 mg Oral BID    [START ON 11/14/2023] levETIRAcetam (KEPPRA) tablet 250 mg  250 mg Oral Once per day on Tue Thu Sat    montelukast (SINGULAIR) tablet 10 mg  10 mg Oral QPM    pravastatin

## 2023-11-13 NOTE — ED PROVIDER NOTES
[facility] EMERGENCY DEPT  EMERGENCY DEPARTMENT ENCOUNTER      Pt Name: Malaika Rm  MRN: [id number]  [address] [facility] 1940  Date of evaluation: 11/12/2023  Provider: Frances Sheth       Chief Complaint   Patient presents with    Shortness of Breath         HISTORY OF PRESENT ILLNESS   (Location/Symptom, Timing/Onset, Context/Setting, Quality, Duration, Modifying Factors, Severity)  Note limiting factors. 79-year-old female presents ED for evaluation of sudden onset of dyspnea starting around 2100. Patient denies any fevers or chills or chest discomfort. Denies any leg swelling. Patient is a dialysis patient and last had treatment yesterday. Denies history of smoking, denies history of lung disease. Review of External Medical Records:     Nursing Notes were reviewed. REVIEW OF SYSTEMS    (2-9 systems for level 4, 10 or more for level 5)     Review of Systems   Constitutional:  Negative for fever. Respiratory:  Positive for shortness of breath. Cardiovascular:  Negative for chest pain and leg swelling. Gastrointestinal:  Negative for abdominal pain. Except as noted above the remainder of the review of systems was reviewed and negative. 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

## 2023-11-13 NOTE — ED NOTES
Notified provider of patient's BP readings with MAPs of 40s. Awaiting response, will follow orders once placed. Maxim Noriega RN  11/13/23 2268      Dr. Ganesh Martin at bedside assessing patient's BP. Provider states that she is okay with the BP at this time. BP being taken on patient's R lower leg due to her having fisutla's in her UE.       Maxim Noriega RN  11/13/23 0151

## 2023-11-13 NOTE — ED NOTES
Ambulated patient with walker, patient O2 saturation 95% on RA with ambulation. Patient reports minimal shortness of breath and states that she feels like she will benefit from O2 throughout the night. Placed patient back in stretcher on 2L NC for comfort. Patient bed alarm on, side rails x2 and call bell within reach.       Miguel Adrian RN  11/13/23 1302

## 2023-11-14 ENCOUNTER — APPOINTMENT (OUTPATIENT)
Facility: HOSPITAL | Age: 83
DRG: 286 | End: 2023-11-14
Payer: MEDICARE

## 2023-11-14 PROBLEM — R07.9 CHEST PAIN: Status: ACTIVE | Noted: 2023-11-14

## 2023-11-14 LAB
ANION GAP SERPL CALC-SCNC: 9 MMOL/L (ref 5–15)
BASOPHILS # BLD: 0 K/UL (ref 0–0.1)
BASOPHILS NFR BLD: 1 % (ref 0–1)
BUN SERPL-MCNC: 62 MG/DL (ref 6–20)
BUN/CREAT SERPL: 7 (ref 12–20)
CALCIUM SERPL-MCNC: 8.6 MG/DL (ref 8.5–10.1)
CHLORIDE SERPL-SCNC: 101 MMOL/L (ref 97–108)
CO2 SERPL-SCNC: 26 MMOL/L (ref 21–32)
COMMENT:: NORMAL
CREAT SERPL-MCNC: 8.65 MG/DL (ref 0.55–1.02)
DIFFERENTIAL METHOD BLD: ABNORMAL
ECHO BSA: 1.82 M2
EOSINOPHIL # BLD: 0.2 K/UL (ref 0–0.4)
EOSINOPHIL NFR BLD: 3 % (ref 0–7)
ERYTHROCYTE [DISTWIDTH] IN BLOOD BY AUTOMATED COUNT: 14.5 % (ref 11.5–14.5)
GLUCOSE SERPL-MCNC: 83 MG/DL (ref 65–100)
HBV SURFACE AB SER QL: REACTIVE
HBV SURFACE AB SER-ACNC: 28.44 MIU/ML
HBV SURFACE AG SER QL: <0.1 INDEX
HBV SURFACE AG SER QL: NEGATIVE
HCT VFR BLD AUTO: 29.2 % (ref 35–47)
HGB BLD-MCNC: 9.3 G/DL (ref 11.5–16)
IMM GRANULOCYTES # BLD AUTO: 0 K/UL (ref 0–0.04)
IMM GRANULOCYTES NFR BLD AUTO: 0 % (ref 0–0.5)
LYMPHOCYTES # BLD: 1.2 K/UL (ref 0.8–3.5)
LYMPHOCYTES NFR BLD: 18 % (ref 12–49)
MAGNESIUM SERPL-MCNC: 2.4 MG/DL (ref 1.6–2.4)
MCH RBC QN AUTO: 31.6 PG (ref 26–34)
MCHC RBC AUTO-ENTMCNC: 31.8 G/DL (ref 30–36.5)
MCV RBC AUTO: 99.3 FL (ref 80–99)
MONOCYTES # BLD: 0.8 K/UL (ref 0–1)
MONOCYTES NFR BLD: 12 % (ref 5–13)
NEUTS SEG # BLD: 4.3 K/UL (ref 1.8–8)
NEUTS SEG NFR BLD: 66 % (ref 32–75)
NRBC # BLD: 0 K/UL (ref 0–0.01)
NRBC BLD-RTO: 0 PER 100 WBC
NUC REST EJECTION FRACTION: 58 %
PHOSPHATE SERPL-MCNC: 6.9 MG/DL (ref 2.6–4.7)
PLATELET # BLD AUTO: 195 K/UL (ref 150–400)
PMV BLD AUTO: 9.1 FL (ref 8.9–12.9)
POTASSIUM SERPL-SCNC: 5.8 MMOL/L (ref 3.5–5.1)
RBC # BLD AUTO: 2.94 M/UL (ref 3.8–5.2)
SODIUM SERPL-SCNC: 136 MMOL/L (ref 136–145)
SPECIMEN HOLD: NORMAL
STRESS BASELINE DIAS BP: 55 MMHG
STRESS BASELINE HR: 66 BPM
STRESS BASELINE SYS BP: 167 MMHG
STRESS ESTIMATED WORKLOAD: 1 METS
STRESS PEAK DIAS BP: 55 MMHG
STRESS PEAK SYS BP: 167 MMHG
STRESS PERCENT HR ACHIEVED: 74 %
STRESS POST PEAK HR: 100 BPM
STRESS RATE PRESSURE PRODUCT: NORMAL BPM*MMHG
STRESS ST DEPRESSION: 1 MM
STRESS TARGET HR: 136 BPM
WBC # BLD AUTO: 6.5 K/UL (ref 3.6–11)

## 2023-11-14 PROCEDURE — 6360000002 HC RX W HCPCS

## 2023-11-14 PROCEDURE — A9500 TC99M SESTAMIBI: HCPCS | Performed by: SPECIALIST

## 2023-11-14 PROCEDURE — 6370000000 HC RX 637 (ALT 250 FOR IP): Performed by: HOSPITALIST

## 2023-11-14 PROCEDURE — 78452 HT MUSCLE IMAGE SPECT MULT: CPT | Performed by: INTERNAL MEDICINE

## 2023-11-14 PROCEDURE — 6360000002 HC RX W HCPCS: Performed by: HOSPITALIST

## 2023-11-14 PROCEDURE — 94761 N-INVAS EAR/PLS OXIMETRY MLT: CPT

## 2023-11-14 PROCEDURE — 96372 THER/PROPH/DIAG INJ SC/IM: CPT

## 2023-11-14 PROCEDURE — 85025 COMPLETE CBC W/AUTO DIFF WBC: CPT

## 2023-11-14 PROCEDURE — G0378 HOSPITAL OBSERVATION PER HR: HCPCS

## 2023-11-14 PROCEDURE — 3430000000 HC RX DIAGNOSTIC RADIOPHARMACEUTICAL: Performed by: SPECIALIST

## 2023-11-14 PROCEDURE — 6360000002 HC RX W HCPCS: Performed by: INTERNAL MEDICINE

## 2023-11-14 PROCEDURE — 93017 CV STRESS TEST TRACING ONLY: CPT

## 2023-11-14 PROCEDURE — P9047 ALBUMIN (HUMAN), 25%, 50ML: HCPCS | Performed by: PSYCHIATRY & NEUROLOGY

## 2023-11-14 PROCEDURE — 84100 ASSAY OF PHOSPHORUS: CPT

## 2023-11-14 PROCEDURE — 93018 CV STRESS TEST I&R ONLY: CPT | Performed by: INTERNAL MEDICINE

## 2023-11-14 PROCEDURE — 93016 CV STRESS TEST SUPVJ ONLY: CPT | Performed by: INTERNAL MEDICINE

## 2023-11-14 PROCEDURE — 99231 SBSQ HOSP IP/OBS SF/LOW 25: CPT | Performed by: SPECIALIST

## 2023-11-14 PROCEDURE — 86706 HEP B SURFACE ANTIBODY: CPT

## 2023-11-14 PROCEDURE — 1100000000 HC RM PRIVATE

## 2023-11-14 PROCEDURE — 36415 COLL VENOUS BLD VENIPUNCTURE: CPT

## 2023-11-14 PROCEDURE — 5A1D70Z PERFORMANCE OF URINARY FILTRATION, INTERMITTENT, LESS THAN 6 HOURS PER DAY: ICD-10-PCS | Performed by: INTERNAL MEDICINE

## 2023-11-14 PROCEDURE — 6360000002 HC RX W HCPCS: Performed by: PSYCHIATRY & NEUROLOGY

## 2023-11-14 PROCEDURE — 96365 THER/PROPH/DIAG IV INF INIT: CPT

## 2023-11-14 PROCEDURE — 96366 THER/PROPH/DIAG IV INF ADDON: CPT

## 2023-11-14 PROCEDURE — 2580000003 HC RX 258: Performed by: HOSPITALIST

## 2023-11-14 PROCEDURE — 87340 HEPATITIS B SURFACE AG IA: CPT

## 2023-11-14 PROCEDURE — 83735 ASSAY OF MAGNESIUM: CPT

## 2023-11-14 PROCEDURE — 80048 BASIC METABOLIC PNL TOTAL CA: CPT

## 2023-11-14 PROCEDURE — 2500000003 HC RX 250 WO HCPCS: Performed by: HOSPITALIST

## 2023-11-14 PROCEDURE — 78452 HT MUSCLE IMAGE SPECT MULT: CPT

## 2023-11-14 RX ORDER — ALBUMIN (HUMAN) 12.5 G/50ML
25 SOLUTION INTRAVENOUS PRN
Status: DISCONTINUED | OUTPATIENT
Start: 2023-11-14 | End: 2023-11-17 | Stop reason: HOSPADM

## 2023-11-14 RX ORDER — REGADENOSON 0.08 MG/ML
INJECTION, SOLUTION INTRAVENOUS
Status: COMPLETED
Start: 2023-11-14 | End: 2023-11-14

## 2023-11-14 RX ORDER — TETRAKIS(2-METHOXYISOBUTYLISOCYANIDE)COPPER(I) TETRAFLUOROBORATE 1 MG/ML
10 INJECTION, POWDER, LYOPHILIZED, FOR SOLUTION INTRAVENOUS
Status: COMPLETED | OUTPATIENT
Start: 2023-11-14 | End: 2023-11-14

## 2023-11-14 RX ORDER — TETRAKIS(2-METHOXYISOBUTYLISOCYANIDE)COPPER(I) TETRAFLUOROBORATE 1 MG/ML
27 INJECTION, POWDER, LYOPHILIZED, FOR SOLUTION INTRAVENOUS ONCE
Status: COMPLETED | OUTPATIENT
Start: 2023-11-14 | End: 2023-11-14

## 2023-11-14 RX ADMIN — TETRAKIS(2-METHOXYISOBUTYLISOCYANIDE)COPPER(I) TETRAFLUOROBORATE 27 MILLICURIE: 1 INJECTION, POWDER, LYOPHILIZED, FOR SOLUTION INTRAVENOUS at 14:25

## 2023-11-14 RX ADMIN — LEVETIRACETAM 250 MG: 500 TABLET, FILM COATED ORAL at 15:47

## 2023-11-14 RX ADMIN — ALBUMIN (HUMAN) 25 G: 0.25 INJECTION, SOLUTION INTRAVENOUS at 07:14

## 2023-11-14 RX ADMIN — LEVETIRACETAM 250 MG: 500 TABLET, FILM COATED ORAL at 20:54

## 2023-11-14 RX ADMIN — TETRAKIS(2-METHOXYISOBUTYLISOCYANIDE)COPPER(I) TETRAFLUOROBORATE 10 MILLICURIE: 1 INJECTION, POWDER, LYOPHILIZED, FOR SOLUTION INTRAVENOUS at 12:40

## 2023-11-14 RX ADMIN — ACETAMINOPHEN 650 MG: 325 TABLET ORAL at 20:52

## 2023-11-14 RX ADMIN — SODIUM CHLORIDE, PRESERVATIVE FREE 10 ML: 5 INJECTION INTRAVENOUS at 11:10

## 2023-11-14 RX ADMIN — ALBUMIN (HUMAN) 25 G: 0.25 INJECTION, SOLUTION INTRAVENOUS at 08:18

## 2023-11-14 RX ADMIN — LEVETIRACETAM 250 MG: 500 TABLET, FILM COATED ORAL at 11:09

## 2023-11-14 RX ADMIN — REGADENOSON 0.4 MG: 0.08 INJECTION, SOLUTION INTRAVENOUS at 14:21

## 2023-11-14 RX ADMIN — EPOETIN ALFA-EPBX 3000 UNITS: 4000 INJECTION, SOLUTION INTRAVENOUS; SUBCUTANEOUS at 17:43

## 2023-11-14 RX ADMIN — HEPARIN SODIUM 5000 UNITS: 5000 INJECTION INTRAVENOUS; SUBCUTANEOUS at 20:53

## 2023-11-14 RX ADMIN — PRAVASTATIN SODIUM 40 MG: 20 TABLET ORAL at 20:52

## 2023-11-14 RX ADMIN — CALCIUM ACETATE 2001 MG: 667 CAPSULE ORAL at 17:42

## 2023-11-14 RX ADMIN — CARVEDILOL 6.25 MG: 6.25 TABLET, FILM COATED ORAL at 17:42

## 2023-11-14 RX ADMIN — FLUTICASONE PROPIONATE 2 SPRAY: 50 SPRAY, METERED NASAL at 17:43

## 2023-11-14 RX ADMIN — ASPIRIN 81 MG: 81 TABLET, COATED ORAL at 11:09

## 2023-11-14 RX ADMIN — HEPARIN SODIUM 5000 UNITS: 5000 INJECTION INTRAVENOUS; SUBCUTANEOUS at 15:46

## 2023-11-14 RX ADMIN — CALCIUM ACETATE 2001 MG: 667 CAPSULE ORAL at 11:09

## 2023-11-14 RX ADMIN — MONTELUKAST 10 MG: 10 TABLET, FILM COATED ORAL at 17:42

## 2023-11-14 RX ADMIN — SODIUM CHLORIDE, PRESERVATIVE FREE 10 ML: 5 INJECTION INTRAVENOUS at 21:00

## 2023-11-14 RX ADMIN — HEPARIN SODIUM 5000 UNITS: 5000 INJECTION INTRAVENOUS; SUBCUTANEOUS at 06:11

## 2023-11-14 ASSESSMENT — PAIN SCALES - GENERAL
PAINLEVEL_OUTOF10: 0
PAINLEVEL_OUTOF10: 0

## 2023-11-14 NOTE — PROGRESS NOTES
Cardiology Update:     Pt remains off unit for testing. Will follow up tomorrow to discuss stress test once read and hope to have echo complete then as well. Further plans TBD.

## 2023-11-14 NOTE — PROGRESS NOTES
[facility]  Cardiology Care Note                  []Initial visit     [x]Established visit     Marko Caicedo MD, [facility] [address], [place]  Phone [phone]; Fax [phone]  Mobile [phone]   Voice Mail [phone]      Patient Name: Tate Friend - :1940 - YKB:[id number]  Primary Cardiologist:  Bryant Webb  Consulting Cardiologist: Brayan Medina. Irving Caicedo MD       2023 11:02 PM    Reason for initial visit:Acute onset of SOB at 9pm. Was dialyzed on Saturday. HPI:       Jag Crawford is a 79 y.o.  female followed by . she has a PMHx significant for diastolic CHF, HTN, ESRD, MR and pulmonary hypertension on echo in  on TTS dialysis. She also has hx of SUSAN. In ER initial sats were 99%. no lower extremity edema. She states last time she was admitted to hospital for heart failure was in . This episode was sudden. She has no orthopnea and no PND. She does feel that she did not have the usual amount of fluid removed during  her dialysis on Saturday     She states she still tired and has a slight cough. .    SUBJECTIVE:           Cardiac risk factors: advanced age (older than 54 for men, 72 for women), dyslipidemia, hypertension, obesity (BMI >= 30 kg/m2), and sedentary lifestyle.        Assessment  and  Plan     1)Acute SOB with elevated pBNP  -unclear provoking event  -BNP elevated however higher in the past.  -she states she had stress test with in the last 6 mo with   -Echo and stress test pending  2)HTN  3)SUSAN  -wears religiously  4)Hx CVA  5)ESRD on HD   6) Obesity  7) Dyslipidemia  -on pravachol and last LDL was 53 in May 2021            K 4.6, creatinine 6.29 , pBNP 4821, H/H 10/32 , CXR no acute findings ____________________________________________________________    Cardiac work up to date:    1)Echocardiogram  21: EF 55-60%, mild to mod MR< history includes Cancer in her sister; Diabetes in her sister; Hypertension in her mother and sister; Parkinson's Disease in her father; Stroke in her father. Allergies   Allergen Reactions    Latex Rash    Codeine Other (See Comments)    Levofloxacin Other (See Comments)     Hallucinations    Lisinopril Cough    Sulfa Antibiotics Itching and Rash            Prior to Admission Medications   Prescriptions Last Dose Informant Patient Reported? Taking? Omega-3 Fatty Acids (FISH OIL) 1000 MG capsule   Yes Yes   Sig: Take 1 capsule by mouth 2 times daily   Polyvinyl Alcohol-Povidone PF (REFRESH) 1.4-0.6 % SOLN ophthalmic solution   Yes Yes   Sig: Apply 1-2 drops to eye as needed   amLODIPine (NORVASC) 10 MG tablet   Yes Yes   Sig: Take 1 tablet by mouth every evening   aspirin 81 MG EC tablet   Yes Yes   Sig: Take 1 tablet by mouth daily   calcium acetate (PHOSLO) 667 MG CAPS capsule   Yes Yes   Sig: Take 3 capsules by mouth 3 times daily (with meals)   carvedilol (COREG) 6.25 MG tablet   Yes Yes   Sig: Take 1 tablet by mouth 2 times daily (with meals)   fluticasone (FLONASE) 50 MCG/ACT nasal spray   Yes Yes   Si sprays by Nasal route every evening   hydrALAZINE (APRESOLINE) 100 MG tablet   Yes No   Sig: Take 1 tablet by mouth 2 times daily 4 times a week on non dialysis days only   levETIRAcetam (KEPPRA) 250 MG tablet   No Yes   Sig: Take one tablet in morning and evening. After dialysis session, take one additional tablet.   90 day prescription   minoxidil (LONITEN) 2.5 MG tablet   Yes Yes   Sig: Take 1 tablet by mouth 2 times daily 4 times a weekly on non dialysis days only   montelukast (SINGULAIR) 10 MG tablet   Yes Yes   Sig: Take 1 tablet by mouth every evening   pravastatin (PRAVACHOL) 40 MG tablet   Yes No   Sig: Take 1 tablet by mouth nightly      Facility-Administered Medications: None         OBJECTIVE:  Wt Readings from Last 3 Encounters:   23 77.1 kg (170 lb)   10/04/23 67.6 kg (149 lb)

## 2023-11-14 NOTE — PROGRESS NOTES
[address]  YOB: 1940          Assessment & Plan:     ESRD on HD TTS at [facility]  Hypotension  PHTN  SUSAN  Anemia    Rec:  Seen on HD [facility] well. UF goal 3 kg  Plan for HD TTS  JP TTS       Subjective:   CC: SOB, ESRD  HPI: Seen on HD. [facility].  UF goal 3 kg  Current Facility-Administered Medications   Medication Dose Route Frequency    albumin human 25% IV solution 25 g  25 g IntraVENous PRN    sodium chloride flush 0.9 % injection 5-40 mL  5-40 mL IntraVENous 2 times per day    sodium chloride flush 0.9 % injection 5-40 mL  5-40 mL IntraVENous PRN    0.9 % sodium chloride infusion   IntraVENous PRN    heparin (porcine) injection 5,000 Units  5,000 Units SubCUTAneous 3 times per day    ondansetron (ZOFRAN-ODT) disintegrating tablet 4 mg  4 mg Oral Q8H PRN    Or    ondansetron (ZOFRAN) injection 4 mg  4 mg IntraVENous Q6H PRN    polyethylene glycol (GLYCOLAX) packet 17 g  17 g Oral Daily PRN    acetaminophen (TYLENOL) tablet 650 mg  650 mg Oral Q6H PRN    Or    acetaminophen (TYLENOL) suppository 650 mg  650 mg Rectal Q6H PRN    [Held by provider] amLODIPine (NORVASC) tablet 10 mg  10 mg Oral QPM    aspirin EC tablet 81 mg  81 mg Oral Daily    calcium acetate (PHOSLO) capsule 2,001 mg  3 capsule Oral TID WC    carvedilol (COREG) tablet 6.25 mg  6.25 mg Oral BID     fluticasone (FLONASE) 50 MCG/ACT nasal spray 2 spray  2 spray Nasal QPM    levETIRAcetam (KEPPRA) tablet 250 mg  250 mg Oral BID    levETIRAcetam (KEPPRA) tablet 250 mg  250 mg Oral Once per day on Tue Thu Sat    montelukast (SINGULAIR) tablet 10 mg  10 mg Oral QPM    pravastatin (PRAVACHOL) tablet 40 mg  40 mg Oral QHS    artificial tears (dextran-hypromellose-glycerin) ophthalmic solution 2 drop  2 drop Both Eyes PRN    [Held by provider] minoxidil (LONITEN) tablet 2.5 mg  2.5 mg Oral 2 times per day on Sun Mon Wed Fri    [Held by provider] hydrALAZINE (APRESOLINE)

## 2023-11-14 NOTE — FLOWSHEET NOTE
11/14/23 1042   Vital Signs   BP (!) 131/38   Pulse 63   Pain Assessment   Pain Assessment None - Denies Pain   Post-Hemodialysis Assessment   Post-Treatment Procedures Blood returned; Access bleeding time < 10 minutes   Machine Disinfection Process Bleach;Acid/Vinegar Clean;Exterior Machine Disinfection   Rinseback Volume (ml) 300 ml   Blood Volume Processed (Liters) 69.1 L   Dialyzer Clearance Clear   Duration of Treatment (minutes) 210 minutes   Hemodialysis Intake (ml) 500 ml   Hemodialysis Output (ml) 3500 ml   NET Removed (ml) 3000   Tolerated Treatment Good   Interventions Taken Medication  (gave Albumin to support blood pressure)   Time Off 1042   Patient Disposition Other (Comment)  (patient remained in room 535)     Primary RN SBAR: Liv Short, BOAZ  Comments: low blood pressure patient remained asymptomatic and tolerated dialysis treatment

## 2023-11-14 NOTE — ED NOTES
TRANSFER - OUT REPORT:    Verbal report given to [address] [facility], RN on Ivan Jackson  being transferred to 5th floor for routine progression of patient care       Report consisted of patient's Situation, Background, Assessment and   Recommendations(SBAR). Information from the following report(s) ED SBAR, MAR, and Recent Results was reviewed with the receiving nurse. Peyton Fall Assessment:    Presents to emergency department  because of falls (Syncope, seizure, or loss of consciousness): No  Age > 70: No  Altered Mental Status, Intoxication with alcohol or substance confusion (Disorientation, impaired judgment, poor safety awaremess, or inability to follow instructions): No  Impaired Mobility: Ambulates or transfers with assistive devices or assistance; Unable to ambulate or transer.: No             Lines:   Peripheral IV 11/13/23 Right Forearm (Active)   Site Assessment Clean, dry & intact 11/13/23 1846   Line Status Flushed 11/13/23 1846   Line Care Connections checked and tightened 11/13/23 1846   Phlebitis Assessment No symptoms 11/13/23 1846   Infiltration Assessment 0 11/13/23 1846   Dressing Status Clean, dry & intact 11/13/23 1846   Dressing Type Transparent 11/13/23 1846        Opportunity for questions and clarification was provided.       Patient transported with:  Juan Daugherty, RN  03/79/15 8912

## 2023-11-14 NOTE — CARE COORDINATION
11/14/23  Care Management Assessment      Reason for Admission:     Arthritis [M19.90]  Seizures ([address]) [R56.9]  Monoclonal gammopathy [D47.2]  Dyspnea on exertion [R06.09]  DUKES (dyspnea on exertion) [R06.09]  Anxiety and depression [F41.9, F32.A]  High cholesterol [Q88.27]  Diastolic CHF, chronic (HCC) [I50.32]  Elevated brain natriuretic peptide (BNP) level [R79.89]  ESRD on hemodialysis ([address]) [N18.6, Z99.2]  SUSAN on CPAP [G47.33]  Hx of completed stroke [Z86.73]  Acute hypoxemic respiratory failure (HCC) [J96.01]  Dyspnea, unspecified type [R06.00]         RUR: Readmission Risk              Risk of Unplanned Readmission:  0         Advance Directive: Full Code     [x] AD on file. Healthcare Decision Maker:     Primary Decision Maker: Ha Herreraodore - Child - [phone]    Primary Decision Maker: Snata Rodriguez - Child    Assessment:     11/14/23 0904   Service Assessment   Patient Orientation Alert and Oriented   Cognition Alert   History Provided By Patient   Primary [facility]   Patient's Healthcare Decision Maker is: Named in [address]   PCP Verified by CM Yes  (Dr. Miguel England)   Last Visit to PCP Within last 6 months   Prior Functional Level Assistance with the following:;Mobility  (ambulates with rollator)   Current Functional Level Mobility  (ambulates with rollator)   Can patient return to prior living arrangement Yes   Ability to make needs known: Good   Family able to assist with home care needs: Yes   Financial Resources Medicare  (BCBS medicare supplement)   Social/Functional History   Lives With Alone   Type of Home Condo   Home Layout One level   Home Access Elevator   Bathroom Shower/Tub Walk-in shower   Bathroom Toilet Standard  (has elevated seat attachment)   Bathroom Equipment Grab bars in [address]; Wheelchair-manual  (CPAP)   Receives Help From Family   ADL Assistance Independent   Homemaking

## 2023-11-15 ENCOUNTER — APPOINTMENT (OUTPATIENT)
Facility: HOSPITAL | Age: 83
DRG: 286 | End: 2023-11-15
Attending: SPECIALIST
Payer: MEDICARE

## 2023-11-15 LAB
ANION GAP SERPL CALC-SCNC: 4 MMOL/L (ref 5–15)
BASOPHILS # BLD: 0 K/UL (ref 0–0.1)
BASOPHILS NFR BLD: 1 % (ref 0–1)
BUN SERPL-MCNC: 39 MG/DL (ref 6–20)
BUN/CREAT SERPL: 7 (ref 12–20)
CALCIUM SERPL-MCNC: 8.7 MG/DL (ref 8.5–10.1)
CHLORIDE SERPL-SCNC: 98 MMOL/L (ref 97–108)
CO2 SERPL-SCNC: 31 MMOL/L (ref 21–32)
CREAT SERPL-MCNC: 5.7 MG/DL (ref 0.55–1.02)
DIFFERENTIAL METHOD BLD: ABNORMAL
ECHO AO ASC DIAM: 3.1 CM
ECHO AO ASCENDING AORTA INDEX: 1.76 CM/M2
ECHO AV AREA PEAK VELOCITY: 2.1 CM2
ECHO AV AREA VTI: 2.3 CM2
ECHO AV AREA/BSA PEAK VELOCITY: 1.2 CM2/M2
ECHO AV AREA/BSA VTI: 1.3 CM2/M2
ECHO AV MEAN GRADIENT: 10 MMHG
ECHO AV MEAN VELOCITY: 1.5 M/S
ECHO AV PEAK GRADIENT: 18 MMHG
ECHO AV PEAK VELOCITY: 2.1 M/S
ECHO AV VELOCITY RATIO: 0.67
ECHO AV VTI: 49.4 CM
ECHO BSA: 1.82 M2
ECHO LA DIAMETER INDEX: 2.39 CM/M2
ECHO LA DIAMETER: 4.2 CM
ECHO LA VOL A-L A2C: 59 ML (ref 22–52)
ECHO LA VOL A-L A4C: 77 ML (ref 22–52)
ECHO LA VOL BP: 68 ML (ref 22–52)
ECHO LA VOL MOD A2C: 58 ML (ref 22–52)
ECHO LA VOL MOD A4C: 75 ML (ref 22–52)
ECHO LA VOL/BSA BIPLANE: 39 ML/M2 (ref 16–34)
ECHO LA VOLUME AREA LENGTH: 70 ML
ECHO LA VOLUME INDEX A-L A2C: 34 ML/M2 (ref 16–34)
ECHO LA VOLUME INDEX A-L A4C: 44 ML/M2 (ref 16–34)
ECHO LA VOLUME INDEX AREA LENGTH: 40 ML/M2 (ref 16–34)
ECHO LA VOLUME INDEX MOD A2C: 33 ML/M2 (ref 16–34)
ECHO LA VOLUME INDEX MOD A4C: 43 ML/M2 (ref 16–34)
ECHO LV E' LATERAL VELOCITY: 6 CM/S
ECHO LV E' SEPTAL VELOCITY: 6 CM/S
ECHO LV EDV A2C: 63 ML
ECHO LV EDV A4C: 76 ML
ECHO LV EDV BP: 72 ML (ref 56–104)
ECHO LV EDV INDEX A4C: 43 ML/M2
ECHO LV EDV INDEX BP: 41 ML/M2
ECHO LV EDV NDEX A2C: 36 ML/M2
ECHO LV EJECTION FRACTION A2C: 69 %
ECHO LV EJECTION FRACTION A4C: 73 %
ECHO LV EJECTION FRACTION BIPLANE: 72 % (ref 55–100)
ECHO LV ESV A2C: 19 ML
ECHO LV ESV A4C: 20 ML
ECHO LV ESV BP: 20 ML (ref 19–49)
ECHO LV ESV INDEX A2C: 11 ML/M2
ECHO LV ESV INDEX A4C: 11 ML/M2
ECHO LV ESV INDEX BP: 11 ML/M2
ECHO LV FRACTIONAL SHORTENING: 39 % (ref 28–44)
ECHO LV INTERNAL DIMENSION DIASTOLE INDEX: 2.78 CM/M2
ECHO LV INTERNAL DIMENSION DIASTOLIC: 4.9 CM (ref 3.9–5.3)
ECHO LV INTERNAL DIMENSION SYSTOLIC INDEX: 1.7 CM/M2
ECHO LV INTERNAL DIMENSION SYSTOLIC: 3 CM
ECHO LV IVSD: 1.3 CM (ref 0.6–0.9)
ECHO LV MASS 2D: 253.7 G (ref 67–162)
ECHO LV MASS INDEX 2D: 144.2 G/M2 (ref 43–95)
ECHO LV POSTERIOR WALL DIASTOLIC: 1.3 CM (ref 0.6–0.9)
ECHO LV RELATIVE WALL THICKNESS RATIO: 0.53
ECHO LVOT AREA: 3.1 CM2
ECHO LVOT AV VTI INDEX: 0.74
ECHO LVOT DIAM: 2 CM
ECHO LVOT MEAN GRADIENT: 5 MMHG
ECHO LVOT PEAK GRADIENT: 8 MMHG
ECHO LVOT PEAK VELOCITY: 1.4 M/S
ECHO LVOT STROKE VOLUME INDEX: 65.1 ML/M2
ECHO LVOT SV: 114.6 ML
ECHO LVOT VTI: 36.5 CM
ECHO MV A VELOCITY: 1.13 M/S
ECHO MV E DECELERATION TIME (DT): 211.9 MS
ECHO MV E VELOCITY: 1.05 M/S
ECHO MV E/A RATIO: 0.93
ECHO MV E/E' LATERAL: 17.5
ECHO MV E/E' RATIO (AVERAGED): 17.5
ECHO MV REGURGITANT PEAK GRADIENT: 154 MMHG
ECHO MV REGURGITANT PEAK VELOCITY: 6.2 M/S
ECHO PULMONARY ARTERY END DIASTOLIC PRESSURE: 7 MMHG
ECHO PV MAX VELOCITY: 1.5 M/S
ECHO PV PEAK GRADIENT: 9 MMHG
ECHO PV REGURGITANT MAX VELOCITY: 1.4 M/S
ECHO RV INTERNAL DIMENSION: 4.2 CM
ECHO RV TAPSE: 2.6 CM (ref 1.7–?)
ECHO RVOT PEAK GRADIENT: 6 MMHG
ECHO RVOT PEAK VELOCITY: 1.2 M/S
ECHO TV REGURGITANT MAX VELOCITY: 2.87 M/S
ECHO TV REGURGITANT PEAK GRADIENT: 34 MMHG
EOSINOPHIL # BLD: 0.3 K/UL (ref 0–0.4)
EOSINOPHIL NFR BLD: 5 % (ref 0–7)
ERYTHROCYTE [DISTWIDTH] IN BLOOD BY AUTOMATED COUNT: 14 % (ref 11.5–14.5)
GLUCOSE SERPL-MCNC: 87 MG/DL (ref 65–100)
HCT VFR BLD AUTO: 29.1 % (ref 35–47)
HGB BLD-MCNC: 9.2 G/DL (ref 11.5–16)
IMM GRANULOCYTES # BLD AUTO: 0 K/UL (ref 0–0.04)
IMM GRANULOCYTES NFR BLD AUTO: 0 % (ref 0–0.5)
LYMPHOCYTES # BLD: 0.9 K/UL (ref 0.8–3.5)
LYMPHOCYTES NFR BLD: 17 % (ref 12–49)
MAGNESIUM SERPL-MCNC: 2.4 MG/DL (ref 1.6–2.4)
MCH RBC QN AUTO: 31.1 PG (ref 26–34)
MCHC RBC AUTO-ENTMCNC: 31.6 G/DL (ref 30–36.5)
MCV RBC AUTO: 98.3 FL (ref 80–99)
MONOCYTES # BLD: 0.8 K/UL (ref 0–1)
MONOCYTES NFR BLD: 16 % (ref 5–13)
NEUTS SEG # BLD: 3.1 K/UL (ref 1.8–8)
NEUTS SEG NFR BLD: 61 % (ref 32–75)
NRBC # BLD: 0 K/UL (ref 0–0.01)
NRBC BLD-RTO: 0 PER 100 WBC
PHOSPHATE SERPL-MCNC: 4.7 MG/DL (ref 2.6–4.7)
PLATELET # BLD AUTO: 188 K/UL (ref 150–400)
PMV BLD AUTO: 9 FL (ref 8.9–12.9)
POTASSIUM SERPL-SCNC: 4.3 MMOL/L (ref 3.5–5.1)
RBC # BLD AUTO: 2.96 M/UL (ref 3.8–5.2)
SODIUM SERPL-SCNC: 133 MMOL/L (ref 136–145)
WBC # BLD AUTO: 5.1 K/UL (ref 3.6–11)

## 2023-11-15 PROCEDURE — 2500000003 HC RX 250 WO HCPCS: Performed by: HOSPITALIST

## 2023-11-15 PROCEDURE — 2700000000 HC OXYGEN THERAPY PER DAY

## 2023-11-15 PROCEDURE — 83735 ASSAY OF MAGNESIUM: CPT

## 2023-11-15 PROCEDURE — 36415 COLL VENOUS BLD VENIPUNCTURE: CPT

## 2023-11-15 PROCEDURE — 6360000002 HC RX W HCPCS: Performed by: HOSPITALIST

## 2023-11-15 PROCEDURE — 1100000000 HC RM PRIVATE

## 2023-11-15 PROCEDURE — 85025 COMPLETE CBC W/AUTO DIFF WBC: CPT

## 2023-11-15 PROCEDURE — 2580000003 HC RX 258: Performed by: HOSPITALIST

## 2023-11-15 PROCEDURE — 6370000000 HC RX 637 (ALT 250 FOR IP): Performed by: HOSPITALIST

## 2023-11-15 PROCEDURE — 94761 N-INVAS EAR/PLS OXIMETRY MLT: CPT

## 2023-11-15 PROCEDURE — 80048 BASIC METABOLIC PNL TOTAL CA: CPT

## 2023-11-15 PROCEDURE — 84100 ASSAY OF PHOSPHORUS: CPT

## 2023-11-15 PROCEDURE — 93306 TTE W/DOPPLER COMPLETE: CPT

## 2023-11-15 PROCEDURE — 93306 TTE W/DOPPLER COMPLETE: CPT | Performed by: STUDENT IN AN ORGANIZED HEALTH CARE EDUCATION/TRAINING PROGRAM

## 2023-11-15 PROCEDURE — APPSS30 APP SPLIT SHARED TIME 16-30 MINUTES: Performed by: NURSE PRACTITIONER

## 2023-11-15 PROCEDURE — 99232 SBSQ HOSP IP/OBS MODERATE 35: CPT | Performed by: SPECIALIST

## 2023-11-15 RX ADMIN — PRAVASTATIN SODIUM 40 MG: 20 TABLET ORAL at 23:02

## 2023-11-15 RX ADMIN — LEVETIRACETAM 250 MG: 500 TABLET, FILM COATED ORAL at 09:44

## 2023-11-15 RX ADMIN — FLUTICASONE PROPIONATE 2 SPRAY: 50 SPRAY, METERED NASAL at 17:20

## 2023-11-15 RX ADMIN — LEVETIRACETAM 250 MG: 500 TABLET, FILM COATED ORAL at 15:12

## 2023-11-15 RX ADMIN — CALCIUM ACETATE 2001 MG: 667 CAPSULE ORAL at 16:42

## 2023-11-15 RX ADMIN — MONTELUKAST 10 MG: 10 TABLET, FILM COATED ORAL at 17:20

## 2023-11-15 RX ADMIN — CARVEDILOL 6.25 MG: 6.25 TABLET, FILM COATED ORAL at 16:42

## 2023-11-15 RX ADMIN — CALCIUM ACETATE 2001 MG: 667 CAPSULE ORAL at 09:51

## 2023-11-15 RX ADMIN — CALCIUM ACETATE 2001 MG: 667 CAPSULE ORAL at 11:54

## 2023-11-15 RX ADMIN — SODIUM CHLORIDE, PRESERVATIVE FREE 10 ML: 5 INJECTION INTRAVENOUS at 09:51

## 2023-11-15 RX ADMIN — ASPIRIN 81 MG: 81 TABLET, COATED ORAL at 09:46

## 2023-11-15 RX ADMIN — CARVEDILOL 6.25 MG: 6.25 TABLET, FILM COATED ORAL at 09:44

## 2023-11-15 RX ADMIN — HEPARIN SODIUM 5000 UNITS: 5000 INJECTION INTRAVENOUS; SUBCUTANEOUS at 23:02

## 2023-11-15 RX ADMIN — HEPARIN SODIUM 5000 UNITS: 5000 INJECTION INTRAVENOUS; SUBCUTANEOUS at 06:41

## 2023-11-15 RX ADMIN — SODIUM CHLORIDE, PRESERVATIVE FREE 10 ML: 5 INJECTION INTRAVENOUS at 23:06

## 2023-11-15 RX ADMIN — HEPARIN SODIUM 5000 UNITS: 5000 INJECTION INTRAVENOUS; SUBCUTANEOUS at 13:22

## 2023-11-15 NOTE — PLAN OF CARE
Problem: Safety - Adult  Goal: Free from fall injury  Outcome: Progressing     Problem: Chronic Conditions and Co-morbidities  Goal: Patient's chronic conditions and co-morbidity symptoms are monitored and maintained or improved  Outcome: Progressing     Problem: Respiratory - Adult  Goal: Achieves optimal ventilation and oxygenation  11/15/2023 0513 by Libia Carbajal LPN  Outcome: Progressing  11/15/2023 0338 by RT Zacarias  Outcome: Progressing  Flowsheets (Taken 11/15/2023 0338)  Achieves optimal ventilation and oxygenation: Assess for changes in respiratory status

## 2023-11-15 NOTE — PROGRESS NOTES
[address] [facility] Hospitalist Group                                                                               Hospitalist Progress Note  Dejuan Edwards MD          Date of Service:  2023  NAME:  Titus Heller  :  1940  MRN:  [id number]    Please note that this dictation was completed with [employer], the computer voice recognition software. Quite often unanticipated grammatical, syntax, homophones, and other interpretive errors are inadvertently transcribed by the computer software. Please disregard these errors. Please excuse any errors that have escaped final proofreading. Admission Summary:   79 y.o.  female with PMHx chronic diastolic CHF EF 55 to 87%, mild mitral regurgitation, obstructive sleep apnea and moderate pulmonary hypertension on home CPAP, hypertension, end-stage renal disease on hemodialysis , history of seizure, history of stroke, chronic anemia who presents with acute onset of shortness of breath at 9 PM last evening while watching TV. Interval history / Subjective:   Reporting some shortness of breath, no chest pain      Assessment & Plan:     Anticipated discharge date : 11/15  Anticipated disposition : Home   Barriers to discharge : Stress test      Dyspnea with exertion, ?anginal equivalence  Chronic diastolic chf, mild mitral regurgitation and tricuspid regurgitation  --no hypoxia, CXR is clear and lungs clear. EKG reviewed personally sinus with 1st AVB, no ischemia, compared to , LBBB is resolved  --probnp is elevated but not unusual to be elevated ESRD. Previous BNP  8,000-11,000K range    - ECHO and Stress test per cardiology        ESRD on HD TTS  -- nephrology consulted     HTN  --continue amlodipine, coreg.    --continue hydralazine and minoxidil bid only on non dialysis days     SUSAN on home cpap  Moderate pulmonary HTN  --continue home cpap     Hx seizure  --continue keppra 250mg bid, plus additional Status:[id number]:::1}      Medications Reviewed:     Current Facility-Administered Medications   Medication Dose Route Frequency    albumin human 25% IV solution 25 g  25 g IntraVENous PRN    epoetin rakel-epbx (RETACRIT) injection 3,000 Units  3,000 Units SubCUTAneous Once per day on Tue Thu Sat    sodium chloride flush 0.9 % injection 5-40 mL  5-40 mL IntraVENous 2 times per day    sodium chloride flush 0.9 % injection 5-40 mL  5-40 mL IntraVENous PRN    0.9 % sodium chloride infusion   IntraVENous PRN    heparin (porcine) injection 5,000 Units  5,000 Units SubCUTAneous 3 times per day    ondansetron (ZOFRAN-ODT) disintegrating tablet 4 mg  4 mg Oral Q8H PRN    Or    ondansetron (ZOFRAN) injection 4 mg  4 mg IntraVENous Q6H PRN    polyethylene glycol (GLYCOLAX) packet 17 g  17 g Oral Daily PRN    acetaminophen (TYLENOL) tablet 650 mg  650 mg Oral Q6H PRN    Or    acetaminophen (TYLENOL) suppository 650 mg  650 mg Rectal Q6H PRN    [Held by provider] amLODIPine (NORVASC) tablet 10 mg  10 mg Oral QPM    aspirin EC tablet 81 mg  81 mg Oral Daily    calcium acetate (PHOSLO) capsule 2,001 mg  3 capsule Oral TID WC    carvedilol (COREG) tablet 6.25 mg  6.25 mg Oral BID WC    fluticasone (FLONASE) 50 MCG/ACT nasal spray 2 spray  2 spray Nasal QPM    levETIRAcetam (KEPPRA) tablet 250 mg  250 mg Oral BID    levETIRAcetam (KEPPRA) tablet 250 mg  250 mg Oral Once per day on Tue Thu Sat    montelukast (SINGULAIR) tablet 10 mg  10 mg Oral QPM    pravastatin (PRAVACHOL) tablet 40 mg  40 mg Oral QHS    artificial tears (dextran-hypromellose-glycerin) ophthalmic solution 2 drop  2 drop Both Eyes PRN    [Held by provider] minoxidil (LONITEN) tablet 2.5 mg  2.5 mg Oral 2 times per day on Sun Mon Wed Fri    [Held by provider] hydrALAZINE (APRESOLINE) tablet 100 mg  100 mg Oral 2 times per day on Sun Mon Wed Fri    midodrine (PROAMATINE) tablet 5 mg  5 mg Oral TID PRN

## 2023-11-15 NOTE — PROGRESS NOTES
HTN  --continue home cpap     Hx seizure  --continue keppra 250mg bid, plus additional 250mg tab on dialysis days after dialysis     Hx CVA  --continue aspirin        Code status: Full   Prophylaxis: Lovenox   Care Plan discussed with: patient, RN, CM   Anticipated Disposition:   Inpatient  Cardiac monitoring: Telemetry               Social Determinants of Health     Tobacco Use: Medium Risk (11/14/2023)    Patient History     Smoking Tobacco Use: Passive Smoke Exposure - Never Smoker     Smokeless Tobacco Use: Never     Passive Exposure: Yes   Alcohol Use: Not on file   Financial Resource Strain: Not on file   Food Insecurity: No Food Insecurity (11/14/2023)    Hunger Vital Sign     Worried About Running Out of Food in the Last Year: Never true     Ran Out of Food in the Last Year: Never true   Transportation Needs: No Transportation Needs (11/15/2023)    Transportation Problems [facility] HRSN)     In the past 12 months, has lack of reliable transportation kept you from medical appointments, meetings, work or from getting things needed for daily living?: Not on file   Physical Activity: Not on file   Stress: Not on file   Social Connections: Not on file   Intimate Partner Violence: Not on file   Depression: Not at risk (9/14/2022)    PHQ-2     PHQ-2 Score: 0   Housing Stability: [address]  (11/14/2023)    Housing Stability Vital Sign     Unable to Pay for Housing in the Last Year: No     Number of [address] in the Last Year: 1     Unstable Housing in the Last Year: No   Interpersonal Safety: Not At Risk (11/14/2023)    Interpersonal Safety ([address])     How often does anyone, including family and friends, physically hurt you?: Never   Utilities: Not At Risk (11/14/2023)    [address] with loss of utilities: No       Review of Systems:       REVIEW OF SYSTEMS:        I am not able to complete the review of systems because:    The patient is intubated and sedated    The patient has altered mental Fri    midodrine (PROAMATINE) tablet 5 mg  5 mg Oral TID PRN     ______________________________________________________________________  EXPECTED LENGTH OF STAY: 4  ACTUAL LENGTH OF STAY:          1                 Verónica Metcalf MD

## 2023-11-15 NOTE — CARE COORDINATION
11/15/2023  CARE MANAGEMENT NOTE:  CM reviewed EMR and handoff received from previous  Mayra Dorman). Pt was admitted with dyspnea, HTN, SUSAN on Cpap, and ESRD on HD. Reportedly, pt lives alone in 2nd floor apt. RUR 19%    Transition Plan of Care:  Cardiology, Nephrology following for medical management  Plan is for pt to return home where she resides alone. ESRD - pt goes to HD at [facility] T,T,S  Outpatient follow up  Family will transport pt home    CM will continue to follow pt until discharged.   Don

## 2023-11-15 NOTE — PROGRESS NOTES
[address]  YOB: 1940          Assessment & Plan:     ESRD on HD TTS at [facility]  DUKES  Hypotension  PHTN  SUSAN  Anemia    Rec:  HD yest gasper well  Plan for HD TTS  JP TTS       Subjective:   CC: SOB, ESRD  HPI: Feeling better. Had echo today.  HD yesterday  Current Facility-Administered Medications   Medication Dose Route Frequency    albumin human 25% IV solution 25 g  25 g IntraVENous PRN    epoetin rakel-epbx (RETACRIT) injection 3,000 Units  3,000 Units SubCUTAneous Once per day on Tue Thu Sat    sodium chloride flush 0.9 % injection 5-40 mL  5-40 mL IntraVENous 2 times per day    sodium chloride flush 0.9 % injection 5-40 mL  5-40 mL IntraVENous PRN    0.9 % sodium chloride infusion   IntraVENous PRN    heparin (porcine) injection 5,000 Units  5,000 Units SubCUTAneous 3 times per day    ondansetron (ZOFRAN-ODT) disintegrating tablet 4 mg  4 mg Oral Q8H PRN    Or    ondansetron (ZOFRAN) injection 4 mg  4 mg IntraVENous Q6H PRN    polyethylene glycol (GLYCOLAX) packet 17 g  17 g Oral Daily PRN    acetaminophen (TYLENOL) tablet 650 mg  650 mg Oral Q6H PRN    Or    acetaminophen (TYLENOL) suppository 650 mg  650 mg Rectal Q6H PRN    [Held by provider] amLODIPine (NORVASC) tablet 10 mg  10 mg Oral QPM    aspirin EC tablet 81 mg  81 mg Oral Daily    calcium acetate (PHOSLO) capsule 2,001 mg  3 capsule Oral TID     carvedilol (COREG) tablet 6.25 mg  6.25 mg Oral BID     fluticasone (FLONASE) 50 MCG/ACT nasal spray 2 spray  2 spray Nasal QPM    levETIRAcetam (KEPPRA) tablet 250 mg  250 mg Oral BID    levETIRAcetam (KEPPRA) tablet 250 mg  250 mg Oral Once per day on Tue Thu Sat    montelukast (SINGULAIR) tablet 10 mg  10 mg Oral QPM    pravastatin (PRAVACHOL) tablet 40 mg  40 mg Oral QHS    artificial tears (dextran-hypromellose-glycerin) ophthalmic solution 2 drop  2 drop Both Eyes PRN    [Held by provider] minoxidil (LONITEN) tablet

## 2023-11-15 NOTE — PLAN OF CARE
Problem: Safety - Adult  Goal: Free from fall injury  Outcome: Progressing     Problem: Chronic Conditions and Co-morbidities  Goal: Patient's chronic conditions and co-morbidity symptoms are monitored and maintained or improved  Outcome: Progressing     Problem: Respiratory - Adult  Goal: Achieves optimal ventilation and oxygenation  11/15/2023 0513 by Nai Guerrero LPN  Outcome: Progressing  11/15/2023 0338 by Willy Zapata RT  Outcome: Progressing  Flowsheets (Taken 11/15/2023 0338)  Achieves optimal ventilation and oxygenation: Assess for changes in respiratory status

## 2023-11-15 NOTE — PROGRESS NOTES
[facility]  Cardiology Care Note                  []Initial visit     [x]Established visit     Marko Moreira MD, [facility] [address], [place]  Phone [phone]; Fax [phone]  Mobile [phone]   Voice Mail [phone]      Patient Name: Juan Pablo Alvarado - :1940 - HUT:[id number]  Primary Cardiologist:  Brandon William  Consulting Cardiologist: Jessica Moreira MD       2023 11:02 PM    Reason for initial visit:Acute onset of SOB at 9pm. Was dialyzed on Saturday. HPI:       Leanne Kennedy is a 79 y.o.  female followed by . she has a PMHx significant for diastolic CHF, HTN, ESRD, MR and pulmonary hypertension on echo in  on TTS dialysis. She also has hx of SUSAN. In ER initial sats were 99%. no lower extremity edema. She states last time she was admitted to hospital for heart failure was in . This episode was sudden. She has no orthopnea and no PND. She does feel that she did not have the usual amount of fluid removed during  her dialysis on Saturday     She states she fatigued. Discussed with her cath for tomorrow and she is in agreement and will also discuss with her son. Cardiac risk factors: advanced age (older than 54 for men, 72 for women), dyslipidemia, hypertension, obesity (BMI >= 30 kg/m2), and sedentary lifestyle.        Assessment  and  Plan     1)Acute SOB with elevated pBNP  -unclear provoking event  -BNP elevated however higher in the past.  -echo pending  -stress test abnormal, needs cath - on for tomorrow at 11:45 am  -NPO after midnight   2)HTN  3)SUSAN  -wears religiously  4)Hx CVA  5)ESRD on HD   6) Obesity  7) Dyslipidemia  -on pravachol and last LDL was 53 in May 2021            K 4.6, creatinine 6.29 , pBNP 4821, H/H 10/32 , CXR no acute findings ____________________________________________________________    Cardiac work up to MD Reinier Pan, APRN - NP  [facility] [address] [place] [address] [phone]  [facility]  [phone]      I have discussed the diagnosis with the patient and the intended plan as seen in the above orders. Questions were answered concerning future plans. I have discussed medication side effects and warnings with the patient as well. Gonzales Jean Baptiste is in agreement to the plan listed above and wishes to proceed. she  was instructed not to smoke, eat heart healthy diet  and to exercise. Thank you for the consult and the opportunity to be involved in the care of Gonzales Jean Baptiste. Opal Chowdary MD    ATTENTION:   This medical record was transcribed using an electronic medical records/speech recognition system. Although proofread, it may and can contain electronic, spelling and other errors. Corrections may be executed at a later time. Please feel free to contact us for any clarifications as needed.

## 2023-11-16 LAB
ANION GAP SERPL CALC-SCNC: 7 MMOL/L (ref 5–15)
BASOPHILS # BLD: 0 K/UL (ref 0–0.1)
BASOPHILS NFR BLD: 1 % (ref 0–1)
BUN SERPL-MCNC: 54 MG/DL (ref 6–20)
BUN/CREAT SERPL: 7 (ref 12–20)
CALCIUM SERPL-MCNC: 8.3 MG/DL (ref 8.5–10.1)
CHLORIDE SERPL-SCNC: 99 MMOL/L (ref 97–108)
CO2 SERPL-SCNC: 27 MMOL/L (ref 21–32)
CREAT SERPL-MCNC: 7.47 MG/DL (ref 0.55–1.02)
DIFFERENTIAL METHOD BLD: ABNORMAL
ECHO BSA: 1.82 M2
EOSINOPHIL # BLD: 0.4 K/UL (ref 0–0.4)
EOSINOPHIL NFR BLD: 6 % (ref 0–7)
ERYTHROCYTE [DISTWIDTH] IN BLOOD BY AUTOMATED COUNT: 13.9 % (ref 11.5–14.5)
GLUCOSE SERPL-MCNC: 103 MG/DL (ref 65–100)
HCT VFR BLD AUTO: 27.4 % (ref 35–47)
HGB BLD-MCNC: 8.8 G/DL (ref 11.5–16)
IMM GRANULOCYTES # BLD AUTO: 0 K/UL (ref 0–0.04)
IMM GRANULOCYTES NFR BLD AUTO: 0 % (ref 0–0.5)
LYMPHOCYTES # BLD: 1.3 K/UL (ref 0.8–3.5)
LYMPHOCYTES NFR BLD: 22 % (ref 12–49)
MAGNESIUM SERPL-MCNC: 2.4 MG/DL (ref 1.6–2.4)
MCH RBC QN AUTO: 31.4 PG (ref 26–34)
MCHC RBC AUTO-ENTMCNC: 32.1 G/DL (ref 30–36.5)
MCV RBC AUTO: 97.9 FL (ref 80–99)
MONOCYTES # BLD: 1 K/UL (ref 0–1)
MONOCYTES NFR BLD: 16 % (ref 5–13)
NEUTS SEG # BLD: 3.3 K/UL (ref 1.8–8)
NEUTS SEG NFR BLD: 55 % (ref 32–75)
NRBC # BLD: 0 K/UL (ref 0–0.01)
NRBC BLD-RTO: 0 PER 100 WBC
PHOSPHATE SERPL-MCNC: 5.4 MG/DL (ref 2.6–4.7)
PLATELET # BLD AUTO: 191 K/UL (ref 150–400)
PMV BLD AUTO: 9.1 FL (ref 8.9–12.9)
POTASSIUM SERPL-SCNC: 5.1 MMOL/L (ref 3.5–5.1)
RBC # BLD AUTO: 2.8 M/UL (ref 3.8–5.2)
SODIUM SERPL-SCNC: 133 MMOL/L (ref 136–145)
WBC # BLD AUTO: 6 K/UL (ref 3.6–11)

## 2023-11-16 PROCEDURE — 6360000004 HC RX CONTRAST MEDICATION: Performed by: STUDENT IN AN ORGANIZED HEALTH CARE EDUCATION/TRAINING PROGRAM

## 2023-11-16 PROCEDURE — 2580000003 HC RX 258: Performed by: HOSPITALIST

## 2023-11-16 PROCEDURE — 76937 US GUIDE VASCULAR ACCESS: CPT | Performed by: STUDENT IN AN ORGANIZED HEALTH CARE EDUCATION/TRAINING PROGRAM

## 2023-11-16 PROCEDURE — B2111ZZ FLUOROSCOPY OF MULTIPLE CORONARY ARTERIES USING LOW OSMOLAR CONTRAST: ICD-10-PCS | Performed by: STUDENT IN AN ORGANIZED HEALTH CARE EDUCATION/TRAINING PROGRAM

## 2023-11-16 PROCEDURE — 85025 COMPLETE CBC W/AUTO DIFF WBC: CPT

## 2023-11-16 PROCEDURE — 94761 N-INVAS EAR/PLS OXIMETRY MLT: CPT

## 2023-11-16 PROCEDURE — 84100 ASSAY OF PHOSPHORUS: CPT

## 2023-11-16 PROCEDURE — 6360000002 HC RX W HCPCS: Performed by: INTERNAL MEDICINE

## 2023-11-16 PROCEDURE — 6370000000 HC RX 637 (ALT 250 FOR IP): Performed by: HOSPITALIST

## 2023-11-16 PROCEDURE — 83735 ASSAY OF MAGNESIUM: CPT

## 2023-11-16 PROCEDURE — 6360000002 HC RX W HCPCS: Performed by: STUDENT IN AN ORGANIZED HEALTH CARE EDUCATION/TRAINING PROGRAM

## 2023-11-16 PROCEDURE — 2709999900 HC NON-CHARGEABLE SUPPLY: Performed by: STUDENT IN AN ORGANIZED HEALTH CARE EDUCATION/TRAINING PROGRAM

## 2023-11-16 PROCEDURE — 1100000000 HC RM PRIVATE

## 2023-11-16 PROCEDURE — 36415 COLL VENOUS BLD VENIPUNCTURE: CPT

## 2023-11-16 PROCEDURE — 2500000003 HC RX 250 WO HCPCS: Performed by: STUDENT IN AN ORGANIZED HEALTH CARE EDUCATION/TRAINING PROGRAM

## 2023-11-16 PROCEDURE — C1894 INTRO/SHEATH, NON-LASER: HCPCS | Performed by: STUDENT IN AN ORGANIZED HEALTH CARE EDUCATION/TRAINING PROGRAM

## 2023-11-16 PROCEDURE — 4A023N7 MEASUREMENT OF CARDIAC SAMPLING AND PRESSURE, LEFT HEART, PERCUTANEOUS APPROACH: ICD-10-PCS | Performed by: STUDENT IN AN ORGANIZED HEALTH CARE EDUCATION/TRAINING PROGRAM

## 2023-11-16 PROCEDURE — 2580000003 HC RX 258: Performed by: STUDENT IN AN ORGANIZED HEALTH CARE EDUCATION/TRAINING PROGRAM

## 2023-11-16 PROCEDURE — 93458 L HRT ARTERY/VENTRICLE ANGIO: CPT | Performed by: STUDENT IN AN ORGANIZED HEALTH CARE EDUCATION/TRAINING PROGRAM

## 2023-11-16 PROCEDURE — 6360000002 HC RX W HCPCS: Performed by: HOSPITALIST

## 2023-11-16 PROCEDURE — 2500000003 HC RX 250 WO HCPCS: Performed by: HOSPITALIST

## 2023-11-16 PROCEDURE — 80048 BASIC METABOLIC PNL TOTAL CA: CPT

## 2023-11-16 PROCEDURE — C1769 GUIDE WIRE: HCPCS | Performed by: STUDENT IN AN ORGANIZED HEALTH CARE EDUCATION/TRAINING PROGRAM

## 2023-11-16 RX ORDER — SODIUM CHLORIDE 0.9 % (FLUSH) 0.9 %
5-40 SYRINGE (ML) INJECTION PRN
Status: DISCONTINUED | OUTPATIENT
Start: 2023-11-16 | End: 2023-11-17 | Stop reason: HOSPADM

## 2023-11-16 RX ORDER — SODIUM CHLORIDE 0.9 % (FLUSH) 0.9 %
5-40 SYRINGE (ML) INJECTION EVERY 12 HOURS SCHEDULED
Status: DISCONTINUED | OUTPATIENT
Start: 2023-11-16 | End: 2023-11-17 | Stop reason: HOSPADM

## 2023-11-16 RX ORDER — FENTANYL CITRATE 50 UG/ML
INJECTION, SOLUTION INTRAMUSCULAR; INTRAVENOUS PRN
Status: DISCONTINUED | OUTPATIENT
Start: 2023-11-16 | End: 2023-11-16 | Stop reason: HOSPADM

## 2023-11-16 RX ORDER — HEPARIN SODIUM 200 [USP'U]/100ML
INJECTION, SOLUTION INTRAVENOUS PRN
Status: DISCONTINUED | OUTPATIENT
Start: 2023-11-16 | End: 2023-11-16 | Stop reason: HOSPADM

## 2023-11-16 RX ORDER — ACETAMINOPHEN 325 MG/1
650 TABLET ORAL EVERY 4 HOURS PRN
Status: DISCONTINUED | OUTPATIENT
Start: 2023-11-16 | End: 2023-11-17 | Stop reason: HOSPADM

## 2023-11-16 RX ORDER — LIDOCAINE HYDROCHLORIDE 10 MG/ML
INJECTION, SOLUTION INFILTRATION; PERINEURAL PRN
Status: DISCONTINUED | OUTPATIENT
Start: 2023-11-16 | End: 2023-11-16 | Stop reason: HOSPADM

## 2023-11-16 RX ORDER — SODIUM CHLORIDE 9 MG/ML
INJECTION, SOLUTION INTRAVENOUS PRN
Status: DISCONTINUED | OUTPATIENT
Start: 2023-11-16 | End: 2023-11-17 | Stop reason: HOSPADM

## 2023-11-16 RX ADMIN — SODIUM CHLORIDE, PRESERVATIVE FREE 10 ML: 5 INJECTION INTRAVENOUS at 23:55

## 2023-11-16 RX ADMIN — LEVETIRACETAM 250 MG: 500 TABLET, FILM COATED ORAL at 17:11

## 2023-11-16 RX ADMIN — CARVEDILOL 6.25 MG: 6.25 TABLET, FILM COATED ORAL at 17:11

## 2023-11-16 RX ADMIN — EPOETIN ALFA-EPBX 3000 UNITS: 4000 INJECTION, SOLUTION INTRAVENOUS; SUBCUTANEOUS at 17:15

## 2023-11-16 RX ADMIN — PRAVASTATIN SODIUM 40 MG: 20 TABLET ORAL at 23:54

## 2023-11-16 RX ADMIN — FLUTICASONE PROPIONATE 2 SPRAY: 50 SPRAY, METERED NASAL at 17:12

## 2023-11-16 RX ADMIN — HEPARIN SODIUM 5000 UNITS: 5000 INJECTION INTRAVENOUS; SUBCUTANEOUS at 06:43

## 2023-11-16 RX ADMIN — CALCIUM ACETATE 2001 MG: 667 CAPSULE ORAL at 17:11

## 2023-11-16 RX ADMIN — MONTELUKAST 10 MG: 10 TABLET, FILM COATED ORAL at 17:11

## 2023-11-16 RX ADMIN — LEVETIRACETAM 250 MG: 500 TABLET, FILM COATED ORAL at 23:54

## 2023-11-16 RX ADMIN — HEPARIN SODIUM 5000 UNITS: 5000 INJECTION INTRAVENOUS; SUBCUTANEOUS at 23:55

## 2023-11-16 ASSESSMENT — PAIN SCALES - GENERAL: PAINLEVEL_OUTOF10: 0

## 2023-11-16 NOTE — PROGRESS NOTES
Cardiology Update:     Pt had LHC performed this morning which revealed calcified CAD without any critical stenosis. No intervention required. Ok to d/c home from cardiology standpoint on current meds. She should follow up with Dr. Michelle Warren on d/c.

## 2023-11-16 NOTE — PROGRESS NOTES
10:43 AM    TRANSFER - IN REPORT:    Verbal report received from [facility]  on [facility]  being received from Cath Lab  for routine post-op      Report consisted of patient's Situation, Background, Assessment and   Recommendations(SBAR). Information from the following report(s) Nurse Handoff Report was reviewed with the receiving nurse. Opportunity for questions and clarification was provided. Assessment completed upon patient's arrival to unit and care assumed. 10:42 AM    Blood aspirated from sheath then arterial sheath pulled 6 Fr R Groin. Arun [place] applied. Manual pressure held by [employer]. 10:52 AM    Hemostasis achieved at 10:52 am. Dressing applied. Pt voices understanding of post procedure bedrest instructions. 11:08 AM    TRANSFER - OUT REPORT:    Verbal report given to [facility] RN on [facility]  being transferred to 5th floor for routine progression of patient care       Report consisted of patient's Situation, Background, Assessment and   Recommendations(SBAR). Information from the following report(s) Nurse Handoff Report was reviewed with the receiving nurse. Lines:   Peripheral IV 11/13/23 Right Forearm (Active)   Site Assessment Clean, dry & intact 11/16/23 0420   Line Status Normal saline locked 11/16/23 0420   Line Care Connections checked and tightened 11/16/23 0420   Phlebitis Assessment No symptoms 11/16/23 0420   Infiltration Assessment 0 11/16/23 0420   Alcohol Cap Used Yes 11/16/23 0420   Dressing Status Clean, dry & intact 11/16/23 0420   Dressing Type Transparent 11/16/23 0420        Opportunity for questions and clarification was provided. Patient transported with:  Registered Nurse    11:15 AM    Patient taken back to room 535. [facility] RN at bedside. Groin site assessed. Tele box applied.

## 2023-11-16 NOTE — DISCHARGE SUMMARY
CVA  --continue aspirin       DIET: cardiac diet    ACTIVITY: activity as tolerated      DISCHARGE MEDICATIONS:     Medication List        CONTINUE taking these medications      aspirin 81 MG EC tablet     calcium acetate 667 MG Caps capsule  Commonly known as: PHOSLO     carvedilol 6.25 MG tablet  Commonly known as: COREG     fish oil 1000 MG capsule     fluticasone 50 MCG/ACT nasal spray  Commonly known as: FLONASE     hydrALAZINE 100 MG tablet  Commonly known as: APRESOLINE     levETIRAcetam 250 MG tablet  Commonly known as: KEPPRA  Take one tablet in morning and evening. After dialysis session, take one additional tablet. 90 day prescription     minoxidil 2.5 MG tablet  Commonly known as: LONITEN     montelukast 10 MG tablet  Commonly known as: SINGULAIR     Polyvinyl Alcohol-Povidone PF 1.4-0.6 % Soln ophthalmic solution  Commonly known as: REFRESH     pravastatin 40 MG tablet  Commonly known as: PRAVACHOL            STOP taking these medications      amLODIPine 10 MG tablet  Commonly known as: NORVASC                NOTIFY YOUR PHYSICIAN FOR ANY OF THE FOLLOWING:   Fever over 101 degrees for 24 hours. Chest pain, shortness of breath, fever, chills, nausea, vomiting, diarrhea, change in mentation, falling, weakness, bleeding. Severe pain or pain not relieved by medications. Or, any other signs or symptoms that you may have questions about.     DISPOSITION:    Home With: x   OT  PT  HH  RN       Long term SNF/Inpatient Rehab    Independent/assisted living    Hospice    Other:       PATIENT CONDITION AT DISCHARGE:     Functional status    Poor    x Deconditioned     Independent      Cognition    x Lucid     Forgetful     Dementia      Catheters/lines (plus indication)    Hernandez     PICC     PEG    x None      Code status    x Full code     DNR      PHYSICAL EXAMINATION AT DISCHARGE:    General : alert x 3, awake, no acute distress,   HEENT: PEERL, EOMI, moist mucus membrane  Neck: supple, no JVD, no meningeal

## 2023-11-16 NOTE — DISCHARGE INSTRUCTIONS
Patient Discharge Instructions    Michael Hair / [id number] : 1940    Admitted 2023 Discharged: 2023     Take Home Medications     [unfilled]     It is important that you take the medication exactly as they are prescribed. Keep your medication in the bottles provided by the pharmacist and keep a list of the medication names, dosages, and times to be taken in your wallet. Do not take other medications without consulting your doctor. BRING ALL OF YOUR MEDICINES TO YOUR OFFICE VISIT with Dr. Carey Sites Follow-up with VIN Bruce NP in {:46470} { day/week/month:56527}      Cardiac Catheterization  Discharge Instructions    Do not drive, operate any machinery, or sign any legal documents for 24 hours after your procedure. You must have someone to drive you home. You may take a shower 24 hours after your cardiac catheterization. Be sure to get the dressing wet and then remove it; gently wash the area with warm soapy water. Pat dry and leave open to air. To help prevent infections, be sure to keep the cath site clean and dry. No lotions, creams, powders, ointments, etc. in the cath site for approximately 1 week. Do not take a tub bath, get in a hot tub or swimming pool for approximately 5 days or until the cath site is completely healed. No strenuous activity or heavy lifting over 10 lbs. for 7 days. Drink plenty of fluids for 24-48 hours after your cath to flush the contrast dye from your kidneys. No alcoholic beverages for 24 hours. You may resume your previous diet (low fat, low cholesterol) after your cath. After your cath, some bruising or discomfort is common during the healing process. Tylenol, 1-2 tablets every 6 hours as needed, is recommended if you experience any discomfort.   If you experience any signs or symptoms of infection such as fever, chills, or poorly healing incision, persistent tenderness or swelling in the groin, redness and/or

## 2023-11-16 NOTE — CARE COORDINATION
11/16/23  2:31 PM    CM noted patient to dc home today. Family will transport at dc. No CM needs noted.     [facility]

## 2023-11-17 VITALS
HEIGHT: 61 IN | RESPIRATION RATE: 18 BRPM | SYSTOLIC BLOOD PRESSURE: 184 MMHG | OXYGEN SATURATION: 95 % | BODY MASS INDEX: 32.1 KG/M2 | TEMPERATURE: 98.6 F | WEIGHT: 170 LBS | HEART RATE: 56 BPM | DIASTOLIC BLOOD PRESSURE: 64 MMHG

## 2023-11-17 PROCEDURE — 2580000003 HC RX 258: Performed by: STUDENT IN AN ORGANIZED HEALTH CARE EDUCATION/TRAINING PROGRAM

## 2023-11-17 PROCEDURE — 6360000002 HC RX W HCPCS: Performed by: HOSPITALIST

## 2023-11-17 PROCEDURE — 2580000003 HC RX 258: Performed by: HOSPITALIST

## 2023-11-17 PROCEDURE — 2500000003 HC RX 250 WO HCPCS: Performed by: HOSPITALIST

## 2023-11-17 PROCEDURE — 6370000000 HC RX 637 (ALT 250 FOR IP): Performed by: HOSPITALIST

## 2023-11-17 PROCEDURE — 2700000000 HC OXYGEN THERAPY PER DAY

## 2023-11-17 PROCEDURE — 94761 N-INVAS EAR/PLS OXIMETRY MLT: CPT

## 2023-11-17 RX ADMIN — ASPIRIN 81 MG: 81 TABLET, COATED ORAL at 08:57

## 2023-11-17 RX ADMIN — SODIUM CHLORIDE, PRESERVATIVE FREE 10 ML: 5 INJECTION INTRAVENOUS at 08:59

## 2023-11-17 RX ADMIN — CARVEDILOL 6.25 MG: 6.25 TABLET, FILM COATED ORAL at 08:56

## 2023-11-17 RX ADMIN — SODIUM CHLORIDE, PRESERVATIVE FREE 10 ML: 5 INJECTION INTRAVENOUS at 08:58

## 2023-11-17 RX ADMIN — CALCIUM ACETATE 2001 MG: 667 CAPSULE ORAL at 08:56

## 2023-11-17 RX ADMIN — LEVETIRACETAM 250 MG: 500 TABLET, FILM COATED ORAL at 08:57

## 2023-11-17 RX ADMIN — HEPARIN SODIUM 5000 UNITS: 5000 INJECTION INTRAVENOUS; SUBCUTANEOUS at 06:59

## 2023-11-17 RX ADMIN — CALCIUM ACETATE 2001 MG: 667 CAPSULE ORAL at 12:23

## 2023-11-17 ASSESSMENT — PAIN SCALES - GENERAL
PAINLEVEL_OUTOF10: 0
PAINLEVEL_OUTOF10: 0

## 2023-11-17 NOTE — PROGRESS NOTES
[address] [facility]                                                                               Hospitalist Progress Note  Geovanny Espinosa MD          Date of Service:  2023  NAME:  Yamila Escalante  :  1940  MRN:  [id number]    Please note that this dictation was completed with [employer], the computer voice recognition software. Quite often unanticipated grammatical, syntax, homophones, and other interpretive errors are inadvertently transcribed by the computer software. Please disregard these errors. Please excuse any errors that have escaped final proofreading. Admission Summary:   79 y.o.  female with PMHx chronic diastolic CHF EF 55 to 15%, mild mitral regurgitation, obstructive sleep apnea and moderate pulmonary hypertension on home CPAP, hypertension, end-stage renal disease on hemodialysis , history of seizure, history of stroke, chronic anemia who presents with acute onset of shortness of breath at 9 PM last evening while watching TV. Interval history / Subjective:   No chest pain or shortness of breath     Assessment & Plan:     Anticipated discharge date :   Anticipated disposition : Home   Barriers to discharge : Dialysis pending     Dyspnea with exertion  Chronic diastolic chf, mild mitral regurgitation and tricuspid regurgitation  no hypoxia, CXR is clear and lungs clear. EKG reviewed personally sinus with 1st AVB, no ischemia, compared to , LBBB is resolved. probnp is elevated but not unusual to be elevated ESRD. Previous BNP  8,000-11,000K range  Stress test showed reversible myocardial ischemia. Cardiac cath showed calcified CAD without critical stenosis. Dyspnea was possibly due to transient pulmonary edema, unclear at the time of discharge. ESRD on HD TTS  -- nephrology following  - HD due for today prior to discharge     HTN  --continue amlodipine, coreg.    --continue hydralazine and levETIRAcetam (KEPPRA) tablet 250 mg  250 mg Oral Once per day on Tue Thu Sat    montelukast (SINGULAIR) tablet 10 mg  10 mg Oral QPM    pravastatin (PRAVACHOL) tablet 40 mg  40 mg Oral QHS    artificial tears (dextran-hypromellose-glycerin) ophthalmic solution 2 drop  2 drop Both Eyes PRN    [Held by provider] minoxidil (LONITEN) tablet 2.5 mg  2.5 mg Oral 2 times per day on Sun Mon Wed Fri    [Held by provider] hydrALAZINE (APRESOLINE) tablet 100 mg  100 mg Oral 2 times per day on Sun Mon Wed Fri    midodrine (PROAMATINE) tablet 5 mg  5 mg Oral TID PRN     ______________________________________________________________________  EXPECTED LENGTH OF STAY: 5  ACTUAL LENGTH OF STAY:          3                 Verónica Metcalf MD

## 2023-11-17 NOTE — PLAN OF CARE
Problem: Safety - Adult  Goal: Free from fall injury  Outcome: Progressing     Problem: Chronic Conditions and Co-morbidities  Goal: Patient's chronic conditions and co-morbidity symptoms are monitored and maintained or improved  Outcome: Progressing     Problem: Respiratory - Adult  Goal: Achieves optimal ventilation and oxygenation  Outcome: Progressing     Problem: Skin/Tissue Integrity  Goal: Absence of new skin breakdown  Description: 1. Monitor for areas of redness and/or skin breakdown  2. Assess vascular access sites hourly  3. Every 4-6 hours minimum:  Change oxygen saturation probe site  4. Every 4-6 hours:  If on nasal continuous positive airway pressure, respiratory therapy assess nares and determine need for appliance change or resting period.   Outcome: Progressing

## 2023-11-17 NOTE — CARE COORDINATION
11/17/23  10:12 AM    CM noted patient did not dc yesterday as HD did not start until very late yesterday and patient was not able to dc after. Patient will discharge home today.     [facility]

## 2023-11-17 NOTE — PROGRESS NOTES
[address] [facility]    Yamila Escalante  YOB: 1940          Assessment & Plan:     ESRD on HD TTS at [facility]  DUKES  Hypotension  PHTN  SUSAN  Anemia    Rec:  HD last PM gasper well  Next HD tomorrow and TTS  JP TTS  OK for d/c from renal standpoint       Subjective:   CC: SOB, ESRD  HPI: HD last pm.   Current Facility-Administered Medications   Medication Dose Route Frequency    sodium chloride flush 0.9 % injection 5-40 mL  5-40 mL IntraVENous 2 times per day    sodium chloride flush 0.9 % injection 5-40 mL  5-40 mL IntraVENous PRN    0.9 % sodium chloride infusion   IntraVENous PRN    acetaminophen (TYLENOL) tablet 650 mg  650 mg Oral Q4H PRN    albumin human 25% IV solution 25 g  25 g IntraVENous PRN    epoetin rakel-epbx (RETACRIT) injection 3,000 Units  3,000 Units SubCUTAneous Once per day on Tue Thu Sat    sodium chloride flush 0.9 % injection 5-40 mL  5-40 mL IntraVENous 2 times per day    sodium chloride flush 0.9 % injection 5-40 mL  5-40 mL IntraVENous PRN    0.9 % sodium chloride infusion   IntraVENous PRN    heparin (porcine) injection 5,000 Units  5,000 Units SubCUTAneous 3 times per day    ondansetron (ZOFRAN-ODT) disintegrating tablet 4 mg  4 mg Oral Q8H PRN    Or    ondansetron (ZOFRAN) injection 4 mg  4 mg IntraVENous Q6H PRN    polyethylene glycol (GLYCOLAX) packet 17 g  17 g Oral Daily PRN    acetaminophen (TYLENOL) tablet 650 mg  650 mg Oral Q6H PRN    Or    acetaminophen (TYLENOL) suppository 650 mg  650 mg Rectal Q6H PRN    [Held by provider] amLODIPine (NORVASC) tablet 10 mg  10 mg Oral QPM    aspirin EC tablet 81 mg  81 mg Oral Daily    calcium acetate (PHOSLO) capsule 2,001 mg  3 capsule Oral TID WC    carvedilol (COREG) tablet 6.25 mg  6.25 mg Oral BID WC    fluticasone (FLONASE) 50 MCG/ACT nasal spray 2 spray  2 spray Nasal QPM    levETIRAcetam (KEPPRA) tablet 250 mg  250 mg Oral BID    levETIRAcetam (KEPPRA) tablet 250 mg  250 mg Oral

## 2023-11-17 NOTE — PROGRESS NOTES
1600: Dr. Eliza Cowden notified that patient has still not started dialysis and if she stated now would still not end until late--  1900: HD nurse now at bedside.  Patient and son aware that discharge will be tomorrow as it will not end until around 11pm.

## 2023-11-22 NOTE — PROGRESS NOTES
Physician Progress Note      PATIENT:               Rosalio Devi  CSN #:                  [id number]  :                       1940  ADMIT DATE:       2023 11:02 PM  [address] [facility] DATE:        2023 3:44 PM  RESPONDING  PROVIDER #:        Nathaniel Suarez MD          QUERY TEXT:    Good afternoon. Pt admitted with dyspnea. Pt noted to have transient pulmonary edema in    DC summary. If possible, please document in the progress notes and discharge summary if   you are evaluating and/or treating any of the following: The medical record reflects the following:    Risk Factors: 79 yr old female, ESRD--HD, HTN, SUSAN, hx of seizure, hx of CVA,   diastolic CHF, mild mitral valve regurgitation, tricuspid regurgitation. Clinical Indicators: from  DC summary: \"Dyspnea was possibly due to   transient pulmonary edema, unclear at the time of discharge. \"; CXR: No acute   findings; Echo: \"Left? Ventricle: Normal left ventricular systolic function. EF   by 2D Simpsons Biplane is 72%. Left ventricle size is normal. Mildly   increased wall thickness. Findings consistent with concentric hypertrophy. Normal wall motion. Diastolic function present with increased LAP with normal   LV EF. Aortic? Valve: Mildly thickened cusp. Mild sclerosis of the aortic valve   cusp. Mitral?Valve: Mildly thickened leaflet. Mild regurgitation. Right? Atrium: Right atrium is moderately dilated. \"; LHC:   Calcified CAD   without any critical stenosis, Mildly elevated lvedp'  Cardiac consult:   \"Acute SOB with elevated pBNP  -unclear provoking event -BNP elevated however higher in the past.-she states   she had stress test with in the last 6 mo with  -will order echo to   look at MR and pulmonary Hypertension -is this an anginal equivalent and if so   will need another perfusion study\"; BNP: 4,821      Treatment: Echo, Cardiology consult, CXR, LHC, labs      Thank you,  [place] BOAZ Hubbard, CDI  [facility]

## 2024-02-11 ENCOUNTER — APPOINTMENT (OUTPATIENT)
Facility: HOSPITAL | Age: 84
DRG: 640 | End: 2024-02-11
Payer: MEDICARE

## 2024-02-11 ENCOUNTER — HOSPITAL ENCOUNTER (INPATIENT)
Facility: HOSPITAL | Age: 84
LOS: 4 days | Discharge: HOME | DRG: 640 | End: 2024-02-15
Attending: EMERGENCY MEDICINE | Admitting: HOSPITALIST
Payer: MEDICARE

## 2024-02-11 DIAGNOSIS — R09.02 HYPOXIA: Primary | ICD-10-CM

## 2024-02-11 DIAGNOSIS — I50.9 CONGESTIVE HEART FAILURE, UNSPECIFIED HF CHRONICITY, UNSPECIFIED HEART FAILURE TYPE (HCC): ICD-10-CM

## 2024-02-11 DIAGNOSIS — R79.89 TROPONIN LEVEL ELEVATED: ICD-10-CM

## 2024-02-11 PROBLEM — E87.70 FLUID OVERLOAD: Status: ACTIVE | Noted: 2024-02-11

## 2024-02-11 LAB
ALBUMIN SERPL-MCNC: 3.2 G/DL (ref 3.5–5)
ALBUMIN/GLOB SERPL: 0.7 (ref 1.1–2.2)
ALP SERPL-CCNC: 80 U/L (ref 45–117)
ALT SERPL-CCNC: 20 U/L (ref 12–78)
ANION GAP SERPL CALC-SCNC: 4 MMOL/L (ref 5–15)
AST SERPL-CCNC: 18 U/L (ref 15–37)
BASOPHILS # BLD: 0.1 K/UL (ref 0–0.1)
BASOPHILS NFR BLD: 1 % (ref 0–1)
BILIRUB SERPL-MCNC: 1.1 MG/DL (ref 0.2–1)
BUN SERPL-MCNC: 23 MG/DL (ref 6–20)
BUN/CREAT SERPL: 4 (ref 12–20)
CALCIUM SERPL-MCNC: 9.7 MG/DL (ref 8.5–10.1)
CHLORIDE SERPL-SCNC: 104 MMOL/L (ref 97–108)
CO2 SERPL-SCNC: 31 MMOL/L (ref 21–32)
COMMENT:: NORMAL
CREAT SERPL-MCNC: 6.37 MG/DL (ref 0.55–1.02)
DIFFERENTIAL METHOD BLD: ABNORMAL
EOSINOPHIL # BLD: 0.1 K/UL (ref 0–0.4)
EOSINOPHIL NFR BLD: 1 % (ref 0–7)
ERYTHROCYTE [DISTWIDTH] IN BLOOD BY AUTOMATED COUNT: 13.6 % (ref 11.5–14.5)
GLOBULIN SER CALC-MCNC: 4.5 G/DL (ref 2–4)
GLUCOSE SERPL-MCNC: 113 MG/DL (ref 65–100)
HCT VFR BLD AUTO: 36.8 % (ref 35–47)
HGB BLD-MCNC: 12.4 G/DL (ref 11.5–16)
IMM GRANULOCYTES # BLD AUTO: 0 K/UL (ref 0–0.04)
IMM GRANULOCYTES NFR BLD AUTO: 1 % (ref 0–0.5)
LIPASE SERPL-CCNC: 25 U/L (ref 13–75)
LYMPHOCYTES # BLD: 1.2 K/UL (ref 0.8–3.5)
LYMPHOCYTES NFR BLD: 15 % (ref 12–49)
MAGNESIUM SERPL-MCNC: 2 MG/DL (ref 1.6–2.4)
MCH RBC QN AUTO: 32.7 PG (ref 26–34)
MCHC RBC AUTO-ENTMCNC: 33.7 G/DL (ref 30–36.5)
MCV RBC AUTO: 97.1 FL (ref 80–99)
MONOCYTES # BLD: 0.5 K/UL (ref 0–1)
MONOCYTES NFR BLD: 6 % (ref 5–13)
NEUTS SEG # BLD: 6.4 K/UL (ref 1.8–8)
NEUTS SEG NFR BLD: 76 % (ref 32–75)
NRBC # BLD: 0 K/UL (ref 0–0.01)
NRBC BLD-RTO: 0 PER 100 WBC
NT PRO BNP: ABNORMAL PG/ML
PLATELET # BLD AUTO: 236 K/UL (ref 150–400)
PMV BLD AUTO: 9 FL (ref 8.9–12.9)
POTASSIUM SERPL-SCNC: 4.4 MMOL/L (ref 3.5–5.1)
PROT SERPL-MCNC: 7.7 G/DL (ref 6.4–8.2)
RBC # BLD AUTO: 3.79 M/UL (ref 3.8–5.2)
SODIUM SERPL-SCNC: 139 MMOL/L (ref 136–145)
SPECIMEN HOLD: NORMAL
TROPONIN I SERPL HS-MCNC: 57 NG/L (ref 0–51)
WBC # BLD AUTO: 8.3 K/UL (ref 3.6–11)

## 2024-02-11 PROCEDURE — 83735 ASSAY OF MAGNESIUM: CPT

## 2024-02-11 PROCEDURE — 84484 ASSAY OF TROPONIN QUANT: CPT

## 2024-02-11 PROCEDURE — 85025 COMPLETE CBC W/AUTO DIFF WBC: CPT

## 2024-02-11 PROCEDURE — 93005 ELECTROCARDIOGRAM TRACING: CPT | Performed by: EMERGENCY MEDICINE

## 2024-02-11 PROCEDURE — 83690 ASSAY OF LIPASE: CPT

## 2024-02-11 PROCEDURE — 1100000000 HC RM PRIVATE

## 2024-02-11 PROCEDURE — 74176 CT ABD & PELVIS W/O CONTRAST: CPT

## 2024-02-11 PROCEDURE — 71045 X-RAY EXAM CHEST 1 VIEW: CPT

## 2024-02-11 PROCEDURE — 80053 COMPREHEN METABOLIC PANEL: CPT

## 2024-02-11 PROCEDURE — 36415 COLL VENOUS BLD VENIPUNCTURE: CPT

## 2024-02-11 PROCEDURE — 99285 EMERGENCY DEPT VISIT HI MDM: CPT

## 2024-02-11 PROCEDURE — 6360000002 HC RX W HCPCS: Performed by: EMERGENCY MEDICINE

## 2024-02-11 PROCEDURE — 83880 ASSAY OF NATRIURETIC PEPTIDE: CPT

## 2024-02-11 RX ORDER — CARVEDILOL 6.25 MG/1
6.25 TABLET ORAL 2 TIMES DAILY WITH MEALS
Status: DISCONTINUED | OUTPATIENT
Start: 2024-02-11 | End: 2024-02-15 | Stop reason: HOSPADM

## 2024-02-11 RX ORDER — FLUTICASONE PROPIONATE 50 MCG
2 SPRAY, SUSPENSION (ML) NASAL EVERY EVENING
Status: DISCONTINUED | OUTPATIENT
Start: 2024-02-11 | End: 2024-02-15 | Stop reason: HOSPADM

## 2024-02-11 RX ORDER — SODIUM CHLORIDE 0.9 % (FLUSH) 0.9 %
5-40 SYRINGE (ML) INJECTION PRN
Status: DISCONTINUED | OUTPATIENT
Start: 2024-02-11 | End: 2024-02-15 | Stop reason: HOSPADM

## 2024-02-11 RX ORDER — PRAVASTATIN SODIUM 20 MG
40 TABLET ORAL
Status: DISCONTINUED | OUTPATIENT
Start: 2024-02-11 | End: 2024-02-15 | Stop reason: HOSPADM

## 2024-02-11 RX ORDER — SODIUM CHLORIDE 0.9 % (FLUSH) 0.9 %
5-40 SYRINGE (ML) INJECTION EVERY 12 HOURS SCHEDULED
Status: DISCONTINUED | OUTPATIENT
Start: 2024-02-11 | End: 2024-02-15 | Stop reason: HOSPADM

## 2024-02-11 RX ORDER — MONTELUKAST SODIUM 10 MG/1
10 TABLET ORAL
Status: DISCONTINUED | OUTPATIENT
Start: 2024-02-11 | End: 2024-02-15 | Stop reason: HOSPADM

## 2024-02-11 RX ORDER — ASPIRIN 81 MG/1
81 TABLET ORAL DAILY
Status: DISCONTINUED | OUTPATIENT
Start: 2024-02-12 | End: 2024-02-15 | Stop reason: HOSPADM

## 2024-02-11 RX ORDER — LEVETIRACETAM 500 MG/1
250 TABLET ORAL 2 TIMES DAILY
Status: DISCONTINUED | OUTPATIENT
Start: 2024-02-11 | End: 2024-02-15 | Stop reason: HOSPADM

## 2024-02-11 RX ORDER — ARTIFICIAL TEARS 1; 2; 3 MG/ML; MG/ML; MG/ML
2 SOLUTION/ DROPS OPHTHALMIC PRN
Status: DISCONTINUED | OUTPATIENT
Start: 2024-02-11 | End: 2024-02-15 | Stop reason: HOSPADM

## 2024-02-11 RX ORDER — CALCIUM ACETATE 667 MG/1
3 CAPSULE ORAL
Status: DISCONTINUED | OUTPATIENT
Start: 2024-02-12 | End: 2024-02-15 | Stop reason: HOSPADM

## 2024-02-11 RX ORDER — FUROSEMIDE 10 MG/ML
40 INJECTION INTRAMUSCULAR; INTRAVENOUS ONCE
Status: COMPLETED | OUTPATIENT
Start: 2024-02-11 | End: 2024-02-11

## 2024-02-11 RX ORDER — ACETAMINOPHEN 650 MG/1
650 SUPPOSITORY RECTAL EVERY 6 HOURS PRN
Status: DISCONTINUED | OUTPATIENT
Start: 2024-02-11 | End: 2024-02-15 | Stop reason: HOSPADM

## 2024-02-11 RX ORDER — POLYETHYLENE GLYCOL 3350 17 G/17G
17 POWDER, FOR SOLUTION ORAL DAILY PRN
Status: DISCONTINUED | OUTPATIENT
Start: 2024-02-11 | End: 2024-02-15 | Stop reason: HOSPADM

## 2024-02-11 RX ORDER — MINOXIDIL 2.5 MG/1
2.5 TABLET ORAL
Status: DISCONTINUED | OUTPATIENT
Start: 2024-02-12 | End: 2024-02-15 | Stop reason: HOSPADM

## 2024-02-11 RX ORDER — HEPARIN SODIUM 5000 [USP'U]/ML
5000 INJECTION, SOLUTION INTRAVENOUS; SUBCUTANEOUS EVERY 8 HOURS SCHEDULED
Status: DISCONTINUED | OUTPATIENT
Start: 2024-02-11 | End: 2024-02-15 | Stop reason: HOSPADM

## 2024-02-11 RX ORDER — SODIUM CHLORIDE 9 MG/ML
INJECTION, SOLUTION INTRAVENOUS PRN
Status: DISCONTINUED | OUTPATIENT
Start: 2024-02-11 | End: 2024-02-15 | Stop reason: HOSPADM

## 2024-02-11 RX ORDER — ONDANSETRON 4 MG/1
4 TABLET, ORALLY DISINTEGRATING ORAL EVERY 8 HOURS PRN
Status: DISCONTINUED | OUTPATIENT
Start: 2024-02-11 | End: 2024-02-15 | Stop reason: HOSPADM

## 2024-02-11 RX ORDER — LEVETIRACETAM 500 MG/1
250 TABLET ORAL
Status: DISCONTINUED | OUTPATIENT
Start: 2024-02-13 | End: 2024-02-15 | Stop reason: HOSPADM

## 2024-02-11 RX ORDER — HYDRALAZINE HYDROCHLORIDE 25 MG/1
100 TABLET, FILM COATED ORAL
Status: DISCONTINUED | OUTPATIENT
Start: 2024-02-12 | End: 2024-02-15 | Stop reason: HOSPADM

## 2024-02-11 RX ORDER — ACETAMINOPHEN 325 MG/1
650 TABLET ORAL EVERY 6 HOURS PRN
Status: DISCONTINUED | OUTPATIENT
Start: 2024-02-11 | End: 2024-02-15 | Stop reason: HOSPADM

## 2024-02-11 RX ORDER — ONDANSETRON 2 MG/ML
4 INJECTION INTRAMUSCULAR; INTRAVENOUS EVERY 6 HOURS PRN
Status: DISCONTINUED | OUTPATIENT
Start: 2024-02-11 | End: 2024-02-15 | Stop reason: HOSPADM

## 2024-02-11 RX ORDER — CHLORAL HYDRATE 500 MG
1 CAPSULE ORAL 2 TIMES DAILY
Status: DISCONTINUED | OUTPATIENT
Start: 2024-02-11 | End: 2024-02-11 | Stop reason: RX

## 2024-02-11 RX ADMIN — FUROSEMIDE 40 MG: 10 INJECTION, SOLUTION INTRAMUSCULAR; INTRAVENOUS at 18:51

## 2024-02-11 ASSESSMENT — PAIN DESCRIPTION - FREQUENCY: FREQUENCY: CONTINUOUS

## 2024-02-11 ASSESSMENT — PAIN DESCRIPTION - ONSET: ONSET: ON-GOING

## 2024-02-11 ASSESSMENT — PAIN DESCRIPTION - DESCRIPTORS: DESCRIPTORS: SHARP

## 2024-02-11 ASSESSMENT — PAIN DESCRIPTION - ORIENTATION: ORIENTATION: LEFT

## 2024-02-11 ASSESSMENT — PAIN SCALES - GENERAL
PAINLEVEL_OUTOF10: 8
PAINLEVEL_OUTOF10: 0

## 2024-02-11 ASSESSMENT — PAIN - FUNCTIONAL ASSESSMENT
PAIN_FUNCTIONAL_ASSESSMENT: 0-10
PAIN_FUNCTIONAL_ASSESSMENT: PREVENTS OR INTERFERES SOME ACTIVE ACTIVITIES AND ADLS

## 2024-02-11 ASSESSMENT — PAIN DESCRIPTION - PAIN TYPE: TYPE: ACUTE PAIN

## 2024-02-11 ASSESSMENT — PAIN DESCRIPTION - LOCATION: LOCATION: ABDOMEN;BACK

## 2024-02-11 NOTE — ED TRIAGE NOTES
Pt arrives with LLQ pain and SOB. Pt reports \"loose stools and constipation\". Denies N/V. Pt reports decreased urinary outpt and some burning when she urinates.+HD patient with last treatment yesterday.

## 2024-02-11 NOTE — ED PROVIDER NOTES
Bilateral 2018     SECTION N/A     x 2    HEMORRHOID SURGERY      HYSTERECTOMY (CERVIX STATUS UNKNOWN)      INVASIVE VASCULAR N/A 2023    Ultrasound guided vascular access performed by Jose David Johnson DO at [facility] CARDIAC CATH LAB    IR NONTUNNELED VASCULAR CATHETER  2021    IR NONTUNNELED VASCULAR CATHETER 2021 [facility] RAD ANGIO IR    IR NONTUNNELED VASCULAR CATHETER  2021    IR TUNNELED CATHETER PLACEMENT GREATER THAN 5 YEARS  2021    IR TUNNELED CATHETER PLACEMENT GREATER THAN 5 YEARS 2021 [facility] RAD ANGIO IR    IR TUNNELED CATHETER PLACEMENT GREATER THAN 5 YEARS  2021    TONSILLECTOMY      TOTAL KNEE ARTHROPLASTY Right 2019         CURRENT MEDICATIONS       Previous Medications    ASPIRIN 81 MG EC TABLET    Take 1 tablet by mouth daily    CALCIUM ACETATE (PHOSLO) 667 MG CAPS CAPSULE    Take 3 capsules by mouth 3 times daily (with meals)    CARVEDILOL (COREG) 6.25 MG TABLET    Take 1 tablet by mouth 2 times daily (with meals)    FLUTICASONE (FLONASE) 50 MCG/ACT NASAL SPRAY    2 sprays by Nasal route every evening    HYDRALAZINE (APRESOLINE) 100 MG TABLET    Take 1 tablet by mouth 2 times daily 4 times a week on non dialysis days only    LEVETIRACETAM (KEPPRA) 250 MG TABLET    Take one tablet in morning and evening.  After dialysis session, take one additional tablet.  90 day prescription    MINOXIDIL (LONITEN) 2.5 MG TABLET    Take 1 tablet by mouth 2 times daily 4 times a weekly on non dialysis days only    MONTELUKAST (SINGULAIR) 10 MG TABLET    Take 1 tablet by mouth every evening    OMEGA-3 FATTY ACIDS (FISH OIL) 1000 MG CAPSULE    Take 1 capsule by mouth 2 times daily    POLYVINYL ALCOHOL-POVIDONE PF (REFRESH) 1.4-0.6 % SOLN OPHTHALMIC SOLUTION    Apply 1-2 drops to eye as needed    PRAVASTATIN (PRAVACHOL) 40 MG TABLET    Take 1 tablet by mouth nightly       ALLERGIES     Latex, Codeine, Levofloxacin, Lisinopril, and Sulfa antibiotics    FAMILY HISTORY       Family History  EKG's are interpreted by the Emergency Department Physician listed in the interpretation in the absence of a cardiologist and may also be found below under Reassessment/ED Course.    ED EKG Interpretation:  Time: 4:41 PM  Rhythm: normal sinus rhythm and 1st degree block; and regular . Rate (approx.): 86; Axis: normal; P wave: prolonged; QRS interval: normal ; ST/T wave: non-specific changes; Other findings: abnormal.  EKG interpreted by Cortney Keith MD.        RADIOLOGY:   Non-plain film images such as CT, Ultrasound and MRI are read by the radiologist. Plain radiographic images are visualized and preliminarily interpreted by the emergency physician with the below findings:    Interpretation per the Radiologist below, if available at the time of this note:    CT ABDOMEN PELVIS WO CONTRAST Additional Contrast? None   Final Result   No acute abnormalities.      XR CHEST PORTABLE   Final Result   1. Findings are consistent with moderate congestive heart failure. Follow-up to   resolution is suggested.           LABS:  Labs Reviewed   CBC WITH AUTO DIFFERENTIAL - Abnormal; Notable for the following components:       Result Value    RBC 3.79 (*)     Neutrophils % 76 (*)     Immature Granulocytes 1 (*)     All other components within normal limits   COMPREHENSIVE METABOLIC PANEL - Abnormal; Notable for the following components:    Anion Gap 4 (*)     Glucose 113 (*)     BUN 23 (*)     Creatinine 6.37 (*)     Bun/Cre Ratio 4 (*)     Est, Glom Filt Rate 6 (*)     Total Bilirubin 1.1 (*)     Albumin 3.2 (*)     Globulin 4.5 (*)     Albumin/Globulin Ratio 0.7 (*)     All other components within normal limits   TROPONIN - Abnormal; Notable for the following components:    Troponin, High Sensitivity 57 (*)     All other components within normal limits   BRAIN NATRIURETIC PEPTIDE - Abnormal; Notable for the following components:    NT Pro-BNP 14,748 (*)     All other components within normal limits   URINE CULTURE HOLD

## 2024-02-12 LAB
ANION GAP SERPL CALC-SCNC: 4 MMOL/L (ref 5–15)
BASOPHILS # BLD: 0 K/UL (ref 0–0.1)
BASOPHILS NFR BLD: 0 % (ref 0–1)
BUN SERPL-MCNC: 27 MG/DL (ref 6–20)
BUN/CREAT SERPL: 4 (ref 12–20)
CALCIUM SERPL-MCNC: 8.7 MG/DL (ref 8.5–10.1)
CHLORIDE SERPL-SCNC: 104 MMOL/L (ref 97–108)
CO2 SERPL-SCNC: 30 MMOL/L (ref 21–32)
CREAT SERPL-MCNC: 7.15 MG/DL (ref 0.55–1.02)
DIFFERENTIAL METHOD BLD: ABNORMAL
EKG ATRIAL RATE: 86 BPM
EKG DIAGNOSIS: NORMAL
EKG P AXIS: 43 DEGREES
EKG P-R INTERVAL: 226 MS
EKG Q-T INTERVAL: 378 MS
EKG QRS DURATION: 86 MS
EKG QTC CALCULATION (BAZETT): 452 MS
EKG R AXIS: 34 DEGREES
EKG T AXIS: 7 DEGREES
EKG VENTRICULAR RATE: 86 BPM
EOSINOPHIL # BLD: 0.1 K/UL (ref 0–0.4)
EOSINOPHIL NFR BLD: 1 % (ref 0–7)
ERYTHROCYTE [DISTWIDTH] IN BLOOD BY AUTOMATED COUNT: 13.9 % (ref 11.5–14.5)
GLUCOSE SERPL-MCNC: 91 MG/DL (ref 65–100)
HCT VFR BLD AUTO: 33.4 % (ref 35–47)
HGB BLD-MCNC: 10.7 G/DL (ref 11.5–16)
IMM GRANULOCYTES # BLD AUTO: 0 K/UL (ref 0–0.04)
IMM GRANULOCYTES NFR BLD AUTO: 0 % (ref 0–0.5)
LYMPHOCYTES # BLD: 1.6 K/UL (ref 0.8–3.5)
LYMPHOCYTES NFR BLD: 15 % (ref 12–49)
MCH RBC QN AUTO: 32.1 PG (ref 26–34)
MCHC RBC AUTO-ENTMCNC: 32 G/DL (ref 30–36.5)
MCV RBC AUTO: 100.3 FL (ref 80–99)
MONOCYTES # BLD: 0.7 K/UL (ref 0–1)
MONOCYTES NFR BLD: 6 % (ref 5–13)
NEUTS SEG # BLD: 8.3 K/UL (ref 1.8–8)
NEUTS SEG NFR BLD: 78 % (ref 32–75)
NRBC # BLD: 0 K/UL (ref 0–0.01)
NRBC BLD-RTO: 0 PER 100 WBC
PLATELET # BLD AUTO: 202 K/UL (ref 150–400)
PMV BLD AUTO: 9.1 FL (ref 8.9–12.9)
POTASSIUM SERPL-SCNC: 4.2 MMOL/L (ref 3.5–5.1)
RBC # BLD AUTO: 3.33 M/UL (ref 3.8–5.2)
SODIUM SERPL-SCNC: 138 MMOL/L (ref 136–145)
WBC # BLD AUTO: 10.7 K/UL (ref 3.6–11)

## 2024-02-12 PROCEDURE — 5A1D70Z PERFORMANCE OF URINARY FILTRATION, INTERMITTENT, LESS THAN 6 HOURS PER DAY: ICD-10-PCS | Performed by: INTERNAL MEDICINE

## 2024-02-12 PROCEDURE — P9045 ALBUMIN (HUMAN), 5%, 250 ML: HCPCS | Performed by: NURSE PRACTITIONER

## 2024-02-12 PROCEDURE — 94761 N-INVAS EAR/PLS OXIMETRY MLT: CPT

## 2024-02-12 PROCEDURE — 2500000003 HC RX 250 WO HCPCS: Performed by: HOSPITALIST

## 2024-02-12 PROCEDURE — 93010 ELECTROCARDIOGRAM REPORT: CPT | Performed by: INTERNAL MEDICINE

## 2024-02-12 PROCEDURE — 36415 COLL VENOUS BLD VENIPUNCTURE: CPT

## 2024-02-12 PROCEDURE — 2580000003 HC RX 258: Performed by: NURSE PRACTITIONER

## 2024-02-12 PROCEDURE — 6360000002 HC RX W HCPCS: Performed by: HOSPITALIST

## 2024-02-12 PROCEDURE — 6370000000 HC RX 637 (ALT 250 FOR IP): Performed by: HOSPITALIST

## 2024-02-12 PROCEDURE — 85025 COMPLETE CBC W/AUTO DIFF WBC: CPT

## 2024-02-12 PROCEDURE — 90935 HEMODIALYSIS ONE EVALUATION: CPT

## 2024-02-12 PROCEDURE — 80048 BASIC METABOLIC PNL TOTAL CA: CPT

## 2024-02-12 PROCEDURE — 2580000003 HC RX 258: Performed by: HOSPITALIST

## 2024-02-12 PROCEDURE — 6360000002 HC RX W HCPCS: Performed by: NURSE PRACTITIONER

## 2024-02-12 PROCEDURE — 2060000000 HC ICU INTERMEDIATE R&B

## 2024-02-12 PROCEDURE — 6370000000 HC RX 637 (ALT 250 FOR IP): Performed by: NURSE PRACTITIONER

## 2024-02-12 RX ORDER — SODIUM CHLORIDE 9 MG/ML
INJECTION, SOLUTION INTRAVENOUS CONTINUOUS
Status: DISCONTINUED | OUTPATIENT
Start: 2024-02-12 | End: 2024-02-12

## 2024-02-12 RX ORDER — ALBUMIN, HUMAN INJ 5% 5 %
12.5 SOLUTION INTRAVENOUS ONCE
Status: COMPLETED | OUTPATIENT
Start: 2024-02-12 | End: 2024-02-12

## 2024-02-12 RX ORDER — MIDODRINE HYDROCHLORIDE 5 MG/1
5 TABLET ORAL ONCE
Status: COMPLETED | OUTPATIENT
Start: 2024-02-12 | End: 2024-02-12

## 2024-02-12 RX ADMIN — HEPARIN SODIUM 5000 UNITS: 5000 INJECTION INTRAVENOUS; SUBCUTANEOUS at 08:57

## 2024-02-12 RX ADMIN — CALCIUM ACETATE 2001 MG: 667 CAPSULE ORAL at 13:24

## 2024-02-12 RX ADMIN — CARVEDILOL 6.25 MG: 6.25 TABLET, FILM COATED ORAL at 09:08

## 2024-02-12 RX ADMIN — HEPARIN SODIUM 5000 UNITS: 5000 INJECTION INTRAVENOUS; SUBCUTANEOUS at 13:24

## 2024-02-12 RX ADMIN — CARVEDILOL 6.25 MG: 6.25 TABLET, FILM COATED ORAL at 17:34

## 2024-02-12 RX ADMIN — MONTELUKAST 10 MG: 10 TABLET, FILM COATED ORAL at 00:13

## 2024-02-12 RX ADMIN — MIDODRINE HYDROCHLORIDE 5 MG: 5 TABLET ORAL at 02:09

## 2024-02-12 RX ADMIN — ALBUMIN (HUMAN) 12.5 G: 12.5 INJECTION, SOLUTION INTRAVENOUS at 04:37

## 2024-02-12 RX ADMIN — SODIUM CHLORIDE: 9 INJECTION, SOLUTION INTRAVENOUS at 05:52

## 2024-02-12 RX ADMIN — LEVETIRACETAM 250 MG: 500 TABLET, FILM COATED ORAL at 00:13

## 2024-02-12 RX ADMIN — SODIUM CHLORIDE, PRESERVATIVE FREE 10 ML: 5 INJECTION INTRAVENOUS at 09:08

## 2024-02-12 RX ADMIN — FLUTICASONE PROPIONATE 2 SPRAY: 50 SPRAY, METERED NASAL at 00:18

## 2024-02-12 RX ADMIN — CALCIUM ACETATE 2001 MG: 667 CAPSULE ORAL at 08:57

## 2024-02-12 RX ADMIN — ASPIRIN 81 MG: 81 TABLET, COATED ORAL at 08:56

## 2024-02-12 RX ADMIN — SODIUM CHLORIDE, PRESERVATIVE FREE 10 ML: 5 INJECTION INTRAVENOUS at 00:19

## 2024-02-12 RX ADMIN — PRAVASTATIN SODIUM 40 MG: 20 TABLET ORAL at 00:13

## 2024-02-12 RX ADMIN — HEPARIN SODIUM 5000 UNITS: 5000 INJECTION INTRAVENOUS; SUBCUTANEOUS at 00:14

## 2024-02-12 RX ADMIN — LEVETIRACETAM 250 MG: 500 TABLET, FILM COATED ORAL at 08:56

## 2024-02-12 RX ADMIN — MINOXIDIL 2.5 MG: 2.5 TABLET ORAL at 08:57

## 2024-02-12 RX ADMIN — FLUTICASONE PROPIONATE 2 SPRAY: 50 SPRAY, METERED NASAL at 17:34

## 2024-02-12 RX ADMIN — CALCIUM ACETATE 2001 MG: 667 CAPSULE ORAL at 17:34

## 2024-02-12 ASSESSMENT — PAIN SCALES - GENERAL: PAINLEVEL_OUTOF10: 0

## 2024-02-12 NOTE — PROGRESS NOTES
mucus membranes  Chest: CTAB, normal WOB  CVS: S1 and S2 heard, no m/g/r  Abd: soft/non tender, non distended  Ext: WWP, 2+ edema bilaterally  Neuro/Psych: no focal deficits  Skin: Multiple seborrheic keratoses on back      Data Review:     I have personally and independently reviewed all pertinent labs, diagnostic studies, imaging, and have provided independent interpretation of the same.     Labs and imaging:     Recent Labs     02/11/24  1640 02/12/24  0556   WBC 8.3 10.7   HGB 12.4 10.7*   HCT 36.8 33.4*    202     Recent Labs     02/11/24  1640 02/12/24  0556    138   K 4.4 4.2    104   CO2 31 30   BUN 23* 27*   MG 2.0  --      Recent Labs     02/11/24  1640   ALT 20   GLOB 4.5*     No results for input(s): \"INR\", \"APTT\" in the last 72 hours.    Invalid input(s): \"PTP\"   No results for input(s): \"TIBC\", \"FERR\" in the last 72 hours.    Invalid input(s): \"FE\", \"PSAT\"   No results found for: \"FOL\", \"RBCF\"   No results for input(s): \"PH\", \"PCO2\", \"PO2\" in the last 72 hours.  No results for input(s): \"CPK\" in the last 72 hours.    Invalid input(s): \"CPKMB\", \"CKNDX\", \"TROIQ\"  Lab Results   Component Value Date/Time    CHOL 128 05/15/2021 03:52 AM    HDL 56 05/15/2021 03:52 AM     No results found for: \"GLUCPOC\"  [unfilled]        CT ABDOMEN PELVIS WO CONTRAST Additional Contrast? None   Final Result   No acute abnormalities.      XR CHEST PORTABLE   Final Result   1. Findings are consistent with moderate congestive heart failure. Follow-up to   resolution is suggested.            Notes reviewed from all clinical/nonclinical/nursing services involved in patient's clinical care. Care coordination discussions were held with appropriate clinical/nonclinical/ nursing providers based on care coordination needs.     Patients current active Medications were reviewed, considered, added and adjusted based on the clinical condition today.      Home Medications were reconciled to the best of my ability given all  available resources at the time of admission. Route is PO if not otherwise noted.      Admission Status:[id number]:::1}      Medications Reviewed:     Current Facility-Administered Medications   Medication Dose Route Frequency    aspirin EC tablet 81 mg  81 mg Oral Daily    calcium acetate (PHOSLO) capsule 2,001 mg  3 capsule Oral TID     carvedilol (COREG) tablet 6.25 mg  6.25 mg Oral BID WC    fluticasone (FLONASE) 50 MCG/ACT nasal spray 2 spray  2 spray Nasal QPM    [Held by provider] hydrALAZINE (APRESOLINE) tablet 100 mg  100 mg Oral 2 times per day on Sun Mon Wed Fri    minoxidil (LONITEN) tablet 2.5 mg  2.5 mg Oral 2 times per day on Sun Mon Wed Fri    montelukast (SINGULAIR) tablet 10 mg  10 mg Oral QHS    artificial tears (dextran-hypromellose-glycerin) ophthalmic solution 2 drop  2 drop Both Eyes PRN    pravastatin (PRAVACHOL) tablet 40 mg  40 mg Oral QHS    levETIRAcetam (KEPPRA) tablet 250 mg  250 mg Oral BID    sodium chloride flush 0.9 % injection 5-40 mL  5-40 mL IntraVENous 2 times per day    sodium chloride flush 0.9 % injection 5-40 mL  5-40 mL IntraVENous PRN    0.9 % sodium chloride infusion   IntraVENous PRN    heparin (porcine) injection 5,000 Units  5,000 Units SubCUTAneous 3 times per day    ondansetron (ZOFRAN-ODT) disintegrating tablet 4 mg  4 mg Oral Q8H PRN    Or    ondansetron (ZOFRAN) injection 4 mg  4 mg IntraVENous Q6H PRN    polyethylene glycol (GLYCOLAX) packet 17 g  17 g Oral Daily PRN    acetaminophen (TYLENOL) tablet 650 mg  650 mg Oral Q6H PRN    Or    acetaminophen (TYLENOL) suppository 650 mg  650 mg Rectal Q6H PRN    [START ON 2/13/2024] levETIRAcetam (KEPPRA) tablet 250 mg  250 mg Oral Once per day on Tue Thu Sat     ______________________________________________________________________  EXPECTED LENGTH OF STAY: 3  ACTUAL LENGTH OF STAY:          1                 Santiago Payne MD

## 2024-02-12 NOTE — H&P
Hospitalist Admission Note      NAME:  Ivone Swain   :  1940   MRN:  [id number]     Date/Time:  2024 8:06 PM    Patient PCP: Yaniv Hyde MD    ________________________________________________________________________    Given the patient's current clinical presentation, I have a high level of concern for decompensation if discharged from the emergency department.  Complex decision making was performed, which includes reviewing the patient's available past medical records, laboratory results, and x-ray films.       My assessment of this patient's clinical condition and my plan of care is as follows.    Assessment / Plan:  Patient is a 83-year-old female with history of ES ESRD on hemodialysis on  and  comes to the hospital with chief complaint of shortness of breath, acute hypoxemic respiratory failure and abdominal pain.  CT scan of abdomen and pelvis is negative for any acute findings.  Chest x-ray showed fluid overload and her proBNP is elevated.  Patient is admitted with fluid overload related to ESRD.  She also has a history of diastolic congestive heart failure.    1.  Acute hypoxemic respiratory failure  Due to fluid overload due to ESRD.  Nephrology consult.  Echo was done in 2023 which showed ejection fraction of 72% with diastolic dysfunction.  She makes very less urine.  And she is mostly dialysis dependent.  She will probably need prolonged dialysis session versus dialysis sessions back-to-back for next couple of days.    2.  Elevated troponin  Trend the troponin although it seems mostly related to ESRD and fluid overload.    3.  ESRD on hemodialysis  Patient gets dialysis on  and .  Her last dialysis session was on Saturday.  Nephrology consult.    4.  Abdominal pain  Patient had a CT scan of abdomen and pelvis which did not show any acute findings.  UA and urine culture.    5.  Seizure disorder  Patient takes Keppra at  Questions answered    Family      RN x    Care Manager                    Consultant:  pierre GARCIA MD   _______________________________________________________________________  Recommended Disposition:   Home with Family x   HH/PT/OT/RN    SNF/LTC    [facility]    ________________________________________________________________________  TOTAL TIME:  60 Minutes        Comments   >50% of visit spent in counseling and coordination of care  Chart reviewed  Discussion with patient and/or family and questions answered     ________________________________________________________________________  Signed: Sharron Ogden MD        Procedures: see electronic medical records for all procedures/Xrays/labs and details which were not copied into this note but were reviewed prior to creation of Plan.

## 2024-02-12 NOTE — PROGRESS NOTES
Pt bp low few times notified MD new orders for midodrine 5mg x1 no continous improvement, new order albumin 12.5g and NS at 50ml per hour

## 2024-02-12 NOTE — PROGRESS NOTES
NEPHROLOGY PROGRESS NOTE     Patient: Ivone Swain MRN: [id number]  PCP: Yaniv Hyde MD   :     1940  Age:   83 y.o.  Sex:  female          Assessment & Plan:     [facility]  Fluid overload  AOCD    Plan:  Plan for extra UF today for 2 hr  Continue HD TTS schedule  [facility] notified             Subjective:   CC:ESKD-HD admitted for sob and cxr c/w fluid overload, last HD Saturday  HPI: Patient seen   ROS:  Current Facility-Administered Medications   Medication Dose Route Frequency    aspirin EC tablet 81 mg  81 mg Oral Daily    calcium acetate (PHOSLO) capsule 2,001 mg  3 capsule Oral TID WC    carvedilol (COREG) tablet 6.25 mg  6.25 mg Oral BID WC    fluticasone (FLONASE) 50 MCG/ACT nasal spray 2 spray  2 spray Nasal QPM    [Held by provider] hydrALAZINE (APRESOLINE) tablet 100 mg  100 mg Oral 2 times per day on Sun Mon Wed Fri    minoxidil (LONITEN) tablet 2.5 mg  2.5 mg Oral 2 times per day on Sun     montelukast (SINGULAIR) tablet 10 mg  10 mg Oral QHS    artificial tears (dextran-hypromellose-glycerin) ophthalmic solution 2 drop  2 drop Both Eyes PRN    pravastatin (PRAVACHOL) tablet 40 mg  40 mg Oral QHS    levETIRAcetam (KEPPRA) tablet 250 mg  250 mg Oral BID    sodium chloride flush 0.9 % injection 5-40 mL  5-40 mL IntraVENous 2 times per day    sodium chloride flush 0.9 % injection 5-40 mL  5-40 mL IntraVENous PRN    0.9 % sodium chloride infusion   IntraVENous PRN    heparin (porcine) injection 5,000 Units  5,000 Units SubCUTAneous 3 times per day    ondansetron (ZOFRAN-ODT) disintegrating tablet 4 mg  4 mg Oral Q8H PRN    Or    ondansetron (ZOFRAN) injection 4 mg  4 mg IntraVENous Q6H PRN    polyethylene glycol (GLYCOLAX) packet 17 g  17 g Oral Daily PRN    acetaminophen (TYLENOL) tablet 650 mg  650 mg Oral Q6H PRN    Or    acetaminophen (TYLENOL) suppository 650 mg  650 mg Rectal Q6H PRN    [START ON 2024] levETIRAcetam (KEPPRA) tablet 250 mg

## 2024-02-12 NOTE — CARE COORDINATION
Initial Case Management Assessment       02/12/24 7213   Service Assessment   Patient Orientation Alert and Oriented   Cognition Alert   History Provided By Patient   Primary Caregiver Self   Support Systems Children;Family Members   Patient's Healthcare Decision Maker is: Legal Next of Kin  (Refugio- son- [phone]/ [phone])   PCP Verified by CM Yes  (Yaniv Hyde MD)   Last Visit to PCP Within last 6 months   Prior Functional Level Independent in ADLs/IADLs   Current Functional Level Independent in ADLs/IADLs   Can patient return to prior living arrangement Yes   Ability to make needs known: Good   Family able to assist with home care needs: No  (Pt lives alone)   Would you like for me to discuss the discharge plan with any other family members/significant others, and if so, who? Yes  (Refugio/son (if necessary))   Financial Resources Medicare   Active[employer] Resources None   Social/Functional History   Lives With Alone   Type of Home Apartment   Home Layout One level   Home Access Elevator   Bathroom Toilet Handicap height   Bathroom Equipment Grab bars in shower;Shower chair;Toilet raiser   Home Equipment Rollator;Cane;Wheelchair-manual   Active  No   Patient's  Info family   Occupation Retired   Discharge Planning   Type of Residence Apartment   Living Arrangements Alone   Current Services Prior To Admission C-pap;Durable Medical Equipment   Current DME Prior to Arrival Shower Chair;Cpap;Cane;Wheelchair   Potential Assistance Needed N/A   DME Ordered? No   Potential Assistance Purchasing Medications No   Type of Home Care Services None   Patient expects to be discharged to: Apartment   One/Two Story Residence One story   History of falls? 0   Services At/After Discharge   Transition of Care Consult (CM Consult) N/A   Services At/After Discharge None    Resource Information Provided? No   Mode of Transport at Discharge Other (see comment)  (family)     Pt was admitted on 02/11/24 for fluid  overload. CM met with Pt to complete initial assessment. CM introduced self, CM role, and verified demographics. Pt lives in an apartment alone. Pt reports her son and daughter in law live in an apartment above hers. Pt has no hx of HH or home O2. Pt has a CPAP, rollator, cane, w/c, raised toilet seat, shower chair, and grab bars. Pt reports she is independent with ADLs. Pt denies any problems with ADLs. Pt ambulates with a rollator. Pt denies any recent hx of falls. Pt is retired. Pt is unable to drive.    Pharmacy: [facility] [place]  COVID vaccination(s): Yes  AMD: Yes- not on file.     Pt goes to [facility] [address] for dialysis. Pt reports a TTS schedule. Pt has a transport service to assist with transportation to/from dialysis.     Discharge plan is for Pt to return home. Family will transport Pt home.   _____________________________________  WERNER Huertas  [facility]- Care Management  [phone]  Available via [employer]  2/12/2024   12:41 PM

## 2024-02-13 PROCEDURE — 2500000003 HC RX 250 WO HCPCS: Performed by: HOSPITALIST

## 2024-02-13 PROCEDURE — P9047 ALBUMIN (HUMAN), 25%, 50ML: HCPCS | Performed by: INTERNAL MEDICINE

## 2024-02-13 PROCEDURE — 6370000000 HC RX 637 (ALT 250 FOR IP): Performed by: HOSPITALIST

## 2024-02-13 PROCEDURE — 6360000002 HC RX W HCPCS: Performed by: INTERNAL MEDICINE

## 2024-02-13 PROCEDURE — 1100000000 HC RM PRIVATE

## 2024-02-13 PROCEDURE — 6360000002 HC RX W HCPCS: Performed by: HOSPITALIST

## 2024-02-13 PROCEDURE — 90935 HEMODIALYSIS ONE EVALUATION: CPT

## 2024-02-13 PROCEDURE — 6370000000 HC RX 637 (ALT 250 FOR IP): Performed by: INTERNAL MEDICINE

## 2024-02-13 PROCEDURE — 2700000000 HC OXYGEN THERAPY PER DAY

## 2024-02-13 RX ORDER — MIDODRINE HYDROCHLORIDE 5 MG/1
10 TABLET ORAL
Status: COMPLETED | OUTPATIENT
Start: 2024-02-13 | End: 2024-02-13

## 2024-02-13 RX ORDER — ALBUMIN (HUMAN) 12.5 G/50ML
50 SOLUTION INTRAVENOUS
Status: COMPLETED | OUTPATIENT
Start: 2024-02-13 | End: 2024-02-13

## 2024-02-13 RX ADMIN — CALCIUM ACETATE 2001 MG: 667 CAPSULE ORAL at 13:25

## 2024-02-13 RX ADMIN — FLUTICASONE PROPIONATE 2 SPRAY: 50 SPRAY, METERED NASAL at 17:38

## 2024-02-13 RX ADMIN — ASPIRIN 81 MG: 81 TABLET, COATED ORAL at 13:20

## 2024-02-13 RX ADMIN — CALCIUM ACETATE 2001 MG: 667 CAPSULE ORAL at 17:44

## 2024-02-13 RX ADMIN — LEVETIRACETAM 250 MG: 500 TABLET, FILM COATED ORAL at 20:16

## 2024-02-13 RX ADMIN — MIDODRINE HYDROCHLORIDE 10 MG: 5 TABLET ORAL at 08:58

## 2024-02-13 RX ADMIN — LEVETIRACETAM 250 MG: 500 TABLET, FILM COATED ORAL at 00:31

## 2024-02-13 RX ADMIN — MONTELUKAST 10 MG: 10 TABLET, FILM COATED ORAL at 00:33

## 2024-02-13 RX ADMIN — PRAVASTATIN SODIUM 40 MG: 20 TABLET ORAL at 20:16

## 2024-02-13 RX ADMIN — MONTELUKAST 10 MG: 10 TABLET, FILM COATED ORAL at 20:16

## 2024-02-13 RX ADMIN — PRAVASTATIN SODIUM 40 MG: 20 TABLET ORAL at 00:31

## 2024-02-13 RX ADMIN — LEVETIRACETAM 250 MG: 500 TABLET, FILM COATED ORAL at 17:39

## 2024-02-13 RX ADMIN — ALBUMIN (HUMAN) 50 G: 0.25 INJECTION, SOLUTION INTRAVENOUS at 08:59

## 2024-02-13 RX ADMIN — HEPARIN SODIUM 5000 UNITS: 5000 INJECTION INTRAVENOUS; SUBCUTANEOUS at 20:16

## 2024-02-13 RX ADMIN — HEPARIN SODIUM 5000 UNITS: 5000 INJECTION INTRAVENOUS; SUBCUTANEOUS at 13:20

## 2024-02-13 RX ADMIN — LEVETIRACETAM 250 MG: 500 TABLET, FILM COATED ORAL at 13:20

## 2024-02-13 RX ADMIN — HEPARIN SODIUM 5000 UNITS: 5000 INJECTION INTRAVENOUS; SUBCUTANEOUS at 00:31

## 2024-02-13 ASSESSMENT — PAIN SCALES - GENERAL
PAINLEVEL_OUTOF10: 0
PAINLEVEL_OUTOF10: 0

## 2024-02-13 NOTE — PROGRESS NOTES
NEPHROLOGY PROGRESS NOTE     Patient: Ivone Swain MRN: [id number]  PCP: Yaniv Hyde MD   :     1940  Age:   83 y.o.  Sex:  female          Assessment & Plan:     [facility] TTS [facility] ([facility]  Fluid overload  AOCD    Plan:  HD today then  TTS  Use albumin and midodrine during HD  Hold BP med's on the day of HD  [facility] notified             Subjective:   CC:ESKD-HD admitted for sob and cxr c/w fluid overload, last HD Saturday  HPI: Patient seen, Had UF for 2 kg removed  Receiving HD now  ROS:  Current Facility-Administered Medications   Medication Dose Route Frequency    aspirin EC tablet 81 mg  81 mg Oral Daily    calcium acetate (PHOSLO) capsule 2,001 mg  3 capsule Oral TID WC    carvedilol (COREG) tablet 6.25 mg  6.25 mg Oral BID WC    fluticasone (FLONASE) 50 MCG/ACT nasal spray 2 spray  2 spray Nasal QPM    [Held by provider] hydrALAZINE (APRESOLINE) tablet 100 mg  100 mg Oral 2 times per day on Sun Mon Wed Fri    minoxidil (LONITEN) tablet 2.5 mg  2.5 mg Oral 2 times per day on Sun     montelukast (SINGULAIR) tablet 10 mg  10 mg Oral QHS    artificial tears (dextran-hypromellose-glycerin) ophthalmic solution 2 drop  2 drop Both Eyes PRN    pravastatin (PRAVACHOL) tablet 40 mg  40 mg Oral QHS    levETIRAcetam (KEPPRA) tablet 250 mg  250 mg Oral BID    sodium chloride flush 0.9 % injection 5-40 mL  5-40 mL IntraVENous 2 times per day    sodium chloride flush 0.9 % injection 5-40 mL  5-40 mL IntraVENous PRN    0.9 % sodium chloride infusion   IntraVENous PRN    heparin (porcine) injection 5,000 Units  5,000 Units SubCUTAneous 3 times per day    ondansetron (ZOFRAN-ODT) disintegrating tablet 4 mg  4 mg Oral Q8H PRN    Or    ondansetron (ZOFRAN) injection 4 mg  4 mg IntraVENous Q6H PRN    polyethylene glycol (GLYCOLAX) packet 17 g  17 g Oral Daily PRN    acetaminophen (TYLENOL) tablet 650 mg  650 mg Oral Q6H PRN    Or    acetaminophen (TYLENOL) suppository 650 mg  650 mg Rectal

## 2024-02-13 NOTE — FLOWSHEET NOTE
Evaluations   Level of Consciousness 0   Oriented X 4   Heart Rhythm Regular   Respiratory Quality/Effort Unlabored   O2 Device Nasal cannula  (2 L)   Skin Color Pale;Ecchymosis (comment)   Skin Condition/Temp Dry;Fragile;Warm   Appetite Fair   Abdomen Inspection Soft   Bowel Sounds (All Quadrants) Active;Audible      02/13/24 1310   Hemodialysis Fistula/Graft Arteriovenous fistula Right Arm   Placement Date/Time: 08/03/22 (c) 0100   Access Type: Arteriovenous fistula  Orientation: Right  Access Location: Arm   Thrill Present   Bruit Present   Status Deaccessed   Date of Last Dressing Change 02/13/24   Dressing Intervention New   Dressing Status Clean, dry & intact     Primary RN SBAR: MARCOS Partida, RN     Comments: Pt tolerated treatment well. Albumin given to support BP. Ordered UF goal obtained. All possible blood returned. Hemostasis achieved. +bruit and +thrill post treatment. Pt left resting in bed with call light in reach. Report given to primary nurse Vannesa.

## 2024-02-13 NOTE — CARE COORDINATION
Care Management Progress Note:  Son requested meeting with CM. CM met with son who had questions with regard to HH, explained we are waiting on therapy to eval, then we could send referrals based on choice. Per son if recommended no preference as to provider. Son also inquired about additional help in home as patient seems to be needing more assistance. CM provided son with list of PCG for review. Dispo pending improvement in clinical status. CM following for needs.    ______________________  Vannesa IBANEZ, RN  Care Management  2/13/2024  2:07 PM

## 2024-02-13 NOTE — FLOWSHEET NOTE
Pt very pleasant, orders for 3L UF removal in 2 hours. BP trending down from start of tx. NP White notified and UF goal decreased. BP would come back up after goal decreased, and then trend down. UF was stopped after pt bp 69/39 per NP, bld returned, needles removed, new dressing applied after hemostasis. Pt was asymptomatic and resting, easily woken by verbal communication. SBAR handoff.  Total tx time 1 hr 40 mn; 1.5 L removed.     02/12/24 2020   Vital Signs   /65   Temp 98.4 °F (36.9 °C)   Pulse 72   Respirations 18   SpO2 96 %   Weight - Scale 79.6 kg (175 lb 7.8 oz)   Weight Method Bed scale   Percent Weight Change 5.15   Dry Weight 78.5 kg (173 lb 1 oz)   Pain Assessment   Pain Assessment None - Denies Pain   Pain Level 0   Treatment   Time On 2020   Treatment Goal UF only 3L / 2Hr   Observations & Evaluations   Level of Consciousness 0   Oriented X 4   Heart Rhythm Regular   Respiratory Quality/Effort Dyspnea with exertion   O2 Device Nasal cannula   Bilateral Breath Sounds Diminished   Skin Color Pale   Skin Condition/Temp Dry;Fragile   Abdomen Inspection Soft   Edema Generalized;Left lower extremity;Right lower extremity   Edema Generalized +1   RLE Edema +3   LLE Edema +3   Technical Checks   Dialysis Machine No. 02   RO Machine Number [id number]   Dialyzer Lot No. [id number]   Tubing Lot Number [id number]   All Connections Secure Yes   NS Bag Yes   Saline Line Double Clamped Yes   Dialyzer Revaclear 300   ICEBOAT I;C;E;B;O;A;T   RO Machine Log Sheet Completed Yes   Machine Alarm Self Test Completed;Passed   Air Foam Detector Tested;Proper Function;pH Reading   Extracorporeal Circuit Tested for Integrity Yes   Machine Conductivity 13.9   Manual Ph 7.4   Bleach Test (Neg) Yes   Treatment Initiation   Dialyze Hours 2   Treatment  Initiation Universal Precautions maintained;Lines secured to patient;Connections secured;Prime given;Venous Parameters set;Arterial Parameters set;Air foam detector engaged;[place]

## 2024-02-13 NOTE — PROGRESS NOTES
[facility]  Hospitalist Group                                                                                          Hospitalist Progress Note  Santiago Payne MD  Office Phone: [phone]        Date of Service:  2024  NAME:  Ivone Swain  :  1940  MRN:  [id number]       Admission Summary:   Per admission H&P, \"Ivone is a very pleasant 83 y.o.   female with PMHx ESRD, anxiety, sleep apnea, pulmonary hypertension comes to the hospital with chief complaint of shortness of breath.  As per the patient the shortness of breath started just today.  She is also complaining of cough and back pain.  She is requiring 2 L of oxygen at this time.  Patient is a dialysis patient and gets dialysis on  and .  Her last dialysis was yesterday.  She denies any fever or chills.  She did complain of some diarrhea but she denies any nausea or vomiting.  She does complain of burning with urination.  She makes very minimal urine.  She denies any abdominal pain.  She had a chest x-ray done which showed fluid overload.  She denies any chest pain.  When she arrived to the ER her blood pressure was 153/76, temperature 98.9, pulse 87, respiratory rate 30.  Blood work was done in the ER which showed creatinine of 6.37, proBNP 14,000, troponin 57, WBC 8.3, hemoglobin 12.4, platelet count 236.  She did complain of abdominal pain in the left lower quadrant when she arrived so she had a CT scan of abdomen and pelvis which did not show any acute abnormalities. \"       Interval history / Subjective:   O2 down to 2L this morning. BP downtrended thoughout HD yesterday. 1.5L removed.     Assessment & Plan:     Patient is an 83-year-old female with history of ES ESRD on hemodialysis on  and  comes to the hospital with chief complaint of shortness of breath, acute hypoxemic respiratory failure and abdominal pain.  CT scan of abdomen and pelvis is  outlined below:          General : alert and awake, no acute distress  HEENT: moist mucus membranes  Chest: CTAB, normal WOB  CVS: S1 and S2 heard, no m/g/r  Abd: soft/non tender, non distended  Ext: WWP, 2+ edema bilaterally  Neuro/Psych: no focal deficits  Skin: Multiple seborrheic keratoses on back      Data Review:     I have personally and independently reviewed all pertinent labs, diagnostic studies, imaging, and have provided independent interpretation of the same.     Labs and imaging:     Recent Labs     02/11/24  1640 02/12/24  0556   WBC 8.3 10.7   HGB 12.4 10.7*   HCT 36.8 33.4*    202       Recent Labs     02/11/24  1640 02/12/24  0556    138   K 4.4 4.2    104   CO2 31 30   BUN 23* 27*   MG 2.0  --        Recent Labs     02/11/24  1640   ALT 20   GLOB 4.5*       No results for input(s): \"INR\", \"APTT\" in the last 72 hours.    Invalid input(s): \"PTP\"   No results for input(s): \"TIBC\", \"FERR\" in the last 72 hours.    Invalid input(s): \"FE\", \"PSAT\"   No results found for: \"FOL\", \"RBCF\"   No results for input(s): \"PH\", \"PCO2\", \"PO2\" in the last 72 hours.  No results for input(s): \"CPK\" in the last 72 hours.    Invalid input(s): \"CPKMB\", \"CKNDX\", \"TROIQ\"  Lab Results   Component Value Date/Time    CHOL 128 05/15/2021 03:52 AM    HDL 56 05/15/2021 03:52 AM     No results found for: \"GLUCPOC\"  [unfilled]        CT ABDOMEN PELVIS WO CONTRAST Additional Contrast? None   Final Result   No acute abnormalities.      XR CHEST PORTABLE   Final Result   1. Findings are consistent with moderate congestive heart failure. Follow-up to   resolution is suggested.            Notes reviewed from all clinical/nonclinical/nursing services involved in patient's clinical care. Care coordination discussions were held with appropriate clinical/nonclinical/ nursing providers based on care coordination needs.     Patients current active Medications were reviewed, considered, added and adjusted based on the clinical

## 2024-02-14 PROCEDURE — 2700000000 HC OXYGEN THERAPY PER DAY

## 2024-02-14 PROCEDURE — 97530 THERAPEUTIC ACTIVITIES: CPT

## 2024-02-14 PROCEDURE — 97165 OT EVAL LOW COMPLEX 30 MIN: CPT | Performed by: OCCUPATIONAL THERAPIST

## 2024-02-14 PROCEDURE — 97161 PT EVAL LOW COMPLEX 20 MIN: CPT

## 2024-02-14 PROCEDURE — 6370000000 HC RX 637 (ALT 250 FOR IP): Performed by: HOSPITALIST

## 2024-02-14 PROCEDURE — 1100000000 HC RM PRIVATE

## 2024-02-14 PROCEDURE — 97116 GAIT TRAINING THERAPY: CPT

## 2024-02-14 PROCEDURE — 2500000003 HC RX 250 WO HCPCS: Performed by: HOSPITALIST

## 2024-02-14 PROCEDURE — 6360000002 HC RX W HCPCS: Performed by: HOSPITALIST

## 2024-02-14 PROCEDURE — 2580000003 HC RX 258: Performed by: HOSPITALIST

## 2024-02-14 RX ADMIN — CARVEDILOL 6.25 MG: 6.25 TABLET, FILM COATED ORAL at 09:25

## 2024-02-14 RX ADMIN — SODIUM CHLORIDE, PRESERVATIVE FREE 10 ML: 5 INJECTION INTRAVENOUS at 22:47

## 2024-02-14 RX ADMIN — MINOXIDIL 2.5 MG: 2.5 TABLET ORAL at 09:25

## 2024-02-14 RX ADMIN — FLUTICASONE PROPIONATE 2 SPRAY: 50 SPRAY, METERED NASAL at 17:27

## 2024-02-14 RX ADMIN — ASPIRIN 81 MG: 81 TABLET, COATED ORAL at 09:25

## 2024-02-14 RX ADMIN — SODIUM CHLORIDE, PRESERVATIVE FREE 10 ML: 5 INJECTION INTRAVENOUS at 09:26

## 2024-02-14 RX ADMIN — HEPARIN SODIUM 5000 UNITS: 5000 INJECTION INTRAVENOUS; SUBCUTANEOUS at 06:20

## 2024-02-14 RX ADMIN — CARVEDILOL 6.25 MG: 6.25 TABLET, FILM COATED ORAL at 17:26

## 2024-02-14 RX ADMIN — HEPARIN SODIUM 5000 UNITS: 5000 INJECTION INTRAVENOUS; SUBCUTANEOUS at 22:46

## 2024-02-14 RX ADMIN — MONTELUKAST 10 MG: 10 TABLET, FILM COATED ORAL at 22:47

## 2024-02-14 RX ADMIN — LEVETIRACETAM 250 MG: 500 TABLET, FILM COATED ORAL at 22:47

## 2024-02-14 RX ADMIN — HEPARIN SODIUM 5000 UNITS: 5000 INJECTION INTRAVENOUS; SUBCUTANEOUS at 14:15

## 2024-02-14 RX ADMIN — LEVETIRACETAM 250 MG: 500 TABLET, FILM COATED ORAL at 09:25

## 2024-02-14 RX ADMIN — CALCIUM ACETATE 2001 MG: 667 CAPSULE ORAL at 09:25

## 2024-02-14 RX ADMIN — PRAVASTATIN SODIUM 40 MG: 20 TABLET ORAL at 22:47

## 2024-02-14 RX ADMIN — CALCIUM ACETATE 2001 MG: 667 CAPSULE ORAL at 13:02

## 2024-02-14 RX ADMIN — CALCIUM ACETATE 2001 MG: 667 CAPSULE ORAL at 17:26

## 2024-02-14 RX ADMIN — MINOXIDIL 2.5 MG: 2.5 TABLET ORAL at 22:51

## 2024-02-14 NOTE — PROGRESS NOTES
[facility] Adult  Hospitalist Group                                                                                          Hospitalist Progress Note  Santiago Payne MD  Office Phone: [phone]        Date of Service:  2024  NAME:  Ivone Swain  :  1940  MRN:  [id number]       Admission Summary:   Per admission H&P, \"Ivone is a very pleasant 83 y.o.   female with PMHx ESRD, anxiety, sleep apnea, pulmonary hypertension comes to the hospital with chief complaint of shortness of breath.  As per the patient the shortness of breath started just today.  She is also complaining of cough and back pain.  She is requiring 2 L of oxygen at this time.  Patient is a dialysis patient and gets dialysis on  and .  Her last dialysis was yesterday.  She denies any fever or chills.  She did complain of some diarrhea but she denies any nausea or vomiting.  She does complain of burning with urination.  She makes very minimal urine.  She denies any abdominal pain.  She had a chest x-ray done which showed fluid overload.  She denies any chest pain.  When she arrived to the ER her blood pressure was 153/76, temperature 98.9, pulse 87, respiratory rate 30.  Blood work was done in the ER which showed creatinine of 6.37, proBNP 14,000, troponin 57, WBC 8.3, hemoglobin 12.4, platelet count 236.  She did complain of abdominal pain in the left lower quadrant when she arrived so she had a CT scan of abdomen and pelvis which did not show any acute abnormalities. \"       Interval history / Subjective:   O2 down to 1L today.  Patient without complaints.     Assessment & Plan:     Patient is an 83-year-old female with history of ES ESRD on hemodialysis on  and  comes to the hospital with chief complaint of shortness of breath, acute hypoxemic respiratory failure and abdominal pain.  CT scan of abdomen and pelvis is negative for any acute findings.  "Received referral from Dr PATRICIA Barahona's office requesting patient seen by Dr. Fields on 8/14/20 to discuss ICD removal. Spoke to patient to arrange. He agrees to appointments at 8:30 AM on 8/14/20.     Reports ICD implanted in 2010 at [facility]. States he has not had ICD check in over 5 years and device "hasn't made noise in a while." Explained if device was beeping and now stopped, it is likely that is no longer working. Will arrange for ICD check in clinic to confirm. Patient agrees to appointments and verbalized understanding.     Message routed to clinic staff to inform of upcoming visit and to obtain records prior to clinic visit.   "

## 2024-02-14 NOTE — CARE COORDINATION
2/14/2024   CARE MANAGEMENT NOTE:  CM reviewed EMR and handoff received from previous  (Vannesa WASHINGTON).  Pt was admitted with hypoxemic respiratory failure; also ESRD on HD.  Reportedly, pt resides alone.    SonRefugio ([phone]) is the primary family contact.    RUR 19%    Transition Plan of Care:  Nephrology is following pt for medical management  Plan is for pt to return home  Son would like pt to have home health PT/OT and a referral was sent to [facility]  ESRD - pt is HD at [facility] on T,T,S  Son was provided with a list of private duty aides by my coworker  Outpatient follow up  Son will transport pt home    CM will continue to follow pt until discharged.  Don

## 2024-02-14 NOTE — PROGRESS NOTES
right     EXAMINATION OF PERFORMANCE DEFICITS:    Cognitive/Behavioral Status:    Orientation  Overall Orientation Status: Within Normal Limits  Orientation Level: Oriented X4  Cognition  Overall Cognitive Status: WNL    Hearing:   Hearing  Hearing: Within functional limits    Vision/Perceptual:          Vision  Vision: Impaired  Vision Exceptions: Wears glasses for reading  Perception  Overall Perceptual Status: WFL    Range of Motion:   AROM: Generally decreased, functional  PROM: Generally decreased, functional      Strength:  Strength: Generally decreased, functional      Coordination:  Coordination: Within functional limits     Coordination: Within functional limits      Tone & Sensation:   Tone: Normal  Sensation: Intact      Functional Mobility and Transfers for ADLs:  Bed Mobility:     Bed Mobility Training  Bed Mobility Training: Yes  Supine to Sit: Modified independent  Scooting: Modified independent    Transfers:     Transfer Training  Transfer Training: Yes  Sit to Stand: Modified independent  Stand to Sit: Modified independent                                   Balance:   Standing: Impaired  Balance  Sitting: Intact  Standing: Impaired  Standing - Static: Good  Standing - Dynamic: Good;Constant support      ADL Assessment:   Feeding: Independent     Grooming: Supervision  Grooming Skilled Clinical Factors: standing at sink    UE Bathing: Setup     Skin Care: Chlorhexidine wipes    LE Bathing: Supervision;Setup       UE Dressing: Setup       LE Dressing: Supervision;Setup  LE Dressing Skilled Clinical Factors: pt uses adaptive equipment at home- reacher and sock aide    Toileting: Supervision       Functional Mobility: Supervision  Functional Mobility Skilled Clinical Factors: RW     Skin Care: Chlorhexidine wipes    ADL Intervention and task modifications:                                                                                                                                                                                                                                      Barthel Index:    Barthel Index Scale  Feeding: Independent, Able to apply any necessary device. Feeds in reasonable time  Bathing: Cannot perform activity  Grooming: Washes face, lane hair, brushes teeth, shaves (manages plug if electric razor)  Dressing: Needs help, but does at least half of task within reasonable time  Bowel Control: No accidents. Able to use enema or suppository if needed  Bladder Control: No accidents. Able to care for collecting device, if used  Toilet Transfers: Needs help for balance, handling clothes or toilet paper  Chair/Bed Trannsfers: Minimum assistance or supervision required  Ambulation: With help for 50 yards  Stairs: Needs help or supervision  Total Barthel Index Score: 70       The Barthel ADL Index: Guidelines  1. The index should be used as a record of what a patient does, not as a record of what a patient could do.  2. The main aim is to establish degree of independence from any help, physical or verbal, however minor and for whatever reason.  3. The need for supervision renders the patient not independent.  4. A patient's performance should be established using the best available evidence. Asking the patient, friends/relatives and nurses are the usual sources, but direct observation and common sense are also important. However direct testing is not needed.  5. Usually the patient's performance over the preceding 24-48 hours is important, but occasionally longer periods will be relevant.  6. Middle categories imply that the patient supplies over 50 per cent of the effort.  7. Use of aids to be independent is allowed.    Score Interpretation (from Sj 1997)    Independent   60-79 Minimally independent   40-59 Partially dependent   20-39 Very dependent   <20 Totally dependent     -Margarita Stoo, Barthel, D.W. (1965). Functional evaluation: the Barthel Index. Md State Med J 142.  -ESTUARDO Gustafson,

## 2024-02-14 NOTE — PLAN OF CARE
needed walking in hospital room?: A Little  How much help is needed climbing 3-5 steps with a railing?: A Little    AM-[facility] Inpatient Mobility Raw Score : 21  AM-PAC Inpatient T-Scale Score : 50.25     Cutoff score ?171,2,3 had higher odds of discharging home with home health or need of SNF/IPR.    1. Caitlin Hoyt, Vannesa Roche, Alvarado Xie, Lian Wagner, Mack Felipe, Oral Hoyt.  Validity of the AM-PAC “6-Clicks” Inpatient Daily Activity and Basic Mobility Short Forms. Physical Therapy Mar 2014, 94 3) 379-391; DOI: 10.2522/ptj.29703173  2. Emmett YODER, Kathy PHAN, Jason PHAN, Torrie PHAN. Association of AM-PAC \"6-Clicks\" Basic Mobility and Daily Activity Scores With Discharge Destination. Phys Ther. 2021 4;101(4):zoxh101. doi: 10.1093/ptj/lzti190. PMID: 90023309.  3. Sara PHAN, Agus D, Lainey S, Rito K, Isacc S. Activity Measure for Post-Acute Care \"6-Clicks\" Basic Mobility Scores Predict Discharge Destination After Acute Care Hospitalization in Select Patient Groups: A Retrospective, Observational Study. Arch Rehabil Res Clin Transl. 2022;4(3):783900. doi: 10.1016/j.arrct..504125. PMID: 29967434; PMCID: CUY0454118.  4. Lai PEREZ, Mara SILVERIO, Abdi W, Ruthie P. AM-PAC Short Forms Manual 4.0. Revised 2020.                                                                                                                                                                                                                               Pain Ratin/10   Pain Intervention(s):       Activity Tolerance:   Fair , requires rest breaks, observed shortness of breath on exertion, and desaturates with activity and requires oxygen    After treatment:   Patient left in no apparent distress sitting up in chair, Call bell within reach, and Bed/ chair alarm activated    COMMUNICATION/EDUCATION:   The patient's plan of care was discussed with: registered nurse    Patient Education  Education Given To:  Patient  Education Provided: Role of Therapy;Plan of Care;Energy Conservation;Equipment  Education Provided Comments: Rest breaks, pacing, symptom awareness  Education Method: Verbal  Barriers to Learning: None  Education Outcome: Verbalized understanding;Demonstrated understanding    Thank you for this referral.  Greg Muñiz, PT  Minutes: 30      Physical Therapy Evaluation Charge Determination   History Examination Presentation Decision-Making   LOW Complexity : Zero comorbidities / personal factors that will impact the outcome / POC LOW Complexity : 1-2 Standardized tests and measures addressing body structure, function, activity limitation and / or participation in recreation  LOW Complexity : Stable, uncomplicated  AM-PAC  LOW    Based on the above components, the patient evaluation is determined to be of the following complexity level: Low

## 2024-02-15 ENCOUNTER — APPOINTMENT (OUTPATIENT)
Facility: HOSPITAL | Age: 84
DRG: 640 | End: 2024-02-15
Payer: MEDICARE

## 2024-02-15 VITALS
HEART RATE: 55 BPM | HEIGHT: 61 IN | RESPIRATION RATE: 18 BRPM | TEMPERATURE: 97.7 F | DIASTOLIC BLOOD PRESSURE: 46 MMHG | BODY MASS INDEX: 33.01 KG/M2 | WEIGHT: 174.82 LBS | OXYGEN SATURATION: 96 % | SYSTOLIC BLOOD PRESSURE: 115 MMHG

## 2024-02-15 LAB
BASOPHILS # BLD: 0 K/UL (ref 0–0.1)
BASOPHILS NFR BLD: 1 % (ref 0–1)
DIFFERENTIAL METHOD BLD: ABNORMAL
EOSINOPHIL # BLD: 0.2 K/UL (ref 0–0.4)
EOSINOPHIL NFR BLD: 4 % (ref 0–7)
ERYTHROCYTE [DISTWIDTH] IN BLOOD BY AUTOMATED COUNT: 13.6 % (ref 11.5–14.5)
HCT VFR BLD AUTO: 33.3 % (ref 35–47)
HGB BLD-MCNC: 11.2 G/DL (ref 11.5–16)
IMM GRANULOCYTES # BLD AUTO: 0 K/UL (ref 0–0.04)
IMM GRANULOCYTES NFR BLD AUTO: 0 % (ref 0–0.5)
LYMPHOCYTES # BLD: 1.4 K/UL (ref 0.8–3.5)
LYMPHOCYTES NFR BLD: 27 % (ref 12–49)
MCH RBC QN AUTO: 32.6 PG (ref 26–34)
MCHC RBC AUTO-ENTMCNC: 33.6 G/DL (ref 30–36.5)
MCV RBC AUTO: 96.8 FL (ref 80–99)
MONOCYTES # BLD: 0.5 K/UL (ref 0–1)
MONOCYTES NFR BLD: 9 % (ref 5–13)
NEUTS SEG # BLD: 3.1 K/UL (ref 1.8–8)
NEUTS SEG NFR BLD: 59 % (ref 32–75)
NRBC # BLD: 0 K/UL (ref 0–0.01)
NRBC BLD-RTO: 0 PER 100 WBC
NT PRO BNP: ABNORMAL PG/ML
PLATELET # BLD AUTO: 215 K/UL (ref 150–400)
PMV BLD AUTO: 9.3 FL (ref 8.9–12.9)
RBC # BLD AUTO: 3.44 M/UL (ref 3.8–5.2)
TROPONIN I SERPL HS-MCNC: 35 NG/L (ref 0–51)
WBC # BLD AUTO: 5.3 K/UL (ref 3.6–11)

## 2024-02-15 PROCEDURE — 83880 ASSAY OF NATRIURETIC PEPTIDE: CPT

## 2024-02-15 PROCEDURE — 6370000000 HC RX 637 (ALT 250 FOR IP): Performed by: INTERNAL MEDICINE

## 2024-02-15 PROCEDURE — 2500000003 HC RX 250 WO HCPCS: Performed by: HOSPITALIST

## 2024-02-15 PROCEDURE — 94761 N-INVAS EAR/PLS OXIMETRY MLT: CPT

## 2024-02-15 PROCEDURE — 6360000002 HC RX W HCPCS: Performed by: HOSPITALIST

## 2024-02-15 PROCEDURE — 36415 COLL VENOUS BLD VENIPUNCTURE: CPT

## 2024-02-15 PROCEDURE — 90935 HEMODIALYSIS ONE EVALUATION: CPT

## 2024-02-15 PROCEDURE — 71045 X-RAY EXAM CHEST 1 VIEW: CPT

## 2024-02-15 PROCEDURE — 97530 THERAPEUTIC ACTIVITIES: CPT

## 2024-02-15 PROCEDURE — 85025 COMPLETE CBC W/AUTO DIFF WBC: CPT

## 2024-02-15 PROCEDURE — 84484 ASSAY OF TROPONIN QUANT: CPT

## 2024-02-15 PROCEDURE — 6370000000 HC RX 637 (ALT 250 FOR IP): Performed by: HOSPITALIST

## 2024-02-15 PROCEDURE — 2580000003 HC RX 258: Performed by: HOSPITALIST

## 2024-02-15 PROCEDURE — 97116 GAIT TRAINING THERAPY: CPT

## 2024-02-15 RX ORDER — ALBUMIN (HUMAN) 12.5 G/50ML
25 SOLUTION INTRAVENOUS DAILY PRN
Status: DISCONTINUED | OUTPATIENT
Start: 2024-02-15 | End: 2024-02-15 | Stop reason: HOSPADM

## 2024-02-15 RX ORDER — MIDODRINE HYDROCHLORIDE 5 MG/1
10 TABLET ORAL
Status: COMPLETED | OUTPATIENT
Start: 2024-02-15 | End: 2024-02-15

## 2024-02-15 RX ADMIN — MIDODRINE HYDROCHLORIDE 10 MG: 5 TABLET ORAL at 09:00

## 2024-02-15 RX ADMIN — LEVETIRACETAM 250 MG: 500 TABLET, FILM COATED ORAL at 09:00

## 2024-02-15 RX ADMIN — ASPIRIN 81 MG: 81 TABLET, COATED ORAL at 09:00

## 2024-02-15 RX ADMIN — CALCIUM ACETATE 2001 MG: 667 CAPSULE ORAL at 11:51

## 2024-02-15 RX ADMIN — HEPARIN SODIUM 5000 UNITS: 5000 INJECTION INTRAVENOUS; SUBCUTANEOUS at 06:45

## 2024-02-15 RX ADMIN — SODIUM CHLORIDE, PRESERVATIVE FREE 10 ML: 5 INJECTION INTRAVENOUS at 09:02

## 2024-02-15 RX ADMIN — CALCIUM ACETATE 2001 MG: 667 CAPSULE ORAL at 09:00

## 2024-02-15 NOTE — FLOWSHEET NOTE
Primary RN SBAR: MARCOS Smallwood RN  Patient Education: procedural / infection control  Hospital associated wait time; reason: none  Hepatitis B Surface Ag   Date/Time Value Ref Range Status   11/14/2023 07:05 AM <0.10 Index Final     Hep B S Ab   Date/Time Value Ref Range Status   11/14/2023 07:05 AM 28.44 mIU/mL Final        02/15/24 0839   Vital Signs   /61   Temp 98.6 °F (37 °C)   Pulse 57   Respirations 17   SpO2 91 %   Treatment   Treatment Goal UF 2-2.5 kg   Observations & Evaluations   Level of Consciousness 0   Oriented X 4   Respiratory Quality/Effort Unlabored   O2 Device Nasal cannula   Skin Color   (appropriate)   Skin Condition/Temp Warm;Dry   Appetite Fair   Edema Generalized   Edema Generalized Trace   Technical Checks   Dialysis Machine No. 04   RO Machine Number [id number]   Dialyzer Lot No. [id number]   Tubing Lot Number [id number]   All Connections Secure Yes   NS Bag Yes   Saline Line Double Clamped Yes   Dialyzer Revaclear 300   Prime Volume (mL) 200 mL   ICEBOAT I;C;E;B;O;A;T   RO Machine Log Sheet Completed Yes   Machine Alarm Self Test Completed;Passed   Air Foam Detector Tested;Proper Function   Extracorporeal Circuit Tested for Integrity Yes   Dialysis Bath   K+ (Potassium)   (2.5)   Ca+ (Calcium) 2.5   Na+ (Sodium) 138   HCO3 (Bicarb) 35   Bicarbonate Concentrate Lot No. [id number]   Acid Concentrate Lot No. [id number]   Treatment Initiation   Dialyze Hours 3.5   Treatment  Initiation Universal Precautions maintained;Lines secured to patient;Prime given;Connections secured;Venous Parameters set;Arterial Parameters set;Air foam detector engaged;Dialysate;Saline line double clamped;REV-300   Hemodialysis Fistula/Graft Arteriovenous fistula Right Arm   Placement Date/Time: 08/03/22 (c) 0100   Access Type: Arteriovenous fistula  Orientation: Right  Access Location: Arm   Site Assessment Clean, dry & intact   Thrill Present   Bruit Present   Status Accessed   Venous Needle Size 15 G   Arterial  Needle Size 15 G   Accessed By: URIEL Davey RN   Access Attempts  1   Date of Last Dressing Change 02/15/24   Access Interventions Aseptic Technique;Chlorhexidine;Needles taped to patient   Dressing Intervention New   Dressing Status New dressing applied;Clean, dry & intact          02/15/24 1245   Treatment   Time Off 1245   Treatment Goal UF goal met   Observations & Evaluations   Level of Consciousness 0   Oriented X 4   Respiratory Quality/Effort Unlabored   O2 Device Nasal cannula   Skin Color   (appropriate)   Skin Condition/Temp Warm;Dry   Appetite Good   Edema Generalized   Edema Generalized Trace   Vital Signs   BP (!) 115/46   Temp 97.7 °F (36.5 °C)   Pulse 55   Respirations 18   Hemodialysis Fistula/Graft Arteriovenous fistula Right Arm   Placement Date/Time: 08/03/22 (c) 0100   Access Type: Arteriovenous fistula  Orientation: Right  Access Location: Arm   Site Assessment Clean, dry & intact   Thrill Present   Bruit Present   Status Deaccessed   Post-Hemodialysis Assessment   Post-Treatment Procedures Blood returned;Access bleeding time < 10 minutes   Machine Disinfection Process Exterior Machine Disinfection   Rinseback Volume (ml) 200 ml   Blood Volume Processed (Liters) 77.9 L   Dialyzer Clearance Lightly streaked   Duration of Treatment (minutes) 210 minutes   Heparin Amount Administered During Treatment (mL) 0 mL   Hemodialysis Intake (ml) 500 ml   Hemodialysis Output (ml) 3000 ml   NET Removed (ml) 2500   Tolerated Treatment Good   Interventions Taken Medication   Physician Notified Yes   Patient Disposition   (remains in room)       Primary RN SBAR: MARCOS Smallwood RN  Comments: HD treatment completed per physician order. All possible blood returned to patient. Hemostasis achieved in <10 minutes. Access site dressings clean, dry, and intact. +thrill.

## 2024-02-15 NOTE — PROGRESS NOTES
NEPHROLOGY PROGRESS NOTE     Patient: Ivone Swain MRN: [id number]  PCP: Yaniv Hyde MD   :     1940  Age:   83 y.o.  Sex:  female          Assessment & Plan:     ESKD-HD TTS @ [facility] ([facility])  Fluid overload  AOCD    Plan:  Receiving HD now  HD- TTS  Use albumin and midodrine during HD  Hold BP med's on the day of HD  Sanchezita notified             Subjective:   CC:ESKD-HD admitted for sob and cxr c/w fluid overload, last HD Saturday  HPI:   Receiving HD now  ROS:  Current Facility-Administered Medications   Medication Dose Route Frequency    albumin human 25% IV solution 25 g  25 g IntraVENous Daily PRN    aspirin EC tablet 81 mg  81 mg Oral Daily    calcium acetate (PHOSLO) capsule 2,001 mg  3 capsule Oral TID WC    carvedilol (COREG) tablet 6.25 mg  6.25 mg Oral BID WC    fluticasone (FLONASE) 50 MCG/ACT nasal spray 2 spray  2 spray Nasal QPM    [Held by provider] hydrALAZINE (APRESOLINE) tablet 100 mg  100 mg Oral 2 times per day on Sun     [Held by provider] minoxidil (LONITEN) tablet 2.5 mg  2.5 mg Oral 2 times per day on Sun     montelukast (SINGULAIR) tablet 10 mg  10 mg Oral QHS    artificial tears (dextran-hypromellose-glycerin) ophthalmic solution 2 drop  2 drop Both Eyes PRN    pravastatin (PRAVACHOL) tablet 40 mg  40 mg Oral QHS    levETIRAcetam (KEPPRA) tablet 250 mg  250 mg Oral BID    sodium chloride flush 0.9 % injection 5-40 mL  5-40 mL IntraVENous 2 times per day    sodium chloride flush 0.9 % injection 5-40 mL  5-40 mL IntraVENous PRN    0.9 % sodium chloride infusion   IntraVENous PRN    heparin (porcine) injection 5,000 Units  5,000 Units SubCUTAneous 3 times per day    ondansetron (ZOFRAN-ODT) disintegrating tablet 4 mg  4 mg Oral Q8H PRN    Or    ondansetron (ZOFRAN) injection 4 mg  4 mg IntraVENous Q6H PRN    polyethylene glycol (GLYCOLAX) packet 17 g  17 g Oral Daily PRN    acetaminophen (TYLENOL) tablet 650 mg  650 mg Oral Q6H PRN    Or

## 2024-02-15 NOTE — DISCHARGE INSTRUCTIONS
Patient Discharge Instructions    Ivone Swain / [id number] : 1940    Admitted 2024 Discharged: 2/15/2024       Discharge Medications:   It is important that you take the medication exactly as they are prescribed.   Keep your medication in the bottles provided by the pharmacist and keep a list of the medication names, dosages, and times to be taken in your wallet.   Do not take other medications without consulting your doctor.   Call your PCP for medication refills      What to do at Home    Take medications as prescribed and follow up with PCP and Nephrology.  Call your PCP for refills of your medications.      Recommended Diet: renal diet ADULT DIET; Regular; Low Sodium (2 gm); Low Potassium (Less than 3000 mg/day); Low Phosphorus (Less than 1000 mg)       Recommended Activity: Activity as tolerated      **If you experience any of the following symptoms chest pain, shortness of breath, fevers, chills, and dizziness, please follow up with PCP or go to the nearest ER.**    Ms. Swain, it was a pleasure taking care of you.  I am glad you are doing better.  On discharge of asked you to stop your blood pressure medications minoxidil and hydralazine because your blood pressures been a little bit on the lower side.  Do continue with the carvedilol but hold on the day of hemodialysis..  Please monitor your blood pressures and document them for follow-up with your primary care doctor and nephrologist for adjustments or restarting of medications if indicated.  But for the time being a blood pressure will drop too much with these additional medications.  Take care good luck and God bless.       Chandan Shanks MD     February 15, 2024

## 2024-02-15 NOTE — PLAN OF CARE
Problem: Physical Therapy - Adult  Goal: By Discharge: Performs mobility at highest level of function for planned discharge setting.  See evaluation for individualized goals.  Description: FUNCTIONAL STATUS PRIOR TO ADMISSION: Patient was modified independent using a rollator for functional mobility.    HOME SUPPORT PRIOR TO ADMISSION: Patient lived alone in a second floor apartment with elevator access and family living upstairs. Granddaughter visits often.     Physical Therapy Goals  Initiated 2/14/2024  1.  Patient will move from supine to sit and sit to supine, scoot up and down, and roll side to side in bed with independence within 7 day(s).    2.  Patient will perform sit to stand with independence within 7 day(s).  3.  Patient will transfer from bed to chair and chair to bed with modified independence using the least restrictive device within 7 day(s).  4.  Patient will ambulate with modified independence for 150 feet with the least restrictive device within 7 day(s).     Outcome: Progressing   PHYSICAL THERAPY TREATMENT    Patient: Ivone Swain (83 y.o. female)  Date: 2/15/2024  Diagnosis: Hypoxia [R09.02]  Troponin level elevated [R79.89]  Fluid overload [E87.70]  Congestive heart failure, unspecified HF chronicity, unspecified heart failure type (HCC) [I50.9] Fluid overload      Precautions: Fall Risk                      ASSESSMENT:  Patient continues to benefit from skilled PT services and is slowly progressing towards goals. Patient still requiring 1L NC for maintain sats >90%, see below.  Patient was able to increase ambulation distance to 100 feet with RW and demonstrates no loss of balance or instability.  Patient continues to benefit from therapy while in the hospital to address the above stated goals.  Next therapy session recommend ambulation with her premorbid device of rollator.       Documentation for home O2:     ROOM AIR    AT REST   O2 SATS  87% HR  59   ROOM AIR WITH ACTIVITY 02 SATS

## 2024-02-15 NOTE — DISCHARGE SUMMARY
[facility]  [address] [place]  [address] [phone]       PHYSICIAN DISCHARGE SUMMARY        Name:  Ivone Swain, 83 y.o.  :    1940  MRN:    [id number]  PCP:    Yaniv Hyde MD      Date of Admission: 2024  Date of Discharge:   2/15/2024 2:53 PM  Disposition:  [facility]  Condition:  improved, stable, and good      Patient PCP:  Yaniv Hyde MD    Emergency Contact:    Extended Emergency Contact Information  Primary Emergency Contact: [place] [address] [facility]  Home Phone: [phone]  Mobile Phone: [phone]  Relation: Child  Secondary Emergency Contact: Lori Swain  Address: [address] [place] [address]           [address]           [place] [address] [facility]  Home Phone: [phone]  Mobile Phone: [phone]  Relation: Daughter-in-Law      Primary Decision Maker: Refugio Swain - Child - [phone]    Primary Decision Maker: Soy Swain - Child    Code Status:  Full Code     Admission Status: Inpatient       Consultations:  IP CONSULT HOME HEALTH    Reason for Admission:   Hypoxia [R09.02]  Troponin level elevated [R79.89]  Fluid overload [E87.70]  Congestive heart failure, unspecified HF chronicity, unspecified heart failure type (HCC) [I50.9]    Discharge Diagnoses:    Principal Problem:    Fluid overload  Resolved Problems:    * No resolved hospital problems. *      Excerpted HPI from H&P of Sharron Ogden MD:  Ivone is a very pleasant 83 y.o.   female with PMHx ESRD, anxiety, sleep apnea, pulmonary hypertension comes to the hospital with chief complaint of shortness of breath.  As per the patient the shortness of breath started just today.  She is also complaining of cough and back pain.  She is requiring 2 L of oxygen at this time.  Patient is a dialysis patient and gets dialysis on  and .  Her last dialysis was yesterday.  She denies any fever or chills.  She

## 2024-02-15 NOTE — CARE COORDINATION
2/15/2024   CARE MANAGEMENT NOTE:  CM reviewed EMR for clinical updates.  Pt was admitted with hypoxemic respiratory failure; also ESRD on HD.  Reportedly, pt resides alone.    SonRefugio ([phone]) is the primary family contact.     RUR 12%     Transition Plan of Care:  Nephrology is following pt for medical management  Plan is for pt to return home  [facility] (PT, OT) has been arranged. SOC will be 2/21 or 2/22  ESRD - pt is HD at [facility] on T,T,S  Son was provided with a list of private duty aides by my coworker  Outpatient follow up  Son will transport pt home     CM will continue to follow pt until discharged.  Don

## 2024-02-16 ENCOUNTER — HOME HEALTH ADMISSION (OUTPATIENT)
Dept: HOME HEALTH SERVICES | Facility: HOME HEALTH | Age: 84
End: 2024-02-16

## 2024-02-21 ENCOUNTER — HOME CARE VISIT (OUTPATIENT)
Dept: HOME HEALTH SERVICES | Facility: HOME HEALTH | Age: 84
End: 2024-02-21

## 2024-02-23 ENCOUNTER — APPOINTMENT (OUTPATIENT)
Facility: HOSPITAL | Age: 84
DRG: 981 | End: 2024-02-23
Payer: MEDICARE

## 2024-02-23 ENCOUNTER — HOSPITAL ENCOUNTER (INPATIENT)
Facility: HOSPITAL | Age: 84
LOS: 9 days | Discharge: NURSING, REHAB OR LONG-TERM CARE | DRG: 981 | End: 2024-03-03
Attending: EMERGENCY MEDICINE | Admitting: HOSPITALIST
Payer: MEDICARE
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
## 2024-02-23 DIAGNOSIS — R09.2 RESPIRATORY ARREST (HCC): ICD-10-CM

## 2024-02-23 DIAGNOSIS — J96.90 RESPIRATORY FAILURE, UNSPECIFIED CHRONICITY, UNSPECIFIED WHETHER WITH HYPOXIA OR HYPERCAPNIA (HCC): ICD-10-CM

## 2024-02-23 DIAGNOSIS — I63.9 CEREBROVASCULAR ACCIDENT (CVA), UNSPECIFIED MECHANISM (HCC): ICD-10-CM

## 2024-02-23 DIAGNOSIS — R56.9 SEIZURE (HCC): ICD-10-CM

## 2024-02-23 DIAGNOSIS — R56.9 SEIZURES (HCC): ICD-10-CM

## 2024-02-23 DIAGNOSIS — Z86.73 HISTORY OF STROKE: Primary | ICD-10-CM

## 2024-02-23 DIAGNOSIS — E87.70 FLUID OVERLOAD: ICD-10-CM

## 2024-02-23 DIAGNOSIS — N18.6 ESRD (END STAGE RENAL DISEASE) (HCC): ICD-10-CM

## 2024-02-23 LAB
ALBUMIN SERPL-MCNC: 3.6 G/DL (ref 3.5–5)
ALBUMIN/GLOB SERPL: 0.8 (ref 1.1–2.2)
ALP SERPL-CCNC: 72 U/L (ref 45–117)
ALT SERPL-CCNC: 18 U/L (ref 12–78)
ANION GAP SERPL CALC-SCNC: 9 MMOL/L (ref 5–15)
APPEARANCE UR: ABNORMAL
ARTERIAL PATENCY WRIST A: YES
AST SERPL-CCNC: 18 U/L (ref 15–37)
BACTERIA URNS QL MICRO: ABNORMAL /HPF
BASE DEFICIT BLDA-SCNC: 2.8 MMOL/L
BASOPHILS # BLD: 0.1 K/UL (ref 0–0.1)
BASOPHILS NFR BLD: 1 % (ref 0–1)
BDY SITE: ABNORMAL
BILIRUB SERPL-MCNC: 1.2 MG/DL (ref 0.2–1)
BILIRUB UR QL: NEGATIVE
BUN SERPL-MCNC: 26 MG/DL (ref 6–20)
BUN/CREAT SERPL: 5 (ref 12–20)
CALCIUM SERPL-MCNC: 9.2 MG/DL (ref 8.5–10.1)
CHLORIDE SERPL-SCNC: 101 MMOL/L (ref 97–108)
CO2 SERPL-SCNC: 28 MMOL/L (ref 21–32)
COLOR UR: ABNORMAL
COMMENT:: NORMAL
CREAT SERPL-MCNC: 5.48 MG/DL (ref 0.55–1.02)
DIFFERENTIAL METHOD BLD: ABNORMAL
EOSINOPHIL # BLD: 0.1 K/UL (ref 0–0.4)
EOSINOPHIL NFR BLD: 1 % (ref 0–7)
EPITH CASTS URNS QL MICRO: ABNORMAL /LPF
ERYTHROCYTE [DISTWIDTH] IN BLOOD BY AUTOMATED COUNT: 13.5 % (ref 11.5–14.5)
FIO2 ON VENT: 60 %
GAS FLOW.O2 SETTING OXYMISER: 16
GLOBULIN SER CALC-MCNC: 4.8 G/DL (ref 2–4)
GLUCOSE BLD STRIP.AUTO-MCNC: 129 MG/DL (ref 65–117)
GLUCOSE SERPL-MCNC: 114 MG/DL (ref 65–100)
GLUCOSE UR STRIP.AUTO-MCNC: NEGATIVE MG/DL
HCO3 BLDA-SCNC: 23 MMOL/L (ref 22–26)
HCT VFR BLD AUTO: 41.4 % (ref 35–47)
HGB BLD-MCNC: 14 G/DL (ref 11.5–16)
HGB UR QL STRIP: ABNORMAL
IMM GRANULOCYTES # BLD AUTO: 0 K/UL (ref 0–0.04)
IMM GRANULOCYTES NFR BLD AUTO: 0 % (ref 0–0.5)
KETONES UR QL STRIP.AUTO: NEGATIVE MG/DL
LEUKOCYTE ESTERASE UR QL STRIP.AUTO: ABNORMAL
LYMPHOCYTES # BLD: 1.3 K/UL (ref 0.8–3.5)
LYMPHOCYTES NFR BLD: 13 % (ref 12–49)
MAGNESIUM SERPL-MCNC: 1.9 MG/DL (ref 1.6–2.4)
MCH RBC QN AUTO: 31.7 PG (ref 26–34)
MCHC RBC AUTO-ENTMCNC: 33.8 G/DL (ref 30–36.5)
MCV RBC AUTO: 93.9 FL (ref 80–99)
MONOCYTES # BLD: 0.5 K/UL (ref 0–1)
MONOCYTES NFR BLD: 5 % (ref 5–13)
NEUTS SEG # BLD: 7.7 K/UL (ref 1.8–8)
NEUTS SEG NFR BLD: 80 % (ref 32–75)
NITRITE UR QL STRIP.AUTO: NEGATIVE
NRBC # BLD: 0 K/UL (ref 0–0.01)
NRBC BLD-RTO: 0 PER 100 WBC
PCO2 BLDA: 42 MMHG (ref 35–45)
PEEP RESPIRATORY: 5
PH BLDA: 7.35 (ref 7.35–7.45)
PH UR STRIP: 8.5 (ref 5–8)
PHOSPHATE SERPL-MCNC: 3.1 MG/DL (ref 2.6–4.7)
PLATELET # BLD AUTO: 234 K/UL (ref 150–400)
PMV BLD AUTO: 9.2 FL (ref 8.9–12.9)
PO2 BLDA: 128 MMHG (ref 80–100)
POTASSIUM SERPL-SCNC: 3.8 MMOL/L (ref 3.5–5.1)
PROT SERPL-MCNC: 8.4 G/DL (ref 6.4–8.2)
PROT UR STRIP-MCNC: >300 MG/DL
RBC # BLD AUTO: 4.41 M/UL (ref 3.8–5.2)
RBC #/AREA URNS HPF: ABNORMAL /HPF (ref 0–5)
SAO2 % BLD: 98 % (ref 92–97)
SAO2% DEVICE SAO2% SENSOR NAME: ABNORMAL
SERVICE CMNT-IMP: ABNORMAL
SODIUM SERPL-SCNC: 138 MMOL/L (ref 136–145)
SP GR UR REFRACTOMETRY: 1.02 (ref 1–1.03)
SPECIMEN HOLD: NORMAL
SPECIMEN SITE: ABNORMAL
URINE CULTURE IF INDICATED: ABNORMAL
UROBILINOGEN UR QL STRIP.AUTO: 0.2 EU/DL (ref 0.2–1)
VENTILATION MODE VENT: ABNORMAL
VT SETTING VENT: 400
WBC # BLD AUTO: 9.6 K/UL (ref 3.6–11)
WBC URNS QL MICRO: >100 /HPF (ref 0–4)

## 2024-02-23 PROCEDURE — 82803 BLOOD GASES ANY COMBINATION: CPT

## 2024-02-23 PROCEDURE — 99223 1ST HOSP IP/OBS HIGH 75: CPT | Performed by: NURSE PRACTITIONER

## 2024-02-23 PROCEDURE — 80177 DRUG SCRN QUAN LEVETIRACETAM: CPT

## 2024-02-23 PROCEDURE — P9047 ALBUMIN (HUMAN), 25%, 50ML: HCPCS | Performed by: INTERNAL MEDICINE

## 2024-02-23 PROCEDURE — 81001 URINALYSIS AUTO W/SCOPE: CPT

## 2024-02-23 PROCEDURE — 87077 CULTURE AEROBIC IDENTIFY: CPT

## 2024-02-23 PROCEDURE — 6360000002 HC RX W HCPCS

## 2024-02-23 PROCEDURE — 71045 X-RAY EXAM CHEST 1 VIEW: CPT

## 2024-02-23 PROCEDURE — 6360000002 HC RX W HCPCS: Performed by: INTERNAL MEDICINE

## 2024-02-23 PROCEDURE — 82962 GLUCOSE BLOOD TEST: CPT

## 2024-02-23 PROCEDURE — 2000000000 HC ICU R&B

## 2024-02-23 PROCEDURE — 94002 VENT MGMT INPAT INIT DAY: CPT

## 2024-02-23 PROCEDURE — 87086 URINE CULTURE/COLONY COUNT: CPT

## 2024-02-23 PROCEDURE — 2580000003 HC RX 258: Performed by: EMERGENCY MEDICINE

## 2024-02-23 PROCEDURE — 87186 SC STD MICRODIL/AGAR DIL: CPT

## 2024-02-23 PROCEDURE — 31500 INSERT EMERGENCY AIRWAY: CPT

## 2024-02-23 PROCEDURE — 72125 CT NECK SPINE W/O DYE: CPT

## 2024-02-23 PROCEDURE — 36600 WITHDRAWAL OF ARTERIAL BLOOD: CPT

## 2024-02-23 PROCEDURE — 5A1D70Z PERFORMANCE OF URINARY FILTRATION, INTERMITTENT, LESS THAN 6 HOURS PER DAY: ICD-10-PCS | Performed by: INTERNAL MEDICINE

## 2024-02-23 PROCEDURE — 83735 ASSAY OF MAGNESIUM: CPT

## 2024-02-23 PROCEDURE — 0BH17EZ INSERTION OF ENDOTRACHEAL AIRWAY INTO TRACHEA, VIA NATURAL OR ARTIFICIAL OPENING: ICD-10-PCS | Performed by: EMERGENCY MEDICINE

## 2024-02-23 PROCEDURE — 99285 EMERGENCY DEPT VISIT HI MDM: CPT

## 2024-02-23 PROCEDURE — 80053 COMPREHEN METABOLIC PANEL: CPT

## 2024-02-23 PROCEDURE — 90935 HEMODIALYSIS ONE EVALUATION: CPT

## 2024-02-23 PROCEDURE — 74018 RADEX ABDOMEN 1 VIEW: CPT

## 2024-02-23 PROCEDURE — 5A1945Z RESPIRATORY VENTILATION, 24-96 CONSECUTIVE HOURS: ICD-10-PCS | Performed by: INTERNAL MEDICINE

## 2024-02-23 PROCEDURE — 85025 COMPLETE CBC W/AUTO DIFF WBC: CPT

## 2024-02-23 PROCEDURE — 2500000003 HC RX 250 WO HCPCS: Performed by: EMERGENCY MEDICINE

## 2024-02-23 PROCEDURE — 70450 CT HEAD/BRAIN W/O DYE: CPT

## 2024-02-23 PROCEDURE — 2700000000 HC OXYGEN THERAPY PER DAY

## 2024-02-23 PROCEDURE — 6360000002 HC RX W HCPCS: Performed by: NURSE PRACTITIONER

## 2024-02-23 PROCEDURE — 96375 TX/PRO/DX INJ NEW DRUG ADDON: CPT

## 2024-02-23 PROCEDURE — 2580000003 HC RX 258: Performed by: HOSPITALIST

## 2024-02-23 PROCEDURE — 6360000002 HC RX W HCPCS: Performed by: EMERGENCY MEDICINE

## 2024-02-23 PROCEDURE — 36415 COLL VENOUS BLD VENIPUNCTURE: CPT

## 2024-02-23 PROCEDURE — 96374 THER/PROPH/DIAG INJ IV PUSH: CPT

## 2024-02-23 PROCEDURE — A4216 STERILE WATER/SALINE, 10 ML: HCPCS | Performed by: EMERGENCY MEDICINE

## 2024-02-23 PROCEDURE — 84100 ASSAY OF PHOSPHORUS: CPT

## 2024-02-23 RX ORDER — ACETAMINOPHEN 650 MG/1
650 SUPPOSITORY RECTAL EVERY 6 HOURS PRN
Status: DISCONTINUED | OUTPATIENT
Start: 2024-02-23 | End: 2024-03-03 | Stop reason: HOSPADM

## 2024-02-23 RX ORDER — FENTANYL CITRATE 50 UG/ML
25 INJECTION, SOLUTION INTRAMUSCULAR; INTRAVENOUS
Status: DISCONTINUED | OUTPATIENT
Start: 2024-02-23 | End: 2024-02-25

## 2024-02-23 RX ORDER — HEPARIN SODIUM 1000 [USP'U]/ML
2000 INJECTION, SOLUTION INTRAVENOUS; SUBCUTANEOUS ONCE
Status: COMPLETED | OUTPATIENT
Start: 2024-02-23 | End: 2024-02-23

## 2024-02-23 RX ORDER — 0.9 % SODIUM CHLORIDE 0.9 %
1000 INTRAVENOUS SOLUTION INTRAVENOUS ONCE
Status: COMPLETED | OUTPATIENT
Start: 2024-02-23 | End: 2024-02-23

## 2024-02-23 RX ORDER — HEPARIN SODIUM 1000 [USP'U]/ML
2000 INJECTION, SOLUTION INTRAVENOUS; SUBCUTANEOUS ONCE
Status: DISCONTINUED | OUTPATIENT
Start: 2024-02-23 | End: 2024-02-23

## 2024-02-23 RX ORDER — LEVETIRACETAM 500 MG/5ML
500 INJECTION, SOLUTION, CONCENTRATE INTRAVENOUS ONCE
Status: COMPLETED | OUTPATIENT
Start: 2024-02-23 | End: 2024-02-23

## 2024-02-23 RX ORDER — SODIUM CHLORIDE 9 MG/ML
INJECTION, SOLUTION INTRAVENOUS PRN
Status: DISCONTINUED | OUTPATIENT
Start: 2024-02-23 | End: 2024-03-03 | Stop reason: HOSPADM

## 2024-02-23 RX ORDER — ACETAMINOPHEN 325 MG/1
650 TABLET ORAL EVERY 6 HOURS PRN
Status: DISCONTINUED | OUTPATIENT
Start: 2024-02-23 | End: 2024-03-03 | Stop reason: HOSPADM

## 2024-02-23 RX ORDER — HEPARIN SODIUM 5000 [USP'U]/ML
5000 INJECTION, SOLUTION INTRAVENOUS; SUBCUTANEOUS EVERY 8 HOURS SCHEDULED
Status: DISCONTINUED | OUTPATIENT
Start: 2024-02-23 | End: 2024-02-23

## 2024-02-23 RX ORDER — CHLORHEXIDINE GLUCONATE ORAL RINSE 1.2 MG/ML
15 SOLUTION DENTAL 2 TIMES DAILY
Status: DISCONTINUED | OUTPATIENT
Start: 2024-02-23 | End: 2024-02-23

## 2024-02-23 RX ORDER — ONDANSETRON 4 MG/1
4 TABLET, ORALLY DISINTEGRATING ORAL EVERY 8 HOURS PRN
Status: DISCONTINUED | OUTPATIENT
Start: 2024-02-23 | End: 2024-02-23

## 2024-02-23 RX ORDER — HEPARIN SODIUM 5000 [USP'U]/ML
5000 INJECTION, SOLUTION INTRAVENOUS; SUBCUTANEOUS EVERY 12 HOURS
Status: DISCONTINUED | OUTPATIENT
Start: 2024-02-23 | End: 2024-03-03 | Stop reason: HOSPADM

## 2024-02-23 RX ORDER — ASPIRIN 81 MG/1
81 TABLET, CHEWABLE ORAL DAILY
Status: DISCONTINUED | OUTPATIENT
Start: 2024-02-23 | End: 2024-02-25

## 2024-02-23 RX ORDER — LEVETIRACETAM 500 MG/5ML
500 INJECTION, SOLUTION, CONCENTRATE INTRAVENOUS EVERY 12 HOURS
Status: DISCONTINUED | OUTPATIENT
Start: 2024-02-24 | End: 2024-02-27

## 2024-02-23 RX ORDER — ALBUMIN (HUMAN) 12.5 G/50ML
25 SOLUTION INTRAVENOUS PRN
Status: DISCONTINUED | OUTPATIENT
Start: 2024-02-23 | End: 2024-02-27

## 2024-02-23 RX ORDER — ONDANSETRON 2 MG/ML
4 INJECTION INTRAMUSCULAR; INTRAVENOUS EVERY 6 HOURS PRN
Status: DISCONTINUED | OUTPATIENT
Start: 2024-02-23 | End: 2024-03-03 | Stop reason: HOSPADM

## 2024-02-23 RX ORDER — LORAZEPAM 2 MG/ML
2 CONCENTRATE ORAL ONCE
Status: DISCONTINUED | OUTPATIENT
Start: 2024-02-23 | End: 2024-02-23

## 2024-02-23 RX ORDER — ROCURONIUM BROMIDE 10 MG/ML
80 INJECTION, SOLUTION INTRAVENOUS ONCE
Status: COMPLETED | OUTPATIENT
Start: 2024-02-23 | End: 2024-02-23

## 2024-02-23 RX ORDER — LEVETIRACETAM 500 MG/5ML
2000 INJECTION, SOLUTION, CONCENTRATE INTRAVENOUS ONCE
Status: COMPLETED | OUTPATIENT
Start: 2024-02-23 | End: 2024-02-23

## 2024-02-23 RX ORDER — ONDANSETRON 2 MG/ML
4 INJECTION INTRAMUSCULAR; INTRAVENOUS EVERY 6 HOURS PRN
Status: DISCONTINUED | OUTPATIENT
Start: 2024-02-23 | End: 2024-02-23

## 2024-02-23 RX ORDER — ALBUMIN (HUMAN) 12.5 G/50ML
25 SOLUTION INTRAVENOUS ONCE
Status: COMPLETED | OUTPATIENT
Start: 2024-02-23 | End: 2024-02-23

## 2024-02-23 RX ORDER — PROPOFOL 10 MG/ML
5-200 INJECTION, EMULSION INTRAVENOUS CONTINUOUS
Status: DISCONTINUED | OUTPATIENT
Start: 2024-02-23 | End: 2024-02-23

## 2024-02-23 RX ORDER — SODIUM CHLORIDE 0.9 % (FLUSH) 0.9 %
5-40 SYRINGE (ML) INJECTION PRN
Status: DISCONTINUED | OUTPATIENT
Start: 2024-02-23 | End: 2024-03-03 | Stop reason: HOSPADM

## 2024-02-23 RX ORDER — PROPOFOL 10 MG/ML
0-50 INJECTION, EMULSION INTRAVENOUS CONTINUOUS
Status: DISCONTINUED | OUTPATIENT
Start: 2024-02-23 | End: 2024-02-25

## 2024-02-23 RX ORDER — LEVETIRACETAM 500 MG/5ML
500 INJECTION, SOLUTION, CONCENTRATE INTRAVENOUS
Status: DISCONTINUED | OUTPATIENT
Start: 2024-02-24 | End: 2024-02-27

## 2024-02-23 RX ORDER — HYDRALAZINE HYDROCHLORIDE 20 MG/ML
10 INJECTION INTRAMUSCULAR; INTRAVENOUS EVERY 4 HOURS PRN
Status: DISCONTINUED | OUTPATIENT
Start: 2024-02-23 | End: 2024-02-25

## 2024-02-23 RX ORDER — SODIUM CHLORIDE 0.9 % (FLUSH) 0.9 %
5-40 SYRINGE (ML) INJECTION EVERY 12 HOURS SCHEDULED
Status: DISCONTINUED | OUTPATIENT
Start: 2024-02-23 | End: 2024-03-03 | Stop reason: HOSPADM

## 2024-02-23 RX ORDER — LORAZEPAM 2 MG/ML
INJECTION INTRAMUSCULAR
Status: COMPLETED
Start: 2024-02-23 | End: 2024-02-23

## 2024-02-23 RX ORDER — ASPIRIN 300 MG/1
300 SUPPOSITORY RECTAL DAILY
Status: DISCONTINUED | OUTPATIENT
Start: 2024-02-23 | End: 2024-02-25

## 2024-02-23 RX ADMIN — SODIUM CHLORIDE, PRESERVATIVE FREE 10 ML: 5 INJECTION INTRAVENOUS at 13:38

## 2024-02-23 RX ADMIN — LEVETIRACETAM 2000 MG: 100 INJECTION, SOLUTION INTRAVENOUS at 09:26

## 2024-02-23 RX ADMIN — PROPOFOL 30 MCG/KG/MIN: 10 INJECTION, EMULSION INTRAVENOUS at 20:55

## 2024-02-23 RX ADMIN — ROCURONIUM BROMIDE 80 MG: 10 INJECTION, SOLUTION INTRAVENOUS at 09:08

## 2024-02-23 RX ADMIN — HEPARIN SODIUM 5000 UNITS: 5000 INJECTION INTRAVENOUS; SUBCUTANEOUS at 18:48

## 2024-02-23 RX ADMIN — FAMOTIDINE 20 MG: 10 INJECTION INTRAVENOUS at 09:42

## 2024-02-23 RX ADMIN — ALBUMIN (HUMAN) 25 G: 0.25 INJECTION, SOLUTION INTRAVENOUS at 14:56

## 2024-02-23 RX ADMIN — HEPARIN SODIUM 2000 UNITS: 1000 INJECTION INTRAVENOUS; SUBCUTANEOUS at 15:05

## 2024-02-23 RX ADMIN — ALBUMIN (HUMAN) 25 G: 0.25 INJECTION, SOLUTION INTRAVENOUS at 18:54

## 2024-02-23 RX ADMIN — PROPOFOL 30 MCG/KG/MIN: 10 INJECTION, EMULSION INTRAVENOUS at 09:23

## 2024-02-23 RX ADMIN — LORAZEPAM 2 MG: 2 INJECTION INTRAMUSCULAR; INTRAVENOUS at 09:04

## 2024-02-23 RX ADMIN — SODIUM CHLORIDE 1000 ML: 9 INJECTION, SOLUTION INTRAVENOUS at 09:26

## 2024-02-23 RX ADMIN — LEVETIRACETAM 500 MG: 100 INJECTION, SOLUTION INTRAVENOUS at 18:48

## 2024-02-23 ASSESSMENT — PULMONARY FUNCTION TESTS
PIF_VALUE: 25
PIF_VALUE: 24
PIF_VALUE: 18
PIF_VALUE: 18
PIF_VALUE: 21

## 2024-02-23 ASSESSMENT — PAIN SCALES - GENERAL
PAINLEVEL_OUTOF10: 0

## 2024-02-23 ASSESSMENT — PAIN SCALES - WONG BAKER: WONGBAKER_NUMERICALRESPONSE: 6

## 2024-02-23 ASSESSMENT — PAIN - FUNCTIONAL ASSESSMENT: PAIN_FUNCTIONAL_ASSESSMENT: WONG-BAKER FACES

## 2024-02-23 NOTE — H&P
Relation Age of Onset    Hypertension Sister     Diabetes Sister     Hypertension Mother     Stroke Father     Parkinson's Disease Father     Cancer Sister           Review of Systems (14 point ROS):    Unable to obtain due to clinical condition discussed      Objective:      Vitals:    02/23/24 1002   BP: (!) 195/88   Pulse: 89   Resp: 16   Temp:    SpO2: 99%       Physical Exam:  General:  Intubated and sedated  Head: Normocephalic, without obvious abnormality  Eyes: pinpoint pupils, anicteric sclerae, and conjuntiva clear  ENT: oral mucosa pink & moist  Neck: supple  Lungs:  On vent.  Clear to auscultation anteriorly  Heart: S1, S2 normal, regular rate, and regular rhythm  Abd: not distended, soft  Ext: no cyanosis and no edema  Skin: normal skin color, no rashes, and texture normal  Neuro:   Cannot assess the patient is sedated and intubated  Psych: can not assess due to medical condition      Labs:  Recent Labs     02/23/24  0741   WBC 9.6   HGB 14.0   HCT 41.4        Recent Labs     02/23/24  0741      K 3.8      CO2 28   BUN 26*   MG 1.9   ALT 18     No results found for: \"GLUCPOC\"  No results for input(s): \"PH\", \"PCO2\", \"PO2\", \"HCO3\", \"FIO2\" in the last 72 hours.  No results for input(s): \"INR\" in the last 72 hours.  [unfilled]  INR/Prothrombin Time  TSH No results for input(s): \"TSH\" in the last 72 hours.  THYROXINE No results for input(s): \"THYROXINE\" in the last 72 hours. @TSH@  @A1C@  No results for input(s): \"LABURIN\" in the last 72 hours.    Microbiology:  [unfilled]    Reviewed:     Telemetry reviewed: NSR      Radiology:  XR CHEST PORTABLE    Result Date: 2/23/2024  1. Endotracheal tube overlies the trachea 3.4 cm below the level of the clavicles 2. Interval development of pulmonary edema and trace pleural effusion on the left    XR CHEST PORTABLE    Result Date: 2/23/2024  1. No acute disease     CT HEAD WO CONTRAST    Result Date: 2/23/2024  Interval development of

## 2024-02-23 NOTE — FLOWSHEET NOTE
02/23/24 1315   Vital Signs   /88   Temp (!) 100.8 °F (38.2 °C)   Pulse 70   Respirations 16   SpO2 100 %   Pain Assessment   Pain Assessment Critical Care Pain Observation Tool (CPOT)   Pain Level 0   Treatment   Time On 1343   Treatment Goal 3000   Observations & Evaluations   Level of Consciousness 3   Oriented X SHANI- intubated/sedated   Heart Rhythm Irregular   Respiratory Quality/Effort Unlabored   FiO2  40 %   O2 Device Ventilator   Bilateral Breath Sounds Diminished   Skin Color Other (comment)  (WDL)   Skin Condition/Temp Warm;Dry   Abdomen Inspection Soft;Obese   Edema Generalized   Technical Checks   Dialysis Machine No. 01   RO Machine Number [id number]   Dialyzer Lot No. [id number]   Tubing Lot Number [id number]   All Connections Secure Yes   NS Bag Yes   Saline Line Double Clamped Yes   Dialyzer Revaclear 300   Prime Volume (mL) 200 mL   ICEBOAT I;C;E;B;O;A;T   RO Machine Log Sheet Completed Yes   Machine Alarm Self Test Completed;Passed   Air Foam Detector Tested;Proper Function   Extracorporeal Circuit Tested for Integrity Yes   Machine Conductivity 13.8   Manual Ph 7.4   Bleach Test (Neg) Yes   Bath Temperature 98.6 °F (37 °C)   Dialysis Bath   K+ (Potassium) 3   Ca+ (Calcium) 2.5   Na+ (Sodium) 138   HCO3 (Bicarb) 35   Bicarbonate Concentrate Lot No. [id number]   Acid Concentrate Lot No. [id number]   Treatment Initiation   Dialyze Hours 3   Treatment  Initiation Universal Precautions maintained;Lines secured to patient;Connections secured;Prime given;Venous Parameters set;Arterial Parameters set;Air foam detector engaged;Dialysate;Saline line double clamped;Revaclear Dialyzer;REV-300   Hemodialysis Fistula/Graft Arteriovenous fistula Right Arm   Placement Date/Time: 08/03/22 (c) 0100   Access Type: Arteriovenous fistula  Orientation: Right  Access Location: Arm   Site Assessment Clean, dry & intact   Thrill Present   Bruit Present   Status Accessed   Venous Needle Size 15 G   Arterial Needle Size

## 2024-02-23 NOTE — ED TRIAGE NOTES
Pt via [place] [facility] for c/o fall. EMS states son found pt lying on floor c/o headache. Unsure if headache started before or after falling.   Son reports pt is not acting at baseline.  Completed dialysis yesterday.    Pt aaox4, answering questions appropriately. Rr even and unlabored.

## 2024-02-23 NOTE — ED PROVIDER NOTES
ED Attending Physician Addendum    1:46 PM  Change of shift.  Care of patient transferred from Dr Dunaway.  Awaiting imgaging and labs.     MDM:    83F w/ hx ESRD on HD and seizures p/w AMS and GLF. Pt completed HD yesterday after which told famly that felt dizzy and generalized fatigue and was c/o HA. Family went to check on her this AM and found her severely altered and on the floor of her condo where she lives alone. Has a distant hx of seizures last 2015 and on keppra.    Pt signed out to me by Dr Dunaway. I was called to bedside for pt having a GTC after which remained altered so I intubated her for airway protection. Gave ativan and loaded w/ keppra 2g. Now ventilated. ABG looks ok. Labs show stable ESRD including normal AG/bicarb and K. EKG SR w/o ischemic changes or dysrythmia. CTH neg for hemorrhage but c/f subacute infarct. CT cspine unremarkable. Post intubation CXR showed deep ETT so withdrew 1-2cm and also diffuse patchy infiltrates c/f edema vs aspiration. Updated family. I spoke w/ crit care. Admit to ICU.    EKG Interpretation  SR, narrow QRS, nl intervals, no ARMNAD/STD/TWI    I personally reviewed and independently interpreted EKG, labs and imaging results.    Total critical care time (not including time spent performing separately reportable procedures): 59      Intubation    Date/Time: 2/23/2024 9:44 AM    Performed by: Arnoldo Adams MD  Authorized by: Chandan Shanks MD    Consent:     Consent obtained:  Emergent situation  Universal protocol:     Patient identity confirmed:  Anonymous protocol, patient vented/unresponsive  Pre-procedure details:     Indications: airway protection and altered consciousness      Patient status:  Unresponsive    Look externally: no concerns      Mallampati score:  III    Obstruction: none      Neck mobility: normal      Pharmacologic strategy: RSI      Induction agents:  None    Paralytics:  Rocuronium  Procedure details:     Preoxygenation:  Bag valve mask    CPR in 
Surgical History:   Procedure Laterality Date    APPENDECTOMY N/A     CARDIAC PROCEDURE N/A 2023    Left heart cath / coronary angiography performed by Jose David Johnson DO at [facility] CARDIAC CATH LAB    CATARACT REMOVAL Bilateral 2018     SECTION N/A     x 2    HEMORRHOID SURGERY      HYSTERECTOMY (CERVIX STATUS UNKNOWN)      INVASIVE VASCULAR N/A 2023    Ultrasound guided vascular access performed by Jose David Johnson DO at [facility] CARDIAC CATH LAB    IR NONTUNNELED VASCULAR CATHETER  2021    IR NONTUNNELED VASCULAR CATHETER 2021 [facility] ANGIO IR    IR NONTUNNELED VASCULAR CATHETER  2021    IR TUNNELED CATHETER PLACEMENT GREATER THAN 5 YEARS  2021    IR TUNNELED CATHETER PLACEMENT GREATER THAN 5 YEARS 2021 [facility] RAD ANGIO IR    IR TUNNELED CATHETER PLACEMENT GREATER THAN 5 YEARS  2021    TONSILLECTOMY      TOTAL KNEE ARTHROPLASTY Right 2019       CURRENT MEDICATIONS       Previous Medications    ASPIRIN 81 MG EC TABLET    Take 1 tablet by mouth daily    CALCIUM ACETATE (PHOSLO) 667 MG CAPS CAPSULE    Take 3 capsules by mouth 3 times daily (with meals)    CARVEDILOL (COREG) 6.25 MG TABLET    Take 1 tablet by mouth 2 times daily (with meals)    FLUTICASONE (FLONASE) 50 MCG/ACT NASAL SPRAY    2 sprays by Nasal route every evening    LEVETIRACETAM (KEPPRA) 250 MG TABLET    Take one tablet in morning and evening.  After dialysis session, take one additional tablet.  90 day prescription    MONTELUKAST (SINGULAIR) 10 MG TABLET    Take 1 tablet by mouth every evening    OMEGA-3 FATTY ACIDS (FISH OIL) 1000 MG CAPSULE    Take 1 capsule by mouth 2 times daily    POLYVINYL ALCOHOL-POVIDONE PF (REFRESH) 1.4-0.6 % SOLN OPHTHALMIC SOLUTION    Apply 1-2 drops to eye as needed    PRAVASTATIN (PRAVACHOL) 40 MG TABLET    Take 1 tablet by mouth nightly       ALLERGIES     Latex, Codeine, Levofloxacin, Lisinopril, and Sulfa antibiotics    FAMILY HISTORY       Family History   Problem Relation Age of

## 2024-02-23 NOTE — ED NOTES
0855- called into room with patient who was seizing per family.   MD called to room for seizure activity. Patient respirations assisted by BVM.   0900- Dr. Adams ordered 2 mg ativan for seizures.   0907- Dr. Adams ordered 80mg Rocuronium and RSI for airway control.   0908- 80mg Rocuronium given  0910- Intubation successful, ET placement confirmed with colorimetric device and auscultation, 7.5, 24cm at lip.   0945- ET pulled back to 23cm post xray showing tip at ana.

## 2024-02-23 NOTE — ED NOTES
TRANSFER - OUT REPORT:    Verbal report given to BOAZ Khan on Ivone Swain  being transferred to ICU Rm. [id number] for routine progression of patient care       Report consisted of patient's Situation, Background, Assessment and   Recommendations(SBAR).     Information from the following report(s) ED Encounter Summary, ED SBAR, Intake/Output, MAR, and Recent Results was reviewed with the receiving nurse.    [place] Fall Assessment:    Presents to emergency department  because of falls (Syncope, seizure, or loss of consciousness): Yes  Age > 70: Yes  Altered Mental Status, Intoxication with alcohol or substance confusion (Disorientation, impaired judgment, poor safety awaremess, or inability to follow instructions): Yes  Impaired Mobility: Ambulates or transfers with assistive devices or assistance; Unable to ambulate or transer.: No  Nursing Judgement: Yes          Lines:   Peripheral IV 02/23/24 Left;Posterior Hand (Active)        Opportunity for questions and clarification was provided.      Patient transported with:  Monitor, Rajendra, RN, RT

## 2024-02-24 ENCOUNTER — APPOINTMENT (OUTPATIENT)
Facility: HOSPITAL | Age: 84
DRG: 981 | End: 2024-02-24
Payer: MEDICARE

## 2024-02-24 ENCOUNTER — APPOINTMENT (OUTPATIENT)
Facility: HOSPITAL | Age: 84
DRG: 981 | End: 2024-02-24
Attending: HOSPITALIST
Payer: MEDICARE

## 2024-02-24 LAB
ALBUMIN SERPL-MCNC: 4.1 G/DL (ref 3.5–5)
ALBUMIN/GLOB SERPL: 1.1 (ref 1.1–2.2)
ALP SERPL-CCNC: 63 U/L (ref 45–117)
ALT SERPL-CCNC: 15 U/L (ref 12–78)
ANION GAP SERPL CALC-SCNC: 8 MMOL/L (ref 5–15)
AST SERPL-CCNC: 17 U/L (ref 15–37)
BASOPHILS # BLD: 0 K/UL (ref 0–0.1)
BASOPHILS NFR BLD: 0 % (ref 0–1)
BILIRUB SERPL-MCNC: 1.8 MG/DL (ref 0.2–1)
BUN SERPL-MCNC: 19 MG/DL (ref 6–20)
BUN/CREAT SERPL: 4 (ref 12–20)
CALCIUM SERPL-MCNC: 9.3 MG/DL (ref 8.5–10.1)
CHLORIDE SERPL-SCNC: 98 MMOL/L (ref 97–108)
CHOLEST SERPL-MCNC: 103 MG/DL
CO2 SERPL-SCNC: 27 MMOL/L (ref 21–32)
CREAT SERPL-MCNC: 4.42 MG/DL (ref 0.55–1.02)
DIFFERENTIAL METHOD BLD: NORMAL
ECHO AO ASC DIAM: 2.8 CM
ECHO AO ASCENDING AORTA INDEX: 1.61 CM/M2
ECHO AV AREA PEAK VELOCITY: 1.4 CM2
ECHO AV AREA VTI: 1.4 CM2
ECHO AV AREA/BSA PEAK VELOCITY: 0.8 CM2/M2
ECHO AV AREA/BSA VTI: 0.8 CM2/M2
ECHO AV MEAN GRADIENT: 5 MMHG
ECHO AV MEAN VELOCITY: 1.1 M/S
ECHO AV PEAK GRADIENT: 9 MMHG
ECHO AV PEAK VELOCITY: 1.5 M/S
ECHO AV VELOCITY RATIO: 0.6
ECHO AV VTI: 41.9 CM
ECHO BSA: 1.79 M2
ECHO LA DIAMETER INDEX: 1.95 CM/M2
ECHO LA DIAMETER: 3.4 CM
ECHO LA VOL A-L A2C: 47 ML (ref 22–52)
ECHO LA VOL A-L A4C: 43 ML (ref 22–52)
ECHO LA VOL BP: 46 ML (ref 22–52)
ECHO LA VOL MOD A2C: 45 ML (ref 22–52)
ECHO LA VOL MOD A4C: 38 ML (ref 22–52)
ECHO LA VOL/BSA BIPLANE: 26 ML/M2 (ref 16–34)
ECHO LA VOLUME AREA LENGTH: 50 ML
ECHO LA VOLUME INDEX A-L A2C: 27 ML/M2 (ref 16–34)
ECHO LA VOLUME INDEX A-L A4C: 25 ML/M2 (ref 16–34)
ECHO LA VOLUME INDEX AREA LENGTH: 29 ML/M2 (ref 16–34)
ECHO LA VOLUME INDEX MOD A2C: 26 ML/M2 (ref 16–34)
ECHO LA VOLUME INDEX MOD A4C: 22 ML/M2 (ref 16–34)
ECHO LV E' LATERAL VELOCITY: 4 CM/S
ECHO LV E' SEPTAL VELOCITY: 3 CM/S
ECHO LV EDV A2C: 87 ML
ECHO LV EDV A4C: 77 ML
ECHO LV EDV BP: 82 ML (ref 56–104)
ECHO LV EDV INDEX A4C: 44 ML/M2
ECHO LV EDV INDEX BP: 47 ML/M2
ECHO LV EDV NDEX A2C: 50 ML/M2
ECHO LV EJECTION FRACTION A2C: 60 %
ECHO LV EJECTION FRACTION A4C: 48 %
ECHO LV EJECTION FRACTION BIPLANE: 55 % (ref 55–100)
ECHO LV ESV A2C: 35 ML
ECHO LV ESV A4C: 40 ML
ECHO LV ESV BP: 37 ML (ref 19–49)
ECHO LV ESV INDEX A2C: 20 ML/M2
ECHO LV ESV INDEX A4C: 23 ML/M2
ECHO LV ESV INDEX BP: 21 ML/M2
ECHO LV FRACTIONAL SHORTENING: 23 % (ref 28–44)
ECHO LV INTERNAL DIMENSION DIASTOLE INDEX: 2.53 CM/M2
ECHO LV INTERNAL DIMENSION DIASTOLIC: 4.4 CM (ref 3.9–5.3)
ECHO LV INTERNAL DIMENSION SYSTOLIC INDEX: 1.95 CM/M2
ECHO LV INTERNAL DIMENSION SYSTOLIC: 3.4 CM
ECHO LV IVSD: 1 CM (ref 0.6–0.9)
ECHO LV MASS 2D: 147.8 G (ref 67–162)
ECHO LV MASS INDEX 2D: 85 G/M2 (ref 43–95)
ECHO LV POSTERIOR WALL DIASTOLIC: 1 CM (ref 0.6–0.9)
ECHO LV RELATIVE WALL THICKNESS RATIO: 0.45
ECHO LVOT AREA: 2.5 CM2
ECHO LVOT AV VTI INDEX: 0.58
ECHO LVOT DIAM: 1.8 CM
ECHO LVOT MEAN GRADIENT: 2 MMHG
ECHO LVOT PEAK GRADIENT: 3 MMHG
ECHO LVOT PEAK VELOCITY: 0.9 M/S
ECHO LVOT STROKE VOLUME INDEX: 35.7 ML/M2
ECHO LVOT SV: 62.1 ML
ECHO LVOT VTI: 24.4 CM
ECHO MV A VELOCITY: 1.09 M/S
ECHO MV E DECELERATION TIME (DT): 250.5 MS
ECHO MV E VELOCITY: 0.7 M/S
ECHO MV E/A RATIO: 0.64
ECHO MV E/E' LATERAL: 17.5
ECHO MV E/E' RATIO (AVERAGED): 20.42
ECHO MV REGURGITANT PEAK GRADIENT: 174 MMHG
ECHO MV REGURGITANT PEAK VELOCITY: 6.6 M/S
ECHO PV MAX VELOCITY: 0.9 M/S
ECHO PV PEAK GRADIENT: 3 MMHG
ECHO RV FREE WALL PEAK S': 9 CM/S
ECHO RV INTERNAL DIMENSION: 3.3 CM
ECHO RV TAPSE: 1.8 CM (ref 1.7–?)
ECHO TV REGURGITANT MAX VELOCITY: 2.71 M/S
ECHO TV REGURGITANT PEAK GRADIENT: 30 MMHG
EOSINOPHIL # BLD: 0.1 K/UL (ref 0–0.4)
EOSINOPHIL NFR BLD: 1 % (ref 0–7)
ERYTHROCYTE [DISTWIDTH] IN BLOOD BY AUTOMATED COUNT: 13.8 % (ref 11.5–14.5)
EST. AVERAGE GLUCOSE BLD GHB EST-MCNC: 91 MG/DL
GLOBULIN SER CALC-MCNC: 3.8 G/DL (ref 2–4)
GLUCOSE SERPL-MCNC: 92 MG/DL (ref 65–100)
HBA1C MFR BLD: 4.8 % (ref 4–5.6)
HCT VFR BLD AUTO: 39.6 % (ref 35–47)
HDLC SERPL-MCNC: 31 MG/DL
HDLC SERPL: 3.3 (ref 0–5)
HGB BLD-MCNC: 13.1 G/DL (ref 11.5–16)
IMM GRANULOCYTES # BLD AUTO: 0 K/UL (ref 0–0.04)
IMM GRANULOCYTES NFR BLD AUTO: 0 % (ref 0–0.5)
LDLC SERPL CALC-MCNC: ABNORMAL MG/DL (ref 0–100)
LDLC SERPL DIRECT ASSAY-MCNC: 55 MG/DL (ref 0–100)
LYMPHOCYTES # BLD: 1.6 K/UL (ref 0.8–3.5)
LYMPHOCYTES NFR BLD: 17 % (ref 12–49)
MAGNESIUM SERPL-MCNC: 2 MG/DL (ref 1.6–2.4)
MCH RBC QN AUTO: 31.6 PG (ref 26–34)
MCHC RBC AUTO-ENTMCNC: 33.1 G/DL (ref 30–36.5)
MCV RBC AUTO: 95.4 FL (ref 80–99)
MONOCYTES # BLD: 0.7 K/UL (ref 0–1)
MONOCYTES NFR BLD: 7 % (ref 5–13)
NEUTS SEG # BLD: 7.5 K/UL (ref 1.8–8)
NEUTS SEG NFR BLD: 75 % (ref 32–75)
NRBC # BLD: 0 K/UL (ref 0–0.01)
NRBC BLD-RTO: 0 PER 100 WBC
PHOSPHATE SERPL-MCNC: 3 MG/DL (ref 2.6–4.7)
PLATELET # BLD AUTO: 232 K/UL (ref 150–400)
PMV BLD AUTO: 9.1 FL (ref 8.9–12.9)
POTASSIUM SERPL-SCNC: 3.6 MMOL/L (ref 3.5–5.1)
PROT SERPL-MCNC: 7.9 G/DL (ref 6.4–8.2)
RBC # BLD AUTO: 4.15 M/UL (ref 3.8–5.2)
SODIUM SERPL-SCNC: 133 MMOL/L (ref 136–145)
TRIGL SERPL-MCNC: 552 MG/DL
VLDLC SERPL CALC-MCNC: ABNORMAL MG/DL
WBC # BLD AUTO: 10 K/UL (ref 3.6–11)

## 2024-02-24 PROCEDURE — 2500000003 HC RX 250 WO HCPCS: Performed by: EMERGENCY MEDICINE

## 2024-02-24 PROCEDURE — 87324 CLOSTRIDIUM AG IA: CPT

## 2024-02-24 PROCEDURE — 80061 LIPID PANEL: CPT

## 2024-02-24 PROCEDURE — 93306 TTE W/DOPPLER COMPLETE: CPT

## 2024-02-24 PROCEDURE — P9047 ALBUMIN (HUMAN), 25%, 50ML: HCPCS | Performed by: INTERNAL MEDICINE

## 2024-02-24 PROCEDURE — 2000000000 HC ICU R&B

## 2024-02-24 PROCEDURE — 90935 HEMODIALYSIS ONE EVALUATION: CPT

## 2024-02-24 PROCEDURE — 83735 ASSAY OF MAGNESIUM: CPT

## 2024-02-24 PROCEDURE — 95720 EEG PHY/QHP EA INCR W/VEEG: CPT | Performed by: PSYCHIATRY & NEUROLOGY

## 2024-02-24 PROCEDURE — 94003 VENT MGMT INPAT SUBQ DAY: CPT

## 2024-02-24 PROCEDURE — 6360000002 HC RX W HCPCS: Performed by: NURSE PRACTITIONER

## 2024-02-24 PROCEDURE — 85025 COMPLETE CBC W/AUTO DIFF WBC: CPT

## 2024-02-24 PROCEDURE — 93306 TTE W/DOPPLER COMPLETE: CPT | Performed by: INTERNAL MEDICINE

## 2024-02-24 PROCEDURE — 87449 NOS EACH ORGANISM AG IA: CPT

## 2024-02-24 PROCEDURE — 6360000002 HC RX W HCPCS: Performed by: INTERNAL MEDICINE

## 2024-02-24 PROCEDURE — A4216 STERILE WATER/SALINE, 10 ML: HCPCS | Performed by: EMERGENCY MEDICINE

## 2024-02-24 PROCEDURE — 83036 HEMOGLOBIN GLYCOSYLATED A1C: CPT

## 2024-02-24 PROCEDURE — 6370000000 HC RX 637 (ALT 250 FOR IP): Performed by: HOSPITALIST

## 2024-02-24 PROCEDURE — 36415 COLL VENOUS BLD VENIPUNCTURE: CPT

## 2024-02-24 PROCEDURE — 70547 MR ANGIOGRAPHY NECK W/O DYE: CPT

## 2024-02-24 PROCEDURE — 2580000003 HC RX 258: Performed by: EMERGENCY MEDICINE

## 2024-02-24 PROCEDURE — 99233 SBSQ HOSP IP/OBS HIGH 50: CPT | Performed by: PSYCHIATRY & NEUROLOGY

## 2024-02-24 PROCEDURE — 80053 COMPREHEN METABOLIC PANEL: CPT

## 2024-02-24 PROCEDURE — 84100 ASSAY OF PHOSPHORUS: CPT

## 2024-02-24 PROCEDURE — 6360000002 HC RX W HCPCS: Performed by: HOSPITALIST

## 2024-02-24 PROCEDURE — 70551 MRI BRAIN STEM W/O DYE: CPT

## 2024-02-24 PROCEDURE — 71045 X-RAY EXAM CHEST 1 VIEW: CPT

## 2024-02-24 PROCEDURE — 2580000003 HC RX 258: Performed by: HOSPITALIST

## 2024-02-24 PROCEDURE — 83721 ASSAY OF BLOOD LIPOPROTEIN: CPT

## 2024-02-24 PROCEDURE — 70544 MR ANGIOGRAPHY HEAD W/O DYE: CPT

## 2024-02-24 RX ORDER — HEPARIN SODIUM 1000 [USP'U]/ML
3000 INJECTION, SOLUTION INTRAVENOUS; SUBCUTANEOUS PRN
Status: DISCONTINUED | OUTPATIENT
Start: 2024-02-24 | End: 2024-02-28

## 2024-02-24 RX ADMIN — HEPARIN SODIUM 5000 UNITS: 5000 INJECTION INTRAVENOUS; SUBCUTANEOUS at 18:38

## 2024-02-24 RX ADMIN — ALBUMIN (HUMAN) 25 G: 0.25 INJECTION, SOLUTION INTRAVENOUS at 12:52

## 2024-02-24 RX ADMIN — SODIUM CHLORIDE, PRESERVATIVE FREE 10 ML: 5 INJECTION INTRAVENOUS at 21:21

## 2024-02-24 RX ADMIN — PROPOFOL 35 MCG/KG/MIN: 10 INJECTION, EMULSION INTRAVENOUS at 02:48

## 2024-02-24 RX ADMIN — LEVETIRACETAM 500 MG: 100 INJECTION, SOLUTION INTRAVENOUS at 18:39

## 2024-02-24 RX ADMIN — HEPARIN SODIUM 5000 UNITS: 5000 INJECTION INTRAVENOUS; SUBCUTANEOUS at 06:02

## 2024-02-24 RX ADMIN — ASPIRIN 300 MG: 300 SUPPOSITORY RECTAL at 08:42

## 2024-02-24 RX ADMIN — LEVETIRACETAM 500 MG: 100 INJECTION, SOLUTION INTRAVENOUS at 08:36

## 2024-02-24 RX ADMIN — ALBUMIN (HUMAN) 25 G: 0.25 INJECTION, SOLUTION INTRAVENOUS at 20:25

## 2024-02-24 RX ADMIN — HEPARIN SODIUM 3000 UNITS: 1000 INJECTION INTRAVENOUS; SUBCUTANEOUS at 12:52

## 2024-02-24 RX ADMIN — FENTANYL CITRATE 25 MCG: 50 INJECTION INTRAMUSCULAR; INTRAVENOUS at 13:51

## 2024-02-24 RX ADMIN — FENTANYL CITRATE 25 MCG: 50 INJECTION INTRAMUSCULAR; INTRAVENOUS at 21:38

## 2024-02-24 RX ADMIN — PROPOFOL 30 MCG/KG/MIN: 10 INJECTION, EMULSION INTRAVENOUS at 21:30

## 2024-02-24 RX ADMIN — FAMOTIDINE 20 MG: 10 INJECTION INTRAVENOUS at 08:37

## 2024-02-24 RX ADMIN — HYDRALAZINE HYDROCHLORIDE 10 MG: 20 INJECTION INTRAMUSCULAR; INTRAVENOUS at 18:38

## 2024-02-24 RX ADMIN — SODIUM CHLORIDE, PRESERVATIVE FREE 10 ML: 5 INJECTION INTRAVENOUS at 08:42

## 2024-02-24 RX ADMIN — LEVETIRACETAM 500 MG: 100 INJECTION, SOLUTION INTRAVENOUS at 21:20

## 2024-02-24 RX ADMIN — PROPOFOL 40 MCG/KG/MIN: 10 INJECTION, EMULSION INTRAVENOUS at 15:58

## 2024-02-24 RX ADMIN — FENTANYL CITRATE 25 MCG: 50 INJECTION INTRAMUSCULAR; INTRAVENOUS at 12:25

## 2024-02-24 ASSESSMENT — PULMONARY FUNCTION TESTS
PIF_VALUE: 20
PIF_VALUE: 17
PIF_VALUE: 20
PIF_VALUE: 18
PIF_VALUE: 17
PIF_VALUE: 18

## 2024-02-24 ASSESSMENT — PAIN SCALES - GENERAL
PAINLEVEL_OUTOF10: 0
PAINLEVEL_OUTOF10: 2
PAINLEVEL_OUTOF10: 0

## 2024-02-24 ASSESSMENT — PAIN DESCRIPTION - PAIN TYPE: TYPE: ACUTE PAIN

## 2024-02-24 NOTE — FLOWSHEET NOTE
Primary RN SBAR:   Ana Cristina Long  RN   Patient Education: FAMILY EDUCATED ON FLUID GOAL, TX NEEDS AND EXPECTATIONS   Hospital associated wait time; reason: 45 MINUTE DELAY FOR PT PROCEDURE WITH OTHER DEPARTMENT.   Hepatitis B Surface Ag   Date/Time Value Ref Range Status   11/14/2023 07:05 AM <0.10 Index Final     Hep B S Ab   Date/Time Value Ref Range Status   11/14/2023 07:05 AM 28.44 mIU/mL Final      02/24/24 1145   Vital Signs   BP (!) 156/70   Pulse 63   Respirations 18   SpO2 100 %   Pain Assessment   Pain Assessment Morse-Zurita FACES   Pain Level 0   Pain Type Acute pain   Treatment   Time On 1200   Time Off 1530   Treatment Goal 3000 ml   Observations & Evaluations   Level of Consciousness 2   Oriented X   (intubated)   Heart Rhythm Regular   Respiratory Quality/Effort Unlabored   O2 Device Ventilator   Bilateral Breath Sounds Diminished   Skin Color Pale   Skin Condition/Temp Cool   Bowel Sounds (All Quadrants) Active   Edema Generalized;Right upper extremity;Left upper extremity;Right lower extremity;Left lower extremity   Edema Generalized +1   RUE Edema +1   LUE Edema +1   RLE Edema +1   LLE Edema +1   Technical Checks   Dialysis Machine No. 04   RO Machine Number [id number]   Dialyzer Lot No. [id number]   Tubing Lot Number [id number]   All Connections Secure Yes   NS Bag Yes   Saline Line Double Clamped Yes   Dialyzer Revaclear 300   Prime Volume (mL) 200 mL   ICEBOAT I;C;E;B;O;A;T   RO Machine Log Sheet Completed Yes   Machine Alarm Self Test Completed;Passed   Air Foam Detector Tested;Proper Function;pH Reading   Extracorporeal Circuit Tested for Integrity Yes   Machine Conductivity 13.8   Manual Conductivity 13.8   Machine Ph 7.4   Manual Ph 7.4   Bleach Test (Neg) Yes   Bath Temperature 97.7 °F (36.5 °C)   Dialysis Bath   K+ (Potassium) 3   Ca+ (Calcium) 2.5   Na+ (Sodium) 138   HCO3 (Bicarb) 35   Bicarbonate Concentrate Lot No. [id number]   Acid Concentrate Lot No. [id number]   Treatment Initiation

## 2024-02-24 NOTE — FLOWSHEET NOTE
02/24/24 1515   Vital Signs   BP (!) 166/138   Pulse 66   Respirations 16   Pain Assessment   Pain Assessment Morse-Zurita FACES   Pain Level 2   Post-Hemodialysis Assessment   Post-Treatment Procedures Blood returned;Access bleeding time < 10 minutes   Machine Disinfection Process Exterior Machine Disinfection   Rinseback Volume (ml) 300 ml   Blood Volume Processed (Liters) 31 L   Dialyzer Clearance Clear   Duration of Treatment (minutes) 210 minutes   Hemodialysis Intake (ml) 500 ml   Hemodialysis Output (ml) 1500 ml   NET Removed (ml) 1000   Tolerated Treatment Fair   Interventions Taken Fluid bolus;Medication;Ultrafiltration stopped;Ultrafiltration goal decreased   Patient Response to Treatment   (pt did fair, access issues as well as bp issues)   Bilateral Breath Sounds Clear;Diminished   Edema Generalized   Edema Generalized +1   RUE Edema +1   LUE Edema +1   RLE Edema +1   LLE Edema +1   Physician Notified Yes   Time Off 1515   Patient Disposition Remain in ICU/ED   Observations & Evaluations   Level of Consciousness 2   Oriented X   (intubated)   Heart Rhythm Regular   Respiratory Quality/Effort Dyspnea with exertion   O2 Device Ventilator   Skin Color Pale   Skin Condition/Temp Cool   Abdomen Inspection Soft;Rounded   Bowel Sounds (All Quadrants) Active     Primary RN SBAR:  Ana Cristina Long RN  Comments: PT STARTED OUT OK, WITHIN 10 15 MIN PT BP DROPPED AND ART/BEL PRESSURE CHANO. ALBUMIN GIVEN PER ORDER, HEPARIN ORDERED TO HELP WITH PRESSURE, BUT WAS OF LITTLE USE. BFR LOWERED TO MAINTAIN SAFETY, UF LOWERED TO KEEP BP STABLE. AT END, ALL BLOOD RINCED BACK. PT LEFT WITH FAMILY IN ROOM. ALL QUESTIONS ANSWERED, AND RN UPDATED.

## 2024-02-25 LAB
ALBUMIN SERPL-MCNC: 4.4 G/DL (ref 3.5–5)
ALBUMIN/GLOB SERPL: 1.3 (ref 1.1–2.2)
ALP SERPL-CCNC: 55 U/L (ref 45–117)
ALT SERPL-CCNC: 15 U/L (ref 12–78)
ANION GAP SERPL CALC-SCNC: 10 MMOL/L (ref 5–15)
AST SERPL-CCNC: 23 U/L (ref 15–37)
BACTERIA SPEC CULT: ABNORMAL
BASOPHILS # BLD: 0 K/UL (ref 0–0.1)
BASOPHILS NFR BLD: 0 % (ref 0–1)
BILIRUB SERPL-MCNC: 1.5 MG/DL (ref 0.2–1)
BUN SERPL-MCNC: 18 MG/DL (ref 6–20)
BUN/CREAT SERPL: 5 (ref 12–20)
C DIFF GDH STL QL: NEGATIVE
C DIFF TOX A+B STL QL IA: NEGATIVE
C DIFF TOXIN INTERPRETATION: NORMAL
CALCIUM SERPL-MCNC: 8.8 MG/DL (ref 8.5–10.1)
CC UR VC: ABNORMAL
CHLORIDE SERPL-SCNC: 101 MMOL/L (ref 97–108)
CO2 SERPL-SCNC: 26 MMOL/L (ref 21–32)
CREAT SERPL-MCNC: 3.86 MG/DL (ref 0.55–1.02)
DIFFERENTIAL METHOD BLD: NORMAL
EOSINOPHIL # BLD: 0.1 K/UL (ref 0–0.4)
EOSINOPHIL NFR BLD: 1 % (ref 0–7)
ERYTHROCYTE [DISTWIDTH] IN BLOOD BY AUTOMATED COUNT: 14.2 % (ref 11.5–14.5)
GLOBULIN SER CALC-MCNC: 3.3 G/DL (ref 2–4)
GLUCOSE SERPL-MCNC: 90 MG/DL (ref 65–100)
HCT VFR BLD AUTO: 37.9 % (ref 35–47)
HGB BLD-MCNC: 12.2 G/DL (ref 11.5–16)
IMM GRANULOCYTES # BLD AUTO: 0 K/UL (ref 0–0.04)
IMM GRANULOCYTES NFR BLD AUTO: 0 % (ref 0–0.5)
LYMPHOCYTES # BLD: 1.5 K/UL (ref 0.8–3.5)
LYMPHOCYTES NFR BLD: 17 % (ref 12–49)
MAGNESIUM SERPL-MCNC: 2 MG/DL (ref 1.6–2.4)
MCH RBC QN AUTO: 31.4 PG (ref 26–34)
MCHC RBC AUTO-ENTMCNC: 32.2 G/DL (ref 30–36.5)
MCV RBC AUTO: 97.7 FL (ref 80–99)
MONOCYTES # BLD: 0.7 K/UL (ref 0–1)
MONOCYTES NFR BLD: 8 % (ref 5–13)
NEUTS SEG # BLD: 6.7 K/UL (ref 1.8–8)
NEUTS SEG NFR BLD: 74 % (ref 32–75)
NRBC # BLD: 0 K/UL (ref 0–0.01)
NRBC BLD-RTO: 0 PER 100 WBC
PHOSPHATE SERPL-MCNC: 2.8 MG/DL (ref 2.6–4.7)
PLATELET # BLD AUTO: 213 K/UL (ref 150–400)
PMV BLD AUTO: 9.4 FL (ref 8.9–12.9)
POTASSIUM SERPL-SCNC: 4 MMOL/L (ref 3.5–5.1)
PROT SERPL-MCNC: 7.7 G/DL (ref 6.4–8.2)
RBC # BLD AUTO: 3.88 M/UL (ref 3.8–5.2)
SERVICE CMNT-IMP: ABNORMAL
SODIUM SERPL-SCNC: 137 MMOL/L (ref 136–145)
WBC # BLD AUTO: 9.1 K/UL (ref 3.6–11)

## 2024-02-25 PROCEDURE — 84100 ASSAY OF PHOSPHORUS: CPT

## 2024-02-25 PROCEDURE — 94003 VENT MGMT INPAT SUBQ DAY: CPT

## 2024-02-25 PROCEDURE — 99232 SBSQ HOSP IP/OBS MODERATE 35: CPT | Performed by: PSYCHIATRY & NEUROLOGY

## 2024-02-25 PROCEDURE — 2000000000 HC ICU R&B

## 2024-02-25 PROCEDURE — 2580000003 HC RX 258: Performed by: HOSPITALIST

## 2024-02-25 PROCEDURE — 6360000002 HC RX W HCPCS: Performed by: NURSE PRACTITIONER

## 2024-02-25 PROCEDURE — 83735 ASSAY OF MAGNESIUM: CPT

## 2024-02-25 PROCEDURE — A4216 STERILE WATER/SALINE, 10 ML: HCPCS | Performed by: EMERGENCY MEDICINE

## 2024-02-25 PROCEDURE — 6360000002 HC RX W HCPCS: Performed by: INTERNAL MEDICINE

## 2024-02-25 PROCEDURE — 80053 COMPREHEN METABOLIC PANEL: CPT

## 2024-02-25 PROCEDURE — 6370000000 HC RX 637 (ALT 250 FOR IP): Performed by: HOSPITALIST

## 2024-02-25 PROCEDURE — 2580000003 HC RX 258: Performed by: EMERGENCY MEDICINE

## 2024-02-25 PROCEDURE — 85025 COMPLETE CBC W/AUTO DIFF WBC: CPT

## 2024-02-25 PROCEDURE — 36415 COLL VENOUS BLD VENIPUNCTURE: CPT

## 2024-02-25 PROCEDURE — 2500000003 HC RX 250 WO HCPCS: Performed by: EMERGENCY MEDICINE

## 2024-02-25 RX ORDER — NALOXONE HYDROCHLORIDE 0.4 MG/ML
0.4 INJECTION, SOLUTION INTRAMUSCULAR; INTRAVENOUS; SUBCUTANEOUS ONCE
Status: COMPLETED | OUTPATIENT
Start: 2024-02-25 | End: 2024-02-25

## 2024-02-25 RX ORDER — CLOPIDOGREL BISULFATE 75 MG/1
75 TABLET ORAL DAILY
Status: DISCONTINUED | OUTPATIENT
Start: 2024-02-26 | End: 2024-02-27

## 2024-02-25 RX ORDER — HYDRALAZINE HYDROCHLORIDE 20 MG/ML
5 INJECTION INTRAMUSCULAR; INTRAVENOUS
Status: DISCONTINUED | OUTPATIENT
Start: 2024-02-25 | End: 2024-02-27

## 2024-02-25 RX ADMIN — NALXONE HYDROCHLORIDE 0.4 MG: 0.4 INJECTION INTRAMUSCULAR; INTRAVENOUS; SUBCUTANEOUS at 11:30

## 2024-02-25 RX ADMIN — SODIUM CHLORIDE, PRESERVATIVE FREE 10 ML: 5 INJECTION INTRAVENOUS at 20:05

## 2024-02-25 RX ADMIN — LEVETIRACETAM 500 MG: 100 INJECTION, SOLUTION INTRAVENOUS at 20:04

## 2024-02-25 RX ADMIN — SODIUM CHLORIDE, PRESERVATIVE FREE 10 ML: 5 INJECTION INTRAVENOUS at 08:05

## 2024-02-25 RX ADMIN — HYDRALAZINE HYDROCHLORIDE 5 MG: 20 INJECTION INTRAMUSCULAR; INTRAVENOUS at 16:35

## 2024-02-25 RX ADMIN — HEPARIN SODIUM 5000 UNITS: 5000 INJECTION INTRAVENOUS; SUBCUTANEOUS at 06:19

## 2024-02-25 RX ADMIN — ASPIRIN 81 MG: 81 TABLET, CHEWABLE ORAL at 08:03

## 2024-02-25 RX ADMIN — FAMOTIDINE 20 MG: 10 INJECTION INTRAVENOUS at 08:04

## 2024-02-25 RX ADMIN — PROPOFOL 25 MCG/KG/MIN: 10 INJECTION, EMULSION INTRAVENOUS at 03:58

## 2024-02-25 RX ADMIN — FENTANYL CITRATE 25 MCG: 50 INJECTION INTRAMUSCULAR; INTRAVENOUS at 00:33

## 2024-02-25 RX ADMIN — LEVETIRACETAM 500 MG: 100 INJECTION, SOLUTION INTRAVENOUS at 08:04

## 2024-02-25 RX ADMIN — FENTANYL CITRATE 25 MCG: 50 INJECTION INTRAMUSCULAR; INTRAVENOUS at 02:33

## 2024-02-25 RX ADMIN — HYDRALAZINE HYDROCHLORIDE 5 MG: 20 INJECTION INTRAMUSCULAR; INTRAVENOUS at 20:16

## 2024-02-25 RX ADMIN — HEPARIN SODIUM 5000 UNITS: 5000 INJECTION INTRAVENOUS; SUBCUTANEOUS at 17:48

## 2024-02-25 ASSESSMENT — PAIN SCALES - GENERAL
PAINLEVEL_OUTOF10: 0

## 2024-02-25 ASSESSMENT — PULMONARY FUNCTION TESTS
PIF_VALUE: 19
PIF_VALUE: 20

## 2024-02-26 ENCOUNTER — APPOINTMENT (OUTPATIENT)
Facility: HOSPITAL | Age: 84
DRG: 981 | End: 2024-02-26
Payer: MEDICARE

## 2024-02-26 LAB
ALBUMIN SERPL-MCNC: 4 G/DL (ref 3.5–5)
ALBUMIN/GLOB SERPL: 1 (ref 1.1–2.2)
ALP SERPL-CCNC: 57 U/L (ref 45–117)
ALT SERPL-CCNC: 15 U/L (ref 12–78)
ANION GAP SERPL CALC-SCNC: 11 MMOL/L (ref 5–15)
AST SERPL-CCNC: 19 U/L (ref 15–37)
BASOPHILS # BLD: 0 K/UL (ref 0–0.1)
BASOPHILS NFR BLD: 0 % (ref 0–1)
BILIRUB SERPL-MCNC: 1.4 MG/DL (ref 0.2–1)
BUN SERPL-MCNC: 37 MG/DL (ref 6–20)
BUN/CREAT SERPL: 6 (ref 12–20)
CALCIUM SERPL-MCNC: 9.3 MG/DL (ref 8.5–10.1)
CHLORIDE SERPL-SCNC: 99 MMOL/L (ref 97–108)
CO2 SERPL-SCNC: 24 MMOL/L (ref 21–32)
CREAT SERPL-MCNC: 6.53 MG/DL (ref 0.55–1.02)
DIFFERENTIAL METHOD BLD: NORMAL
EOSINOPHIL # BLD: 0.2 K/UL (ref 0–0.4)
EOSINOPHIL NFR BLD: 2 % (ref 0–7)
ERYTHROCYTE [DISTWIDTH] IN BLOOD BY AUTOMATED COUNT: 14 % (ref 11.5–14.5)
GLOBULIN SER CALC-MCNC: 4.1 G/DL (ref 2–4)
GLUCOSE SERPL-MCNC: 76 MG/DL (ref 65–100)
HCT VFR BLD AUTO: 40.1 % (ref 35–47)
HGB BLD-MCNC: 13 G/DL (ref 11.5–16)
IMM GRANULOCYTES # BLD AUTO: 0 K/UL (ref 0–0.04)
IMM GRANULOCYTES NFR BLD AUTO: 0 % (ref 0–0.5)
LEVETIRACETAM SERPL-MCNC: 79.1 UG/ML (ref 10–40)
LYMPHOCYTES # BLD: 1.6 K/UL (ref 0.8–3.5)
LYMPHOCYTES NFR BLD: 17 % (ref 12–49)
MAGNESIUM SERPL-MCNC: 2.2 MG/DL (ref 1.6–2.4)
MCH RBC QN AUTO: 31.8 PG (ref 26–34)
MCHC RBC AUTO-ENTMCNC: 32.4 G/DL (ref 30–36.5)
MCV RBC AUTO: 98 FL (ref 80–99)
MONOCYTES # BLD: 0.8 K/UL (ref 0–1)
MONOCYTES NFR BLD: 8 % (ref 5–13)
NEUTS SEG # BLD: 7 K/UL (ref 1.8–8)
NEUTS SEG NFR BLD: 73 % (ref 32–75)
NRBC # BLD: 0 K/UL (ref 0–0.01)
NRBC BLD-RTO: 0 PER 100 WBC
NT PRO BNP: ABNORMAL PG/ML
PHOSPHATE SERPL-MCNC: 4.7 MG/DL (ref 2.6–4.7)
PLATELET # BLD AUTO: 226 K/UL (ref 150–400)
PMV BLD AUTO: 9.1 FL (ref 8.9–12.9)
POTASSIUM SERPL-SCNC: 3.9 MMOL/L (ref 3.5–5.1)
PROT SERPL-MCNC: 8.1 G/DL (ref 6.4–8.2)
RBC # BLD AUTO: 4.09 M/UL (ref 3.8–5.2)
SODIUM SERPL-SCNC: 134 MMOL/L (ref 136–145)
WBC # BLD AUTO: 9.7 K/UL (ref 3.6–11)

## 2024-02-26 PROCEDURE — 92610 EVALUATE SWALLOWING FUNCTION: CPT

## 2024-02-26 PROCEDURE — 6360000002 HC RX W HCPCS: Performed by: NURSE PRACTITIONER

## 2024-02-26 PROCEDURE — 2580000003 HC RX 258: Performed by: HOSPITALIST

## 2024-02-26 PROCEDURE — 97530 THERAPEUTIC ACTIVITIES: CPT

## 2024-02-26 PROCEDURE — 2580000003 HC RX 258: Performed by: INTERNAL MEDICINE

## 2024-02-26 PROCEDURE — 71045 X-RAY EXAM CHEST 1 VIEW: CPT

## 2024-02-26 PROCEDURE — 97163 PT EVAL HIGH COMPLEX 45 MIN: CPT

## 2024-02-26 PROCEDURE — 97165 OT EVAL LOW COMPLEX 30 MIN: CPT

## 2024-02-26 PROCEDURE — 99223 1ST HOSP IP/OBS HIGH 75: CPT | Performed by: STUDENT IN AN ORGANIZED HEALTH CARE EDUCATION/TRAINING PROGRAM

## 2024-02-26 PROCEDURE — 99232 SBSQ HOSP IP/OBS MODERATE 35: CPT | Performed by: PSYCHIATRY & NEUROLOGY

## 2024-02-26 PROCEDURE — 94761 N-INVAS EAR/PLS OXIMETRY MLT: CPT

## 2024-02-26 PROCEDURE — 80053 COMPREHEN METABOLIC PANEL: CPT

## 2024-02-26 PROCEDURE — 6370000000 HC RX 637 (ALT 250 FOR IP): Performed by: INTERNAL MEDICINE

## 2024-02-26 PROCEDURE — 2700000000 HC OXYGEN THERAPY PER DAY

## 2024-02-26 PROCEDURE — 36415 COLL VENOUS BLD VENIPUNCTURE: CPT

## 2024-02-26 PROCEDURE — 83880 ASSAY OF NATRIURETIC PEPTIDE: CPT

## 2024-02-26 PROCEDURE — 83735 ASSAY OF MAGNESIUM: CPT

## 2024-02-26 PROCEDURE — 84100 ASSAY OF PHOSPHORUS: CPT

## 2024-02-26 PROCEDURE — 85025 COMPLETE CBC W/AUTO DIFF WBC: CPT

## 2024-02-26 PROCEDURE — 6360000002 HC RX W HCPCS: Performed by: INTERNAL MEDICINE

## 2024-02-26 PROCEDURE — 6370000000 HC RX 637 (ALT 250 FOR IP): Performed by: PSYCHIATRY & NEUROLOGY

## 2024-02-26 PROCEDURE — 2060000000 HC ICU INTERMEDIATE R&B

## 2024-02-26 RX ORDER — GUAIFENESIN 600 MG/1
600 TABLET, EXTENDED RELEASE ORAL 2 TIMES DAILY
Status: DISCONTINUED | OUTPATIENT
Start: 2024-02-26 | End: 2024-02-27

## 2024-02-26 RX ORDER — MONTELUKAST SODIUM 10 MG/1
10 TABLET ORAL NIGHTLY
Status: DISCONTINUED | OUTPATIENT
Start: 2024-02-26 | End: 2024-03-03 | Stop reason: HOSPADM

## 2024-02-26 RX ORDER — PRAVASTATIN SODIUM 20 MG
40 TABLET ORAL NIGHTLY
Status: DISCONTINUED | OUTPATIENT
Start: 2024-02-26 | End: 2024-03-03 | Stop reason: HOSPADM

## 2024-02-26 RX ADMIN — LEVETIRACETAM 500 MG: 100 INJECTION, SOLUTION INTRAVENOUS at 21:00

## 2024-02-26 RX ADMIN — LEVETIRACETAM 500 MG: 100 INJECTION, SOLUTION INTRAVENOUS at 08:07

## 2024-02-26 RX ADMIN — WATER 1000 MG: 1 INJECTION INTRAMUSCULAR; INTRAVENOUS; SUBCUTANEOUS at 08:07

## 2024-02-26 RX ADMIN — SODIUM CHLORIDE, PRESERVATIVE FREE 10 ML: 5 INJECTION INTRAVENOUS at 21:00

## 2024-02-26 RX ADMIN — SODIUM CHLORIDE, PRESERVATIVE FREE 10 ML: 5 INJECTION INTRAVENOUS at 08:09

## 2024-02-26 RX ADMIN — CLOPIDOGREL BISULFATE 75 MG: 75 TABLET ORAL at 08:12

## 2024-02-26 RX ADMIN — HEPARIN SODIUM 5000 UNITS: 5000 INJECTION INTRAVENOUS; SUBCUTANEOUS at 07:00

## 2024-02-26 RX ADMIN — HEPARIN SODIUM 5000 UNITS: 5000 INJECTION INTRAVENOUS; SUBCUTANEOUS at 17:00

## 2024-02-26 RX ADMIN — GUAIFENESIN 600 MG: 600 TABLET ORAL at 10:58

## 2024-02-26 ASSESSMENT — PAIN SCALES - GENERAL
PAINLEVEL_OUTOF10: 0

## 2024-02-26 NOTE — CARE COORDINATION
02/26/24 0856   Service Assessment   Patient Orientation Alert and Oriented   Cognition Alert   History Provided By Patient   Primary Caregiver Self   Support Systems Children;Family Members   Patient's Healthcare Decision Maker is: Legal Next of Kin  (son-Refugio Swain @ [phone] or home [phone])   PCP Verified by CM Yes  (Dr Yaniv Hyde)   Last Visit to PCP Within last 6 months   Prior Functional Level Assistance with the following:;Mobility   Current Functional Level Assistance with the following:;Mobility   Can patient return to prior living arrangement Yes   Ability to make needs known: Good   Family able to assist with home care needs: No   Would you like for me to discuss the discharge plan with any other family members/significant others, and if so, who? Yes  (my son)   Financial Resources Medicare   Community Resources Other (Comment)  (home health through [facility] for PT/OT)   CM/SW Referral Disease Management Education   Social/Functional History   Lives With Alone   Type of Home Apartment   Home Layout One level   Home Access Elevator   Bathroom Shower/Tub Walk-in shower   Bathroom Toilet Handicap height   Bathroom Equipment Grab bars in shower;Shower chair;Toilet raiser   Bathroom Accessibility Accessible   Home Equipment Cane;Wheelchair-manual;Reacher;Sock aid;Rollator   Receives Help From Family   ADL Assistance Independent   Homemaking Assistance Needs assistance   Meal Prep Moderate   Laundry Maximal   Vacuuming Moderate   Cleaning Moderate   Driving Total   Ambulation Assistance Independent   Transfer Assistance Independent   Active  No   Patient's  Info family   Occupation Retired   Discharge Planning   Type of Residence Apartment   Living Arrangements Alone   Current Services Prior To Admission C-pap;Durable Medical Equipment   Current DME Prior to Arrival Cpap;Wheelchair;Shower Chair;Cane   Potential Assistance Needed Other (Comment);Home Care  (likely continution of

## 2024-02-26 NOTE — CARE COORDINATION
02/26/24 0916   Readmission Assessment   Number of Days since last admission? 1-7 days   Previous Disposition Home with Home Health   Who is being Interviewed Patient;Caregiver   What was the patient's/caregiver's perception as to why they think they needed to return back to the hospital? Other (Comment)  (found unresponsive on the floor,seizures)   Did you visit your Primary Care Physician after you left the hospital, before you returned this time? No  (readmitted)   Why weren't you able to visit your PCP? Other (Comment)   Did you see a specialist, such as Cardiac, Pulmonary, Orthopedic Physician, etc. after you left the hospital? No   Who advised the patient to return to the hospital? Other (Comment)   Does the patient report anything that got in the way of taking their medications? No   In our efforts to provide the best possible care to you and others like you, can you think of anything that we could have done to help you after you left the hospital the first time, so that you might not have needed to return so soon? Arrange for more help when leaving the hospital;Other (Comment)     Pt is a readmission  Pt was found by family on the floor.  Pt had a witnessed seizure in the ED,  Pt was readmitted on 2/24/24   Pt was supposed to see her PCP on Friday and home health was supposed to begin but pt was readmitted so son states pt missed both.    Sanjuanita Rothman RN

## 2024-02-26 NOTE — ACP (ADVANCE CARE PLANNING)
Advance Care Planning   Healthcare Decision Maker:    Primary Decision Maker: Refugio Swain - [phone]    Primary Decision Maker: Soy Swain    Click here to complete Healthcare Decision Makers including selection of the Healthcare Decision Maker Relationship (ie \"Primary\").  {Select if Healthcare Decision Makers in ACP Activity was empty/incomplete (Optional):[id number]}       {If the relationship to the patient does NOT follow our state's Next of Kin hierarchy, the patient MUST complete an ACP Document to allow him/her to act on the patient's behalf. (Optional):[id number]}

## 2024-02-26 NOTE — ACP (ADVANCE CARE PLANNING)
Code Status: DNR     Advance Care Planning:    Primary Decision Maker: Refugio Swain - Child - [phone]    Primary Decision Maker: Soy Swain - Child     Pt has Natural Death Act document on file dated 2/9/1991 which notes pt's end of life wishes but does not appoint a Medical POA.  In absence of verified POA, carmen Huff and Soy Swain are legal NOK and surrogate decision makers.  Son Refugio was unable to confirm whether pt ever completed a new AMD, will look through pt's paperwork at home.  Son stated he would include Soy and pt's sister in conversations if difficult decisions are needed on pt's behalf.

## 2024-02-27 LAB
ANION GAP SERPL CALC-SCNC: 16 MMOL/L (ref 5–15)
BASOPHILS # BLD: 0.1 K/UL (ref 0–0.1)
BASOPHILS NFR BLD: 1 % (ref 0–1)
BUN SERPL-MCNC: 68 MG/DL (ref 6–20)
BUN/CREAT SERPL: 8 (ref 12–20)
CALCIUM SERPL-MCNC: 9 MG/DL (ref 8.5–10.1)
CHLORIDE SERPL-SCNC: 99 MMOL/L (ref 97–108)
CO2 SERPL-SCNC: 22 MMOL/L (ref 21–32)
CREAT SERPL-MCNC: 8.6 MG/DL (ref 0.55–1.02)
DIFFERENTIAL METHOD BLD: ABNORMAL
EOSINOPHIL # BLD: 0.4 K/UL (ref 0–0.4)
EOSINOPHIL NFR BLD: 4 % (ref 0–7)
ERYTHROCYTE [DISTWIDTH] IN BLOOD BY AUTOMATED COUNT: 14.2 % (ref 11.5–14.5)
GLUCOSE SERPL-MCNC: 67 MG/DL (ref 65–100)
HCT VFR BLD AUTO: 41.4 % (ref 35–47)
HGB BLD-MCNC: 13.5 G/DL (ref 11.5–16)
IMM GRANULOCYTES # BLD AUTO: 0 K/UL (ref 0–0.04)
IMM GRANULOCYTES NFR BLD AUTO: 1 % (ref 0–0.5)
LYMPHOCYTES # BLD: 1.6 K/UL (ref 0.8–3.5)
LYMPHOCYTES NFR BLD: 19 % (ref 12–49)
MAGNESIUM SERPL-MCNC: 2.4 MG/DL (ref 1.6–2.4)
MCH RBC QN AUTO: 31.7 PG (ref 26–34)
MCHC RBC AUTO-ENTMCNC: 32.6 G/DL (ref 30–36.5)
MCV RBC AUTO: 97.2 FL (ref 80–99)
MONOCYTES # BLD: 0.8 K/UL (ref 0–1)
MONOCYTES NFR BLD: 10 % (ref 5–13)
NEUTS SEG # BLD: 5.5 K/UL (ref 1.8–8)
NEUTS SEG NFR BLD: 65 % (ref 32–75)
NRBC # BLD: 0 K/UL (ref 0–0.01)
NRBC BLD-RTO: 0 PER 100 WBC
PLATELET # BLD AUTO: 252 K/UL (ref 150–400)
PMV BLD AUTO: 9 FL (ref 8.9–12.9)
POTASSIUM SERPL-SCNC: 4.5 MMOL/L (ref 3.5–5.1)
RBC # BLD AUTO: 4.26 M/UL (ref 3.8–5.2)
SODIUM SERPL-SCNC: 137 MMOL/L (ref 136–145)
WBC # BLD AUTO: 8.4 K/UL (ref 3.6–11)

## 2024-02-27 PROCEDURE — 94761 N-INVAS EAR/PLS OXIMETRY MLT: CPT

## 2024-02-27 PROCEDURE — 6360000002 HC RX W HCPCS: Performed by: INTERNAL MEDICINE

## 2024-02-27 PROCEDURE — 2580000003 HC RX 258: Performed by: INTERNAL MEDICINE

## 2024-02-27 PROCEDURE — 97530 THERAPEUTIC ACTIVITIES: CPT

## 2024-02-27 PROCEDURE — 90935 HEMODIALYSIS ONE EVALUATION: CPT

## 2024-02-27 PROCEDURE — 99233 SBSQ HOSP IP/OBS HIGH 50: CPT | Performed by: STUDENT IN AN ORGANIZED HEALTH CARE EDUCATION/TRAINING PROGRAM

## 2024-02-27 PROCEDURE — 1100000000 HC RM PRIVATE

## 2024-02-27 PROCEDURE — 6370000000 HC RX 637 (ALT 250 FOR IP): Performed by: INTERNAL MEDICINE

## 2024-02-27 PROCEDURE — 92612 ENDOSCOPY SWALLOW (FEES) VID: CPT

## 2024-02-27 PROCEDURE — 2700000000 HC OXYGEN THERAPY PER DAY

## 2024-02-27 PROCEDURE — 6360000002 HC RX W HCPCS: Performed by: NURSE PRACTITIONER

## 2024-02-27 PROCEDURE — 6360000002 HC RX W HCPCS: Performed by: PSYCHIATRY & NEUROLOGY

## 2024-02-27 PROCEDURE — 99232 SBSQ HOSP IP/OBS MODERATE 35: CPT | Performed by: PSYCHIATRY & NEUROLOGY

## 2024-02-27 PROCEDURE — 2060000000 HC ICU INTERMEDIATE R&B

## 2024-02-27 PROCEDURE — 36415 COLL VENOUS BLD VENIPUNCTURE: CPT

## 2024-02-27 PROCEDURE — 80177 DRUG SCRN QUAN LEVETIRACETAM: CPT

## 2024-02-27 PROCEDURE — 97535 SELF CARE MNGMENT TRAINING: CPT

## 2024-02-27 PROCEDURE — 2580000003 HC RX 258: Performed by: HOSPITALIST

## 2024-02-27 PROCEDURE — 83735 ASSAY OF MAGNESIUM: CPT

## 2024-02-27 PROCEDURE — P9047 ALBUMIN (HUMAN), 25%, 50ML: HCPCS | Performed by: INTERNAL MEDICINE

## 2024-02-27 PROCEDURE — 80048 BASIC METABOLIC PNL TOTAL CA: CPT

## 2024-02-27 PROCEDURE — 85025 COMPLETE CBC W/AUTO DIFF WBC: CPT

## 2024-02-27 RX ORDER — LANOLIN ALCOHOL/MO/W.PET/CERES
3 CREAM (GRAM) TOPICAL NIGHTLY PRN
Status: DISCONTINUED | OUTPATIENT
Start: 2024-02-27 | End: 2024-03-03 | Stop reason: HOSPADM

## 2024-02-27 RX ORDER — CLOPIDOGREL BISULFATE 75 MG/1
75 TABLET ORAL DAILY
Status: DISCONTINUED | OUTPATIENT
Start: 2024-02-27 | End: 2024-03-03 | Stop reason: HOSPADM

## 2024-02-27 RX ORDER — GUAIFENESIN 600 MG/1
600 TABLET, EXTENDED RELEASE ORAL 2 TIMES DAILY
Status: DISCONTINUED | OUTPATIENT
Start: 2024-02-27 | End: 2024-03-03 | Stop reason: HOSPADM

## 2024-02-27 RX ORDER — LEVETIRACETAM 500 MG/5ML
250 INJECTION, SOLUTION, CONCENTRATE INTRAVENOUS EVERY EVENING
Status: DISCONTINUED | OUTPATIENT
Start: 2024-02-27 | End: 2024-02-28

## 2024-02-27 RX ORDER — LEVETIRACETAM 500 MG/5ML
250 INJECTION, SOLUTION, CONCENTRATE INTRAVENOUS
Status: DISCONTINUED | OUTPATIENT
Start: 2024-02-27 | End: 2024-02-28

## 2024-02-27 RX ORDER — LEVETIRACETAM 500 MG/5ML
500 INJECTION, SOLUTION, CONCENTRATE INTRAVENOUS EVERY MORNING
Status: DISCONTINUED | OUTPATIENT
Start: 2024-02-28 | End: 2024-02-28

## 2024-02-27 RX ADMIN — ALBUMIN (HUMAN) 25 G: 0.25 INJECTION, SOLUTION INTRAVENOUS at 09:51

## 2024-02-27 RX ADMIN — MONTELUKAST 10 MG: 10 TABLET, FILM COATED ORAL at 20:50

## 2024-02-27 RX ADMIN — CLOPIDOGREL BISULFATE 75 MG: 75 TABLET ORAL at 16:52

## 2024-02-27 RX ADMIN — HEPARIN SODIUM 5000 UNITS: 5000 INJECTION INTRAVENOUS; SUBCUTANEOUS at 05:51

## 2024-02-27 RX ADMIN — SODIUM CHLORIDE, PRESERVATIVE FREE 10 ML: 5 INJECTION INTRAVENOUS at 10:35

## 2024-02-27 RX ADMIN — HEPARIN SODIUM 3000 UNITS: 1000 INJECTION INTRAVENOUS; SUBCUTANEOUS at 06:04

## 2024-02-27 RX ADMIN — LEVETIRACETAM 250 MG: 100 INJECTION, SOLUTION INTRAVENOUS at 18:54

## 2024-02-27 RX ADMIN — GUAIFENESIN 600 MG: 600 TABLET ORAL at 20:50

## 2024-02-27 RX ADMIN — LEVETIRACETAM 250 MG: 100 INJECTION, SOLUTION INTRAVENOUS at 20:50

## 2024-02-27 RX ADMIN — HEPARIN SODIUM 5000 UNITS: 5000 INJECTION INTRAVENOUS; SUBCUTANEOUS at 18:54

## 2024-02-27 RX ADMIN — WATER 1000 MG: 1 INJECTION INTRAMUSCULAR; INTRAVENOUS; SUBCUTANEOUS at 10:35

## 2024-02-27 RX ADMIN — GUAIFENESIN 600 MG: 600 TABLET ORAL at 16:52

## 2024-02-27 RX ADMIN — SODIUM CHLORIDE, PRESERVATIVE FREE 10 ML: 5 INJECTION INTRAVENOUS at 20:50

## 2024-02-27 RX ADMIN — PRAVASTATIN SODIUM 40 MG: 20 TABLET ORAL at 20:50

## 2024-02-27 RX ADMIN — LEVETIRACETAM 500 MG: 100 INJECTION, SOLUTION INTRAVENOUS at 10:34

## 2024-02-27 ASSESSMENT — PAIN SCALES - GENERAL
PAINLEVEL_OUTOF10: 0

## 2024-02-27 NOTE — CARE COORDINATION
CM Note:  PT/OT following-recommending snf-list given  HD-nephro following  SLP consult  2L O2    Margarita, RN  2/27/2024  11:22 AM

## 2024-02-27 NOTE — CONSULTS
Comprehensive Nutrition Assessment    Type and Reason for Visit:  Initial, Consult    Nutrition Recommendations/Plan:   OGT in place, awaiting KUB - if remains intubated, provide trophic feeds per below;   Trickle Feeds Vital HP @ 20 mL/hour  FWF 85 mL q 8 hours  Provide 3 Prosource/day, flush with 15 mL H2O before and after     Malnutrition Assessment:  Malnutrition Status:  Mild malnutrition (02/23/24 1223)    Context:  Acute Illness     Findings of the 6 clinical characteristics of malnutrition:  Energy Intake:  Unable to assess  Weight Loss:  Greater than 2% over 1 week (if documentation correct)     Body Fat Loss:  No significant body fat loss     Muscle Mass Loss:  No significant muscle mass loss    Fluid Accumulation:  No significant fluid accumulation     Strength:  Not Performed    Nutrition Assessment:      Patient is an 83 year old female admitted with Respiratory arrest ([facility]) [R09.2]  Seizure ([facility]) [R56.9]  Seizures (HCC) [R56.9]  ESRD (end stage renal disease) ([facility]) [N18.6]  Respiratory failure, unspecified chronicity, unspecified whether with hypoxia or hypercapnia ([facility]) [J96.90]  Cerebrovascular accident (CVA), unspecified mechanism ([facility]) [I63.9]  Ground-level fall [W18.30XA]. She  has a past medical history of Anemia, Anxiety and depression, Arthritis, ESRD on dialysis ([facility]), Heart failure with preserved ejection fraction (HCC), High cholesterol, Hx of completed stroke, Hx of seasonal allergies, Hypertension, Moderate pulmonary hypertension (HCC), Monoclonal gammopathy of undetermined significance, Obstructive sleep apnea, Seizures ([facility]), and Urinary incontinence.  Intubated, OGT just placed. Awaiting KUB for placement verification. No family members bedside at time of RD assessment. No muscle/fat wasting noted on physical exam. NKFA. No known nausea/vomiting/diarrhea PTA. Appears patient has lost 9# (5.4%) x ~2 weeks, clinically significant for timeframe.  No known chewing/swallowing problems. 
Palliative Medicine  Patient Name: Ivoen Swain  YOB: 1940  MRN: [id number]  Age: 83 y.o.  Gender: female    Date of Initial Consult: 2024  Date of Service: 2024  Time: 3:49 PM  Provider: Titus Sanderson MD  Hospital Day: 5  Admit Date: 2024  Referring Provider: Deshaun Michelle MD       Reasons for Consultation:  Goals of Care    HISTORY OF PRESENT ILLNESS (HPI):   Ivone Swain is a 83 y.o. female with a past medical history of ESRD on HD, HTN, Epilepsy (Dr. 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  On presentation she was noted to have seizure activity in ED.  Required intubation and admission to ICU, able to be extubated .  Has been seen by Neurology with continuous EEG without seizure, MRI brain with chronic encephalomalacia but no acute finding.  Nephrology following, patient receiving HD per routine outpatient schedule .    Psychosocial:   .,   about 2 years ago.  2 sons: Refugio and Soy Swain.  Lives alone in a condo complex, son Refugio and WICHO Sandoval live 2 floors above.  Uses rollator and wheelchair at home.  Supported by lupe and family.    PALLIATIVE DIAGNOSES:    ESRD on HD  Acute Hypoxic Respiratory Failure requiring intubation, now extubated  Epilepsy  Encephalopathy  Physical Debility  Palliative Care Encounter    ASSESSMENT AND PLAN:   Reviewed medical chart including progress/consultant notes, MAR, and recent labs/imaging.  Discussed with Hospitalist Dr. Patrick.  Met with patient at bedside along with Palliative LCSW Gabby.  She is sitting in bedside chair, able to have superficial level conversation about care and symptoms but does exhibit some ongoing confusion.  States no pain, some shortness of breath, no other worries.   Met with son, Refugio, alongside palliative LCSW Gabby 
extremities      Allergies:   Allergies   Allergen Reactions    Latex Rash    Codeine Other (See Comments)    Levofloxacin Other (See Comments)     Hallucinations    Lisinopril Cough    Sulfa Antibiotics Itching and Rash       Outpatient Meds  No current facility-administered medications on file prior to encounter.     Current Outpatient Medications on File Prior to Encounter   Medication Sig Dispense Refill    levETIRAcetam (KEPPRA) 250 MG tablet Take one tablet in morning and evening.  After dialysis session, take one additional tablet.  90 day prescription 225 tablet 3    aspirin 81 MG EC tablet Take 1 tablet by mouth daily      calcium acetate (PHOSLO) 667 MG CAPS capsule Take 3 capsules by mouth 3 times daily (with meals)      carvedilol (COREG) 6.25 MG tablet Take 1 tablet by mouth 2 times daily (with meals)      fluticasone (FLONASE) 50 MCG/ACT nasal spray 2 sprays by Nasal route every evening      montelukast (SINGULAIR) 10 MG tablet Take 1 tablet by mouth every evening      Omega-3 Fatty Acids (FISH OIL) 1000 MG capsule Take 1 capsule by mouth 2 times daily      Polyvinyl Alcohol-Povidone PF (REFRESH) 1.4-0.6 % SOLN ophthalmic solution Apply 1-2 drops to eye as needed      pravastatin (PRAVACHOL) 40 MG tablet Take 1 tablet by mouth nightly         Inpatient Meds    [unfilled]        Past Medical History:   Diagnosis Date    Anemia     Anxiety and depression     Arthritis     ESRD on dialysis (HCC)     Heart failure with preserved ejection fraction (HCC)     High cholesterol     Hx of completed stroke     Per MRI of the brain on 2/21/2019, 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; Old right posterior medial occipital lobe cortical infarct    Hx of seasonal allergies     Hypertension     Moderate pulmonary hypertension (HCC)     Per echocardiogram of 7/7/2021; same study showed normal LVEF of 55-60%    Monoclonal gammopathy of undetermined significance     Obstructive sleep apnea     Seizures 
the patient's care with the consulting services, the bedside nurses and the respiratory therapist.      NOTE OF PERSONAL INVOLVEMENT IN CARE   This patient has a high probability of imminent, clinically significant deterioration, which requires the highest level of preparedness to intervene urgently. I participated in the decision-making and personally managed or directed the management of the following life and organ supporting interventions that required my frequent assessment to treat or prevent imminent deterioration.    I personally spent 40 minutes of critical care time.  This is time spent at this critically ill patient's bedside actively involved in patient care as well as the coordination of care.  This does not include any procedural time which has been billed separately.    Deshaun Michelle MD   Critical Care Medicine  [facility]  
70.5 kg (155 lb 6.8 oz), SpO2 98 %.    PHYSICAL ASSESSMENT:   General: [] Oriented x3  [] Well appearing  [] Intubated  []Ill appearing  [x]Other: no acute distress, sleeping in bed and awakens to voice  Mental Status: [] Normal mental status exam  [] Drowsy  [x] Confused  []Other:  Cardiovascular: [x] Regular rate  [] Arrhythmia  [] Other:  Chest: [x] Effort normal  []Lungs clear  [] Respiratory distress  []Tachypnea  [] Other:  Abdomen: [x] Soft/non-tender  [] Normal appearance  [] Distended  [] Ascites  [] Other:  Neurological: [] Normal speech  [] Normal sensation  []Deficits present:  Extremity: [] Normal skin color/temp  [] Clubbing/cyanosis  [] No edema  [] Other:    Wt Readings from Last 15 Encounters:   02/26/24 70.5 kg (155 lb 6.8 oz)   02/12/24 79.3 kg (174 lb 13.2 oz)   11/15/23 77.1 kg (170 lb)   10/04/23 67.6 kg (149 lb)   09/12/22 69.4 kg (153 lb)   03/09/22 77.1 kg (170 lb)   09/29/21 78 kg (171 lb 14.4 oz)   09/24/21 78.1 kg (172 lb 2.9 oz)   09/15/21 79 kg (174 lb 2.6 oz)   09/10/21 78.8 kg (173 lb 11.6 oz)   09/08/21 77.6 kg (171 lb)   08/23/21 78.5 kg (173 lb 1 oz)   03/10/21 92.1 kg (203 lb)        Current Diet: Diet NPO       PSYCHOSOCIAL/SPIRITUAL SCREENING:   Palliative IDT has assessed this patient for cultural preferences / practices and a referral made as appropriate to needs (Cultural Services, Patient Advocacy, Ethics, etc.)    Spiritual Affiliation: Amish    Any spiritual / Orthodox concerns:  [] Yes /  [x] No   If \"Yes\" to discuss with pastoral care during IDT     Does caregiver feel burdened by caring for their loved one:   [] Yes /  [x] No /  [] No Caregiver Present/Available [] No Caregiver [] Pt Lives at Facility  If \"Yes\" to discuss with social work during IDT    Anticipatory grief assessment:   [x] Normal  / [] Maladaptive     If \"Maladaptive\" to discuss with social work during IDT    ESAS Anxiety:      ESAS Depression:          LAB AND IMAGING FINDINGS:   Objective data

## 2024-02-27 NOTE — FLOWSHEET NOTE
02/27/24 1000   Vital Signs   BP 91/69   Pulse (!) 41   Respirations 14   Pain Assessment   Pain Assessment None - Denies Pain   Post-Hemodialysis Assessment   Post-Treatment Procedures Blood returned;Access bleeding time < 10 minutes   Machine Disinfection Process Exterior Machine Disinfection   Rinseback Volume (ml) 300 ml   Blood Volume Processed (Liters) 61.2 L   Dialyzer Clearance Clear   Duration of Treatment (minutes) 210 minutes   Hemodialysis Intake (ml) 550 ml   Hemodialysis Output (ml) 2500 ml   NET Removed (ml) 1950   Tolerated Treatment Good   Interventions Taken Head of bed lowered;Medication;Ultrafiltration stopped;Ultrafiltration goal decreased   Patient Response to Treatment   (Occasional low BP and Fistula issues, but able to finsish Tx.)   Bilateral Breath Sounds Clear;Diminished   Edema Right upper extremity;Left upper extremity;Right lower extremity;Left lower extremity   Edema Generalized +1   RUE Edema +1   LUE Edema +1   RLE Edema +1   LLE Edema +1   Facial Edema +1   Physician Notified No   Time Off 1000   Patient Disposition   (remain in room)   Observations & Evaluations   Level of Consciousness 0   Oriented X 3   Heart Rhythm Regular   Respiratory Quality/Effort Unlabored   O2 Device Nasal cannula   Skin Color Pink;Pale   Skin Condition/Temp Cool   Appetite Fair   Abdomen Inspection Soft;Rounded   Bowel Sounds (All Quadrants) Active     Primary RN SBAR: Goyo Cintron RN  Comments: TX STARTED OK, VITALS STABLE AS WELL AS ACCESS. WITHIN 30 MINUTES, BP TRENDING DOWN AND ART/BEL PRESSURES GOING UP. RX GIVEN FOR BOTH. BFR RATE LOWERED TO COMPENSATE. THIS HAS BEEN THE NORM THE PAST 3 TREATMENTS, EVEN WITH HEPARIN AND OTHER INTERVENTIONS. PT MY BENEFIT FROM A FISTULAGRAM TO ASSESS. OTHERWISE, PT DID WELL, ALL BLOOD RETURNED AT END. LEFT WITH CALL BELL IN REACH, SIDE RAILS X 4 ( SEIZURE PRECAUTIONS IN PLACE) AND BED LOW AND LOCKED. RN IN ROOM, AND UPDATED.

## 2024-02-27 NOTE — FLOWSHEET NOTE
Primary RN SBAR: Katie Aranda RN  Patient Education: TX TIME  Hospital associated wait time; reason: NONE  Hepatitis B Surface Ag   Date/Time Value Ref Range Status   11/14/2023 07:05 AM <0.10 Index Final     Hep B S Ab   Date/Time Value Ref Range Status   11/14/2023 07:05 AM 28.44 mIU/mL Final      02/27/24 0600   Vital Signs   /71   Pulse 67   Respirations 14   Treatment   Time On 0626   Treatment Goal 3000   Observations & Evaluations   Level of Consciousness 0   Oriented X 4   Heart Rhythm Regular   Respiratory Quality/Effort Unlabored   O2 Device Nasal cannula   Bilateral Breath Sounds Diminished;Clear   Skin Color Pale   Skin Condition/Temp Cool;Clammy   Abdomen Inspection Soft;Rounded   Bowel Sounds (All Quadrants) Active   Edema Right upper extremity;Left upper extremity;Right lower extremity;Left lower extremity   Edema Generalized +1   RUE Edema +1   LUE Edema +1   RLE Edema +1   LLE Edema +1   Facial Edema +1   Technical Checks   Dialysis Machine No. 02   RO Machine Number [id number]   Dialyzer Lot No. [id number]   Tubing Lot Number [id number]   All Connections Secure Yes   NS Bag Yes   Saline Line Double Clamped Yes   Dialyzer Revaclear 300   Prime Volume (mL) 200 mL   ICEBOAT T;A;O;B;E;C;I   RO Machine Log Sheet Completed Yes   Machine Alarm Self Test Completed;Passed   Air Foam Detector Proper Function;Tested;pH Reading   Extracorporeal Circuit Tested for Integrity Yes   Machine Conductivity 13.8   Manual Conductivity 13.8   Machine Ph 7.4   Manual Ph 7.4   Bleach Test (Neg) Yes   Bath Temperature 97.7 °F (36.5 °C)   Dialysis Bath   K+ (Potassium) 3   Ca+ (Calcium) 2.5   Na+ (Sodium) 138   HCO3 (Bicarb) 35   Bicarbonate Concentrate Lot No. [id number]   Acid Concentrate Lot No. [id number]   Treatment Initiation   Dialyze Hours 3.5   Treatment  Initiation Universal Precautions maintained;Lines secured to patient;Connections secured;Prime given;Venous Parameters set;Arterial Parameters

## 2024-02-27 NOTE — CARE COORDINATION
1519:  Met with pt's son, Refugio.  He strongly feels that his mother should not be d/c'd until she is more alert and clear in her mind.  He was assured that she will be evaluated on a daily basis.    1338:   Pt was accepted at [facility].  She will need to bring her cpap with her.      1330:  [facility] declined.  Patient was not considered a good therapy candidate.         02/27/24 1135   Condition of Participation: Discharge Planning   The Plan for Transition of Care is related to the following treatment goals: respiratory arrest, seizure, ESRD , CVA, ground level fall   The Patient and/or Patient Representative was provided with a Choice of Provider? Patient;Patient Representative   Name of the Patient Representative who was provided with the Choice of Provider and agrees with the Discharge Plan?  Refugio Swain, son   The Patient and/Or Patient Representative agree with the Discharge Plan? Yes   Freedom of Choice list was provided with basic dialogue that supports the patient's individualized plan of care/goals, treatment preferences, and shares the quality data associated with the providers?  Yes     List was given to pt.  Her son was notified of d/c plan.  He stated she had been to [facility] in the past and would like a referral sent.  He also would like [facility] as another choice.  Referrals were sent in [facility] and [facility].

## 2024-02-28 ENCOUNTER — HOME CARE VISIT (OUTPATIENT)
Dept: HOME HEALTH SERVICES | Facility: HOME HEALTH | Age: 84
End: 2024-02-28

## 2024-02-28 LAB
ANION GAP SERPL CALC-SCNC: 15 MMOL/L (ref 5–15)
BASOPHILS # BLD: 0 K/UL (ref 0–0.1)
BASOPHILS NFR BLD: 0 % (ref 0–1)
BUN SERPL-MCNC: 42 MG/DL (ref 6–20)
BUN/CREAT SERPL: 7 (ref 12–20)
CALCIUM SERPL-MCNC: 9.9 MG/DL (ref 8.5–10.1)
CHLORIDE SERPL-SCNC: 96 MMOL/L (ref 97–108)
CO2 SERPL-SCNC: 25 MMOL/L (ref 21–32)
CREAT SERPL-MCNC: 6.06 MG/DL (ref 0.55–1.02)
DIFFERENTIAL METHOD BLD: ABNORMAL
EOSINOPHIL # BLD: 0.3 K/UL (ref 0–0.4)
EOSINOPHIL NFR BLD: 3 % (ref 0–7)
ERYTHROCYTE [DISTWIDTH] IN BLOOD BY AUTOMATED COUNT: 14.1 % (ref 11.5–14.5)
GLUCOSE SERPL-MCNC: 102 MG/DL (ref 65–100)
HCT VFR BLD AUTO: 43.5 % (ref 35–47)
HGB BLD-MCNC: 14.7 G/DL (ref 11.5–16)
IMM GRANULOCYTES # BLD AUTO: 0 K/UL (ref 0–0.04)
IMM GRANULOCYTES NFR BLD AUTO: 0 % (ref 0–0.5)
LYMPHOCYTES # BLD: 1.4 K/UL (ref 0.8–3.5)
LYMPHOCYTES NFR BLD: 15 % (ref 12–49)
MAGNESIUM SERPL-MCNC: 2.5 MG/DL (ref 1.6–2.4)
MCH RBC QN AUTO: 31.7 PG (ref 26–34)
MCHC RBC AUTO-ENTMCNC: 33.8 G/DL (ref 30–36.5)
MCV RBC AUTO: 94 FL (ref 80–99)
MONOCYTES # BLD: 1.2 K/UL (ref 0–1)
MONOCYTES NFR BLD: 13 % (ref 5–13)
NEUTS SEG # BLD: 6.3 K/UL (ref 1.8–8)
NEUTS SEG NFR BLD: 69 % (ref 32–75)
NRBC # BLD: 0 K/UL (ref 0–0.01)
NRBC BLD-RTO: 0 PER 100 WBC
PLATELET # BLD AUTO: 262 K/UL (ref 150–400)
PMV BLD AUTO: 8.9 FL (ref 8.9–12.9)
POTASSIUM SERPL-SCNC: 3.9 MMOL/L (ref 3.5–5.1)
RBC # BLD AUTO: 4.63 M/UL (ref 3.8–5.2)
SODIUM SERPL-SCNC: 136 MMOL/L (ref 136–145)
WBC # BLD AUTO: 9.2 K/UL (ref 3.6–11)

## 2024-02-28 PROCEDURE — 80048 BASIC METABOLIC PNL TOTAL CA: CPT

## 2024-02-28 PROCEDURE — 94761 N-INVAS EAR/PLS OXIMETRY MLT: CPT

## 2024-02-28 PROCEDURE — 6370000000 HC RX 637 (ALT 250 FOR IP): Performed by: INTERNAL MEDICINE

## 2024-02-28 PROCEDURE — 6360000002 HC RX W HCPCS: Performed by: HOSPITALIST

## 2024-02-28 PROCEDURE — 36415 COLL VENOUS BLD VENIPUNCTURE: CPT

## 2024-02-28 PROCEDURE — 2580000003 HC RX 258: Performed by: HOSPITALIST

## 2024-02-28 PROCEDURE — 97535 SELF CARE MNGMENT TRAINING: CPT

## 2024-02-28 PROCEDURE — 83735 ASSAY OF MAGNESIUM: CPT

## 2024-02-28 PROCEDURE — 6360000002 HC RX W HCPCS: Performed by: INTERNAL MEDICINE

## 2024-02-28 PROCEDURE — 97116 GAIT TRAINING THERAPY: CPT

## 2024-02-28 PROCEDURE — 1100000000 HC RM PRIVATE

## 2024-02-28 PROCEDURE — 99231 SBSQ HOSP IP/OBS SF/LOW 25: CPT | Performed by: PSYCHIATRY & NEUROLOGY

## 2024-02-28 PROCEDURE — 2700000000 HC OXYGEN THERAPY PER DAY

## 2024-02-28 PROCEDURE — 85025 COMPLETE CBC W/AUTO DIFF WBC: CPT

## 2024-02-28 RX ORDER — CEFDINIR 300 MG/1
300 CAPSULE ORAL EVERY OTHER DAY
Status: DISCONTINUED | OUTPATIENT
Start: 2024-02-28 | End: 2024-02-28

## 2024-02-28 RX ORDER — CEFDINIR 300 MG/1
300 CAPSULE ORAL EVERY EVENING
Status: DISCONTINUED | OUTPATIENT
Start: 2024-02-29 | End: 2024-03-03 | Stop reason: HOSPADM

## 2024-02-28 RX ORDER — CEFDINIR 300 MG/1
300 CAPSULE ORAL EVERY 12 HOURS SCHEDULED
Status: DISCONTINUED | OUTPATIENT
Start: 2024-02-28 | End: 2024-02-28

## 2024-02-28 RX ORDER — LEVETIRACETAM 500 MG/1
500 TABLET ORAL DAILY
Status: DISCONTINUED | OUTPATIENT
Start: 2024-02-28 | End: 2024-02-29

## 2024-02-28 RX ORDER — LEVETIRACETAM 500 MG/1
250 TABLET ORAL NIGHTLY
Status: DISCONTINUED | OUTPATIENT
Start: 2024-02-28 | End: 2024-02-29

## 2024-02-28 RX ORDER — CEFDINIR 300 MG/1
300 CAPSULE ORAL ONCE
Status: COMPLETED | OUTPATIENT
Start: 2024-02-28 | End: 2024-02-28

## 2024-02-28 RX ORDER — HEPARIN SODIUM 1000 [USP'U]/ML
3000 INJECTION, SOLUTION INTRAVENOUS; SUBCUTANEOUS PRN
Status: DISCONTINUED | OUTPATIENT
Start: 2024-02-28 | End: 2024-03-03 | Stop reason: HOSPADM

## 2024-02-28 RX ORDER — CEFDINIR 300 MG/1
300 CAPSULE ORAL
Status: DISCONTINUED | OUTPATIENT
Start: 2024-02-29 | End: 2024-02-28

## 2024-02-28 RX ORDER — LEVETIRACETAM 500 MG/1
250 TABLET ORAL
Status: DISCONTINUED | OUTPATIENT
Start: 2024-02-29 | End: 2024-02-29

## 2024-02-28 RX ADMIN — HEPARIN SODIUM 5000 UNITS: 5000 INJECTION INTRAVENOUS; SUBCUTANEOUS at 18:12

## 2024-02-28 RX ADMIN — GUAIFENESIN 600 MG: 600 TABLET ORAL at 09:53

## 2024-02-28 RX ADMIN — ONDANSETRON 4 MG: 2 INJECTION INTRAMUSCULAR; INTRAVENOUS at 05:54

## 2024-02-28 RX ADMIN — LEVETIRACETAM 500 MG: 500 TABLET, FILM COATED ORAL at 09:53

## 2024-02-28 RX ADMIN — CEFDINIR 300 MG: 300 CAPSULE ORAL at 09:53

## 2024-02-28 RX ADMIN — PRAVASTATIN SODIUM 40 MG: 20 TABLET ORAL at 21:17

## 2024-02-28 RX ADMIN — MONTELUKAST 10 MG: 10 TABLET, FILM COATED ORAL at 21:17

## 2024-02-28 RX ADMIN — LEVETIRACETAM 250 MG: 500 TABLET, FILM COATED ORAL at 21:16

## 2024-02-28 RX ADMIN — CLOPIDOGREL BISULFATE 75 MG: 75 TABLET ORAL at 09:53

## 2024-02-28 RX ADMIN — SODIUM CHLORIDE, PRESERVATIVE FREE 10 ML: 5 INJECTION INTRAVENOUS at 21:17

## 2024-02-28 RX ADMIN — SODIUM CHLORIDE, PRESERVATIVE FREE 10 ML: 5 INJECTION INTRAVENOUS at 09:53

## 2024-02-28 RX ADMIN — GUAIFENESIN 600 MG: 600 TABLET ORAL at 21:17

## 2024-02-28 ASSESSMENT — PAIN SCALES - GENERAL: PAINLEVEL_OUTOF10: 0

## 2024-02-28 NOTE — CARE COORDINATION
2/28/24  12:03 PM    Care Management Daily Progress Note:    1. History of stroke    2. Ground-level fall    3. Seizures (HCC)    4. Respiratory failure, unspecified chronicity, unspecified whether with hypoxia or hypercapnia (HCC)    5. ESRD (end stage renal disease) (HCC)    6. Respiratory arrest (HCC)    7. Cerebrovascular accident (CVA), unspecified mechanism (HCC)    8. Seizure (HCC)    9. Fluid overload      RUR: 19%  LOS: 5D    CM reviewed EMR and noted patient has been accepted at [facility] and CM spoke to patients son and he is agreeable with the plan for patient to dc to [facility] when medically stable for dc. He is hoping for Friday or Saturday dc. CM called and spoke to admissions at [facility] and they will have a private room for her when she is ready. CM updated likely tomorrow or Friday.     Transition of Care:  1). Fistulagram scheduled for tomorrow  2). Dialysis, T, TH, Sat  3). TBD on transport at dc  4). CM following for dc needs    Brittany Keating  Care Manager

## 2024-02-28 NOTE — DISCHARGE INSTR - DIET
Good nutrition is important when healing from an illness, injury, or surgery.  Follow any nutrition recommendations given to you during your hospital stay.   If you were given an oral nutrition supplement while in the hospital, continue to take this supplement at home.  You can take it with meals, in-between meals, and/or before bedtime. These supplements can be purchased at most local grocery stores, pharmacies, and chain [employer]-stores.   If you have any questions about your diet or nutrition, call the hospital and ask for the dietitian.    Nutrition Recommendations for Discharge:    Continue Oral Nutrition Supplements at discharge:   Nepro Therapeutic Nutrition or similar product once daily for 30 days unless otherwise directed by your Primary Care Physician.  This supplement can be ordered at your local pharmacy such as [facility] or [facility] or online at [email or web address]     ARELI KO RD, MS  RD Office: [phone]

## 2024-02-29 ENCOUNTER — TELEPHONE (OUTPATIENT)
Age: 84
End: 2024-02-29

## 2024-02-29 LAB
ANION GAP SERPL CALC-SCNC: 14 MMOL/L (ref 5–15)
BASOPHILS # BLD: 0.1 K/UL (ref 0–0.1)
BASOPHILS NFR BLD: 1 % (ref 0–1)
BUN SERPL-MCNC: 75 MG/DL (ref 6–20)
BUN/CREAT SERPL: 9 (ref 12–20)
CALCIUM SERPL-MCNC: 9.5 MG/DL (ref 8.5–10.1)
CHLORIDE SERPL-SCNC: 94 MMOL/L (ref 97–108)
CO2 SERPL-SCNC: 24 MMOL/L (ref 21–32)
CREAT SERPL-MCNC: 8.27 MG/DL (ref 0.55–1.02)
DIFFERENTIAL METHOD BLD: ABNORMAL
EOSINOPHIL # BLD: 0.4 K/UL (ref 0–0.4)
EOSINOPHIL NFR BLD: 4 % (ref 0–7)
ERYTHROCYTE [DISTWIDTH] IN BLOOD BY AUTOMATED COUNT: 14.2 % (ref 11.5–14.5)
GLUCOSE SERPL-MCNC: 129 MG/DL (ref 65–100)
HCT VFR BLD AUTO: 44.9 % (ref 35–47)
HGB BLD-MCNC: 14.7 G/DL (ref 11.5–16)
IMM GRANULOCYTES # BLD AUTO: 0.1 K/UL (ref 0–0.04)
IMM GRANULOCYTES NFR BLD AUTO: 1 % (ref 0–0.5)
LEVETIRACETAM SERPL-MCNC: 51.2 UG/ML (ref 10–40)
LYMPHOCYTES # BLD: 2.3 K/UL (ref 0.8–3.5)
LYMPHOCYTES NFR BLD: 24 % (ref 12–49)
MCH RBC QN AUTO: 31.5 PG (ref 26–34)
MCHC RBC AUTO-ENTMCNC: 32.7 G/DL (ref 30–36.5)
MCV RBC AUTO: 96.1 FL (ref 80–99)
MONOCYTES # BLD: 1.1 K/UL (ref 0–1)
MONOCYTES NFR BLD: 11 % (ref 5–13)
NEUTS SEG # BLD: 5.7 K/UL (ref 1.8–8)
NEUTS SEG NFR BLD: 59 % (ref 32–75)
NRBC # BLD: 0 K/UL (ref 0–0.01)
NRBC BLD-RTO: 0 PER 100 WBC
PLATELET # BLD AUTO: 254 K/UL (ref 150–400)
PMV BLD AUTO: 9.3 FL (ref 8.9–12.9)
POTASSIUM SERPL-SCNC: 3.8 MMOL/L (ref 3.5–5.1)
RBC # BLD AUTO: 4.67 M/UL (ref 3.8–5.2)
SODIUM SERPL-SCNC: 132 MMOL/L (ref 136–145)
WBC # BLD AUTO: 9.5 K/UL (ref 3.6–11)

## 2024-02-29 PROCEDURE — 1100000000 HC RM PRIVATE

## 2024-02-29 PROCEDURE — C1769 GUIDE WIRE: HCPCS

## 2024-02-29 PROCEDURE — 99232 SBSQ HOSP IP/OBS MODERATE 35: CPT | Performed by: PSYCHIATRY & NEUROLOGY

## 2024-02-29 PROCEDURE — 94761 N-INVAS EAR/PLS OXIMETRY MLT: CPT

## 2024-02-29 PROCEDURE — 90935 HEMODIALYSIS ONE EVALUATION: CPT

## 2024-02-29 PROCEDURE — C1894 INTRO/SHEATH, NON-LASER: HCPCS

## 2024-02-29 PROCEDURE — C1725 CATH, TRANSLUMIN NON-LASER: HCPCS

## 2024-02-29 PROCEDURE — P9047 ALBUMIN (HUMAN), 25%, 50ML: HCPCS | Performed by: INTERNAL MEDICINE

## 2024-02-29 PROCEDURE — 6360000002 HC RX W HCPCS

## 2024-02-29 PROCEDURE — 2709999900 HC NON-CHARGEABLE SUPPLY

## 2024-02-29 PROCEDURE — 85025 COMPLETE CBC W/AUTO DIFF WBC: CPT

## 2024-02-29 PROCEDURE — 6370000000 HC RX 637 (ALT 250 FOR IP): Performed by: INTERNAL MEDICINE

## 2024-02-29 PROCEDURE — 36415 COLL VENOUS BLD VENIPUNCTURE: CPT

## 2024-02-29 PROCEDURE — 2580000003 HC RX 258: Performed by: HOSPITALIST

## 2024-02-29 PROCEDURE — 76937 US GUIDE VASCULAR ACCESS: CPT

## 2024-02-29 PROCEDURE — 6360000004 HC RX CONTRAST MEDICATION

## 2024-02-29 PROCEDURE — 99152 MOD SED SAME PHYS/QHP 5/>YRS: CPT

## 2024-02-29 PROCEDURE — 36902 INTRO CATH DIALYSIS CIRCUIT: CPT

## 2024-02-29 PROCEDURE — 6360000002 HC RX W HCPCS: Performed by: INTERNAL MEDICINE

## 2024-02-29 PROCEDURE — 80048 BASIC METABOLIC PNL TOTAL CA: CPT

## 2024-02-29 RX ORDER — LEVETIRACETAM 500 MG/1
250 TABLET ORAL EVERY EVENING
Status: DISCONTINUED | OUTPATIENT
Start: 2024-02-29 | End: 2024-03-03 | Stop reason: HOSPADM

## 2024-02-29 RX ORDER — LEVETIRACETAM 500 MG/1
500 TABLET ORAL EVERY MORNING
Status: DISCONTINUED | OUTPATIENT
Start: 2024-03-01 | End: 2024-03-03 | Stop reason: HOSPADM

## 2024-02-29 RX ORDER — LEVETIRACETAM 500 MG/1
250 TABLET ORAL
Status: DISCONTINUED | OUTPATIENT
Start: 2024-03-02 | End: 2024-02-29

## 2024-02-29 RX ORDER — FENTANYL CITRATE 50 UG/ML
INJECTION, SOLUTION INTRAMUSCULAR; INTRAVENOUS PRN
Status: DISCONTINUED | OUTPATIENT
Start: 2024-02-29 | End: 2024-02-29 | Stop reason: HOSPADM

## 2024-02-29 RX ORDER — MIDODRINE HYDROCHLORIDE 5 MG/1
2.5 TABLET ORAL
Status: DISCONTINUED | OUTPATIENT
Start: 2024-02-29 | End: 2024-03-03 | Stop reason: HOSPADM

## 2024-02-29 RX ORDER — HEPARIN SODIUM 200 [USP'U]/100ML
INJECTION, SOLUTION INTRAVENOUS CONTINUOUS PRN
Status: COMPLETED | OUTPATIENT
Start: 2024-02-29 | End: 2024-02-29

## 2024-02-29 RX ORDER — MIDAZOLAM HYDROCHLORIDE 1 MG/ML
INJECTION INTRAMUSCULAR; INTRAVENOUS PRN
Status: DISCONTINUED | OUTPATIENT
Start: 2024-02-29 | End: 2024-02-29 | Stop reason: HOSPADM

## 2024-02-29 RX ORDER — ALBUMIN (HUMAN) 12.5 G/50ML
25 SOLUTION INTRAVENOUS PRN
Status: DISCONTINUED | OUTPATIENT
Start: 2024-02-29 | End: 2024-03-03 | Stop reason: HOSPADM

## 2024-02-29 RX ADMIN — GUAIFENESIN 600 MG: 600 TABLET ORAL at 21:41

## 2024-02-29 RX ADMIN — HEPARIN SODIUM 3000 UNITS: 1000 INJECTION INTRAVENOUS; SUBCUTANEOUS at 17:37

## 2024-02-29 RX ADMIN — HEPARIN SODIUM 5000 UNITS: 5000 INJECTION INTRAVENOUS; SUBCUTANEOUS at 05:52

## 2024-02-29 RX ADMIN — ALBUMIN (HUMAN) 25 G: 0.25 INJECTION, SOLUTION INTRAVENOUS at 16:45

## 2024-02-29 RX ADMIN — MONTELUKAST 10 MG: 10 TABLET, FILM COATED ORAL at 21:41

## 2024-02-29 RX ADMIN — MIDODRINE HYDROCHLORIDE 2.5 MG: 5 TABLET ORAL at 09:30

## 2024-02-29 RX ADMIN — SODIUM CHLORIDE, PRESERVATIVE FREE 10 ML: 5 INJECTION INTRAVENOUS at 09:32

## 2024-02-29 RX ADMIN — MIDODRINE HYDROCHLORIDE 2.5 MG: 5 TABLET ORAL at 14:05

## 2024-02-29 RX ADMIN — CLOPIDOGREL BISULFATE 75 MG: 75 TABLET ORAL at 09:30

## 2024-02-29 RX ADMIN — SODIUM CHLORIDE, PRESERVATIVE FREE 10 ML: 5 INJECTION INTRAVENOUS at 21:43

## 2024-02-29 RX ADMIN — GUAIFENESIN 600 MG: 600 TABLET ORAL at 09:30

## 2024-02-29 RX ADMIN — PRAVASTATIN SODIUM 40 MG: 20 TABLET ORAL at 21:40

## 2024-02-29 RX ADMIN — MIDODRINE HYDROCHLORIDE 2.5 MG: 5 TABLET ORAL at 17:39

## 2024-02-29 RX ADMIN — LEVETIRACETAM 250 MG: 500 TABLET, FILM COATED ORAL at 17:40

## 2024-02-29 RX ADMIN — LEVETIRACETAM 500 MG: 500 TABLET, FILM COATED ORAL at 09:31

## 2024-02-29 ASSESSMENT — PAIN SCALES - GENERAL
PAINLEVEL_OUTOF10: 0

## 2024-02-29 NOTE — WOUND CARE
Wound consult:  Attempted to see patient, at procedure at this time.  Will attempt tomorrow  Paola Louis RN

## 2024-02-29 NOTE — FLOWSHEET NOTE
Pre-Treatment      02/29/24 1515   Vital Signs   /61   Temp 97.4 °F (36.3 °C)   Pulse 60   Respirations 18   SpO2 95 %   Pain Assessment   Pain Assessment None - Denies Pain   Pain Level 0   Treatment   Time On 1527   Time Off 1858   Treatment Goal 3.5H, 2L   Observations & Evaluations   Level of Consciousness 0   Oriented X 3   Heart Rhythm Regular   Respiratory Quality/Effort Unlabored   O2 Device None (Room air)   Bilateral Breath Sounds Diminished   Skin Color Ecchymosis (comment);Other (comment)  (appropriate for race)   Skin Condition/Temp Dry;Warm;Fragile   Appetite Good   Abdomen Inspection Rounded   Bowel Sounds (All Quadrants) Active   Edema Left lower extremity;Right lower extremity   Technical Checks   Dialysis Machine No. 04   RO Machine Number [id number]   Dialyzer Lot No. [id number]   Tubing Lot Number [id number]   All Connections Secure Yes   NS Bag Yes   Saline Line Double Clamped Yes   Dialyzer Revaclear 300   Prime Volume (mL) 200 mL   ICEBOAT I;C;E;B;O;A;T   RO Machine Log Sheet Completed Yes   Machine Alarm Self Test Completed;Passed   Air Foam Detector Tested;Proper Function;pH Reading   Extracorporeal Circuit Tested for Integrity Yes   Machine Conductivity 13.8   Manual Ph 7.2   Bleach Test (Neg) Yes   Bath Temperature 98.6 °F (37 °C)   Dialysis Bath   K+ (Potassium) 3   Ca+ (Calcium) 2.5   Na+ (Sodium) 138   HCO3 (Bicarb) 35   Bicarbonate Concentrate Lot No. [id number]   Acid Concentrate Lot No. [id number]   Treatment Initiation   Dialyze Hours 3.5   Treatment  Initiation Universal Precautions maintained;Lines secured to patient;Connections secured;Prime given;Venous Parameters set;Arterial Parameters set;Air foam detector engaged;REV-300;Revaclear Dialyzer   Hemodialysis Fistula/Graft Arteriovenous fistula Right Arm   Placement Date/Time: 08/03/22 (c) 0100   Access Type: Arteriovenous fistula  Orientation: Right  Access Location: Arm   Site Assessment Clean, dry & intact   Thrill Present

## 2024-02-29 NOTE — CARE COORDINATION
2:14 PM  Confirmed with [facility] that they will have a room available Saturday after dialysis.     11:58 AM  Confirmed with [facility] that the earliest they can accept patient back to their clinic is Tuesday of next week.  Will send them notes and updates at discharge, which is anticipated as after dialysis on Saturday if medically cleared by physician to do so.    9:59 AM  Discussed in IDR, anticipate discharge to [facility] when confirmed able to start back with her dialysis center in the community. Fistulagram today.     9:05 AM  Met with son and discussed discharge planning.  Patient is TTS for dialysis at [facility], message left for them to determine ability to start back with them as soon as Saturday, anticipate they will not be able, son aware and agreeable to discharge after dialysis on Saturday if medically stable.  Confirmed cm will clarify dc date with him after team rounds this am.     Transition of Care  RUR 20%  1- CM continue to follow for transition to SNF   2- Anticipate transfer after dialysis on Saturday.   3- Transportation TBD

## 2024-03-01 LAB — ECHO BSA: 1.79 M2

## 2024-03-01 PROCEDURE — 97110 THERAPEUTIC EXERCISES: CPT

## 2024-03-01 PROCEDURE — 037D3ZZ DILATION OF RIGHT HAND ARTERY, PERCUTANEOUS APPROACH: ICD-10-PCS

## 2024-03-01 PROCEDURE — 6370000000 HC RX 637 (ALT 250 FOR IP): Performed by: PSYCHIATRY & NEUROLOGY

## 2024-03-01 PROCEDURE — 97116 GAIT TRAINING THERAPY: CPT

## 2024-03-01 PROCEDURE — 6360000002 HC RX W HCPCS: Performed by: INTERNAL MEDICINE

## 2024-03-01 PROCEDURE — 97530 THERAPEUTIC ACTIVITIES: CPT

## 2024-03-01 PROCEDURE — 92526 ORAL FUNCTION THERAPY: CPT

## 2024-03-01 PROCEDURE — 6370000000 HC RX 637 (ALT 250 FOR IP): Performed by: INTERNAL MEDICINE

## 2024-03-01 PROCEDURE — 2580000003 HC RX 258: Performed by: HOSPITALIST

## 2024-03-01 PROCEDURE — 94761 N-INVAS EAR/PLS OXIMETRY MLT: CPT

## 2024-03-01 PROCEDURE — 1100000000 HC RM PRIVATE

## 2024-03-01 RX ADMIN — MONTELUKAST 10 MG: 10 TABLET, FILM COATED ORAL at 22:21

## 2024-03-01 RX ADMIN — SODIUM CHLORIDE, PRESERVATIVE FREE 10 ML: 5 INJECTION INTRAVENOUS at 22:10

## 2024-03-01 RX ADMIN — MIDODRINE HYDROCHLORIDE 2.5 MG: 5 TABLET ORAL at 11:21

## 2024-03-01 RX ADMIN — PRAVASTATIN SODIUM 40 MG: 20 TABLET ORAL at 22:21

## 2024-03-01 RX ADMIN — SODIUM CHLORIDE, PRESERVATIVE FREE 10 ML: 5 INJECTION INTRAVENOUS at 08:07

## 2024-03-01 RX ADMIN — LEVETIRACETAM 250 MG: 500 TABLET, FILM COATED ORAL at 17:18

## 2024-03-01 RX ADMIN — CLOPIDOGREL BISULFATE 75 MG: 75 TABLET ORAL at 08:07

## 2024-03-01 RX ADMIN — GUAIFENESIN 600 MG: 600 TABLET ORAL at 08:06

## 2024-03-01 RX ADMIN — MIDODRINE HYDROCHLORIDE 2.5 MG: 5 TABLET ORAL at 17:18

## 2024-03-01 RX ADMIN — GUAIFENESIN 600 MG: 600 TABLET ORAL at 22:21

## 2024-03-01 RX ADMIN — MIDODRINE HYDROCHLORIDE 2.5 MG: 5 TABLET ORAL at 08:07

## 2024-03-01 RX ADMIN — HEPARIN SODIUM 5000 UNITS: 5000 INJECTION INTRAVENOUS; SUBCUTANEOUS at 06:26

## 2024-03-01 RX ADMIN — HEPARIN SODIUM 5000 UNITS: 5000 INJECTION INTRAVENOUS; SUBCUTANEOUS at 17:19

## 2024-03-01 RX ADMIN — CEFDINIR 300 MG: 300 CAPSULE ORAL at 17:18

## 2024-03-01 RX ADMIN — LEVETIRACETAM 500 MG: 500 TABLET, FILM COATED ORAL at 08:07

## 2024-03-01 ASSESSMENT — PAIN SCALES - GENERAL
PAINLEVEL_OUTOF10: 0

## 2024-03-01 NOTE — WOUND CARE
Wound consult for red buttock  Patient sitting in chair , unable to stand at this time.  Nurse Silas SANDRA stated assessed this morning and no redness noted  Please re-consult as needed  Paola Louis RN

## 2024-03-01 NOTE — TELEPHONE ENCOUNTER
Patient needs a hospital follow up appointment    Provider: Dr Dumont/ K[facility] Cordero  In person or virtual: In person  When: 5-6 weeks from now  Diagnosis/ Reason for follow-up: Breakthrough seizure    Additional information (I.e, best phone number or family member to call, etc):     Call Son to arrange appointment    Needs to have Keppra level rechecked before appointment

## 2024-03-01 NOTE — OP NOTE
[facility]          [address] [place]  [address]                            OPERATIVE REPORT      PATIENT NAME: GREG BLAIR                : 1940  MED REC NO: [id number]                       ROOM: [id number]  ACCOUNT NO: [id number]                       ADMIT DATE: 2024  PROVIDER: Johann Dimas MD    DATE OF SERVICE:  2024    PREOPERATIVE DIAGNOSES:  End-stage renal disease.    POSTOPERATIVE DIAGNOSES:  End-stage renal disease.    PROCEDURES PERFORMED:  Venous angioplasty of AV fistula with fistulogram.    SURGEON:  Johann Dimas MD    ASSISTANT:  None.    ANESTHESIA:  IV sedation with local anesthesia.    ESTIMATED BLOOD LOSS:  Minimal.    SPECIMENS REMOVED:  None.    INTRAOPERATIVE FINDINGS:  See below.     COMPLICATIONS:  None.    IMPLANTS:  None.    INDICATIONS:  The patient is an elderly female, who has had trouble on dialysis with low flows.  Decision was made to take her to the cath lab for angiographic evaluation.    DESCRIPTION OF PROCEDURE:  The patient was taken to the cath lab.  Once a suitable level of anesthesia was induced, prepped and draped in a typical sterile fashion.  The access was accessed with an entry needle and a 6-Kiswahili sheath placed.  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  Both these stenoses were treated with a 10 mm angioplasty balloon with good technical results.  The sheath was removed.  Suture was used for hemostasis.  She tolerated the procedure well.  There were no obvious complications.  She was transferred to the recovery area in good condition.        JOHANN DIMAS MD      MTW/AQS  D:  2024 07:58:07  T:  2024 11:24:25  JOB #:  [id number]

## 2024-03-01 NOTE — PALLIATIVE CARE DISCHARGE
Goals of Care/Treatment Preferences    The Palliative Medicine team was consulted as part of your/your loved one's care in the hospital. Our team is a supportive service; we strive to relieve suffering and improve quality of life.    We reviewed advance care planning information, which includes the following:    Primary Decision Maker: Refugio Swain - [phone]    Primary Decision Maker: Soy Swain - [phone]  Patient's Healthcare Decision Maker is:: Legal Next of Kin   Confirm Advance Directive: Yes, on file    We reviewed / discussed your code status as:   Code Status: DNR     “Full Code” means perform CPR in the event of cardiac arrest.      “DNR” means do NOT perform CPR in the event of cardiac arrest.      “Partial Code” means you have specific preferences, please discuss with your healthcare team.      “No Order” means this issue was not addressed / resolved during your stay    Medical Interventions: Limited additional interventions         Because of the importance of this information, we are providing you with a printed copy to share with other healthcare providers after this hospitalization is complete.

## 2024-03-02 PROCEDURE — 6370000000 HC RX 637 (ALT 250 FOR IP): Performed by: HOSPITALIST

## 2024-03-02 PROCEDURE — 6370000000 HC RX 637 (ALT 250 FOR IP): Performed by: INTERNAL MEDICINE

## 2024-03-02 PROCEDURE — 6360000002 HC RX W HCPCS: Performed by: INTERNAL MEDICINE

## 2024-03-02 PROCEDURE — 90935 HEMODIALYSIS ONE EVALUATION: CPT

## 2024-03-02 PROCEDURE — 2580000003 HC RX 258: Performed by: HOSPITALIST

## 2024-03-02 PROCEDURE — 94761 N-INVAS EAR/PLS OXIMETRY MLT: CPT

## 2024-03-02 PROCEDURE — 1100000000 HC RM PRIVATE

## 2024-03-02 PROCEDURE — 6370000000 HC RX 637 (ALT 250 FOR IP): Performed by: PSYCHIATRY & NEUROLOGY

## 2024-03-02 RX ORDER — LEVETIRACETAM 250 MG/1
250 TABLET ORAL EVERY EVENING
Qty: 60 TABLET | Refills: 3 | Status: SHIPPED | OUTPATIENT
Start: 2024-03-02

## 2024-03-02 RX ORDER — CLOPIDOGREL BISULFATE 75 MG/1
75 TABLET ORAL DAILY
Qty: 30 TABLET | Refills: 3 | Status: SHIPPED | OUTPATIENT
Start: 2024-03-03

## 2024-03-02 RX ORDER — CARBOXYMETHYLCELLULOSE SODIUM 10 MG/ML
1 GEL OPHTHALMIC PRN
Status: DISCONTINUED | OUTPATIENT
Start: 2024-03-02 | End: 2024-03-03 | Stop reason: HOSPADM

## 2024-03-02 RX ORDER — CEFDINIR 300 MG/1
300 CAPSULE ORAL EVERY EVENING
Qty: 3 CAPSULE | Refills: 0 | Status: SHIPPED | OUTPATIENT
Start: 2024-03-02 | End: 2024-03-03 | Stop reason: HOSPADM

## 2024-03-02 RX ORDER — LEVETIRACETAM 500 MG/1
500 TABLET ORAL EVERY MORNING
Qty: 60 TABLET | Refills: 3 | Status: SHIPPED | OUTPATIENT
Start: 2024-03-03

## 2024-03-02 RX ADMIN — HEPARIN SODIUM 5000 UNITS: 5000 INJECTION INTRAVENOUS; SUBCUTANEOUS at 06:43

## 2024-03-02 RX ADMIN — HEPARIN SODIUM 5000 UNITS: 5000 INJECTION INTRAVENOUS; SUBCUTANEOUS at 17:02

## 2024-03-02 RX ADMIN — GUAIFENESIN 600 MG: 600 TABLET ORAL at 20:11

## 2024-03-02 RX ADMIN — HEPARIN SODIUM 3000 UNITS: 1000 INJECTION INTRAVENOUS; SUBCUTANEOUS at 12:07

## 2024-03-02 RX ADMIN — ACETAMINOPHEN 650 MG: 325 TABLET ORAL at 17:56

## 2024-03-02 RX ADMIN — CLOPIDOGREL BISULFATE 75 MG: 75 TABLET ORAL at 08:05

## 2024-03-02 RX ADMIN — MIDODRINE HYDROCHLORIDE 2.5 MG: 5 TABLET ORAL at 08:05

## 2024-03-02 RX ADMIN — MIDODRINE HYDROCHLORIDE 2.5 MG: 5 TABLET ORAL at 12:07

## 2024-03-02 RX ADMIN — SODIUM CHLORIDE, PRESERVATIVE FREE 10 ML: 5 INJECTION INTRAVENOUS at 08:05

## 2024-03-02 RX ADMIN — LEVETIRACETAM 250 MG: 500 TABLET, FILM COATED ORAL at 17:02

## 2024-03-02 RX ADMIN — CEFDINIR 300 MG: 300 CAPSULE ORAL at 17:01

## 2024-03-02 RX ADMIN — LEVETIRACETAM 500 MG: 500 TABLET, FILM COATED ORAL at 08:05

## 2024-03-02 RX ADMIN — MONTELUKAST 10 MG: 10 TABLET, FILM COATED ORAL at 20:11

## 2024-03-02 RX ADMIN — SODIUM CHLORIDE, PRESERVATIVE FREE 10 ML: 5 INJECTION INTRAVENOUS at 20:12

## 2024-03-02 RX ADMIN — CARBOXYMETHYLCELLULOSE SODIUM 1 DROP: 10 GEL OPHTHALMIC at 10:58

## 2024-03-02 RX ADMIN — PRAVASTATIN SODIUM 40 MG: 20 TABLET ORAL at 20:11

## 2024-03-02 RX ADMIN — MIDODRINE HYDROCHLORIDE 2.5 MG: 5 TABLET ORAL at 17:01

## 2024-03-02 RX ADMIN — GUAIFENESIN 600 MG: 600 TABLET ORAL at 08:05

## 2024-03-02 ASSESSMENT — PAIN SCALES - GENERAL
PAINLEVEL_OUTOF10: 0
PAINLEVEL_OUTOF10: 0

## 2024-03-02 NOTE — DISCHARGE INSTRUCTIONS
HOSPITALIST DISCHARGE INSTRUCTIONS  NAME:  Ivone Swain   :  1940   MRN:  [id number]     Date/Time:  3/2/2024 10:04 AM    ADMIT DATE: 2024     DISCHARGE DATE: 3/2/2024     DISCHARGE DIAGNOSIS:  Acute respiratory failure with hypoxia    DISCHARGE INSTRUCTIONS:  Thank you for allowing us to participate in your care. Your discharging Hospitalist is Tanvir Becerra MD. You were admitted for evaluation and treatment of the following:    Acute respiratory failure with hypoxia / Pulmonary edema - This was due to too much fluid in your body.  You initially required intubation. We consulted nephrology to remove fluid by dialysis.      Urinary tract infection - This was due to a bacteria and is being treated with Omnicef     Acute seizure / History of completed stroke / Dysphagia - Neurology was consulted. Continue statin, and we adjusted your dose of keppra. MRI ruled out any stroke.  We changed aspirin to Plavix. A speech consultant recommended you eat a pureed diet     End stage renal disease on dialysis - We consulted renal.  You are tolerating dialysis. We consulted vascular.  your fistula is working poorly.  Eat a renal diet with fluid restrictions.     Diastolic CHF, chronic - Continue midodrine. Outpatient cardiology follow up     Anemia due to chronic kidney disease - This is stable. Epogen injections per renal     Debilitated patient / Arthritis - You had a PT OT eval.  You will go to rehab     Obese / sleep apnea on CPAP - You may resume home CPAP.  We advise weight loss.     High cholesterol - Continue pravastatin     Anxiety and depression - You might benefit from counseling      MEDICATIONS:    It is important that you take the medication exactly as they are prescribed.   Keep your medication in the bottles provided by the pharmacist and keep a list of the medication names, dosages, and times to be taken in your wallet.   Do not take other medications without consulting your doctor.       If you 
Yes

## 2024-03-02 NOTE — DISCHARGE SUMMARY
Physician Discharge Summary     Patient ID:  Ivone Swain  [id number]  83 y.o.  1940    Admit date: 2/23/2024    Discharge date of service and time: 3/3/2024  Greater than 30 minutes were spent providing discharge related services for this patient    Admission Diagnoses:    Acute Respiratory failure with hypoxia    Discharge Diagnoses:    Principal Diagnosis   cute respiratory failure with hypoxia                                             Hospital Course and other diagnoses  Acute respiratory failure with hypoxia / Pulmonary edema / DUKES (dyspnea on exertion) - POA, due to a excess fluid.  Initially required intubation, now extubated.  Consulted renal for HD.  Hypoventilation may contribute to hypoxia too.  Consulted palliative care.  She is now on room air.      E coli UTI - POA, pan senstive.  Completed Omnicef     Acute seizure / Hx of completed stroke / Hx Seizures / Dysphagia  - Neurology consulted. Continue ASA, BB, statin, adjusted keppra. MRI only shows \"Chronic small vessel ischemic disease and chronic encephalomalacia in the left posterior parietal lobe\". ASA changed to plavix considering her hx of occluded L vertebral artery.  Consulted speech, recommending pureed diet; thin liquids     ESRD on dialysis - Consulted renal. Tolerated HD.  They consulted vascular.  Fistula suboptimal.  Renal diet.  Fluid restriction.     Hypertension / Diastolic CHF, chronic - Continue midodrine.  No longer on Norvasc, Coreg, clonidine, hydralazine and minoxidil.  Outpatient cardiology follow up     Anemia due to chronic kidney disease - Stable. EPO per renal     Debilitated patient / Arthritis - PT OT eval.  Prn tylenol. D/C to SNF on today after HD     Obese / SUSAN on CPAP - May resume home CPAP. Advise weight loss.     Urinary incontinence - Not on meds     Monoclonal gammopathy - Outpatient follow up     High cholesterol - Continue pravastatin     Anxiety and depression - Not on meds. Would benefit from

## 2024-03-02 NOTE — CARE COORDINATION
4:18 PM  Call received from nursing station, discharged changed to tomorrow to monitor blood pressure and readiness to transfer safely tomorrow per Dr. Becerra.  Son is aware as well.   [facility] made aware.     11:51 AM  Transition of Care Plan to SNF/Rehab    Communication to Patient/Family:  Met with patient and family and they are agreeable to the transition plan. The Plan for Transition of Care is related to the following treatment goals: resume highest level of function.     The Patient and/or patient representative was provided with a choice of provider and agrees  with the discharge plan.      Yes [x] No []    A Freedom of choice list was provided with basic dialogue that supports the patient's individualized plan of care/goals and shares the quality data associated with the providers.       Yes [x] No []    SNF/Rehab Transition:  Patient has been accepted to [facility] and meets criteria for admission.   Patient will transported by her son, Refugio and expected to leave at 5:30pm     Communication to SNF/Rehab:  Nursing station  has been notified to update the transition plan to the facility and call report [phone]  Discharge information has been updated on the AVS. And communicated to facility via [employer], or CC link.         Nursing Please include all hard scripts for controlled substances, med rec and dc summary, and AVS in packet.     Reviewed and confirmed with facility, [facility] can manage the patient care needs for the following:     Marko with (X) only those applicable:  Medication:  [x]Medications are available at the facility  []IV Antibiotics    [x]Controlled Substance - hard copies available sent.  [x]Weekly Labs    Equipment:  []CPAP/BiPAP  []Wound Vacuum  []Hernandez or Urinary Device  []PICC/Central Line  []Nebulizer  []Ventilator    Treatment:  []Isolation (for MRSA, VRE, etc.)  []Surgical Drain Management  []Tracheostomy Care  []Dressing

## 2024-03-02 NOTE — FLOWSHEET NOTE
PRE HD: 03/02/24 1140   Vital Signs   /62   Temp 98.1 °F (36.7 °C)   Pulse 61   Respirations 16   Pain Assessment   Pain Assessment None - Denies Pain   Treatment   Time On 1140   Time Off 1520   Treatment Goal 2L, 3.5hrs   Observations & Evaluations   Level of Consciousness 0   Oriented X 4   Heart Rhythm   (not monitored)   Respiratory Quality/Effort Unlabored   O2 Device None (Room air)   Bilateral Breath Sounds Diminished   Skin Condition/Temp Warm;Dry   Abdomen Inspection Soft   Edema Left upper extremity;Right lower extremity   Edema Generalized +1   Technical Checks   Dialysis Machine No. 02   RO Machine Number [id number]   Dialyzer Lot No. [id number]   Tubing Lot Number [id number]   All Connections Secure Yes   NS Bag Yes   Saline Line Double Clamped Yes   Dialyzer Revaclear 300   Prime Volume (mL) 200 mL   ICEBOAT I;C;E;B;O;A;T   RO Machine Log Sheet Completed Yes   Machine Alarm Self Test Completed;Passed   Air Foam Detector Tested;Proper Function   Extracorporeal Circuit Tested for Integrity Yes   Machine Conductivity 13.8   Manual Conductivity 13.8   Manual Ph 7.4   Bleach Test (Neg) Yes   Bath Temperature 96.8 °F (36 °C)   Dialysis Bath   K+ (Potassium) 3   Ca+ (Calcium) 2.5   Na+ (Sodium) 138   HCO3 (Bicarb) 35   Bicarbonate Concentrate Lot No. [id number]   Acid Concentrate Lot No. [id number]   Treatment Initiation   Dialyze Hours 3.5   Treatment  Initiation Universal Precautions maintained;Lines secured to patient;Connections secured;Prime given;Venous Parameters set;Arterial Parameters set;Air foam detector engaged;Dialysate;Saline line double clamped;Revaclear Dialyzer   Hemodialysis Fistula/Graft Arteriovenous fistula Right Arm   Placement Date/Time: 08/03/22 (c) 0100   Access Type: Arteriovenous fistula  Orientation: Right  Access Location: Arm   Site Assessment Clean, dry & intact   Thrill Present   Bruit Present   Status Accessed   Venous Needle Size 15 G   Arterial Needle Size 15 G   Accessed By:

## 2024-03-03 VITALS
BODY MASS INDEX: 30.81 KG/M2 | HEIGHT: 61 IN | RESPIRATION RATE: 16 BRPM | HEART RATE: 64 BPM | SYSTOLIC BLOOD PRESSURE: 154 MMHG | DIASTOLIC BLOOD PRESSURE: 68 MMHG | WEIGHT: 163.2 LBS | TEMPERATURE: 98.2 F | OXYGEN SATURATION: 95 %

## 2024-03-03 PROCEDURE — 6370000000 HC RX 637 (ALT 250 FOR IP): Performed by: PSYCHIATRY & NEUROLOGY

## 2024-03-03 PROCEDURE — 94761 N-INVAS EAR/PLS OXIMETRY MLT: CPT

## 2024-03-03 PROCEDURE — 6370000000 HC RX 637 (ALT 250 FOR IP): Performed by: INTERNAL MEDICINE

## 2024-03-03 PROCEDURE — 2580000003 HC RX 258: Performed by: HOSPITALIST

## 2024-03-03 PROCEDURE — 6360000002 HC RX W HCPCS: Performed by: INTERNAL MEDICINE

## 2024-03-03 RX ADMIN — CLOPIDOGREL BISULFATE 75 MG: 75 TABLET ORAL at 09:08

## 2024-03-03 RX ADMIN — SODIUM CHLORIDE, PRESERVATIVE FREE 10 ML: 5 INJECTION INTRAVENOUS at 09:09

## 2024-03-03 RX ADMIN — MIDODRINE HYDROCHLORIDE 2.5 MG: 5 TABLET ORAL at 09:08

## 2024-03-03 RX ADMIN — HEPARIN SODIUM 5000 UNITS: 5000 INJECTION INTRAVENOUS; SUBCUTANEOUS at 06:26

## 2024-03-03 RX ADMIN — LEVETIRACETAM 500 MG: 500 TABLET, FILM COATED ORAL at 09:08

## 2024-03-03 RX ADMIN — GUAIFENESIN 600 MG: 600 TABLET ORAL at 09:08

## 2024-03-03 ASSESSMENT — PAIN SCALES - GENERAL
PAINLEVEL_OUTOF10: 0

## 2024-03-03 NOTE — PROGRESS NOTES
[facility] Adult  Hospitalist Group                                                                                          Hospitalist Progress Note  Sawyer Michelle MD  Office Phone: [phone]        Date of Service:  2024  NAME:  Ivone Swain  :  1940  MRN:  [id number]       Admission Summary:   Ms. Swain is a 83 y.o. female with history that includes ESRD on HD, CVA, HTN, seizure, MGUS, and recent admission with shortness of breath and pulmonary edema due to fluid overload which responded to HD was brought in today due to fall at home.  Based on chart patient complained of headache but not sure if this was before or after the fall.  Underwent CT of the head showing hypodensity in the left peritoneal lobe which may be consistent with subacute or chronic ischemic changes with encephalomalacia.       In ER she had a seizure and apparently went into respiratory arrest and was intubated by the ER physician.  Blood pressure was elevated into the 200s and subsequently came down to 190 with propofol.  Chest x-ray showed interval development of pulmonary edema.  When I saw the patient she was intubated and sedated and not able to obtain history from her.  No family at bedside.  Was discussed with ER physician and also call to discuss case with neurology along with nephrology who are consulted.       Interval history / Subjective:   Denies any discomfort resting, extubated, MRI no acute intracranial abnormalities, chronic small vessel ischemic disease chronic encephalomalacia,     Assessment & Plan:      Acute respiratory failure post seizure requiring intubation  Recent admission with AHRF due to fluid overload  Extubated 24  Chest x-ray results pending  N.p.o.  No need for pressors as the patient is hypertensive  Intensivist following.     Acute seizure in patient with history of seizure on AED: Neurology following.  Abnormal CT finding concerning for possible stroke chronic 
     CRITICAL CARE NOTE    Name: Ivone Swain   : 1940   MRN: [id number]   Date: 2024      REASON FOR ICU ADMISSION:  Intubated for post ictal encephalopathy    PRINCIPAL ICU DIAGNOSIS   Ventilator dependent respiratory failure - intubated due to AMS  Seizure  ESRD  Pulmonary edema pattern on CXR    BRIEF PATIENT SUMMARY   83 F with hx of ESRD and seizures found by son in her home with AMS after having soiled her self. Brought to ED and witnessed to seize. Intubated in ED for post ictal encephalopathy. Received 2 gram load of levetiracetam and transferred to ICU. CTH revealed At her baseline she is semi-independent, ambulates with walker.       HOSPITAL COURSE/DAILY EVENT LOG    CTH: Interval development of hypodense in the left parietal lobe. This may represent subacute or chronic ischemic changes with encephalomalacia. However, there is some sparing of the cortex. MRI with contrast is recommended for further assessment.   Remains intubated and sedated. Plan for repeat HD today and bMRI ordered. Anticipate extubation after that   bMRI: No acute intracranial abnormality. Chronic small vessel ischemic disease and chronic encephalomalacia in the left posterior parietal lobe   Passed SBT and extubated to NC O2. Tolerating well initially    Has tolerated extubation. Cognition gradually coming back to baseline. No major events. Transfer to lower level of care and [facility] Service sign off      COMPREHENSIVE ASSESSMENT & PLAN:SYSTEM BASED     24 HOUR EVENTS: as above    NEUROLOGICAL:  Hx of seizure disorder  Acute seizure in ED  Post ictal encephalopathy   Neurology following  Cont Keppra as ordered  PT/OT ordered    PULMONOLOGY:  Ventilator dependent respiratory failure - intubated for AMS       Extubated   Edema pattern on CXR - resolved   Cont pulse ox  Supplemental O2 as needed    CARDIOVASCULAR:  No acute issues   Cardiac/hemodynamic monitoring  MAP goal > 60 
     Palliative Medicine Social Work Note      Palliative Medicine (Dr. Sanderson, FRANCIS Pierre) attempted visit; PT/OT were working with pt.  Spoke with son Refugio together with Care Manager Gia.  Son had been in earlier today, was glad to see more alert and interactive but reports increased confusion when he returned this afternoon.  Son expressed concern re: pt being discharged too soon, stated he would like to see pt eating, more mobile, and less confused before pt transfers to SNF.  Dr. Sanderson update son re: results of today's SLP michael, provided education re: delirium, explained that pt will likely be physically weaker than her baseline when she leaves the hospital.  Son stated he would still like to have a conversation with pt re: her overall goc (specifically re: dialysis) but will wait until pt's mental status is clearer.  Palliative team will continue to follow for support and goc discussions as indicated.     Addendum:  Checked back later in afternoon.  Pt was up in chair, family was no longer present.  Pt was more alert than during yesterday's visit, shared that she gets a bit confused at times, thought she was in her apartment before but now knows she is in the hospital, shared that she thought she saw her granddtr earlier but that was not the case.  Pt reports dialysis went well this morning, shared that working with PT/OT had been a little hard for her due to weakness.  Pt has been approved for diet, is looking forward to eating dinner.  No questions/concerns expressed at this time.  Will continue to follow for support.     Thank you for including Palliative Medicine in the care of Ms. Swain.     Gabby Rodas, FRANCIS, ACHP-SW  Palliative Medicine:  [phone])  
    NEPHROLOGY PROGRESS NOTE     Patient: Ivone Swain MRN: [id number]  PCP: Yaniv Hyde MD   :     1940  Age:   83 y.o.  Sex:  female          Assessment & Plan:     ESKD-HD TTS [place] unit ([facility])  Seizure dz  HTN  Fluid overload  Acute hypoxic respiratory failure > intubated for airway protection  AOCD    Plan:  HD today and TTS   Getting EEG, HD to follow         Subjective:   CC:ESKD-HD TTS     HPI: On vent, getting EEG. For HD today.     Current Facility-Administered Medications   Medication Dose Route Frequency    famotidine (PEPCID) 20 mg in sodium chloride (PF) 0.9 % 10 mL injection  20 mg IntraVENous Daily    sodium chloride flush 0.9 % injection 5-40 mL  5-40 mL IntraVENous 2 times per day    sodium chloride flush 0.9 % injection 5-40 mL  5-40 mL IntraVENous PRN    0.9 % sodium chloride infusion   IntraVENous PRN    acetaminophen (TYLENOL) tablet 650 mg  650 mg Oral Q6H PRN    Or    acetaminophen (TYLENOL) suppository 650 mg  650 mg Rectal Q6H PRN    hydrALAZINE (APRESOLINE) injection 10 mg  10 mg IntraVENous Q4H PRN    ondansetron (ZOFRAN) injection 4 mg  4 mg IntraVENous Q6H PRN    aspirin chewable tablet 81 mg  81 mg Oral Daily    Or    aspirin suppository 300 mg  300 mg Rectal Daily    heparin (porcine) injection 5,000 Units  5,000 Units SubCUTAneous Q12H    fentaNYL (SUBLIMAZE) injection 25 mcg  25 mcg IntraVENous Q1H PRN    propofol infusion  0-50 mcg/kg/min IntraVENous Continuous    levETIRAcetam (KEPPRA) injection 500 mg  500 mg IntraVENous Once per day on Tue Thu Sat    levETIRAcetam (KEPPRA) injection 500 mg  500 mg IntraVENous Q12H    albumin human 25% IV solution 25 g  25 g IntraVENous PRN          Objective:     Vitals:  Blood pressure (!) 158/64, pulse 61, temperature 98.1 °F (36.7 °C), temperature source Bladder, resp. rate 16, height 1.549 m (5' 1\"), weight 75 kg (165 lb 5.5 oz), SpO2 100 %.  Temp (24hrs), Av.2 °F (37.3 °C), Min:97.6 °F (36.4 °C), Max:100.8 °F (38.2 
    NEPHROLOGY PROGRESS NOTE     Patient: Ivone Swain MRN: [id number]  PCP: Yaniv Hyde MD   :     1940  Age:   83 y.o.  Sex:  female          Assessment & Plan:     ESKD-HD TTS [place] unit ([facility])  Seizure dz  HTN  Fluid overload  Acute hypoxic respiratory failure > intubated for airway protection  AOCD    Plan:  HD -TTS   Seizure med's per Neuro and recommended dose after HD  Discussed with patient and son at bedside         Subjective:   CC:ESKD-HD TTS     HPI: Extubated, son at bedside no chest pain or shortness of breath    Current Facility-Administered Medications   Medication Dose Route Frequency    cefTRIAXone (ROCEPHIN) 1,000 mg in sterile water 10 mL IV syringe  1,000 mg IntraVENous Q24H    montelukast (SINGULAIR) tablet 10 mg  10 mg Oral Nightly    pravastatin (PRAVACHOL) tablet 40 mg  40 mg Oral Nightly    guaiFENesin (MUCINEX) extended release tablet 600 mg  600 mg Oral BID    hydrALAZINE (APRESOLINE) injection 5 mg  5 mg IntraVENous Q3H PRN    clopidogrel (PLAVIX) tablet 75 mg  75 mg Oral Daily    heparin (porcine) injection 3,000 Units  3,000 Units IntraVENous PRN    sodium chloride flush 0.9 % injection 5-40 mL  5-40 mL IntraVENous 2 times per day    sodium chloride flush 0.9 % injection 5-40 mL  5-40 mL IntraVENous PRN    0.9 % sodium chloride infusion   IntraVENous PRN    acetaminophen (TYLENOL) tablet 650 mg  650 mg Oral Q6H PRN    Or    acetaminophen (TYLENOL) suppository 650 mg  650 mg Rectal Q6H PRN    ondansetron (ZOFRAN) injection 4 mg  4 mg IntraVENous Q6H PRN    heparin (porcine) injection 5,000 Units  5,000 Units SubCUTAneous Q12H    levETIRAcetam (KEPPRA) injection 500 mg  500 mg IntraVENous Once per day on Tue Thu Sat    levETIRAcetam (KEPPRA) injection 500 mg  500 mg IntraVENous Q12H    albumin human 25% IV solution 25 g  25 g IntraVENous PRN          Objective:     Vitals:  Blood pressure (!) 115/54, pulse 65, temperature 98.5 °F (36.9 °C), temperature source Oral, 
    NEPHROLOGY PROGRESS NOTE     Patient: Ivone Swain MRN: [id number]  PCP: Yaniv Hyde MD   :     1940  Age:   83 y.o.  Sex:  female          Assessment & Plan:     ESKD-HD TTS [place] unit ([facility])  Seizure dz  HTN  Fluid overload  Acute hypoxic respiratory failure > intubated for airway protection  AOCD    Plan:  Access function has been poor.   For f-gram tomorrow prior to dialysis  HD -TTS          Subjective:   CC:ESKD-HD TTS     HPI: No acute events overnight    Current Facility-Administered Medications   Medication Dose Route Frequency    levETIRAcetam (KEPPRA) tablet 500 mg  500 mg Oral Daily    levETIRAcetam (KEPPRA) tablet 250 mg  250 mg Oral Nightly    [START ON 2024] levETIRAcetam (KEPPRA) tablet 250 mg  250 mg Oral Once per day on Tue Thu Sat    [START ON 2024] cefdinir (OMNICEF) capsule 300 mg  300 mg Oral QPM    clopidogrel (PLAVIX) tablet 75 mg  75 mg Oral Daily    guaiFENesin (MUCINEX) extended release tablet 600 mg  600 mg Oral BID    melatonin tablet 3 mg  3 mg Oral Nightly PRN    montelukast (SINGULAIR) tablet 10 mg  10 mg Oral Nightly    pravastatin (PRAVACHOL) tablet 40 mg  40 mg Oral Nightly    heparin (porcine) injection 3,000 Units  3,000 Units IntraVENous PRN    sodium chloride flush 0.9 % injection 5-40 mL  5-40 mL IntraVENous 2 times per day    sodium chloride flush 0.9 % injection 5-40 mL  5-40 mL IntraVENous PRN    0.9 % sodium chloride infusion   IntraVENous PRN    acetaminophen (TYLENOL) tablet 650 mg  650 mg Oral Q6H PRN    Or    acetaminophen (TYLENOL) suppository 650 mg  650 mg Rectal Q6H PRN    ondansetron (ZOFRAN) injection 4 mg  4 mg IntraVENous Q6H PRN    heparin (porcine) injection 5,000 Units  5,000 Units SubCUTAneous Q12H          Objective:     Vitals:  Blood pressure 138/62, pulse 63, temperature 97.7 °F (36.5 °C), temperature source Axillary, resp. rate 19, height 1.54 m (5' 0.63\"), weight 74 kg (163 lb 3.2 oz), SpO2 95 %.  Temp (24hrs), Av 
  Hospitalist Progress Note    NAME: Ivone Swain   :  1940   MRN:  [id number]     Date/Time:  3/1/2024 8:32 AM    Patient PCP: Yaniv Hyde MD       Subjective:   CHIEF COMPLAINT: Seizure       No further seizures.  No new issues.  Plan for discharge to SNF tomorrow after dialysis.        Objective:   VITALS:    Vitals:    24 0815   BP: 95/60   Pulse:    Resp:    Temp:    SpO2:        PHYSICAL EXAM:      General:   No acute distress, resting comfortably in bed.  Heart:  RRR, No MRG  Lungs:  CTAB.  Non-labored breathing.  Abdomen:  Soft .  Nondistended.  NTTP.  BS present.  Extremities:  No edema.  Skin:  No rash  Neuro:  Alert and interactive.  No focal deficits.  Generalized weakness  Psych: Mood stable.        LAB DATA REVIEWED:    Recent Results (from the past 24 hour(s))   Invasive vascular procedure    Collection Time: 24 12:25 PM   Result Value Ref Range    Body Surface Area 1.79 m2         IMAGING DATA REVIEWED:    XR CHEST PORTABLE   Final Result   Mild pulmonary edema and bibasilar airspace opacities       MRA neck without contrast   Final Result   Nonvisualization of the left vertebral artery in the neck which may   be occluded. Remaining vessels are patent.      MRI brain without contrast   Final Result   No acute intracranial abnormality. Chronic small vessel ischemic disease and   chronic encephalomalacia in the left posterior parietal lobe      MRA head without contrast   Final Result   No intracranial stenosis.      XR CHEST PORTABLE   Final Result   Improved pulmonary edema.      XR ABDOMEN (KUB) (SINGLE AP VIEW)   Final Result   1. Satisfactory positioning of nasogastric tube         XR CHEST PORTABLE   Final Result   1. Endotracheal tube overlies the trachea 3.4 cm below the level of the   clavicles      2. Interval development of pulmonary edema and trace pleural effusion on the   left      XR CHEST PORTABLE   Final Result   1. No acute disease         CT HEAD WO CONTRAST 
  [facility]    Hospitalist Progress Note    NAME: Ivone Swain   :  1940  MRM:  [id number]    Date/Time of service 3/2/2024  9:48 AM    To assist coordination of care and communication with nursing and staff, this note may be preliminary early in the day, but finalized by end of the day.        Assessment and Plan:     Acute respiratory failure with hypoxia / Pulmonary edema / DUKES (dyspnea on exertion) - POA, due to a excess fluid.  Initially required intubation, now extubated.  Consulted renal for HD.  Hypoventilation may contribute to hypoxia too.  Consulted palliative care.  She is now on 2L NC oxygen. PT eval for oxygen needs, arrange home oxygen for chronic CHF if hypoxia persists.      E coli UTI - POA, pan senstive.  Complete Omnicef    Acute seizure / Hx of completed stroke / Hx Seizures / Dysphagia  - Neurology consulted. Continue ASA, BB, statin, adjusted keppra. MRI only shows \"Chronic small vessel ischemic disease and chronic encephalomalacia in the left posterior parietal lobe\". ASA changed to plavix considering her hx of occluded L vertebral artery.  Consulted speech, recommending pureed diet; thin liquids     ESRD on dialysis - Consulted renal. Tolerating HD.  They consulted vascular.  Fistula suboptimal.  Renal diet.  Fluid restriction.     Hypertension / Diastolic CHF, chronic - Continue midodrine.  No longer on Norvasc, Coreg, clonidine, hydralazine and minoxidil.  Outpatient cardiology follow up     Anemia due to chronic kidney disease - Stable. EPO per renal     Debilitated patient / Arthritis - PT OT eval.  Prn tylenol. D/C to SNF on today after HD     Obese / SUSAN on CPAP - May resume home CPAP. Advise weight loss.     Urinary incontinence - Not on meds     Monoclonal gammopathy - Outpatient follow up     High cholesterol - Continue pravastatin     Anxiety and depression - Not on meds. Would benefit from counseling       Subjective:     Chief Complaint:  
  [facility]    Hospitalist Progress Note    NAME: Ivone Swain   :  1940  MRM:  [id number]    Date/Time of service 3/3/2024  9:26 AM    To assist coordination of care and communication with nursing and staff, this note may be preliminary early in the day, but finalized by end of the day.        Assessment and Plan:     Acute respiratory failure with hypoxia / Pulmonary edema / DUKES (dyspnea on exertion) - POA, due to a excess fluid.  Initially required intubation, now extubated.  Consulted renal for HD.  Hypoventilation may contribute to hypoxia too.  Consulted palliative care.  She is now on room air     E coli UTI - POA, pan senstive.  Completed Omnicef    Acute seizure / Hx of completed stroke / Hx Seizures / Dysphagia  - Neurology consulted. Continue ASA, BB, statin, adjusted keppra. MRI only shows \"Chronic small vessel ischemic disease and chronic encephalomalacia in the left posterior parietal lobe\". ASA changed to plavix considering her hx of occluded L vertebral artery.  Consulted speech, recommending pureed diet; thin liquids     ESRD on dialysis - Consulted renal. Tolerating HD.  They consulted vascular.  Fistula suboptimal.  Renal diet.  Fluid restriction.     Hypertension / Diastolic CHF, chronic - Continue midodrine.  No longer on Norvasc, Coreg, clonidine, hydralazine and minoxidil.  Outpatient cardiology follow up     Anemia due to chronic kidney disease - Stable. EPO per renal     Debilitated patient / Arthritis - PT OT eval.  Prn tylenol. D/C to SNF on today after HD     Obese / SUSAN on CPAP - May resume home CPAP. Advise weight loss.     Urinary incontinence - Not on meds     Monoclonal gammopathy - Outpatient follow up     High cholesterol - Continue pravastatin     Anxiety and depression - Not on meds. Would benefit from counseling       Subjective:     Chief Complaint:  weak, ready to go to SNF.  Tolerated HD yesterday.    ROS:  (bold if positive, if 
  Physician Progress Note      PATIENT:               GREG BLAIR  CSN #:                  [id number]  :                       1940  ADMIT DATE:       2024 6:33 AM  DISCH DATE:  RESPONDING  PROVIDER #:        Vale Patrick MD          QUERY TEXT:    Patient admitted with seizure. Noted documentation of intubation for airway   protection in ED provider note and acute respiratory failure in H&P. Please   indicate one of the following and document in the medical record:    The medical record reflects the following:  Risk Factors: seizure, AMS  Clinical Indicators: admitted with seizure  - ED Provider: called to bedside for pt having a GTC after which remained   altered so I intubated her for airway protection  - H&P- Acute respiratory failure post seizure requiring intubation  - sats >95 in ED with RR 16-23  Treatment: MV with extubation  to NC, ICU monitoring    Thank you,    Lyric Morris RN  CDI  Options provided:  -- Intubated for Acute Respiratory Failure as evidenced by, Please document   evidence.  -- Intubated for airway protection only, Acute Respiratory Failure ruled out   after study  -- Other - I will add my own diagnosis  -- Disagree - Not applicable / Not valid  -- Disagree - Clinically unable to determine / Unknown  -- Refer to Clinical Documentation Reviewer    PROVIDER RESPONSE TEXT:    This patient was intubated for airway protection only, Acute Respiratory   Failure has been ruled out after study.    Query created by: Lyric Morris on 2024 1:16 PM      Electronically signed by:  Vale Patrick MD 2024 5:36 PM          
 attended rounds in ICU as part of interdisciplinary team where patient's ongoing care was discussed. Please contact Spiritual Care for further referrals.    Rev Paula Fink MDiv, BCC  To edith vale: [phone])  
..0700 Bedside and Verbal shift change report given to Anabel RN (oncoming nurse) by Gracie RN (offgoing nurse). Report included the following information SBAR, Cardiac Rhythm NSR, and Dual Neuro Assessment.    0800 Morning Assessment  Neuro A&0 x4  Cardiac NSR  Respiratory 2L NC  GI NPO   Anuric  Skin Intact  IV L H #20, L FA #20 KVO + abx    0800 Assessment completed. VSS. Pt A&0 x4. Delayed speech. Lethargic. PT/OT/SLP ordered  1200 Reassessment. VSS.   1600 Reassessment. VSS. Pt eval by SLP keeping NPO until reeval tomorrow. Pooling oral secretions.   1900 ..Bedside and Verbal shift change report given to Bianca RN (oncoming nurse) by Anabel RN (offgoing nurse). Report included the following information Nurse Handoff Report, Cardiac Rhythm NSR, and Neuro Assessment.       
0700 Bedside and Verbal shift change report given to Tiara RN (oncoming nurse) by Nati SANDRA (offgoing nurse). Report included the following information Nurse Handoff Report, Adult Overview, Intake/Output, MAR, and Recent Results.      1519 Attempted to call patients son to let him know the patient will be transferred to 5th floor.     1525 Spoke with patients son over the phone, notified him that the patient will be moved to 5th floor. Attempted to complete Pt med rec in admission documentation. Pt's son states he has a list of the patient's home medications, but is unsure exactly when she last took them. Will notify 5th floor RN.    1700 Telephone/verbal report given to Linda SANDRA on 5th floor. All questions answered.  
0715: Bedside and Verbal shift change report given to Goyo RN (oncoming nurse) by Nati RN (offgoing nurse). Report included the following information Adult Overview, Recent Results, Med Rec Status, and Cardiac Rhythm SR to S [place] .      1455: messaged provider advised pt was trying to get out of bed just now and oriented changed some. Advised earlier pt knew she was in the hospital just not which one during this time pt thought she was in the apartments. Advised still oriented x3. Advised still impulsive (ripped out IV earlier). Provider asked if we wanted a sitter advised trying to see if impulsiveness continues but if does would need one.     1528: notified provider pt son Refugio in the room now wanting to speak with her regarding dc. Provider asked for his number gave her pt son's number. Son also wanted to know if eye drops could be ordered asked provider via message.     1915: Bedside and Verbal shift change report given to Nati RN (oncoming nurse) by Goyo RN (offgoing nurse). Report included the following information Adult Overview, Recent Results, Med Rec Status, and Cardiac Rhythm SR .    
0820- propofol stopped and pt extubated per Dr. Michelle. No complications, pt moving all limbs spontaneously with not command following. Opens eyes spontaneously but not to verbal command. 6L NC applied, O2 98%, restraints removed   0830- 2L NC, O2 97%. Neuro status unchanged. MD at bedside, no concerns. No new orders at this time   
1600 Notified MD regarding blood pressure being 88/50 , recheck 100/54 , running soft post dialysis . No new orders , gave the scheduled midodrine.    1604 RN called the family, Son-Refugio regarding transport to [facility] . Son requesting for her to stay overnight , because of her blood pressure and he does not feel confident to transport her post dialysis , as she is worn out and lethargic .    1615 Notified MD , agreed to stay overnight . Notified family and case management . Patient to stay overnight , possible discharge tomorrow .  
2122 TRANSFER - OUT REPORT:    Verbal report given to rodolfo Flaherty on Ivone Swain  being transferred to [facility] for routine progression of patient care       Report consisted of patient's Situation, Background, Assessment and   Recommendations(SBAR).     Information from the following report(s) Nurse Handoff Report, ED Encounter Summary, Adult Overview, Intake/Output, MAR, Recent Results, Med Rec Status, and Cardiac Rhythm SR  was reviewed with the receiving nurse.    Lines:   Peripheral IV 02/24/24 Left;Proximal;Anterior Forearm (Active)   Site Assessment Clean, dry & intact 02/26/24 2148   Line Status Flushed;Normal saline locked 02/26/24 2148   Line Care Connections checked and tightened 02/26/24 2148   Phlebitis Assessment No symptoms 02/26/24 2148   Infiltration Assessment 0 02/26/24 2148   Alcohol Cap Used Yes 02/26/24 2148   Dressing Status Clean, dry & intact 02/26/24 2148   Dressing Type Transparent 02/26/24 2148   Dressing Intervention New 02/26/24 2054       Peripheral IV 02/25/24 Left;Posterior Hand (Active)   Site Assessment Clean, dry & intact 02/26/24 2148   Line Status Flushed;Normal saline locked 02/26/24 2148   Line Care Connections checked and tightened 02/26/24 2148   Phlebitis Assessment No symptoms 02/26/24 2148   Infiltration Assessment 0 02/26/24 2148   Alcohol Cap Used Yes 02/26/24 2148   Dressing Status Clean, dry & intact 02/26/24 2148   Dressing Type Transparent 02/26/24 2054        Opportunity for questions and clarification was provided.      Patient transported with:  Monitor, O2 @ 2lpm, Registered Nurse, and Tech  
Brief Neurology Note    I haven't seen patient today, but I spoke with Son  We discussed that her Keppra level at the time of admission was fairly high 79 (rr 10-40)  Her outpatient Keppra regimen was 250 mg twice a day and 250 mg tablet after dialysis session  She had a seizure in the ER on 2-23-24  Her Keppra was increase to 500 mg twice a day (with one extra tablet after dialysis) on 2-24-24  I suspected the higher dose of Keppra may have pushed up her blood level higher and could be contributing to her confusion    I lowered her Keppra on 2-27-24 to 500 mg AM, 250 mg PM, and 250 mg after each dialysis session  He reported that she was more awake/ alert this AM as compared to a few days before    I d/w him that because her Keppra level was high on admission, I planned to reduce her Keppra to 500 mg AM and 250 mg PM, and NO keppra after dialysis.     Notified by Dr Patrick that Son was upset that the changes were not made yet  She was not aware of the above plan    I have entered the correct orders    Keppra 500 mg tablet AM  Keppra 250 mg tablet PM  
Extubation per MD without complications  
INPATIENT NEUROLOGY FOLLOW-UP NOTE    NAME:  Ivone Swain    REASON FOR FOLLOW UP:  seizure in ER, CT head showed hypodense are in left parietal lobe suggestive of subacute or chronic ischemic changes with encephalomalacia.      INTERVAL HX (02/24/24):     Prolonged video EEG (2-23 to 2-24-24):  Abnormal prolonged video-EEG recording. There was severe generalized slowing (burst-suppression) pattern at the beginning of this recording which is suggestive of an encephalopathic process. As the study progressed, there were shorter periods of suppression/ longer bursts of brain activity, with PDR ranging between 4-5 Hz on both sides. No electrographic seizures were seen during this recording.  There were intermittent epileptiform discharges in the form of sharp waves any poly-spike discharges in right frontal and central regions.  These place the patient at increased risk of focal onset seizure.  Clinical and Neuro-Imaging correlation is necessary      Pt intubated and sedate at the time of my visit  Discussed with SonWICHO at bedside    He says after pt finished her dialysis session yesterday, she was very fatigued (happens occasionally to her after dialysis).  He says that a close friend of hers called him around 5 AM yesterday because the patient had apparently called her and sounded in distress.  Son went there quickly and found pt lying on ground.  He says while pt was in ER, she had witnessed seizure.     Discussed that EEG showed generalized slowing (may be due to ongoing sedation), seizure discharges (has hx of epilepsy), but no evolution of the seizure discharges into electrographic seizure.     Reviewed the CT head images with him/ WICHO and showed him that there is a hypodensity in the left parietal lobe that may be recent stroke.    Brain MRI was pending when I saw pt earlier today    Pt is intubated, sedated.  She doesn't open eyes to voice, but does alert.  She moves hands spontaneously.  She moves feet to 
INPATIENT NEUROLOGY FOLLOW-UP NOTE    NAME:  Ivone Swain    REASON FOR FOLLOW UP:  seizure in ER, CT head showed hypodense are in left parietal lobe suggestive of subacute or chronic ischemic changes with encephalomalacia.      INTERVAL HX (02/26/24):     Family at bedside when I came to see patient  She is less lethargic today  Family says she's still slow to respond  She seems to recognize me, smiles a little  I d/w her how I saw her last in 2022, and she saw Dr Dumont in Oct 2023 for her annual follow up  She asks (in low voice) if she needs to see me when she leaves here  I d/w her that she will see Dr Dumont or one of the Neurology NPs in clinic      IMPRESSION/ PLAN:     83 y.o. 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  Found by Son on 2-23-24, laying on floor. Was alert when arrived to ER. BP was high, then had a witnessed seizure.  Was intubated due airway protection. On Keppra 250 mg BID prior to admission (with one extra Keppra tablet after dialysis sessions). CT head done at time of admission showed a hypodensity in the left parietal lobe, new since MRI Brain done on 2-.  Keppra was increased to 500 mg twice daily, with one extra tab to be given after dialysis sessions    1) Brain MRI shows chronic encephalomalacia in left posterior parietal lobe.  No hx of significant head trauma per son.  D/w Son then this is old stroke, in the vicinity / distrubution of occluded left vertebral artery.  Continue Plavix 75 mg/ day (had the asymptomatic left posterior parietal lobe stroke while on ASA). LDL is already at goal; no indication to add statin    2) Hx of Epilepsy.  The seizure pt had in ER on 2- was breakthrough seizure (potentially due to the underlying brain abnormality/ area of encephalomalacia in left 
INPATIENT NEUROLOGY FOLLOW-UP NOTE    NAME:  Ivone Swain    REASON FOR FOLLOW UP:  seizure in ER, CT head showed hypodense are in left parietal lobe suggestive of subacute or chronic ischemic changes with encephalomalacia.      INTERVAL HX (02/27/24):     Pt transferred out of ICU to floor bed  Follow up Keppra level is in process (drawn 2-27-24)  Pt resting in bed, more alert, but remains confused  She tells me she is at home  I let her know she is in Hospital      IMPRESSION/ PLAN:     83 y.o. 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  Found by Son on 2-23-24, laying on floor. Was alert when arrived to ER. BP was high, then had a witnessed seizure.  Was intubated due airway protection. On Keppra 250 mg BID prior to admission (with one extra Keppra tablet after dialysis sessions). CT head done at time of admission showed a hypodensity in the left parietal lobe, new since MRI Brain done on 2-.  Keppra was increased to 500 mg twice daily, with one extra tab to be given after dialysis sessions    1) Brain MRI shows chronic encephalomalacia in left posterior parietal lobe.  No hx of significant head trauma per son.  D/w Son then this is old stroke, in the vicinity / distrubution of occluded left vertebral artery.  Continue Plavix 75 mg/ day (had the asymptomatic left posterior parietal lobe stroke while on ASA). LDL is already at goal; no indication to add statin    2) Hx of Epilepsy.  The seizure pt had in ER on 2- was breakthrough seizure (potentially due to the underlying brain abnormality/ area of encephalomalacia in left posterior parietal lobe).  Levetiracetam level drawn on day of admission was elevated 79.1 (rr 10-40). Her Keppra was increased to 500 mg twice a day (with one extra tablet after dialysis) on 
INPATIENT NEUROLOGY FOLLOW-UP NOTE    NAME:  Ivone Swain    REASON FOR FOLLOW UP:  seizure in ER, CT head showed hypodense are in left parietal lobe suggestive of subacute or chronic ischemic changes with encephalomalacia.      INTERVAL HX (02/29/24):     Reduced Keppra on 2- to 500 mg AM (IV), 250 mg PM (IV), and 250 mg (IV) after each dialysis session due to 1) supra-therapeutic level on 2-23-24/ day of admission, 2) ongoing confusion/ improving, and 3) likely higher level (possibly contributing to ongoing confusion) as Keppra was increased on 2-24-24    Pt was out of room to Radiology for a procedure when I attempted to see her this AM    Keppra level from 02-27-24 (prior to reduction) is 51.2 (rr 10-40)    Discussed with Son, DENYIVANIADARLING in room.  Keppra level (checked prior to me lowering the dose on 2-27-24) is unexpectedly lower, despite the fact that her Keppra had been doubled.  They both agree that patient is more alert than earlier in admission but still somewhat slower than baseline. Son attributes part of that to UTI noted at time of admission. We discussed it's also possible that there is some post-ictal effect of the seizure that she had at the time of admission.    Pt resting in bed, awake, dinner tray in front of her  She responds to questions but seems slow/ distant  Family tells me that she may be going to inpatient rehab from her    IMPRESSION/ PLAN:     83 y.o. 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  Found by Son on 2-23-24, laying on floor. Was alert when arrived to ER. BP was high, then had a witnessed seizure.  Was intubated due airway protection. On Keppra 250 mg BID prior to admission (with one extra Keppra tablet after dialysis sessions). CT head done at time of admission showed a hypodensity in 
KILO BOONE [facility]  [address] [place] [address] [phone]        Hospitalist Progress Note      NAME: Ivone Swain   :  1940  MRM:  [id number]    Date/Time: 2024  1:24 PM         Subjective:     Chief Complaint: \"I'm okay\"     Pt seen and examined. Son at the bedside. Questions answered. Pt responsive but slow to respond. Quite diffusely weak.     ROS:  (bold if positive, if negative)    Cough   Sputum   Dyspnea        Objective:       Vitals:          Last 24hrs VS reviewed since prior progress note. Most recent are:    Vitals:    24 1215   BP: (!) 104/48   Pulse: 66   Resp: 23   Temp:    SpO2: 100%     SpO2 Readings from Last 6 Encounters:   24 100%   02/15/24 96%   23 95%   10/04/23 91%          Intake/Output Summary (Last 24 hours) at 2024 1324  Last data filed at 2024 1600  Gross per 24 hour   Intake --   Output 0 ml   Net 0 ml          Exam:     Physical Exam:    Gen: Chronically ill appearing. RUE fistula  HEENT:  Pink conjunctivae, PERRL, hearing intact to voice, moist mucous membranes  Neck:  Supple, without masses, thyroid non-tender  Resp: coarse breath sounds diffusely   Card:  No murmurs, normal S1, S2 without thrills, bruits   Abd:  Soft, non-tender, non-distended, normoactive bowel sounds are present  Musc:  No cyanosis or clubbing  Skin:  No rashes or ulcers, skin turgor is good  Neuro: moderate diffuse weakness v. Failure to appropriately follow commands   Psych: poor insight. Slow to respond     Medications Reviewed: (see below)    Lab Data Reviewed: (see below)    ______________________________________________________________________    Medications:     Current Facility-Administered Medications   Medication Dose Route Frequency    cefTRIAXone (ROCEPHIN) 1,000 mg in sterile water 10 mL IV syringe  1,000 mg IntraVENous Q24H    montelukast (SINGULAIR) tablet 10 mg  10 mg Oral Nightly    pravastatin (PRAVACHOL) tablet 
KILO BOONE [facility]  [address] [place] [address] [phone]        Hospitalist Progress Note      NAME: Ivone Swain   :  1940  MRM:  [id number]    Date/Time: 2024  2:05 PM         Subjective:     Chief Complaint: \"I'm okay\"     Pt seen and examined. No complaints. Sitter at bedside     ROS:  (bold if positive, if negative)         Objective:       Vitals:          Last 24hrs VS reviewed since prior progress note. Most recent are:    Vitals:    24 1130   BP: 116/71   Pulse: 66   Resp: 18   Temp: 97.7 °F (36.5 °C)   SpO2: 97%     SpO2 Readings from Last 6 Encounters:   24 97%   02/15/24 96%   23 95%   10/04/23 91%          Intake/Output Summary (Last 24 hours) at 2024 1405  Last data filed at 2024 0600  Gross per 24 hour   Intake 0 ml   Output 1 ml   Net -1 ml          Exam:     Physical Exam:    Gen: Chronically ill appearing. RUE fistula with bruit   HEENT:  Pink conjunctivae, PERRL, hearing intact to voice, moist mucous membranes  Neck:  Supple, without masses, thyroid non-tender  Resp: CTA b/l. No wheezing   Card:  No murmurs, normal S1, S2 without thrills, bruits   Abd:  Soft, non-tender, non-distended, normoactive bowel sounds are present  Musc:  No cyanosis or clubbing  Skin:  No rashes or ulcers, skin turgor is good  Neuro: moderate diffuse weakness   Psych: oriented to year, president and self     Medications Reviewed: (see below)    Lab Data Reviewed: (see below)    ______________________________________________________________________    Medications:     Current Facility-Administered Medications   Medication Dose Route Frequency    levETIRAcetam (KEPPRA) tablet 500 mg  500 mg Oral Daily    levETIRAcetam (KEPPRA) tablet 250 mg  250 mg Oral Nightly    [START ON 2024] levETIRAcetam (KEPPRA) tablet 250 mg  250 mg Oral Once per day on Tue Thu Sat    [START ON 2024] cefdinir (OMNICEF) capsule 300 mg  300 mg Oral QPM    
Music Therapy Assessment  [facility]    Ivone Swain [id number]     1940  83 y.o.  female    Patient Telephone Number: [phone] (home)   [employer]ism Affiliation: Quaker   Language: English   Patient Active Problem List    Diagnosis Date Noted    Respiratory arrest (HCC) 02/23/2024    Seizure (HCC) 02/23/2024    Fluid overload 02/11/2024    Chest pain 11/14/2023    Dyspnea on exertion 11/13/2023    Pulmonary edema 04/06/2022    DUKES (dyspnea on exertion) 04/06/2022    Urinary incontinence     Seizures (HCC)     SUSAN on CPAP     Hypertension     History of stroke     High cholesterol     Arthritis     Anxiety and depression     Acute hypoxemic respiratory failure (HCC) 07/06/2021    ESRD on hemodialysis ([facility]) 05/24/2021    Anemia due to chronic kidney disease 05/24/2021    Volume overload 05/14/2021    Diastolic CHF, chronic ([facility]) 05/14/2021    Monoclonal gammopathy 01/01/2018        Date: 2/27/2024            Total Time Calculated: 20 min          [facility] INTERMEDIATE CARE UNIT    Mental Status:   [x] Alert [  ] Forgetful [x]  Confused  [  ] Minimally responsive  [  ] Sleeping    Communication Status: [x] Impaired Speech - slow responses [  ] Nonverbal     Physical Status:   [  ] Oxygen in use  [  ] Hard of Hearing [  ] Vision Impaired  [  ] Ambulatory  [  ] Ambulatory with assistance [  ] Non-ambulatory -N/A    Music Preferences, Background: Pt used to sing in the [employer]    Clinical Problem addressed: Comfort; Emotional support    Goal(s) met in session: N/A: Please see Session Observations below.   Physical/Pain management (Scale of 1-10):    Pre-session rating ___________    Post-session rating __________  [  ] Increased relaxation   [  ] Affected breathing patterns  [  ] Decreased muscle tension   [  ] Decreased agitation  [  ] Affected heart rate    [  ] Increased alertness     Emotional/Psychological:  [  ] Increased self-expression   [  ] Decreased aggressive behavior   [  ] 
PLEASE NOTE; after procedure, this patient should be returned to pureed diet, thin liquids per SLP recommendation after FEES swallow study.  SLP will follow up 3/1/24.   
Physical Therapy Note;  Attempted to see pt 2x today at 10:15 and at this time, yet continues to be off flr for procedure.  Ceci Morris, PT  
Plan fistulagram tomorrow before next dialysis session.  Not sure if patient npo for possible procedure today  Will defer diet to primary team.     
RECEIVED ORDER FOR FISTULAGRAM PER RADIOLOGIST TO BE DONE 2/28. PLEASE HAVE PATIENT NPO INCASE OF ANY TYPE OF INTERVENTION IS NEEDED   
Report obtained from Lynn via phone after patient returned to floor from fistulogram by transport staff only. Per Lynn, sutures to procedure site, open to air incision, 2mg of Versed & 50mcg of Fentanyl given to patient for sedation, BP running low during and after procedure but patient easily arousable.  Writer will continue to monitor patient, notify attending of low blood pressures, and administer scheduled midodrine.   Sukh BSN, RN  
Speech Language Pathology  Flexible Endoscopic Evaluation of Swallowing-FEES  Patient: Ivone Swain (83 y.o. female)  Date: 2/27/2024  Primary Diagnosis: Respiratory arrest (HCC) [R09.2]  Seizure (HCC) [R56.9]  Seizures (HCC) [R56.9]  ESRD (end stage renal disease) (HCC) [N18.6]  Respiratory failure, unspecified chronicity, unspecified whether with hypoxia or hypercapnia (HCC) [J96.90]  Cerebrovascular accident (CVA), unspecified mechanism (HCC) [I63.9]  Ground-level fall [W18.30XA]       Precautions: aspiration  Fall Risk, Seizure                  ASSESSMENT :  Based on the objective data described below, the patient presents with mild-moderate oral-pharyngeal dysphagia, complicated by her mental status of confused with poor insight into deficits and slow processing.   Oral issues:  needed cues for straw suck, slow chew and a-p propulsion, premature spillage to valleculae  Pharyngeal issues:  mild delay in swallow with aspiration occasionally in trace amount of thins.  More importantly, she had significant pharyngeal residue on L  greater than R throughout pharyngeal cavity. This was due to both reduced pharyngeal squeeze and impaired sensation for pharyngeal residue.   There was moderate edema in all of pharynx, including arytenoids that often blocked view of vocal cords in an attempt to protect the airway from penetration and aspiration.   She is ready for slow, careful feeding with a modified diet. .    Patient will benefit from skilled intervention to address the above impairments.     Images taken from FEES:    IMAGE: note significant bilateral fullness in arytenoids and residual applesauce on L posterior pharyngeal wall       PLAN :  Recommendations and Planned Interventions:  Diet: Puree and thin liquids  Upright for all PO intake  1 sip at a time.   Feed only when awake, alert and interested.   Acute SLP Services: Yes, patient will be followed by speech-language pathology 4x/week to address goals. 
Spiritual Care Assessment/Progress Note  [facility] [place]    Name: Ivone Swain MRN: [id number]    Age: 83 y.o.     Sex: female   Language: English     Date: 2/25/2024            Total Time Calculated: 10 min              Spiritual Assessment begun in [facility] A4 INTENSIVE CARE UNIT  Service Provided For:: Patient not available     Encounter Overview/Reason : Attempted Encounter    Spiritual beliefs:      [] Involved in a lupe tradition/spiritual practice:  Sabianism on file      [] Supported by a lupe community:      [] Claims no spiritual orientation:      [] Seeking spiritual identity:           [] Adheres to an individual form of spirituality:      [x] Not able to assess:                Identified resources for coping and support system:   Support System: Children, Family members       [] Prayer                  [] Devotional reading               [] Music                  [] Guided Imagery     [] Pet visits                                        [] Other: (COMMENT)     Specific area/focus of visit   Encounter:    Crisis:    Spiritual/Emotional needs:    Ritual, Rites and Sacraments:    Grief, Loss, and Adjustments:    Ethics/Mediation:    Behavioral Health:    Palliative Care:    Advance Care Planning:           Narrative:   Spiritual care assessment attempted for Ms. Ivone Swain in ICU.  Reviewed chart prior to encounter attempt; Pt's son spoke with CM in previous stay. Pt has 2 sons and a daughter-in-law on file for contacts. Consulted with RN on Pt's well being. Pt was extubated this morning and tolerating well at this time. Pt is asleep at this time with no visitors. Pt's family were in visiting this morning before they went to [employer]. Pt has good family support according to RN.   Please contact Spiritual Care for any emotional and spiritual needs and/or referrals. Thank you.    Chaplain Mike Arriaga M.Div., [facility].  [facility] Spiritual Health Team [phone])      
Spiritual Care Assessment/Progress Note  [facility]    Name: Ivone Swain MRN: [id number]    Age: 83 y.o.     Sex: female   Language: English     Date: 2/26/2024            Total Time Calculated: 14 min              Spiritual Assessment begun in [facility] A[facility] INTENSIVE CARE UNIT  Service Provided For:: Patient  Referral/Consult From:: Rounding  Encounter Overview/Reason : Initial Encounter    Spiritual beliefs:      [x] Involved in a lupe tradition/spiritual practice:      [] Supported by a lupe community:      [] Claims no spiritual orientation:      [] Seeking spiritual identity:           [] Adheres to an individual form of spirituality:      [] Not able to assess:                Identified resources for coping and support system:   Support System: Family members       [x] Prayer                  [] Devotional reading               [] Music                  [] Guided Imagery     [] Pet visits                                        [] Other: (COMMENT)     Specific area/focus of visit   Encounter:    Crisis:    Spiritual/Emotional needs: Type: Spiritual Support, Other (comment) (emotional support)  Ritual, Rites and Sacraments:    Grief, Loss, and Adjustments:    Ethics/Mediation:    Behavioral Health:    Palliative Care:    Advance Care Planning:      Plan/Referrals: No future visits requested    Narrative:   Visited Ms Swain in room A474 for initial spiritual assessment. Reviewed patient's chart prior to visit. Ms Swain was lying quietly in bed; her facial affect was flat. Patient answered questions primarily in one word responses and she appeared drowsy.    Provided spiritual presence and active listening as Ms Swain shared about her seizures. Stated that she was feeling fine about how things were progressing. When asked how she coped with difficulties she did not answer. Acknowledged patient's feelings and offered words of support. Ms Swain stated that her support came from a sister, brother and two 
         Objective:     Vitals:  Blood pressure (!) 102/58, pulse 60, temperature 98.1 °F (36.7 °C), resp. rate 16, height 1.54 m (5' 0.63\"), weight 74 kg (163 lb 3.2 oz), SpO2 96 %.  Temp (24hrs), Av.1 °F (36.7 °C), Min:97.7 °F (36.5 °C), Max:98.4 °F (36.9 °C)      Intake and Output:  701 - 1900  In: 100 [P.O.:100]  Out: -   1901 -  0700  In: 1163 [P.O.:560; I.V.:3]  Out: 2100     Physical Exam:               GENERAL ASSESSMENT: NAD  CHEST: CTA  HEART: reg  ABDOMEN: Soft,NT,  EXTREMITY: No EDEMA  AVF LUE +t/b          ECG/rhythm:    Data Review      No results for input(s): \"TNIPOC\" in the last 72 hours.    Invalid input(s): \"ITNL\"   Lab Results   Component Value Date    LABALBU 4.0 2024      Recent Labs     24  0205   *   K 3.8   CL 94*   CO2 24   BUN 75*   WBC 9.5   HGB 14.7   HCT 44.9           Lab Results   Component Value Date    WBC 9.5 2024    HGB 14.7 2024    HCT 44.9 2024    MCV 96.1 2024     2024    LYMPHOPCT 24 2024    RBC 4.67 2024    MCH 31.5 2024    MCHC 32.7 2024    RDW 14.2 2024     Lab Results   Component Value Date    CREATININE 8.27 (H) 2024    CREATININE 6.06 (H) 2024    CREATININE 8.60 (H) 2024      Lab Results   Component Value Date    BUN 75 (H) 2024         Needs: urine analysis, urine sodium, protein and creatinine  No results found for: \"SERINA\", \"KU\", \"CREAU\"    Ultrasound Result (most recent):  No results found for this or any previous visit from the past 3650 days.           Discussed with:  Staff    Geovanni Barker MD  3/2/2024      [facility]:  [email or web address]  [email or web address]    [place] office:  [address] [place] [address]  Phone: [phone]  Fax :     [phone]    [place] office:  [address] [place] [address]  Phone - [phone]  Fax - [phone]         
02/29/24 0900   Infiltration Assessment 0 02/29/24 0900   Alcohol Cap Used Yes 02/29/24 0900   Dressing Status Clean, dry & intact 02/29/24 0900   Dressing Type Transparent 02/29/24 0900        Opportunity for questions and clarification was provided.      Patient transported with:  Registered Nurse    Fistula graft open to air clean, dry and intact  With sutures.    Waiting to give report to staff nurse. Mrs. Ingram message given Savanha will return call.    Returned call, report given.  
Based On: Kcal/kg  Weight Used for Energy Requirements: Current    Energy (kcal/day): 1850 kcal/d (25 Kcal/kg)  Weight Used for Protein Requirements: Current  Protein (g/day): 71 - 94 g/d (1.5-2 g/kg of IBW)  Method Used for Fluid Requirements: Standard Renal  Fluid (ml/day): 1500 mL/d    Nutrition Related Findings:   Edema: Right lower extremity, Left lower extremity, Right upper extremity, Left upper extremity   Edema Generalized: +1  RUE Edema: Trace  LUE Edema: Trace  RLE Edema: Trace  LLE Edema: Trace    Bowel Movement:  Last BM (including prior to admit): 02/28/24    Wounds: Wound Type: None (has HD fistula and replacement site planned)    Nutrition Related Labs:  Lab Results   Component Value Date    CREATININE 6.06 (H) 02/28/2024    BUN 42 (H) 02/28/2024     02/28/2024    K 3.9 02/28/2024    CL 96 (L) 02/28/2024    CO2 25 02/28/2024     Lab Results   Component Value Date/Time    POCGLU 129 02/23/2024 09:07 AM    POCGLU 100 05/20/2021 09:00 AM     Hemoglobin A1C   Date Value Ref Range Status   02/24/2024 4.8 4.0 - 5.6 % Final     Comment:     (NOTE)  HbA1C Interpretive Ranges  <5.7              Normal  5.7 - 6.4         Consider Prediabetes  >6.5              Consider Diabetes       Lab Results   Component Value Date/Time    MG 2.5 02/28/2024 02:20 AM     Lab Results   Component Value Date    CALCIUM 9.9 02/28/2024    PHOS 4.7 02/26/2024     Lab Results   Component Value Date    TRIG 552 (H) 02/24/2024    TRIG 93 05/15/2021    TRIG 114 07/30/2019     Current Medications:  Scheduled Meds:   levETIRAcetam  500 mg Oral Daily    levETIRAcetam  250 mg Oral Nightly    [START ON 2/29/2024] levETIRAcetam  250 mg Oral Once per day on Tue Thu Sat    [START ON 2/29/2024] cefdinir  300 mg Oral QPM    clopidogrel  75 mg Oral Daily    guaiFENesin  600 mg Oral BID    montelukast  10 mg Oral Nightly    pravastatin  40 mg Oral Nightly    sodium chloride flush  5-40 mL IntraVENous 2 times per day    heparin (porcine)  
on Tue Thu Sat    cefTRIAXone (ROCEPHIN) 1,000 mg in sterile water 10 mL IV syringe  1,000 mg IntraVENous Q24H    montelukast (SINGULAIR) tablet 10 mg  10 mg Oral Nightly    pravastatin (PRAVACHOL) tablet 40 mg  40 mg Oral Nightly    guaiFENesin (MUCINEX) extended release tablet 600 mg  600 mg Oral BID    hydrALAZINE (APRESOLINE) injection 5 mg  5 mg IntraVENous Q3H PRN    clopidogrel (PLAVIX) tablet 75 mg  75 mg Oral Daily    heparin (porcine) injection 3,000 Units  3,000 Units IntraVENous PRN    sodium chloride flush 0.9 % injection 5-40 mL  5-40 mL IntraVENous 2 times per day    sodium chloride flush 0.9 % injection 5-40 mL  5-40 mL IntraVENous PRN    0.9 % sodium chloride infusion   IntraVENous PRN    acetaminophen (TYLENOL) tablet 650 mg  650 mg Oral Q6H PRN    Or    acetaminophen (TYLENOL) suppository 650 mg  650 mg Rectal Q6H PRN    ondansetron (ZOFRAN) injection 4 mg  4 mg IntraVENous Q6H PRN    heparin (porcine) injection 5,000 Units  5,000 Units SubCUTAneous Q12H    albumin human 25% IV solution 25 g  25 g IntraVENous PRN            Lab Review:     Recent Labs     02/25/24  0102 02/26/24  0450 02/27/24  0516   WBC 9.1 9.7 8.4   HGB 12.2 13.0 13.5   HCT 37.9 40.1 41.4    226 252     Recent Labs     02/25/24  0102 02/26/24  0450 02/27/24  0357    134* 137   K 4.0 3.9 4.5    99 99   CO2 26 24 22   BUN 18 37* 68*   MG 2.0 2.2 2.4   PHOS 2.8 4.7  --    ALT 15 15  --      No components found for: \"GLPOC\"         Assessment / Plan:   Acute respiratory failure post seizure requiring intubation  Recent admission with AHRF due to fluid overload. 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 
ordered as part of stroke order set    RENAL/ELECTROLYTE/FLUIDS:  ESRD   Monitor I/Os and Uo  Monitor renal function panel intermittently  Correct electrolytes as needed  Nephrology following  HD TTS schedule    ENDOCRINE:  No acute issues   Monitor glu on chemistry panels  Consider SSI for glu > 180    HEMATOLOGY/ONCOLOGY:  No acute issues   Follow CBC  Transfuse per usual guidelines  SQH for DVT ppx    INFECTIOUS DISEASE:  No acute issues     ANTIBIOTICS TO DATE:      CULTURES TO DATE:      ICU DAILY CHECKLIST     Code Status:Full  DVT Prophylaxis:SQH  T/L/D: ETT  ABCDEF Bundle/Checklist Completed:Yes  Disposition: ICU  Multidisciplinary Rounds Completed: N/A  Goals of Care Discussion/Palliative: Yes - full code for now. Patient has previously executed Living Will. Will ask Palliative Care to meet with family and discuss ACP  Patient/Family Updated: Son updated at bedside      SUBJECTIVE   Passed SBT and extubated. Somnolent but tolerating extubation well    OBJECTIVE     Vitals:    24 0815 24 0820 24 0830 24 0845   BP: (!) 169/66  (!) 153/77 (!) 161/59   Pulse: 77 75 72 72   Resp:    Temp:       TempSrc:       SpO2: 96% 98% 97% 96%   Weight:       Height:         Physical Exam:  GEN: NAD on NC O2  HEENT: NCAT, sclerae white  NECK: No JVD noted  CHEST: Clear to auscultation anteriorly  CARDIAC: sinus rhythm, regular, no murmur noted  ABD: Soft, NT, +BS  EXT: Warm, no edema, normal capillary refill  NEURO: Cranial nerves intact, symmetric strength, no focal deficits noted  DERM: No lesions noted      Labs and Data: Reviewed 24  Medications: Reviewed 24  Imaging: Reviewed 24    BP (!) 161/59   Pulse 72   Temp 98.5 °F (36.9 °C) (Axillary)   Resp 24   Ht 1.54 m (5' 0.63\")   Wt 72.5 kg (159 lb 13.3 oz)   SpO2 96%   BMI 30.57 kg/m²      Temp (24hrs), Av.4 °F (36.9 °C), Min:98 °F (36.7 °C), Max:98.8 °F (37.1 °C)           Intake/Output:     Intake/Output 
say for certain if pt at this moment in time is ready to die.  Son was surprised to learn that prognosis was much shorter than he anticipated if decision is made to stop dialysis.     Psychosocial Issues Identified/ Resilience Factors: Pt lives alone but has strong support from local son and DIL.  No spiritual concerns identified at this time; Chaplains are following for support.  Pt previously attended an [employer], finds peace in her lupe.  Son shared that there are many people praying for pt, which brings him a sense of comfort.     Caregiver [place]: [place], son shared that he and his wife provide some assistance but acknowledged that pt will likely need more help moving forward, stated hiring caregivers would be a strain financially.  Does the caregiver feel confident administering medication? Pt has been able to self-administer meds.  Does the caregiver need any help connecting with community resources? Not at this time, possible hospice in the future  Does the caregiver feel confident assisting with activities of daily living? Pt had been managing her own ADLs at home; family is unable to provide 24/7 hands-on care giving.    Goals of Care / Plan: Son is hopeful that pt's mental status will improve in the coming days but is aware of potential for further decline.  Goals of care to be determined based on pt's progress or lack thereof in the coming days.  Palliative team will follow for ongoing support and goc conversations.     Thank you for including Palliative Medicine in the care of Ms. Swain.     Gabby Rodas, FRANCIS, [facility]  [phone])  
temperature source Oral, resp. rate 17, height 1.54 m (5' 0.63\"), weight 74 kg (163 lb 3.2 oz), SpO2 95 %.  Temp (24hrs), Av.7 °F (36.5 °C), Min:97.4 °F (36.3 °C), Max:98.2 °F (36.8 °C)      Intake and Output:  701 - 1900  In: 120 [P.O.:120]  Out: -   1901 -  07  In: 613 [I.V.:13]  Out:      Physical Exam:               GENERAL ASSESSMENT: NAD  CHEST: CTA  HEART: reg  ABDOMEN: Soft,NT,  EXTREMITY: No EDEMA  AVF LUE +t/b          ECG/rhythm:    Data Review      No results for input(s): \"TNIPOC\" in the last 72 hours.    Invalid input(s): \"ITNL\"   Lab Results   Component Value Date    LABALBU 4.0 2024      Recent Labs     24  0220 24  0205    132*   K 3.9 3.8   CL 96* 94*   CO2 25 24   BUN 42* 75*   MG 2.5*  --    WBC 9.2 9.5   HGB 14.7 14.7   HCT 43.5 44.9    254        Lab Results   Component Value Date    WBC 9.5 2024    HGB 14.7 2024    HCT 44.9 2024    MCV 96.1 2024     2024    LYMPHOPCT 24 2024    RBC 4.67 2024    MCH 31.5 2024    MCHC 32.7 2024    RDW 14.2 2024     Lab Results   Component Value Date    CREATININE 8.27 (H) 2024    CREATININE 6.06 (H) 2024    CREATININE 8.60 (H) 2024      Lab Results   Component Value Date    BUN 75 (H) 2024         Needs: urine analysis, urine sodium, protein and creatinine  No results found for: \"SERINA\", \"KU\", \"CREAU\"    Ultrasound Result (most recent):  No results found for this or any previous visit from the past 3650 days.           Discussed with:  Staff    Edison Gonzales MD  3/1/2024      [facility]:  [email or web address]  [email or web address]    [place] office:  [address] [place] [address]  Phone: [phone]  Fax :     [phone]    [place] office:  [address] [place] [address]  Phone - [phone]  Fax - [phone]         
7.15 (H) 02/12/2024    CREATININE 6.37 (H) 02/11/2024      Lab Results   Component Value Date    BUN 26 (H) 02/23/2024         Needs: urine analysis, urine sodium, protein and creatinine  No results found for: \"SERINA\", \"KU\", \"CREAU\"    Ultrasound Result (most recent):  No results found for this or any previous visit from the past 3650 days.           Discussed with:  Staff    Mona Carter MD  2/23/2024      [facility]:  [email or web address]  [email or web address]    [place] office:  [address] [place] [address]  Phone: [phone]  Fax :     [phone]    [place] office:  [address] [place] [address]  Phone - [phone]  Fax - [phone]         
BP meds currently and BP WNL   -start low dose midodrine as BP's seem to be low/normal; may consider on HD days only if not needed on non-HD days      Pulmonary edema: Limited echo shows 55% ejection fraction   -volume via HD     ESRD on HD TTS  -HD as per nephrology; T/Th/Sat     Hyperlipidemia  -on statin      SUSAN  -CPAP PRN      Class 1 obesity (30.0 - 34.9): Body mass index is 31.2 kg/m².:    -weight loss    UTI - POA   -urine cultures with E.coli; pansensitive  -change ceftriaxone to omnicef for completion of treatment course     Dysphagia   -appreciate speech   -speech on board  -s/p FEES => pureed diet; thin liquids    Dispo  -to SNF after HD on Saturday if stable medically     Total time spent with patient: 35 minutes                   Care Plan discussed with: Patient, Family, and Nursing Staff    Discussed:  Care Plan    Prophylaxis:  Hep SQ    Disposition:  SNF/LTC           ___________________________________________________    Attending Physician: Vale Patrick MD   
epileptiform discharges in the form of sharp waves any poly-spike discharges in right frontal and central regions.  These place the patient at increased risk of focal onset seizure.  Clinical and Neuro-Imaging correlation is necessary        Vitals:    02/25/24 1830 02/25/24 1845 02/25/24 1900 02/25/24 1915   BP: (!) 162/45 (!) 163/55 (!) 169/45    Pulse: 74 72 72 72   Resp: 28 25 24 28   Temp:       TempSrc:       SpO2: 94% 94% 94% 95%   Weight:       Height:           Allergies   Allergen Reactions    Latex Rash    Codeine Other (See Comments)    Levofloxacin Other (See Comments)     Hallucinations    Lisinopril Cough    Sulfa Antibiotics Itching and Rash       INPATIENT MEDS    Current Facility-Administered Medications:     hydrALAZINE (APRESOLINE) injection 5 mg, 5 mg, IntraVENous, Q3H PRN, Deshaun Michelle MD, 5 mg at 02/25/24 1635    [START ON 2/26/2024] clopidogrel (PLAVIX) tablet 75 mg, 75 mg, Oral, Daily, Luis Angel Mullen MD    heparin (porcine) injection 3,000 Units, 3,000 Units, IntraVENous, PRN, Edison Gonzales MD, 3,000 Units at 02/24/24 1252    sodium chloride flush 0.9 % injection 5-40 mL, 5-40 mL, IntraVENous, 2 times per day, Chandan Shanks MD, 10 mL at 02/25/24 0805    sodium chloride flush 0.9 % injection 5-40 mL, 5-40 mL, IntraVENous, PRN, Chandan Shanks MD    0.9 % sodium chloride infusion, , IntraVENous, PRN, Chandan Shanks MD    acetaminophen (TYLENOL) tablet 650 mg, 650 mg, Oral, Q6H PRN **OR** acetaminophen (TYLENOL) suppository 650 mg, 650 mg, Rectal, Q6H PRN, Chandan Shanks MD    [DISCONTINUED] ondansetron (ZOFRAN-ODT) disintegrating tablet 4 mg, 4 mg, Oral, Q8H PRN **OR** ondansetron (ZOFRAN) injection 4 mg, 4 mg, IntraVENous, Q6H PRN, Chandan Shanks MD    heparin (porcine) injection 5,000 Units, 5,000 Units, SubCUTAneous, Q12H, Deshaun Michelle MD, 5,000 Units at 02/25/24 1748    levETIRAcetam (KEPPRA) injection 500 mg, 500 mg, IntraVENous, Once per day on Tue Thu 
to face encounter with the patient, reviewed and interpreted patient data including clinical events, labs, images, vital signs, I/O's, and examined patient.  I have discussed the case and the plan and management of the patient's care with the consulting services, the bedside nurses and the respiratory therapist.      NOTE OF PERSONAL INVOLVEMENT IN CARE   This patient has a high probability of imminent, clinically significant deterioration, which requires the highest level of preparedness to intervene urgently. I participated in the decision-making and personally managed or directed the management of the following life and organ supporting interventions that required my frequent assessment to treat or prevent imminent deterioration.    I personally spent 45 minutes of critical care time.  This is time spent at this critically ill patient's bedside actively involved in patient care as well as the coordination of care.  This does not include any procedural time which has been billed separately.    Deshaun Michelle MD   Critical Care Medicine  [facility] [place]  
5,000 Units, 5,000 Units, SubCUTAneous, Q12H, Deshaun Michelle MD, 5,000 Units at 02/27/24 0249     
clinical/nonclinical/nursing services involved in patient's clinical care. Care coordination discussions were held with appropriate clinical/nonclinical/ nursing providers based on care coordination needs.         Patients current active Medications were reviewed, considered, added and adjusted based on the clinical condition today.      Home Medications were reconciled to the best of my ability given all available resources at the time of admission. Route is PO if not otherwise noted.      Admission Status:[id number]:::1}      Medications Reviewed:     Current Facility-Administered Medications   Medication Dose Route Frequency    famotidine (PEPCID) 20 mg in sodium chloride (PF) 0.9 % 10 mL injection  20 mg IntraVENous Daily    sodium chloride flush 0.9 % injection 5-40 mL  5-40 mL IntraVENous 2 times per day    sodium chloride flush 0.9 % injection 5-40 mL  5-40 mL IntraVENous PRN    0.9 % sodium chloride infusion   IntraVENous PRN    acetaminophen (TYLENOL) tablet 650 mg  650 mg Oral Q6H PRN    Or    acetaminophen (TYLENOL) suppository 650 mg  650 mg Rectal Q6H PRN    hydrALAZINE (APRESOLINE) injection 10 mg  10 mg IntraVENous Q4H PRN    ondansetron (ZOFRAN) injection 4 mg  4 mg IntraVENous Q6H PRN    aspirin chewable tablet 81 mg  81 mg Oral Daily    Or    aspirin suppository 300 mg  300 mg Rectal Daily    heparin (porcine) injection 5,000 Units  5,000 Units SubCUTAneous Q12H    fentaNYL (SUBLIMAZE) injection 25 mcg  25 mcg IntraVENous Q1H PRN    propofol infusion  0-50 mcg/kg/min IntraVENous Continuous    levETIRAcetam (KEPPRA) injection 500 mg  500 mg IntraVENous Once per day on Tue Thu Sat    levETIRAcetam (KEPPRA) injection 500 mg  500 mg IntraVENous Q12H    albumin human 25% IV solution 25 g  25 g IntraVENous PRN     ______________________________________________________________________  EXPECTED LENGTH OF STAY: 4  ACTUAL LENGTH OF STAY:          1                 Sawyer Michelle MD

## 2024-03-03 NOTE — PLAN OF CARE
Problem: Discharge Planning  Goal: Discharge to home or other facility with appropriate resources  Outcome: Progressing     Problem: Nutrition Deficit:  Goal: Optimize nutritional status  Outcome: Progressing     Problem: Pain  Goal: Verbalizes/displays adequate comfort level or baseline comfort level  Outcome: Progressing     Problem: Safety - Adult  Goal: Free from fall injury  Outcome: Progressing     Problem: Respiratory - Adult  Goal: Achieves optimal ventilation and oxygenation  Outcome: Progressing     Problem: Skin/Tissue Integrity  Goal: Absence of new skin breakdown  Description: 1.  Monitor for areas of redness and/or skin breakdown  2.  Assess vascular access sites hourly  3.  Every 4-6 hours minimum:  Change oxygen saturation probe site  4.  Every 4-6 hours:  If on nasal continuous positive airway pressure, respiratory therapy assess nares and determine need for appliance change or resting period.  Outcome: Progressing     Problem: ABCDS Injury Assessment  Goal: Absence of physical injury  Outcome: Progressing  Flowsheets (Taken 2/28/2024 1945)  Absence of Physical Injury: Implement safety measures based on patient assessment     Problem: Chronic Conditions and Co-morbidities  Goal: Patient's chronic conditions and co-morbidity symptoms are monitored and maintained or improved  Outcome: Progressing     
  Problem: Discharge Planning  Goal: Discharge to home or other facility with appropriate resources  Outcome: Progressing     Problem: Nutrition Deficit:  Goal: Optimize nutritional status  Outcome: Progressing     Problem: Pain  Goal: Verbalizes/displays adequate comfort level or baseline comfort level  Outcome: Progressing     Problem: Safety - Adult  Goal: Free from fall injury  Outcome: Progressing     Problem: Respiratory - Adult  Goal: Achieves optimal ventilation and oxygenation  Outcome: Progressing     Problem: Skin/Tissue Integrity  Goal: Absence of new skin breakdown  Description: 1.  Monitor for areas of redness and/or skin breakdown  2.  Assess vascular access sites hourly  3.  Every 4-6 hours minimum:  Change oxygen saturation probe site  4.  Every 4-6 hours:  If on nasal continuous positive airway pressure, respiratory therapy assess nares and determine need for appliance change or resting period.  Outcome: Progressing     Problem: Occupational Therapy - Adult  Goal: By Discharge: Performs self-care activities at highest level of function for planned discharge setting.  See evaluation for individualized goals.  Description: FUNCTIONAL STATUS PRIOR TO ADMISSION:  Note patient had a recent admission earlier this month to [facility] for fluid overload.  Per her sister, she returned home at independent level using rollator.  On room air at home.    HOME SUPPORT: Patient resided at home alone with local supportive family    Occupational Therapy Goals:  Initiated 2/26/2024  1.  Patient will perform grooming with Minimal Assist within 7 day(s).  2.  Patient will perform upper body dressing with Minimal Assist within 7 day(s).  3.  Patient will perform  anterior neck to thigh bathing with Minimal Assist within 7 day(s).  4.  Patient will perform toilet transfers with Moderate Assist  within 7 day(s).  5.  Patient will perform all aspects of toileting with Moderate Assist within 7 day(s).  6.  Patient will 
  Problem: Occupational Therapy - Adult  Goal: By Discharge: Performs self-care activities at highest level of function for planned discharge setting.  See evaluation for individualized goals.  Description: FUNCTIONAL STATUS PRIOR TO ADMISSION:  Note patient had a recent admission earlier this month to [facility] for fluid overload.  Per her sister, she returned home at independent level using rollator.  On room air at home.    HOME SUPPORT: Patient resided at home alone with local supportive family    Occupational Therapy Goals:  Initiated 2/26/2024  1.  Patient will perform grooming with Minimal Assist within 7 day(s).  2.  Patient will perform upper body dressing with Minimal Assist within 7 day(s).  3.  Patient will perform  anterior neck to thigh bathing with Minimal Assist within 7 day(s).  4.  Patient will perform toilet transfers with Moderate Assist  within 7 day(s).  5.  Patient will perform all aspects of toileting with Moderate Assist within 7 day(s).  6.  Patient will participate in upper extremity therapeutic exercise/activities with Supervision for 10 minutes within 7 day(s).      Outcome: Progressing   OCCUPATIONAL THERAPY TREATMENT  Patient: Ivone Swain (83 y.o. female)  Date: 3/1/2024  Primary Diagnosis: Respiratory arrest (HCC) [R09.2]  Seizure (HCC) [R56.9]  Seizures (HCC) [R56.9]  ESRD (end stage renal disease) (HCC) [N18.6]  Respiratory failure, unspecified chronicity, unspecified whether with hypoxia or hypercapnia (HCC) [J96.90]  Cerebrovascular accident (CVA), unspecified mechanism (HCC) [I63.9]  Ground-level fall [W18.30XA]  Procedure(s) (LRB):  Fistulogram right (N/A)  Ultrasound guided vascular access (N/A)  Angioplasty fistula/dialysis circuit (N/A) 1 Day Post-Op   Precautions: Fall Risk, Seizure                Chart, occupational therapy assessment, plan of care, and goals were reviewed.    ASSESSMENT  Patient continues to benefit from skilled OT services and is progressing towards goals. 
  Problem: Occupational Therapy - Adult  Goal: By Discharge: Performs self-care activities at highest level of function for planned discharge setting.  See evaluation for individualized goals.  Description: FUNCTIONAL STATUS PRIOR TO ADMISSION:  Note patient had a recent admission earlier this month to [facility] for fluid overload.  Per her sister, she returned home at independent level using rollator.  On room air at home.    HOME SUPPORT: Patient resided at home alone with local supportive family    Occupational Therapy Goals:  Initiated 2/26/2024  1.  Patient will perform grooming with Minimal Assist within 7 day(s).  2.  Patient will perform upper body dressing with Minimal Assist within 7 day(s).  3.  Patient will perform  anterior neck to thigh bathing with Minimal Assist within 7 day(s).  4.  Patient will perform toilet transfers with Moderate Assist  within 7 day(s).  5.  Patient will perform all aspects of toileting with Moderate Assist within 7 day(s).  6.  Patient will participate in upper extremity therapeutic exercise/activities with Supervision for 10 minutes within 7 day(s).      Outcome: Progressing     OCCUPATIONAL THERAPY TREATMENT  Patient: Ivone Swain (83 y.o. female)  Date: 2/27/2024  Primary Diagnosis: Respiratory arrest (HCC) [R09.2]  Seizure (HCC) [R56.9]  Seizures (HCC) [R56.9]  ESRD (end stage renal disease) (HCC) [N18.6]  Respiratory failure, unspecified chronicity, unspecified whether with hypoxia or hypercapnia (HCC) [J96.90]  Cerebrovascular accident (CVA), unspecified mechanism (HCC) [I63.9]  Ground-level fall [W18.30XA]       Precautions: Fall Risk, Seizure                Chart, occupational therapy assessment, plan of care, and goals were reviewed.    ASSESSMENT  Patient presents today with improving responsiveness, participation, alertness, and command following.  She remains limited by confusion, impaired short-term memory, disorientation, delayed processing, delayed command 
  Problem: Occupational Therapy - Adult  Goal: By Discharge: Performs self-care activities at highest level of function for planned discharge setting.  See evaluation for individualized goals.  Description: FUNCTIONAL STATUS PRIOR TO ADMISSION:  Note patient had a recent admission earlier this month to [facility] for fluid overload.  Per her sister, she returned home at independent level using rollator.  On room air at home.    HOME SUPPORT: Patient resided at home alone with local supportive family    Occupational Therapy Goals:  Initiated 2/26/2024  1.  Patient will perform grooming with Minimal Assist within 7 day(s).  2.  Patient will perform upper body dressing with Minimal Assist within 7 day(s).  3.  Patient will perform  anterior neck to thigh bathing with Minimal Assist within 7 day(s).  4.  Patient will perform toilet transfers with Moderate Assist  within 7 day(s).  5.  Patient will perform all aspects of toileting with Moderate Assist within 7 day(s).  6.  Patient will participate in upper extremity therapeutic exercise/activities with Supervision for 10 minutes within 7 day(s).      Outcome: Progressing     OCCUPATIONAL THERAPY TREATMENT  Patient: Ivone Swain (83 y.o. female)  Date: 2/28/2024  Primary Diagnosis: Respiratory arrest (HCC) [R09.2]  Seizure (HCC) [R56.9]  Seizures (HCC) [R56.9]  ESRD (end stage renal disease) (HCC) [N18.6]  Respiratory failure, unspecified chronicity, unspecified whether with hypoxia or hypercapnia (HCC) [J96.90]  Cerebrovascular accident (CVA), unspecified mechanism (HCC) [I63.9]  Ground-level fall [W18.30XA]  Procedure(s) (LRB):  Fistulogram right (N/A)     Precautions: Fall Risk, Seizure                Chart, occupational therapy assessment, plan of care, and goals were reviewed.    ASSESSMENT  Patient continues to present with improving responsiveness, participation, alertness, and command following.  Today she was Ox4 with min cuing.  She remains limited by delayed 
  Problem: Occupational Therapy - Adult  Goal: By Discharge: Performs self-care activities at highest level of function for planned discharge setting.  See evaluation for individualized goals.  Description: FUNCTIONAL STATUS PRIOR TO ADMISSION:  Note patient had a recent admission earlier this month to [facility] for fluid overload.  Per her sister, she returned home at independent level using rollator.  On room air at home.    HOME SUPPORT: Patient resided at home alone with local supportive family    Occupational Therapy Goals:  Initiated 2/26/2024  1.  Patient will perform grooming with Minimal Assist within 7 day(s).  2.  Patient will perform upper body dressing with Minimal Assist within 7 day(s).  3.  Patient will perform  anterior neck to thigh bathing with Minimal Assist within 7 day(s).  4.  Patient will perform toilet transfers with Moderate Assist  within 7 day(s).  5.  Patient will perform all aspects of toileting with Moderate Assist within 7 day(s).  6.  Patient will participate in upper extremity therapeutic exercise/activities with Supervision for 10 minutes within 7 day(s).      Outcome: Progressing     OCCUPATIONAL THERAPY EVALUATION    Patient: Ivone Swain (83 y.o. female)  Date: 2/26/2024  Primary Diagnosis: Respiratory arrest (HCC) [R09.2]  Seizure (HCC) [R56.9]  Seizures (HCC) [R56.9]  ESRD (end stage renal disease) (HCC) [N18.6]  Respiratory failure, unspecified chronicity, unspecified whether with hypoxia or hypercapnia (HCC) [J96.90]  Cerebrovascular accident (CVA), unspecified mechanism (HCC) [I63.9]  Ground-level fall [W18.30XA]         Precautions: Fall Risk, Seizure                  ASSESSMENT :  Note patient had a recent admission earlier this month to [facility] for fluid overload.  Per her sister, she returned home alone at independent level using rollator and was on room air.  Note she was then found down next to her bed leading to current admission with witnessed seizure in ED and required 
  Problem: Pain  Goal: Verbalizes/displays adequate comfort level or baseline comfort level  Outcome: Progressing     Problem: Safety - Adult  Goal: Free from fall injury  Outcome: Progressing     Problem: Skin/Tissue Integrity  Goal: Absence of new skin breakdown  Description: 1.  Monitor for areas of redness and/or skin breakdown  2.  Assess vascular access sites hourly  3.  Every 4-6 hours minimum:  Change oxygen saturation probe site  4.  Every 4-6 hours:  If on nasal continuous positive airway pressure, respiratory therapy assess nares and determine need for appliance change or resting period.  Outcome: Progressing     Problem: Discharge Planning  Goal: Discharge to home or other facility with appropriate resources  Outcome: Progressing     
  Problem: Physical Therapy - Adult  Goal: By Discharge: Performs mobility at highest level of function for planned discharge setting.  See evaluation for individualized goals.  Description: FUNCTIONAL STATUS PRIOR TO ADMISSION: Patient was modified independent using a rolling walker for functional mobility.  Just discharged from [facility], was scheduled with     HOME SUPPORT PRIOR TO ADMISSION: The patient lived with alone, supportive family.    Physical Therapy Goals  Initiated 2/26/2024  1.  Patient will move from supine to sit and sit to supine in bed with moderate assistance x1 within 7 day(s).    2.  Patient will perform sit to stand with moderate assistance x1 within 7 day(s).  3.  Patient will transfer from bed to chair and chair to bed with moderate assistance x1 using the least restrictive device within 7 day(s).  4.  Patient will ambulate with moderate assistance for 20 feet with the least restrictive device within 7 day(s).     Outcome: Progressing   PHYSICAL THERAPY EVALUATION    Patient: Ivone Swain (83 y.o. female)  Date: 2/26/2024  Primary Diagnosis: Respiratory arrest (HCC) [R09.2]  Seizure (HCC) [R56.9]  Seizures (HCC) [R56.9]  ESRD (end stage renal disease) (HCC) [N18.6]  Respiratory failure, unspecified chronicity, unspecified whether with hypoxia or hypercapnia (HCC) [J96.90]  Cerebrovascular accident (CVA), unspecified mechanism (HCC) [I63.9]  Ground-level fall [W18.30XA]       Precautions: Restrictions/Precautions: Fall Risk, Seizure                      ASSESSMENT :   DEFICITS/IMPAIRMENTS:   The patient is limited by decreased functional mobility, strength, activity tolerance, cognition, command following, attention/concentration, balance following admission for seizure.  Patient required intubation and extubated on 2/25/24- transitioned to 2L and tolerate 2L NC.  Patient requiring ICU level of care.  Patient has been evaluated by neurology, CT performed showed a chronic Left parietal ischemia 
  Problem: Physical Therapy - Adult  Goal: By Discharge: Performs mobility at highest level of function for planned discharge setting.  See evaluation for individualized goals.  Description: FUNCTIONAL STATUS PRIOR TO ADMISSION: Patient was modified independent using a rolling walker for functional mobility.  Just discharged from [facility], was scheduled with     HOME SUPPORT PRIOR TO ADMISSION: The patient lived with alone, supportive family.    Physical Therapy Goals  Initiated 2/26/2024  1.  Patient will move from supine to sit and sit to supine in bed with moderate assistance x1 within 7 day(s).    2.  Patient will perform sit to stand with moderate assistance x1 within 7 day(s).  3.  Patient will transfer from bed to chair and chair to bed with moderate assistance x1 using the least restrictive device within 7 day(s).  4.  Patient will ambulate with moderate assistance for 20 feet with the least restrictive device within 7 day(s).     Outcome: Progressing   PHYSICAL THERAPY TREATMENT    Patient: Ivone Swain (83 y.o. female)  Date: 2/28/2024  Diagnosis: Respiratory arrest (HCC) [R09.2]  Seizure (HCC) [R56.9]  Seizures (HCC) [R56.9]  ESRD (end stage renal disease) (HCC) [N18.6]  Respiratory failure, unspecified chronicity, unspecified whether with hypoxia or hypercapnia (HCC) [J96.90]  Cerebrovascular accident (CVA), unspecified mechanism (HCC) [I63.9]  Ground-level fall [W18.30XA] Respiratory arrest (HCC)  Procedure(s) (LRB):  Fistulogram right (N/A)    Precautions: Fall Risk, Seizure                      ASSESSMENT:  Patient continues to benefit from skilled PT services and is progressing towards goals. Pt tolerated session well. Pt appears to be more alert and oriented than previous sessions. Pt continues to be limited by generalized weakness, decreased functional mobility, impaired standing balance and gait, decreased insight into deficits, increased risk for falls. Pt continues to have a Right lateral leaning 
  Problem: Respiratory - Adult  Goal: Achieves optimal ventilation and oxygenation  2/25/2024 1000 by Eneida Coy RCP  Outcome: Progressing  Flowsheets (Taken 2/25/2024 0800 by Ana Cristina Long, BOAZ)  Achieves optimal ventilation and oxygenation:   Assess for changes in respiratory status   Assess for changes in mentation and behavior   Position to facilitate oxygenation and minimize respiratory effort   Oxygen supplementation based on oxygen saturation or arterial blood gases   Initiate smoking cessation protocol as indicated   Encourage broncho-pulmonary hygiene including cough, deep breathe, incentive spirometry   Assess the need for suctioning and aspirate as needed   Assess and instruct to report shortness of breath or any respiratory difficulty   Respiratory therapy support as indicated  2/25/2024 0116 by Naina Inman, RT  Outcome: Progressing  Flowsheets (Taken 2/25/2024 0116)  Achieves optimal ventilation and oxygenation:   Assess for changes in respiratory status   Respiratory therapy support as indicated   Oxygen supplementation based on oxygen saturation or arterial blood gases     
  Problem: Safety - Adult  Goal: Free from fall injury  Outcome: Progressing     
  Problem: Safety - Medical Restraint  Goal: Remains free of injury from restraints (Restraint for Interference with Medical Device)  Description: INTERVENTIONS:  1. Determine that other, less restrictive measures have been tried or would not be effective before applying the restraint  2. Evaluate the patient's condition at the time of restraint application  3. Inform patient/family regarding the reason for restraint  4. Q2H: Monitor safety, psychosocial status, comfort, nutrition and hydration  2/25/2024 0843 by Ana Cristina Long, RN  Outcome: Completed  Flowsheets (Taken 2/25/2024 0800)  Remains free of injury from restraints (restraint for interference with medical device):   Determine that other, less restrictive measures have been tried or would not be effective before applying the restraint   Evaluate the patient's condition at the time of restraint application   Inform patient/family regarding the reason for restraint   Every 2 hours: Monitor safety, psychosocial status, comfort, nutrition and hydration  2/24/2024 1910 by Ana Cristina Long, RN  Outcome: Progressing  Flowsheets (Taken 2/24/2024 1718)  Remains free of injury from restraints (restraint for interference with medical device):   Inform patient/family regarding the reason for restraint   Every 2 hours: Monitor safety, psychosocial status, comfort, nutrition and hydration   Evaluate the patient's condition at the time of restraint application   Determine that other, less restrictive measures have been tried or would not be effective before applying the restraint     
  Problem: Safety - Medical Restraint  Goal: Remains free of injury from restraints (Restraint for Interference with Medical Device)  Description: INTERVENTIONS:  1. Determine that other, less restrictive measures have been tried or would not be effective before applying the restraint  2. Evaluate the patient's condition at the time of restraint application  3. Inform patient/family regarding the reason for restraint  4. Q2H: Monitor safety, psychosocial status, comfort, nutrition and hydration  Outcome: Progressing  Flowsheets (Taken 2/23/2024 9874)  Remains free of injury from restraints (restraint for interference with medical device):   Determine that other, less restrictive measures have been tried or would not be effective before applying the restraint   Evaluate the patient's condition at the time of restraint application   Inform patient/family regarding the reason for restraint   Every 2 hours: Monitor safety, psychosocial status, comfort, nutrition and hydration     
  Problem: Safety - Medical Restraint  Goal: Remains free of injury from restraints (Restraint for Interference with Medical Device)  Description: INTERVENTIONS:  1. Determine that other, less restrictive measures have been tried or would not be effective before applying the restraint  2. Evaluate the patient's condition at the time of restraint application  3. Inform patient/family regarding the reason for restraint  4. Q2H: Monitor safety, psychosocial status, comfort, nutrition and hydration  Outcome: Progressing  Flowsheets (Taken 2/24/2024 1718)  Remains free of injury from restraints (restraint for interference with medical device):   Inform patient/family regarding the reason for restraint   Every 2 hours: Monitor safety, psychosocial status, comfort, nutrition and hydration   Evaluate the patient's condition at the time of restraint application   Determine that other, less restrictive measures have been tried or would not be effective before applying the restraint     
Speech LAnguage Pathology EVALUATION    Patient: Ivone Swain (83 y.o. female)  Date: 2/26/2024  Primary Diagnosis: Respiratory arrest (HCC) [R09.2]  Seizure (HCC) [R56.9]  Seizures (HCC) [R56.9]  ESRD (end stage renal disease) (HCC) [N18.6]  Respiratory failure, unspecified chronicity, unspecified whether with hypoxia or hypercapnia (HCC) [J96.90]  Cerebrovascular accident (CVA), unspecified mechanism (HCC) [I63.9]  Ground-level fall [W18.30XA]       Precautions: aspiration Fall Risk, Seizure                  ASSESSMENT :  Based on the objective data described below, the patient presents with moderate oral-pharyngeal dysphagia, complicated by impaired LOC, secretion management issues.    She had oral pooling of secretions, oral holding of meds with RN.   She had multiple swallows per bolus of all consistencies, indicative of weakness and/or aspiration. Also noted noisy swallow  Coughing on secretions.   Aspiraiton risks due to seizures, 2 days of intubation, impaired LOC.   NOT Ready for PO yet. .  Her verbal responses and processing are frequently slow.   Minimal dysarthria.      Admitted 2-23-24 with fall, sz, intubated.  EEG : noted R frontal activity. Head CT:  old L parietal encephalomalacia  PMH: ESRD on dialysis, SUSAN, obesity,  CHF< CVA  (BG bleed 2019), MGUS , HTN sz.        Patient will benefit from skilled intervention to address the above impairments.     PLAN :  Recommendations and Planned Interventions:  Diet: NPO  Ok for ice chips with RN when upright.   SLP will re-eval in am.      Acute SLP Services: Yes, patient will be followed by speech-language pathology 4x/week to address goals. Patient's rehabilitation potential is considered to be Fair.    Discharge Recommendations: Continue to assess pending progress     SUBJECTIVE:   Patient stated, “My son lives upstairs from me.”    OBJECTIVE:     Past Medical History:   Diagnosis Date    Anemia     Anxiety and depression     Arthritis     ESRD on 
Speech LAnguage Pathology TREATMENT    Patient: Ivone Swain (83 y.o. female)  Date: 3/1/2024  Primary Diagnosis: Respiratory arrest ([facility]) [R09.2]  Seizure (HCC) [R56.9]  Seizures (HCC) [R56.9]  ESRD (end stage renal disease) ([facility]) [N18.6]  Respiratory failure, unspecified chronicity, unspecified whether with hypoxia or hypercapnia ([facility]) [J96.90]  Cerebrovascular accident (CVA), unspecified mechanism ([facility]) [I63.9]  Ground-level fall [W18.30XA]  Procedure(s) (LRB):  Fistulogram right (N/A)  Ultrasound guided vascular access (N/A)  Angioplasty fistula/dialysis circuit (N/A) 1 Day Post-Op   Precautions: Aspiration, Fall Risk, Seizure                  ASSESSMENT:  Therapy targeted dysphagia management. PO trials of thin liquids, purees, and solids given. Patient self-fed independently. Improved oral phase of swallow noted. Timely and efficient bolus transit and oral cavity clearance. No overt s/s of aspiration were appreciated with any trialed consistency. Reviewed swallow strategies with patient who verbalized understanding but likely will require reinforcement. Recommend PO diet advancement to soft/bite-sized solids with thin liquids with aspiration precautions as listed below.     Patient will benefit from skilled intervention to address the above impairments.     PLAN :  Recommendations and Planned Interventions:  Diet: Soft and bite sized and thin liquids  -Oral medications whole with liquids, one at a time  -Aspiration precautions: Upright position, slow rate, small bites/sips, single bites/sips, alternate bites/sips  -Routine oral care 2-3 times daily    Acute SLP Services: Yes, SLP will continue to follow per plan of care.    Discharge Recommendations: Yes, recommend SLP treatment at next level of care     SUBJECTIVE:   Patient stated, “I'm feeling a lot better than I was.”    OBJECTIVE:     Past Medical History:   Diagnosis Date    Anemia     Anxiety and depression     Arthritis     ESRD on dialysis ([facility])     
ultimately transfer out of bed to chair with rolling walker and minimal to moderate assist for steadying. She remains well below her baseline. Recommend SNF rehab upon dc to maximize functional potential and decrease caregiver burden. She normally lives alone but is noted to have supportive family.     PLAN:  Patient continues to benefit from skilled intervention to address the above impairments.  Continue treatment per established plan of care.    Recommendation for discharge: (in order for the patient to meet his/her long term goals): Therapy up to 5 days/week in Skilled nursing facility    Other factors to consider for discharge: impaired cognition and high risk for falls, palliative care following    IF patient discharges home will need the following DME: continuing to assess with progress     SUBJECTIVE:   Patient stated, \"It looks like my apartment.\"    OBJECTIVE DATA SUMMARY:   Critical Behavior:  Orientation  Orientation Level: Oriented to person;Disoriented to place  Cognition  Arousal/Alertness: Delayed responses to stimuli  Following Commands: Follows one step commands with increased time  Insights: Decreased awareness of deficits  Initiation: Requires cues for some  Sequencing: Requires cues for some    Functional Mobility Training:  Bed Mobility:  Bed Mobility Training  Supine to Sit: Moderate assistance  Scooting: Minimum assistance;Additional time  Transfers:  Transfer Training  Transfer Training: Yes  Sit to Stand: Minimum assistance;Additional time  Stand to Sit: Minimum assistance  Stand Pivot Transfers: Moderate assistance  Bed to Chair: Moderate assistance (using RW)  Balance:  Balance  Sitting: Impaired  Sitting - Static: Fair (occasional)  Sitting - Dynamic: Fair (occasional);Poor (constant support)  Standing: Impaired  Standing - Static: Constant support;Poor  Standing - Dynamic: Poor;Constant support   Ambulation/Gait Training:   Min to mod A x 1 to take a few steps to chair, assist for 
Impaired  Standing - Static: Constant support;Fair;Good  Standing - Dynamic: Constant support;Fair   Ambulation/Gait Training:     Gait  Gait Training: Yes  Overall Level of Assistance: Minimum assistance;Additional time  Distance (ft): 5 Feet  Assistive Device: Walker, rolling;Gait belt  Base of Support: Narrowed  Speed/Siobhan: Slow  Step Length: Right shortened;Left shortened  Gait Abnormalities: Decreased step clearance                Pain Ratin/10   Pain Intervention(s):   nursing notified    Activity Tolerance:   Good    After treatment:   Patient left in no apparent distress in bed, Call bell within reach, Bed/ chair alarm activated, and Caregiver / family present      COMMUNICATION/EDUCATION:   The patient's plan of care was discussed with: registered nurse and     Patient Education  Education Given To: Patient;Family  Education Provided: Role of Therapy;Plan of Care;Home Exercise Program;Transfer Training;Equipment;Fall Prevention Strategies  Education Method: Verbal;Demonstration  Barriers to Learning: Cognition  Education Outcome: Continued education needed      BINA STREETER, PT  Minutes: 27  
or symptoms of aspiration for planned discharge setting.  See evaluation for individualized goals.  2/26/2024 1431 by Ludmila Rondon, SLP  Outcome: Not Progressing     Problem: Chronic Conditions and Co-morbidities  Goal: Patient's chronic conditions and co-morbidity symptoms are monitored and maintained or improved  Outcome: Progressing  Flowsheets (Taken 2/26/2024 0800)  Care Plan - Patient's Chronic Conditions and Co-Morbidity Symptoms are Monitored and Maintained or Improved: Monitor and assess patient's chronic conditions and comorbid symptoms for stability, deterioration, or improvement     Problem: SLP Adult - Impaired Swallowing  Goal: By Discharge: Advance to least restrictive diet without signs or symptoms of aspiration for planned discharge setting.  See evaluation for individualized goals.  2/26/2024 1431 by Ludmila Rondon, SLP  Outcome: Not Progressing

## 2024-03-03 NOTE — CARE COORDINATION
Patient for transfer to [facility] today.  Son plans to transport in his car.  CM note of yesterday has info re calling report

## 2024-06-12 ENCOUNTER — HOME HEALTH ADMISSION (OUTPATIENT)
Dept: HOME HEALTH SERVICES | Facility: HOME HEALTH | Age: 84
End: 2024-06-12
Payer: MEDICARE

## 2024-06-15 ENCOUNTER — HOME CARE VISIT (OUTPATIENT)
Facility: HOME HEALTH | Age: 84
End: 2024-06-15

## 2024-06-15 PROCEDURE — G0299 HHS/HOSPICE OF RN EA 15 MIN: HCPCS

## 2024-06-15 PROCEDURE — 0221000100 HH NO PAY CLAIM PROCEDURE

## 2024-06-17 ENCOUNTER — APPOINTMENT (OUTPATIENT)
Facility: HOSPITAL | Age: 84
End: 2024-06-17
Payer: MEDICARE

## 2024-06-17 ENCOUNTER — HOME CARE VISIT (OUTPATIENT)
Dept: HOME HEALTH SERVICES | Facility: HOME HEALTH | Age: 84
End: 2024-06-17

## 2024-06-17 ENCOUNTER — HOSPITAL ENCOUNTER (INPATIENT)
Facility: HOSPITAL | Age: 84
LOS: 4 days | Discharge: HOME | End: 2024-06-21
Attending: EMERGENCY MEDICINE | Admitting: HOSPITALIST
Payer: MEDICARE

## 2024-06-17 VITALS
OXYGEN SATURATION: 96 % | DIASTOLIC BLOOD PRESSURE: 60 MMHG | HEART RATE: 88 BPM | TEMPERATURE: 96.9 F | RESPIRATION RATE: 16 BRPM | SYSTOLIC BLOOD PRESSURE: 110 MMHG

## 2024-06-17 DIAGNOSIS — I50.33 ACUTE ON CHRONIC HEART FAILURE WITH PRESERVED EJECTION FRACTION (HFPEF) (HCC): ICD-10-CM

## 2024-06-17 DIAGNOSIS — I13.2 CARDIORENAL SYNDROME WITH RENAL FAILURE, STAGE 5 CHRONIC KIDNEY DISEASE OR END STAGE RENAL DISEASE, WITH HEART FAILURE (HCC): ICD-10-CM

## 2024-06-17 DIAGNOSIS — I50.9 ACUTE ON CHRONIC CONGESTIVE HEART FAILURE, UNSPECIFIED HEART FAILURE TYPE (HCC): Primary | ICD-10-CM

## 2024-06-17 PROBLEM — Z99.2 END-STAGE RENAL DISEASE ON HEMODIALYSIS (HCC): Status: ACTIVE | Noted: 2021-05-24

## 2024-06-17 PROBLEM — N18.6 END-STAGE RENAL DISEASE ON HEMODIALYSIS (HCC): Status: ACTIVE | Noted: 2021-05-24

## 2024-06-17 PROBLEM — I21.4 NSTEMI (NON-ST ELEVATED MYOCARDIAL INFARCTION) (HCC): Status: ACTIVE | Noted: 2024-06-17

## 2024-06-17 PROBLEM — E87.1 HYPONATREMIA WITH EXCESS EXTRACELLULAR FLUID VOLUME: Status: ACTIVE | Noted: 2024-06-17

## 2024-06-17 LAB
ALBUMIN SERPL-MCNC: 2.9 G/DL (ref 3.5–5)
ALBUMIN/GLOB SERPL: 0.6 (ref 1.1–2.2)
ALP SERPL-CCNC: 94 U/L (ref 45–117)
ALT SERPL-CCNC: 15 U/L (ref 12–78)
ANION GAP SERPL CALC-SCNC: 9 MMOL/L (ref 5–15)
AST SERPL-CCNC: 20 U/L (ref 15–37)
BASOPHILS # BLD: 0 K/UL (ref 0–0.1)
BASOPHILS NFR BLD: 1 % (ref 0–1)
BILIRUB SERPL-MCNC: 1 MG/DL (ref 0.2–1)
BUN SERPL-MCNC: 36 MG/DL (ref 6–20)
BUN/CREAT SERPL: 5 (ref 12–20)
CALCIUM SERPL-MCNC: 9.4 MG/DL (ref 8.5–10.1)
CHLORIDE SERPL-SCNC: 92 MMOL/L (ref 97–108)
CO2 SERPL-SCNC: 28 MMOL/L (ref 21–32)
COMMENT:: NORMAL
CREAT SERPL-MCNC: 6.86 MG/DL (ref 0.55–1.02)
DIFFERENTIAL METHOD BLD: ABNORMAL
EOSINOPHIL # BLD: 0.1 K/UL (ref 0–0.4)
EOSINOPHIL NFR BLD: 1 % (ref 0–7)
ERYTHROCYTE [DISTWIDTH] IN BLOOD BY AUTOMATED COUNT: 14.8 % (ref 11.5–14.5)
FLUAV RNA SPEC QL NAA+PROBE: NOT DETECTED
FLUBV RNA SPEC QL NAA+PROBE: NOT DETECTED
GLOBULIN SER CALC-MCNC: 4.6 G/DL (ref 2–4)
GLUCOSE SERPL-MCNC: 99 MG/DL (ref 65–100)
HCT VFR BLD AUTO: 38.7 % (ref 35–47)
HGB BLD-MCNC: 12.8 G/DL (ref 11.5–16)
IMM GRANULOCYTES # BLD AUTO: 0 K/UL (ref 0–0.04)
IMM GRANULOCYTES NFR BLD AUTO: 0 % (ref 0–0.5)
LIPASE SERPL-CCNC: 26 U/L (ref 13–75)
LYMPHOCYTES # BLD: 1.5 K/UL (ref 0.8–3.5)
LYMPHOCYTES NFR BLD: 18 % (ref 12–49)
MCH RBC QN AUTO: 31.1 PG (ref 26–34)
MCHC RBC AUTO-ENTMCNC: 33.1 G/DL (ref 30–36.5)
MCV RBC AUTO: 93.9 FL (ref 80–99)
MONOCYTES # BLD: 0.6 K/UL (ref 0–1)
MONOCYTES NFR BLD: 7 % (ref 5–13)
NEUTS SEG # BLD: 6.3 K/UL (ref 1.8–8)
NEUTS SEG NFR BLD: 73 % (ref 32–75)
NRBC # BLD: 0 K/UL (ref 0–0.01)
NRBC BLD-RTO: 0 PER 100 WBC
NT PRO BNP: ABNORMAL PG/ML (ref 0–450)
PLATELET # BLD AUTO: 249 K/UL (ref 150–400)
PMV BLD AUTO: 9.4 FL (ref 8.9–12.9)
POTASSIUM SERPL-SCNC: 4.8 MMOL/L (ref 3.5–5.1)
PROT SERPL-MCNC: 7.5 G/DL (ref 6.4–8.2)
RBC # BLD AUTO: 4.12 M/UL (ref 3.8–5.2)
SARS-COV-2 RNA RESP QL NAA+PROBE: NOT DETECTED
SODIUM SERPL-SCNC: 129 MMOL/L (ref 136–145)
SPECIMEN HOLD: NORMAL
TROPONIN I SERPL HS-MCNC: 246 NG/L (ref 0–51)
TROPONIN I SERPL HS-MCNC: 256 NG/L (ref 0–51)
WBC # BLD AUTO: 8.5 K/UL (ref 3.6–11)

## 2024-06-17 PROCEDURE — 87636 SARSCOV2 & INF A&B AMP PRB: CPT

## 2024-06-17 PROCEDURE — 6360000002 HC RX W HCPCS: Performed by: EMERGENCY MEDICINE

## 2024-06-17 PROCEDURE — 2060000000 HC ICU INTERMEDIATE R&B

## 2024-06-17 PROCEDURE — 80053 COMPREHEN METABOLIC PANEL: CPT

## 2024-06-17 PROCEDURE — 6370000000 HC RX 637 (ALT 250 FOR IP): Performed by: HOSPITALIST

## 2024-06-17 PROCEDURE — 84484 ASSAY OF TROPONIN QUANT: CPT

## 2024-06-17 PROCEDURE — 70450 CT HEAD/BRAIN W/O DYE: CPT

## 2024-06-17 PROCEDURE — 99285 EMERGENCY DEPT VISIT HI MDM: CPT

## 2024-06-17 PROCEDURE — 85025 COMPLETE CBC W/AUTO DIFF WBC: CPT

## 2024-06-17 PROCEDURE — 83880 ASSAY OF NATRIURETIC PEPTIDE: CPT

## 2024-06-17 PROCEDURE — 6360000002 HC RX W HCPCS: Performed by: HOSPITALIST

## 2024-06-17 PROCEDURE — 83690 ASSAY OF LIPASE: CPT

## 2024-06-17 PROCEDURE — 36415 COLL VENOUS BLD VENIPUNCTURE: CPT

## 2024-06-17 PROCEDURE — 71045 X-RAY EXAM CHEST 1 VIEW: CPT

## 2024-06-17 PROCEDURE — 93005 ELECTROCARDIOGRAM TRACING: CPT | Performed by: EMERGENCY MEDICINE

## 2024-06-17 RX ORDER — CALCIUM ACETATE 667 MG/1
1 CAPSULE ORAL
Status: DISCONTINUED | OUTPATIENT
Start: 2024-06-18 | End: 2024-06-21 | Stop reason: HOSPADM

## 2024-06-17 RX ORDER — PRAVASTATIN SODIUM 20 MG
40 TABLET ORAL
Status: DISCONTINUED | OUTPATIENT
Start: 2024-06-17 | End: 2024-06-21 | Stop reason: HOSPADM

## 2024-06-17 RX ORDER — MONTELUKAST SODIUM 10 MG/1
10 TABLET ORAL
Status: DISCONTINUED | OUTPATIENT
Start: 2024-06-17 | End: 2024-06-21 | Stop reason: HOSPADM

## 2024-06-17 RX ORDER — SODIUM CHLORIDE 0.9 % (FLUSH) 0.9 %
5-40 SYRINGE (ML) INJECTION PRN
Status: DISCONTINUED | OUTPATIENT
Start: 2024-06-17 | End: 2024-06-21 | Stop reason: HOSPADM

## 2024-06-17 RX ORDER — SODIUM CHLORIDE 9 MG/ML
INJECTION, SOLUTION INTRAVENOUS PRN
Status: DISCONTINUED | OUTPATIENT
Start: 2024-06-17 | End: 2024-06-21 | Stop reason: HOSPADM

## 2024-06-17 RX ORDER — ONDANSETRON 4 MG/1
4 TABLET, ORALLY DISINTEGRATING ORAL EVERY 8 HOURS PRN
Status: DISCONTINUED | OUTPATIENT
Start: 2024-06-17 | End: 2024-06-21 | Stop reason: HOSPADM

## 2024-06-17 RX ORDER — FLUTICASONE PROPIONATE 50 MCG
2 SPRAY, SUSPENSION (ML) NASAL DAILY
Status: DISCONTINUED | OUTPATIENT
Start: 2024-06-18 | End: 2024-06-21 | Stop reason: HOSPADM

## 2024-06-17 RX ORDER — SODIUM CHLORIDE 0.9 % (FLUSH) 0.9 %
5-40 SYRINGE (ML) INJECTION EVERY 12 HOURS SCHEDULED
Status: DISCONTINUED | OUTPATIENT
Start: 2024-06-17 | End: 2024-06-21 | Stop reason: HOSPADM

## 2024-06-17 RX ORDER — ONDANSETRON 2 MG/ML
4 INJECTION INTRAMUSCULAR; INTRAVENOUS EVERY 6 HOURS PRN
Status: DISCONTINUED | OUTPATIENT
Start: 2024-06-17 | End: 2024-06-21 | Stop reason: HOSPADM

## 2024-06-17 RX ORDER — LEVETIRACETAM 500 MG/1
500 TABLET ORAL EVERY MORNING
Status: DISCONTINUED | OUTPATIENT
Start: 2024-06-18 | End: 2024-06-21 | Stop reason: HOSPADM

## 2024-06-17 RX ORDER — ACETAMINOPHEN 325 MG/1
650 TABLET ORAL EVERY 6 HOURS PRN
Status: DISCONTINUED | OUTPATIENT
Start: 2024-06-17 | End: 2024-06-21 | Stop reason: HOSPADM

## 2024-06-17 RX ORDER — HEPARIN SODIUM 5000 [USP'U]/ML
5000 INJECTION, SOLUTION INTRAVENOUS; SUBCUTANEOUS EVERY 8 HOURS SCHEDULED
Status: DISCONTINUED | OUTPATIENT
Start: 2024-06-17 | End: 2024-06-21 | Stop reason: HOSPADM

## 2024-06-17 RX ORDER — POLYETHYLENE GLYCOL 3350 17 G/17G
17 POWDER, FOR SOLUTION ORAL DAILY PRN
Status: DISCONTINUED | OUTPATIENT
Start: 2024-06-17 | End: 2024-06-21 | Stop reason: HOSPADM

## 2024-06-17 RX ORDER — LEVETIRACETAM 250 MG/1
250 TABLET ORAL
Status: DISCONTINUED | OUTPATIENT
Start: 2024-06-18 | End: 2024-06-21 | Stop reason: HOSPADM

## 2024-06-17 RX ORDER — FLUTICASONE PROPIONATE 50 MCG
2 SPRAY, SUSPENSION (ML) NASAL EVERY EVENING
Status: DISCONTINUED | OUTPATIENT
Start: 2024-06-17 | End: 2024-06-17 | Stop reason: SDUPTHER

## 2024-06-17 RX ORDER — ACETAMINOPHEN 650 MG/1
650 SUPPOSITORY RECTAL EVERY 6 HOURS PRN
Status: DISCONTINUED | OUTPATIENT
Start: 2024-06-17 | End: 2024-06-21 | Stop reason: HOSPADM

## 2024-06-17 RX ORDER — ARTIFICIAL TEARS 1; 2; 3 MG/ML; MG/ML; MG/ML
2 SOLUTION/ DROPS OPHTHALMIC DAILY
Status: DISCONTINUED | OUTPATIENT
Start: 2024-06-18 | End: 2024-06-21 | Stop reason: HOSPADM

## 2024-06-17 RX ORDER — FUROSEMIDE 10 MG/ML
60 INJECTION INTRAMUSCULAR; INTRAVENOUS ONCE
Status: COMPLETED | OUTPATIENT
Start: 2024-06-17 | End: 2024-06-17

## 2024-06-17 RX ORDER — LEVETIRACETAM 250 MG/1
250 TABLET ORAL NIGHTLY
Status: DISCONTINUED | OUTPATIENT
Start: 2024-06-17 | End: 2024-06-21 | Stop reason: HOSPADM

## 2024-06-17 RX ORDER — ALBUTEROL SULFATE 2.5 MG/3ML
2 SOLUTION RESPIRATORY (INHALATION) EVERY 12 HOURS PRN
Status: DISCONTINUED | OUTPATIENT
Start: 2024-06-17 | End: 2024-06-21 | Stop reason: HOSPADM

## 2024-06-17 RX ADMIN — HEPARIN SODIUM 5000 UNITS: 5000 INJECTION INTRAVENOUS; SUBCUTANEOUS at 21:07

## 2024-06-17 RX ADMIN — MONTELUKAST 10 MG: 10 TABLET, FILM COATED ORAL at 21:07

## 2024-06-17 RX ADMIN — LEVETIRACETAM 250 MG: 250 TABLET, FILM COATED ORAL at 21:37

## 2024-06-17 RX ADMIN — FUROSEMIDE 60 MG: 10 INJECTION, SOLUTION INTRAMUSCULAR; INTRAVENOUS at 17:04

## 2024-06-17 RX ADMIN — PRAVASTATIN SODIUM 40 MG: 20 TABLET ORAL at 21:07

## 2024-06-17 ASSESSMENT — ENCOUNTER SYMPTOMS
ABDOMINAL DISTENTION: 0
SHORTNESS OF BREATH: 1
NAUSEA: 1
VOMITING: 0
COUGH: 0
DYSPNEA ACTIVITY LEVEL: AFTER AMBULATING LESS THAN 10 FT

## 2024-06-17 ASSESSMENT — PAIN - FUNCTIONAL ASSESSMENT: PAIN_FUNCTIONAL_ASSESSMENT: NONE - DENIES PAIN

## 2024-06-17 NOTE — ED TRIAGE NOTES
Pt arrives via EMS with c/o generalized weakness.  Numerous hospitalizations over the past 4 mths.  Just discharged last Thursday from SNF Rehab.  Ambulatory on scene with assist.  Note Sp02 on RA 87%; pt does not wear oxygen at baseline; NC applied.

## 2024-06-17 NOTE — ED PROVIDER NOTES
[facility] EMERGENCY DEPT  EMERGENCY DEPARTMENT ENCOUNTER      Pt Name: Ivone Swain  MRN: [id number]  Birthdate 1940  Date of evaluation: 6/17/2024  Provider: Parish Trores MD      HISTORY OF PRESENT ILLNESS      83-year-old female with a history of end-stage renal disease on dialysis Tuesday Thursday and Saturday, hypertension, seizure disorder presenting to the ER for fatigue.  She is accompanied by family today, they report that she has been lethargic all day since about 6 AM this morning.  They checked her pulse ox at home and it was in the 80s.  On arrival to the ER she had an oxygen saturation of about 86%, she was started on 2 L nasal cannula.  She does not typically wear oxygen.  During my interview with her she is slow to answer questions but fully oriented to time and place.  She reports her stomach feels upset but does not have any pain vomiting or diarrhea.              Nursing Notes were reviewed.    REVIEW OF SYSTEMS         Review of Systems   Constitutional:  Positive for fatigue. Negative for fever.   Respiratory:  Positive for shortness of breath. Negative for cough.    Cardiovascular:  Negative for chest pain.   Gastrointestinal:  Positive for nausea. Negative for abdominal distention and vomiting.   Skin:  Negative for rash.   Neurological:  Positive for weakness (Generalized).           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 Decision-making details documented in ED Course.  Radiology: ordered.  ECG/medicine tests: ordered and independent interpretation performed.     Details: EKG at 1530, normal sinus rhythm with PVCs, left axis deviation, left bundle branch block, ventricular rate of 77    Risk  Prescription drug management.  Decision regarding hospitalization.            REASSESSMENT     ED Course as of 06/17/24 1638   Mon Jun 17, 2024   1558 Troponin, High Sensitivity(!!): 256 [BN]   1604 NT Pro-BNP(!): 34,922 [BN]   1635 Patient has an acutely elevated troponin and BNP.  Suspect she is having congestive heart failure and cardiorenal syndrome.  Last hemodialysis was Saturday.  Her potassium is within normal limits.  She is not uremic.  My interpretation of the x-ray shows some pulmonary edema.  According to family she still makes urine, will order her a 60 mg dose of IV Lasix.  She has mild hypoxia but no significant respiratory distress.  She is stable on nasal cannula.  I am going to admit her to the hospitalist service at [facility].  Discussed case with the hospitalist on-call.  We will check a second enzyme, if it is elevating then she will be anticoagulated.  Reviewed her heart cath results from 2023.  No major vessel stenosis during the study.  So she may not have ACS today, might just be from CHF causing enzyme elevation [BN]      ED Course User Index  [BN] Parish Torres MD         CONSULTS:  None    PROCEDURES:     Procedures      (Please note that portions of this note were completed with a voice recognition program.  Efforts were made to edit the dictations but occasionally words are mis-transcribed.)    Parish Torres MD (electronically

## 2024-06-17 NOTE — ED NOTES
TRANSFER - OUT REPORT:    Verbal report given to Lisa SANDRA  on Ivone Swain  being transferred to [facility] for routine progression of patient care       Report consisted of patient's Situation, Background, Assessment and   Recommendations(SBAR).     Information from the following report(s) Nurse Handoff Report was reviewed with the receiving nurse.    [place] Fall Assessment:                           Lines:   Peripheral IV 06/17/24 Left;Anterior Forearm (Active)        Opportunity for questions and clarification was provided.      Patient transported with:  Monitor and O2 @ 2lpm

## 2024-06-18 PROBLEM — I21.A1 TYPE 2 MYOCARDIAL INFARCTION DUE TO HEART FAILURE (HCC): Status: ACTIVE | Noted: 2024-06-17

## 2024-06-18 PROBLEM — I50.9 ACUTE ON CHRONIC CONGESTIVE HEART FAILURE (HCC): Status: ACTIVE | Noted: 2024-06-18

## 2024-06-18 PROBLEM — R79.89 ELEVATED TROPONIN: Status: ACTIVE | Noted: 2024-06-18

## 2024-06-18 PROBLEM — I50.9 TYPE 2 MYOCARDIAL INFARCTION DUE TO HEART FAILURE (HCC): Status: ACTIVE | Noted: 2024-06-17

## 2024-06-18 LAB
ANION GAP SERPL CALC-SCNC: 8 MMOL/L (ref 5–15)
BASOPHILS # BLD: 0.1 K/UL (ref 0–0.1)
BASOPHILS NFR BLD: 1 % (ref 0–1)
BUN SERPL-MCNC: 43 MG/DL (ref 6–20)
BUN/CREAT SERPL: 6 (ref 12–20)
CALCIUM SERPL-MCNC: 9.1 MG/DL (ref 8.5–10.1)
CHLORIDE SERPL-SCNC: 93 MMOL/L (ref 97–108)
CO2 SERPL-SCNC: 28 MMOL/L (ref 21–32)
CREAT SERPL-MCNC: 7.74 MG/DL (ref 0.55–1.02)
DIFFERENTIAL METHOD BLD: ABNORMAL
EKG ATRIAL RATE: 77 BPM
EKG DIAGNOSIS: NORMAL
EKG P AXIS: 36 DEGREES
EKG P-R INTERVAL: 204 MS
EKG Q-T INTERVAL: 434 MS
EKG QRS DURATION: 138 MS
EKG QTC CALCULATION (BAZETT): 491 MS
EKG R AXIS: -38 DEGREES
EKG T AXIS: 113 DEGREES
EKG VENTRICULAR RATE: 77 BPM
EOSINOPHIL # BLD: 0.2 K/UL (ref 0–0.4)
EOSINOPHIL NFR BLD: 3 % (ref 0–7)
ERYTHROCYTE [DISTWIDTH] IN BLOOD BY AUTOMATED COUNT: 14.8 % (ref 11.5–14.5)
GLUCOSE SERPL-MCNC: 83 MG/DL (ref 65–100)
HCT VFR BLD AUTO: 34.4 % (ref 35–47)
HGB BLD-MCNC: 11.6 G/DL (ref 11.5–16)
IMM GRANULOCYTES # BLD AUTO: 0 K/UL (ref 0–0.04)
IMM GRANULOCYTES NFR BLD AUTO: 0 % (ref 0–0.5)
LYMPHOCYTES # BLD: 2.2 K/UL (ref 0.8–3.5)
LYMPHOCYTES NFR BLD: 33 % (ref 12–49)
MCH RBC QN AUTO: 32.4 PG (ref 26–34)
MCHC RBC AUTO-ENTMCNC: 33.7 G/DL (ref 30–36.5)
MCV RBC AUTO: 96.1 FL (ref 80–99)
MONOCYTES # BLD: 0.5 K/UL (ref 0–1)
MONOCYTES NFR BLD: 7 % (ref 5–13)
NEUTS SEG # BLD: 3.7 K/UL (ref 1.8–8)
NEUTS SEG NFR BLD: 56 % (ref 32–75)
NRBC # BLD: 0 K/UL (ref 0–0.01)
NRBC BLD-RTO: 0 PER 100 WBC
PLATELET # BLD AUTO: 233 K/UL (ref 150–400)
PMV BLD AUTO: 9.2 FL (ref 8.9–12.9)
POTASSIUM SERPL-SCNC: 4.4 MMOL/L (ref 3.5–5.1)
RBC # BLD AUTO: 3.58 M/UL (ref 3.8–5.2)
SODIUM SERPL-SCNC: 129 MMOL/L (ref 136–145)
TROPONIN I SERPL HS-MCNC: 217 NG/L (ref 0–51)
TROPONIN I SERPL HS-MCNC: 234 NG/L (ref 0–51)
WBC # BLD AUTO: 6.6 K/UL (ref 3.6–11)

## 2024-06-18 PROCEDURE — 97161 PT EVAL LOW COMPLEX 20 MIN: CPT

## 2024-06-18 PROCEDURE — 2060000000 HC ICU INTERMEDIATE R&B

## 2024-06-18 PROCEDURE — 97116 GAIT TRAINING THERAPY: CPT

## 2024-06-18 PROCEDURE — 2500000003 HC RX 250 WO HCPCS: Performed by: HOSPITALIST

## 2024-06-18 PROCEDURE — 84484 ASSAY OF TROPONIN QUANT: CPT

## 2024-06-18 PROCEDURE — 97165 OT EVAL LOW COMPLEX 30 MIN: CPT

## 2024-06-18 PROCEDURE — 90935 HEMODIALYSIS ONE EVALUATION: CPT

## 2024-06-18 PROCEDURE — 2580000003 HC RX 258: Performed by: HOSPITALIST

## 2024-06-18 PROCEDURE — 94761 N-INVAS EAR/PLS OXIMETRY MLT: CPT

## 2024-06-18 PROCEDURE — 6360000002 HC RX W HCPCS: Performed by: HOSPITALIST

## 2024-06-18 PROCEDURE — 80048 BASIC METABOLIC PNL TOTAL CA: CPT

## 2024-06-18 PROCEDURE — APPSS30 APP SPLIT SHARED TIME 16-30 MINUTES: Performed by: NURSE PRACTITIONER

## 2024-06-18 PROCEDURE — 99222 1ST HOSP IP/OBS MODERATE 55: CPT | Performed by: INTERNAL MEDICINE

## 2024-06-18 PROCEDURE — 5A1D70Z PERFORMANCE OF URINARY FILTRATION, INTERMITTENT, LESS THAN 6 HOURS PER DAY: ICD-10-PCS | Performed by: INTERNAL MEDICINE

## 2024-06-18 PROCEDURE — 6370000000 HC RX 637 (ALT 250 FOR IP): Performed by: HOSPITALIST

## 2024-06-18 PROCEDURE — 85025 COMPLETE CBC W/AUTO DIFF WBC: CPT

## 2024-06-18 PROCEDURE — 6370000000 HC RX 637 (ALT 250 FOR IP): Performed by: NURSE PRACTITIONER

## 2024-06-18 PROCEDURE — 6370000000 HC RX 637 (ALT 250 FOR IP): Performed by: INTERNAL MEDICINE

## 2024-06-18 PROCEDURE — 97530 THERAPEUTIC ACTIVITIES: CPT

## 2024-06-18 PROCEDURE — 99222 1ST HOSP IP/OBS MODERATE 55: CPT | Performed by: STUDENT IN AN ORGANIZED HEALTH CARE EDUCATION/TRAINING PROGRAM

## 2024-06-18 PROCEDURE — 93010 ELECTROCARDIOGRAM REPORT: CPT | Performed by: INTERNAL MEDICINE

## 2024-06-18 PROCEDURE — 36415 COLL VENOUS BLD VENIPUNCTURE: CPT

## 2024-06-18 PROCEDURE — 97535 SELF CARE MNGMENT TRAINING: CPT

## 2024-06-18 RX ADMIN — POLYETHYLENE GLYCOL 3350 17 G: 17 POWDER, FOR SOLUTION ORAL at 21:02

## 2024-06-18 RX ADMIN — ONDANSETRON 4 MG: 2 INJECTION INTRAMUSCULAR; INTRAVENOUS at 18:39

## 2024-06-18 RX ADMIN — PRAVASTATIN SODIUM 40 MG: 20 TABLET ORAL at 20:58

## 2024-06-18 RX ADMIN — LEVETIRACETAM 250 MG: 250 TABLET, FILM COATED ORAL at 20:58

## 2024-06-18 RX ADMIN — SODIUM CHLORIDE, PRESERVATIVE FREE 10 ML: 5 INJECTION INTRAVENOUS at 09:13

## 2024-06-18 RX ADMIN — LEVETIRACETAM 500 MG: 500 TABLET, FILM COATED ORAL at 09:10

## 2024-06-18 RX ADMIN — SODIUM CHLORIDE, PRESERVATIVE FREE 10 ML: 5 INJECTION INTRAVENOUS at 21:07

## 2024-06-18 RX ADMIN — METOPROLOL TARTRATE 25 MG: 25 TABLET, FILM COATED ORAL at 09:34

## 2024-06-18 RX ADMIN — HEPARIN SODIUM 5000 UNITS: 5000 INJECTION INTRAVENOUS; SUBCUTANEOUS at 21:00

## 2024-06-18 RX ADMIN — DEXTRAN 70, GLYCERIN, HYPROMELLOSE 2 DROP: 1; 2; 3 SOLUTION/ DROPS OPHTHALMIC at 09:10

## 2024-06-18 RX ADMIN — HEPARIN SODIUM 5000 UNITS: 5000 INJECTION INTRAVENOUS; SUBCUTANEOUS at 13:40

## 2024-06-18 RX ADMIN — METOPROLOL TARTRATE 12.5 MG: 25 TABLET, FILM COATED ORAL at 20:59

## 2024-06-18 RX ADMIN — FLUTICASONE PROPIONATE 2 SPRAY: 50 SPRAY, METERED NASAL at 09:10

## 2024-06-18 RX ADMIN — HEPARIN SODIUM 5000 UNITS: 5000 INJECTION INTRAVENOUS; SUBCUTANEOUS at 05:24

## 2024-06-18 RX ADMIN — CALCIUM ACETATE 667 MG: 667 CAPSULE ORAL at 12:07

## 2024-06-18 RX ADMIN — CALCIUM ACETATE 667 MG: 667 CAPSULE ORAL at 09:34

## 2024-06-18 RX ADMIN — MONTELUKAST 10 MG: 10 TABLET, FILM COATED ORAL at 20:58

## 2024-06-18 NOTE — FLOWSHEET NOTE
Primary RN SBAR: MARIO Marin, RN  Patient Education provided: procedural / infection control  Incapacitated Nurse partha. provided: yes  Preferred Education method and Primary language: English / Verbal  Hospital associated wait time; reason: Yes, transport delay going back to room  Hepatitis B Surface Ag   Date/Time Value Ref Range Status   11/14/2023 07:05 AM <0.10 Index Final     HEP B SURF AB   Date/Time Value Ref Range Status   05/16/2021 09:04 PM <3.10 mIU/mL Final     Hep B S Ab   Date/Time Value Ref Range Status   11/14/2023 07:05 AM 28.44 mIU/mL Final          06/18/24 1445   Vital Signs   /72   Temp 98.1 °F (36.7 °C)   Pulse 56   Respirations 16   SpO2 98 %   Treatment   Time On 1453   Treatment Goal UF 2-3L as tolerated   Observations & Evaluations   Level of Consciousness 0   Heart Rhythm   (remote telemetry)   Respiratory Quality/Effort Unlabored   O2 Device Nasal cannula   Skin Color   (appropriate)   Skin Condition/Temp Warm;Dry   Edema Generalized   Technical Checks   Dialysis Machine No. 04   RO Machine Number [id number]   Dialyzer Lot No. [id number]   Tubing Lot Number [id number]   All Connections Secure Yes   NS Bag Yes   Saline Line Double Clamped Yes   Dialyzer Revaclear 300   Prime Volume (mL) 200 mL   ICEBOAT I;C;E;B;O;A;T   RO Machine Log Sheet Completed Yes   Machine Alarm Self Test Completed;Passed   Air Foam Detector Tested;Proper Function   Extracorporeal Circuit Tested for Integrity Yes   Machine Conductivity 13.5   Manual Conductivity 13.7   Manual Ph 7.4   Bleach Test (Neg) Yes   Bath Temperature 98.6 °F (37 °C)   Dialysis Bath   K+ (Potassium) 2   Ca+ (Calcium) 2.5   Na+ (Sodium) 138   HCO3 (Bicarb) 39   Bicarbonate Concentrate Lot No. [id number]   Acid Concentrate Lot No. [id number]   Treatment Initiation   Dialyze Hours 4   Treatment  Initiation Universal Precautions maintained;Lines secured to patient;Connections secured;Prime given;Venous Parameters set;Arterial Parameters set;Air foam

## 2024-06-18 NOTE — CONSULTS
[facility]    Ivone Swain  YOB: 1940          Assessment & Plan:     ESRD on HD TTS at [facility]  Volume overload  Debility  HTN  PHTN  Sz d/o  H/o CVA    Rec:  HD today and TTS  UF with HD       Subjective:   CC: ESRD  HPI: Patient of Dr. Carter with ESRD on HD TTS at [facility] is seen for ESRD mgmt. Came in with SOB and volume overlaod.    ROS: Comfortable. Less sob. No n/v  Current Facility-Administered Medications   Medication Dose Route Frequency    metoprolol tartrate (LOPRESSOR) tablet 25 mg  25 mg Oral BID    artificial tears (dextran-hypromellose-glycerin) ophthalmic solution 2 drop  2 drop Both Eyes Daily    albuterol (PROVENTIL) (2.5 MG/3ML) 0.083% nebulizer solution 2 mg  2 mg Nebulization Q12H PRN    calcium acetate (PHOSLO) capsule 667 mg  1 capsule Oral TID WC    fluticasone (FLONASE) 50 MCG/ACT nasal spray 2 spray  2 spray Nasal Daily    levETIRAcetam (KEPPRA) tablet 500 mg  500 mg Oral QAM    montelukast (SINGULAIR) tablet 10 mg  10 mg Oral QHS    pravastatin (PRAVACHOL) tablet 40 mg  40 mg Oral QHS    sodium chloride flush 0.9 % injection 5-40 mL  5-40 mL IntraVENous 2 times per day    sodium chloride flush 0.9 % injection 5-40 mL  5-40 mL IntraVENous PRN    0.9 % sodium chloride infusion   IntraVENous PRN    heparin (porcine) injection 5,000 Units  5,000 Units SubCUTAneous 3 times per day    ondansetron (ZOFRAN-ODT) disintegrating tablet 4 mg  4 mg Oral Q8H PRN    Or    ondansetron (ZOFRAN) injection 4 mg  4 mg IntraVENous Q6H PRN    polyethylene glycol (GLYCOLAX) packet 17 g  17 g Oral Daily PRN    acetaminophen (TYLENOL) tablet 650 mg  650 mg Oral Q6H PRN    Or    acetaminophen (TYLENOL) suppository 650 mg  650 mg Rectal Q6H PRN    levETIRAcetam (KEPPRA) tablet 250 mg  250 mg Oral Nightly    levETIRAcetam (KEPPRA) tablet 250 mg  250 mg Oral Once per day on Tue Thu Sat          Objective:     Vitals:  Blood pressure (!) 157/70,  pulse 68, temperature 98.1 °F (36.7 °C), temperature source Oral, resp. rate 16, height 1.575 m (5' 2\"), weight 66.7 kg (147 lb), SpO2 94 %.  Temp (24hrs), Av.1 °F (31.7 °C), Min:35.6 °F (2 °C), Max:98.9 °F (37.2 °C)      Intake and Output:  No intake/output data recorded.  No intake/output data recorded.    Physical Exam:               GENERAL ASSESSMENT: NAD  HEENT:Nontraumatic   CHEST: On oxygen, dec BS  HEART: S1S2  ABDOMEN: Soft,NT  EXTREMITY: trace EDEMA; RUE AVF  NEURO: Grossly non focal          ECG/rhythm:    Data Review        Recent Labs     24  1523 24  0200   * 129*   K 4.8 4.4   CL 92* 93*   CO2 28 28   BUN 36* 43*   CREATININE 6.86* 7.74*   GLUCOSE 99 83   CALCIUM 9.4 9.1           : Edison Gonzales MD  2024        [facility]:  [email or web address]  [email or web address]    [place] office:  [address], [address]  [place] [address]  Phone: [phone]  Fax :     [phone]    [place] office:  [address] [place] [address]  Phone - [phone]  Fax - [phone]

## 2024-06-18 NOTE — PROGRESS NOTES
[facility]  [address] [place] [address]  [phone]      Hospitalist  Progress Note      NAME:       Ivone Swain   :        1940  MRM:        [id number]    Date of service: 2024      Subjective: Patient seen and examined by me. Patient admitted with volume overload. She says she still has DUKES. No chest discomfort. No cough and no fever. No nausea or vomiting.     Objective:    Vital Signs:    /66   Pulse 56   Temp 97.7 °F (36.5 °C) (Oral)   Resp 16   Ht 1.575 m (5' 2\")   Wt 66.7 kg (147 lb)   SpO2 95%   BMI 26.89 kg/m²      No intake or output data in the 24 hours ending 24 1236     Current inpatient medications reviewed:  Current Facility-Administered Medications   Medication Dose Route Frequency    metoprolol tartrate (LOPRESSOR) tablet 25 mg  25 mg Oral BID    artificial tears (dextran-hypromellose-glycerin) ophthalmic solution 2 drop  2 drop Both Eyes Daily    albuterol (PROVENTIL) (2.5 MG/3ML) 0.083% nebulizer solution 2 mg  2 mg Nebulization Q12H PRN    calcium acetate (PHOSLO) capsule 667 mg  1 capsule Oral TID WC    fluticasone (FLONASE) 50 MCG/ACT nasal spray 2 spray  2 spray Nasal Daily    levETIRAcetam (KEPPRA) tablet 500 mg  500 mg Oral QAM    montelukast (SINGULAIR) tablet 10 mg  10 mg Oral QHS    pravastatin (PRAVACHOL) tablet 40 mg  40 mg Oral QHS    sodium chloride flush 0.9 % injection 5-40 mL  5-40 mL IntraVENous 2 times per day    sodium chloride flush 0.9 % injection 5-40 mL  5-40 mL IntraVENous PRN    0.9 % sodium chloride infusion   IntraVENous PRN    heparin (porcine) injection 5,000 Units  5,000 Units SubCUTAneous 3 times per day    ondansetron (ZOFRAN-ODT) disintegrating tablet 4 mg  4 mg Oral Q8H PRN    Or    ondansetron (ZOFRAN) injection 4 mg  4 mg IntraVENous Q6H PRN    polyethylene glycol (GLYCOLAX) packet 17 g  17 g Oral Daily PRN     microangiopathic white matter changes. Electronically signed by EMMANUEL DAWN    XR CHEST PORTABLE    Result Date: 6/17/2024  Mild interstitial pulmonary edema. Electronically signed by Deshaun Sagastume MD    Imaging studies reviewed and reports noted, Telemetry reviewed and independently interpreted by me and available notes from other care providers - all reviewed by me on: 6/18/2024    Assessment and Plan:    Acute on chronic heart failure with preserved ejection fraction (HFpEF) POA: due to volume overload. Echo done 2/2024 showed an EF of 55%. Continue Metoprolol. HD for fluid management.     End-stage renal disease on hemodialysis / Volume overload / Hyponatremia with excess extracellular fluid volume POA: she says she has had all sessions as recommended. Now with volume overload. Consult nephrology for HD. Usually on a TTS schedule    Acute hypoxemic respiratory failure POA: likely from the CHF. Usually not on home oxygen. Wean as tolerated to keep saturations > 90%     Type 2 myocardial infarction due to heart failure POA: troponin elevation likely from the CHF. Trending down. Seen by cardiology.    CAD POA: s/p [facility] 11/2023 with Lcx with 40-50% stenosis, RCA 50-60% stenosis, no intervention performed. Not taking Plavix or Asprin. Continue Pravastatin    Anemia due to chronic kidney disease POA: Hgb stable. JP as per nephrology    HTN (hypertension), benign / Hyperlipidemia POA: Continue Metoprolol and Pravastatin    Seizures / History of stroke POA: continue Keppra    Care Plan discussed with: Patient, Nursing, CM     Prophylaxis:  Hep SQ                Anticipated Disposition:  HH PT, OT, RN    PCP:      Yaniv Hyde MD     I have personally examined and treated the patient at bedside during this period. To assist coordination of care and communication with nursing and staff, this note may be preliminary early in the day, but finalized by end of the

## 2024-06-18 NOTE — PROGRESS NOTES
2053 Pagerosales MD: \"Patient is a newly admitted patient transferred from [place]. Family is stating patient takes Keppra 500mg in the morning and 250 at night on non dialysis days. Patient had dialysis on TThSa and her last date of being dialyzed was 6/15. Are you able to add her nightly dose for tonight. Family is concerned for her.\" Awaiting response.     2100 placed a wound care consult for patients heels. They are non-blanchable and boggy.  Floated patients heels overnight.   2115 MD placed orders for 250mg nightly keppra dose.     2130 Paged MD \"Patient trops were being serial at last facility, Did you want them to continue being taken throughout the night?\"    2200 MD placed trop orders for 2200.    Trops were done at 2200 and 0200. Last trop was 217 and NP  Mariann.

## 2024-06-18 NOTE — CONSULTS
Palliative Medicine  Patient Name: Ivone Swain  YOB: 1940  MRN: [id number]  Age: 83 y.o.  Gender: female    Date of Initial Consult: 2024  Date of Service: 2024  Time: 5:37 PM  Provider: Titus Sanderson MD  Hospital Day: 2  Admit Date: 2024  Referring Provider: Mike Curry MD    Reasons for Consultation:  Goals of Care    HISTORY OF PRESENT ILLNESS (HPI):   Ivone Swain is a 83 y.o. female with a past medical history of ESRD on HD, HTN, Epilepsy (Dr. 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   Evaluation on presentation notable for BUN 36, Cr 6.86, proBNP 34,922, troponin 256, albumin 2.9.  CXR with mild pulmonary edema.  CT head without acute process, notable for chronic CVA changes.  Nephrology consulted for HD and volume management.  Started on supplemental O2 for respiratory support, suspected hypoxia due to volume overload.  Cardiology consulted and obtaining TTE and outpatient records, had [facility] in 2023 with no intervention performed.  Numerous hospitalizations and rehab stays over past 4 months.  Most recently discharged from SNF 5 days ago, doing okay at home initially but then family noticed desaturation acutely day of presentation which led to ED visit.    Psychosocial:   ,   around .  2 sons: Reufgio and Soy Swain.  Lives alone in a condo complex, son Refugio and WICHO Sandoval live 2 floors above.  Uses rollator and wheelchair at home.  Supported by lupe and family.    PALLIATIVE DIAGNOSES:    ESRD on HD  Acute Hypoxic Respiratory Failure, pulm edema, volume overload  HFpEF  HTN  Epilepsy  CVA history  Palliative Care Encounter    ASSESSMENT AND PLAN:   Reviewed medical chart including admit H&P, consultant notes, MAR, and recent  labs/imaging.    Met with patient at bedside.  Introduced role of Palliative service.  She is sitting in bedside chair, eating lunch, and visiting with her sister.  Appears comfortable and without acute complaints.  Offered that I can come back at later time for full visit which she politely agrees to.  Second attempt made for visit later in afternoon but she is off the floor.  Extensive conversation with patient's son, Refugio, via phone.  He is worried about her overall state of health and had questions about options for next steps as she has been in and out of hospitals and rehab for past 4-5 months, \"taking 1 step forward and 4 steps back.\"  Discussed Hospice as possible avenue of care if she has become tired of this process or no longer finding benefit in it.  Open communication that Hospice would not continue to provide dialysis if this is the decision made; life expectancy at that time would likely be measured in 1-2 weeks and explained what this process would look like.  He wishes to discuss this with patient to extent she is able to understand, though realizes that with her history of strokes and cognitive impairment this may not be a decision she can fully understand and make for herself.  I cannot comment on capacity today given limited evaluation.  Surrogate  No AMD on file.  .  Sons are legal NOK.  Refugio provides that patient completed AMD in past naming himself primary and his wife Lori secondary - asked to bring copy.  Code Status  Full Code.  Refugio believes patient would desire DNR and she does have document declaring wish for natural death, though this is not DDNR.  Need to assess with patient if she has capacity for this decision; if no capacity, then can appropriately be made DNR per NOK decision.  Initial consult note routed to primary continuity provider and/or primary health care team members  Palliative will continue to follow along.  Please call with any palliative questions or concerns.

## 2024-06-18 NOTE — H&P
Hospitalist Admission Note      NAME:  Ivone Swain   :  1940   MRN:  [id number]     Date/Time:  2024 8:18 PM    Patient PCP: Yaniv Hyde MD    ________________________________________________________________________    Given the patient's current clinical presentation, I have a high level of concern for decompensation if discharged from the emergency department.  Complex decision making was performed, which includes reviewing the patient's available past medical records, laboratory results, and x-ray films.       My assessment of this patient's clinical condition and my plan of care is as follows.    Assessment / Plan:  Patient is a 83-year-old female with history of ESRD on hemodialysis, diastolic CHF, hyperlipidemia, seizure disorder comes to the hospital with acute hypoxemic respiratory failure, fluid overload and acute on chronic diastolic heart failure, elevated troponin.  Patient is admitted for serial troponin, dialysis and PT OT.    1.  Acute hypoxemic respiratory failure  Due to fluid overload.  Patient is a dialysis patient and gets dialysis on .  proBNP is 34,000.    2.  Elevated troponin  Due to renal failure and fluid overload.  Trend the troponin.    3.  Acute on chronic diastolic heart failure  proBNP is 34,000.  Nephrology consult for dialysis.  Blood pressure control.  Patient given Lasix in the ER.  Last echo showed EF of 55% with diastolic dysfunction and 2024.    4.  Generalized weakness  Probably due to fluid overload and hypoxia.  Check UA    5.  ESRD on hemodialysis  On .  Nephrology consult  Continue PhosLo    6.  Hyperlipidemia  Continue statins.    7.  Seizure disorder  Continue Keppra           I have personally reviewed the radiographs, laboratory data in Epic and decisions and statements above are based partially on this personal interpretation.    Code Status: Full Code  DVT Prophylaxis: Hep SQ  GI  cyanosis or clubbing  Skin: No rashes or ulcers, skin turgor is good  Neuro:  Cranial nerves are grossly intact, no focal motor weakness, follows commands appropriately  Psych:  Good insight, oriented to person, place and time, alert          _______________________________________________________________________  Care Plan discussed with:    Comments   Patient x Discussed with patient in room. POC outlined and Questions answered    Family      RN x    Care Manager                    Consultant:  pierre GARCIA MD   _______________________________________________________________________  Recommended Disposition:   Home with Family x   HH/PT/OT/RN    SNF/LTC    [facility]    ________________________________________________________________________  TOTAL TIME: 60 minutes        Comments   >50% of visit spent in counseling and coordination of care  Chart reviewed  Discussion with patient and/or family and questions answered     ________________________________________________________________________  Signed: Sharron Ogden MD        Procedures: see electronic medical records for all procedures/Xrays/labs and details which were not copied into this note but were reviewed prior to creation of Plan.

## 2024-06-18 NOTE — PLAN OF CARE
Problem: Occupational Therapy - Adult  Goal: By Discharge: Performs self-care activities at highest level of function for planned discharge setting.  See evaluation for individualized goals.  Description: FUNCTIONAL STATUS PRIOR TO ADMISSION:  Patient normally MOD I for ADLs and mobility with use of rollator. Patient was just discharged home from SNF after being in and out of hospitals/rehab for past 4 months. Does not drive. Endorses fall history. No home O2 use.    , ADL Assistance: Independent,  ,  ,  ,  ,  ,  , Ambulation Assistance: Independent, Transfer Assistance: Independent, Active : No     HOME SUPPORT: Patient lived alone with son and daughter locally (live in condo above pt) to provide assistance/support.    Occupational Therapy Goals:  Initiated 6/18/2024  1.  Patient will perform grooming, standing at sink, with Modified Barry within 7 day(s).  2.  Patient will perform lower body dressing with Supervision within 7 day(s).  3.  Patient will perform bathing with Supervision within 7 day(s).  4.  Patient will perform toilet transfers with Modified Barry  within 7 day(s).  5.  Patient will perform all aspects of toileting with Modified Barry within 7 day(s).  6.  Patient will participate in upper extremity therapeutic exercise/activities with Barry for 10 minutes within 7 day(s).    Outcome: Progressing     OCCUPATIONAL THERAPY EVALUATION    Patient: Ivone Swain (83 y.o. female)  Date: 6/18/2024  Primary Diagnosis: NSTEMI (non-ST elevated myocardial infarction) ([facility]) [I21.4]  Cardiorenal syndrome with renal failure, stage 5 chronic kidney disease or end stage renal disease, with heart failure ([facility]) [I13.2]  Acute on chronic congestive heart failure, unspecified heart failure type ([facility]) [I50.9]         Precautions: Fall Risk                  ASSESSMENT :  The patient is limited by decreased functional mobility, independence in ADLs, strength, activity tolerance on day  One level  Home Access: Elevator  Bathroom Shower/Tub: Walk-in shower  Bathroom Toilet: Handicap height  Bathroom Equipment: Grab bars in shower, Built-in shower seat, Shower chair, Grab bars around toilet  Bathroom Accessibility: Walker accessible  Home Equipment: Sock aid, Rollator, Reacher  Has the patient had two or more falls in the past year or any fall with injury in the past year?: Yes  ADL Assistance: Independent  Ambulation Assistance: Independent  Transfer Assistance: Independent  Active : No  Patient's  Info: son      Hand Dominance: right     EXAMINATION OF PERFORMANCE DEFICITS:    Cognitive/Behavioral Status:  Orientation  Overall Orientation Status: Within Normal Limits  Cognition  Overall Cognitive Status: WFL  Cognition Comment: occasionally slow to respond; flat affect    Range of Motion:   AROM: Within functional limits  PROM: Within functional limits    Strength:  Strength: Generally decreased, functional    Coordination:  Coordination: Within functional limits     Coordination: Generally decreased, functional      Tone:   Tone: Normal    Functional Mobility and Transfers for ADLs:    Bed Mobility:     Bed Mobility Training  Bed Mobility Training: Yes  Overall Level of Assistance: Additional time;Supervision  Rolling: Modified independent  Supine to Sit: Supervision;Stand-by assistance;Additional time  Scooting: Supervision    Transfers:      Transfer Training  Transfer Training: Yes  Sit to Stand: Stand-by assistance;Contact-guard assistance  Stand to Sit: Stand-by assistance     Balance:      Balance  Sitting: Intact  Standing: With support  Standing - Static: Good  Standing - Dynamic: Good;Constant support    ADL Assessment:   Feeding: Independent     Grooming: Supervision;Stand by assistance  Grooming Skilled Clinical Factors: for standing grooming tasks    Skin Care: Chlorhexidine wipes;Bath wipes    LE Dressing: Minimal assistance    ADL Intervention and task

## 2024-06-18 NOTE — PLAN OF CARE
Problem: Physical Therapy - Adult  Goal: By Discharge: Performs mobility at highest level of function for planned discharge setting.  See evaluation for individualized goals.  Description: FUNCTIONAL STATUS PRIOR TO ADMISSION: Patient was modified independent using a rollator for functional mobility. Of note, pt recently returned home from SNF. Lives alone in [facility] [place] [facility], states that son/ DIL live in [place] upstairs    Physical Therapy Goals  Initiated 6/18/2024  1.  Patient will move from supine to sit and sit to supine in bed with modified independence within 7 day(s).    2.  Patient will perform sit to stand with modified independence within 7 day(s).  3.  Patient will transfer from bed to chair and chair to bed with modified independence using the least restrictive device within 7 day(s).  4.  Patient will ambulate with supervision/set-up for 150 feet with the least restrictive device within 7 day(s).     Outcome: Progressing   PHYSICAL THERAPY EVALUATION    Patient: Ivone Swain (83 y.o. female)  Date: 6/18/2024  Primary Diagnosis: NSTEMI (non-ST elevated myocardial infarction) ([facility]) [I21.4]  Cardiorenal syndrome with renal failure, stage 5 chronic kidney disease or end stage renal disease, with heart failure ([facility]) [I13.2]  Acute on chronic congestive heart failure, unspecified heart failure type ([facility]) [I50.9]       Precautions: Restrictions/Precautions: Fall Risk                      ASSESSMENT :   DEFICITS/IMPAIRMENTS:   The patient is limited by decreased functional mobility, strength, activity tolerance s/p admit with fatigue, SOB/ O2 desat, elevated troponin. Pt seen by cardiology, recommending echo.    Pt received in chair following OT session. Presents with flat affect, increased time to answer questions. Pt mobilized with SBA/ CGA for transfer sit<->stand from chair height and amb on unit with RW. Noted slow gait speed, required cues for walker mgmt with turns due to use of rollator at baseline.  [id number]. PMID: [id number]; PMCID: [id number].  4. Lai PEREZ, Mara S, Abdi W, Ruthie P. AM-PAC Short Forms Manual 4.0. Revised 2/2020.                                                                                                                                                                                                                              Pain Rating:  No c/o pain    Activity Tolerance:   Good    After treatment:   Patient left in no apparent distress sitting up in chair, Call bell within reach, and Bed/ chair alarm activated    COMMUNICATION/EDUCATION:   The patient's plan of care was discussed with: registered nurse    Patient Education  Education Given To: Patient  Education Provided: Role of Therapy;Plan of Care;Fall Prevention Strategies  Education Method: Verbal  Barriers to Learning:  (required increased time to process information/ responed to questions this date)  Education Outcome: Verbalized understanding;Continued education needed    Thank you for this referral.  Sarah Cruz, PT  Minutes: 17      Physical Therapy Evaluation Charge Determination   History Examination Presentation Decision-Making   HIGH Complexity :3+ comorbidities / personal factors will impact the outcome/ POC  MEDIUM Complexity : 3 Standardized tests and measures addressin body structure, function, activity limitation and / or participation in recreation  LOW Complexity : Stable, uncomplicated  AM-PAC  LOW    Based on the above components, the patient evaluation is determined to be of the following complexity level: Low

## 2024-06-18 NOTE — PROGRESS NOTES
patient. Throughout the conversation, I had to lean into the patient to speak with the patient. When patient goes through difficult times, she prays a lot. Her belief in Mert is important to her. Per RN Nurse, Mert sustains her and gives her inner strength. Patient welcomed prayer for healing. Provided prayers of healing/wellness over the patient. Patient was thankful for the prayers and the visitation. Patient said she was feeling good today. Please contact spiritual health services for further assistance needed.    Vinay Vidal MDiv  Staff    [facility]  Paging Service [phone] (PRAY)

## 2024-06-18 NOTE — CONSULTS
INVASIVE VASCULAR N/A 2/29/2024    Fistulogram right performed by Johann Berrios MD at [facility] CARDIAC CATH LAB    INVASIVE VASCULAR N/A 2/29/2024    Ultrasound guided vascular access performed by Johann Berrios MD at [facility] CARDIAC CATH LAB    INVASIVE VASCULAR N/A 2/29/2024    Angioplasty fistula/dialysis circuit performed by Johann Berrios MD at [facility] CARDIAC CATH LAB    IR NONTUNNELED VASCULAR CATHETER  5/16/2021    IR NONTUNNELED VASCULAR CATHETER 5/16/2021 [facility] RAD ANGIO IR    IR NONTUNNELED VASCULAR CATHETER  5/16/2021    IR TUNNELED CATHETER PLACEMENT GREATER THAN 5 YEARS  5/18/2021    IR TUNNELED CATHETER PLACEMENT GREATER THAN 5 YEARS 5/18/2021 [facility] RAD ANGIO IR    IR TUNNELED CATHETER PLACEMENT GREATER THAN 5 YEARS  5/18/2021    TONSILLECTOMY      TOTAL KNEE ARTHROPLASTY Right 2019     Social Hx:  reports that she is a non-smoker but has been exposed to tobacco smoke. She has never used smokeless tobacco. She reports that she does not drink alcohol and does not use drugs.  Family Hx: family history includes Cancer in her sister; Diabetes in her sister; Hypertension in her mother and sister; Parkinson's Disease in her father; Stroke in her father.  Allergies   Allergen Reactions    Latex Rash    Codeine Other (See Comments)    Levofloxacin Other (See Comments)     Hallucinations    Lisinopril Cough    Sulfa Antibiotics Itching and Rash          OBJECTIVE:  Wt Readings from Last 3 Encounters:   06/17/24 66.7 kg (147 lb)   02/28/24 74 kg (163 lb 3.2 oz)   02/12/24 79.3 kg (174 lb 13.2 oz)     No intake/output data recorded.  No intake/output data recorded.    Physical Exam:    Vitals:   Vitals:    06/17/24 2313 06/18/24 0332 06/18/24 0335 06/18/24 0807   BP: (!) 150/74 (!) 158/80 (!) 158/80 (!) 149/55   Pulse: 70 70 71 64   Resp: 16 18 18 16   Temp: 97.7 °F (36.5 °C) 97.5 °F (36.4 °C) 97.5 °F (36.4 °C) 98.1 °F (36.7 °C)   TempSrc: Axillary Axillary Axillary Oral   SpO2: 96%  95% 94%   Weight:       Height:      white matter  ill-defined hypodensities, nonspecific and likely microangiopathic white matter  disease. There is no evidence of acute brain infarct, intracranial hemorrhage,  or extraaxial fluid collection. The ventricles are normal in size and position.  Basilar cisterns are patent. No midline shift.    The orbital contents are within normal limits.  The paranasal sinuses, mastoid  air cells, and middle ears are clear.  There are no significant osseous or  scalp/soft tissue hematoma.    Impression  No evidence of acute intracranial abnormality.    Chronic changes include left greater than right PCA territory infarctions, and  at least mild microangiopathic white matter changes.        Electronically signed by EMMANUEL DAWN      Lab Results   Component Value Date/Time     06/18/2024 02:00 AM    K 4.4 06/18/2024 02:00 AM    CL 93 06/18/2024 02:00 AM    CO2 28 06/18/2024 02:00 AM    BUN 43 06/18/2024 02:00 AM    CREATININE 7.74 06/18/2024 02:00 AM    GLUCOSE 83 06/18/2024 02:00 AM    CALCIUM 9.1 06/18/2024 02:00 AM    LABGLOM 5 06/18/2024 02:00 AM    LABGLOM 4 02/29/2024 02:05 AM    LABGLOM 6 03/01/2023 11:03 AM      Lab Results   Component Value Date    BNP 8,160 (H) 08/23/2022     Lab Results   Component Value Date    WBC 6.6 06/18/2024    HGB 11.6 06/18/2024    HCT 34.4 (L) 06/18/2024    MCV 96.1 06/18/2024     06/18/2024     No results for input(s): \"CHOL\", \"HDLC\", \"HBA1C\" in the last 72 hours.    Invalid input(s): \"TGL\", \"LDLC\"    Current meds:    Current Facility-Administered Medications:     artificial tears (dextran-hypromellose-glycerin) ophthalmic solution 2 drop, 2 drop, Both Eyes, Daily, Sharron Ogden MD    albuterol (PROVENTIL) (2.5 MG/3ML) 0.083% nebulizer solution 2 mg, 2 mg, Nebulization, Q12H PRN, Sharron Ogden MD    calcium acetate (PHOSLO) capsule 667 mg, 1 capsule, Oral, TID Alin DALTON Amarinder P, MD    fluticasone (FLONASE) 50 MCG/ACT nasal spray 2 spray, 2 spray,

## 2024-06-18 NOTE — HOME HEALTH
Failure Handbook    Clinician reviewed orientation to home health booklet with patient/caregiver including agency phone number, agency complaint process, state hotline number, as well as Joint Commission's quality hotline number. Emergency preparedness reviewed with patient/caregiver in home health booklet. Consent forms signed    Patient/caregiver instructed on plan of care and are agreeable to plan of care at this time.      Plan of care and admission to home health status called to attending physician. The following practitioner has agreed to sign the ongoing POC Dr Hyde, and was notified of the following: visit frequency of 1w1, 2w,3 1w6, 3 prn    Discharge planning discussed with patient and caregiver. Discharge planning as follows: When goals of disease process education completed and therapy goals met  Patient/caregiver did verbalize agreement with discharge planning.     Specific plan for next visit: Weight log check, bp check, education of CHF

## 2024-06-18 NOTE — CARE COORDINATION
06/18/24 0956   Service Assessment   Patient Orientation Alert and Oriented;Person;Place;Situation;Self   Cognition Alert   History Provided By Patient   Primary Caregiver Self   Support Systems Children;Family Members   Patient's Healthcare Decision Maker is: Named in Scanned ACP Document   PCP Verified by CM Yes  (Yaniv Hyde MD)   Last Visit to PCP Within last 6 months   Prior Functional Level Independent in ADLs/IADLs;Assistance with the following:;Mobility   Current Functional Level Independent in ADLs/IADLs;Mobility   Can patient return to prior living arrangement Yes   Family able to assist with home care needs: Yes   Would you like for me to discuss the discharge plan with any other family members/significant others, and if so, who? No   Financial Resources Medicare  (BCBS supplement)   Social/Functional History   Lives With Alone   Type of Home Condo   Home Layout One level   Home Access Elevator   Bathroom Shower/Tub Walk-in shower   Bathroom Toilet Handicap height   Bathroom Equipment Grab bars in shower;Built-in shower seat;Shower chair;Grab bars around toilet   Bathroom Accessibility Walker accessible   Home Equipment Sock aid;Rollator;Reacher   Active  No   Patient's  Info son   Discharge Planning   Type of Residence Apartment   Patient expects to be discharged to: Apartment   Services At/After Discharge   Mode of Transport at Discharge Other (see comment)  (son)   Confirm Follow Up Transport Family     [facility]: [facility]  Pt's son to transport her home at d/c

## 2024-06-19 PROBLEM — R45.89 NEED FOR EMOTIONAL SUPPORT: Status: ACTIVE | Noted: 2024-06-19

## 2024-06-19 PROBLEM — Z71.89 COUNSELING REGARDING ADVANCE CARE PLANNING AND GOALS OF CARE: Status: ACTIVE | Noted: 2024-06-19

## 2024-06-19 PROBLEM — Z51.5 PALLIATIVE CARE BY SPECIALIST: Status: ACTIVE | Noted: 2024-06-19

## 2024-06-19 PROCEDURE — 6370000000 HC RX 637 (ALT 250 FOR IP): Performed by: INTERNAL MEDICINE

## 2024-06-19 PROCEDURE — 2500000003 HC RX 250 WO HCPCS: Performed by: HOSPITALIST

## 2024-06-19 PROCEDURE — 2060000000 HC ICU INTERMEDIATE R&B

## 2024-06-19 PROCEDURE — 2580000003 HC RX 258: Performed by: HOSPITALIST

## 2024-06-19 PROCEDURE — 94761 N-INVAS EAR/PLS OXIMETRY MLT: CPT

## 2024-06-19 PROCEDURE — 99231 SBSQ HOSP IP/OBS SF/LOW 25: CPT | Performed by: INTERNAL MEDICINE

## 2024-06-19 PROCEDURE — 6370000000 HC RX 637 (ALT 250 FOR IP): Performed by: HOSPITALIST

## 2024-06-19 PROCEDURE — 97116 GAIT TRAINING THERAPY: CPT

## 2024-06-19 PROCEDURE — 6360000002 HC RX W HCPCS: Performed by: HOSPITALIST

## 2024-06-19 PROCEDURE — 2700000000 HC OXYGEN THERAPY PER DAY

## 2024-06-19 PROCEDURE — APPSS30 APP SPLIT SHARED TIME 16-30 MINUTES: Performed by: NURSE PRACTITIONER

## 2024-06-19 PROCEDURE — 99233 SBSQ HOSP IP/OBS HIGH 50: CPT | Performed by: FAMILY MEDICINE

## 2024-06-19 RX ADMIN — SODIUM CHLORIDE, PRESERVATIVE FREE 10 ML: 5 INJECTION INTRAVENOUS at 21:13

## 2024-06-19 RX ADMIN — LEVETIRACETAM 250 MG: 250 TABLET, FILM COATED ORAL at 21:13

## 2024-06-19 RX ADMIN — FLUTICASONE PROPIONATE 2 SPRAY: 50 SPRAY, METERED NASAL at 08:10

## 2024-06-19 RX ADMIN — PRAVASTATIN SODIUM 40 MG: 20 TABLET ORAL at 21:12

## 2024-06-19 RX ADMIN — HEPARIN SODIUM 5000 UNITS: 5000 INJECTION INTRAVENOUS; SUBCUTANEOUS at 13:27

## 2024-06-19 RX ADMIN — CALCIUM ACETATE 667 MG: 667 CAPSULE ORAL at 13:27

## 2024-06-19 RX ADMIN — METOPROLOL TARTRATE 12.5 MG: 25 TABLET, FILM COATED ORAL at 08:10

## 2024-06-19 RX ADMIN — CALCIUM ACETATE 667 MG: 667 CAPSULE ORAL at 18:36

## 2024-06-19 RX ADMIN — CALCIUM ACETATE 667 MG: 667 CAPSULE ORAL at 08:10

## 2024-06-19 RX ADMIN — MONTELUKAST 10 MG: 10 TABLET, FILM COATED ORAL at 21:13

## 2024-06-19 RX ADMIN — HEPARIN SODIUM 5000 UNITS: 5000 INJECTION INTRAVENOUS; SUBCUTANEOUS at 21:12

## 2024-06-19 RX ADMIN — SODIUM CHLORIDE, PRESERVATIVE FREE 10 ML: 5 INJECTION INTRAVENOUS at 09:51

## 2024-06-19 RX ADMIN — DEXTRAN 70, GLYCERIN, HYPROMELLOSE 2 DROP: 1; 2; 3 SOLUTION/ DROPS OPHTHALMIC at 08:10

## 2024-06-19 RX ADMIN — HEPARIN SODIUM 5000 UNITS: 5000 INJECTION INTRAVENOUS; SUBCUTANEOUS at 05:53

## 2024-06-19 RX ADMIN — METOPROLOL TARTRATE 12.5 MG: 25 TABLET, FILM COATED ORAL at 21:12

## 2024-06-19 RX ADMIN — LEVETIRACETAM 500 MG: 500 TABLET, FILM COATED ORAL at 08:10

## 2024-06-19 NOTE — CONSULTS
Palliative Medicine  Patient Name: Ivone Swain  YOB: 1940  MRN: [id number]  Age: 83 y.o.  Gender: female    Date of Initial Consult: 2024  Date of Service: 2024  Time: 11:53 AM  Provider: Mandy Zamora MD  Hospital Day: 3  Admit Date: 2024  Referring Provider: Mike Curry MD    Reasons for Consultation:  Goals of Care    HISTORY OF PRESENT ILLNESS (HPI):   Ivone Swain is a 83 y.o. female with a past medical history of ESRD on HD, HTN, Epilepsy (Dr. 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   Evaluation on presentation notable for BUN 36, Cr 6.86, proBNP 34,922, troponin 256, albumin 2.9.  CXR with mild pulmonary edema.  CT head without acute process, notable for chronic CVA changes.  Nephrology consulted for HD and volume management.  Started on supplemental O2 for respiratory support, suspected hypoxia due to volume overload.  Cardiology consulted and obtaining TTE and outpatient records, had [facility] in 2023 with no intervention performed.  Numerous hospitalizations and rehab stays over past 4 months.  Most recently discharged from SNF 5 days ago, doing okay at home initially but then family noticed desaturation acutely day of presentation which led to ED visit.    Psychosocial:   ,   around .  2 sons: Refugio and Soy Swain.  Lives alone in a condo complex, son Refugio and WICHO Sandoval live 2 floors above.  Uses rollator and wheelchair at home.  Supported by lupe and family.    PALLIATIVE DIAGNOSES:    ESRD on HD  Acute Hypoxic Respiratory Failure, pulm edema, volume overload  HFpEF  HTN  Epilepsy  CVA history  Palliative Care Encounter  Goals of Care and Code Status discussions  Patient and family support    ASSESSMENT AND PLAN:   I am covering    09/10/21 78.8 kg (173 lb 11.6 oz)   09/08/21 77.6 kg (171 lb)   08/23/21 78.5 kg (173 lb 1 oz)   03/10/21 92.1 kg (203 lb)        Current Diet: ADULT DIET; Regular; Low Sodium (2 gm); Low Potassium (Less than 3000 mg/day); Low Phosphorus (Less than 1000 mg)       PSYCHOSOCIAL/SPIRITUAL SCREENING:   Palliative IDT has assessed this patient for cultural preferences / practices and a referral made as appropriate to needs (Cultural Services, Patient Advocacy, Ethics, etc.)    Spiritual Affiliation: Orthodox    Any spiritual / Mu-ism concerns:  [] Yes /  [x] No   If \"Yes\" to discuss with pastoral care during IDT     Does caregiver feel burdened by caring for their loved one:   [] Yes /  [] No /  [x] No Caregiver Present/Available [] No Caregiver [] Pt Lives at Facility  If \"Yes\" to discuss with social work during IDT    Anticipatory grief assessment:   [x] Normal  / [] Maladaptive     If \"Maladaptive\" to discuss with social work during IDT    ESAS Anxiety:      ESAS Depression:          LAB AND IMAGING FINDINGS:   Objective data reviewed:  labs, images, records, medication use, vitals, and chart     FINAL COMMENTS   Thank you for allowing Palliative Medicine to participate in the care of Ivone Swain.    Only check if applicable and billing time based rather than MDM  [x] The total encounter time on this service date was __55, including 20 minute conversation with son__ minutes which was spent performing a face-to-face encounter and personally completing the provider-level activities documented in the note. This includes time spent prior to the visit and after the visit in direct care of the patient. This time does not include time spent in any separately reportable services.    Electronically signed by   Mandy Zamora MD  Palliative Care Team  on 6/19/2024 at 11:53 AM

## 2024-06-19 NOTE — PROGRESS NOTES
[facility]    Ivone Swain  YOB: 1940          Assessment & Plan:     ESRD on HD TTS at [facility]  Volume overload  Debility  HTN  PHTN  Sz d/o  H/o CVA    Rec:  HD yesterday -2kg gasper well  HD tts       Subjective:   CC: ESRD  HPI: Tolerated HD yesterday  Current Facility-Administered Medications   Medication Dose Route Frequency    metoprolol tartrate (LOPRESSOR) tablet 12.5 mg  12.5 mg Oral BID    artificial tears (dextran-hypromellose-glycerin) ophthalmic solution 2 drop  2 drop Both Eyes Daily    albuterol (PROVENTIL) (2.5 MG/3ML) 0.083% nebulizer solution 2 mg  2 mg Nebulization Q12H PRN    calcium acetate (PHOSLO) capsule 667 mg  1 capsule Oral TID WC    fluticasone (FLONASE) 50 MCG/ACT nasal spray 2 spray  2 spray Nasal Daily    levETIRAcetam (KEPPRA) tablet 500 mg  500 mg Oral QAM    montelukast (SINGULAIR) tablet 10 mg  10 mg Oral QHS    pravastatin (PRAVACHOL) tablet 40 mg  40 mg Oral QHS    sodium chloride flush 0.9 % injection 5-40 mL  5-40 mL IntraVENous 2 times per day    sodium chloride flush 0.9 % injection 5-40 mL  5-40 mL IntraVENous PRN    0.9 % sodium chloride infusion   IntraVENous PRN    heparin (porcine) injection 5,000 Units  5,000 Units SubCUTAneous 3 times per day    ondansetron (ZOFRAN-ODT) disintegrating tablet 4 mg  4 mg Oral Q8H PRN    Or    ondansetron (ZOFRAN) injection 4 mg  4 mg IntraVENous Q6H PRN    polyethylene glycol (GLYCOLAX) packet 17 g  17 g Oral Daily PRN    acetaminophen (TYLENOL) tablet 650 mg  650 mg Oral Q6H PRN    Or    acetaminophen (TYLENOL) suppository 650 mg  650 mg Rectal Q6H PRN    levETIRAcetam (KEPPRA) tablet 250 mg  250 mg Oral Nightly    levETIRAcetam (KEPPRA) tablet 250 mg  250 mg Oral Once per day on Tue Thu Sat          Objective:     Vitals:  Blood pressure 121/61, pulse 53, temperature 97.9 °F (36.6 °C), temperature source Oral, resp. rate 14, height 1.575 m (5' 2\"), weight 66 kg (145 lb

## 2024-06-19 NOTE — PLAN OF CARE
Problem: Physical Therapy - Adult  Goal: By Discharge: Performs mobility at highest level of function for planned discharge setting.  See evaluation for individualized goals.  Description: FUNCTIONAL STATUS PRIOR TO ADMISSION: Patient was modified independent using a rollator for functional mobility. Of note, pt recently returned home from SNF. Lives alone in [facility], states that [facility]/ DIL [facility]    Physical Therapy Goals  Initiated 6/18/2024  1.  Patient will move from supine to sit and sit to supine in bed with modified independence within 7 day(s).    2.  Patient will perform sit to stand with modified independence within 7 day(s).  3.  Patient will transfer from bed to chair and chair to bed with modified independence using the least restrictive device within 7 day(s).  4.  Patient will ambulate with supervision/set-up for 150 feet with the least restrictive device within 7 day(s).     Outcome: Progressing   PHYSICAL THERAPY TREATMENT    Patient: Ivone Swain (83 y.o. female)  Date: 6/19/2024  Diagnosis: NSTEMI (non-ST elevated myocardial infarction) ([facility]) [I21.4]  Cardiorenal syndrome with renal failure, stage 5 chronic kidney disease or end stage renal disease, with heart failure ([facility]) [I13.2]  Acute on chronic congestive heart failure, unspecified heart failure type ([facility]) [I50.9] Acute on chronic heart failure with preserved ejection fraction (HFpEF) ([facility])      Precautions: Fall Risk                      ASSESSMENT:  Patient continues to benefit from skilled PT services and is slowly progressing towards goals.   Patient able to increase ambulation distance to 80 feet RW, no loss of balance or instability.  Patient able to tolerate session on room air- patient does need cuing for pursed lip breathing and sats quickly recover to 90%.  Patient tends to hold her breath but with standing rest break and breathing recovers to 90%         PLAN:  Patient continues to benefit from skilled intervention  to address the above impairments.  Continue treatment per established plan of care.    Recommend with staff: therapy recommendations for staff: Recommend mobility with staff assist x1 using gait belt and rolling walker.      Recommendation for discharge: (in order for the patient to meet his/her long term goals): Therapy 2x a week in the home, however, may require supervision, cognitive and/or physical assistance due to the following concerns listed below:    Other factors to consider for discharge: impaired cognition    IF patient discharges home will need the following DME: continuing to assess with progress       SUBJECTIVE:   Patient stated, \".\"    OBJECTIVE DATA SUMMARY:   Critical Behavior:          Functional Mobility Training:  Bed Mobility:  Bed Mobility Training  Rolling: Supervision  Supine to Sit: Supervision  Transfers:  Transfer Training  Sit to Stand: Contact-guard assistance  Stand to Sit: Contact-guard assistance  Bed to Chair: Contact-guard assistance  Balance:      Ambulation/Gait Training:     Gait  Overall Level of Assistance: Contact-guard assistance  Distance (ft): 90 Feet  Assistive Device: Gait belt;Walker, rolling  Speed/Siobhan: Slow  Gait Abnormalities: Decreased step clearance        Neuro Re-Education:                    Pain Rating:  None noted    Pain Intervention(s):       Activity Tolerance:   Good    After treatment:   Patient left in no apparent distress sitting up in chair, Call bell within reach, and Bed/ chair alarm activated      COMMUNICATION/EDUCATION:   The patient's plan of care was discussed with: registered nurse    Patient Education  Education Given To: Patient  Education Provided: Plan of Care  Education Method: Verbal  Education Outcome: Verbalized understanding      Sana Berumen PT,DPT,NCS,CLT  Minutes: 21

## 2024-06-19 NOTE — PROGRESS NOTES
Physical Therapy   Patient currently off the floor for HD.  We will continue to follow. Thank you.  Sana Berumen PT,DPT,NCS,CLT

## 2024-06-19 NOTE — PROGRESS NOTES
Palliative Medicine      Code Status: Full Code    Advance Care Planning:    Primary Decision Maker: Refugio Swain - Child - [phone]    Primary Decision Maker: Lori Swain - Daughter-in-Law - [phone]    Family provided copy of pt's AMD dated 5/24/2024 which appoints son Refugio Swain and dtr-in-law Lori Swain as joint Medical POAs.  Copy has been scanned into medical record.     Patient / Family Encounter Documentation    Participants (names): Pt, son Refugio by phone, Palliative Medicine (Dr. Zamora, [facility])    Narrative:  Pt was awake in bed; no family present, pt reports son Refugio had left just moments before.  Pt is known to Palliative team from prior hospitalizations over the past several years.  Pt is , has two sons, a granddtr, and a one yr old great-grandchild.  Pt lives alone in an apartment; son Refugio and WICHO Sandoval live in a separate apartment in the same building.  Pt has been on dialysis since May 2021, shared today that she is facing some difficult decisions, including whether or not to stop dialysis.  Pt was tearful, shared that she is not sure she is ready to die, wants time with her great-grandchild; however, pt expressed concern that her medical needs are too draining on her son and WICHO, reports that past months have been spent in the hospital and/or SNF.  Pt was discharged home from SNF on 6/13, reports she was feeling great at first, was seen by home health on 6/15, had rapid decline on 6/17 and returned to the hospital.    Code status was addressed, with explicit reminders that code status was a separate decision from decision re: dialysis, explained that pt could still continue HD and other treatment with DNR order in place.  Pt initially expressed belief that she signed a DNR document 20 yrs ago (likely referring to Natural Death Act, which pt signed on 2/9/1991), was educated re: differences between NDA and DDNR.  Despite having believed she already had DNR in place, pt was very  reluctant to make a decision to change code status, expressed feeling very overwhelmed with all of the decisions weighing on her shoulders, appears stuck.    Palliative team spoke with son Refugio by phone, provided update re: above.  Son stated he, his wife, and pt have had several \"high level\" conversations over the past 3 months re: goc, without pt ever expressing any clear wishes, stated he felt pt was closer today to making decision for hospice, though pt did not indicate this during Palliative visit.  Son was under the impression that pt already had DNR in place as well, was made aware that pt had not made definitive decision today re: to change code status to DNR.    Psychosocial Issues Identified/ Resilience Factors:  Anticipatory grief, pt was tearful, is very torn re: EOL wishes, is not sure she is ready to stop dialysis but does not want to be a burden on her family.  No spiritual concerns identified; Chaplains are following for support, note pt finds comfort in her Jainism lupe.      Caregiver [employer]: Moderate  Does the caregiver feel confident administering medication? Pt is able to self-administer meds at home.  Does the caregiver need any help connecting with community resources? Not at this time  Does the caregiver feel confident assisting with activities of daily living? Pt was managing her own ADLs in the short time she was home.    Goals of Care / Plan: Family meeting scheduled for tomorrow 6/20 at 9:30am with pt and son Refugio together to continue [facility] conversation.     Thank you for including Palliative Medicine in the care of Ms. Swain.     Gabby Rodas, FRANCIS, [facility]  [phone])

## 2024-06-19 NOTE — PROGRESS NOTES
MD, 2 drop at 06/19/24 0810    albuterol (PROVENTIL) (2.5 MG/3ML) 0.083% nebulizer solution 2 mg, 2 mg, Nebulization, Q12H PRN, Sharron Ogden MD    calcium acetate (PHOSLO) capsule 667 mg, 1 capsule, Oral, TID WC, Sharron Ogden MD, 667 mg at 06/19/24 0810    fluticasone (FLONASE) 50 MCG/ACT nasal spray 2 spray, 2 spray, Nasal, Daily, Sharron Ogden MD, 2 spray at 06/19/24 0810    levETIRAcetam (KEPPRA) tablet 500 mg, 500 mg, Oral, QAM, Sharron Ogden MD, 500 mg at 06/19/24 0810    montelukast (SINGULAIR) tablet 10 mg, 10 mg, Oral, QHS, Sharron Ogden MD, 10 mg at 06/18/24 2058    pravastatin (PRAVACHOL) tablet 40 mg, 40 mg, Oral, QHS, Sharron Ogden MD, 40 mg at 06/18/24 2058    sodium chloride flush 0.9 % injection 5-40 mL, 5-40 mL, IntraVENous, 2 times per day, Sharron Ogden MD, 10 mL at 06/18/24 2107    sodium chloride flush 0.9 % injection 5-40 mL, 5-40 mL, IntraVENous, PRN, Sharron Ogden MD    0.9 % sodium chloride infusion, , IntraVENous, PRN, Sharron Ogden MD    heparin (porcine) injection 5,000 Units, 5,000 Units, SubCUTAneous, 3 times per day, Sharron Ogden MD, 5,000 Units at 06/19/24 0553    ondansetron (ZOFRAN-ODT) disintegrating tablet 4 mg, 4 mg, Oral, Q8H PRN **OR** ondansetron (ZOFRAN) injection 4 mg, 4 mg, IntraVENous, Q6H PRN, Sharron Ogden MD, 4 mg at 06/18/24 1839    polyethylene glycol (GLYCOLAX) packet 17 g, 17 g, Oral, Daily PRN, Sharron Ogden MD, 17 g at 06/18/24 2102    acetaminophen (TYLENOL) tablet 650 mg, 650 mg, Oral, Q6H PRN **OR** acetaminophen (TYLENOL) suppository 650 mg, 650 mg, Rectal, Q6H PRN, Sharron Ogden MD    levETIRAcetam (KEPPRA) tablet 250 mg, 250 mg, Oral, Nightly, Sharron Ogden MD, 250 mg at 06/18/24 2058    levETIRAcetam (KEPPRA) tablet 250 mg, 250 mg, Oral, Once per day on Tue Thu Sat, Sharron Ogden MD, 250 mg at 06/18/24 2058    VIN Cameron NP    [facility]  Call  center: P) [phone] (F) [phone]      CC:Yaniv Hyde MD

## 2024-06-19 NOTE — CARE COORDINATION
Discussed in IDR.  Palliative Care is working with patient and have scheduled a meeting with her son Refugio for tomorrow am at 9:30.  CM following to coordinate any transition planning needs. She goes to dialysis TTS at [place], and working to decide how much longer she will continue dialysis and if she is ready for hospice.    Transition of Care  RUR 19%  1- family meeting with Palliative care tomorrow  2- dialysis center is at [facility] per nephrology note  3- working to wean oxygen  4- HH recommended by therapy, cm to follow for preference if patient does not decide to go with Hospice  5- CM following to coordinate discharge plan  6- family to transport home

## 2024-06-19 NOTE — PROGRESS NOTES
Music Therapy Assessment  [facility]    Ivone Swain [id number]     1940  83 y.o.  female    Patient Telephone Number: [phone] (home)   Religion Affiliation: Caodaism   Language: English   Patient Active Problem List    Diagnosis Date Noted    Elevated troponin 06/18/2024    Acute on chronic congestive heart failure (HCC) 06/18/2024    Type 2 myocardial infarction due to heart failure (HCC) 06/17/2024    Hyponatremia with excess extracellular fluid volume 06/17/2024    Acute on chronic heart failure with preserved ejection fraction (HFpEF) ([facility]) 06/17/2024    Respiratory arrest ([facility]) 02/23/2024    Seizure ([facility]) 02/23/2024    Fluid overload 02/11/2024    Chest pain 11/14/2023    Dyspnea on exertion 11/13/2023    Pulmonary edema 04/06/2022    DUKES (dyspnea on exertion) 04/06/2022    Urinary incontinence     Seizures ([facility])     SUSAN on CPAP     Hypertension     History of stroke     Hyperlipidemia     Arthritis     Anxiety and depression     Acute hypoxemic respiratory failure (HCC) 07/06/2021    End-stage renal disease on hemodialysis (HCC) 05/24/2021    Anemia due to chronic kidney disease 05/24/2021    HTN (hypertension), benign 05/24/2021    Volume overload 05/14/2021    Diastolic CHF, chronic ([facility]) 05/14/2021    Monoclonal gammopathy 01/01/2018        Date: 6/19/2024            Total Time Calculated: 20 min          [facility] B3 INTERMEDIATE CARE UNIT    Mental Status:   [  ] Alert [  ] Forgetful [  ]  Confused  [  ] Minimally responsive  [x] Sleeping    Session Observations:  Referred by Gabby Young LCSW; This music therapist (MT) entered the space and observed the patient (pt) lying in bed, sleeping soundly. MT knocked on the door and spoke her name multiple times, but pt did not awaken. Out of respect for her rest MT concluded the visit and exited quietly. MT will plan to f/up later in the afternoon.     IVELISSE Segovia (Music Therapist, Board Certified)  Spiritual Health

## 2024-06-19 NOTE — PROGRESS NOTES
0700: Bedside and Verbal shift change report given to Rayo RN (oncoming nurse) by Paula RN (offgoing nurse). Report included the following information Nurse Handoff Report, Adult Overview, Intake/Output, MAR, Recent Results, and Cardiac Rhythm NSR w/ LBBB .     0745: Pt son Refugio provided this RN w/ pt's updated advanced directive. Made copy of document and placed in chart. MD notified via [employer].    1900: Bedside and Verbal shift change report given to Cora RN (oncoming nurse) by Rayo RN (offgoing nurse). Report included the following information Nurse Handoff Report, Adult Overview, Intake/Output, MAR, Recent Results, and Cardiac Rhythm NSR w/ LBBB .

## 2024-06-19 NOTE — PLAN OF CARE
Problem: Safety - Adult  Goal: Free from fall injury  6/18/2024 2310 by Paula العراقي, RN  Outcome: Progressing  6/18/2024 0950 by Ronny Marin, RN  Outcome: Progressing

## 2024-06-19 NOTE — PLAN OF CARE
Problem: Discharge Planning  Goal: Discharge to home or other facility with appropriate resources  Outcome: Progressing  Flowsheets (Taken 6/19/2024 0792)  Discharge to home or other facility with appropriate resources: Identify barriers to discharge with patient and caregiver     Problem: Safety - Adult  Goal: Free from fall injury  Outcome: Progressing     Problem: ABCDS Injury Assessment  Goal: Absence of physical injury  Outcome: Progressing     Problem: Physical Therapy - Adult  Goal: By Discharge: Performs mobility at highest level of function for planned discharge setting.  See evaluation for individualized goals.  Description: FUNCTIONAL STATUS PRIOR TO ADMISSION: Patient was modified independent using a rollator for functional mobility. Of note, pt recently returned home from SNF. Lives alone in [facility], states that [facility] D[facility]    Physical Therapy Goals  Initiated 6/18/2024  1.  Patient will move from supine to sit and sit to supine in bed with modified independence within 7 day(s).    2.  Patient will perform sit to stand with modified independence within 7 day(s).  3.  Patient will transfer from bed to chair and chair to bed with modified independence using the least restrictive device within 7 day(s).  4.  Patient will ambulate with supervision/set-up for 150 feet with the least restrictive device within 7 day(s).     6/19/2024 1309 by Sana Berumen, PT  Outcome: Progressing     Problem: Chronic Conditions and Co-morbidities  Goal: Patient's chronic conditions and co-morbidity symptoms are monitored and maintained or improved  Outcome: Progressing

## 2024-06-19 NOTE — PROGRESS NOTES
[facility]  [address] [place] [address]  [phone]      Hospitalist  Progress Note      NAME:       Ivone Swain   :        1940  MRM:        [id number]    Date of service: 2024      Subjective: Patient seen and examined by me. Patient admitted with volume overload. She feels a little better today and less SOB. Weak. No cough or fever.      Objective:    Vital Signs:    /61   Pulse 53   Temp 97.9 °F (36.6 °C) (Oral)   Resp 14   Ht 1.575 m (5' 2\")   Wt 66 kg (145 lb 8.1 oz)   SpO2 98%   BMI 26.61 kg/m²        Intake/Output Summary (Last 24 hours) at 2024 1238  Last data filed at 2024 1845  Gross per 24 hour   Intake 500 ml   Output 2500 ml   Net -2000 ml        Current inpatient medications reviewed:  Current Facility-Administered Medications   Medication Dose Route Frequency    metoprolol tartrate (LOPRESSOR) tablet 12.5 mg  12.5 mg Oral BID    artificial tears (dextran-hypromellose-glycerin) ophthalmic solution 2 drop  2 drop Both Eyes Daily    albuterol (PROVENTIL) (2.5 MG/3ML) 0.083% nebulizer solution 2 mg  2 mg Nebulization Q12H PRN    calcium acetate (PHOSLO) capsule 667 mg  1 capsule Oral TID WC    fluticasone (FLONASE) 50 MCG/ACT nasal spray 2 spray  2 spray Nasal Daily    levETIRAcetam (KEPPRA) tablet 500 mg  500 mg Oral QAM    montelukast (SINGULAIR) tablet 10 mg  10 mg Oral QHS    pravastatin (PRAVACHOL) tablet 40 mg  40 mg Oral QHS    sodium chloride flush 0.9 % injection 5-40 mL  5-40 mL IntraVENous 2 times per day    sodium chloride flush 0.9 % injection 5-40 mL  5-40 mL IntraVENous PRN    0.9 % sodium chloride infusion   IntraVENous PRN    heparin (porcine) injection 5,000 Units  5,000 Units SubCUTAneous 3 times per day    ondansetron (ZOFRAN-ODT) disintegrating tablet 4 mg  4 mg Oral Q8H PRN    Or    ondansetron (ZOFRAN) injection 4 mg  4 mg IntraVENous

## 2024-06-19 NOTE — ACP (ADVANCE CARE PLANNING)
Code Status: Full Code     Advance Care Planning:    Primary Decision Maker: Refugio Swain - Child - [phone]    Primary Decision Maker: Lori Swain - Daughter-in-Law - [phone]     Family provided copy of pt's AMD dated 5/24/2024 which appoints son Refugio Swain and dtr-in-law Lori Swain as joint Medical POAs.  Copy has been scanned into medical record.     Code status was addressed, with explicit reminders that code status was a separate decision from decision re: dialysis, explained that pt could still continue HD and other treatment with DNR order in place. Pt initially expressed belief that she signed a DNR document 20 yrs ago (likely referring to Natural Death Act, which pt signed on 2/9/1991), was educated re: differences between NDA and DDNR. Despite having believed she already had DNR in place, pt was very reluctant to make a decision to change code status, expressed feeling very overwhelmed with all of the decisions weighing on her shoulders..     Family meeting scheduled for tomorrow 6/20 at 9:30am with pt and son Refugio together to continue [facility] conversation.

## 2024-06-19 NOTE — PROGRESS NOTES
Music Therapy Assessment  [facility]    Ivone Swain [id number]     1940  83 y.o.  female    Patient Telephone Number: [phone] (home)   Uatsdin Affiliation: Buddhism   Language: English   Patient Active Problem List    Diagnosis Date Noted    Elevated troponin 06/18/2024    Acute on chronic congestive heart failure (HCC) 06/18/2024    Type 2 myocardial infarction due to heart failure ([facility]) 06/17/2024    Hyponatremia with excess extracellular fluid volume 06/17/2024    Acute on chronic heart failure with preserved ejection fraction (HFpEF) ([facility]) 06/17/2024    Respiratory arrest ([facility]) 02/23/2024    Seizure ([facility]) 02/23/2024    Fluid overload 02/11/2024    Chest pain 11/14/2023    Dyspnea on exertion 11/13/2023    Pulmonary edema 04/06/2022    DUKES (dyspnea on exertion) 04/06/2022    Urinary incontinence     Seizures ([facility])     SUSAN on CPAP     Hypertension     History of stroke     Hyperlipidemia     Arthritis     Anxiety and depression     Acute hypoxemic respiratory failure ([facility]) 07/06/2021    End-stage renal disease on hemodialysis ([facility]) 05/24/2021    Anemia due to chronic kidney disease 05/24/2021    HTN (hypertension), benign 05/24/2021    Volume overload 05/14/2021    Diastolic CHF, chronic ([facility]) 05/14/2021    Monoclonal gammopathy 01/01/2018        Date: 6/19/2024            Total Time Calculated: 15 min          [facility] INTERMEDIATE CARE UNIT    Mental Status:   [x] Alert [  ] Forgetful [  ]  Confused  [  ] Minimally responsive  [  ] Sleeping    Communication Status: [  ] Impaired Speech [  ] Nonverbal -N/A    Physical Status:   [  ] Oxygen in use  [  ] Hard of Hearing [  ] Vision Impaired  [  ] Ambulatory  [  ] Ambulatory with assistance [  ] Non-ambulatory -N/A    Music Preferences, Background: Pt enjoys Mandaeism music; [employer]     Clinical Problem addressed: Pt/family emotional support    Goal(s) met in session: N/A: Please see Session Observations below.   Physical/Pain

## 2024-06-20 ENCOUNTER — APPOINTMENT (OUTPATIENT)
Facility: HOSPITAL | Age: 84
End: 2024-06-20
Payer: MEDICARE

## 2024-06-20 LAB
ANION GAP SERPL CALC-SCNC: 5 MMOL/L (ref 5–15)
BUN SERPL-MCNC: 32 MG/DL (ref 6–20)
BUN/CREAT SERPL: 5 (ref 12–20)
CALCIUM SERPL-MCNC: 8.5 MG/DL (ref 8.5–10.1)
CHLORIDE SERPL-SCNC: 92 MMOL/L (ref 97–108)
CO2 SERPL-SCNC: 31 MMOL/L (ref 21–32)
CREAT SERPL-MCNC: 6.28 MG/DL (ref 0.55–1.02)
ECHO BSA: 1.74 M2
ECHO LA DIAMETER INDEX: 2.53 CM/M2
ECHO LA DIAMETER: 4.3 CM
ECHO LV EDV A2C: 88 ML
ECHO LV EDV A4C: 103 ML
ECHO LV EDV BP: 96 ML (ref 56–104)
ECHO LV EDV INDEX A4C: 61 ML/M2
ECHO LV EDV INDEX BP: 56 ML/M2
ECHO LV EDV NDEX A2C: 52 ML/M2
ECHO LV EJECTION FRACTION A2C: 49 %
ECHO LV EJECTION FRACTION A4C: 49 %
ECHO LV EJECTION FRACTION BIPLANE: 50 % (ref 55–100)
ECHO LV ESV A2C: 45 ML
ECHO LV ESV A4C: 53 ML
ECHO LV ESV BP: 48 ML (ref 19–49)
ECHO LV ESV INDEX A2C: 26 ML/M2
ECHO LV ESV INDEX A4C: 31 ML/M2
ECHO LV ESV INDEX BP: 28 ML/M2
ECHO LV FRACTIONAL SHORTENING: 21 % (ref 28–44)
ECHO LV INTERNAL DIMENSION DIASTOLE INDEX: 2.24 CM/M2
ECHO LV INTERNAL DIMENSION DIASTOLIC: 3.8 CM (ref 3.9–5.3)
ECHO LV INTERNAL DIMENSION SYSTOLIC INDEX: 1.76 CM/M2
ECHO LV INTERNAL DIMENSION SYSTOLIC: 3 CM
ECHO LV IVSD: 1.4 CM (ref 0.6–0.9)
ECHO LV MASS 2D: 216.6 G (ref 67–162)
ECHO LV MASS INDEX 2D: 127.4 G/M2 (ref 43–95)
ECHO LV POSTERIOR WALL DIASTOLIC: 1.6 CM (ref 0.6–0.9)
ECHO LV RELATIVE WALL THICKNESS RATIO: 0.84
GLUCOSE SERPL-MCNC: 89 MG/DL (ref 65–100)
POTASSIUM SERPL-SCNC: 4.6 MMOL/L (ref 3.5–5.1)
SODIUM SERPL-SCNC: 128 MMOL/L (ref 136–145)

## 2024-06-20 PROCEDURE — 93308 TTE F-UP OR LMTD: CPT

## 2024-06-20 PROCEDURE — 2500000003 HC RX 250 WO HCPCS: Performed by: HOSPITALIST

## 2024-06-20 PROCEDURE — 80048 BASIC METABOLIC PNL TOTAL CA: CPT

## 2024-06-20 PROCEDURE — 2700000000 HC OXYGEN THERAPY PER DAY

## 2024-06-20 PROCEDURE — 90935 HEMODIALYSIS ONE EVALUATION: CPT

## 2024-06-20 PROCEDURE — 99233 SBSQ HOSP IP/OBS HIGH 50: CPT | Performed by: STUDENT IN AN ORGANIZED HEALTH CARE EDUCATION/TRAINING PROGRAM

## 2024-06-20 PROCEDURE — 36415 COLL VENOUS BLD VENIPUNCTURE: CPT

## 2024-06-20 PROCEDURE — 6370000000 HC RX 637 (ALT 250 FOR IP): Performed by: HOSPITALIST

## 2024-06-20 PROCEDURE — 2580000003 HC RX 258: Performed by: HOSPITALIST

## 2024-06-20 PROCEDURE — 2060000000 HC ICU INTERMEDIATE R&B

## 2024-06-20 PROCEDURE — 6360000002 HC RX W HCPCS: Performed by: HOSPITALIST

## 2024-06-20 PROCEDURE — 94761 N-INVAS EAR/PLS OXIMETRY MLT: CPT

## 2024-06-20 PROCEDURE — 97116 GAIT TRAINING THERAPY: CPT

## 2024-06-20 PROCEDURE — 6370000000 HC RX 637 (ALT 250 FOR IP): Performed by: INTERNAL MEDICINE

## 2024-06-20 PROCEDURE — 93308 TTE F-UP OR LMTD: CPT | Performed by: INTERNAL MEDICINE

## 2024-06-20 RX ADMIN — LEVETIRACETAM 500 MG: 500 TABLET, FILM COATED ORAL at 10:17

## 2024-06-20 RX ADMIN — ACETAMINOPHEN 650 MG: 325 TABLET ORAL at 23:58

## 2024-06-20 RX ADMIN — PRAVASTATIN SODIUM 40 MG: 20 TABLET ORAL at 23:58

## 2024-06-20 RX ADMIN — FLUTICASONE PROPIONATE 2 SPRAY: 50 SPRAY, METERED NASAL at 10:18

## 2024-06-20 RX ADMIN — HEPARIN SODIUM 5000 UNITS: 5000 INJECTION INTRAVENOUS; SUBCUTANEOUS at 13:34

## 2024-06-20 RX ADMIN — CALCIUM ACETATE 667 MG: 667 CAPSULE ORAL at 10:17

## 2024-06-20 RX ADMIN — LEVETIRACETAM 250 MG: 250 TABLET, FILM COATED ORAL at 10:17

## 2024-06-20 RX ADMIN — CALCIUM ACETATE 667 MG: 667 CAPSULE ORAL at 17:25

## 2024-06-20 RX ADMIN — SODIUM CHLORIDE, PRESERVATIVE FREE 10 ML: 5 INJECTION INTRAVENOUS at 10:18

## 2024-06-20 RX ADMIN — METOPROLOL TARTRATE 12.5 MG: 25 TABLET, FILM COATED ORAL at 23:59

## 2024-06-20 RX ADMIN — MONTELUKAST 10 MG: 10 TABLET, FILM COATED ORAL at 23:58

## 2024-06-20 RX ADMIN — DEXTRAN 70, GLYCERIN, HYPROMELLOSE 2 DROP: 1; 2; 3 SOLUTION/ DROPS OPHTHALMIC at 13:33

## 2024-06-20 RX ADMIN — METOPROLOL TARTRATE 12.5 MG: 25 TABLET, FILM COATED ORAL at 10:17

## 2024-06-20 RX ADMIN — CALCIUM ACETATE 667 MG: 667 CAPSULE ORAL at 13:33

## 2024-06-20 RX ADMIN — LEVETIRACETAM 250 MG: 250 TABLET, FILM COATED ORAL at 23:58

## 2024-06-20 RX ADMIN — HEPARIN SODIUM 5000 UNITS: 5000 INJECTION INTRAVENOUS; SUBCUTANEOUS at 06:39

## 2024-06-20 ASSESSMENT — PAIN SCALES - GENERAL: PAINLEVEL_OUTOF10: 4

## 2024-06-20 ASSESSMENT — PAIN DESCRIPTION - ORIENTATION: ORIENTATION: RIGHT;LEFT

## 2024-06-20 ASSESSMENT — PAIN DESCRIPTION - LOCATION: LOCATION: LEG

## 2024-06-20 NOTE — CARE COORDINATION
CM Note:  Pt and her son met with Palliative Care for [facility].  Pt does not want hospice at this time.  PT/OT recommending home health.  Order for PT/OT/nursing noted.  Pt couldn't remember the agency name and said it was okay to call her son, Refugio.  Refugio stated she had just been opened by [facility] but hadn't started therapy yet.  Referral sent to [facility] to resume PT/OT and add nursing.  Pt will need home O2 eval prior to d/c.  Son to transport her home at d/c.    BOAZ Avila  6/20/2024  2:12 PM

## 2024-06-20 NOTE — FLOWSHEET NOTE
Primary RN SBAR: Margot Adam, RN  Patient Education provided: access care, Dialysis procedure   Incapacitated Nurse edu. provided:yes    Preferred Education method and Primary language: english, verbal  Hospital associated wait time; reason: NA  Hepatitis B Surface Ag   Date/Time Value Ref Range Status   11/14/2023 07:05 AM <0.10 Index Final     Hep B S Ag Interp   Date/Time Value Ref Range Status   11/14/2023 07:05 AM Negative NEG   Final     HEP B SURF AB   Date/Time Value Ref Range Status   05/16/2021 09:04 PM <3.10 mIU/mL Final     Hep B S Ab   Date/Time Value Ref Range Status   11/14/2023 07:05 AM 28.44 mIU/mL Final     Hep B S Ab Interp   Date/Time Value Ref Range Status   11/14/2023 07:05 AM REACTIVE (A) NR   Final     PRE HD   06/20/24 1805   Vital Signs   BP (!) 162/78   Temp 97.3 °F (36.3 °C)   Pulse 68   Respirations 15   Pain Assessment   Pain Assessment None - Denies Pain   Treatment   Time On 1815   Time Off 2215   Treatment Goal 2-3L/4hours   Observations & Evaluations   Level of Consciousness 0   Oriented X 4   Heart Rhythm Regular   Respiratory Quality/Effort Unlabored   O2 Device Nasal cannula   Bilateral Breath Sounds Diminished   Skin Color   (appropriate for ethnicity)   Skin Condition/Temp Fragile;Warm;Dry   Appetite Good   Abdomen Inspection Rounded;Soft   Bowel Sounds (All Quadrants) Present   Edema Left lower extremity;Right lower extremity   Edema Generalized +2   RLE Edema +2   LLE Edema +2   Technical Checks   Dialysis Machine No. 05   RO Machine Number [id number]   Dialyzer Lot No. [id number]   Tubing Lot Number [id number]   All Connections Secure Yes   NS Bag Yes   Saline Line Double Clamped Yes   Dialyzer Revaclear 300   Prime Volume (mL) 250 mL   ICEBOAT I;C;E;B;O;A;T   RO Machine Log Sheet Completed Yes   Machine Alarm Self Test Completed;Passed   Air Foam Detector Tested;Proper Function   Extracorporeal Circuit Tested for Integrity Yes   Machine Conductivity 13.8   Manual Ph 7.2   Bleach  Test (Neg) Yes   Bath Temperature 98.6 °F (37 °C)   Dialysis Bath   K+ (Potassium) 2   Ca+ (Calcium) 2.5   Na+ (Sodium) 138   HCO3 (Bicarb) 39   Bicarbonate Concentrate Lot No. [id number]   Acid Concentrate Lot No. [id number]   Treatment Initiation   Dialyze Hours 4   Treatment  Initiation Universal Precautions maintained;Lines secured to patient;Connections secured;Prime given;Arterial Parameters set;Venous Parameters set;Air foam detector engaged;[place] Device;Dialysate;Saline line double clamped;Hemo-Safe Applied;Revaclear Dialyzer   Hemodialysis Fistula/Graft Arteriovenous fistula Right Arm   Placement Date/Time: 08/03/22 (c) 0100   Access Type: Arteriovenous fistula  Orientation: Right  Access Location: Arm   Site Assessment Clean, dry & intact   Thrill Present   Bruit Present   Status Accessed   Venous Needle Size 15 G   Arterial Needle Size 15 G   Accessed By: clayton lock   Access Attempts  1   Access Interventions Aseptic Technique;Chlorhexidine;Needles taped to patient      06/20/24 2245   Hemodialysis Fistula/Graft Arteriovenous fistula Right Arm   Placement Date/Time: 08/03/22 (c) 0100   Access Type: Arteriovenous fistula  Orientation: Right  Access Location: Arm   Site Assessment Clean, dry & intact   Thrill Present   Bruit Present   Status Deaccessed   Date of Last Dressing Change 06/20/24   Dressing Intervention New   Dressing Status Clean, dry & intact;New dressing applied        06/20/24 2245   Vital Signs   BP (!) 154/55   Temp 97.5 °F (36.4 °C)   Pulse 62   Respirations 16   Pain Assessment   Pain Assessment None - Denies Pain   Post-Hemodialysis Assessment   Post-Treatment Procedures Blood returned;Access bleeding time < 10 minutes   Machine Disinfection Process Acid/Vinegar Clean;Heat Disinfect;Exterior Machine Disinfection   Rinseback Volume (ml) 250 ml   Blood Volume Processed (Liters) 96 L   Dialyzer Clearance Lightly streaked   Duration of Treatment (minutes) 240 minutes   Heparin Amount

## 2024-06-20 NOTE — PROGRESS NOTES
Dialysis nurse called asking about the timing of dialysis.Dialysis nurse aware of family meeting time and that the family will be discussing her future course of treatment. Informed dialysis nurse to call an hour after meeting time to find out whether treatment is needed or not anymore.Dialysis nurse agreed and will call after meeting.

## 2024-06-20 NOTE — PROGRESS NOTES
[facility]  [address] [place] [address]  [phone]      Hospitalist  Progress Note      NAME:       Ivone Swain   :        1940  MRM:        [id number]    Date of service: 2024      Subjective: Patient seen and examined by me. Patient admitted with volume overload. She remains weak and frail. Able to say she is undecided as of now on hospice although there is a pending family meeting today. Afebrile. No nausea or vomiting.       Objective:    Vital Signs:    BP (!) 150/69   Pulse 62   Temp 97.9 °F (36.6 °C) (Oral)   Resp 18   Ht 1.575 m (5' 2\")   Wt 69.1 kg (152 lb 6.4 oz)   SpO2 98%   BMI 27.87 kg/m²        Intake/Output Summary (Last 24 hours) at 2024 0824  Last data filed at 2024 0453  Gross per 24 hour   Intake 0 ml   Output 200 ml   Net -200 ml          Current inpatient medications reviewed:  Current Facility-Administered Medications   Medication Dose Route Frequency    metoprolol tartrate (LOPRESSOR) tablet 12.5 mg  12.5 mg Oral BID    artificial tears (dextran-hypromellose-glycerin) ophthalmic solution 2 drop  2 drop Both Eyes Daily    albuterol (PROVENTIL) (2.5 MG/3ML) 0.083% nebulizer solution 2 mg  2 mg Nebulization Q12H PRN    calcium acetate (PHOSLO) capsule 667 mg  1 capsule Oral TID WC    fluticasone (FLONASE) 50 MCG/ACT nasal spray 2 spray  2 spray Nasal Daily    levETIRAcetam (KEPPRA) tablet 500 mg  500 mg Oral QAM    montelukast (SINGULAIR) tablet 10 mg  10 mg Oral QHS    pravastatin (PRAVACHOL) tablet 40 mg  40 mg Oral QHS    sodium chloride flush 0.9 % injection 5-40 mL  5-40 mL IntraVENous 2 times per day    sodium chloride flush 0.9 % injection 5-40 mL  5-40 mL IntraVENous PRN    0.9 % sodium chloride infusion   IntraVENous PRN    heparin (porcine) injection 5,000 Units  5,000 Units SubCUTAneous 3 times per day    ondansetron (ZOFRAN-ODT) disintegrating

## 2024-06-20 NOTE — CONSULTS
Palliative Medicine  Patient Name: Ivone Swain  YOB: 1940  MRN: [id number]  Age: 83 y.o.  Gender: female    Date of Initial Consult: 2024  Date of Service: 2024  Time: 10:29 AM  Provider: Titus Sanderson MD  Hospital Day: 4  Admit Date: 2024  Referring Provider: Mike Curry MD    Reasons for Consultation:  Goals of Care    HISTORY OF PRESENT ILLNESS (HPI):   Ivone Swain is a 83 y.o. female with a past medical history of ESRD on HD, HTN, Epilepsy (Dr. 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   Evaluation on presentation notable for BUN 36, Cr 6.86, proBNP 34,922, troponin 256, albumin 2.9.  CXR with mild pulmonary edema.  CT head without acute process, notable for chronic CVA changes.  Nephrology consulted for HD and volume management.  Started on supplemental O2 for respiratory support, suspected hypoxia due to volume overload.  Cardiology consulted and obtaining TTE and outpatient records, had [facility] in 2023 with no intervention performed.  Numerous hospitalizations and rehab stays over past 4 months.  Most recently discharged from SNF 5 days ago, doing okay at home initially but then family noticed desaturation acutely day of presentation which led to ED visit.    Psychosocial:   ,   around .  2 sons: Refugio and Soy Swain.  Lives alone in a condo complex, son Refugio and WICHO Sandoval live 2 floors above.  Uses rollator and wheelchair at home.  Supported by lupe and family.    PALLIATIVE DIAGNOSES:    ESRD on HD  Acute Hypoxic Respiratory Failure, pulm edema, volume overload  HFpEF  HTN  Epilepsy  CVA history  Palliative Care Encounter  Goals of Care and Code Status discussions  Patient and family support    ASSESSMENT AND PLAN:   Reviewed  Confused  [x]Other: participates in conversation appropriately  Cardiovascular: [] Regular rate/rhythm  [] Arrhythmia  [x] Other: rate 50s  Chest: [x] Effort normal  []Lungs clear  [] Respiratory distress  []Tachypnea  [x] Other: on 2L NC  Abdomen: [x] Soft/non-tender  [] Normal appearance  [] Distended  [] Ascites  [] Other:  Neurological: [x] Normal speech  [] Normal sensation  []Deficits present:  Extremity: [x] Normal skin color/temp  [] Clubbing/cyanosis  [] No edema  [] Other:    Wt Readings from Last 15 Encounters:   06/20/24 69.1 kg (152 lb 6.4 oz)   02/28/24 74 kg (163 lb 3.2 oz)   02/12/24 79.3 kg (174 lb 13.2 oz)   11/15/23 77.1 kg (170 lb)   10/04/23 67.6 kg (149 lb)   09/12/22 69.4 kg (153 lb)   03/09/22 77.1 kg (170 lb)   09/29/21 78 kg (171 lb 14.4 oz)   09/24/21 78.1 kg (172 lb 2.9 oz)   09/15/21 79 kg (174 lb 2.6 oz)   09/10/21 78.8 kg (173 lb 11.6 oz)   09/08/21 77.6 kg (171 lb)   08/23/21 78.5 kg (173 lb 1 oz)   03/10/21 92.1 kg (203 lb)        Current Diet: ADULT DIET; Regular; Low Sodium (2 gm); Low Potassium (Less than 3000 mg/day); Low Phosphorus (Less than 1000 mg)       PSYCHOSOCIAL/SPIRITUAL SCREENING:   Palliative IDT has assessed this patient for cultural preferences / practices and a referral made as appropriate to needs (Cultural Services, Patient Advocacy, Ethics, etc.)    Spiritual Affiliation: Tenriism    Any spiritual / Holiness concerns:  [] Yes /  [x] No   If \"Yes\" to discuss with pastoral care during IDT     Does caregiver feel burdened by caring for their loved one:   [] Yes /  [] No /  [x] No Caregiver Present/Available [] No Caregiver [] Pt Lives at Facility  If \"Yes\" to discuss with social work during IDT    Anticipatory grief assessment:   [x] Normal  / [] Maladaptive     If \"Maladaptive\" to discuss with social work during IDT    ESAS Anxiety:      ESAS Depression:          LAB AND IMAGING FINDINGS:   Objective data reviewed:  labs, images, records, medication use,

## 2024-06-20 NOTE — PROGRESS NOTES
[facility]    Ivone Swain  YOB: 1940          Assessment & Plan:     ESRD on HD TTS at [facility]  Volume overload  Debility  HTN  PHTN  Sz d/o  H/o CVA    Rec:  HD later today  Pt wants to continue dialysis for now  HD tts       Subjective:   CC: ESRD  HPI: Family meeting with palliative this am. Pt wants to continue dialysis for now.  Current Facility-Administered Medications   Medication Dose Route Frequency    metoprolol tartrate (LOPRESSOR) tablet 12.5 mg  12.5 mg Oral BID    artificial tears (dextran-hypromellose-glycerin) ophthalmic solution 2 drop  2 drop Both Eyes Daily    albuterol (PROVENTIL) (2.5 MG/3ML) 0.083% nebulizer solution 2 mg  2 mg Nebulization Q12H PRN    calcium acetate (PHOSLO) capsule 667 mg  1 capsule Oral TID WC    fluticasone (FLONASE) 50 MCG/ACT nasal spray 2 spray  2 spray Nasal Daily    levETIRAcetam (KEPPRA) tablet 500 mg  500 mg Oral QAM    montelukast (SINGULAIR) tablet 10 mg  10 mg Oral QHS    pravastatin (PRAVACHOL) tablet 40 mg  40 mg Oral QHS    sodium chloride flush 0.9 % injection 5-40 mL  5-40 mL IntraVENous 2 times per day    sodium chloride flush 0.9 % injection 5-40 mL  5-40 mL IntraVENous PRN    0.9 % sodium chloride infusion   IntraVENous PRN    heparin (porcine) injection 5,000 Units  5,000 Units SubCUTAneous 3 times per day    ondansetron (ZOFRAN-ODT) disintegrating tablet 4 mg  4 mg Oral Q8H PRN    Or    ondansetron (ZOFRAN) injection 4 mg  4 mg IntraVENous Q6H PRN    polyethylene glycol (GLYCOLAX) packet 17 g  17 g Oral Daily PRN    acetaminophen (TYLENOL) tablet 650 mg  650 mg Oral Q6H PRN    Or    acetaminophen (TYLENOL) suppository 650 mg  650 mg Rectal Q6H PRN    levETIRAcetam (KEPPRA) tablet 250 mg  250 mg Oral Nightly    levETIRAcetam (KEPPRA) tablet 250 mg  250 mg Oral Once per day on Tue Thu Sat          Objective:     Vitals:  Blood pressure (!) 150/69, pulse 62, temperature 97.9 °F (36.6

## 2024-06-20 NOTE — PROGRESS NOTES
Palliative Medicine      Code Status: DNR    Advance Care Planning:  Advance Care Planning:    Primary Decision Maker: Refugio Swain - Child - [phone]    Primary Decision Maker: Lori Swain - Daughter-in-Law - [phone]     Pt has AMD on file dated 5/24/2024 which appoints son Refugio Swain and dtr-in-law Lori Swain as joint Medical POAs.      Patient / Family Encounter Documentation    Participants (names): Pt, son Refugio, Palliative Medicine (Dr. Sanderson, [facility])    Narrative:  Pt was up in chair, alert with flat affect, though tearful at times.  Pt continues to struggle with decisions re: her care; conversation was filled with long pauses to allow time for pt to share her feelings if she so chose but pt often remained silent.  Pt acknowledged that the past months have been difficult but stated she does not want to die.  Pt did make decision to change code status to DNR, stated \"I don't want to be a vegetable.\"  DDNR was reviewed and completed.  Copy has been scanned into medical record, copy was placed on chart to be sent out with pt at time of discharge, original and copy were returned to son with instructions for form to be posted in pt's home on the refrigerator or bedroom door/wall.  Spoke further with son outside of room.  Son expressed concern that cycle of pt coming in and out of the hospital will continue but verbalized understanding that pt is not ready to consider hospice, verbalized understanding that pt may reach that point in the future or her body might make the decision for her.  In the meantime, pt will continue with HD.  SW will continue to follow for support.    Thank you for including Palliative Medicine in the care of Ms. Swain.    Gabby Rodas, FRANCIS, [facility] CenterP-SW  [phone])

## 2024-06-20 NOTE — PLAN OF CARE
Problem: Physical Therapy - Adult  Goal: By Discharge: Performs mobility at highest level of function for planned discharge setting.  See evaluation for individualized goals.  Description: FUNCTIONAL STATUS PRIOR TO ADMISSION: Patient was modified independent using a rollator for functional mobility. Of note, pt recently returned home from SNF. Lives alone in [facility], states that son/ DIL live in condo upstairs    Physical Therapy Goals  Initiated 6/18/2024  1.  Patient will move from supine to sit and sit to supine in bed with modified independence within 7 day(s).    2.  Patient will perform sit to stand with modified independence within 7 day(s).  3.  Patient will transfer from bed to chair and chair to bed with modified independence using the least restrictive device within 7 day(s).  4.  Patient will ambulate with supervision/set-up for 150 feet with the least restrictive device within 7 day(s).     Outcome: Progressing   PHYSICAL THERAPY TREATMENT    Patient: Ivone Swain (83 y.o. female)  Date: 6/20/2024  Diagnosis: NSTEMI (non-ST elevated myocardial infarction) ([facility]) [I21.4]  Cardiorenal syndrome with renal failure, stage 5 chronic kidney disease or end stage renal disease, with heart failure ([facility]) [I13.2]  Acute on chronic congestive heart failure, unspecified heart failure type ([facility]) [I50.9] Acute on chronic heart failure with preserved ejection fraction (HFpEF) ([facility])      Precautions: Fall Risk                      ASSESSMENT:  Patient continues to benefit from skilled PT services and is progressing towards goals. Patient received sitting up in chair, patient on NC but cannula was on forehead.  Patient instructed to perform breathing, and sats immediately 93%.  Patient able to tolerate increased ambulation with sats 100%.  Patient returned to sitting up in chair and sats remained 95% on room air.  Patient awaiting her HD today.         PLAN:  Patient continues to benefit from skilled intervention to

## 2024-06-21 VITALS
HEIGHT: 62 IN | OXYGEN SATURATION: 96 % | SYSTOLIC BLOOD PRESSURE: 118 MMHG | WEIGHT: 148.9 LBS | TEMPERATURE: 98.2 F | BODY MASS INDEX: 27.4 KG/M2 | DIASTOLIC BLOOD PRESSURE: 56 MMHG | HEART RATE: 54 BPM | RESPIRATION RATE: 16 BRPM

## 2024-06-21 PROCEDURE — 6360000002 HC RX W HCPCS: Performed by: HOSPITALIST

## 2024-06-21 PROCEDURE — 2500000003 HC RX 250 WO HCPCS: Performed by: HOSPITALIST

## 2024-06-21 PROCEDURE — 97116 GAIT TRAINING THERAPY: CPT

## 2024-06-21 PROCEDURE — 99231 SBSQ HOSP IP/OBS SF/LOW 25: CPT | Performed by: STUDENT IN AN ORGANIZED HEALTH CARE EDUCATION/TRAINING PROGRAM

## 2024-06-21 PROCEDURE — 6370000000 HC RX 637 (ALT 250 FOR IP): Performed by: INTERNAL MEDICINE

## 2024-06-21 PROCEDURE — 6370000000 HC RX 637 (ALT 250 FOR IP): Performed by: HOSPITALIST

## 2024-06-21 PROCEDURE — 94761 N-INVAS EAR/PLS OXIMETRY MLT: CPT

## 2024-06-21 PROCEDURE — 2580000003 HC RX 258: Performed by: HOSPITALIST

## 2024-06-21 PROCEDURE — 2700000000 HC OXYGEN THERAPY PER DAY

## 2024-06-21 RX ADMIN — HEPARIN SODIUM 5000 UNITS: 5000 INJECTION INTRAVENOUS; SUBCUTANEOUS at 07:57

## 2024-06-21 RX ADMIN — SODIUM CHLORIDE, PRESERVATIVE FREE 10 ML: 5 INJECTION INTRAVENOUS at 00:04

## 2024-06-21 RX ADMIN — CALCIUM ACETATE 667 MG: 667 CAPSULE ORAL at 12:49

## 2024-06-21 RX ADMIN — DEXTRAN 70, GLYCERIN, HYPROMELLOSE 2 DROP: 1; 2; 3 SOLUTION/ DROPS OPHTHALMIC at 07:57

## 2024-06-21 RX ADMIN — METOPROLOL TARTRATE 12.5 MG: 25 TABLET, FILM COATED ORAL at 07:58

## 2024-06-21 RX ADMIN — LEVETIRACETAM 500 MG: 500 TABLET, FILM COATED ORAL at 07:57

## 2024-06-21 RX ADMIN — SODIUM CHLORIDE, PRESERVATIVE FREE 10 ML: 5 INJECTION INTRAVENOUS at 07:58

## 2024-06-21 RX ADMIN — CALCIUM ACETATE 667 MG: 667 CAPSULE ORAL at 07:57

## 2024-06-21 RX ADMIN — HEPARIN SODIUM 5000 UNITS: 5000 INJECTION INTRAVENOUS; SUBCUTANEOUS at 00:04

## 2024-06-21 RX ADMIN — FLUTICASONE PROPIONATE 2 SPRAY: 50 SPRAY, METERED NASAL at 07:58

## 2024-06-21 NOTE — DISCHARGE SUMMARY
[facility]  [address] [place] [address]  Tel: [phone]    Hospital Medicine Discharge Summary    Patient ID:    Ivone Swain  Age:              83 y.o.    : 1940  MRN:             [id number]     PCP: Yaniv Hyde MD     Date of Admission: 2024    Date of Discharge:  2024    Discharge Diagnoses:  Principal Problem:    Acute on chronic heart failure with preserved ejection fraction (HFpEF) ([facility])  Active Problems:    Acute hypoxemic respiratory failure ([facility])    Volume overload    End-stage renal disease on hemodialysis ([facility])    Anemia due to chronic kidney disease    HTN (hypertension), benign    Seizures ([facility])    History of stroke    Hyperlipidemia    Type 2 myocardial infarction due to heart failure ([facility])    Hyponatremia with excess extracellular fluid volume    Elevated troponin    Acute on chronic congestive heart failure ([facility])    Palliative care by specialist    Counseling regarding advance care planning and goals of care    Need for emotional support  Resolved Problems:    * No resolved hospital problems. *       Reason for admission:    NSTEMI (non-ST elevated myocardial infarction) ([facility]) [I21.4]  Cardiorenal syndrome with renal failure, stage 5 chronic kidney disease or end stage renal disease, with heart failure ([facility]) [I13.2]  Acute on chronic congestive heart failure, unspecified heart failure type ([facility]) [I50.9]    Diagnostic testing:    Laboratory data reviewed and independently interpreted:    No results for input(s): \"WBC\", \"HGB\", \"HCT\", \"RBC\", \"MCV\", \"MCH\", \"PLT\" in the last 72 hours.  No results found for: \"LACTA\"  Recent Labs     24  0418   *   K 4.6   CL 92*   CO2 31   GLUCOSE 89   BUN 32*   CREATININE 6.28*   CALCIUM 8.5     No components found for: \"GLUCOSEPOC\"  Lab Results   Component Value Date/Time    CHOL 103 2024 02:08 AM    TRIG 552 2024 02:08 AM    HDL

## 2024-06-21 NOTE — CARE COORDINATION
LILLIAN Note:  Pt to d/c home today transported by her son.   She is set up to resume services with [facility].  Pt to follow up OP with providers    BOAZ Avila  6/21/2024  8:31 AM

## 2024-06-21 NOTE — PALLIATIVE CARE DISCHARGE
Goals of Care/Treatment Preferences    The Palliative Medicine team was consulted as part of your/your loved one's care in the hospital. Our team is a supportive service; we strive to relieve suffering and improve quality of life.    We reviewed advance care planning information, which includes the following:    Primary Decision Maker: Refugio Swain - Child - [phone]    Primary Decision Maker: Lori Swain - Daughter-in-Law - [phone]  Patient's Healthcare Decision Maker is:: Named in Scanned ACP Document  Confirm Advance Directive: Yes, on file    Patient/Health Care Proxy Stated Goals: You are continuing to give thought to your care wishes but for now you would like to continue with dialysis.    We reviewed / discussed your code status as:   Code Status: DNR     “Full Code” means perform CPR in the event of cardiac arrest.      “DNR” means do NOT perform CPR in the event of cardiac arrest.      “Partial Code” means you have specific preferences, please discuss with your healthcare team.      “No Order” means this issue was not addressed / resolved during your stay       Other Instructions: You have a Durable Do Not Resuscitate Order in place, which should travel with you.  When you are in a facility, this form should be placed on your chart. Once you are home, it is recommended that the DDNR form be placed in a visible location such as on the refrigerator or bedroom door.         Because of the importance of this information, we are providing you with a printed copy to share with other healthcare providers after this hospitalization is complete.

## 2024-06-21 NOTE — CONSULTS
Palliative Medicine  Patient Name: Ivone Swain  YOB: 1940  MRN: [id number]  Age: 83 y.o.  Gender: female    Date of Initial Consult: 2024  Date of Service: 2024  Time: 10:51 AM  Provider: Titus Sanderson MD  Hospital Day: 5  Admit Date: 2024  Referring Provider: Mike Curry MD    Reasons for Consultation:  Goals of Care    HISTORY OF PRESENT ILLNESS (HPI):   Ivone Swain is a 83 y.o. female with a past medical history of ESRD on HD, HTN, Epilepsy (Dr. 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   Evaluation on presentation notable for BUN 36, Cr 6.86, proBNP 34,922, troponin 256, albumin 2.9.  CXR with mild pulmonary edema.  CT head without acute process, notable for chronic CVA changes.  Nephrology consulted for HD and volume management.  Started on supplemental O2 for respiratory support, suspected hypoxia due to volume overload.  Cardiology consulted and obtaining TTE and outpatient records, had [facility] in 2023 with no intervention performed.  Numerous hospitalizations and rehab stays over past 4 months.  Most recently discharged from SNF 5 days ago, doing okay at home initially but then family noticed desaturation acutely day of presentation which led to ED visit.    Psychosocial:   ,   around .  2 sons: Refugio and Soy Swain.  Lives alone in a condo complex, son Refugio and WICHO Sandoval live 2 floors above.  Uses rollator and wheelchair at home.  Supported by lupe and family.    PALLIATIVE DIAGNOSES:    ESRD on HD  Acute Hypoxic Respiratory Failure, pulm edema, volume overload  HFpEF  HTN  Epilepsy  CVA history  Palliative Care Encounter  Goals of Care and Code Status discussions  Patient and family support    ASSESSMENT AND PLAN:   Reviewed  Assessment (nonverbal scale for severity on nonverbal patients):   Clinical Pain Assessment  Severity: 0       NVPS:  Adult Nonverbal Pain Scale (NVPS)  Face: No particular expression or smile  Activity (Movement): Laid quietly, normal position  Guarding: Lying quietly, no positioning of hands over areas of bod  Physiology (Vital Signs): Stable vital signs  Respiratory: Baseline RR/SpO2 compliant with ventilator  NVPS Score : 0    RDOS:  RDOS  Heart rate per minute: less than 90  Respiratory rate per minute: less than 19  Restlessness:non-purposeful: None  Paradoxical breathing pattern:abdomen moves in on inspiration: None  Accessory muscle use: rise in clavicle during inspiration: None  Grunting at end-expiration: guttural sound: None  Nasal flaring: involuntary movement of nares: None  Look of fear: None  Total : 0      Vital Signs: Blood pressure (!) 136/56, pulse 57, temperature 97.7 °F (36.5 °C), temperature source Oral, resp. rate 16, height 1.575 m (5' 2\"), weight 67.5 kg (148 lb 14.4 oz), SpO2 97 %.    PHYSICAL ASSESSMENT:   General: [] Oriented x3  [] Well appearing  [] Intubated  []Ill appearing  [x]Other: awake, sitting up in hospital bed, NAD  Mental Status: [] Normal mental status exam  [] Drowsy  [] Confused  [x]Other: participates in conversation appropriately  Cardiovascular: [] Regular rate/rhythm  [] Arrhythmia  [x] Other: rate 50s  Chest: [x] Effort normal  []Lungs clear  [] Respiratory distress  []Tachypnea  [x] Other: on 2L NC  Abdomen: [x] Soft/non-tender  [] Normal appearance  [] Distended  [] Ascites  [] Other:  Neurological: [x] Normal speech  [] Normal sensation  []Deficits present:  Extremity: [x] Normal skin color/temp  [] Clubbing/cyanosis  [] No edema  [] Other:    Wt Readings from Last 15 Encounters:   06/21/24 67.5 kg (148 lb 14.4 oz)   02/28/24 74 kg (163 lb 3.2 oz)   02/12/24 79.3 kg (174 lb 13.2 oz)   11/15/23 77.1 kg (170 lb)   10/04/23 67.6 kg (149 lb)   09/12/22 69.4 kg (153 lb)

## 2024-06-21 NOTE — DISCHARGE INSTRUCTIONS
Hospital Medicine DISCHARGE INSTRUCTIONS    NAME: Ivone Swain   :  1940   MRN:  [id number]     Date:     2024    Admission date: 2024     Discharge date:  2024     Reason for your admission:  Cardiorenal syndrome with renal failure, stage 5 chronic kidney disease or end stage renal disease, with heart failure (HCC) [I13.2]  Acute on chronic congestive heart failure, unspecified heart failure type (HCC) [I50.9]    Discharge Diagnoses:  CHF    DISCHARGE INSTRUCTIONS:    Thank you for allowing us to participate in your care. Your discharging Hospitalist is Mike Curry MD. You were admitted for evaluation and treatment for the above diagnoses.    Medications:     It is important that medications are taken exactly as they are prescribed on the discharge medication instructions and keep them your  in the bottles provided by the pharmacist.   Keep a list of the medication names, dosages, and times to be taken at all times.    Do not take other medications without consulting your doctor.     Recommended diet:  cardiac diet and renal diet    Recommended activity: activity as tolerated    Post discharge care:    For questions regarding your Hospitalization or to contact the Hospital Medicine team, please call [phone].    Notify follow up health care provider or return to the emergency department if you cannot get hold of your doctor if you feel worse or experience symptoms similar to those that brought you to hospital    Yaniv Hyde MD  [address] [facility] [address] [phone]    Schedule an appointment as soon as possible for a visit  to schedule a regular follow up after discharge    Marko Nguyen MD  [address] [facility] [address] [phone]    Schedule an appointment as soon as possible for a visit  to schedule follow up with your Cardiologist       Information obtained by :  I understand that if any problems occur once I am at home I am to contact my

## 2024-06-21 NOTE — PLAN OF CARE
Problem: Physical Therapy - Adult  Goal: By Discharge: Performs mobility at highest level of function for planned discharge setting.  See evaluation for individualized goals.  Description: FUNCTIONAL STATUS PRIOR TO ADMISSION: Patient was modified independent using a rollator for functional mobility. Of note, pt recently returned home from SNF. Lives alone in [facility] [place] [facility], states that son/ DIL live in [facility] [place] upstairs    Physical Therapy Goals  Initiated 6/18/2024  1.  Patient will move from supine to sit and sit to supine in bed with modified independence within 7 day(s).    2.  Patient will perform sit to stand with modified independence within 7 day(s).  3.  Patient will transfer from bed to chair and chair to bed with modified independence using the least restrictive device within 7 day(s).  4.  Patient will ambulate with supervision/set-up for 150 feet with the least restrictive device within 7 day(s).     Outcome: Progressing     PHYSICAL THERAPY TREATMENT    Patient: Ivone Swain (83 y.o. female)  Date: 6/21/2024  Diagnosis: NSTEMI (non-ST elevated myocardial infarction) ([facility]) [I21.4]  Cardiorenal syndrome with renal failure, stage 5 chronic kidney disease or end stage renal disease, with heart failure ([facility]) [I13.2]  Acute on chronic congestive heart failure, unspecified heart failure type ([facility]) [I50.9] Acute on chronic heart failure with preserved ejection fraction (HFpEF) ([facility])      Precautions: Fall Risk                      ASSESSMENT:  Patient continues to benefit from skilled PT services and is progressing towards goals. She was found seated on toilet in bathroom when PT entered. Oxygen saturations  monitored throughout activity (see below) with patient stable on room air but benefiting for pursed lip breathing cues throughout activity. Patient mobilizing slowly with rolling walker but at stand-by assist level, cues for safety with transfers. Encouraged continued lower extremity exercises for strengthening.  Continue to recommend home PT services in addition to increased family support upon discharge.     Documentation for home O2:     2L O2   Seated on toilet   O2 SATS  99% HR  73   ROOM AIR Seated on toilet 02 SATS  97% HR  63   ROOM AIR WITH ACTIVITY  O2 SATS  90-95% HR  61-68   ROOM AIR PATIENT LEFT COMFORTABLY  SITTING 02 SATS  96% HR  63           PLAN:  Patient continues to benefit from skilled intervention to address the above impairments.  Continue treatment per established plan of care.    Recommend for next PT session: further progression of gait with existing device    Recommendation for discharge: (in order for the patient to meet his/her long term goals): Therapy 2x a week in the home, however, may require supervision, cognitive and/or physical assistance due to the following concerns listed below:    Other factors to consider for discharge: no additional factors    IF patient discharges home will need the following DME: none       SUBJECTIVE:   Patient stated, \"When can I get dressed?\"    OBJECTIVE DATA SUMMARY:          Functional Mobility Training:  Bed Mobility:  Bed Mobility Training  Bed Mobility Training: No  Transfers:  Transfer Training  Transfer Training: Yes  Overall Level of Assistance: Stand-by assistance  Interventions: Safety awareness training;Verbal cues  Sit to Stand: Stand-by assistance  Stand to Sit: Stand-by assistance  Toilet Transfer: Stand-by assistance;Adaptive equipment  Cues for safe hand placements   Balance:  Balance  Sitting: Intact  Standing - Static: Constant support;Good  Standing - Dynamic: Constant support;Fair;Good   Ambulation/Gait Training:     Gait  Gait Training: Yes  Overall Level of Assistance: Stand-by assistance  Distance (ft):  (10ft, 75ft x 2)  Assistive Device: Walker, rolling;Gait belt  Interventions: Safety awareness training;Verbal cues  Base of Support: Narrowed  Speed/Siobhan: Slow  Gait Abnormalities: Decreased step clearance              Encouraged

## 2024-06-21 NOTE — PROGRESS NOTES
[facility]    Ivone Swain  YOB: 1940          Assessment & Plan:     ESRD on HD TTS at [facility]  Volume overload  Debility  HTN  PHTN  Sz d/o  H/o CVA    Rec:  HD tomorrow  Pt wants to continue dialysis for now  HD tts       Subjective:   CC: ESRD  HPI: Family meeting with palliative yest, note rev today.    Current Facility-Administered Medications   Medication Dose Route Frequency    metoprolol tartrate (LOPRESSOR) tablet 12.5 mg  12.5 mg Oral BID    artificial tears (dextran-hypromellose-glycerin) ophthalmic solution 2 drop  2 drop Both Eyes Daily    albuterol (PROVENTIL) (2.5 MG/3ML) 0.083% nebulizer solution 2 mg  2 mg Nebulization Q12H PRN    calcium acetate (PHOSLO) capsule 667 mg  1 capsule Oral TID WC    fluticasone (FLONASE) 50 MCG/ACT nasal spray 2 spray  2 spray Nasal Daily    levETIRAcetam (KEPPRA) tablet 500 mg  500 mg Oral QAM    montelukast (SINGULAIR) tablet 10 mg  10 mg Oral QHS    pravastatin (PRAVACHOL) tablet 40 mg  40 mg Oral QHS    sodium chloride flush 0.9 % injection 5-40 mL  5-40 mL IntraVENous 2 times per day    sodium chloride flush 0.9 % injection 5-40 mL  5-40 mL IntraVENous PRN    0.9 % sodium chloride infusion   IntraVENous PRN    heparin (porcine) injection 5,000 Units  5,000 Units SubCUTAneous 3 times per day    ondansetron (ZOFRAN-ODT) disintegrating tablet 4 mg  4 mg Oral Q8H PRN    Or    ondansetron (ZOFRAN) injection 4 mg  4 mg IntraVENous Q6H PRN    polyethylene glycol (GLYCOLAX) packet 17 g  17 g Oral Daily PRN    acetaminophen (TYLENOL) tablet 650 mg  650 mg Oral Q6H PRN    Or    acetaminophen (TYLENOL) suppository 650 mg  650 mg Rectal Q6H PRN    levETIRAcetam (KEPPRA) tablet 250 mg  250 mg Oral Nightly    levETIRAcetam (KEPPRA) tablet 250 mg  250 mg Oral Once per day on Tue Thu Sat          Objective:     Vitals:  Blood pressure (!) 136/56, pulse 57, temperature 97.7 °F (36.5 °C), temperature source Oral,

## 2024-06-23 ENCOUNTER — HOME CARE VISIT (OUTPATIENT)
Facility: HOME HEALTH | Age: 84
End: 2024-06-23

## 2024-06-23 VITALS
HEART RATE: 69 BPM | DIASTOLIC BLOOD PRESSURE: 68 MMHG | TEMPERATURE: 98.1 F | WEIGHT: 145 LBS | OXYGEN SATURATION: 92 % | RESPIRATION RATE: 18 BRPM | SYSTOLIC BLOOD PRESSURE: 130 MMHG | BODY MASS INDEX: 26.52 KG/M2

## 2024-06-23 PROCEDURE — G0299 HHS/HOSPICE OF RN EA 15 MIN: HCPCS

## 2024-06-24 ENCOUNTER — HOME CARE VISIT (OUTPATIENT)
Facility: HOME HEALTH | Age: 84
End: 2024-06-24
Payer: MEDICARE

## 2024-06-24 VITALS
TEMPERATURE: 97.8 F | DIASTOLIC BLOOD PRESSURE: 78 MMHG | SYSTOLIC BLOOD PRESSURE: 130 MMHG | RESPIRATION RATE: 16 BRPM | OXYGEN SATURATION: 92 % | HEART RATE: 72 BPM

## 2024-06-24 PROCEDURE — G0151 HHCP-SERV OF PT,EA 15 MIN: HCPCS

## 2024-06-24 ASSESSMENT — ENCOUNTER SYMPTOMS: DYSPNEA ACTIVITY LEVEL: AFTER AMBULATING MORE THAN 20 FT

## 2024-06-24 NOTE — HOME HEALTH
Reason for referral, PMH SUMMARY of clinical condition:83 yr old referred to home care s/p hosp on 06-15-24 and rehosp-. [place] on 6/17 and discharged on 6/21 with dx of CHF.   PMH- CKD with HD on Tuesday, Thursday and Saturday,ESRF,HF and seizures    PLOF- pt said she lives indep and was able to manage her ADLs, Son lives close by and helped with groceries and [place]'s Pt used a RW to get around indoors and outdoors..H/o 1 fall in the past 6 months    Used FWW, went for dialysis 3 x week T-Th- Sat. Was able to use her walker and walk down to the elevator and get out of the bldg to get to transportation or Son would take for for dialysis.     Clinical Assessment/Skilled reason for admission to home health (What this means for the patient overall and need for ongoing skilled care): 83 yr old female who lives alone in an apt on second floor with elevator was referred to homecare S/p hosp for CHF. Pt has CKD and goes for dialysis T-TH-SAT  Per pt her son lives close by and helps with shopping and drs apt's.  CLOF- pt is deconditioned and weak from hospitalization. She walked to her bedroom and back with one sitting rest and was sob.   Muscle strength in B LEs 2+-3/5  Bed mob- pt has hospital bed which is low and has a very soft mattress. Was indep but needed vc for safety and to slow down during transitions  Transfers- pt uses B Ues to push up to standing. She had to raise her lift chair to higher setting to stand up.  Amb- pt ambulated 30 x 2 slow gait with her Rolator needs vc to stay close to her walker.  Endurance- Modified Borgs  2/10. pt reports she is very weak right now.  Balance- Tinetti - 6/10   indicating high risk for falls. [facility] 10-   /10     Pt will benefit from skilled PT to return to previous level of function where she can walk to the elevator and go down to the lobby and parking    Functional Mobility:  Bed Mobility (rolling/scooting): Supervision or Touching Assistance  Transfers (supine to

## 2024-06-25 ENCOUNTER — HOME CARE VISIT (OUTPATIENT)
Facility: HOME HEALTH | Age: 84
End: 2024-06-25
Payer: MEDICARE

## 2024-06-25 ENCOUNTER — HOME CARE VISIT (OUTPATIENT)
Dept: HOME HEALTH SERVICES | Facility: HOME HEALTH | Age: 84
End: 2024-06-25
Payer: MEDICARE

## 2024-06-26 ENCOUNTER — HOME CARE VISIT (OUTPATIENT)
Facility: HOME HEALTH | Age: 84
End: 2024-06-26
Payer: MEDICARE

## 2024-06-26 VITALS
TEMPERATURE: 98 F | OXYGEN SATURATION: 85 % | SYSTOLIC BLOOD PRESSURE: 122 MMHG | DIASTOLIC BLOOD PRESSURE: 70 MMHG | HEART RATE: 77 BPM | RESPIRATION RATE: 18 BRPM

## 2024-06-26 VITALS
DIASTOLIC BLOOD PRESSURE: 78 MMHG | BODY MASS INDEX: 26.94 KG/M2 | TEMPERATURE: 97.5 F | RESPIRATION RATE: 16 BRPM | OXYGEN SATURATION: 96 % | HEART RATE: 65 BPM | SYSTOLIC BLOOD PRESSURE: 122 MMHG | WEIGHT: 147.3 LBS

## 2024-06-26 PROCEDURE — G0299 HHS/HOSPICE OF RN EA 15 MIN: HCPCS

## 2024-06-26 PROCEDURE — G0151 HHCP-SERV OF PT,EA 15 MIN: HCPCS

## 2024-06-26 ASSESSMENT — ENCOUNTER SYMPTOMS
COUGH CHARACTERISTICS: NON-PRODUCTIVE
DYSPNEA ACTIVITY LEVEL: AFTER AMBULATING MORE THAN 20 FT
COUGH: 1
PAIN LOCATION - PAIN QUALITY: ACHING
STOOL DESCRIPTION: SOFT FORMED

## 2024-06-26 NOTE — HOME HEALTH
Subjective: Pt's son was present and helping pt getting dressed- with her shoes.  Pt was tried and falling asleep during my session today.  Falls since last visit No(if yes complete the Fall Tracking Form and include bsrifallreport):   Caregiver involvement changes: none  Home health supplies by type and quantity ordered/delivered this visit include: none    Clinician asked if patient has had any physician contact since last home care visit and patient states: NO  Clinician asked if patient has any new or changed medications and patient states:  N/A   If Yes, were medications reconciled? N/A   Was the certifying physician notified of changes in medications? N/A     Clinical assessment (what this visit means for the patient overall and need for ongoing skilled care) and progress or lack of progress towards SPECIFIC goals: Today's session includes B Le exs in sitting 2 x 15 with rests in between.    Stand exs with B UE support- heel raises,- Pt was tired and had to sit down. O2 dropped to 85. Took some deep breadths and rest and went back up to 92  Pt will benefit from continued therapy because she is deconditioned and gets tired easy. Gait, strength and transfers goal not met,    Written Teaching Material Utilized: written Hep issued    Interdisciplinary communication with: Son for the purpose of POC collaboration    Discharge planning as follows: Is no longer homebound, Per physician order, When patient is no longer able to participate or progresses to SNF/Hospice, Will discharge when the patient has reached their maximum functional potential and maximum safety in their home and When goals are met    Specific plan for next visit: increase strength in B LEs,transfers, gait

## 2024-06-27 NOTE — HOME HEALTH
Subjective: I was very tired yesterday but I'm ok today. Per pcg no acute events and pcg participated in SN visit today.   Falls since last visit No(if yes complete the Fall Tracking Form and include bsrifallreport):   Caregiver involvement changes: none  Home health supplies by type and quantity ordered/delivered this visit include: none    Clinician asked if patient has had any physician contact since last home care visit and patient states: NO  Clinician asked if patient has any new or changed medications and patient states:  NO   If Yes, were medications reconciled? N/A   Was the certifying physician notified of changes in medications? N/A     Clinical assessment (what this visit means for the patient overall and need for ongoing skilled care) and progress or lack of progress towards SPECIFIC goals: Pt seen by  for disease and medication education. Pcg is present at this SN visit and participated in education. Pcg manages pt medications, taking daily weights, and monitoring for s/s of disease complications. SN used HF handbook and stoplight tool for education. Pcg is progressing towards education goals and pt is able to retain some information. Patient at risk for falls, bleeding, worsening disease symptoms, CHF exacerbation, goals not fully met and SN to continue for education to prevent rehospitalization. SN to discuss COPE program and pt and pcg are interested in scheduling visit with TRAVON RN for more information.     Written Teaching Material Utilized: HF handbook, stoplight tool     Interdisciplinary communication with: TRAVON Wright RN for the purpose of COPE referral and setting up appt to meet with family    Discharge planning as follows: Per physician order, When patient is no longer able to participate or progresses to SNF/Hospice and When goals are met    Specific plan for next visit: assess retention of CHF education, educate on seizures and PO medications

## 2024-06-28 ENCOUNTER — HOME CARE VISIT (OUTPATIENT)
Facility: HOME HEALTH | Age: 84
End: 2024-06-28
Payer: MEDICARE

## 2024-06-28 PROCEDURE — G0152 HHCP-SERV OF OT,EA 15 MIN: HCPCS

## 2024-07-01 ENCOUNTER — HOME CARE VISIT (OUTPATIENT)
Facility: HOME HEALTH | Age: 84
End: 2024-07-01
Payer: MEDICARE

## 2024-07-01 VITALS
SYSTOLIC BLOOD PRESSURE: 122 MMHG | BODY MASS INDEX: 26.94 KG/M2 | OXYGEN SATURATION: 96 % | WEIGHT: 147.3 LBS | HEART RATE: 78 BPM | DIASTOLIC BLOOD PRESSURE: 68 MMHG | RESPIRATION RATE: 17 BRPM | TEMPERATURE: 98.4 F

## 2024-07-01 VITALS
HEART RATE: 61 BPM | SYSTOLIC BLOOD PRESSURE: 110 MMHG | OXYGEN SATURATION: 96 % | BODY MASS INDEX: 27 KG/M2 | DIASTOLIC BLOOD PRESSURE: 60 MMHG | TEMPERATURE: 97.8 F | WEIGHT: 147.6 LBS

## 2024-07-01 VITALS
SYSTOLIC BLOOD PRESSURE: 132 MMHG | DIASTOLIC BLOOD PRESSURE: 70 MMHG | OXYGEN SATURATION: 95 % | HEART RATE: 70 BPM | RESPIRATION RATE: 18 BRPM | TEMPERATURE: 98 F

## 2024-07-01 PROCEDURE — G0299 HHS/HOSPICE OF RN EA 15 MIN: HCPCS

## 2024-07-01 PROCEDURE — G0151 HHCP-SERV OF PT,EA 15 MIN: HCPCS

## 2024-07-01 ASSESSMENT — ENCOUNTER SYMPTOMS: PAIN LOCATION - PAIN QUALITY: SORE, SHARP

## 2024-07-01 NOTE — HOME HEALTH
Reason for referral, [facility] SUMMARY of clinical condition:   Pt. initially hospitalized 02/23/24-03/04/24 secondary to respiratory failure, s/p intubation.   Pt. transferred to SNF and discharged home and was then rehospitalized 06/17/24-06/21/24 secon[place] to NSTEMI and CHF.    Clinical Assessment/Skilled reason for admission to home health (What this means for the patient overall and need for ongoing skilled care):  Pt. resides alone in a large, spacious apartment.   Pt.'s son and daughter-in-law live above her and are assisting with all care.   Pt.'s sister comes 2 days a week to stay with pt.   Pt. was previously independent with ADLs and meal prep. and now requires assistance in all areas.  Family is very supportive.   Pt. reports fatigue and knee pain which interfere with overall functioning.   Pt. has all needed DME.   Pt. will benefit from ADL and IADL training to increase overall independence and to return to prior level of functioning.   See Interventions / goals for additional details.     Functional Mobility:  Bed Mobility (rolling/scooting):  not assessed.   Pt. has a hospital bed and reports completing transfers unassisted.  Transfers (supine to chair and return to supine): Stand by assistance.   Patient uses device: yes  Gait:  Ambulates with supervision using a rollator  Safety concerns with mobility include: unsteady gait and debility, pain with ambulation.  DME: hospital bed, 3:1 commode and shower chair, rollator.    Diagnosis:  ESRD on dialysis, acute on chronic heart failure, anemia secondary to CKD, HTN, seizure disorder, h/o CVA, h/o MI, SUSAN.    Subjective (statement from pt/cg that is relative to why you are there):   \"My family helps me with that.\"    Caregiver: family memebers.  Caregiver assists with: Medications, Meals, Bathing, ADL and IADL.  Caregiver unable to assist with: nothing of note. Caregiver is available Regularly.  Caregiver is  present at this visit and did participate with

## 2024-07-01 NOTE — HOME HEALTH
Subjective: Son wanted to know if we can practiced waling down to the lobby where she gets picked dup by the Dialysis .  Falls since last visit No(if yes complete the Fall Tracking Form and include bsrifallreport):   Caregiver involvement changes: none  Home health supplies by type and quantity ordered/delivered this visit include: none    Clinician asked if patient has had any physician contact since last home care visit and patient states: NO  Clinician asked if patient has any new or changed medications and patient states:  N/A   If Yes, were medications reconciled? N/A   Was the certifying physician notified of changes in medications? NO     Clinical assessment (what this visit means for the patient overall and need for ongoing skilled care) and progress or lack of progress towards SPECIFIC goals:   Today's session included having pt walk to the elevator and go down t the lobby and come back. Pt used her Rolator- needed some assist to hold the door open and also needed one sitting rest in between. Was tired after coming back.  B Le exs in sitting x 15 today.  Pt was tried,.  Will benefit from continued therapy for improving balance, endurance and gait training    Written Teaching Material Utilized: written Hep and Amb program    Interdisciplinary communication with:  RN and son for the purpose of POC collaboration    Discharge planning as follows: Is no longer homebound, Per physician order, When patient is no longer able to participate or progresses to SNF/Hospice, Will discharge when the patient has reached their maximum functional potential and maximum safety in their home and When goals are met    Specific plan for next visit: increase strength in B LE,s transfers, gait

## 2024-07-02 NOTE — HOME HEALTH
Subjective: \" I checked my weight today\"  Falls since last visit No(if yes complete the Fall Tracking Form and include bsrifallreport):   Caregiver involvement changes: NA  Home health supplies by type and quantity ordered/delivered this visit include: NA    Clinician asked if patient has had any physician contact since last home care visit and patient states: NO  Clinician asked if patient has any new or changed medications and patient states:  NO   If Yes, were medications reconciled? NO   Was the certifying physician notified of changes in medications? NO     Clinical assessment (what this visit means for the patient overall and need for ongoing skilled care) and progress or lack of progress towards SPECIFIC goals: SN visits are required to assess and educate patient on the CHF disease process.  These visits will reduce the risk of exacerbation.  CHF Disease process education goals are not met.    Written Teaching Material Utilized: N/A    Interdisciplinary communication with: N/A   Discharge planning as follows: Will discharge when the patient has reached their maximum functional potential and maximum safety in their home    Specific plan for next visit: Educate on Seizure disease process and notification

## 2024-07-03 ENCOUNTER — HOME CARE VISIT (OUTPATIENT)
Facility: HOME HEALTH | Age: 84
End: 2024-07-03
Payer: MEDICARE

## 2024-07-05 ENCOUNTER — HOME CARE VISIT (OUTPATIENT)
Facility: HOME HEALTH | Age: 84
End: 2024-07-05
Payer: MEDICARE

## 2024-07-05 VITALS
TEMPERATURE: 98.1 F | WEIGHT: 143.7 LBS | SYSTOLIC BLOOD PRESSURE: 110 MMHG | OXYGEN SATURATION: 98 % | BODY MASS INDEX: 26.28 KG/M2 | HEART RATE: 63 BPM | DIASTOLIC BLOOD PRESSURE: 72 MMHG | RESPIRATION RATE: 18 BRPM

## 2024-07-05 PROCEDURE — G0151 HHCP-SERV OF PT,EA 15 MIN: HCPCS

## 2024-07-05 NOTE — HOME HEALTH
Subjective: I am doing good today- I gave no pain  Falls since last visit No(if yes complete the Fall Tracking Form and include bsrifallreport):   Caregiver involvement changes: none  Home health supplies by type and quantity ordered/delivered this visit include: none    Clinician asked if patient has had any physician contact since last home care visit and patient states: N/A  Clinician asked if patient has any new or changed medications and patient states:  N/A   If Yes, were medications reconciled? N/A   Was the certifying physician notified of changes in medications? N/A     Clinical assessment (what this visit means for the patient overall and need for ongoing skilled care) and progress or lack of progress towards SPECIFIC goals: Today's session included B Le exs in sitting x 15 with rests- pt was able to follow 75% written instructions on the Hep for B le exs in sitting .  Amb to the lobby and back with Rolator today with no sitting rest - 50 x 2 -was able to get in and out of elevator without help.  Amb indoors with Rolator 40 x 2 with vc to stay close to the walker   Was tired after the session.  PT reviewed importance of getting up and walking every 1-2 hrs.  Endurance, strength and gait goal not met.    Written Teaching Material Utilized: written Hep    Interdisciplinary communication with: N/A  Discharge planning as follows: Is no longer homebound, Per physician order, When patient is no longer able to participate or progresses to SNF/Hospice, Will discharge when the patient has reached their maximum functional potential and maximum safety in their home and When goals are met    Specific plan for next visit: increase strength in B LES, transfers, gait

## 2024-07-08 ENCOUNTER — HOME CARE VISIT (OUTPATIENT)
Facility: HOME HEALTH | Age: 84
End: 2024-07-08
Payer: MEDICARE

## 2024-07-08 VITALS
OXYGEN SATURATION: 94 % | SYSTOLIC BLOOD PRESSURE: 110 MMHG | TEMPERATURE: 97.8 F | RESPIRATION RATE: 17 BRPM | HEART RATE: 64 BPM | DIASTOLIC BLOOD PRESSURE: 62 MMHG

## 2024-07-08 VITALS
SYSTOLIC BLOOD PRESSURE: 124 MMHG | TEMPERATURE: 98.3 F | HEART RATE: 64 BPM | BODY MASS INDEX: 26.37 KG/M2 | WEIGHT: 144.2 LBS | RESPIRATION RATE: 17 BRPM | OXYGEN SATURATION: 97 % | DIASTOLIC BLOOD PRESSURE: 74 MMHG

## 2024-07-08 PROCEDURE — G0152 HHCP-SERV OF OT,EA 15 MIN: HCPCS

## 2024-07-08 PROCEDURE — G0299 HHS/HOSPICE OF RN EA 15 MIN: HCPCS

## 2024-07-09 ENCOUNTER — HOME CARE VISIT (OUTPATIENT)
Dept: HOME HEALTH SERVICES | Facility: HOME HEALTH | Age: 84
End: 2024-07-09
Payer: MEDICARE

## 2024-07-09 NOTE — HOME HEALTH
Subjective: Pt denies any pain  Falls since last visit No(if yes complete the Fall Tracking Form and include bsrifallreport):   Caregiver involvement changes: n/a  Home health supplies by type and quantity ordered/delivered this visit include: n/a    Clinician asked if patient has had any physician contact since last home care visit and patient states: NO  Clinician asked if patient has any new or changed medications and patient states:  NO   If Yes, were medications reconciled? N/A   Was the certifying physician notified of changes in medications? N/A     Clinical assessment (what this visit means for the patient overall and need for ongoing skilled care) and progress or lack of progress towards SPECIFIC goals: Pt was able to participate in standing level activity/ambulation x 8 min today utilizing pacing strategies before requiring seated rest break. Pt to benefit from continued OT to increase activity tolerance and maximize independence and safety with ADLs/IADLs.     Written Teaching Material Utilized: N/A    Interdisciplinary communication with: N/A     Discharge planning as follows: When goals are met/max potential is acheived    Specific plan for next visit: shower re-training

## 2024-07-09 NOTE — HOME HEALTH
Subjective: \" I have had seizures in the past, no recently\"  Falls since last visit No(if yes complete the Fall Tracking Form and include bsrifallreport):   Caregiver involvement changes: No  Home health supplies by type and quantity ordered/delivered this visit include: No    Clinician asked if patient has had any physician contact since last home care visit and patient states: NO  Clinician asked if patient has any new or changed medications and patient states:  NO   If Yes, were medications reconciled? NO   Was the certifying physician notified of changes in medications? NO     Clinical assessment (what this visit means for the patient overall and need for ongoing skilled care) and progress or lack of progress towards SPECIFIC goals: SN visits are required to educate patient on seizure disorders.  These visits will reduce the risk of exacerbation. Education goals are not      Written Teaching Material Utilized: N/A    Interdisciplinary communication with: N/A    Discharge planning as follows: Will discharge when the patient has reached their maximum functional potential and maximum safety in their home    Specific plan for next visit: Educate on CHF disease process

## 2024-07-10 ENCOUNTER — HOME CARE VISIT (OUTPATIENT)
Facility: HOME HEALTH | Age: 84
End: 2024-07-10
Payer: MEDICARE

## 2024-07-10 VITALS
TEMPERATURE: 97.4 F | OXYGEN SATURATION: 96 % | SYSTOLIC BLOOD PRESSURE: 122 MMHG | DIASTOLIC BLOOD PRESSURE: 78 MMHG | HEART RATE: 70 BPM | RESPIRATION RATE: 18 BRPM

## 2024-07-10 PROCEDURE — G0151 HHCP-SERV OF PT,EA 15 MIN: HCPCS

## 2024-07-10 PROCEDURE — G0299 HHS/HOSPICE OF RN EA 15 MIN: HCPCS

## 2024-07-10 NOTE — HOME HEALTH
Subjective: I am doing well.  Falls since last visit No(if yes complete the Fall Tracking Form and include bsrifallreport):   Caregiver involvement changes: non  Home health supplies by type and quantity ordered/delivered this visit include: none    Clinician asked if patient has had any physician contact since last home care visit and patient states: NO  Clinician asked if patient has any new or changed medications and patient states:  N/A   If Yes, were medications reconciled? N/A   Was the certifying physician notified of changes in medications? N/A     Clinical assessment (what this visit means for the patient overall and need for ongoing skilled care) and progress or lack of progress towards SPECIFIC goals:   Today's session included B le exs in sitting 2 x 15 reps with one rest in between. pt needs 25% guidance to progress to next exs.   Sand exs with B UE support- 15 reps but needed 100% vc to progress to next exs. Has dementia and doesn't remember instructions.  Transfers- sit to stand from lower position ( not the raised position in her lift chair)  30 sec's test- 4 stand ups with 2 x falling back in chair.  Amb with Rolator indoors today 3 rounds  60 x 2 .  Wt 147.4 lbs.  Will benefit from continued therapy because Gait ,strength and transfers goal not met.    Written Teaching Material Utilized: written HEP    Interdisciplinary communication with:  RN for the purpose of POC collaboration    Discharge planning as follows: Is no longer homebound, Per physician order, When patient is no longer able to participate or progresses to SNF/Hospice, Will discharge when the patient has reached their maximum functional potential and maximum safety in their home and When goals are met    Specific plan for next visit: increase strength in B LE,s transfers, gait

## 2024-07-11 VITALS
BODY MASS INDEX: 26.96 KG/M2 | HEART RATE: 78 BPM | TEMPERATURE: 97.7 F | DIASTOLIC BLOOD PRESSURE: 76 MMHG | RESPIRATION RATE: 18 BRPM | OXYGEN SATURATION: 98 % | SYSTOLIC BLOOD PRESSURE: 121 MMHG | WEIGHT: 147.4 LBS

## 2024-07-11 NOTE — HOME HEALTH
Subjective: \" I have my weight log for review\"  Falls since last visit No(if yes complete the Fall Tracking Form and include bsrifallreport):   Caregiver involvement changes: NA  Home health supplies by type and quantity ordered/delivered this visit include: NA    Clinician asked if patient has had any physician contact since last home care visit and patient states: NO  Clinician asked if patient has any new or changed medications and patient states:  NO   If Yes, were medications reconciled? NO   Was the certifying physician notified of changes in medications? NO     Clinical assessment (what this visit means for the patient overall and need for ongoing skilled care) and progress or lack of progress towards SPECIFIC goals: SN visits are required to educate of edema management, CHF disease process, pravastatin, and singulair.  These visits will reduce the risk of hospitalization. Education goals are not met.    Written Teaching Material Utilized: N/A    Interdisciplinary communication with: Dr. Marko Nguyen (cardiology) for the purpose of weight concerns.  Patient weight trending up since last SN visit.  Weight as follows: Mon- 144.2 / Tues:146.2 / Today: 147.4.  Patient and son educated on leg elevation, limiting sodium intake, monitoring weight gain.  If weight gain persist, contact Cardiology office.  Both verbalized understanding.  SN spoke with Gila, nurse for Dr. Nguyen.  Agrees with SN recommendation.    Discharge planning as follows: Will discharge when the patient has reached their maximum functional potential and maximum safety in their home    Specific plan for next visit: Educate on albuterol sulfate HFA medication and re-assess weight gain.

## 2024-07-12 ENCOUNTER — HOME CARE VISIT (OUTPATIENT)
Facility: HOME HEALTH | Age: 84
End: 2024-07-12
Payer: MEDICARE

## 2024-07-12 PROCEDURE — G0151 HHCP-SERV OF PT,EA 15 MIN: HCPCS

## 2024-07-14 VITALS
OXYGEN SATURATION: 95 % | HEART RATE: 58 BPM | DIASTOLIC BLOOD PRESSURE: 82 MMHG | SYSTOLIC BLOOD PRESSURE: 125 MMHG | RESPIRATION RATE: 17 BRPM | TEMPERATURE: 97.6 F

## 2024-07-15 ENCOUNTER — HOME CARE VISIT (OUTPATIENT)
Facility: HOME HEALTH | Age: 84
End: 2024-07-15
Payer: MEDICARE

## 2024-07-15 VITALS
TEMPERATURE: 98 F | OXYGEN SATURATION: 96 % | HEART RATE: 65 BPM | DIASTOLIC BLOOD PRESSURE: 62 MMHG | SYSTOLIC BLOOD PRESSURE: 98 MMHG | RESPIRATION RATE: 18 BRPM

## 2024-07-15 VITALS
TEMPERATURE: 97.9 F | OXYGEN SATURATION: 93 % | HEART RATE: 66 BPM | DIASTOLIC BLOOD PRESSURE: 60 MMHG | SYSTOLIC BLOOD PRESSURE: 120 MMHG | RESPIRATION RATE: 17 BRPM

## 2024-07-15 PROCEDURE — G0152 HHCP-SERV OF OT,EA 15 MIN: HCPCS

## 2024-07-15 PROCEDURE — G0151 HHCP-SERV OF PT,EA 15 MIN: HCPCS

## 2024-07-15 NOTE — HOME HEALTH
Subjective: Pt reports her grandaughter and great granddaughter visited her over the weekend but they are leaving today  Falls since last visit No(if yes complete the Fall Tracking Form and include bsrifallreport):   Caregiver involvement changes: n/a  Home health supplies by type and quantity ordered/delivered this visit include: n/a    Clinician asked if patient has had any physician contact since last home care visit and patient states: YES, pt saw Dr. Hyde last Friday  Clinician asked if patient has any new or changed medications and patient states:  YES   If Yes, were medications reconciled? yes, except new nasal spray was not added as it is not in home  Was the certifying physician notified of changes in medications? N/A, physician aware    Clinical assessment (what this visit means for the patient overall and need for ongoing skilled care) and progress or lack of progress towards SPECIFIC goals: Pt has progressed to performing clothing retreival and dressing tasks with Mod I, but continues to require assistance from family for showers and IADLs. Pt to benefit from continued OT to increase activity tolerance and safety for ADL/IADL routine and functional mobility.     Written Teaching Material Utilized: n/a    Interdisciplinary communication with: ID team regarding medication changes    Discharge planning as follows: When goals are met/max potential is acheived    Specific plan for next visit: shower re-training

## 2024-07-16 NOTE — HOME HEALTH
Subjective: I am doing well. The OT was here this morning. She updated my med's.  Falls since last visit No(if yes complete the Fall Tracking Form and include bsrifallreport):   Caregiver involvement changes: none  Home health supplies by type and quantity ordered/delivered this visit include: none    Clinician asked if patient has had any physician contact since last home care visit and patient states: YES  Clinician asked if patient has any new or changed medications and patient states:  YES   If Yes, were medications reconciled? YES -OT has updated per pt  Was the certifying physician notified of changes in medications? N/A     Clinical assessment (what this visit means for the patient overall and need for ongoing skilled care) and progress or lack of progress towards SPECIFIC goals: Pt making slow steady progress. She able to perform all her sitting and standing exs with B UE support 15 reps today with 1 sitting rest in between. Needed 50% reminders to progress to next exs.  Transfers- Pt was able to get up from her chair without lifting it up.  Amb- pt ambulated to the lobby and back with Rolator - needs vc to stay close to the walker.   Pt was leaning forward to  stuff from the floor when she was sitting in her chair- PT had to remind her not to do so- fall precautions. verbalized understanding.    Written Teaching Material Utilized: written Hep and Amb program discussed    Interdisciplinary communication with: OT for the purpose of POC collaboration and dc plans    Discharge planning as follows: Is no longer homebound, Per physician order, When patient is no longer able to participate or progresses to SNF/Hospice, Will discharge when the patient has reached their maximum functional potential and maximum safety in their home and When goals are met    Specific plan for next visit: increase strength in B LEs, transfers, gait

## 2024-07-17 ENCOUNTER — HOME CARE VISIT (OUTPATIENT)
Facility: HOME HEALTH | Age: 84
End: 2024-07-17
Payer: MEDICARE

## 2024-07-17 VITALS
OXYGEN SATURATION: 98 % | TEMPERATURE: 97 F | SYSTOLIC BLOOD PRESSURE: 110 MMHG | RESPIRATION RATE: 17 BRPM | DIASTOLIC BLOOD PRESSURE: 70 MMHG | HEART RATE: 62 BPM

## 2024-07-17 PROCEDURE — G0299 HHS/HOSPICE OF RN EA 15 MIN: HCPCS

## 2024-07-17 PROCEDURE — G0151 HHCP-SERV OF PT,EA 15 MIN: HCPCS

## 2024-07-18 VITALS
HEART RATE: 74 BPM | DIASTOLIC BLOOD PRESSURE: 72 MMHG | BODY MASS INDEX: 26.16 KG/M2 | OXYGEN SATURATION: 98 % | RESPIRATION RATE: 18 BRPM | TEMPERATURE: 97.7 F | SYSTOLIC BLOOD PRESSURE: 118 MMHG | WEIGHT: 143 LBS

## 2024-07-18 PROBLEM — R79.89 ELEVATED TROPONIN: Status: RESOLVED | Noted: 2024-06-18 | Resolved: 2024-07-18

## 2024-07-18 NOTE — HOME HEALTH
Subjective: \" My doctor took me off some of my meds\"  Falls since last visit No(if yes complete the Fall Tracking Form and include bsrifallreport):   Caregiver involvement changes: NA  Home health supplies by type and quantity ordered/delivered this visit include: NA    Clinician asked if patient has had any physician contact since last home care visit and patient states: YES  Clinician asked if patient has any new or changed medications and patient states:  YES New medication: Astepro Nasal Fountain.  D/C [place]   If Yes, were medications reconciled? YES   Was the certifying physician notified of changes in medications? N/A. PCP made medication changes    Clinical assessment (what this visit means for the patient overall and need for ongoing skilled care) and progress or lack of progress towards SPECIFIC goals: SN visits are required to educate patient on fall prevention strageties.  These visits will reduce the risk of falls and hospitalization. Education goals are met.       Written Teaching Material Utilized: N/A    Interdisciplinary communication with: N/A     Discharge planning as follows: Will discharge when the patient has reached their maximum functional potential and maximum safety in their home    Specific plan for next visit: Re-Educate on CHF disease process

## 2024-07-24 ENCOUNTER — HOME CARE VISIT (OUTPATIENT)
Facility: HOME HEALTH | Age: 84
End: 2024-07-24
Payer: MEDICARE

## 2024-07-24 VITALS
RESPIRATION RATE: 17 BRPM | HEART RATE: 57 BPM | OXYGEN SATURATION: 97 % | DIASTOLIC BLOOD PRESSURE: 62 MMHG | SYSTOLIC BLOOD PRESSURE: 96 MMHG

## 2024-07-24 PROCEDURE — G0152 HHCP-SERV OF OT,EA 15 MIN: HCPCS

## 2024-07-24 ASSESSMENT — ENCOUNTER SYMPTOMS: PAIN LOCATION - PAIN QUALITY: ACHING

## 2024-07-24 NOTE — HOME HEALTH
Subjective: \"my daughter in law is there when I take a shower but I do most of it\"   Falls since last visit No(if yes complete the Fall Tracking Form and include bsrifallreport):   Caregiver involvement changes: n/a  Home health supplies by type and quantity ordered/delivered this visit include: n/a    Clinician asked if patient has had any physician contact since last home care visit and patient states: NO  Clinician asked if patient has any new or changed medications and patient states:  NO   If Yes, were medications reconciled? N/A   Was the certifying physician notified of changes in medications? n/a    Clinical assessment (what this visit means for the patient overall and need for ongoing skilled care) and progress or lack of progress towards SPECIFIC goals: Pt seen for OT re-assessment visit today. Pt's Modified Barthel Index score has increased from 70/100 to 85/100, indicating she is totally independent, however pt continues to require Min A for showers and assistance with meal prep tasks. Pt is now Mod I with dressing routine and toileting routine. Pt to benefit from 1 additional OT visit to address IADL re-training.     Written Teaching Material Utilized: n/a    Interdisciplinary communication with: n/a    Discharge planning as follows: When goals are met/max potential is acheived    Specific plan for next visit: meal prep re-training

## 2024-07-26 ENCOUNTER — HOME CARE VISIT (OUTPATIENT)
Facility: HOME HEALTH | Age: 84
End: 2024-07-26
Payer: MEDICARE

## 2024-07-26 VITALS
OXYGEN SATURATION: 95 % | WEIGHT: 141.2 LBS | BODY MASS INDEX: 25.83 KG/M2 | RESPIRATION RATE: 16 BRPM | HEART RATE: 63 BPM | DIASTOLIC BLOOD PRESSURE: 60 MMHG | TEMPERATURE: 98.2 F | SYSTOLIC BLOOD PRESSURE: 110 MMHG

## 2024-07-26 PROCEDURE — G0299 HHS/HOSPICE OF RN EA 15 MIN: HCPCS

## 2024-07-26 ASSESSMENT — ENCOUNTER SYMPTOMS: STOOL DESCRIPTION: SOFT FORMED

## 2024-07-26 NOTE — HOME HEALTH
Subjective: I am doing pretty good. I have an eye appt today.   Falls since last visit No(if yes complete the Fall Tracking Form and include bsrifallreport):   Caregiver involvement changes: none    Clinician asked if patient has had any physician contact since last home care visit and patient states: NO  Clinician asked if patient has any new or changed medications and patient states:  NO   If Yes, were medications reconciled? YES and reviewed medication list with pt son  Was the certifying physician notified of changes in medications? N/A     A list of reconciled medications has been given to the patient/caregiver .      Clinical assessment (what this visit means for the patient overall and need for ongoing skilled care) and progress or lack of progress towards SPECIFIC goals: Patient seen by  for disease and medication education. Pt son manages medications and ensuring follow up appts are kept. Pt son lives in same building as pt and provides consistent care for patient. Patient ambulates using a walker and goes to HD three times a week. Patient and pcg have met all education goals with SN and is discharged from .     Discharge Instructions:    Written Teaching Material Utilized: discharge instructions    Instructed patient/caregiver on the following: Take all medications exactly as prescribed by physician. Updated medication list present in the home at discharge, Keep all scheduled medical appointments, Wash hands frequently to control the spread of infection, Follow safety and fall prevention education to prevent falls and Continue home exercises as instructed by your therapist    Call physician for: continuous pain, abnormal bleeding, high fever over 100.4 degrees, increased pain, new problem and frequent falls    The patient/caregiver expressed knowledge and understanding of Discharge Instructions      Interdisciplinary communication with: Nichole BARRIOS and Dr. Hyde for the purpose of notification of

## 2024-07-31 ENCOUNTER — HOME CARE VISIT (OUTPATIENT)
Facility: HOME HEALTH | Age: 84
End: 2024-07-31
Payer: MEDICARE

## 2024-07-31 VITALS
OXYGEN SATURATION: 95 % | DIASTOLIC BLOOD PRESSURE: 60 MMHG | TEMPERATURE: 97.7 F | RESPIRATION RATE: 17 BRPM | HEART RATE: 62 BPM | SYSTOLIC BLOOD PRESSURE: 102 MMHG

## 2024-07-31 PROCEDURE — G0152 HHCP-SERV OF OT,EA 15 MIN: HCPCS

## 2024-07-31 NOTE — HOME HEALTH
Subjective: Pt reports burning with urination since last week. Per DIL, they called [employer] [facility] over the weekend but were unable to get a visit so they administered an at home UTI test, which indicated positive and negative results. They have not followed up with physician because pt seemed to be feeling better. OT [facility]. Pt/cg states understanding and agreement.     Falls since last visit No(if yes complete the Fall Tracking Form and include bsrifallreport):   Caregiver involvement changes: n/a  Home health supplies by type and quantity ordered/delivered this visit include: n/a    Clinician asked if patient has had any physician contact since last home care visit and patient states: NO  Clinician asked if patient has any new or changed medications and patient states:  NO   If Yes, were medications reconciled? N/A   Was the certifying physician notified of changes in medications? N/A     Clinical assessment (what this visit means for the patient overall and need for ongoing skilled care) and progress or lack of progress towards SPECIFIC goals: Pt seen for [facility] discharge today as she has met all OT goals. See d/c summary. Pt/cg state understanding and agreement with agency discharge.     Written Teaching Material Utilized: discharge instructions    Interdisciplinary communication with: Sander SANDRA for the purpose of collaboration of care.    Discharge planning as follows: d/c home with intermittent family support under supervision of physician    Specific plan for next visit: n/a

## 2024-09-05 ENCOUNTER — TELEPHONE (OUTPATIENT)
Age: 84
End: 2024-09-05

## 2024-09-05 DIAGNOSIS — G40.009 PARTIAL IDIOPATHIC EPILEPSY WITH SEIZURES OF LOCALIZED ONSET, NOT INTRACTABLE, WITHOUT STATUS EPILEPTICUS (HCC): Primary | ICD-10-CM

## 2024-09-05 RX ORDER — LEVETIRACETAM 250 MG/1
TABLET ORAL
Qty: 102 TABLET | Refills: 5 | Status: SHIPPED | OUTPATIENT
Start: 2024-09-05

## 2024-09-05 NOTE — TELEPHONE ENCOUNTER
S/w pt's DIL, pt was in rehab for a while and was getting levetiracetam filled there.  Needs refill now that she is home.  Refill sent.  Pt has appt with Dr. Dumont on 10/7/24

## 2024-10-07 ENCOUNTER — TELEPHONE (OUTPATIENT)
Age: 84
End: 2024-10-07

## 2024-10-07 NOTE — TELEPHONE ENCOUNTER
Returned the phone call to Refugio to reschedule the appointment for the patient for today 10.07.2024. [facility] to call back to reschedule.

## 2024-12-02 ENCOUNTER — OFFICE VISIT (OUTPATIENT)
Age: 84
End: 2024-12-02
Payer: MEDICARE

## 2024-12-02 VITALS
DIASTOLIC BLOOD PRESSURE: 58 MMHG | HEART RATE: 66 BPM | OXYGEN SATURATION: 97 % | WEIGHT: 141 LBS | RESPIRATION RATE: 16 BRPM | BODY MASS INDEX: 25.95 KG/M2 | SYSTOLIC BLOOD PRESSURE: 123 MMHG | HEIGHT: 62 IN

## 2024-12-02 DIAGNOSIS — G40.009 PARTIAL IDIOPATHIC EPILEPSY WITH SEIZURES OF LOCALIZED ONSET, NOT INTRACTABLE, WITHOUT STATUS EPILEPTICUS (HCC): ICD-10-CM

## 2024-12-02 PROCEDURE — 1126F AMNT PAIN NOTED NONE PRSNT: CPT | Performed by: PSYCHIATRY & NEUROLOGY

## 2024-12-02 PROCEDURE — G8400 PT W/DXA NO RESULTS DOC: HCPCS | Performed by: PSYCHIATRY & NEUROLOGY

## 2024-12-02 PROCEDURE — G8427 DOCREV CUR MEDS BY ELIG CLIN: HCPCS | Performed by: PSYCHIATRY & NEUROLOGY

## 2024-12-02 PROCEDURE — 3078F DIAST BP <80 MM HG: CPT | Performed by: PSYCHIATRY & NEUROLOGY

## 2024-12-02 PROCEDURE — 3074F SYST BP LT 130 MM HG: CPT | Performed by: PSYCHIATRY & NEUROLOGY

## 2024-12-02 PROCEDURE — 1123F ACP DISCUSS/DSCN MKR DOCD: CPT | Performed by: PSYCHIATRY & NEUROLOGY

## 2024-12-02 PROCEDURE — 1036F TOBACCO NON-USER: CPT | Performed by: PSYCHIATRY & NEUROLOGY

## 2024-12-02 PROCEDURE — 1159F MED LIST DOCD IN RCRD: CPT | Performed by: PSYCHIATRY & NEUROLOGY

## 2024-12-02 PROCEDURE — G8417 CALC BMI ABV UP PARAM F/U: HCPCS | Performed by: PSYCHIATRY & NEUROLOGY

## 2024-12-02 PROCEDURE — 99214 OFFICE O/P EST MOD 30 MIN: CPT | Performed by: PSYCHIATRY & NEUROLOGY

## 2024-12-02 PROCEDURE — 1090F PRES/ABSN URINE INCON ASSESS: CPT | Performed by: PSYCHIATRY & NEUROLOGY

## 2024-12-02 PROCEDURE — G8484 FLU IMMUNIZE NO ADMIN: HCPCS | Performed by: PSYCHIATRY & NEUROLOGY

## 2024-12-02 RX ORDER — LEVETIRACETAM 250 MG/1
TABLET ORAL
Qty: 102 TABLET | Refills: 11 | Status: SHIPPED | OUTPATIENT
Start: 2024-12-02

## 2024-12-02 NOTE — PROGRESS NOTES
[facility] at [facility] at [address] [place] [address] [address] [facility] [address] [place] [address] [phone]              Chief Complaint   Patient presents with    Seizures     Keppra 500/250 // here today w/ sonRefugio      History of stroke     dialysis on Tuesday Thursday Saturday will take an extra Keppra      Current Outpatient Medications   Medication Sig Dispense Refill    levETIRAcetam (KEPPRA) 250 MG tablet Take 2 tablets PO in morning and one tablet po at bedtime.  On dialysis days (Tue, Thur, Sat) take additional tablet in the afternoon. 102 tablet 5    azelastine HCl (ASTEPRO) 0.15 % SOLN 2 sprays by Nasal route See Admin Instructions      polyethylene glycol (MIRALAX) 17 g packet Take 1 packet by mouth daily      Aspirin 81 MG CAPS Take 81 mg by mouth daily.      acetaminophen (TYLENOL) 500 MG tablet Take 1 tablet by mouth every 4 hours as needed for Pain (mild)      melatonin 5 MG TABS tablet Take 1 tablet by mouth nightly as needed (sleep)      carboxymethylcellulose (REFRESH TEARS) 0.5 % SOLN ophthalmic solution Place 2 drops into both eyes every evening      ondansetron (ZOFRAN) 4 MG tablet Take 1 tablet by mouth every 6 hours as needed for Nausea      albuterol sulfate HFA (PROVENTIL;VENTOLIN;PROAIR) 108 (90 Base) MCG/ACT inhaler Inhale 2 puffs into the lungs every 12 hours as needed for Shortness of Breath or Wheezing      calcium acetate (PHOSLO) 667 MG CAPS capsule Take 1 capsule by mouth 3 times daily (with meals)      fluticasone (FLONASE) 50 MCG/ACT nasal spray 2 sprays by Nasal route every evening      montelukast (SINGULAIR) 10 MG tablet Take 1 tablet by mouth every evening      Polyvinyl Alcohol-Povidone PF (REFRESH) 1.4-0.6 % SOLN ophthalmic solution Apply 1-2 drops to eye as needed      pravastatin (PRAVACHOL) 40 MG tablet Take 1 tablet by mouth

## 2025-07-04 NOTE — PROCEDURES
DISCHARGE PLANNING:    Per chart review, patient has no SW/CM needs at this time.  Patient is from home with family, independent at baseline.  Primary care physician information placed on SY.  Patient has active health insurance.      #[phone]  Electronically signed by Olga Choi RN on 7/4/2025 at 10:00 AM     Long Term (12 to 26 hour) Video EEG Monitoring Report    [place], [place]        [phone] (Main)  [phone] (Medical Records)     Interpreting Physician:   Luis Angel Mullen MD  EEG Technologist:   Tomy Cotter    Start/ End:    2-23-24/ 1417  To 2-24-24/ 0942  Total Duration:    14 hrs, 18 mins  Date of Interpretation:  2/24/24    Indication:    83 y.o. 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  Found by Son this AM, laying partly on bed, partly on floor. Was alert when arrived to ER. BP was high, then had a witnessed seizure.  Was intubated due airway protection. On Keppra prior to admission.     Impression:  Abnormal prolonged video-EEG recording. There was severe generalized slowing (burst-suppression) pattern at the beginning of this recording which is suggestive of an encephalopathic process. As the study progressed, there were shorter periods of suppression/ longer bursts of brain activity, with PDR ranging between 4-5 Hz on both sides. No electrographic seizures were seen during this recording.  There were intermittent epileptiform discharges in the form of sharp waves any poly-spike discharges in right frontal and central regions.  These place the patient at increased risk of focal onset seizure.  Clinical and Neuro-Imaging correlation is necessary      =================================    Technical: 16-channel, multiple montages, digital EEG, 10-20 international placement system format.   Simultaneous video recording is performed.  The technical quality of the study was adequate.  Single lead EKG was recorded.     History of Cranial Surgery: No    Previous EEG results:   EEG (8-29-26): normal    Interpretation:  At the beginning of the recording, there background pattern is

## (undated) DEVICE — TAPE,CLOTH/SILK,CURAD,3"X10YD,LF,40/CS: Brand: CURAD

## (undated) DEVICE — APPLICATOR BNDG 1MM ADH PREMIERPRO EXOFIN

## (undated) DEVICE — TUBING PRSS MON L12IN PVC RIG NONEXPANDING M TO FEM CONN

## (undated) DEVICE — GOWN,SIRUS,NONRNF,SETINSLV,2XL,18/CS: Brand: MEDLINE

## (undated) DEVICE — Z DISCONTINUED USE 2635508 SOL IRRIGATION INJ NACL 0.9% 500ML BTL

## (undated) DEVICE — COVER LT HNDL PLAS RIG 1 PER PK

## (undated) DEVICE — SUTURE MCRYL SZ 3-0 L27IN ABSRB UD L24MM PS-1 3/8 CIR PRIM Y936H

## (undated) DEVICE — PADDING CST 6IN STERILE --

## (undated) DEVICE — STERILE POLYISOPRENE POWDER-FREE SURGICAL GLOVES: Brand: PROTEXIS

## (undated) DEVICE — KENDALL DL ECG DUAL CONNECT RADIOLUCENT LEAD WIRES, 5-LEAD, SINGLE PATIENT USE: Brand: KENDALL

## (undated) DEVICE — ANGIOGRAPHIC CATHETER: Brand: IMPULSE™

## (undated) DEVICE — HEART CATH-SFMC: Brand: MEDLINE INDUSTRIES, INC.

## (undated) DEVICE — ZIMMER® STERILE DISPOSABLE TOURNIQUET CUFF WITH PROTECTIVE SLEEVE AND PLC, DUAL PORT, SINGLE BLADDER, 34 IN. (86 CM)

## (undated) DEVICE — MARKER,SKIN,WI/RULER AND LABELS: Brand: MEDLINE

## (undated) DEVICE — CATHETER PTCA L80CM BLLN L40MM DIA10MM INTRO SHTH 6FR 7ATM

## (undated) DEVICE — Device

## (undated) DEVICE — HOOD: Brand: FLYTE

## (undated) DEVICE — COVER,MAYO STAND,STERILE: Brand: MEDLINE

## (undated) DEVICE — BANDAGE,GAUZE,BULKEE II,4.5"X4.1YD,STRL: Brand: MEDLINE

## (undated) DEVICE — BENTSON PLUS WIRE GUIDE EXTRA BODY / 20CM DISTAL FLEXIBILITY WITH SOFTENED TIP: Brand: BENTSON PLUS

## (undated) DEVICE — MASTISOL ADHESIVE LIQ 2/3ML

## (undated) DEVICE — BLADE SURG SAW STD S STL OSC W/ SERR EDGE DISP

## (undated) DEVICE — 1010 S-DRAPE TOWEL DRAPE 10/BX: Brand: STERI-DRAPE™

## (undated) DEVICE — PIN BNE FIX 4X140MM STRL -- 1EA=2PK MAKO

## (undated) DEVICE — BANDAGE COMPR W6INXL10YD ST M E WHITE/BEIGE

## (undated) DEVICE — PIN BNE 4X110MM STRL -- 2/PK MAKO

## (undated) DEVICE — NEEDLE HYPO 18GA L1.5IN PNK S STL HUB POLYPR SHLD REG BVL

## (undated) DEVICE — SPONGE GZ W4XL4IN COT 12 PLY TYP VII WVN C FLD DSGN

## (undated) DEVICE — (D)SENSOR RMFG 02 PULS OXMTR -- DISC BY MFR USE ITEM 133445

## (undated) DEVICE — ZIMMER® STERILE DISPOSABLE TOURNIQUET CUFF WITH PROTECTIVE SLEEVE AND PLC, DUAL PORT, SINGLE BLADDER, 18 IN. (46 CM)

## (undated) DEVICE — SUTURE VCRL SZ 2-0 L36IN ABSRB UD L36MM CT-1 1/2 CIR J945H

## (undated) DEVICE — SOLUTION IRRIG 3000ML 0.9% SOD CHL FLX CONT 0797208] ICU MEDICAL INC]

## (undated) DEVICE — SUT PROL 5-0 18IN P3 BLU --

## (undated) DEVICE — INFECTION CONTROL KIT SYS

## (undated) DEVICE — MICROPUNCTURE INTRODUCER SET SILHOUETTE TRANSITIONLESS PUSH-PLUS DESIGN - STIFFENED CANNULA WITH STAINLESS STEEL WIRE GUIDE: Brand: MICROPUNCTURE

## (undated) DEVICE — CATHETER DIAG 5FR L100CM LUMN ID0.047IN JL4 CRV 0 SIDE H

## (undated) DEVICE — NDL PRT INJ NSAF BLNT 18GX1.5 --

## (undated) DEVICE — BAG BELONG PT PERS CLEAR HANDL

## (undated) DEVICE — STERILE POLYISOPRENE POWDER-FREE SURGICAL GLOVES WITH EMOLLIENT COATING: Brand: PROTEXIS

## (undated) DEVICE — DUAL IRRIGATION ADAPTOR

## (undated) DEVICE — SUTURE STRATAFIX SYMMETRIC SZ 1 L18IN ABSRB VLT CT1 L36CM SXPP1A404

## (undated) DEVICE — TOWEL,OR,DSP,ST,BLUE,STD,2/PK,40PK/CS: Brand: MEDLINE

## (undated) DEVICE — KIT,ANTI FOG,W/SPONGE & FLUID,SOFT PACK: Brand: MEDLINE

## (undated) DEVICE — GUIDEWIRE VASC L180CM DIA0.035IN 3MM PTFE J TIP EXCHG FIX

## (undated) DEVICE — STANDARD (U) BLADE ASSEMBLY 6PK: Brand: MICROAIRE®

## (undated) DEVICE — KIT INT FIX FEM TIB CKPT MAKOPLASTY

## (undated) DEVICE — BLADE OPHTH 180DEG CUT SURF BLU STR SHRP DBL BVL GRINDLESS

## (undated) DEVICE — STRAP,POSITIONING,KNEE/BODY,FOAM,4X60": Brand: MEDLINE

## (undated) DEVICE — CONTAINER,SPECIMEN,3OZ,OR STRL: Brand: MEDLINE

## (undated) DEVICE — STRYKER PERFORMANCE SERIES SAGITTAL BLADE: Brand: STRYKER PERFORMANCE SERIES

## (undated) DEVICE — KIT PROC KNE TRACKING PK/1 -- VIZADISC MAKO

## (undated) DEVICE — KIT DRP FOR RIO ROBOTIC ARM ASST SYS

## (undated) DEVICE — GLOVE ORANGE PI 7   MSG9070

## (undated) DEVICE — RADIFOCUS GLIDEWIRE ADVANTAGE GUIDEWIRE: Brand: GLIDEWIRE ADVANTAGE

## (undated) DEVICE — SET ADMIN 16ML TBNG L100IN 2 Y INJ SITE IV PIGGY BK DISP

## (undated) DEVICE — GAUZE,SPONGE,FLUFF,6"X6.75",STRL,5/TRAY: Brand: MEDLINE

## (undated) DEVICE — BANDAGE COMPR W3INXL5YD TAN POLY COHESIVE CARING SGL

## (undated) DEVICE — ELECTRODE BLDE L4IN NONINSULATED EDGE

## (undated) DEVICE — GLOVE ORANGE PI 7 1/2   MSG9075

## (undated) DEVICE — DRSG GZ OIL EMUL CURAD 3X3 --

## (undated) DEVICE — KIT LEG POS DISP -- MAKO

## (undated) DEVICE — REM POLYHESIVE ADULT PATIENT RETURN ELECTRODE: Brand: VALLEYLAB

## (undated) DEVICE — SET EXTN PRIMING 4.9ML L30IN INCL SLDE CLMP SPIN M LUERLOCK

## (undated) DEVICE — SUTURE VCRL + SZ 1-0 L36IN ABSRB UD CTX 1/2 CIR TAPR PNT VCP977H

## (undated) DEVICE — SUTURE PERMA-HAND 2-0 L30IN NONABSORBABLE BLK MH L36MM 1/2 K843H

## (undated) DEVICE — GAUZE SPNG AVANT 4X4 4PLY NS --

## (undated) DEVICE — SPONGE GZ W4XL4IN COT 12 PLY TYP VII WVN C FLD DSGN STERILE

## (undated) DEVICE — TRAY PARACENT 8FR 4.75IN --

## (undated) DEVICE — PINNACLE R/O II INTRODUCER SHEATH WITH RADIOPAQUE MARKER: Brand: PINNACLE

## (undated) DEVICE — DRAPE,EXTREMITY,89X128,STERILE: Brand: MEDLINE

## (undated) DEVICE — Z DISCONTINUED USE 2744636  DRESSING AQUACEL 14 IN ALG W3.5XL14IN POLYUR FLM CVR W/ HYDRCOLL

## (undated) DEVICE — CUFF BP ADLT SOFT 1 TUBE HP --

## (undated) DEVICE — HANDPIECE SET WITH COAXIAL HIGH FLOW TIP AND SUCTION TUBE: Brand: INTERPULSE

## (undated) DEVICE — PINNACLE INTRODUCER SHEATH: Brand: PINNACLE

## (undated) DEVICE — STRIP,CLOSURE,WOUND,MEDI-STRIP,1/2X4: Brand: MEDLINE

## (undated) DEVICE — 3M™ IOBAN™ 2 ANTIMICROBIAL INCISE DRAPE 6648EZ: Brand: IOBAN™ 2

## (undated) DEVICE — ARGO BAGZ FLUID MANAGEMENT SYSTEM: Brand: ARGO BAGZ FLUID MANAGEMENT SYSTEM

## (undated) DEVICE — SMOKE EVACUATION PENCIL: Brand: VALLEYLAB

## (undated) DEVICE — COVER US PRB W15XL120CM W/ GEL RUBBERBAND TAPE STRP FLD GEN

## (undated) DEVICE — PREP SKN CHLRAPRP SNGL 1.75ML --

## (undated) DEVICE — 5F (1.0MM ID) X 9CM5F (1.0MM ID) REGULAR MICRO-STICK® INTRODUCER SETINTRODUCER SET WITH NITINOL GUIDEWIREWITH NITIN WITH RADIOPAQUE TIPWITH RADIOPAQ: Brand: MICRO-STICK SETMICRO-STICK SET

## (undated) DEVICE — CATH IV AUTOGRD BC BLU 22GA 25 -- INSYTE

## (undated) DEVICE — GLOVE SURG SZ 6 THK91MIL LTX FREE SYN POLYISOPRENE ANTI

## (undated) DEVICE — SYR LR LCK 1ML GRAD NSAF 30ML --

## (undated) DEVICE — HAND-SFMCASU: Brand: MEDLINE INDUSTRIES, INC.

## (undated) DEVICE — ELECTRODE,RADIOTRANSLUCENT,FOAM,3PK: Brand: MEDLINE

## (undated) DEVICE — PREP KIT PEEL PTCH POVIDONE IOD

## (undated) DEVICE — (D)PREP SKN CHLRAPRP APPL 26ML -- CONVERT TO ITEM 371833